# Patient Record
Sex: FEMALE | Race: WHITE | NOT HISPANIC OR LATINO | Employment: OTHER | URBAN - METROPOLITAN AREA
[De-identification: names, ages, dates, MRNs, and addresses within clinical notes are randomized per-mention and may not be internally consistent; named-entity substitution may affect disease eponyms.]

---

## 2017-10-17 ENCOUNTER — GENERIC CONVERSION - ENCOUNTER (OUTPATIENT)
Dept: OTHER | Facility: OTHER | Age: 62
End: 2017-10-17

## 2017-10-17 ENCOUNTER — ALLSCRIPTS OFFICE VISIT (OUTPATIENT)
Dept: OTHER | Facility: OTHER | Age: 62
End: 2017-10-17

## 2017-10-17 DIAGNOSIS — G47.00 INSOMNIA: ICD-10-CM

## 2017-10-17 DIAGNOSIS — E11.9 TYPE 2 DIABETES MELLITUS WITHOUT COMPLICATIONS (HCC): ICD-10-CM

## 2017-10-17 DIAGNOSIS — I48.91 ATRIAL FIBRILLATION (HCC): ICD-10-CM

## 2017-10-17 DIAGNOSIS — R11.0 NAUSEA: ICD-10-CM

## 2017-10-17 DIAGNOSIS — I63.9 CEREBRAL INFARCTION (HCC): ICD-10-CM

## 2017-11-22 ENCOUNTER — ALLSCRIPTS OFFICE VISIT (OUTPATIENT)
Dept: OTHER | Facility: OTHER | Age: 62
End: 2017-11-22

## 2017-11-22 LAB — HBA1C MFR BLD HPLC: 8.8 %

## 2017-12-26 ENCOUNTER — DOCTOR'S OFFICE (OUTPATIENT)
Dept: URBAN - METROPOLITAN AREA CLINIC 136 | Facility: CLINIC | Age: 62
Setting detail: OPHTHALMOLOGY
End: 2017-12-26
Payer: COMMERCIAL

## 2017-12-26 DIAGNOSIS — Z79.84: ICD-10-CM

## 2017-12-26 DIAGNOSIS — H25.13: ICD-10-CM

## 2017-12-26 DIAGNOSIS — E11.3413: ICD-10-CM

## 2017-12-26 PROCEDURE — 92004 COMPRE OPH EXAM NEW PT 1/>: CPT | Performed by: OPHTHALMOLOGY

## 2017-12-26 PROCEDURE — 92134 CPTRZ OPH DX IMG PST SGM RTA: CPT | Performed by: OPHTHALMOLOGY

## 2017-12-26 ASSESSMENT — REFRACTION_MANIFEST
OD_VA2: 20/
OU_VA: 20/
OS_VA1: 20/
OD_VA3: 20/
OD_VA3: 20/
OS_VA1: 20/
OS_VA2: 20/
OS_VA3: 20/
OU_VA: 20/
OD_VA3: 20/
OD_VA1: 20/
OS_VA2: 20/
OS_VA3: 20/
OU_VA: 20/
OD_VA1: 20/
OD_VA2: 20/
OD_VA2: 20/
OS_VA2: 20/
OD_VA1: 20/
OS_VA1: 20/
OS_VA3: 20/

## 2017-12-26 ASSESSMENT — VISUAL ACUITY
OS_BCVA: 20/70+1
OD_BCVA: 20/70-2

## 2017-12-26 ASSESSMENT — REFRACTION_CURRENTRX
OS_AXIS: 108
OS_OVR_VA: 20/
OD_AXIS: 103
OD_CYLINDER: -0.50
OS_SPHERE: +0.25
OD_OVR_VA: 20/
OS_ADD: +3.75
OS_VPRISM_DIRECTION: BF
OS_OVR_VA: 20/
OD_SPHERE: +0.75
OS_OVR_VA: 20/
OD_VPRISM_DIRECTION: BF
OS_CYLINDER: -0.50
OD_OVR_VA: 20/
OD_ADD: +3.75
OD_OVR_VA: 20/

## 2017-12-26 ASSESSMENT — REFRACTION_AUTOREFRACTION
OD_SPHERE: +1.25
OS_SPHERE: +1.25
OD_AXIS: 85
OS_CYLINDER: -1.50
OD_CYLINDER: -2.25
OS_AXIS: 74

## 2017-12-26 ASSESSMENT — SPHEQUIV_DERIVED
OD_SPHEQUIV: 0.125
OS_SPHEQUIV: 0.5

## 2017-12-26 ASSESSMENT — CONFRONTATIONAL VISUAL FIELD TEST (CVF)
OD_FINDINGS: FULL
OS_FINDINGS: FULL

## 2018-01-10 ENCOUNTER — GENERIC CONVERSION - ENCOUNTER (OUTPATIENT)
Dept: OTHER | Facility: OTHER | Age: 63
End: 2018-01-10

## 2018-01-15 VITALS
HEIGHT: 62 IN | DIASTOLIC BLOOD PRESSURE: 92 MMHG | SYSTOLIC BLOOD PRESSURE: 130 MMHG | BODY MASS INDEX: 34.96 KG/M2 | WEIGHT: 190 LBS

## 2018-01-17 ENCOUNTER — DOCTOR'S OFFICE (OUTPATIENT)
Dept: URBAN - METROPOLITAN AREA CLINIC 136 | Facility: CLINIC | Age: 63
Setting detail: OPHTHALMOLOGY
End: 2018-01-17
Payer: COMMERCIAL

## 2018-01-17 DIAGNOSIS — E11.3412: ICD-10-CM

## 2018-01-17 PROCEDURE — SPECPHARM SPECIALTY PHARMACY DRUG: Performed by: OPHTHALMOLOGY

## 2018-01-17 PROCEDURE — 67028 INJECTION EYE DRUG: CPT | Performed by: OPHTHALMOLOGY

## 2018-01-17 ASSESSMENT — REFRACTION_CURRENTRX
OS_OVR_VA: 20/
OD_AXIS: 103
OD_OVR_VA: 20/
OD_OVR_VA: 20/
OS_OVR_VA: 20/
OS_ADD: +3.75
OS_AXIS: 108
OD_OVR_VA: 20/
OD_VPRISM_DIRECTION: BF
OS_SPHERE: +0.25
OD_ADD: +3.75
OD_CYLINDER: -0.50
OS_VPRISM_DIRECTION: BF
OS_OVR_VA: 20/
OD_SPHERE: +0.75
OS_CYLINDER: -0.50

## 2018-01-17 ASSESSMENT — REFRACTION_MANIFEST
OD_VA3: 20/
OD_VA1: 20/
OS_VA1: 20/
OD_VA3: 20/
OD_VA1: 20/
OS_VA2: 20/
OD_VA2: 20/
OU_VA: 20/
OD_VA1: 20/
OS_VA1: 20/
OS_VA3: 20/
OU_VA: 20/
OU_VA: 20/
OS_VA2: 20/
OS_VA1: 20/
OD_VA2: 20/
OS_VA2: 20/
OD_VA2: 20/
OS_VA3: 20/
OS_VA3: 20/
OD_VA3: 20/

## 2018-01-17 ASSESSMENT — REFRACTION_AUTOREFRACTION
OD_SPHERE: +1.25
OD_AXIS: 85
OS_SPHERE: +1.25
OD_CYLINDER: -2.25
OS_AXIS: 74
OS_CYLINDER: -1.50

## 2018-01-17 ASSESSMENT — VISUAL ACUITY
OD_BCVA: 20/70-2
OS_BCVA: 20/70+1

## 2018-01-17 ASSESSMENT — SPHEQUIV_DERIVED
OD_SPHEQUIV: 0.125
OS_SPHEQUIV: 0.5

## 2018-01-17 ASSESSMENT — CONFRONTATIONAL VISUAL FIELD TEST (CVF)
OS_FINDINGS: FULL
OD_FINDINGS: FULL

## 2018-01-22 VITALS
WEIGHT: 180 LBS | BODY MASS INDEX: 33.13 KG/M2 | HEIGHT: 62 IN | DIASTOLIC BLOOD PRESSURE: 86 MMHG | TEMPERATURE: 97.7 F | SYSTOLIC BLOOD PRESSURE: 144 MMHG

## 2018-01-24 ENCOUNTER — DOCTOR'S OFFICE (OUTPATIENT)
Dept: URBAN - METROPOLITAN AREA CLINIC 136 | Facility: CLINIC | Age: 63
Setting detail: OPHTHALMOLOGY
End: 2018-01-24
Payer: COMMERCIAL

## 2018-01-24 VITALS
DIASTOLIC BLOOD PRESSURE: 90 MMHG | HEIGHT: 62 IN | OXYGEN SATURATION: 98 % | WEIGHT: 203.04 LBS | BODY MASS INDEX: 37.36 KG/M2 | SYSTOLIC BLOOD PRESSURE: 140 MMHG

## 2018-01-24 DIAGNOSIS — E11.3411: ICD-10-CM

## 2018-01-24 PROCEDURE — 67028 INJECTION EYE DRUG: CPT | Performed by: OPHTHALMOLOGY

## 2018-01-24 PROCEDURE — SPECPHARM SPECIALTY PHARMACY DRUG: Performed by: OPHTHALMOLOGY

## 2018-01-24 ASSESSMENT — REFRACTION_MANIFEST
OS_VA1: 20/
OD_VA1: 20/
OS_VA1: 20/
OU_VA: 20/
OD_VA1: 20/
OS_VA2: 20/
OS_VA1: 20/
OS_VA3: 20/
OD_VA2: 20/
OS_VA3: 20/
OU_VA: 20/
OD_VA1: 20/
OD_VA2: 20/
OU_VA: 20/
OS_VA3: 20/
OD_VA2: 20/
OD_VA3: 20/
OS_VA2: 20/
OD_VA3: 20/
OD_VA3: 20/
OS_VA2: 20/

## 2018-01-24 ASSESSMENT — REFRACTION_CURRENTRX
OD_OVR_VA: 20/
OS_CYLINDER: -0.50
OD_ADD: +3.75
OS_OVR_VA: 20/
OS_AXIS: 108
OS_OVR_VA: 20/
OS_VPRISM_DIRECTION: BF
OS_ADD: +3.75
OD_VPRISM_DIRECTION: BF
OD_SPHERE: +0.75
OS_SPHERE: +0.25
OD_AXIS: 103
OS_OVR_VA: 20/
OD_OVR_VA: 20/
OD_OVR_VA: 20/
OD_CYLINDER: -0.50

## 2018-01-24 ASSESSMENT — SPHEQUIV_DERIVED
OD_SPHEQUIV: 0.125
OS_SPHEQUIV: 0.5

## 2018-01-24 ASSESSMENT — REFRACTION_AUTOREFRACTION
OS_SPHERE: +1.25
OD_AXIS: 85
OD_CYLINDER: -2.25
OS_CYLINDER: -1.50
OS_AXIS: 74
OD_SPHERE: +1.25

## 2018-01-24 ASSESSMENT — VISUAL ACUITY
OD_BCVA: 20/70-2
OS_BCVA: 20/70-1

## 2018-02-27 DIAGNOSIS — E11.9 TYPE 2 DIABETES MELLITUS WITHOUT COMPLICATION, WITH LONG-TERM CURRENT USE OF INSULIN (HCC): Primary | ICD-10-CM

## 2018-02-27 DIAGNOSIS — Z79.4 TYPE 2 DIABETES MELLITUS WITHOUT COMPLICATION, WITH LONG-TERM CURRENT USE OF INSULIN (HCC): Primary | ICD-10-CM

## 2018-02-28 ENCOUNTER — DOCTOR'S OFFICE (OUTPATIENT)
Dept: URBAN - METROPOLITAN AREA CLINIC 136 | Facility: CLINIC | Age: 63
Setting detail: OPHTHALMOLOGY
End: 2018-02-28
Payer: COMMERCIAL

## 2018-02-28 DIAGNOSIS — Z79.84: ICD-10-CM

## 2018-02-28 DIAGNOSIS — E11.3413: ICD-10-CM

## 2018-02-28 DIAGNOSIS — E11.3411: ICD-10-CM

## 2018-02-28 DIAGNOSIS — H25.13: ICD-10-CM

## 2018-02-28 PROCEDURE — 92134 CPTRZ OPH DX IMG PST SGM RTA: CPT | Performed by: OPHTHALMOLOGY

## 2018-02-28 PROCEDURE — 67028 INJECTION EYE DRUG: CPT | Performed by: OPHTHALMOLOGY

## 2018-02-28 PROCEDURE — SPECPHARM SPECIALTY PHARMACY DRUG: Performed by: OPHTHALMOLOGY

## 2018-02-28 PROCEDURE — 92012 INTRM OPH EXAM EST PATIENT: CPT | Performed by: OPHTHALMOLOGY

## 2018-02-28 ASSESSMENT — REFRACTION_MANIFEST
OU_VA: 20/
OS_VA2: 20/
OD_VA1: 20/
OS_VA1: 20/
OD_VA3: 20/
OS_VA1: 20/
OD_VA1: 20/
OU_VA: 20/
OS_VA1: 20/
OD_VA1: 20/
OD_VA3: 20/
OS_VA3: 20/
OD_VA2: 20/
OD_VA2: 20/
OD_VA3: 20/
OS_VA3: 20/
OS_VA3: 20/
OS_VA2: 20/
OU_VA: 20/
OS_VA2: 20/
OD_VA2: 20/

## 2018-02-28 ASSESSMENT — REFRACTION_CURRENTRX
OD_VPRISM_DIRECTION: BF
OD_CYLINDER: -0.50
OS_OVR_VA: 20/
OD_OVR_VA: 20/
OS_ADD: +3.75
OD_OVR_VA: 20/
OD_ADD: +3.75
OS_OVR_VA: 20/
OS_CYLINDER: -0.50
OS_SPHERE: +0.25
OS_AXIS: 108
OD_AXIS: 103
OD_OVR_VA: 20/
OD_SPHERE: +0.75
OS_VPRISM_DIRECTION: BF
OS_OVR_VA: 20/

## 2018-02-28 ASSESSMENT — VISUAL ACUITY
OS_BCVA: 20/70-2
OD_BCVA: 20/80+1

## 2018-02-28 ASSESSMENT — CONFRONTATIONAL VISUAL FIELD TEST (CVF)
OS_FINDINGS: FULL
OD_FINDINGS: FULL

## 2018-02-28 ASSESSMENT — REFRACTION_AUTOREFRACTION
OS_AXIS: 74
OS_CYLINDER: -1.50
OS_SPHERE: +1.25
OD_CYLINDER: -2.25
OD_AXIS: 85
OD_SPHERE: +1.25

## 2018-02-28 ASSESSMENT — SPHEQUIV_DERIVED
OS_SPHEQUIV: 0.5
OD_SPHEQUIV: 0.125

## 2018-03-02 ENCOUNTER — TELEPHONE (OUTPATIENT)
Dept: FAMILY MEDICINE CLINIC | Facility: CLINIC | Age: 63
End: 2018-03-02

## 2018-03-05 DIAGNOSIS — E11.9 TYPE 2 DIABETES MELLITUS WITHOUT COMPLICATION, WITH LONG-TERM CURRENT USE OF INSULIN (HCC): ICD-10-CM

## 2018-03-05 DIAGNOSIS — Z79.4 TYPE 2 DIABETES MELLITUS WITHOUT COMPLICATION, WITH LONG-TERM CURRENT USE OF INSULIN (HCC): ICD-10-CM

## 2018-03-05 NOTE — TELEPHONE ENCOUNTER
PT CALLED AND WAS QUESTIONING WHERE HER SCRIPT WAS FOR - "BASAGLAR"   SHE SAID SHE ORDERED IT LAST WEEK  SHE ALSO WOULD LIKE TO REORDER HER SCRIPT FOR "Rosalio Mccormick" - SHE WOULD LIKE THEM TO GO TO [address] [place] [address] ON [address] IN [address] [facility]    TR [id number]

## 2018-03-15 ENCOUNTER — DOCTOR'S OFFICE (OUTPATIENT)
Dept: URBAN - METROPOLITAN AREA CLINIC 136 | Facility: CLINIC | Age: 63
Setting detail: OPHTHALMOLOGY
End: 2018-03-15
Payer: COMMERCIAL

## 2018-03-15 ENCOUNTER — RX ONLY (RX ONLY)
Age: 63
End: 2018-03-15

## 2018-03-15 DIAGNOSIS — R05.9 COUGH: Primary | ICD-10-CM

## 2018-03-15 DIAGNOSIS — E11.3412: ICD-10-CM

## 2018-03-15 PROCEDURE — 67028 INJECTION EYE DRUG: CPT | Performed by: OPHTHALMOLOGY

## 2018-03-15 PROCEDURE — SPECPHARM SPECIALTY PHARMACY DRUG: Performed by: OPHTHALMOLOGY

## 2018-03-15 RX ORDER — ALBUTEROL SULFATE 90 UG/1
2 AEROSOL, METERED RESPIRATORY (INHALATION)
COMMUNITY
Start: 2018-01-10 | End: 2018-03-15 | Stop reason: SDUPTHER

## 2018-03-15 RX ORDER — ALBUTEROL SULFATE 90 UG/1
2 AEROSOL, METERED RESPIRATORY (INHALATION) EVERY 4 HOURS PRN
Qty: 1 INHALER | Refills: 2 | Status: SHIPPED | OUTPATIENT
Start: 2018-03-15 | End: 2018-10-25 | Stop reason: SDUPTHER

## 2018-03-15 ASSESSMENT — REFRACTION_CURRENTRX
OS_VPRISM_DIRECTION: BF
OD_ADD: +3.75
OD_OVR_VA: 20/
OD_OVR_VA: 20/
OS_AXIS: 108
OD_CYLINDER: -0.50
OS_CYLINDER: -0.50
OS_OVR_VA: 20/
OS_ADD: +3.75
OD_OVR_VA: 20/
OS_OVR_VA: 20/
OD_VPRISM_DIRECTION: BF
OD_AXIS: 103
OS_OVR_VA: 20/
OS_SPHERE: +0.25
OD_SPHERE: +0.75

## 2018-03-15 ASSESSMENT — REFRACTION_MANIFEST
OD_VA1: 20/
OD_VA3: 20/
OS_VA3: 20/
OS_VA2: 20/
OD_VA3: 20/
OS_VA1: 20/
OS_VA2: 20/
OU_VA: 20/
OU_VA: 20/
OS_VA3: 20/
OD_VA2: 20/
OD_VA2: 20/
OU_VA: 20/
OD_VA1: 20/
OS_VA1: 20/
OS_VA2: 20/
OD_VA3: 20/
OD_VA2: 20/
OS_VA3: 20/
OS_VA1: 20/
OD_VA1: 20/

## 2018-03-15 ASSESSMENT — REFRACTION_AUTOREFRACTION
OS_AXIS: 74
OD_AXIS: 85
OD_SPHERE: +1.25
OD_CYLINDER: -2.25
OS_CYLINDER: -1.50
OS_SPHERE: +1.25

## 2018-03-15 ASSESSMENT — VISUAL ACUITY
OD_BCVA: 20/100+1
OS_BCVA: 20/70-2

## 2018-03-15 ASSESSMENT — SPHEQUIV_DERIVED
OD_SPHEQUIV: 0.125
OS_SPHEQUIV: 0.5

## 2018-03-16 ENCOUNTER — TELEPHONE (OUTPATIENT)
Dept: FAMILY MEDICINE CLINIC | Facility: CLINIC | Age: 63
End: 2018-03-16

## 2018-03-22 DIAGNOSIS — E11.8 TYPE 2 DIABETES MELLITUS WITH COMPLICATION, UNSPECIFIED LONG TERM INSULIN USE STATUS: Primary | ICD-10-CM

## 2018-04-19 ENCOUNTER — DOCTOR'S OFFICE (OUTPATIENT)
Dept: URBAN - METROPOLITAN AREA CLINIC 136 | Facility: CLINIC | Age: 63
Setting detail: OPHTHALMOLOGY
End: 2018-04-19
Payer: COMMERCIAL

## 2018-04-19 ENCOUNTER — RX ONLY (RX ONLY)
Age: 63
End: 2018-04-19

## 2018-04-19 DIAGNOSIS — E11.3411: ICD-10-CM

## 2018-04-19 DIAGNOSIS — Z79.84: ICD-10-CM

## 2018-04-19 DIAGNOSIS — E11.3413: ICD-10-CM

## 2018-04-19 DIAGNOSIS — H25.13: ICD-10-CM

## 2018-04-19 PROCEDURE — SPECPHARM SPECIALTY PHARMACY DRUG: Performed by: OPHTHALMOLOGY

## 2018-04-19 PROCEDURE — 92014 COMPRE OPH EXAM EST PT 1/>: CPT | Performed by: OPHTHALMOLOGY

## 2018-04-19 PROCEDURE — 92134 CPTRZ OPH DX IMG PST SGM RTA: CPT | Performed by: OPHTHALMOLOGY

## 2018-04-19 PROCEDURE — 67028 INJECTION EYE DRUG: CPT | Performed by: OPHTHALMOLOGY

## 2018-04-19 ASSESSMENT — SPHEQUIV_DERIVED
OD_SPHEQUIV: 0.125
OS_SPHEQUIV: 0.5

## 2018-04-19 ASSESSMENT — REFRACTION_MANIFEST
OD_VA1: 20/
OD_VA3: 20/
OS_VA1: 20/
OS_VA2: 20/
OD_VA2: 20/
OU_VA: 20/
OD_VA2: 20/
OD_VA1: 20/
OS_VA2: 20/
OD_VA3: 20/
OS_VA3: 20/
OS_VA1: 20/
OD_VA2: 20/
OD_VA3: 20/
OS_VA3: 20/
OU_VA: 20/
OU_VA: 20/
OS_VA1: 20/
OD_VA1: 20/
OS_VA2: 20/
OS_VA3: 20/

## 2018-04-19 ASSESSMENT — CONFRONTATIONAL VISUAL FIELD TEST (CVF)
OS_FINDINGS: FULL
OD_FINDINGS: FULL

## 2018-04-19 ASSESSMENT — REFRACTION_CURRENTRX
OD_CYLINDER: -0.50
OS_CYLINDER: -0.50
OS_OVR_VA: 20/
OD_OVR_VA: 20/
OS_AXIS: 108
OD_SPHERE: +0.75
OS_ADD: +3.75
OD_ADD: +3.75
OD_OVR_VA: 20/
OS_OVR_VA: 20/
OS_SPHERE: +0.25
OD_VPRISM_DIRECTION: BF
OS_OVR_VA: 20/
OD_OVR_VA: 20/
OD_AXIS: 103
OS_VPRISM_DIRECTION: BF

## 2018-04-19 ASSESSMENT — REFRACTION_AUTOREFRACTION
OS_SPHERE: +1.25
OS_CYLINDER: -1.50
OD_AXIS: 85
OS_AXIS: 74
OD_CYLINDER: -2.25
OD_SPHERE: +1.25

## 2018-04-19 ASSESSMENT — VISUAL ACUITY
OS_BCVA: 20/125-1
OD_BCVA: 20/125

## 2018-04-30 ENCOUNTER — DOCTOR'S OFFICE (OUTPATIENT)
Dept: URBAN - METROPOLITAN AREA CLINIC 136 | Facility: CLINIC | Age: 63
Setting detail: OPHTHALMOLOGY
End: 2018-04-30
Payer: COMMERCIAL

## 2018-04-30 ENCOUNTER — RX ONLY (RX ONLY)
Age: 63
End: 2018-04-30

## 2018-04-30 DIAGNOSIS — E11.3412: ICD-10-CM

## 2018-04-30 PROCEDURE — SPECPHARM SPECIALTY PHARMACY DRUG: Performed by: OPHTHALMOLOGY

## 2018-04-30 PROCEDURE — 67028 INJECTION EYE DRUG: CPT | Performed by: OPHTHALMOLOGY

## 2018-04-30 ASSESSMENT — REFRACTION_MANIFEST
OU_VA: 20/
OS_VA1: 20/
OS_VA3: 20/
OS_VA3: 20/
OD_VA2: 20/
OD_VA3: 20/
OD_VA1: 20/
OD_VA2: 20/
OS_VA2: 20/
OD_VA3: 20/
OS_VA2: 20/
OS_VA3: 20/
OU_VA: 20/
OD_VA1: 20/
OS_VA1: 20/
OU_VA: 20/
OD_VA1: 20/
OD_VA2: 20/
OS_VA2: 20/
OD_VA3: 20/
OS_VA1: 20/

## 2018-04-30 ASSESSMENT — REFRACTION_CURRENTRX
OD_OVR_VA: 20/
OS_VPRISM_DIRECTION: BF
OD_CYLINDER: -0.50
OS_ADD: +3.75
OD_OVR_VA: 20/
OS_OVR_VA: 20/
OS_OVR_VA: 20/
OS_SPHERE: +0.25
OD_OVR_VA: 20/
OS_AXIS: 108
OS_CYLINDER: -0.50
OS_OVR_VA: 20/
OD_ADD: +3.75
OD_SPHERE: +0.75
OD_VPRISM_DIRECTION: BF
OD_AXIS: 103

## 2018-04-30 ASSESSMENT — REFRACTION_AUTOREFRACTION
OD_AXIS: 85
OS_CYLINDER: -1.50
OS_AXIS: 74
OD_SPHERE: +1.25
OD_CYLINDER: -2.25
OS_SPHERE: +1.25

## 2018-04-30 ASSESSMENT — VISUAL ACUITY
OS_BCVA: 20/125-1
OD_BCVA: 20/100-1

## 2018-04-30 ASSESSMENT — SPHEQUIV_DERIVED
OD_SPHEQUIV: 0.125
OS_SPHEQUIV: 0.5

## 2018-05-10 ENCOUNTER — TELEPHONE (OUTPATIENT)
Dept: FAMILY MEDICINE CLINIC | Facility: CLINIC | Age: 63
End: 2018-05-10

## 2018-05-10 DIAGNOSIS — E11.9 TYPE 2 DIABETES MELLITUS WITHOUT COMPLICATION, WITH LONG-TERM CURRENT USE OF INSULIN (HCC): ICD-10-CM

## 2018-05-10 DIAGNOSIS — Z79.4 TYPE 2 DIABETES MELLITUS WITHOUT COMPLICATION, WITH LONG-TERM CURRENT USE OF INSULIN (HCC): ICD-10-CM

## 2018-05-29 DIAGNOSIS — E13.9 OTHER SPECIFIED DIABETES MELLITUS WITHOUT COMPLICATION, WITH LONG-TERM CURRENT USE OF INSULIN (HCC): Primary | ICD-10-CM

## 2018-05-29 DIAGNOSIS — Z79.4 OTHER SPECIFIED DIABETES MELLITUS WITHOUT COMPLICATION, WITH LONG-TERM CURRENT USE OF INSULIN (HCC): Primary | ICD-10-CM

## 2018-05-29 RX ORDER — LANCETS
EACH MISCELLANEOUS
COMMUNITY
Start: 2017-10-10 | End: 2021-04-30 | Stop reason: ALTCHOICE

## 2018-05-29 RX ORDER — PEN NEEDLE, DIABETIC 30 GX5/16"
NEEDLE, DISPOSABLE MISCELLANEOUS DAILY
Qty: 50 EACH | Refills: 0 | Status: SHIPPED | OUTPATIENT
Start: 2018-05-29 | End: 2021-09-08 | Stop reason: SDUPTHER

## 2018-05-30 ENCOUNTER — RX ONLY (RX ONLY)
Age: 63
End: 2018-05-30

## 2018-05-30 ENCOUNTER — DOCTOR'S OFFICE (OUTPATIENT)
Dept: URBAN - METROPOLITAN AREA CLINIC 136 | Facility: CLINIC | Age: 63
Setting detail: OPHTHALMOLOGY
End: 2018-05-30
Payer: COMMERCIAL

## 2018-05-30 DIAGNOSIS — E11.3411: ICD-10-CM

## 2018-05-30 DIAGNOSIS — H25.13: ICD-10-CM

## 2018-05-30 DIAGNOSIS — E11.3413: ICD-10-CM

## 2018-05-30 DIAGNOSIS — Z79.84: ICD-10-CM

## 2018-05-30 PROCEDURE — SPECPHARM SPECIALTY PHARMACY DRUG: Performed by: OPHTHALMOLOGY

## 2018-05-30 PROCEDURE — 92134 CPTRZ OPH DX IMG PST SGM RTA: CPT | Performed by: OPHTHALMOLOGY

## 2018-05-30 PROCEDURE — 92012 INTRM OPH EXAM EST PATIENT: CPT | Performed by: OPHTHALMOLOGY

## 2018-05-30 PROCEDURE — 67028 INJECTION EYE DRUG: CPT | Performed by: OPHTHALMOLOGY

## 2018-05-30 ASSESSMENT — CONFRONTATIONAL VISUAL FIELD TEST (CVF)
OD_FINDINGS: FULL
OS_FINDINGS: FULL

## 2018-05-31 ENCOUNTER — RX ONLY (RX ONLY)
Age: 63
End: 2018-05-31

## 2018-05-31 ASSESSMENT — REFRACTION_CURRENTRX
OS_SPHERE: +0.25
OD_OVR_VA: 20/
OS_OVR_VA: 20/
OS_CYLINDER: -0.50
OD_VPRISM_DIRECTION: BF
OD_OVR_VA: 20/
OS_VPRISM_DIRECTION: BF
OD_ADD: +3.75
OS_OVR_VA: 20/
OD_AXIS: 103
OS_ADD: +3.75
OD_SPHERE: +0.75
OD_CYLINDER: -0.50
OS_AXIS: 108
OS_OVR_VA: 20/
OD_OVR_VA: 20/

## 2018-05-31 ASSESSMENT — REFRACTION_MANIFEST
OS_VA2: 20/
OS_VA1: 20/
OS_VA1: 20/
OD_VA3: 20/
OD_VA1: 20/
OD_VA3: 20/
OS_VA3: 20/
OD_VA2: 20/
OU_VA: 20/
OS_VA1: 20/
OS_VA2: 20/
OS_VA3: 20/
OD_VA2: 20/
OD_VA1: 20/
OU_VA: 20/
OD_VA3: 20/
OS_VA2: 20/
OD_VA2: 20/
OS_VA3: 20/
OD_VA1: 20/
OU_VA: 20/

## 2018-05-31 ASSESSMENT — REFRACTION_AUTOREFRACTION
OS_AXIS: 74
OS_SPHERE: +1.25
OS_CYLINDER: -1.50
OD_SPHERE: +1.25
OD_AXIS: 85
OD_CYLINDER: -2.25

## 2018-05-31 ASSESSMENT — VISUAL ACUITY
OS_BCVA: 20/125
OD_BCVA: 20/100

## 2018-05-31 ASSESSMENT — SPHEQUIV_DERIVED
OD_SPHEQUIV: 0.125
OS_SPHEQUIV: 0.5

## 2018-06-07 ENCOUNTER — TELEPHONE (OUTPATIENT)
Dept: FAMILY MEDICINE CLINIC | Facility: CLINIC | Age: 63
End: 2018-06-07

## 2018-06-07 NOTE — TELEPHONE ENCOUNTER
Spoke with patient-as per Dr Hugo Biswas patient to hold second dose of metformin and cut lantus dose in half at bedtime-pt aware and understands-mg

## 2018-06-07 NOTE — TELEPHONE ENCOUNTER
PT LEFT VOICE MESSAGE STATING SHE IS HAVING THROAT SURGERY TOMORROW AND SHE NEEDS TO KNOW WHAT TO DO WITH HER INSULIN INJECTION

## 2018-06-12 ENCOUNTER — DOCTOR'S OFFICE (OUTPATIENT)
Dept: URBAN - METROPOLITAN AREA CLINIC 136 | Facility: CLINIC | Age: 63
Setting detail: OPHTHALMOLOGY
End: 2018-06-12
Payer: COMMERCIAL

## 2018-06-12 ENCOUNTER — RX ONLY (RX ONLY)
Age: 63
End: 2018-06-12

## 2018-06-12 DIAGNOSIS — E11.3412: ICD-10-CM

## 2018-06-12 PROCEDURE — 67028 INJECTION EYE DRUG: CPT | Performed by: OPHTHALMOLOGY

## 2018-06-12 PROCEDURE — SPECPHARM SPECIALTY PHARMACY DRUG: Performed by: OPHTHALMOLOGY

## 2018-06-12 ASSESSMENT — REFRACTION_MANIFEST
OS_VA2: 20/
OS_VA3: 20/
OU_VA: 20/
OS_VA1: 20/
OS_VA2: 20/
OS_VA3: 20/
OD_VA3: 20/
OS_VA2: 20/
OS_VA3: 20/
OD_VA1: 20/
OU_VA: 20/
OD_VA3: 20/
OU_VA: 20/
OD_VA2: 20/
OD_VA2: 20/
OS_VA1: 20/
OD_VA2: 20/
OS_VA1: 20/
OD_VA3: 20/
OD_VA1: 20/
OD_VA1: 20/

## 2018-06-12 ASSESSMENT — REFRACTION_AUTOREFRACTION
OS_SPHERE: +1.25
OD_SPHERE: +1.25
OD_AXIS: 85
OS_AXIS: 74
OD_CYLINDER: -2.25
OS_CYLINDER: -1.50

## 2018-06-12 ASSESSMENT — REFRACTION_CURRENTRX
OD_SPHERE: +0.75
OS_OVR_VA: 20/
OD_OVR_VA: 20/
OS_VPRISM_DIRECTION: BF
OS_AXIS: 108
OS_OVR_VA: 20/
OD_CYLINDER: -0.50
OS_OVR_VA: 20/
OD_VPRISM_DIRECTION: BF
OD_AXIS: 103
OS_ADD: +3.75
OD_OVR_VA: 20/
OD_OVR_VA: 20/
OS_SPHERE: +0.25
OD_ADD: +3.75
OS_CYLINDER: -0.50

## 2018-06-12 ASSESSMENT — CONFRONTATIONAL VISUAL FIELD TEST (CVF)
OS_FINDINGS: FULL
OD_FINDINGS: FULL

## 2018-06-12 ASSESSMENT — SPHEQUIV_DERIVED
OD_SPHEQUIV: 0.125
OS_SPHEQUIV: 0.5

## 2018-06-12 ASSESSMENT — VISUAL ACUITY
OD_BCVA: 20/80-1
OS_BCVA: 20/70-2

## 2018-06-25 DIAGNOSIS — E11.9 TYPE 2 DIABETES MELLITUS WITHOUT COMPLICATION, WITH LONG-TERM CURRENT USE OF INSULIN (HCC): ICD-10-CM

## 2018-06-25 DIAGNOSIS — Z79.4 TYPE 2 DIABETES MELLITUS WITHOUT COMPLICATION, WITH LONG-TERM CURRENT USE OF INSULIN (HCC): ICD-10-CM

## 2018-06-25 NOTE — TELEPHONE ENCOUNTER
Dr Erinn Reyez patient  She called stating she is out of her medication and requests a refill ASAP to be sent to [facility]

## 2018-06-26 ENCOUNTER — OPTICAL OFFICE (OUTPATIENT)
Dept: URBAN - METROPOLITAN AREA CLINIC 143 | Facility: CLINIC | Age: 63
Setting detail: OPHTHALMOLOGY
End: 2018-06-26
Payer: COMMERCIAL

## 2018-06-26 ENCOUNTER — DOCTOR'S OFFICE (OUTPATIENT)
Dept: URBAN - METROPOLITAN AREA CLINIC 136 | Facility: CLINIC | Age: 63
Setting detail: OPHTHALMOLOGY
End: 2018-06-26
Payer: COMMERCIAL

## 2018-06-26 DIAGNOSIS — H52.4: ICD-10-CM

## 2018-06-26 DIAGNOSIS — H52.223: ICD-10-CM

## 2018-06-26 PROCEDURE — V2744 TINT PHOTOCHROMATIC LENS/ES: HCPCS | Performed by: OPTOMETRIST

## 2018-06-26 PROCEDURE — 92015 DETERMINE REFRACTIVE STATE: CPT | Performed by: OPTOMETRIST

## 2018-06-26 PROCEDURE — V2203 LENS SPHCYL BIFOCAL 4.00D/.1: HCPCS | Performed by: OPTOMETRIST

## 2018-06-26 PROCEDURE — V2020 VISION SVCS FRAMES PURCHASES: HCPCS | Performed by: OPTOMETRIST

## 2018-06-26 ASSESSMENT — REFRACTION_OUTSIDERX
OD_VA3: 20/
OS_ADD: +2.75
OS_VA2: 20/
OS_SPHERE: +1.25
OU_VA: 20/50
OD_VA1: 20/50-
OS_AXIS: 090
OD_AXIS: 105
OS_VA1: 20/50-
OD_ADD: +2.75
OS_VA3: 20/
OD_SPHERE: +1.25
OD_VA2: 20/
OS_CYLINDER: -0.75
OD_CYLINDER: -0.75

## 2018-06-26 ASSESSMENT — REFRACTION_MANIFEST
OD_VA1: 20/
OS_VA2: 20/
OD_VA3: 20/
OD_VA1: 20/
OS_VA3: 20/
OU_VA: 20/
OS_VA2: 20/
OD_VA2: 20/
OU_VA: 20/
OS_VA3: 20/
OD_VA2: 20/
OD_VA3: 20/
OS_VA1: 20/
OS_VA1: 20/

## 2018-06-26 ASSESSMENT — REFRACTION_AUTOREFRACTION
OS_CYLINDER: -1.50
OS_AXIS: 087
OD_CYLINDER: -1.50
OD_AXIS: 115
OD_SPHERE: +1.25
OS_SPHERE: +1.50

## 2018-06-26 ASSESSMENT — REFRACTION_CURRENTRX
OD_CYLINDER: -0.50
OS_OVR_VA: 20/
OS_OVR_VA: 20/
OD_AXIS: 103
OS_AXIS: 108
OS_VPRISM_DIRECTION: BF
OS_CYLINDER: -0.50
OD_VPRISM_DIRECTION: BF
OD_ADD: +3.75
OD_OVR_VA: 20/
OS_ADD: +3.75
OD_OVR_VA: 20/
OS_SPHERE: +0.25
OD_OVR_VA: 20/
OS_OVR_VA: 20/
OD_SPHERE: +0.75

## 2018-06-26 ASSESSMENT — CONFRONTATIONAL VISUAL FIELD TEST (CVF): OD_FINDINGS: FULL

## 2018-06-26 ASSESSMENT — VISUAL ACUITY
OD_BCVA: 20/100-1
OS_BCVA: 20/80+1

## 2018-06-26 ASSESSMENT — SPHEQUIV_DERIVED
OS_SPHEQUIV: 0.75
OD_SPHEQUIV: 0.5

## 2018-07-19 ENCOUNTER — DOCTOR'S OFFICE (OUTPATIENT)
Dept: URBAN - METROPOLITAN AREA CLINIC 136 | Facility: CLINIC | Age: 63
Setting detail: OPHTHALMOLOGY
End: 2018-07-19
Payer: COMMERCIAL

## 2018-07-19 DIAGNOSIS — E11.3413: ICD-10-CM

## 2018-07-19 DIAGNOSIS — H25.13: ICD-10-CM

## 2018-07-19 DIAGNOSIS — Z79.84: ICD-10-CM

## 2018-07-19 PROCEDURE — 92012 INTRM OPH EXAM EST PATIENT: CPT | Performed by: OPHTHALMOLOGY

## 2018-07-19 PROCEDURE — 92134 CPTRZ OPH DX IMG PST SGM RTA: CPT | Performed by: OPHTHALMOLOGY

## 2018-07-19 ASSESSMENT — CONFRONTATIONAL VISUAL FIELD TEST (CVF): OD_FINDINGS: FULL

## 2018-07-25 ASSESSMENT — REFRACTION_MANIFEST
OD_VA2: 20/
OD_VA3: 20/
OD_VA2: 20/
OD_VA1: 20/
OS_VA2: 20/
OD_VA1: 20/
OU_VA: 20/
OS_VA1: 20/
OS_VA2: 20/
OS_VA1: 20/
OU_VA: 20/
OS_VA3: 20/
OS_VA3: 20/
OD_VA3: 20/

## 2018-07-25 ASSESSMENT — REFRACTION_OUTSIDERX
OS_SPHERE: +1.25
OD_VA3: 20/
OS_AXIS: 090
OS_ADD: +2.75
OU_VA: 20/50
OS_CYLINDER: -0.75
OS_VA1: 20/50-
OD_SPHERE: +1.25
OD_CYLINDER: -0.75
OD_VA2: 20/
OD_VA1: 20/50-
OS_VA2: 20/
OD_ADD: +2.75
OS_VA3: 20/
OD_AXIS: 105

## 2018-07-25 ASSESSMENT — REFRACTION_AUTOREFRACTION
OS_AXIS: 087
OD_CYLINDER: -1.50
OS_CYLINDER: -1.50
OS_SPHERE: +1.50
OD_AXIS: 115
OD_SPHERE: +1.25

## 2018-07-25 ASSESSMENT — REFRACTION_CURRENTRX
OD_OVR_VA: 20/
OD_AXIS: 103
OS_VPRISM_DIRECTION: BF
OS_ADD: +3.75
OS_OVR_VA: 20/
OD_ADD: +3.75
OS_AXIS: 108
OD_SPHERE: +0.75
OS_SPHERE: +0.25
OD_OVR_VA: 20/
OS_OVR_VA: 20/
OS_CYLINDER: -0.50
OD_CYLINDER: -0.50
OS_OVR_VA: 20/
OD_OVR_VA: 20/
OD_VPRISM_DIRECTION: BF

## 2018-07-25 ASSESSMENT — SPHEQUIV_DERIVED
OD_SPHEQUIV: 0.5
OS_SPHEQUIV: 0.75

## 2018-07-25 ASSESSMENT — VISUAL ACUITY
OS_BCVA: 20/60-1
OD_BCVA: 20/60-1

## 2018-08-07 DIAGNOSIS — Z79.4 TYPE 2 DIABETES MELLITUS WITHOUT COMPLICATION, WITH LONG-TERM CURRENT USE OF INSULIN (HCC): ICD-10-CM

## 2018-08-07 DIAGNOSIS — E11.9 TYPE 2 DIABETES MELLITUS WITHOUT COMPLICATION, WITH LONG-TERM CURRENT USE OF INSULIN (HCC): ICD-10-CM

## 2018-08-07 RX ORDER — INSULIN GLARGINE 100 [IU]/ML
50 INJECTION, SOLUTION SUBCUTANEOUS
Qty: 18 ML | Refills: 0 | Status: SHIPPED | OUTPATIENT
Start: 2018-08-07 | End: 2018-09-18 | Stop reason: SDUPTHER

## 2018-08-09 ENCOUNTER — DOCTOR'S OFFICE (OUTPATIENT)
Dept: URBAN - METROPOLITAN AREA CLINIC 136 | Facility: CLINIC | Age: 63
Setting detail: OPHTHALMOLOGY
End: 2018-08-09
Payer: COMMERCIAL

## 2018-08-09 ENCOUNTER — RX ONLY (RX ONLY)
Age: 63
End: 2018-08-09

## 2018-08-09 DIAGNOSIS — E11.3411: ICD-10-CM

## 2018-08-09 PROCEDURE — SPECPHARM SPECIALTY PHARMACY DRUG: Performed by: OPHTHALMOLOGY

## 2018-08-09 PROCEDURE — 67028 INJECTION EYE DRUG: CPT | Performed by: OPHTHALMOLOGY

## 2018-08-09 ASSESSMENT — REFRACTION_MANIFEST
OU_VA: 20/
OS_VA3: 20/
OS_VA1: 20/
OD_VA3: 20/
OS_VA3: 20/
OD_VA1: 20/
OD_VA2: 20/
OD_VA2: 20/
OU_VA: 20/
OD_VA1: 20/
OS_VA2: 20/
OS_VA1: 20/
OS_VA2: 20/
OD_VA3: 20/

## 2018-08-09 ASSESSMENT — REFRACTION_CURRENTRX
OS_CYLINDER: -0.50
OS_ADD: +3.75
OS_VPRISM_DIRECTION: BF
OD_CYLINDER: -0.50
OD_ADD: +3.75
OS_OVR_VA: 20/
OS_SPHERE: +0.25
OD_OVR_VA: 20/
OD_AXIS: 103
OD_OVR_VA: 20/
OS_OVR_VA: 20/
OS_OVR_VA: 20/
OD_VPRISM_DIRECTION: BF
OS_AXIS: 108
OD_SPHERE: +0.75
OD_OVR_VA: 20/

## 2018-08-09 ASSESSMENT — REFRACTION_OUTSIDERX
OS_VA3: 20/
OD_SPHERE: +1.25
OD_VA1: 20/50-
OD_VA3: 20/
OS_VA2: 20/
OD_CYLINDER: -0.75
OD_VA2: 20/
OD_AXIS: 105
OS_AXIS: 090
OU_VA: 20/50
OS_VA1: 20/50-
OD_ADD: +2.75
OS_SPHERE: +1.25
OS_ADD: +2.75
OS_CYLINDER: -0.75

## 2018-08-09 ASSESSMENT — REFRACTION_AUTOREFRACTION
OS_AXIS: 087
OD_AXIS: 115
OD_CYLINDER: -1.50
OS_SPHERE: +1.50
OD_SPHERE: +1.25
OS_CYLINDER: -1.50

## 2018-08-09 ASSESSMENT — SPHEQUIV_DERIVED
OD_SPHEQUIV: 0.5
OS_SPHEQUIV: 0.75

## 2018-08-09 ASSESSMENT — VISUAL ACUITY
OD_BCVA: 20/60-1
OS_BCVA: 20/70+2

## 2018-08-16 ENCOUNTER — RX ONLY (RX ONLY)
Age: 63
End: 2018-08-16

## 2018-08-16 ENCOUNTER — DOCTOR'S OFFICE (OUTPATIENT)
Dept: URBAN - METROPOLITAN AREA CLINIC 136 | Facility: CLINIC | Age: 63
Setting detail: OPHTHALMOLOGY
End: 2018-08-16
Payer: COMMERCIAL

## 2018-08-16 DIAGNOSIS — E11.3412: ICD-10-CM

## 2018-08-16 PROCEDURE — SPECPHARM SPECIALTY PHARMACY DRUG: Performed by: OPHTHALMOLOGY

## 2018-08-16 PROCEDURE — 67028 INJECTION EYE DRUG: CPT | Performed by: OPHTHALMOLOGY

## 2018-08-16 ASSESSMENT — REFRACTION_CURRENTRX
OS_AXIS: 108
OS_SPHERE: +0.25
OD_OVR_VA: 20/
OS_CYLINDER: -0.50
OS_VPRISM_DIRECTION: BF
OD_OVR_VA: 20/
OD_AXIS: 103
OS_OVR_VA: 20/
OD_VPRISM_DIRECTION: BF
OS_OVR_VA: 20/
OD_SPHERE: +0.75
OS_ADD: +3.75
OS_OVR_VA: 20/
OD_ADD: +3.75
OD_OVR_VA: 20/
OD_CYLINDER: -0.50

## 2018-08-16 ASSESSMENT — REFRACTION_MANIFEST
OS_VA2: 20/
OD_VA3: 20/
OU_VA: 20/
OS_VA2: 20/
OD_VA3: 20/
OS_VA3: 20/
OD_VA1: 20/
OS_VA3: 20/
OD_VA1: 20/
OS_VA1: 20/
OS_VA1: 20/
OD_VA2: 20/
OU_VA: 20/
OD_VA2: 20/

## 2018-08-16 ASSESSMENT — REFRACTION_OUTSIDERX
OD_ADD: +2.75
OU_VA: 20/50
OS_VA1: 20/50-
OD_CYLINDER: -0.75
OS_ADD: +2.75
OD_VA3: 20/
OS_VA2: 20/
OS_VA3: 20/
OS_AXIS: 090
OD_VA2: 20/
OD_AXIS: 105
OD_SPHERE: +1.25
OS_SPHERE: +1.25
OD_VA1: 20/50-
OS_CYLINDER: -0.75

## 2018-08-16 ASSESSMENT — REFRACTION_AUTOREFRACTION
OD_AXIS: 115
OD_SPHERE: +1.25
OS_SPHERE: +1.50
OD_CYLINDER: -1.50
OS_CYLINDER: -1.50
OS_AXIS: 087

## 2018-08-16 ASSESSMENT — SPHEQUIV_DERIVED
OD_SPHEQUIV: 0.5
OS_SPHEQUIV: 0.75

## 2018-08-16 ASSESSMENT — VISUAL ACUITY
OD_BCVA: 20/80+2
OS_BCVA: 20/70+2

## 2018-09-05 ENCOUNTER — DOCTOR'S OFFICE (OUTPATIENT)
Dept: URBAN - METROPOLITAN AREA CLINIC 136 | Facility: CLINIC | Age: 63
Setting detail: OPHTHALMOLOGY
End: 2018-09-05
Payer: COMMERCIAL

## 2018-09-05 DIAGNOSIS — H25.13: ICD-10-CM

## 2018-09-05 DIAGNOSIS — Z79.84: ICD-10-CM

## 2018-09-05 DIAGNOSIS — E11.3413: ICD-10-CM

## 2018-09-05 PROCEDURE — 92134 CPTRZ OPH DX IMG PST SGM RTA: CPT | Performed by: OPHTHALMOLOGY

## 2018-09-05 PROCEDURE — 92012 INTRM OPH EXAM EST PATIENT: CPT | Performed by: OPHTHALMOLOGY

## 2018-09-05 ASSESSMENT — REFRACTION_MANIFEST
OD_AXIS: 105
OU_VA: 20/
OD_VA2: 20/
OD_VA2: 20/
OD_SPHERE: +1.25
OD_CYLINDER: -0.75
OD_ADD: +2.75
OS_CYLINDER: -0.75
OS_VA2: 20/
OD_VA3: 20/
OD_VA3: 20/
OS_VA3: 20/
OS_SPHERE: +1.25
OU_VA: 20/50
OS_VA1: 20/50-
OS_VA3: 20/
OS_VA2: 20/
OS_VA1: 20/
OS_AXIS: 090
OD_VA1: 20/50-
OS_ADD: +2.75
OD_VA1: 20/

## 2018-09-05 ASSESSMENT — REFRACTION_CURRENTRX
OS_VPRISM_DIRECTION: BF
OS_AXIS: 108
OD_OVR_VA: 20/
OD_VPRISM_DIRECTION: BF
OD_OVR_VA: 20/
OS_OVR_VA: 20/
OD_SPHERE: +0.75
OS_ADD: +3.75
OS_CYLINDER: -0.50
OD_OVR_VA: 20/
OD_CYLINDER: -0.50
OD_AXIS: 103
OS_OVR_VA: 20/
OD_ADD: +3.75
OS_OVR_VA: 20/
OS_SPHERE: +0.25

## 2018-09-05 ASSESSMENT — VISUAL ACUITY
OS_BCVA: 20/70+2
OD_BCVA: 20/80+2

## 2018-09-05 ASSESSMENT — REFRACTION_AUTOREFRACTION
OD_CYLINDER: -1.50
OD_AXIS: 115
OS_CYLINDER: -1.50
OS_SPHERE: +1.50
OD_SPHERE: +1.25
OS_AXIS: 087

## 2018-09-05 ASSESSMENT — SPHEQUIV_DERIVED
OS_SPHEQUIV: 0.875
OS_SPHEQUIV: 0.75
OD_SPHEQUIV: 0.875
OD_SPHEQUIV: 0.5

## 2018-09-05 ASSESSMENT — CONFRONTATIONAL VISUAL FIELD TEST (CVF)
OD_FINDINGS: FULL
OS_FINDINGS: FULL

## 2018-09-06 DIAGNOSIS — E11.9 TYPE 2 DIABETES MELLITUS WITHOUT COMPLICATION, WITH LONG-TERM CURRENT USE OF INSULIN (HCC): Primary | ICD-10-CM

## 2018-09-06 DIAGNOSIS — Z79.4 TYPE 2 DIABETES MELLITUS WITHOUT COMPLICATION, WITH LONG-TERM CURRENT USE OF INSULIN (HCC): Primary | ICD-10-CM

## 2018-09-06 RX ORDER — PEN NEEDLE, DIABETIC 31 GX5/16"
NEEDLE, DISPOSABLE MISCELLANEOUS
Qty: 100 EACH | Refills: 0 | Status: SHIPPED | OUTPATIENT
Start: 2018-09-06 | End: 2018-12-04 | Stop reason: SDUPTHER

## 2018-09-18 ENCOUNTER — OFFICE VISIT (OUTPATIENT)
Dept: FAMILY MEDICINE CLINIC | Facility: CLINIC | Age: 63
End: 2018-09-18
Payer: COMMERCIAL

## 2018-09-18 VITALS
DIASTOLIC BLOOD PRESSURE: 96 MMHG | SYSTOLIC BLOOD PRESSURE: 142 MMHG | WEIGHT: 215 LBS | HEIGHT: 65 IN | BODY MASS INDEX: 35.82 KG/M2 | TEMPERATURE: 98.2 F

## 2018-09-18 DIAGNOSIS — J38.6 GLOTTIC STENOSIS: ICD-10-CM

## 2018-09-18 DIAGNOSIS — Z79.4 TYPE 2 DIABETES MELLITUS WITHOUT COMPLICATION, WITH LONG-TERM CURRENT USE OF INSULIN (HCC): ICD-10-CM

## 2018-09-18 DIAGNOSIS — Z12.11 COLON CANCER SCREENING: ICD-10-CM

## 2018-09-18 DIAGNOSIS — E11.8 TYPE 2 DIABETES MELLITUS WITH COMPLICATION, UNSPECIFIED WHETHER LONG TERM INSULIN USE: Primary | ICD-10-CM

## 2018-09-18 DIAGNOSIS — E11.9 TYPE 2 DIABETES MELLITUS WITHOUT COMPLICATION, WITH LONG-TERM CURRENT USE OF INSULIN (HCC): ICD-10-CM

## 2018-09-18 DIAGNOSIS — K52.9 CHRONIC DIARRHEA: ICD-10-CM

## 2018-09-18 DIAGNOSIS — J39.8 TRACHEAL STENOSIS: ICD-10-CM

## 2018-09-18 PROBLEM — J38.02 BILATERAL VOCAL CORD PARALYSIS: Status: ACTIVE | Noted: 2018-05-04

## 2018-09-18 PROBLEM — S22.39XA FRACTURE OF RIB: Status: ACTIVE | Noted: 2017-10-17

## 2018-09-18 PROBLEM — H53.8 BLURRY VISION: Status: ACTIVE | Noted: 2017-10-17

## 2018-09-18 PROBLEM — L92.9 GRANULATION TISSUE OF SITE OF TRACHEOSTOMY: Status: ACTIVE | Noted: 2017-10-17

## 2018-09-18 PROBLEM — R06.2 WHEEZING: Status: ACTIVE | Noted: 2018-01-10

## 2018-09-18 PROBLEM — R33.9 URINARY RETENTION: Status: ACTIVE | Noted: 2017-10-17

## 2018-09-18 PROBLEM — R49.0 DYSPHONIA: Status: ACTIVE | Noted: 2018-05-04

## 2018-09-18 PROBLEM — R11.0 NAUSEA: Status: ACTIVE | Noted: 2017-10-17

## 2018-09-18 PROBLEM — R13.10 DYSPHAGIA: Status: ACTIVE | Noted: 2018-04-19

## 2018-09-18 PROBLEM — I48.91 ATRIAL FIBRILLATION (HCC): Status: ACTIVE | Noted: 2017-10-17

## 2018-09-18 PROBLEM — J38.00 VOCAL CORD PARALYSIS: Status: ACTIVE | Noted: 2018-06-04

## 2018-09-18 PROBLEM — I63.9 CEREBROVASCULAR ACCIDENT (CVA) (HCC): Status: ACTIVE | Noted: 2017-10-17

## 2018-09-18 PROBLEM — G47.00 INSOMNIA: Status: ACTIVE | Noted: 2017-10-17

## 2018-09-18 PROBLEM — Z98.890 GRANULATION TISSUE OF SITE OF TRACHEOSTOMY: Status: ACTIVE | Noted: 2017-10-17

## 2018-09-18 PROBLEM — R33.9 INCOMPLETE EMPTYING OF BLADDER: Status: ACTIVE | Noted: 2018-05-07

## 2018-09-18 PROBLEM — E11.39 TYPE 2 DIABETES MELLITUS WITH OPHTHALMIC COMPLICATION (HCC): Status: ACTIVE | Noted: 2017-10-31

## 2018-09-18 PROBLEM — R06.02 SHORTNESS OF BREATH: Status: ACTIVE | Noted: 2018-05-10

## 2018-09-18 LAB — SL AMB POCT HEMOGLOBIN AIC: 10.5

## 2018-09-18 PROCEDURE — 3008F BODY MASS INDEX DOCD: CPT | Performed by: FAMILY MEDICINE

## 2018-09-18 PROCEDURE — 83036 HEMOGLOBIN GLYCOSYLATED A1C: CPT | Performed by: FAMILY MEDICINE

## 2018-09-18 PROCEDURE — 99214 OFFICE O/P EST MOD 30 MIN: CPT | Performed by: FAMILY MEDICINE

## 2018-09-18 PROCEDURE — 3725F SCREEN DEPRESSION PERFORMED: CPT | Performed by: FAMILY MEDICINE

## 2018-09-18 RX ORDER — LOPERAMIDE HYDROCHLORIDE 2 MG/1
2 TABLET ORAL 4 TIMES DAILY PRN
Qty: 30 TABLET | Refills: 0 | Status: SHIPPED | OUTPATIENT
Start: 2018-09-18 | End: 2021-02-20 | Stop reason: HOSPADM

## 2018-09-18 NOTE — PROGRESS NOTES
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  Discussed workup and treatment options with risks and benefits  Will increase basal goal are to 30 units twice daily and encourage continued lifestyle changes  Will give Imodium to use as needed for breakthrough symptoms but will refer to GI for colonoscopy  Encouraged continued follow-up with ENT as well and follow-up in 1 month to discuss patient's blood sugars  No problem-specific Assessment & Plan notes found for this encounter  Diagnoses and all orders for this visit:    Type 2 diabetes mellitus with complication, unspecified whether long term insulin use (HCC)  -     POCT hemoglobin A1c  -     metFORMIN (GLUCOPHAGE) 500 mg tablet; Take 1 tablet (500 mg total) by mouth 2 (two) times a day    Colon cancer screening  -     Ambulatory referral to Gastroenterology; Future    Chronic diarrhea  -     loperamide (IMODIUM A-D) 2 MG tablet; Take 1 tablet (2 mg total) by mouth 4 (four) times a day as needed for diarrhea    Type 2 diabetes mellitus without complication, with long-term current use of insulin (HCC)  -     insulin glargine (BASAGLAR KWIKPEN) 100 units/mL injection pen; Inject 30 Units under the skin every 12 (twelve) hours    Tracheal stenosis    Glottic stenosis          Subjective:   Chief Complaint   Patient presents with    Diarrhea     Contiuous, gas and some stoach pains    A1c done today          Patient ID: Benita Tyson is a 66 y o  female  Patient is a 53-year-old woman who presents for follow-up  Patient admits that she has had for the past several months chronic diarrhea that is not improving if anything worsening  No fevers chills nausea vomiting  No blood in her stool  No specific precipitating triggers  Patient has never had a colonoscopy  Patient also has type 2 diabetes and admits that her sugars have been poorly controlled    Patient also admits that she continues to follow ENT down in [place] and she had a surgery which helped somewhat but she is being planned for an additional surgery to help her vocal cord dysfunction  No other complaints at this time  Diarrhea          The following portions of the patient's history were reviewed and updated as appropriate: allergies, current medications, past family history, past medical history, past social history, past surgical history and problem list     Review of Systems   Constitutional: Negative  HENT: Negative  Eyes: Negative  Respiratory: Negative  Cardiovascular: Negative  Gastrointestinal: Positive for diarrhea  Endocrine: Negative  Genitourinary: Negative  Musculoskeletal: Negative  Allergic/Immunologic: Negative  Neurological: Negative  Hematological: Negative  Psychiatric/Behavioral: Negative  All other systems reviewed and are negative  Objective:      /96 (BP Location: Right arm, Patient Position: Sitting, Cuff Size: Large)   Temp 98 2 °F (36 8 °C) (Tympanic)   Ht 5' 5" (1 651 m)   Wt 97 5 kg (215 lb)   BMI 35 78 kg/m²          Physical Exam   Constitutional: She is oriented to person, place, and time  She appears well-developed and well-nourished  HENT:   Head: Atraumatic  Right Ear: External ear normal    Left Ear: External ear normal    Eyes: Conjunctivae and EOM are normal  Pupils are equal, round, and reactive to light  Neck: Normal range of motion  Cardiovascular: Normal rate, regular rhythm and normal heart sounds  Pulmonary/Chest: Effort normal and breath sounds normal  No respiratory distress  Abdominal: Soft  She exhibits no distension  There is no tenderness  There is no rebound and no guarding  Musculoskeletal: Normal range of motion  Neurological: She is alert and oriented to person, place, and time  No cranial nerve deficit  Skin: Skin is warm and dry     Psychiatric: She has a normal mood and affect   Her behavior is normal  Judgment and thought content normal

## 2018-09-26 ENCOUNTER — RX ONLY (RX ONLY)
Age: 63
End: 2018-09-26

## 2018-09-26 ENCOUNTER — DOCTOR'S OFFICE (OUTPATIENT)
Dept: URBAN - METROPOLITAN AREA CLINIC 136 | Facility: CLINIC | Age: 63
Setting detail: OPHTHALMOLOGY
End: 2018-09-26
Payer: COMMERCIAL

## 2018-09-26 DIAGNOSIS — E11.3411: ICD-10-CM

## 2018-09-26 PROCEDURE — 67028 INJECTION EYE DRUG: CPT | Performed by: OPHTHALMOLOGY

## 2018-09-26 PROCEDURE — SPECPHARM SPECIALTY PHARMACY DRUG: Performed by: OPHTHALMOLOGY

## 2018-09-26 ASSESSMENT — REFRACTION_AUTOREFRACTION
OS_AXIS: 087
OD_AXIS: 115
OD_CYLINDER: -1.50
OD_SPHERE: +1.25
OS_CYLINDER: -1.50
OS_SPHERE: +1.50

## 2018-09-26 ASSESSMENT — REFRACTION_CURRENTRX
OD_AXIS: 103
OS_AXIS: 108
OD_VPRISM_DIRECTION: BF
OS_OVR_VA: 20/
OS_OVR_VA: 20/
OD_OVR_VA: 20/
OD_OVR_VA: 20/
OS_SPHERE: +0.25
OS_CYLINDER: -0.50
OD_CYLINDER: -0.50
OS_VPRISM_DIRECTION: BF
OD_SPHERE: +0.75
OS_OVR_VA: 20/
OS_ADD: +3.75
OD_OVR_VA: 20/
OD_ADD: +3.75

## 2018-09-26 ASSESSMENT — REFRACTION_MANIFEST
OD_VA3: 20/
OD_VA2: 20/
OD_VA1: 20/
OS_SPHERE: +1.25
OD_VA3: 20/
OS_VA2: 20/
OU_VA: 20/50
OD_ADD: +2.75
OS_VA3: 20/
OD_SPHERE: +1.25
OD_CYLINDER: -0.75
OS_VA3: 20/
OS_AXIS: 090
OS_VA1: 20/
OS_VA1: 20/50-
OD_VA1: 20/50-
OD_VA2: 20/
OS_VA2: 20/
OD_AXIS: 105
OS_CYLINDER: -0.75
OS_ADD: +2.75
OU_VA: 20/

## 2018-09-26 ASSESSMENT — VISUAL ACUITY
OS_BCVA: 20/80-1
OD_BCVA: 20/80+2

## 2018-09-26 ASSESSMENT — SPHEQUIV_DERIVED
OS_SPHEQUIV: 0.75
OS_SPHEQUIV: 0.875
OD_SPHEQUIV: 0.5
OD_SPHEQUIV: 0.875

## 2018-10-25 ENCOUNTER — OFFICE VISIT (OUTPATIENT)
Dept: FAMILY MEDICINE CLINIC | Facility: CLINIC | Age: 63
End: 2018-10-25
Payer: COMMERCIAL

## 2018-10-25 VITALS
WEIGHT: 219 LBS | TEMPERATURE: 98.9 F | SYSTOLIC BLOOD PRESSURE: 148 MMHG | BODY MASS INDEX: 36.49 KG/M2 | DIASTOLIC BLOOD PRESSURE: 94 MMHG | HEIGHT: 65 IN

## 2018-10-25 DIAGNOSIS — R06.2 WHEEZING: ICD-10-CM

## 2018-10-25 DIAGNOSIS — J20.8 ACUTE BRONCHITIS DUE TO OTHER SPECIFIED ORGANISMS: Primary | ICD-10-CM

## 2018-10-25 DIAGNOSIS — R05.9 COUGH: ICD-10-CM

## 2018-10-25 PROCEDURE — 3008F BODY MASS INDEX DOCD: CPT | Performed by: FAMILY MEDICINE

## 2018-10-25 PROCEDURE — 1036F TOBACCO NON-USER: CPT | Performed by: FAMILY MEDICINE

## 2018-10-25 PROCEDURE — 99213 OFFICE O/P EST LOW 20 MIN: CPT | Performed by: FAMILY MEDICINE

## 2018-10-25 RX ORDER — ALBUTEROL SULFATE 90 UG/1
2 AEROSOL, METERED RESPIRATORY (INHALATION) EVERY 4 HOURS PRN
Qty: 1 INHALER | Refills: 0 | Status: SHIPPED | OUTPATIENT
Start: 2018-10-25 | End: 2019-03-15 | Stop reason: SDUPTHER

## 2018-10-25 RX ORDER — PREDNISONE 20 MG/1
40 TABLET ORAL DAILY
Qty: 10 TABLET | Refills: 0 | Status: SHIPPED | OUTPATIENT
Start: 2018-10-25 | End: 2019-04-17

## 2018-10-25 RX ORDER — ALBUTEROL SULFATE 90 UG/1
2 AEROSOL, METERED RESPIRATORY (INHALATION) EVERY 4 HOURS PRN
Qty: 1 INHALER | Refills: 0 | Status: SHIPPED | OUTPATIENT
Start: 2018-10-25 | End: 2018-10-25 | Stop reason: SDUPTHER

## 2018-10-25 RX ORDER — ALBUTEROL SULFATE 2.5 MG/3ML
2.5 SOLUTION RESPIRATORY (INHALATION) EVERY 6 HOURS PRN
Status: DISCONTINUED | OUTPATIENT
Start: 2018-10-25 | End: 2021-01-30

## 2018-10-25 RX ORDER — AMOXICILLIN 500 MG/1
500 TABLET, FILM COATED ORAL 3 TIMES DAILY
Qty: 21 TABLET | Refills: 0 | Status: SHIPPED | OUTPATIENT
Start: 2018-10-25 | End: 2018-10-25 | Stop reason: SDUPTHER

## 2018-10-25 RX ORDER — PREDNISONE 20 MG/1
40 TABLET ORAL DAILY
Qty: 10 TABLET | Refills: 0 | Status: SHIPPED | OUTPATIENT
Start: 2018-10-25 | End: 2018-10-25 | Stop reason: SDUPTHER

## 2018-10-25 RX ORDER — AMOXICILLIN 500 MG/1
500 TABLET, FILM COATED ORAL 3 TIMES DAILY
Qty: 21 TABLET | Refills: 0 | Status: SHIPPED | OUTPATIENT
Start: 2018-10-25 | End: 2018-11-01

## 2018-10-25 NOTE — PROGRESS NOTES
Assessment/Plan:    17-year-old woman with:  Acute bronchitis  Discussed treatment options with risks and benefits  Will give breathing treatment point of care  Patient use rescue inhalers every 4 hours as needed and will give steroid burst   Will give script for amoxicillin to fill in case symptoms are not improving or if they worsen  Encouraged her to call back if symptoms are not improving  No problem-specific Assessment & Plan notes found for this encounter  Diagnoses and all orders for this visit:    Acute bronchitis due to other specified organisms  -     Discontinue: predniSONE 20 mg tablet; Take 2 tablets (40 mg total) by mouth daily  -     Discontinue: amoxicillin (AMOXIL) 500 MG tablet; Take 1 tablet (500 mg total) by mouth 3 (three) times a day for 7 days  -     predniSONE 20 mg tablet; Take 2 tablets (40 mg total) by mouth daily  -     amoxicillin (AMOXIL) 500 MG tablet; Take 1 tablet (500 mg total) by mouth 3 (three) times a day for 7 days    Cough  -     Discontinue: albuterol (VENTOLIN HFA) 90 mcg/act inhaler; Inhale 2 puffs every 4 (four) hours as needed for wheezing  -     albuterol (VENTOLIN HFA) 90 mcg/act inhaler; Inhale 2 puffs every 4 (four) hours as needed for wheezing  -     albuterol inhalation solution 2 5 mg; Take 3 mL (2 5 mg total) by nebulization every 6 (six) hours as needed for wheezing or shortness of breath     Wheezing  -     albuterol inhalation solution 2 5 mg; Take 3 mL (2 5 mg total) by nebulization every 6 (six) hours as needed for wheezing or shortness of breath           Subjective:      Patient ID: Orvin Soulier is a 66 y o  female  Patient is a 17-year-old woman who presents complaining of several days of cough congestion and some wheezing with some post-tussive emesis  No fevers chills nausea vomiting  No productive sputum  No other complaints at this time      Cough     Vomiting    Associated symptoms include coughing         The following portions of the patient's history were reviewed and updated as appropriate: allergies, current medications, past family history, past medical history, past social history, past surgical history and problem list     Review of Systems   Constitutional: Negative  HENT: Negative  Eyes: Negative  Respiratory: Positive for cough  Cardiovascular: Negative  Gastrointestinal: Positive for vomiting  Endocrine: Negative  Genitourinary: Negative  Musculoskeletal: Negative  Allergic/Immunologic: Negative  Neurological: Negative  Hematological: Negative  Psychiatric/Behavioral: Negative  All other systems reviewed and are negative  Objective:      /94 (BP Location: Right arm, Patient Position: Sitting, Cuff Size: Large)   Temp 98 9 °F (37 2 °C) (Tympanic)   Ht 5' 5" (1 651 m)   Wt 99 3 kg (219 lb)   BMI 36 44 kg/m²          Physical Exam   Constitutional: She is oriented to person, place, and time  She appears well-developed and well-nourished  HENT:   Head: Atraumatic  Right Ear: External ear normal    Left Ear: External ear normal    Eyes: Pupils are equal, round, and reactive to light  Conjunctivae and EOM are normal    Neck: Normal range of motion  Cardiovascular: Normal rate, regular rhythm and normal heart sounds  Pulmonary/Chest: Effort normal  No respiratory distress  Decreased air movement bilaterally   Abdominal: Soft  She exhibits no distension  There is no tenderness  There is no rebound and no guarding  Musculoskeletal: Normal range of motion  Neurological: She is alert and oriented to person, place, and time  No cranial nerve deficit  Skin: Skin is warm and dry  Psychiatric: She has a normal mood and affect   Her behavior is normal  Judgment and thought content normal

## 2018-11-28 ENCOUNTER — DOCTOR'S OFFICE (OUTPATIENT)
Dept: URBAN - METROPOLITAN AREA CLINIC 136 | Facility: CLINIC | Age: 63
Setting detail: OPHTHALMOLOGY
End: 2018-11-28
Payer: COMMERCIAL

## 2018-11-28 DIAGNOSIS — Z79.84: ICD-10-CM

## 2018-11-28 DIAGNOSIS — H25.13: ICD-10-CM

## 2018-11-28 DIAGNOSIS — E11.3413: ICD-10-CM

## 2018-11-28 PROCEDURE — 92134 CPTRZ OPH DX IMG PST SGM RTA: CPT | Performed by: OPHTHALMOLOGY

## 2018-11-28 PROCEDURE — 92014 COMPRE OPH EXAM EST PT 1/>: CPT | Performed by: OPHTHALMOLOGY

## 2018-11-28 ASSESSMENT — CONFRONTATIONAL VISUAL FIELD TEST (CVF)
OS_FINDINGS: FULL
OD_FINDINGS: FULL

## 2018-11-29 ASSESSMENT — REFRACTION_MANIFEST
OS_SPHERE: +1.25
OD_VA2: 20/
OS_AXIS: 090
OS_VA2: 20/
OS_VA2: 20/
OD_VA1: 20/50-
OD_VA3: 20/
OS_VA1: 20/
OD_VA1: 20/
OS_VA1: 20/50-
OS_CYLINDER: -0.75
OS_VA3: 20/
OU_VA: 20/50
OD_AXIS: 105
OD_ADD: +2.75
OD_SPHERE: +1.25
OU_VA: 20/
OS_ADD: +2.75
OD_CYLINDER: -0.75
OD_VA3: 20/
OS_VA3: 20/
OD_VA2: 20/

## 2018-11-29 ASSESSMENT — REFRACTION_CURRENTRX
OS_VPRISM_DIRECTION: BF
OD_OVR_VA: 20/
OD_AXIS: 103
OD_VPRISM_DIRECTION: BF
OS_OVR_VA: 20/
OS_OVR_VA: 20/
OD_ADD: +3.75
OS_OVR_VA: 20/
OD_OVR_VA: 20/
OD_CYLINDER: -0.50
OD_SPHERE: +0.75
OS_SPHERE: +0.25
OS_CYLINDER: -0.50
OD_OVR_VA: 20/
OS_ADD: +3.75
OS_AXIS: 108

## 2018-11-29 ASSESSMENT — REFRACTION_AUTOREFRACTION
OD_AXIS: 115
OD_SPHERE: +1.25
OS_CYLINDER: -1.50
OD_CYLINDER: -1.50
OS_SPHERE: +1.50
OS_AXIS: 087

## 2018-11-29 ASSESSMENT — VISUAL ACUITY
OS_BCVA: 20/80-1
OD_BCVA: 20/80-1

## 2018-11-29 ASSESSMENT — SPHEQUIV_DERIVED
OD_SPHEQUIV: 0.875
OD_SPHEQUIV: 0.5
OS_SPHEQUIV: 0.75
OS_SPHEQUIV: 0.875

## 2018-12-04 DIAGNOSIS — E11.9 TYPE 2 DIABETES MELLITUS WITHOUT COMPLICATION, WITH LONG-TERM CURRENT USE OF INSULIN (HCC): ICD-10-CM

## 2018-12-04 DIAGNOSIS — Z79.4 TYPE 2 DIABETES MELLITUS WITHOUT COMPLICATION, WITH LONG-TERM CURRENT USE OF INSULIN (HCC): ICD-10-CM

## 2018-12-04 RX ORDER — PEN NEEDLE, DIABETIC 31 GX5/16"
NEEDLE, DISPOSABLE MISCELLANEOUS
Qty: 100 EACH | Refills: 0 | Status: SHIPPED | OUTPATIENT
Start: 2018-12-04 | End: 2019-12-17 | Stop reason: SDUPTHER

## 2018-12-06 ENCOUNTER — OPTICAL OFFICE (OUTPATIENT)
Dept: URBAN - METROPOLITAN AREA CLINIC 143 | Facility: CLINIC | Age: 63
Setting detail: OPHTHALMOLOGY
End: 2018-12-06

## 2018-12-06 DIAGNOSIS — H52.223: ICD-10-CM

## 2018-12-06 PROCEDURE — V2020 VISION SVCS FRAMES PURCHASES: HCPCS | Performed by: OPTOMETRIST

## 2018-12-06 PROCEDURE — V2744 TINT PHOTOCHROMATIC LENS/ES: HCPCS | Performed by: OPTOMETRIST

## 2018-12-06 PROCEDURE — V2203 LENS SPHCYL BIFOCAL 4.00D/.1: HCPCS | Performed by: OPTOMETRIST

## 2019-02-21 DIAGNOSIS — E11.65 TYPE 2 DIABETES MELLITUS WITH HYPERGLYCEMIA, WITHOUT LONG-TERM CURRENT USE OF INSULIN (HCC): Primary | ICD-10-CM

## 2019-02-21 RX ORDER — PEN NEEDLE, DIABETIC 31 GX5/16"
NEEDLE, DISPOSABLE MISCELLANEOUS
Qty: 100 EACH | Refills: 0 | Status: SHIPPED | OUTPATIENT
Start: 2019-02-21 | End: 2019-05-21 | Stop reason: SDUPTHER

## 2019-02-25 ENCOUNTER — DOCTOR'S OFFICE (OUTPATIENT)
Dept: URBAN - METROPOLITAN AREA CLINIC 136 | Facility: CLINIC | Age: 64
Setting detail: OPHTHALMOLOGY
End: 2019-02-25

## 2019-02-25 DIAGNOSIS — Z02.71: ICD-10-CM

## 2019-02-25 PROCEDURE — DISABILITY BUREAU OF DISABILITY REPORT: Performed by: OPHTHALMOLOGY

## 2019-03-15 ENCOUNTER — OFFICE VISIT (OUTPATIENT)
Dept: FAMILY MEDICINE CLINIC | Facility: CLINIC | Age: 64
End: 2019-03-15
Payer: COMMERCIAL

## 2019-03-15 VITALS — WEIGHT: 225 LBS | BODY MASS INDEX: 37.44 KG/M2

## 2019-03-15 DIAGNOSIS — R05.9 COUGH: ICD-10-CM

## 2019-03-15 DIAGNOSIS — Z11.59 NEED FOR HEPATITIS C SCREENING TEST: ICD-10-CM

## 2019-03-15 DIAGNOSIS — I48.91 ATRIAL FIBRILLATION, UNSPECIFIED TYPE (HCC): ICD-10-CM

## 2019-03-15 DIAGNOSIS — E78.5 HYPERLIPIDEMIA, UNSPECIFIED HYPERLIPIDEMIA TYPE: ICD-10-CM

## 2019-03-15 DIAGNOSIS — E11.9 TYPE 2 DIABETES MELLITUS WITHOUT COMPLICATION, WITH LONG-TERM CURRENT USE OF INSULIN (HCC): ICD-10-CM

## 2019-03-15 DIAGNOSIS — Z79.4 TYPE 2 DIABETES MELLITUS WITH DIABETIC CATARACT, WITH LONG-TERM CURRENT USE OF INSULIN (HCC): ICD-10-CM

## 2019-03-15 DIAGNOSIS — Z79.4 TYPE 2 DIABETES MELLITUS WITHOUT COMPLICATION, WITH LONG-TERM CURRENT USE OF INSULIN (HCC): ICD-10-CM

## 2019-03-15 DIAGNOSIS — R60.9 EDEMA, UNSPECIFIED TYPE: ICD-10-CM

## 2019-03-15 DIAGNOSIS — Z23 ENCOUNTER FOR VACCINATION: ICD-10-CM

## 2019-03-15 DIAGNOSIS — E66.01 SEVERE OBESITY (BMI 35.0-39.9) WITH COMORBIDITY (HCC): ICD-10-CM

## 2019-03-15 DIAGNOSIS — I63.9 CEREBROVASCULAR ACCIDENT (CVA), UNSPECIFIED MECHANISM (HCC): ICD-10-CM

## 2019-03-15 DIAGNOSIS — R19.7 DIARRHEA, UNSPECIFIED TYPE: ICD-10-CM

## 2019-03-15 DIAGNOSIS — E11.36 TYPE 2 DIABETES MELLITUS WITH DIABETIC CATARACT, WITH LONG-TERM CURRENT USE OF INSULIN (HCC): ICD-10-CM

## 2019-03-15 DIAGNOSIS — Z12.11 SCREENING FOR COLON CANCER: Primary | ICD-10-CM

## 2019-03-15 DIAGNOSIS — E11.36 DIABETIC CATARACT (HCC): ICD-10-CM

## 2019-03-15 PROBLEM — R19.4 CHANGE IN BOWEL HABITS: Status: ACTIVE | Noted: 2018-12-28

## 2019-03-15 PROBLEM — G93.40 ACUTE ENCEPHALOPATHY: Status: ACTIVE | Noted: 2019-03-15

## 2019-03-15 PROBLEM — E11.10 DIABETIC KETOACIDOSIS (HCC): Status: RESOLVED | Noted: 2017-08-13 | Resolved: 2019-03-15

## 2019-03-15 PROBLEM — K21.9 GERD (GASTROESOPHAGEAL REFLUX DISEASE): Status: ACTIVE | Noted: 2019-03-15

## 2019-03-15 PROBLEM — Z99.11 VENTILATOR DEPENDENCE (HCC): Status: ACTIVE | Noted: 2017-08-13

## 2019-03-15 PROBLEM — E11.10 DIABETIC KETOACIDOSIS (HCC): Status: ACTIVE | Noted: 2017-08-13

## 2019-03-15 PROBLEM — S22.41XA CLOSED FRACTURE OF MULTIPLE RIBS OF RIGHT SIDE: Status: ACTIVE | Noted: 2017-08-13

## 2019-03-15 PROBLEM — S22.008A CLOSED FRACTURE OF SPINOUS PROCESS OF THORACIC VERTEBRA (HCC): Status: RESOLVED | Noted: 2017-08-13 | Resolved: 2019-03-15

## 2019-03-15 PROBLEM — S22.008A CLOSED FRACTURE OF SPINOUS PROCESS OF THORACIC VERTEBRA (HCC): Status: ACTIVE | Noted: 2017-08-13

## 2019-03-15 PROBLEM — S27.0XXA TRAUMATIC PNEUMOTHORAX: Status: ACTIVE | Noted: 2017-08-13

## 2019-03-15 PROBLEM — Z99.11 VENTILATOR DEPENDENCE (HCC): Status: RESOLVED | Noted: 2017-08-13 | Resolved: 2019-03-15

## 2019-03-15 PROCEDURE — 1036F TOBACCO NON-USER: CPT | Performed by: FAMILY MEDICINE

## 2019-03-15 PROCEDURE — 99214 OFFICE O/P EST MOD 30 MIN: CPT | Performed by: FAMILY MEDICINE

## 2019-03-15 RX ORDER — LOPERAMIDE HYDROCHLORIDE 2 MG/1
2 TABLET ORAL 4 TIMES DAILY PRN
Qty: 30 TABLET | Refills: 0 | Status: SHIPPED | OUTPATIENT
Start: 2019-03-15 | End: 2019-10-23 | Stop reason: SDUPTHER

## 2019-03-15 RX ORDER — ALBUTEROL SULFATE 90 UG/1
2 AEROSOL, METERED RESPIRATORY (INHALATION) EVERY 4 HOURS PRN
Qty: 1 INHALER | Refills: 0 | Status: SHIPPED | OUTPATIENT
Start: 2019-03-15 | End: 2021-12-14

## 2019-03-15 RX ORDER — FUROSEMIDE 20 MG/1
20 TABLET ORAL DAILY
Qty: 30 TABLET | Refills: 0 | Status: SHIPPED | OUTPATIENT
Start: 2019-03-15 | End: 2021-02-20 | Stop reason: HOSPADM

## 2019-03-15 NOTE — PROGRESS NOTES
Assessment/Plan:    30-year-old woman with:  Type 2 diabetes with diabetic cataract, cough, diarrhea, severe obesity, history of AFib and CVA, hyperlipidemia and edema  Discussed workup and treatment options with risks and benefits  Patient admits that she stopped anticoagulation previously and she opts to hold off of vent  We can refer her to Cardiology if she would like to do that in the future  Encourage continued lifestyle modifications including healthy diet exercise and healthy weight  Will increase her insulin and discussed getting blood work and using compression stockings  Advised that she should not start her Lasix until we get the results of the blood work to make sure her electrolytes and her renal function are okay  Will continue as needed Imodium but advised her to go to her GI doctor she is not getting better follow-up in 1 month  Discussed other supportive care return parameters  No problem-specific Assessment & Plan notes found for this encounter  Diagnoses and all orders for this visit:    Screening for colon cancer  -     Ambulatory referral to Gastroenterology; Future  -     Occult Blood, Fecal Immunochemical; Future  -     CBC and differential; Future  -     Comprehensive metabolic panel; Future  -     Lipid Panel with Direct LDL reflex; Future  -     TSH, 3rd generation with Free T4 reflex; Future  -     Microalbumin / creatinine urine ratio  -     Urinalysis with reflex to microscopic    Need for hepatitis C screening test  -     Hepatitis C antibody; Future  -     CBC and differential; Future  -     Comprehensive metabolic panel; Future  -     Lipid Panel with Direct LDL reflex; Future  -     TSH, 3rd generation with Free T4 reflex; Future  -     Microalbumin / creatinine urine ratio  -     Urinalysis with reflex to microscopic    Encounter for vaccination  -     CBC and differential; Future  -     Comprehensive metabolic panel;  Future  -     Lipid Panel with Direct LDL reflex; Future  -     TSH, 3rd generation with Free T4 reflex; Future  -     Microalbumin / creatinine urine ratio  -     Urinalysis with reflex to microscopic    Type 2 diabetes mellitus without complication, with long-term current use of insulin (HCC)  -     Microalbumin / creatinine urine ratio  -     insulin glargine (BASAGLAR KWIKPEN) 100 units/mL injection pen; Inject 40 Units under the skin every 12 (twelve) hours  -     CBC and differential; Future  -     Comprehensive metabolic panel; Future  -     Lipid Panel with Direct LDL reflex; Future  -     TSH, 3rd generation with Free T4 reflex; Future  -     Microalbumin / creatinine urine ratio  -     Urinalysis with reflex to microscopic    Cough  -     albuterol (VENTOLIN HFA) 90 mcg/act inhaler; Inhale 2 puffs every 4 (four) hours as needed for wheezing  -     CBC and differential; Future  -     Comprehensive metabolic panel; Future  -     Lipid Panel with Direct LDL reflex; Future  -     TSH, 3rd generation with Free T4 reflex; Future  -     Microalbumin / creatinine urine ratio  -     Urinalysis with reflex to microscopic    Diarrhea, unspecified type  -     loperamide (IMODIUM A-D) 2 MG tablet; Take 1 tablet (2 mg total) by mouth 4 (four) times a day as needed for diarrhea  -     CBC and differential; Future  -     Comprehensive metabolic panel; Future  -     Lipid Panel with Direct LDL reflex; Future  -     TSH, 3rd generation with Free T4 reflex; Future  -     Microalbumin / creatinine urine ratio  -     Urinalysis with reflex to microscopic    Edema, unspecified type  -     furosemide (LASIX) 20 mg tablet; Take 1 tablet (20 mg total) by mouth daily  -     CBC and differential; Future  -     Comprehensive metabolic panel; Future  -     Lipid Panel with Direct LDL reflex; Future  -     TSH, 3rd generation with Free T4 reflex;  Future  -     Microalbumin / creatinine urine ratio  -     Urinalysis with reflex to microscopic    Type 2 diabetes mellitus with diabetic cataract, with long-term current use of insulin (HCC)  -     CBC and differential; Future  -     Comprehensive metabolic panel; Future  -     Lipid Panel with Direct LDL reflex; Future  -     TSH, 3rd generation with Free T4 reflex; Future  -     Microalbumin / creatinine urine ratio  -     Urinalysis with reflex to microscopic    Diabetic cataract (HCC)  -     CBC and differential; Future  -     Comprehensive metabolic panel; Future  -     Lipid Panel with Direct LDL reflex; Future  -     TSH, 3rd generation with Free T4 reflex; Future  -     Microalbumin / creatinine urine ratio  -     Urinalysis with reflex to microscopic    Atrial fibrillation, unspecified type (HCC)  -     CBC and differential; Future  -     Comprehensive metabolic panel; Future  -     Lipid Panel with Direct LDL reflex; Future  -     TSH, 3rd generation with Free T4 reflex; Future  -     Microalbumin / creatinine urine ratio  -     Urinalysis with reflex to microscopic    Cerebrovascular accident (CVA), unspecified mechanism ([facility] )  -     CBC and differential; Future  -     Comprehensive metabolic panel; Future  -     Lipid Panel with Direct LDL reflex; Future  -     TSH, 3rd generation with Free T4 reflex; Future  -     Microalbumin / creatinine urine ratio  -     Urinalysis with reflex to microscopic    Hyperlipidemia, unspecified hyperlipidemia type  -     CBC and differential; Future  -     Comprehensive metabolic panel; Future  -     Lipid Panel with Direct LDL reflex; Future  -     TSH, 3rd generation with Free T4 reflex; Future  -     Microalbumin / creatinine urine ratio  -     Urinalysis with reflex to microscopic    Severe obesity (BMI 35 0-39  9) with comorbidity ([facility] [address] )    Other orders  -     Cancel:  Tdap vaccine greater than or equal to 8yo IM  -     Cancel: Flu vaccine greater than or equal to 4yo preservative free IM          Subjective:     Chief Complaint   Patient presents with    Leg Swelling     both legs    Shoulder Pain     right    Foot Exam Competed today     Foot Screening Sites are normal        Patient ID: Kalli Clark is a 66 y o  female  Patient is a 53-year-old woman who presents for follow-up  She has type 2 diabetes diabetic cataract and admits her sugars have been elevated  She admits she is having swelling in her legs since she had a recent vacation to [place]  No fevers chills nausea vomiting  She does have some cough and she also has some aching in posterior right shoulder  No other health maintenance complaints at this point  Patient also has severe obesity  The following portions of the patient's history were reviewed and updated as appropriate: allergies, current medications, past family history, past medical history, past social history, past surgical history and problem list     Review of Systems   Constitutional: Negative  HENT: Negative  Eyes: Negative  Respiratory: Positive for cough  Cardiovascular: Negative  Gastrointestinal: Positive for diarrhea  Endocrine: Negative  Genitourinary: Negative  Musculoskeletal: Positive for arthralgias and neck pain  Allergic/Immunologic: Negative  Neurological: Negative  Hematological: Negative  Psychiatric/Behavioral: Negative  All other systems reviewed and are negative  Objective: Wt 102 kg (225 lb)   BMI 37 44 kg/m²          Physical Exam   Constitutional: She is oriented to person, place, and time  She appears well-developed and well-nourished  HENT:   Head: Atraumatic  Right Ear: External ear normal    Left Ear: External ear normal    Eyes: Pupils are equal, round, and reactive to light  Conjunctivae and EOM are normal    Neck: Normal range of motion  Cardiovascular: Normal rate, regular rhythm and normal heart sounds  Pulses are no weak pulses  Pulses:       Dorsalis pedis pulses are 2+ on the right side, and 2+ on the left side          Posterior tibial pulses are 2+ on the right side, and 2+ on the left side  Pulmonary/Chest: Effort normal and breath sounds normal  No respiratory distress  Abdominal: Soft  She exhibits no distension  There is no tenderness  There is no rebound and no guarding  Musculoskeletal: Normal range of motion  Feet:   Right Foot:   Skin Integrity: Negative for ulcer, skin breakdown, erythema, warmth, callus or dry skin  Left Foot:   Skin Integrity: Negative for ulcer, skin breakdown, erythema, warmth, callus or dry skin  Neurological: She is alert and oriented to person, place, and time  No cranial nerve deficit  Skin: Skin is warm and dry  1+ pitting edema bilateral lower extremity   Psychiatric: She has a normal mood and affect  Her behavior is normal  Judgment and thought content normal        Patient's shoes and socks removed  Right Foot/Ankle   Right Foot Inspection  Skin Exam: skin normal and skin intact no dry skin, no warmth, no callus, no erythema, no maceration, no abnormal color, no pre-ulcer, no ulcer and no callus                          Toe Exam: ROM and strength within normal limits  Sensory       Monofilament testing: intact  Vascular    The right DP pulse is 2+  The right PT pulse is 2+  Left Foot/Ankle  Left Foot Inspection  Skin Exam: skin normal and skin intactno dry skin, no warmth, no erythema, no maceration, normal color, no pre-ulcer, no ulcer and no callus                         Toe Exam: ROM and strength within normal limits                   Sensory       Monofilament: intact  Vascular    The left DP pulse is 2+  The left PT pulse is 2+  Assign Risk Category:  No deformity present; No loss of protective sensation; No weak pulses       Risk: 0    BMI Counseling: Body mass index is 37 44 kg/m²  Discussed the patient's BMI with her  The BMI is above average  BMI counseling and education was provided to the patient  Nutrition recommendations include moderation in carbohydrate intake   Exercise recommendations include moderate aerobic physical activity for 150 minutes/week

## 2019-03-18 ENCOUNTER — DOCTOR'S OFFICE (OUTPATIENT)
Dept: URBAN - METROPOLITAN AREA CLINIC 136 | Facility: CLINIC | Age: 64
Setting detail: OPHTHALMOLOGY
End: 2019-03-18
Payer: COMMERCIAL

## 2019-03-18 DIAGNOSIS — E11.3412: ICD-10-CM

## 2019-03-18 DIAGNOSIS — E11.3413: ICD-10-CM

## 2019-03-18 DIAGNOSIS — Z79.84: ICD-10-CM

## 2019-03-18 DIAGNOSIS — H25.13: ICD-10-CM

## 2019-03-18 LAB — HCV AB SER-ACNC: NEGATIVE

## 2019-03-18 PROCEDURE — 67028 INJECTION EYE DRUG: CPT | Performed by: OPHTHALMOLOGY

## 2019-03-18 PROCEDURE — 92134 CPTRZ OPH DX IMG PST SGM RTA: CPT | Performed by: OPHTHALMOLOGY

## 2019-03-18 PROCEDURE — SPECPHARM SPECIALTY PHARMACY DRUG: Performed by: OPHTHALMOLOGY

## 2019-03-18 PROCEDURE — 92014 COMPRE OPH EXAM EST PT 1/>: CPT | Performed by: OPHTHALMOLOGY

## 2019-03-18 ASSESSMENT — REFRACTION_AUTOREFRACTION
OS_CYLINDER: -1.50
OD_CYLINDER: -1.50
OD_AXIS: 115
OS_AXIS: 087
OS_SPHERE: +1.50
OD_SPHERE: +1.25

## 2019-03-18 ASSESSMENT — CONFRONTATIONAL VISUAL FIELD TEST (CVF)
OD_FINDINGS: FULL
OS_FINDINGS: FULL

## 2019-03-18 ASSESSMENT — REFRACTION_MANIFEST
OD_ADD: +2.75
OS_AXIS: 090
OS_SPHERE: +1.25
OD_VA1: 20/50-
OS_VA2: 20/
OD_VA1: 20/
OU_VA: 20/50
OD_VA2: 20/
OD_VA2: 20/
OD_SPHERE: +1.25
OS_VA3: 20/
OU_VA: 20/
OD_AXIS: 105
OS_VA1: 20/
OS_VA2: 20/
OS_VA1: 20/50-
OS_CYLINDER: -0.75
OD_VA3: 20/
OS_VA3: 20/
OD_CYLINDER: -0.75
OS_ADD: +2.75
OD_VA3: 20/

## 2019-03-18 ASSESSMENT — REFRACTION_CURRENTRX
OD_SPHERE: +0.75
OS_SPHERE: +0.25
OS_AXIS: 108
OS_VPRISM_DIRECTION: BF
OD_OVR_VA: 20/
OS_OVR_VA: 20/
OS_OVR_VA: 20/
OD_OVR_VA: 20/
OD_ADD: +3.75
OD_AXIS: 103
OS_ADD: +3.75
OS_CYLINDER: -0.50
OD_CYLINDER: -0.50
OD_VPRISM_DIRECTION: BF
OS_OVR_VA: 20/
OD_OVR_VA: 20/

## 2019-03-18 ASSESSMENT — VISUAL ACUITY
OD_BCVA: 20/80-1
OS_BCVA: 20/80-1

## 2019-03-18 ASSESSMENT — SPHEQUIV_DERIVED
OS_SPHEQUIV: 0.875
OD_SPHEQUIV: 0.875
OD_SPHEQUIV: 0.5
OS_SPHEQUIV: 0.75

## 2019-04-09 DIAGNOSIS — E11.65 TYPE 2 DIABETES MELLITUS WITH HYPERGLYCEMIA, WITHOUT LONG-TERM CURRENT USE OF INSULIN (HCC): Primary | ICD-10-CM

## 2019-04-17 ENCOUNTER — OFFICE VISIT (OUTPATIENT)
Dept: FAMILY MEDICINE CLINIC | Facility: CLINIC | Age: 64
End: 2019-04-17
Payer: COMMERCIAL

## 2019-04-17 VITALS
HEIGHT: 65 IN | DIASTOLIC BLOOD PRESSURE: 94 MMHG | TEMPERATURE: 99.4 F | WEIGHT: 227 LBS | BODY MASS INDEX: 37.82 KG/M2 | SYSTOLIC BLOOD PRESSURE: 144 MMHG

## 2019-04-17 DIAGNOSIS — Z79.4 TYPE 2 DIABETES MELLITUS WITHOUT COMPLICATION, WITH LONG-TERM CURRENT USE OF INSULIN (HCC): ICD-10-CM

## 2019-04-17 DIAGNOSIS — E11.36 DIABETIC CATARACT (HCC): ICD-10-CM

## 2019-04-17 DIAGNOSIS — E11.8 TYPE 2 DIABETES MELLITUS WITH COMPLICATION, UNSPECIFIED WHETHER LONG TERM INSULIN USE: ICD-10-CM

## 2019-04-17 DIAGNOSIS — Z00.00 ROUTINE HEALTH MAINTENANCE: ICD-10-CM

## 2019-04-17 DIAGNOSIS — I10 ESSENTIAL HYPERTENSION: ICD-10-CM

## 2019-04-17 DIAGNOSIS — E66.01 SEVERE OBESITY (BMI 35.0-39.9) WITH COMORBIDITY (HCC): ICD-10-CM

## 2019-04-17 DIAGNOSIS — E11.9 TYPE 2 DIABETES MELLITUS WITHOUT COMPLICATION, WITH LONG-TERM CURRENT USE OF INSULIN (HCC): ICD-10-CM

## 2019-04-17 DIAGNOSIS — Z12.11 SCREENING FOR COLON CANCER: Primary | ICD-10-CM

## 2019-04-17 LAB
SL AMB POCT GLUCOSE BLD: 139
SL AMB POCT HEMOGLOBIN AIC: 9.1 (ref ?–6.5)

## 2019-04-17 PROCEDURE — 83036 HEMOGLOBIN GLYCOSYLATED A1C: CPT | Performed by: FAMILY MEDICINE

## 2019-04-17 PROCEDURE — 99396 PREV VISIT EST AGE 40-64: CPT | Performed by: FAMILY MEDICINE

## 2019-04-17 PROCEDURE — 99214 OFFICE O/P EST MOD 30 MIN: CPT | Performed by: FAMILY MEDICINE

## 2019-04-17 PROCEDURE — 82948 REAGENT STRIP/BLOOD GLUCOSE: CPT | Performed by: FAMILY MEDICINE

## 2019-04-17 PROCEDURE — 3046F HEMOGLOBIN A1C LEVEL >9.0%: CPT | Performed by: FAMILY MEDICINE

## 2019-04-17 RX ORDER — METOPROLOL TARTRATE 50 MG/1
TABLET, FILM COATED ORAL EVERY 12 HOURS
COMMUNITY
Start: 2018-01-03 | End: 2019-06-26 | Stop reason: SDUPTHER

## 2019-04-17 RX ORDER — LISINOPRIL AND HYDROCHLOROTHIAZIDE 12.5; 1 MG/1; MG/1
1 TABLET ORAL DAILY
Qty: 30 TABLET | Refills: 0 | Status: SHIPPED | OUTPATIENT
Start: 2019-04-17 | End: 2019-07-17 | Stop reason: SDUPTHER

## 2019-04-17 RX ORDER — METOPROLOL TARTRATE 50 MG/1
TABLET, FILM COATED ORAL
Refills: 0 | COMMUNITY
Start: 2019-02-13 | End: 2021-02-20 | Stop reason: HOSPADM

## 2019-04-18 ENCOUNTER — DOCTOR'S OFFICE (OUTPATIENT)
Dept: URBAN - METROPOLITAN AREA CLINIC 136 | Facility: CLINIC | Age: 64
Setting detail: OPHTHALMOLOGY
End: 2019-04-18
Payer: COMMERCIAL

## 2019-04-18 DIAGNOSIS — E11.3411: ICD-10-CM

## 2019-04-18 PROCEDURE — 67028 INJECTION EYE DRUG: CPT | Performed by: OPHTHALMOLOGY

## 2019-04-18 PROCEDURE — SPECPHARM SPECIALTY PHARMACY DRUG: Performed by: OPHTHALMOLOGY

## 2019-04-18 ASSESSMENT — REFRACTION_AUTOREFRACTION
OD_CYLINDER: -1.50
OD_AXIS: 115
OS_CYLINDER: -1.50
OS_AXIS: 087
OS_SPHERE: +1.50
OD_SPHERE: +1.25

## 2019-04-18 ASSESSMENT — VISUAL ACUITY
OD_BCVA: 20/70-2
OS_BCVA: 20/70+2

## 2019-04-18 ASSESSMENT — SPHEQUIV_DERIVED
OS_SPHEQUIV: 0.75
OD_SPHEQUIV: 0.5

## 2019-05-01 ENCOUNTER — CLINICAL SUPPORT (OUTPATIENT)
Dept: FAMILY MEDICINE CLINIC | Facility: CLINIC | Age: 64
End: 2019-05-01
Payer: COMMERCIAL

## 2019-05-01 VITALS
DIASTOLIC BLOOD PRESSURE: 80 MMHG | WEIGHT: 227 LBS | HEIGHT: 65 IN | BODY MASS INDEX: 37.82 KG/M2 | SYSTOLIC BLOOD PRESSURE: 130 MMHG

## 2019-05-01 DIAGNOSIS — I10 HYPERTENSION, UNSPECIFIED TYPE: Primary | ICD-10-CM

## 2019-05-01 PROCEDURE — 99211 OFF/OP EST MAY X REQ PHY/QHP: CPT | Performed by: FAMILY MEDICINE

## 2019-05-14 ENCOUNTER — DOCTOR'S OFFICE (OUTPATIENT)
Dept: URBAN - METROPOLITAN AREA CLINIC 136 | Facility: CLINIC | Age: 64
Setting detail: OPHTHALMOLOGY
End: 2019-05-14
Payer: COMMERCIAL

## 2019-05-14 DIAGNOSIS — H25.13: ICD-10-CM

## 2019-05-14 DIAGNOSIS — E11.3413: ICD-10-CM

## 2019-05-14 PROCEDURE — 92134 CPTRZ OPH DX IMG PST SGM RTA: CPT | Performed by: OPHTHALMOLOGY

## 2019-05-14 PROCEDURE — 92014 COMPRE OPH EXAM EST PT 1/>: CPT | Performed by: OPHTHALMOLOGY

## 2019-05-14 ASSESSMENT — REFRACTION_MANIFEST
OS_VA3: 20/
OD_VA3: 20/
OS_VA2: 20/
OD_VA1: 20/
OS_VA2: 20/
OD_VA2: 20/
OS_VA3: 20/
OS_VA1: 20/
OU_VA: 20/
OU_VA: 20/
OD_VA3: 20/
OD_VA1: 20/
OS_VA1: 20/
OD_VA2: 20/

## 2019-05-14 ASSESSMENT — REFRACTION_CURRENTRX
OS_OVR_VA: 20/
OD_OVR_VA: 20/
OD_OVR_VA: 20/
OS_OVR_VA: 20/
OD_OVR_VA: 20/
OS_OVR_VA: 20/

## 2019-05-14 ASSESSMENT — REFRACTION_AUTOREFRACTION
OS_CYLINDER: -1.50
OS_AXIS: 087
OS_SPHERE: +1.50
OD_SPHERE: +1.25
OD_AXIS: 115
OD_CYLINDER: -1.50

## 2019-05-14 ASSESSMENT — SPHEQUIV_DERIVED
OS_SPHEQUIV: 0.75
OD_SPHEQUIV: 0.5

## 2019-05-14 ASSESSMENT — CONFRONTATIONAL VISUAL FIELD TEST (CVF)
OD_FINDINGS: FULL
OS_FINDINGS: FULL

## 2019-05-14 ASSESSMENT — VISUAL ACUITY
OS_BCVA: 20/60-2
OD_BCVA: 20/70-2

## 2019-05-21 DIAGNOSIS — E11.65 TYPE 2 DIABETES MELLITUS WITH HYPERGLYCEMIA, WITHOUT LONG-TERM CURRENT USE OF INSULIN (HCC): ICD-10-CM

## 2019-05-21 RX ORDER — PEN NEEDLE, DIABETIC 31 GX5/16"
NEEDLE, DISPOSABLE MISCELLANEOUS
Qty: 100 EACH | Refills: 0 | Status: SHIPPED | OUTPATIENT
Start: 2019-05-21

## 2019-05-21 RX ORDER — LISINOPRIL AND HYDROCHLOROTHIAZIDE 12.5; 1 MG/1; MG/1
TABLET ORAL
Qty: 30 TABLET | Refills: 0 | Status: SHIPPED | OUTPATIENT
Start: 2019-05-21 | End: 2021-02-20 | Stop reason: HOSPADM

## 2019-05-28 DIAGNOSIS — E11.65 TYPE 2 DIABETES MELLITUS WITH HYPERGLYCEMIA, WITHOUT LONG-TERM CURRENT USE OF INSULIN (HCC): ICD-10-CM

## 2019-05-30 DIAGNOSIS — E11.65 TYPE 2 DIABETES MELLITUS WITH HYPERGLYCEMIA, WITHOUT LONG-TERM CURRENT USE OF INSULIN (HCC): ICD-10-CM

## 2019-06-25 ENCOUNTER — TELEPHONE (OUTPATIENT)
Dept: FAMILY MEDICINE CLINIC | Facility: CLINIC | Age: 64
End: 2019-06-25

## 2019-06-25 DIAGNOSIS — I48.91 ATRIAL FIBRILLATION, UNSPECIFIED TYPE (HCC): Primary | ICD-10-CM

## 2019-06-26 ENCOUNTER — OFFICE VISIT (OUTPATIENT)
Dept: FAMILY MEDICINE CLINIC | Facility: CLINIC | Age: 64
End: 2019-06-26
Payer: COMMERCIAL

## 2019-06-26 VITALS
TEMPERATURE: 99.1 F | SYSTOLIC BLOOD PRESSURE: 138 MMHG | DIASTOLIC BLOOD PRESSURE: 72 MMHG | BODY MASS INDEX: 38.32 KG/M2 | WEIGHT: 230 LBS | HEIGHT: 65 IN

## 2019-06-26 DIAGNOSIS — Z79.4 TYPE 2 DIABETES MELLITUS WITHOUT COMPLICATION, WITH LONG-TERM CURRENT USE OF INSULIN (HCC): ICD-10-CM

## 2019-06-26 DIAGNOSIS — A08.4 VIRAL GASTROENTERITIS: Primary | ICD-10-CM

## 2019-06-26 DIAGNOSIS — E66.01 SEVERE OBESITY (BMI 35.0-39.9) WITH COMORBIDITY (HCC): ICD-10-CM

## 2019-06-26 DIAGNOSIS — F32.9 CURRENT EPISODE OF MAJOR DEPRESSIVE DISORDER WITHOUT PRIOR EPISODE, UNSPECIFIED DEPRESSION EPISODE SEVERITY: ICD-10-CM

## 2019-06-26 DIAGNOSIS — E11.9 TYPE 2 DIABETES MELLITUS WITHOUT COMPLICATION, WITH LONG-TERM CURRENT USE OF INSULIN (HCC): ICD-10-CM

## 2019-06-26 PROCEDURE — 1036F TOBACCO NON-USER: CPT | Performed by: FAMILY MEDICINE

## 2019-06-26 PROCEDURE — 3008F BODY MASS INDEX DOCD: CPT | Performed by: FAMILY MEDICINE

## 2019-06-26 PROCEDURE — 99214 OFFICE O/P EST MOD 30 MIN: CPT | Performed by: FAMILY MEDICINE

## 2019-06-26 RX ORDER — ONDANSETRON 4 MG/1
4 TABLET, ORALLY DISINTEGRATING ORAL EVERY 8 HOURS PRN
Qty: 30 TABLET | Refills: 0 | Status: SHIPPED | OUTPATIENT
Start: 2019-06-26 | End: 2019-07-26 | Stop reason: SDUPTHER

## 2019-06-26 RX ORDER — ESCITALOPRAM OXALATE 5 MG/1
5 TABLET ORAL DAILY
Qty: 30 TABLET | Refills: 0 | Status: SHIPPED | OUTPATIENT
Start: 2019-06-26 | End: 2019-07-29 | Stop reason: SDUPTHER

## 2019-07-02 ENCOUNTER — TELEPHONE (OUTPATIENT)
Dept: FAMILY MEDICINE CLINIC | Facility: CLINIC | Age: 64
End: 2019-07-02

## 2019-07-02 DIAGNOSIS — E11.9 TYPE 2 DIABETES MELLITUS WITHOUT COMPLICATION, WITH LONG-TERM CURRENT USE OF INSULIN (HCC): Primary | ICD-10-CM

## 2019-07-02 DIAGNOSIS — Z79.4 TYPE 2 DIABETES MELLITUS WITHOUT COMPLICATION, WITH LONG-TERM CURRENT USE OF INSULIN (HCC): Primary | ICD-10-CM

## 2019-07-02 NOTE — TELEPHONE ENCOUNTER
Dr Cynthia Becker,    Patient called today and stated that her insurance will cover the following meds, Victoza, Poetize and Truelisty

## 2019-07-09 ENCOUNTER — DOCTOR'S OFFICE (OUTPATIENT)
Dept: URBAN - METROPOLITAN AREA CLINIC 136 | Facility: CLINIC | Age: 64
Setting detail: OPHTHALMOLOGY
End: 2019-07-09
Payer: COMMERCIAL

## 2019-07-09 DIAGNOSIS — H25.13: ICD-10-CM

## 2019-07-09 DIAGNOSIS — E10.3593: ICD-10-CM

## 2019-07-09 DIAGNOSIS — E11.3413: ICD-10-CM

## 2019-07-09 PROCEDURE — 92134 CPTRZ OPH DX IMG PST SGM RTA: CPT | Performed by: OPHTHALMOLOGY

## 2019-07-09 PROCEDURE — 92014 COMPRE OPH EXAM EST PT 1/>: CPT | Performed by: OPHTHALMOLOGY

## 2019-07-09 ASSESSMENT — REFRACTION_MANIFEST
OD_VA2: 20/
OS_VA1: 20/
OS_VA2: 20/
OD_VA1: 20/
OS_VA3: 20/
OD_VA1: 20/
OU_VA: 20/
OS_VA3: 20/
OD_VA2: 20/
OS_VA2: 20/
OU_VA: 20/
OD_VA3: 20/
OS_VA1: 20/
OD_VA3: 20/

## 2019-07-09 ASSESSMENT — REFRACTION_AUTOREFRACTION
OS_SPHERE: +1.50
OD_SPHERE: +1.25
OS_CYLINDER: -1.50
OD_CYLINDER: -1.50
OS_AXIS: 087
OD_AXIS: 115

## 2019-07-09 ASSESSMENT — REFRACTION_CURRENTRX
OS_OVR_VA: 20/
OD_OVR_VA: 20/
OS_OVR_VA: 20/
OD_OVR_VA: 20/
OD_OVR_VA: 20/
OS_OVR_VA: 20/

## 2019-07-09 ASSESSMENT — VISUAL ACUITY
OS_BCVA: 20/70-2
OD_BCVA: 20/70-2

## 2019-07-09 ASSESSMENT — CONFRONTATIONAL VISUAL FIELD TEST (CVF)
OD_FINDINGS: FULL
OS_FINDINGS: FULL

## 2019-07-09 ASSESSMENT — SPHEQUIV_DERIVED
OS_SPHEQUIV: 0.75
OD_SPHEQUIV: 0.5

## 2019-07-17 ENCOUNTER — OFFICE VISIT (OUTPATIENT)
Dept: FAMILY MEDICINE CLINIC | Facility: CLINIC | Age: 64
End: 2019-07-17
Payer: COMMERCIAL

## 2019-07-17 VITALS
HEIGHT: 65 IN | WEIGHT: 220.6 LBS | BODY MASS INDEX: 36.75 KG/M2 | SYSTOLIC BLOOD PRESSURE: 120 MMHG | DIASTOLIC BLOOD PRESSURE: 80 MMHG

## 2019-07-17 DIAGNOSIS — Z79.4 TYPE 2 DIABETES MELLITUS WITHOUT COMPLICATION, WITH LONG-TERM CURRENT USE OF INSULIN (HCC): ICD-10-CM

## 2019-07-17 DIAGNOSIS — I10 ESSENTIAL HYPERTENSION: ICD-10-CM

## 2019-07-17 DIAGNOSIS — E11.36 DIABETIC CATARACT (HCC): Primary | ICD-10-CM

## 2019-07-17 DIAGNOSIS — Z79.4 TYPE 2 DIABETES MELLITUS WITH DIABETIC CATARACT, WITH LONG-TERM CURRENT USE OF INSULIN (HCC): ICD-10-CM

## 2019-07-17 DIAGNOSIS — E66.01 SEVERE OBESITY (BMI 35.0-39.9) WITH COMORBIDITY (HCC): ICD-10-CM

## 2019-07-17 DIAGNOSIS — E11.36 TYPE 2 DIABETES MELLITUS WITH DIABETIC CATARACT, WITH LONG-TERM CURRENT USE OF INSULIN (HCC): ICD-10-CM

## 2019-07-17 DIAGNOSIS — E11.9 TYPE 2 DIABETES MELLITUS WITHOUT COMPLICATION, WITH LONG-TERM CURRENT USE OF INSULIN (HCC): ICD-10-CM

## 2019-07-17 LAB — SL AMB POCT HEMOGLOBIN AIC: 8.3 (ref ?–6.5)

## 2019-07-17 PROCEDURE — 83036 HEMOGLOBIN GLYCOSYLATED A1C: CPT | Performed by: FAMILY MEDICINE

## 2019-07-17 PROCEDURE — 3045F PR MOST RECENT HEMOGLOBIN A1C LEVEL 7.0-9.0%: CPT | Performed by: FAMILY MEDICINE

## 2019-07-17 PROCEDURE — 3074F SYST BP LT 130 MM HG: CPT | Performed by: FAMILY MEDICINE

## 2019-07-17 PROCEDURE — 3008F BODY MASS INDEX DOCD: CPT | Performed by: FAMILY MEDICINE

## 2019-07-17 PROCEDURE — 99214 OFFICE O/P EST MOD 30 MIN: CPT | Performed by: FAMILY MEDICINE

## 2019-07-17 PROCEDURE — 1036F TOBACCO NON-USER: CPT | Performed by: FAMILY MEDICINE

## 2019-07-17 RX ORDER — LISINOPRIL AND HYDROCHLOROTHIAZIDE 12.5; 1 MG/1; MG/1
1 TABLET ORAL DAILY
Qty: 90 TABLET | Refills: 1 | Status: SHIPPED | OUTPATIENT
Start: 2019-07-17 | End: 2020-04-03

## 2019-07-17 NOTE — PROGRESS NOTES
Assessment/Plan:    65-year-old woman with:  hypertension, type 2 diabetes with diabetic cataract and obesity  Discussed supportive care return parameters  Continue current medications and will increase to use of Trulicity and advised that if patient's sugars continued to improve she may continue off the CinnaBid  Encouraged follow-up with GI with the bariatric team and follow-up in 3 months  No problem-specific Assessment & Plan notes found for this encounter  Diagnoses and all orders for this visit:    Diabetic cataract ([address] )  -     POCT hemoglobin A1c    Essential hypertension  -     lisinopril-hydrochlorothiazide (PRINZIDE,ZESTORETIC) 10-12 5 MG per tablet; Take 1 tablet by mouth daily    Type 2 diabetes mellitus without complication, with long-term current use of insulin (HCC)  -     Dulaglutide 1 5 MG/0 5ML SOPN; Inject 0 5 mL (1 5 mg total) under the skin once a week    Type 2 diabetes mellitus with diabetic cataract, with long-term current use of insulin (HCC)          Subjective:     Chief Complaint   Patient presents with    Follow-up     Reg F/U DM, GERD, HTN, Hyperlipids, MDD, insomnia   Medication Refill     Lisinopril-HCT to [facility] for 90 day - awaiting approval     Nausea     pt still c/o nausea, vomiting, spitting up, diarrhea persisting  Gastro gaver her supplements but this stuff doesnt work  Does not believe this is related to the Trulicity, this has been occurring prior  Patient ID: Alannah Li is a 66 y o  female  Patient is a 65-year-old woman who presents for follow-up on hypertension, type 2 diabetes with diabetic cataract and obesity  She admits that she is doing okay on her current medications but she is weaning down her basal call our because she likes taking the Trulicity better and she feels that it is working better with fever side effects  No fevers chills nausea vomiting    She is having diarrhea and she plans to see GI in August   No other complaints at this time  She has plans to see the bariatric team as well to begin the process  The following portions of the patient's history were reviewed and updated as appropriate: allergies, current medications, past family history, past medical history, past social history, past surgical history and problem list     Review of Systems   Constitutional: Negative  HENT: Negative  Eyes: Negative  Respiratory: Negative  Cardiovascular: Negative  Gastrointestinal: Positive for diarrhea  Endocrine: Negative  Genitourinary: Negative  Musculoskeletal: Negative  Allergic/Immunologic: Negative  Neurological: Negative  Hematological: Negative  Psychiatric/Behavioral: Negative  All other systems reviewed and are negative  Objective:      /80 (BP Location: Right arm, Patient Position: Sitting, Cuff Size: Large)   Ht 5' 5" (1 651 m)   Wt 100 kg (220 lb 9 6 oz)   BMI 36 71 kg/m²          Physical Exam   Constitutional: She is oriented to person, place, and time  She appears well-developed and well-nourished  HENT:   Head: Atraumatic  Right Ear: External ear normal    Left Ear: External ear normal    Eyes: Pupils are equal, round, and reactive to light  Conjunctivae and EOM are normal    Neck: Normal range of motion  Cardiovascular: Normal rate, regular rhythm and normal heart sounds  Pulmonary/Chest: Effort normal and breath sounds normal  No respiratory distress  Abdominal: Soft  She exhibits no distension  There is no tenderness  There is no rebound and no guarding  Musculoskeletal: Normal range of motion  Neurological: She is alert and oriented to person, place, and time  No cranial nerve deficit  Skin: Skin is warm and dry  Psychiatric: She has a normal mood and affect   Her behavior is normal  Judgment and thought content normal

## 2019-07-26 DIAGNOSIS — A08.4 VIRAL GASTROENTERITIS: ICD-10-CM

## 2019-07-26 RX ORDER — ONDANSETRON 4 MG/1
TABLET, ORALLY DISINTEGRATING ORAL
Qty: 30 TABLET | Refills: 0 | Status: SHIPPED | OUTPATIENT
Start: 2019-07-26 | End: 2020-11-27

## 2019-07-29 DIAGNOSIS — F32.9 CURRENT EPISODE OF MAJOR DEPRESSIVE DISORDER WITHOUT PRIOR EPISODE, UNSPECIFIED DEPRESSION EPISODE SEVERITY: ICD-10-CM

## 2019-07-29 RX ORDER — ESCITALOPRAM OXALATE 5 MG/1
TABLET ORAL
Qty: 30 TABLET | Refills: 0 | Status: SHIPPED | OUTPATIENT
Start: 2019-07-29 | End: 2021-02-26 | Stop reason: SDUPTHER

## 2019-08-08 ENCOUNTER — RX ONLY (RX ONLY)
Age: 64
End: 2019-08-08

## 2019-08-08 ENCOUNTER — DOCTOR'S OFFICE (OUTPATIENT)
Dept: URBAN - METROPOLITAN AREA CLINIC 136 | Facility: CLINIC | Age: 64
Setting detail: OPHTHALMOLOGY
End: 2019-08-08
Payer: COMMERCIAL

## 2019-08-08 DIAGNOSIS — E11.3411: ICD-10-CM

## 2019-08-08 DIAGNOSIS — E11.3413: ICD-10-CM

## 2019-08-08 PROCEDURE — 67028 INJECTION EYE DRUG: CPT | Performed by: OPHTHALMOLOGY

## 2019-08-08 PROCEDURE — SPECPHARM SPECIALTY PHARMACY DRUG: Performed by: OPHTHALMOLOGY

## 2019-08-08 PROCEDURE — 92134 CPTRZ OPH DX IMG PST SGM RTA: CPT | Performed by: OPHTHALMOLOGY

## 2019-08-08 ASSESSMENT — REFRACTION_MANIFEST
OD_VA1: 20/
OD_VA3: 20/
OS_VA2: 20/
OS_VA2: 20/
OS_VA3: 20/
OD_VA2: 20/
OU_VA: 20/
OS_VA1: 20/
OD_VA1: 20/
OD_VA2: 20/
OD_VA3: 20/
OU_VA: 20/
OS_VA3: 20/
OS_VA1: 20/

## 2019-08-08 ASSESSMENT — SPHEQUIV_DERIVED
OD_SPHEQUIV: 0.5
OS_SPHEQUIV: 0.75

## 2019-08-08 ASSESSMENT — CONFRONTATIONAL VISUAL FIELD TEST (CVF)
OD_FINDINGS: FULL
OS_FINDINGS: FULL

## 2019-08-08 ASSESSMENT — REFRACTION_CURRENTRX
OD_OVR_VA: 20/
OS_OVR_VA: 20/
OD_OVR_VA: 20/
OS_OVR_VA: 20/
OD_OVR_VA: 20/
OS_OVR_VA: 20/

## 2019-08-08 ASSESSMENT — REFRACTION_AUTOREFRACTION
OS_SPHERE: +1.50
OD_SPHERE: +1.25
OD_AXIS: 115
OS_AXIS: 087
OS_CYLINDER: -1.50
OD_CYLINDER: -1.50

## 2019-08-08 ASSESSMENT — VISUAL ACUITY
OS_BCVA: 20/60-1
OD_BCVA: 2070-1

## 2019-08-26 ENCOUNTER — TELEPHONE (OUTPATIENT)
Dept: FAMILY MEDICINE CLINIC | Facility: CLINIC | Age: 64
End: 2019-08-26

## 2019-08-26 DIAGNOSIS — Z72.0 TOBACCO ABUSE: Primary | ICD-10-CM

## 2019-08-27 RX ORDER — VARENICLINE TARTRATE 25 MG
KIT ORAL
Qty: 53 TABLET | Refills: 0 | Status: SHIPPED | OUTPATIENT
Start: 2019-08-27 | End: 2020-09-18 | Stop reason: ALTCHOICE

## 2019-09-12 DIAGNOSIS — A08.4 VIRAL GASTROENTERITIS: ICD-10-CM

## 2019-09-12 RX ORDER — ONDANSETRON 4 MG/1
TABLET, ORALLY DISINTEGRATING ORAL
Qty: 30 TABLET | Refills: 0 | Status: SHIPPED | OUTPATIENT
Start: 2019-09-12 | End: 2020-11-27

## 2019-10-02 DIAGNOSIS — E11.9 TYPE 2 DIABETES MELLITUS WITHOUT COMPLICATION, WITH LONG-TERM CURRENT USE OF INSULIN (HCC): ICD-10-CM

## 2019-10-02 DIAGNOSIS — Z79.4 TYPE 2 DIABETES MELLITUS WITHOUT COMPLICATION, WITH LONG-TERM CURRENT USE OF INSULIN (HCC): ICD-10-CM

## 2019-10-02 RX ORDER — DULAGLUTIDE 1.5 MG/.5ML
INJECTION, SOLUTION SUBCUTANEOUS
Qty: 6 ML | Refills: 0 | Status: SHIPPED | OUTPATIENT
Start: 2019-10-02 | End: 2019-10-31 | Stop reason: ALTCHOICE

## 2019-10-03 ENCOUNTER — DOCTOR'S OFFICE (OUTPATIENT)
Dept: URBAN - METROPOLITAN AREA CLINIC 136 | Facility: CLINIC | Age: 64
Setting detail: OPHTHALMOLOGY
End: 2019-10-03
Payer: COMMERCIAL

## 2019-10-03 DIAGNOSIS — E11.3413: ICD-10-CM

## 2019-10-03 DIAGNOSIS — H25.13: ICD-10-CM

## 2019-10-03 DIAGNOSIS — E10.3593: ICD-10-CM

## 2019-10-03 PROCEDURE — 92134 CPTRZ OPH DX IMG PST SGM RTA: CPT | Performed by: OPHTHALMOLOGY

## 2019-10-03 PROCEDURE — 92014 COMPRE OPH EXAM EST PT 1/>: CPT | Performed by: OPHTHALMOLOGY

## 2019-10-03 ASSESSMENT — CONFRONTATIONAL VISUAL FIELD TEST (CVF)
OS_FINDINGS: FULL
OD_FINDINGS: FULL

## 2019-10-23 ENCOUNTER — OFFICE VISIT (OUTPATIENT)
Dept: FAMILY MEDICINE CLINIC | Facility: CLINIC | Age: 64
End: 2019-10-23
Payer: COMMERCIAL

## 2019-10-23 VITALS
HEIGHT: 65 IN | TEMPERATURE: 97.9 F | SYSTOLIC BLOOD PRESSURE: 132 MMHG | BODY MASS INDEX: 34.16 KG/M2 | DIASTOLIC BLOOD PRESSURE: 84 MMHG | WEIGHT: 205 LBS

## 2019-10-23 DIAGNOSIS — E11.9 TYPE 2 DIABETES MELLITUS WITHOUT COMPLICATION, WITH LONG-TERM CURRENT USE OF INSULIN (HCC): ICD-10-CM

## 2019-10-23 DIAGNOSIS — Z79.4 TYPE 2 DIABETES MELLITUS WITHOUT COMPLICATION, WITH LONG-TERM CURRENT USE OF INSULIN (HCC): ICD-10-CM

## 2019-10-23 LAB — SL AMB POCT HEMOGLOBIN AIC: 10.6 (ref ?–6.5)

## 2019-10-23 PROCEDURE — 83036 HEMOGLOBIN GLYCOSYLATED A1C: CPT | Performed by: FAMILY MEDICINE

## 2019-10-23 PROCEDURE — 99213 OFFICE O/P EST LOW 20 MIN: CPT | Performed by: FAMILY MEDICINE

## 2019-10-23 NOTE — PROGRESS NOTES
Assessment/Plan:    60-year-old woman with:  Type 2 diabetes  Will restart the basilar and discussed additional treatment options and will begin trial of mealtime coverage and discussed supportive care return parameters and follow-up in 3 months    No problem-specific Assessment & Plan notes found for this encounter  Diagnoses and all orders for this visit:    Type 2 diabetes mellitus without complication, with long-term current use of insulin (HCC)  -     insulin glargine (BASAGLAR KWIKPEN) 100 units/mL injection pen; Inject 45 Units under the skin every 12 (twelve) hours  -     insulin lispro (ADMELOG SOLOSTAR) 100 units/mL injection pen; Inject 5 Units under the skin 3 (three) times a day with meals  -     POCT hemoglobin A1c          Subjective:     Chief Complaint   Patient presents with    Medication Problem     Vomitting, not eating and falling asleep        Patient ID: Sunday Zhen is a 60 y o  female  Patient is a 60-year-old woman who presents for follow-up on type 2 diabetes  She was having significant nausea and vomiting along with fatigue and she felt that it may have been secondary to her medications so she stopped that she was [facility]  She had previously  The Lantus  But her sugars have been poorly controlled  No fevers chills  Otherwise she admits being stable on her medicines and denies other complaints      The following portions of the patient's history were reviewed and updated as appropriate: allergies, current medications, past family history, past medical history, past social history, past surgical history and problem list     Review of Systems   Constitutional: Negative  HENT: Negative  Eyes: Negative  Respiratory: Negative  Cardiovascular: Negative  Gastrointestinal: Positive for nausea and vomiting  Endocrine: Negative  Genitourinary: Negative  Musculoskeletal: Negative  Allergic/Immunologic: Negative  Neurological: Negative      Hematological: Negative  Psychiatric/Behavioral: Negative  All other systems reviewed and are negative  Objective:      /84 (BP Location: Right arm, Patient Position: Sitting, Cuff Size: Large)   Temp 97 9 °F (36 6 °C) (Tympanic)   Ht 5' 5" (1 651 m)   Wt 93 kg (205 lb)   BMI 34 11 kg/m²          Physical Exam   Constitutional: She is oriented to person, place, and time  She appears well-developed and well-nourished  HENT:   Head: Atraumatic  Right Ear: External ear normal    Left Ear: External ear normal    Eyes: Pupils are equal, round, and reactive to light  Conjunctivae and EOM are normal    Neck: Normal range of motion  Cardiovascular: Normal rate, regular rhythm and normal heart sounds  Pulmonary/Chest: Effort normal and breath sounds normal  No respiratory distress  Abdominal: Soft  She exhibits no distension  There is no tenderness  There is no rebound and no guarding  Musculoskeletal: Normal range of motion  Neurological: She is alert and oriented to person, place, and time  No cranial nerve deficit  Skin: Skin is warm and dry  Psychiatric: She has a normal mood and affect   Her behavior is normal  Judgment and thought content normal

## 2019-10-28 ENCOUNTER — TELEPHONE (OUTPATIENT)
Dept: FAMILY MEDICINE CLINIC | Facility: CLINIC | Age: 64
End: 2019-10-28

## 2019-10-28 DIAGNOSIS — E11.36 TYPE 2 DIABETES MELLITUS WITH DIABETIC CATARACT, WITH LONG-TERM CURRENT USE OF INSULIN (HCC): Primary | ICD-10-CM

## 2019-10-28 DIAGNOSIS — Z79.4 TYPE 2 DIABETES MELLITUS WITH DIABETIC CATARACT, WITH LONG-TERM CURRENT USE OF INSULIN (HCC): Primary | ICD-10-CM

## 2019-10-28 NOTE — TELEPHONE ENCOUNTER
Patient called requesting a referral to endo  She also states she believes she is allergic to insulin because when she used to take the trulicity she would vomit and now she takes bagaslar and it is causing her constant nausea

## 2019-10-31 ENCOUNTER — TELEPHONE (OUTPATIENT)
Dept: DIABETES SERVICES | Facility: CLINIC | Age: 64
End: 2019-10-31

## 2019-10-31 ENCOUNTER — OFFICE VISIT (OUTPATIENT)
Dept: ENDOCRINOLOGY | Facility: CLINIC | Age: 64
End: 2019-10-31
Payer: COMMERCIAL

## 2019-10-31 VITALS
SYSTOLIC BLOOD PRESSURE: 128 MMHG | HEART RATE: 90 BPM | DIASTOLIC BLOOD PRESSURE: 70 MMHG | BODY MASS INDEX: 34.49 KG/M2 | HEIGHT: 65 IN | WEIGHT: 207 LBS

## 2019-10-31 DIAGNOSIS — Z79.4 TYPE 2 DIABETES MELLITUS WITH HYPERGLYCEMIA, WITH LONG-TERM CURRENT USE OF INSULIN (HCC): Primary | ICD-10-CM

## 2019-10-31 DIAGNOSIS — I10 ESSENTIAL HYPERTENSION: ICD-10-CM

## 2019-10-31 DIAGNOSIS — R11.2 NAUSEA AND VOMITING, INTRACTABILITY OF VOMITING NOT SPECIFIED, UNSPECIFIED VOMITING TYPE: ICD-10-CM

## 2019-10-31 DIAGNOSIS — E11.65 TYPE 2 DIABETES MELLITUS WITH HYPERGLYCEMIA, WITH LONG-TERM CURRENT USE OF INSULIN (HCC): Primary | ICD-10-CM

## 2019-10-31 LAB — SL AMB POCT GLUCOSE BLD: 315

## 2019-10-31 PROCEDURE — 82948 REAGENT STRIP/BLOOD GLUCOSE: CPT | Performed by: INTERNAL MEDICINE

## 2019-10-31 PROCEDURE — 99244 OFF/OP CNSLTJ NEW/EST MOD 40: CPT | Performed by: INTERNAL MEDICINE

## 2019-10-31 RX ORDER — GLIPIZIDE 2.5 MG/1
2.5 TABLET, EXTENDED RELEASE ORAL DAILY
Qty: 30 TABLET | Refills: 4 | Status: SHIPPED | OUTPATIENT
Start: 2019-10-31 | End: 2020-04-20

## 2019-10-31 NOTE — TELEPHONE ENCOUNTER
I spoke with Dr Rosa Keyes about this pt who is currently scheduled in January with her and she said to please have someone get her in ASAP at any location! Can someone please call to get her in sooner somewhere? Thanks so much!

## 2019-10-31 NOTE — TELEPHONE ENCOUNTER
Hi, is there someplace that we can fit patient in      She has amer caritas and is unable to go to [place] with this insurance, thank you

## 2019-10-31 NOTE — PROGRESS NOTES
Tobi Stephens 60 y o  female MRN: [id number]    Encounter: [id number]      Assessment/Plan     Assessment: This is a 60y o -year-old female with PMH of type 2 diabetes, uncontrolled, is here for evaluation of type 2 diabetes and management    Plan:    Diagnoses and all orders for this visit:    Type 2 diabetes mellitus with hyperglycemia, with long-term current use of insulin ([address] )    Lab Results   Component Value Date    HGBA1C 10 6 (A) 10/23/2019    Patient has been having nausea and vomiting with Trulicity, since she has stopped the trulicity, nausea and vomiting has improved, also abdominal pain is improved  She has not taken any medication for the last 4 days, and appetite has gotten better  I have asked her to make appointment with gastroenterologist, if her nausea vomiting comes back, her go to the ER for evaluation  Also have stopped metformin as well as [place]  Will start glipizide XL 2 5 mg with breakfast, if she does not eat breakfast and asked her to take glipizide with lunch  Will restart insulin glargine at 20 units at bedtime  Discussed to check blood sugar twice daily and send log next week to make further adjustment  Will refer her to nutrition therapy and diabetes education  -     glipiZIDE (GLUCOTROL XL) 2 5 mg 24 hr tablet; Take 1 tablet (2 5 mg total) by mouth daily With meal  -     Ambulatory referral to Diabetic Education; Future  -     Ambulatory referral to medical nutrition therapy for diabetes; Future  -     Microalbumin / creatinine urine ratio; Future  -     Hemoglobin A1C; Future  -     Lipid panel; Future  -     Ambulatory referral to Nutrition Services; Future    Nausea and vomiting, intractability of vomiting not specified, unspecified vomiting type  Improved  Discussed to make appointment with GI if she continues to have nausea vomiting, or go to the ER  -     POCT blood glucose  -     Basic metabolic panel; Future  -     Hepatic function panel;  Future    Essential hypertension  Blood pressure is controlled  Continue current management  -     Discontinue: insulin glargine (BASAGLAR KWIKPEN) 100 units/mL injection pen; Inject 30 units at bedtime  -     insulin glargine (BASAGLAR KWIKPEN) 100 units/mL injection pen; Inject 20 units at bedtime  -     Lipid panel; Future      Lab Results   Component Value Date    HGBA1C 10 6 (A) 10/23/2019         CC: Diabetes    History of Present Illness     HPI:    Anat Bryant is 22-year-old woman with past medical history of type 2 diabetes, diagnosed 2 years ago  Denies any complications of diabetes other than diabetic retinopathy  She was taking Trulicity however she was having severe nausea vomiting, abdominal pains  She took her last shot 10 days ago, she did not take trulicity last Sunday, since then she does not have a nausea, vomiting  She was supposed to take insulin glargine, 45 units at bedtime, and admelog 5 units with meals , however she has not taken anything for the last 3 days  Her blood sugar is in the office is 300 mg/dL  She also has diabetic retinopathy and is currently followed by Ophthalmology     Denies history of pancreatitis or pancreatic problem  Denies any history of liver problems    She has history of hypertension, for which she is currently taking metoprolol 50 mg daily, Prinizide 10/12 5 mg daily     Review of Systems   Constitutional: Negative for activity change, diaphoresis, fatigue, fever and unexpected weight change  HENT: Positive for voice change  Eyes: Positive for visual disturbance  Respiratory: Positive for cough and shortness of breath  Negative for chest tightness  Cardiovascular: Negative for chest pain, palpitations and leg swelling  Gastrointestinal: Negative for abdominal pain, constipation, diarrhea, nausea and vomiting  Endocrine: Negative for cold intolerance, heat intolerance, polydipsia, polyphagia and polyuria     Genitourinary: Negative for dysuria, enuresis, frequency and urgency  Musculoskeletal: Negative for arthralgias and myalgias  Skin: Negative for pallor, rash and wound  Allergic/Immunologic: Negative  Neurological: Negative for dizziness, tremors, weakness and numbness  Hematological: Negative  Psychiatric/Behavioral: Negative  Historical Information   History reviewed  No pertinent past medical history  Past Surgical History:   Procedure Laterality Date    LAPAROTOMY      Exploratory;  Last Assessed 10/17/2017     Social History   Social History     Substance and Sexual Activity   Alcohol Use Yes    Comment: Socially     Social History     Substance and Sexual Activity   Drug Use No     Social History     Tobacco Use   Smoking Status Never Smoker   Smokeless Tobacco Never Used     Family History:   Family History   Problem Relation Age of Onset    No Known Problems Mother     No Known Problems Father        Meds/Allergies   Current Outpatient Medications   Medication Sig Dispense Refill    ACCU-CHEK SOFTCLIX LANCETS lancets by Does not apply route      B-D ULTRAFINE III SHORT PEN 31G X 8 MM MISC use as directed 100 each 0    furosemide (LASIX) 20 mg tablet Take 1 tablet (20 mg total) by mouth daily 30 tablet 0    insulin glargine (BASAGLAR KWIKPEN) 100 units/mL injection pen Inject 20 units at bedtime 5 pen 1    Insulin Pen Needle (PEN NEEDLES 5/16") 30G X 8 MM MISC by Does not apply route daily 50 each 0    lisinopril-hydrochlorothiazide (PRINZIDE,ZESTORETIC) 10-12 5 MG per tablet Take 1 tablet by mouth daily 90 tablet 1    metoprolol tartrate (LOPRESSOR) 50 mg tablet   0    albuterol (VENTOLIN HFA) 90 mcg/act inhaler Inhale 2 puffs every 4 (four) hours as needed for wheezing (Patient not taking: Reported on 6/26/2019) 1 Inhaler 0    escitalopram (LEXAPRO) 5 mg tablet take 1 tablet by mouth once daily 30 tablet 0    glipiZIDE (GLUCOTROL XL) 2 5 mg 24 hr tablet Take 1 tablet (2 5 mg total) by mouth daily With meal 30 tablet 4    loperamide (IMODIUM A-D) 2 MG tablet Take 1 tablet (2 mg total) by mouth 4 (four) times a day as needed for diarrhea (Patient not taking: Reported on 10/31/2019) 30 tablet 0    ondansetron (ZOFRAN-ODT) 4 mg disintegrating tablet dissolve 1 tablet ON TONGUE every 8 hours if needed for nausea (Patient not taking: Reported on 10/31/2019) 30 tablet 0    ondansetron (ZOFRAN-ODT) 4 mg disintegrating tablet dissolve 1 tablet ON TONGUE every 8 hours if needed for nausea (Patient not taking: Reported on 10/31/2019) 30 tablet 0    varenicline (CHANTIX ALEX) 0 5 MG X 11 & 1 MG X 42 tablet Take one 0 5mg tab by mouth 1x daily for 3 days, then increase to one 0 5mg tab 2x daily for 3 days, then increase to one 1mg tab 2x daily (Patient not taking: Reported on 10/31/2019) 53 tablet 0     Current Facility-Administered Medications   Medication Dose Route Frequency Provider Last Rate Last Dose    albuterol inhalation solution 2 5 mg  2 5 mg Nebulization Q6H PRN Ming Harman MD         Allergies   Allergen Reactions    Eliquis [Apixaban] Vomiting       Objective   Vitals: Blood pressure 128/70, pulse 90, height 5' 5" (1 651 m), weight 93 9 kg (207 lb)  Physical Exam   Constitutional: She is oriented to person, place, and time  She appears well-developed and well-nourished  No distress  HENT:   Head: Normocephalic and atraumatic  Eyes: Conjunctivae and EOM are normal  Right eye exhibits no discharge  Left eye exhibits no discharge  Neck: Normal range of motion  Neck supple  No thyromegaly present  Cardiovascular: Normal rate, regular rhythm and normal heart sounds  No murmur heard  Pulmonary/Chest: Effort normal and breath sounds normal  No respiratory distress  She has no wheezes  Abdominal: Soft  Bowel sounds are normal  She exhibits no distension  There is no tenderness  Musculoskeletal: Normal range of motion  She exhibits no edema, tenderness or deformity     Neurological: She is alert and oriented to person, place, and time  She has normal reflexes  She displays normal reflexes  She exhibits normal muscle tone  Skin: Skin is warm and dry  No rash noted  She is not diaphoretic  No erythema  Psychiatric: She has a normal mood and affect  Her behavior is normal    Vitals reviewed  The history was obtained from the review of the chart, patient  Lab Results:   Lab Results   Component Value Date/Time    Hemoglobin A1C 10 6 (A) 10/23/2019 02:34 PM    Hemoglobin A1C 8 3 (A) 07/17/2019 10:21 AM    Hemoglobin A1C 9 1 (A) 04/17/2019 09:25 AM           Imaging Studies: I have personally reviewed pertinent reports  Portions of the record may have been created with voice recognition software  Occasional wrong word or "sound a like" substitutions may have occurred due to the inherent limitations of voice recognition software  Read the chart carefully and recognize, using context, where substitutions have occurred

## 2019-10-31 NOTE — TELEPHONE ENCOUNTER
As discussed in person--Pt states she got a call from Dr Nusrat Castillo but I can not find anything documented  She said she gets sick from her insulin and "would rather die happy from not taking it when it makes her sick then die sick from taking it"  Can you please call her to discuss it with her? I will also have endo call pt and schedule at any location ASAP since pt is currently scheduled in January  Thanks so much!

## 2019-11-14 ENCOUNTER — DOCTOR'S OFFICE (OUTPATIENT)
Dept: URBAN - METROPOLITAN AREA CLINIC 136 | Facility: CLINIC | Age: 64
Setting detail: OPHTHALMOLOGY
End: 2019-11-14
Payer: COMMERCIAL

## 2019-11-14 ENCOUNTER — OFFICE VISIT (OUTPATIENT)
Dept: DIABETES SERVICES | Facility: CLINIC | Age: 64
End: 2019-11-14
Payer: COMMERCIAL

## 2019-11-14 DIAGNOSIS — E11.65 TYPE 2 DIABETES MELLITUS WITH HYPERGLYCEMIA, WITH LONG-TERM CURRENT USE OF INSULIN (HCC): Primary | ICD-10-CM

## 2019-11-14 DIAGNOSIS — Z79.4 TYPE 2 DIABETES MELLITUS WITH HYPERGLYCEMIA, WITH LONG-TERM CURRENT USE OF INSULIN (HCC): Primary | ICD-10-CM

## 2019-11-14 DIAGNOSIS — H25.13: ICD-10-CM

## 2019-11-14 DIAGNOSIS — E11.3413: ICD-10-CM

## 2019-11-14 PROCEDURE — G0109 DIAB MANAGE TRN IND/GROUP: HCPCS | Performed by: DIETITIAN, REGISTERED

## 2019-11-14 PROCEDURE — 92012 INTRM OPH EXAM EST PATIENT: CPT | Performed by: OPHTHALMOLOGY

## 2019-11-14 PROCEDURE — 92134 CPTRZ OPH DX IMG PST SGM RTA: CPT | Performed by: OPHTHALMOLOGY

## 2019-11-14 ASSESSMENT — REFRACTION_AUTOREFRACTION
OD_SPHERE: +1.25
OS_AXIS: 087
OS_SPHERE: +1.50
OS_SPHERE: +1.50
OS_AXIS: 087
OD_CYLINDER: -1.50
OD_CYLINDER: -1.50
OD_AXIS: 115
OS_CYLINDER: -1.50
OD_AXIS: 115
OS_CYLINDER: -1.50
OD_SPHERE: +1.25

## 2019-11-14 ASSESSMENT — REFRACTION_MANIFEST
OS_VA1: 20/
OU_VA: 20/
OU_VA: 20/
OD_VA3: 20/
OD_VA2: 20/
OS_VA1: 20/
OS_VA3: 20/
OD_VA1: 20/
OD_VA1: 20/
OS_VA2: 20/
OS_VA1: 20/
OD_VA1: 20/
OS_VA2: 20/
OD_VA3: 20/
OS_VA1: 20/
OD_VA2: 20/
OU_VA: 20/
OS_VA2: 20/
OD_VA2: 20/
OS_VA3: 20/
OD_VA3: 20/
OD_VA1: 20/
OD_VA3: 20/
OD_VA2: 20/
OS_VA2: 20/
OS_VA3: 20/
OS_VA3: 20/
OU_VA: 20/

## 2019-11-14 ASSESSMENT — SPHEQUIV_DERIVED
OS_SPHEQUIV: 0.75
OD_SPHEQUIV: 0.5
OS_SPHEQUIV: 0.75
OD_SPHEQUIV: 0.5

## 2019-11-14 ASSESSMENT — REFRACTION_CURRENTRX
OD_OVR_VA: 20/
OS_OVR_VA: 20/
OD_OVR_VA: 20/
OS_OVR_VA: 20/
OD_OVR_VA: 20/

## 2019-11-14 ASSESSMENT — VISUAL ACUITY
OD_BCVA: 20/100+1
OS_BCVA: 20/60-1
OD_BCVA: 2070-1
OS_BCVA: 20/60-1

## 2019-11-14 ASSESSMENT — CONFRONTATIONAL VISUAL FIELD TEST (CVF)
OS_FINDINGS: FULL
OD_FINDINGS: FULL

## 2019-11-14 NOTE — PATIENT INSTRUCTIONS
Class Assessment AVS    You are scheduled to attend Living Well with Diabetes Classes starting: January 8, 2020 at [facility] location  Please bring a copy of your blood sugar log and pen with you to class  Testing frequency: Pair testing (before a meal and 2 hours after the same meal) rotating a different meal each day  Goal Blood Sugars:   Premeal , even better <110  2hr after a meal <180, even better <140  A1C <7%, even better <6 5%  Thank you for coming to the [facility] for education today  Please feel free to call with any questions or concerns      Soraya Hugo, [address]  [address] [facility] [id number]
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
## 2019-11-22 DIAGNOSIS — Z12.31 BREAST CANCER SCREENING BY MAMMOGRAM: Primary | ICD-10-CM

## 2019-12-17 DIAGNOSIS — E11.9 TYPE 2 DIABETES MELLITUS WITHOUT COMPLICATION, WITH LONG-TERM CURRENT USE OF INSULIN (HCC): ICD-10-CM

## 2019-12-17 DIAGNOSIS — Z79.4 TYPE 2 DIABETES MELLITUS WITHOUT COMPLICATION, WITH LONG-TERM CURRENT USE OF INSULIN (HCC): ICD-10-CM

## 2019-12-17 RX ORDER — PEN NEEDLE, DIABETIC 31 GX5/16"
NEEDLE, DISPOSABLE MISCELLANEOUS
Qty: 100 EACH | Refills: 0 | Status: SHIPPED | OUTPATIENT
Start: 2019-12-17 | End: 2020-04-30

## 2019-12-18 ENCOUNTER — DOCTOR'S OFFICE (OUTPATIENT)
Dept: URBAN - METROPOLITAN AREA CLINIC 136 | Facility: CLINIC | Age: 64
Setting detail: OPHTHALMOLOGY
End: 2019-12-18
Payer: COMMERCIAL

## 2019-12-18 DIAGNOSIS — E11.3413: ICD-10-CM

## 2019-12-18 PROCEDURE — 92012 INTRM OPH EXAM EST PATIENT: CPT | Performed by: OPHTHALMOLOGY

## 2019-12-18 PROCEDURE — 92134 CPTRZ OPH DX IMG PST SGM RTA: CPT | Performed by: OPHTHALMOLOGY

## 2019-12-18 ASSESSMENT — CONFRONTATIONAL VISUAL FIELD TEST (CVF)
OS_FINDINGS: FULL
OD_FINDINGS: FULL

## 2019-12-18 ASSESSMENT — REFRACTION_CURRENTRX
OD_OVR_VA: 20/
OS_OVR_VA: 20/
OD_OVR_VA: 20/
OD_OVR_VA: 20/
OS_OVR_VA: 20/
OS_OVR_VA: 20/

## 2019-12-18 ASSESSMENT — REFRACTION_AUTOREFRACTION
OS_CYLINDER: -1.50
OD_CYLINDER: -1.50
OS_SPHERE: +1.50
OD_AXIS: 115
OS_AXIS: 087
OD_SPHERE: +1.25

## 2019-12-18 ASSESSMENT — REFRACTION_MANIFEST
OD_VA3: 20/
OD_VA1: 20/
OS_VA3: 20/
OS_VA2: 20/
OD_VA3: 20/
OD_VA1: 20/
OD_VA2: 20/
OS_VA1: 20/
OS_VA1: 20/
OU_VA: 20/
OD_VA2: 20/
OS_VA3: 20/
OU_VA: 20/
OS_VA2: 20/

## 2019-12-18 ASSESSMENT — SPHEQUIV_DERIVED
OS_SPHEQUIV: 0.75
OD_SPHEQUIV: 0.5

## 2019-12-18 ASSESSMENT — VISUAL ACUITY
OS_BCVA: 20/70+2
OD_BCVA: 20/200

## 2020-01-16 ENCOUNTER — DOCTOR'S OFFICE (OUTPATIENT)
Dept: URBAN - METROPOLITAN AREA CLINIC 136 | Facility: CLINIC | Age: 65
Setting detail: OPHTHALMOLOGY
End: 2020-01-16
Payer: COMMERCIAL

## 2020-01-16 VITALS — HEIGHT: 55 IN

## 2020-01-16 DIAGNOSIS — E11.3411: ICD-10-CM

## 2020-01-16 DIAGNOSIS — H25.13: ICD-10-CM

## 2020-01-16 DIAGNOSIS — E11.3413: ICD-10-CM

## 2020-01-16 PROCEDURE — SPECPHARM SPECIALTY PHARMACY DRUG: Performed by: OPHTHALMOLOGY

## 2020-01-16 PROCEDURE — 92134 CPTRZ OPH DX IMG PST SGM RTA: CPT | Performed by: OPHTHALMOLOGY

## 2020-01-16 PROCEDURE — 67028 INJECTION EYE DRUG: CPT | Performed by: OPHTHALMOLOGY

## 2020-01-16 PROCEDURE — 92012 INTRM OPH EXAM EST PATIENT: CPT | Performed by: OPHTHALMOLOGY

## 2020-01-16 ASSESSMENT — VISUAL ACUITY
OD_BCVA: 20/200
OS_BCVA: 20/80+1

## 2020-01-16 ASSESSMENT — REFRACTION_AUTOREFRACTION
OS_CYLINDER: -1.50
OD_CYLINDER: -1.50
OD_AXIS: 115
OS_AXIS: 087
OD_SPHERE: +1.25
OS_SPHERE: +1.50

## 2020-01-16 ASSESSMENT — CONFRONTATIONAL VISUAL FIELD TEST (CVF)
OS_FINDINGS: FULL
OD_FINDINGS: FULL

## 2020-01-16 ASSESSMENT — SPHEQUIV_DERIVED
OD_SPHEQUIV: 0.5
OS_SPHEQUIV: 0.75

## 2020-01-31 ENCOUNTER — RX ONLY (RX ONLY)
Age: 65
End: 2020-01-31

## 2020-01-31 ENCOUNTER — DOCTOR'S OFFICE (OUTPATIENT)
Dept: URBAN - METROPOLITAN AREA CLINIC 136 | Facility: CLINIC | Age: 65
Setting detail: OPHTHALMOLOGY
End: 2020-01-31
Payer: COMMERCIAL

## 2020-01-31 VITALS — HEIGHT: 55 IN

## 2020-01-31 DIAGNOSIS — E11.3412: ICD-10-CM

## 2020-01-31 PROCEDURE — SPECPHARM SPECIALTY PHARMACY DRUG: Performed by: OPHTHALMOLOGY

## 2020-01-31 PROCEDURE — 67028 INJECTION EYE DRUG: CPT | Performed by: OPHTHALMOLOGY

## 2020-01-31 ASSESSMENT — REFRACTION_AUTOREFRACTION
OS_CYLINDER: -1.50
OD_SPHERE: +1.25
OS_SPHERE: +1.50
OD_AXIS: 115
OS_AXIS: 087
OD_CYLINDER: -1.50

## 2020-01-31 ASSESSMENT — CONFRONTATIONAL VISUAL FIELD TEST (CVF)
OS_FINDINGS: FULL
OD_FINDINGS: FULL

## 2020-01-31 ASSESSMENT — VISUAL ACUITY
OD_BCVA: 20/200
OS_BCVA: 20/80-1

## 2020-01-31 ASSESSMENT — SPHEQUIV_DERIVED
OS_SPHEQUIV: 0.75
OD_SPHEQUIV: 0.5

## 2020-02-27 ENCOUNTER — TELEPHONE (OUTPATIENT)
Dept: DIABETES SERVICES | Facility: CLINIC | Age: 65
End: 2020-02-27

## 2020-04-03 DIAGNOSIS — I10 ESSENTIAL HYPERTENSION: ICD-10-CM

## 2020-04-03 RX ORDER — LISINOPRIL AND HYDROCHLOROTHIAZIDE 12.5; 1 MG/1; MG/1
TABLET ORAL
Qty: 90 TABLET | Refills: 1 | Status: SHIPPED | OUTPATIENT
Start: 2020-04-03 | End: 2020-04-20 | Stop reason: SDUPTHER

## 2020-04-16 ENCOUNTER — TELEPHONE (OUTPATIENT)
Dept: ADMINISTRATIVE | Facility: OTHER | Age: 65
End: 2020-04-16

## 2020-04-17 ENCOUNTER — DOCTOR'S OFFICE (OUTPATIENT)
Dept: URBAN - METROPOLITAN AREA CLINIC 136 | Facility: CLINIC | Age: 65
Setting detail: OPHTHALMOLOGY
End: 2020-04-17
Payer: COMMERCIAL

## 2020-04-17 VITALS — HEIGHT: 55 IN

## 2020-04-17 DIAGNOSIS — E11.3413: ICD-10-CM

## 2020-04-17 DIAGNOSIS — E11.3411: ICD-10-CM

## 2020-04-17 PROCEDURE — SPECPHARM SPECIALTY PHARMACY DRUG: Performed by: OPHTHALMOLOGY

## 2020-04-17 PROCEDURE — 92134 CPTRZ OPH DX IMG PST SGM RTA: CPT | Performed by: OPHTHALMOLOGY

## 2020-04-17 PROCEDURE — 67028 INJECTION EYE DRUG: CPT | Performed by: OPHTHALMOLOGY

## 2020-04-17 ASSESSMENT — REFRACTION_AUTOREFRACTION
OS_AXIS: 087
OD_AXIS: 115
OD_SPHERE: +1.25
OD_CYLINDER: -1.50
OS_CYLINDER: -1.50
OS_SPHERE: +1.50

## 2020-04-17 ASSESSMENT — VISUAL ACUITY
OS_BCVA: 20/60
OD_BCVA: 20/250

## 2020-04-17 ASSESSMENT — SPHEQUIV_DERIVED
OS_SPHEQUIV: 0.75
OD_SPHEQUIV: 0.5

## 2020-04-18 DIAGNOSIS — Z79.4 TYPE 2 DIABETES MELLITUS WITH HYPERGLYCEMIA, WITH LONG-TERM CURRENT USE OF INSULIN (HCC): ICD-10-CM

## 2020-04-18 DIAGNOSIS — E11.65 TYPE 2 DIABETES MELLITUS WITH HYPERGLYCEMIA, WITH LONG-TERM CURRENT USE OF INSULIN (HCC): ICD-10-CM

## 2020-04-20 DIAGNOSIS — I10 ESSENTIAL HYPERTENSION: ICD-10-CM

## 2020-04-20 RX ORDER — GLIPIZIDE 2.5 MG/1
TABLET, EXTENDED RELEASE ORAL
Qty: 30 TABLET | Refills: 4 | Status: SHIPPED | OUTPATIENT
Start: 2020-04-20 | End: 2020-06-11

## 2020-04-20 RX ORDER — LISINOPRIL AND HYDROCHLOROTHIAZIDE 12.5; 1 MG/1; MG/1
1 TABLET ORAL DAILY
Qty: 90 TABLET | Refills: 1 | Status: SHIPPED | OUTPATIENT
Start: 2020-04-20 | End: 2020-11-27

## 2020-04-30 DIAGNOSIS — E11.9 TYPE 2 DIABETES MELLITUS WITHOUT COMPLICATION, WITH LONG-TERM CURRENT USE OF INSULIN (HCC): ICD-10-CM

## 2020-04-30 DIAGNOSIS — Z79.4 TYPE 2 DIABETES MELLITUS WITHOUT COMPLICATION, WITH LONG-TERM CURRENT USE OF INSULIN (HCC): ICD-10-CM

## 2020-04-30 RX ORDER — PEN NEEDLE, DIABETIC 31 GX5/16"
NEEDLE, DISPOSABLE MISCELLANEOUS
Qty: 100 EACH | Refills: 0 | Status: SHIPPED | OUTPATIENT
Start: 2020-04-30 | End: 2020-08-28

## 2020-06-05 ENCOUNTER — LAB (OUTPATIENT)
Dept: LAB | Facility: MEDICAL CENTER | Age: 65
End: 2020-06-05
Payer: COMMERCIAL

## 2020-06-05 DIAGNOSIS — I10 ESSENTIAL HYPERTENSION: ICD-10-CM

## 2020-06-05 DIAGNOSIS — R11.2 NAUSEA AND VOMITING, INTRACTABILITY OF VOMITING NOT SPECIFIED, UNSPECIFIED VOMITING TYPE: ICD-10-CM

## 2020-06-05 DIAGNOSIS — Z79.4 TYPE 2 DIABETES MELLITUS WITH HYPERGLYCEMIA, WITH LONG-TERM CURRENT USE OF INSULIN (HCC): ICD-10-CM

## 2020-06-05 DIAGNOSIS — E11.65 TYPE 2 DIABETES MELLITUS WITH HYPERGLYCEMIA, WITH LONG-TERM CURRENT USE OF INSULIN (HCC): ICD-10-CM

## 2020-06-05 LAB
ALBUMIN SERPL BCP-MCNC: 3.7 G/DL (ref 3.5–5)
ALP SERPL-CCNC: 69 U/L (ref 46–116)
ALT SERPL W P-5'-P-CCNC: 21 U/L (ref 12–78)
ANION GAP SERPL CALCULATED.3IONS-SCNC: 6 MMOL/L (ref 4–13)
AST SERPL W P-5'-P-CCNC: 8 U/L (ref 5–45)
BILIRUB DIRECT SERPL-MCNC: 0.14 MG/DL (ref 0–0.2)
BILIRUB SERPL-MCNC: 0.5 MG/DL (ref 0.2–1)
BUN SERPL-MCNC: 30 MG/DL (ref 5–25)
CALCIUM SERPL-MCNC: 9.7 MG/DL (ref 8.3–10.1)
CHLORIDE SERPL-SCNC: 103 MMOL/L (ref 100–108)
CHOLEST SERPL-MCNC: 249 MG/DL (ref 50–200)
CO2 SERPL-SCNC: 26 MMOL/L (ref 21–32)
CREAT SERPL-MCNC: 1.29 MG/DL (ref 0.6–1.3)
EST. AVERAGE GLUCOSE BLD GHB EST-MCNC: 298 MG/DL
GFR SERPL CREATININE-BSD FRML MDRD: 44 ML/MIN/1.73SQ M
GLUCOSE P FAST SERPL-MCNC: 339 MG/DL (ref 65–99)
HBA1C MFR BLD: 12 %
HDLC SERPL-MCNC: 69 MG/DL
LDLC SERPL CALC-MCNC: 154 MG/DL (ref 0–100)
NONHDLC SERPL-MCNC: 180 MG/DL
POTASSIUM SERPL-SCNC: 4.7 MMOL/L (ref 3.5–5.3)
PROT SERPL-MCNC: 7.9 G/DL (ref 6.4–8.2)
SODIUM SERPL-SCNC: 135 MMOL/L (ref 136–145)
TRIGL SERPL-MCNC: 128 MG/DL

## 2020-06-05 PROCEDURE — 80048 BASIC METABOLIC PNL TOTAL CA: CPT

## 2020-06-05 PROCEDURE — 36415 COLL VENOUS BLD VENIPUNCTURE: CPT

## 2020-06-05 PROCEDURE — 80061 LIPID PANEL: CPT

## 2020-06-05 PROCEDURE — 80076 HEPATIC FUNCTION PANEL: CPT

## 2020-06-05 PROCEDURE — 83036 HEMOGLOBIN GLYCOSYLATED A1C: CPT

## 2020-06-11 ENCOUNTER — TELEMEDICINE (OUTPATIENT)
Dept: ENDOCRINOLOGY | Facility: CLINIC | Age: 65
End: 2020-06-11
Payer: COMMERCIAL

## 2020-06-11 DIAGNOSIS — Z79.4 TYPE 2 DIABETES MELLITUS WITH HYPERGLYCEMIA, WITH LONG-TERM CURRENT USE OF INSULIN (HCC): Primary | ICD-10-CM

## 2020-06-11 DIAGNOSIS — E11.65 TYPE 2 DIABETES MELLITUS WITH HYPERGLYCEMIA, WITH LONG-TERM CURRENT USE OF INSULIN (HCC): Primary | ICD-10-CM

## 2020-06-11 DIAGNOSIS — I10 ESSENTIAL HYPERTENSION: ICD-10-CM

## 2020-06-11 DIAGNOSIS — E78.2 MIXED HYPERLIPIDEMIA: ICD-10-CM

## 2020-06-11 PROCEDURE — 99214 OFFICE O/P EST MOD 30 MIN: CPT | Performed by: PHYSICIAN ASSISTANT

## 2020-06-11 RX ORDER — GLIPIZIDE 5 MG/1
5 TABLET, FILM COATED, EXTENDED RELEASE ORAL DAILY
Qty: 90 TABLET | Refills: 1 | Status: SHIPPED | OUTPATIENT
Start: 2020-06-11 | End: 2020-12-02

## 2020-06-17 DIAGNOSIS — Z79.4 TYPE 2 DIABETES MELLITUS WITH HYPERGLYCEMIA, WITH LONG-TERM CURRENT USE OF INSULIN (HCC): Primary | ICD-10-CM

## 2020-06-17 DIAGNOSIS — E11.65 TYPE 2 DIABETES MELLITUS WITH HYPERGLYCEMIA, WITH LONG-TERM CURRENT USE OF INSULIN (HCC): Primary | ICD-10-CM

## 2020-07-17 ENCOUNTER — DOCTOR'S OFFICE (OUTPATIENT)
Dept: URBAN - METROPOLITAN AREA CLINIC 136 | Facility: CLINIC | Age: 65
Setting detail: OPHTHALMOLOGY
End: 2020-07-17
Payer: COMMERCIAL

## 2020-07-17 DIAGNOSIS — E11.3413: ICD-10-CM

## 2020-07-17 DIAGNOSIS — H25.13: ICD-10-CM

## 2020-07-17 DIAGNOSIS — H43.811: ICD-10-CM

## 2020-07-17 PROCEDURE — 92014 COMPRE OPH EXAM EST PT 1/>: CPT | Performed by: OPHTHALMOLOGY

## 2020-07-17 PROCEDURE — 92134 CPTRZ OPH DX IMG PST SGM RTA: CPT | Performed by: OPHTHALMOLOGY

## 2020-07-17 ASSESSMENT — REFRACTION_AUTOREFRACTION
OS_CYLINDER: -1.50
OD_AXIS: 115
OD_SPHERE: +1.25
OS_AXIS: 087
OS_SPHERE: +1.50
OD_CYLINDER: -1.50

## 2020-07-17 ASSESSMENT — CONFRONTATIONAL VISUAL FIELD TEST (CVF)
OS_FINDINGS: FULL
OD_FINDINGS: FULL

## 2020-07-17 ASSESSMENT — VISUAL ACUITY
OD_BCVA: 20/70-2
OS_BCVA: 20/200

## 2020-07-17 ASSESSMENT — SPHEQUIV_DERIVED
OD_SPHEQUIV: 0.5
OS_SPHEQUIV: 0.75

## 2020-08-03 ENCOUNTER — DOCTOR'S OFFICE (OUTPATIENT)
Dept: URBAN - METROPOLITAN AREA CLINIC 136 | Facility: CLINIC | Age: 65
Setting detail: OPHTHALMOLOGY
End: 2020-08-03

## 2020-08-03 DIAGNOSIS — H52.4: ICD-10-CM

## 2020-08-03 PROCEDURE — NCRX N/C GLASSES RX: Performed by: OPTOMETRIST

## 2020-08-03 ASSESSMENT — REFRACTION_AUTOREFRACTION
OS_SPHERE: +2.75
OS_CYLINDER: -2.00
OD_CYLINDER: -2.75
OD_AXIS: 075
OS_AXIS: 086
OD_SPHERE: +3.00

## 2020-08-03 ASSESSMENT — REFRACTION_CURRENTRX
OS_OVR_VA: 20/
OD_SPHERE: +1.25
OD_CYLINDER: -0.75
OD_VPRISM_DIRECTION: BF
OS_AXIS: 085
OS_CYLINDER: -0.75
OD_AXIS: 105
OD_OVR_VA: 20/
OS_ADD: +2.75
OD_ADD: +2.75
OS_SPHERE: +1.25
OS_VPRISM_DIRECTION: BF

## 2020-08-03 ASSESSMENT — REFRACTION_MANIFEST
OS_ADD: +2.75
OD_ADD: +2.75
OD_AXIS: 105
OU_VA: 20/60
OD_CYLINDER: -0.75
OD_SPHERE: +1.25
OS_SPHERE: +1.25
OS_CYLINDER: -0.75
OD_VA1: 20/150
OS_AXIS: 085
OS_VA1: 20/70

## 2020-08-03 ASSESSMENT — CONFRONTATIONAL VISUAL FIELD TEST (CVF)
OD_FINDINGS: FULL
OS_FINDINGS: FULL

## 2020-08-03 ASSESSMENT — SPHEQUIV_DERIVED
OD_SPHEQUIV: 0.875
OD_SPHEQUIV: 1.625
OS_SPHEQUIV: 1.75
OS_SPHEQUIV: 0.875

## 2020-08-03 ASSESSMENT — VISUAL ACUITY
OD_BCVA: 20/80-1
OS_BCVA: 20/200

## 2020-08-21 ENCOUNTER — OFFICE VISIT (OUTPATIENT)
Dept: FAMILY MEDICINE CLINIC | Facility: CLINIC | Age: 65
End: 2020-08-21
Payer: COMMERCIAL

## 2020-08-21 VITALS
SYSTOLIC BLOOD PRESSURE: 134 MMHG | OXYGEN SATURATION: 98 % | HEIGHT: 65 IN | BODY MASS INDEX: 36.32 KG/M2 | DIASTOLIC BLOOD PRESSURE: 70 MMHG | HEART RATE: 86 BPM | WEIGHT: 218 LBS

## 2020-08-21 DIAGNOSIS — Z79.4 TYPE 2 DIABETES MELLITUS WITH HYPERGLYCEMIA, WITH LONG-TERM CURRENT USE OF INSULIN (HCC): ICD-10-CM

## 2020-08-21 DIAGNOSIS — E11.36 TYPE 2 DIABETES MELLITUS WITH DIABETIC CATARACT, WITH LONG-TERM CURRENT USE OF INSULIN (HCC): ICD-10-CM

## 2020-08-21 DIAGNOSIS — Z79.4 TYPE 2 DIABETES MELLITUS WITH DIABETIC CATARACT, WITH LONG-TERM CURRENT USE OF INSULIN (HCC): ICD-10-CM

## 2020-08-21 DIAGNOSIS — I10 ESSENTIAL HYPERTENSION: ICD-10-CM

## 2020-08-21 DIAGNOSIS — E66.01 SEVERE OBESITY (BMI 35.0-39.9) WITH COMORBIDITY (HCC): Primary | ICD-10-CM

## 2020-08-21 DIAGNOSIS — E78.5 HYPERLIPIDEMIA, UNSPECIFIED HYPERLIPIDEMIA TYPE: ICD-10-CM

## 2020-08-21 DIAGNOSIS — I63.9 CEREBROVASCULAR ACCIDENT (CVA), UNSPECIFIED MECHANISM (HCC): ICD-10-CM

## 2020-08-21 DIAGNOSIS — Z00.00 MEDICARE ANNUAL WELLNESS VISIT, SUBSEQUENT: ICD-10-CM

## 2020-08-21 DIAGNOSIS — J38.00 VOCAL CORD PARALYSIS: ICD-10-CM

## 2020-08-21 DIAGNOSIS — E11.65 TYPE 2 DIABETES MELLITUS WITH HYPERGLYCEMIA, WITH LONG-TERM CURRENT USE OF INSULIN (HCC): ICD-10-CM

## 2020-08-21 DIAGNOSIS — E11.36 DIABETIC CATARACT (HCC): ICD-10-CM

## 2020-08-21 DIAGNOSIS — I48.0 PAROXYSMAL ATRIAL FIBRILLATION (HCC): ICD-10-CM

## 2020-08-21 PROBLEM — E11.9 TYPE 2 DIABETES MELLITUS WITHOUT COMPLICATION, WITH LONG-TERM CURRENT USE OF INSULIN (HCC): Status: RESOLVED | Noted: 2019-10-23 | Resolved: 2020-08-21

## 2020-08-21 PROCEDURE — 99214 OFFICE O/P EST MOD 30 MIN: CPT | Performed by: FAMILY MEDICINE

## 2020-08-21 PROCEDURE — 3078F DIAST BP <80 MM HG: CPT | Performed by: FAMILY MEDICINE

## 2020-08-21 PROCEDURE — 3046F HEMOGLOBIN A1C LEVEL >9.0%: CPT | Performed by: FAMILY MEDICINE

## 2020-08-21 PROCEDURE — 1036F TOBACCO NON-USER: CPT | Performed by: FAMILY MEDICINE

## 2020-08-21 PROCEDURE — 3008F BODY MASS INDEX DOCD: CPT | Performed by: FAMILY MEDICINE

## 2020-08-21 PROCEDURE — 3075F SYST BP GE 130 - 139MM HG: CPT | Performed by: FAMILY MEDICINE

## 2020-08-21 PROCEDURE — G0439 PPPS, SUBSEQ VISIT: HCPCS | Performed by: FAMILY MEDICINE

## 2020-08-21 RX ORDER — ATORVASTATIN CALCIUM 10 MG/1
10 TABLET, FILM COATED ORAL DAILY
Qty: 90 TABLET | Refills: 1 | Status: SHIPPED | OUTPATIENT
Start: 2020-08-21 | End: 2021-02-20 | Stop reason: HOSPADM

## 2020-08-21 NOTE — PROGRESS NOTES
Assessment/Plan:    81-year-old woman with: type 2 diabetes with diabetic cataract, hypertension, history of CVA, AFib, severe obesity, hyperlipidemia, vocal cord paralysis  Discussed workup and treatment options with risks and benefits  Will continue current medications and restart trial of statin with Lipitor 10 mg x 3 months and then rechecking fasting labs strongly encouraged patient to follow-up with endocrinology to better control her sugars encouraged lifestyle modifications will give referral to bariatric surgery as well discussed supportive care return parameters otherwise and follow-up in 3 months  No problem-specific Assessment & Plan notes found for this encounter  Diagnoses and all orders for this visit:    Severe obesity (BMI 35 0-39  9) with comorbidity ([facility] [address] )  -     Ambulatory referral to Bariatric Surgery; Future  -     Lipid Panel with Direct LDL reflex; Future  -     Comprehensive metabolic panel; Future    Type 2 diabetes mellitus with hyperglycemia, with long-term current use of insulin (HCC)  -     Lipid Panel with Direct LDL reflex; Future  -     Comprehensive metabolic panel; Future    Paroxysmal atrial fibrillation (HCC)  -     Lipid Panel with Direct LDL reflex; Future  -     Comprehensive metabolic panel; Future    Hyperlipidemia, unspecified hyperlipidemia type  -     atorvastatin (LIPITOR) 10 mg tablet; Take 1 tablet (10 mg total) by mouth daily  -     Lipid Panel with Direct LDL reflex; Future  -     Comprehensive metabolic panel; Future    Type 2 diabetes mellitus with diabetic cataract, with long-term current use of insulin (HCC)    Diabetic cataract ([facility] [address] )    Vocal cord paralysis    Cerebrovascular accident (CVA), unspecified mechanism ([facility] [address] )    Essential hypertension          Subjective:     Chief Complaint   Patient presents with   [facility] Visit     pt presents for an AWV  Patient ID: Kamryn Crawford is a 63 y o  female      Patient is a 81-year-old woman who presents for follow-up on type 2 diabetes with diabetic cataract, hypertension, history of CVA, AFib, severe obesity, hyperlipidemia, vocal cord paralysis  Patient admits that she follows with Endocrine and with her eye doctor she would like a referral again for bariatric evaluation no fevers chills nausea vomiting  Tolerating p o  Intake no other complaints at this time      The following portions of the patient's history were reviewed and updated as appropriate: allergies, current medications, past family history, past medical history, past social history, past surgical history and problem list     Review of Systems   Constitutional: Negative  HENT: Negative  Eyes: Negative  Respiratory: Negative  Cardiovascular: Negative  Gastrointestinal: Negative  Endocrine: Negative  Genitourinary: Negative  Musculoskeletal: Negative  Allergic/Immunologic: Negative  Neurological: Negative  Hematological: Negative  Psychiatric/Behavioral: Negative  All other systems reviewed and are negative  Objective:      /70 (BP Location: Right arm, Patient Position: Sitting, Cuff Size: Standard)   Pulse 86   Ht 5' 5" (1 651 m)   Wt 98 9 kg (218 lb)   SpO2 98%   BMI 36 28 kg/m²          Physical Exam  Constitutional:       Appearance: She is well-developed  HENT:      Head: Atraumatic  Right Ear: External ear normal       Left Ear: External ear normal    Eyes:      Conjunctiva/sclera: Conjunctivae normal       Pupils: Pupils are equal, round, and reactive to light  Neck:      Musculoskeletal: Normal range of motion  Cardiovascular:      Rate and Rhythm: Normal rate and regular rhythm  Pulses: no weak pulses          Dorsalis pedis pulses are 2+ on the right side and 2+ on the left side  Posterior tibial pulses are 2+ on the right side and 2+ on the left side  Heart sounds: Normal heart sounds     Pulmonary:      Effort: Pulmonary effort is normal  No respiratory distress  Breath sounds: Normal breath sounds  Abdominal:      General: Bowel sounds are normal  There is no distension  Palpations: Abdomen is soft  Tenderness: There is no abdominal tenderness  There is no guarding or rebound  Musculoskeletal: Normal range of motion  Feet:      Right foot:      Skin integrity: No ulcer, skin breakdown, erythema, warmth, callus or dry skin  Left foot:      Skin integrity: No ulcer, skin breakdown, erythema, warmth, callus or dry skin  Skin:     General: Skin is warm and dry  Neurological:      Mental Status: She is alert and oriented to person, place, and time  Cranial Nerves: No cranial nerve deficit  Psychiatric:         Behavior: Behavior normal          Thought Content: Thought content normal          Judgment: Judgment normal            BMI Counseling: Body mass index is 36 28 kg/m²  The BMI is above normal  Nutrition recommendations include encouraging healthy choices of fruits and vegetables  Exercise recommendations include moderate physical activity 150 minutes/week  Patient's shoes and socks removed  Right Foot/Ankle   Right Foot Inspection  Skin Exam: skin normal and skin intact no dry skin, no warmth, no callus, no erythema, no maceration, no abnormal color, no pre-ulcer, no ulcer and no callus                          Toe Exam: ROM and strength within normal limits  Sensory       Monofilament testing: intact  Vascular    The right DP pulse is 2+  The right PT pulse is 2+  Left Foot/Ankle  Left Foot Inspection  Skin Exam: skin normal and skin intactno dry skin, no warmth, no erythema, no maceration, normal color, no pre-ulcer, no ulcer and no callus                         Toe Exam: ROM and strength within normal limits                   Sensory       Monofilament: intact  Vascular    The left DP pulse is 2+  The left PT pulse is 2+  Assign Risk Category:  No deformity present;  No loss of protective sensation; No weak pulses       Risk: 0

## 2020-08-21 NOTE — PROGRESS NOTES
Assessment and Plan:     Problem List Items Addressed This Visit        Endocrine    Diabetic cataract ([address] )    Type 2 diabetes mellitus with diabetic cataract, with long-term current use of insulin ([address] )       Respiratory    Vocal cord paralysis       Cardiovascular and Mediastinum    Atrial fibrillation ([address] )    Relevant Orders    Lipid Panel with Direct LDL reflex    Comprehensive metabolic panel    Stroke [facility])    Essential hypertension       Other    Severe obesity (BMI 35 0-39  9) with comorbidity ([address] ) - Primary    Relevant Orders    Ambulatory referral to Bariatric Surgery    Lipid Panel with Direct LDL reflex    Comprehensive metabolic panel    Hyperlipidemia    Relevant Medications    atorvastatin (LIPITOR) 10 mg tablet    Other Relevant Orders    Lipid Panel with Direct LDL reflex    Comprehensive metabolic panel    Medicare annual wellness visit, subsequent           Preventive health issues were discussed with patient, and age appropriate screening tests were ordered as noted in patient's After Visit Summary  Personalized health advice and appropriate referrals for health education or preventive services given if needed, as noted in patient's After Visit Summary  History of Present Illness:     Patient presents for Medicare Annual Wellness visit    Patient Care Team:  Rosa Vela MD as PCP - General (Family Medicine)  Sherri Muñoz MD as PCP - Endocrinology (Endocrinology)  Rosa Vela MD as PCP - [address] [place] [address] (RTE)     Problem List:     Patient Active Problem List   Diagnosis    Atrial fibrillation ([address] )    Bilateral vocal cord paralysis    Stroke ([address] )    Blurry vision    Diabetic cataract ([address] )    Dysphagia    Dysphonia    Fracture of rib    Glottic stenosis    Granulation tissue of site of tracheostomy    Heart disease    Insomnia    Incomplete emptying of bladder    Nausea    Severe obesity (BMI 35 0-39  9) with comorbidity (HCC)    Shortness of breath    Tracheal stenosis    Type 2 diabetes mellitus with diabetic cataract, with long-term current use of insulin (HCC)    Urinary retention    Vocal cord paralysis    Wheezing    Acute bronchitis due to other specified organisms    Acute encephalopathy    Change in bowel habits    Closed fracture of multiple ribs of right side    GERD (gastroesophageal reflux disease)    Hyperlipidemia    Traumatic pneumothorax    Edema    Diarrhea    Cough    Essential hypertension    Routine health maintenance    Current episode of major depressive disorder without prior episode    Medicare annual wellness visit, subsequent      Past Medical and Surgical History:     History reviewed  No pertinent past medical history  Past Surgical History:   Procedure Laterality Date    LAPAROTOMY      Exploratory;  Last Assessed 10/17/2017      Family History:     Family History   Problem Relation Age of Onset    No Known Problems Mother     No Known Problems Father       Social History:        Social History     Socioeconomic History    Marital status:      Spouse name: None    Number of children: None    Years of education: None    Highest education level: None   Occupational History    None   Social Needs    Financial resource strain: None    Food insecurity     Worry: None     Inability: None    Transportation needs     Medical: None     Non-medical: None   Tobacco Use    Smoking status: Never Smoker    Smokeless tobacco: Never Used   Substance and Sexual Activity    Alcohol use: Yes     Comment: Socially    Drug use: No    Sexual activity: Not Currently   Lifestyle    Physical activity     Days per week: None     Minutes per session: None    Stress: None   Relationships    Social connections     Talks on phone: None     Gets together: None     Attends Hoahaoism service: None     Active member of club or organization: None     Attends meetings of clubs or organizations: None Relationship status: None    Intimate partner violence     Fear of current or ex partner: None     Emotionally abused: None     Physically abused: None     Forced sexual activity: None   Other Topics Concern    None   Social History Narrative    None      Medications and Allergies:     Current Outpatient Medications   Medication Sig Dispense Refill    ACCU-CHEK SOFTCLIX LANCETS lancets by Does not apply route      albuterol (VENTOLIN HFA) 90 mcg/act inhaler Inhale 2 puffs every 4 (four) hours as needed for wheezing 1 Inhaler 0    B-D ULTRAFINE III SHORT PEN 31G X 8 MM MISC use as directed 100 each 0    B-D ULTRAFINE III SHORT PEN 31G X 8 MM MISC USE AS DIRECTED 100 each 0    escitalopram (LEXAPRO) 5 mg tablet take 1 tablet by mouth once daily 30 tablet 0    furosemide (LASIX) 20 mg tablet Take 1 tablet (20 mg total) by mouth daily 30 tablet 0    glipiZIDE (GLUCOTROL XL) 5 mg 24 hr tablet Take 1 tablet (5 mg total) by mouth daily 90 tablet 1    insulin glargine (Lantus SoloStar) 100 units/mL injection pen Inject 20 Units under the skin daily 6 pen 1    Insulin Pen Needle (PEN NEEDLES 5/16") 30G X 8 MM MISC by Does not apply route daily 50 each 0    lisinopril-hydrochlorothiazide (PRINZIDE,ZESTORETIC) 10-12 5 MG per tablet take 1 tablet by mouth once daily 30 tablet 0    lisinopril-hydrochlorothiazide (PRINZIDE,ZESTORETIC) 10-12 5 MG per tablet Take 1 tablet by mouth daily 90 tablet 1    loperamide (IMODIUM A-D) 2 MG tablet Take 1 tablet (2 mg total) by mouth 4 (four) times a day as needed for diarrhea 30 tablet 0    metoprolol tartrate (LOPRESSOR) 50 mg tablet   0    ondansetron (ZOFRAN-ODT) 4 mg disintegrating tablet dissolve 1 tablet ON TONGUE every 8 hours if needed for nausea 30 tablet 0    ondansetron (ZOFRAN-ODT) 4 mg disintegrating tablet dissolve 1 tablet ON TONGUE every 8 hours if needed for nausea 30 tablet 0    varenicline (CHANTIX ALEX) 0 5 MG X 11 & 1 MG X 42 tablet Take one 0 5mg tab by mouth 1x daily for 3 days, then increase to one 0 5mg tab 2x daily for 3 days, then increase to one 1mg tab 2x daily 53 tablet 0    atorvastatin (LIPITOR) 10 mg tablet Take 1 tablet (10 mg total) by mouth daily 90 tablet 1     Current Facility-Administered Medications   Medication Dose Route Frequency Provider Last Rate Last Dose    albuterol inhalation solution 2 5 mg  2 5 mg Nebulization Q6H PRN Alex Cormier MD         Allergies   Allergen Reactions    Eliquis [Apixaban] Vomiting      Immunizations:     Immunization History   Administered Date(s) Administered    INFLUENZA 11/04/2015, 10/01/2017    Pneumococcal Polysaccharide PPV23 11/04/2015      Health Maintenance:         Topic Date Due    Hepatitis C Screening  Completed         Topic Date Due    DTaP,Tdap,and Td Vaccines (1 - Tdap) 08/02/1978    Influenza Vaccine  07/01/2020      Medicare Health Risk Assessment:     /70 (BP Location: Right arm, Patient Position: Sitting, Cuff Size: Standard)   Pulse 86   Ht 5' 5" (1 651 m)   Wt 98 9 kg (218 lb)   SpO2 98%   BMI 36 28 kg/m²      Last Medicare Wellness visit information reviewed, patient interviewed and updates made to the record today  Health Risk Assessment:   Patient rates overall health as good  Patient feels that their physical health rating is same  Eyesight was rated as same  Hearing was rated as same  Patient feels that their emotional and mental health rating is same  Pain experienced in the last 7 days has been none  Patient states that she has experienced no weight loss or gain in last 6 months  Depression Screening:   PHQ-2 Score: 2  PHQ-9 Score: 7      Fall Risk Screening: In the past year, patient has experienced: no history of falling in past year      Urinary Incontinence Screening:   Patient has not leaked urine accidently in the last six months  Home Safety:  Patient has trouble with stairs inside or outside of their home   Patient has working smoke alarms and has working carbon monoxide detector  Home safety hazards include: none  Nutrition:   Current diet is Regular  Medications:   Patient is not currently taking any over-the-counter supplements  Patient is able to manage medications  Activities of Daily Living (ADLs)/Instrumental Activities of Daily Living (IADLs):   Walk and transfer into and out of bed and chair?: Yes  Dress and groom yourself?: Yes    Bathe or shower yourself?: Yes    Feed yourself?  Yes  Do your laundry/housekeeping?: Yes  Manage your money, pay your bills and track your expenses?: Yes  Make your own meals?: Yes    Do your own shopping?: Yes    Previous Hospitalizations:   Any hospitalizations or ED visits within the last 12 months?: No      Advance Care Planning:   Living will: No    Durable POA for healthcare: No    Advanced directive: Yes      Cognitive Screening:   Provider or family/friend/caregiver concerned regarding cognition?: No    PREVENTIVE SCREENINGS      Cardiovascular Screening:    General: Screening Not Indicated and History Lipid Disorder      Diabetes Screening:     General: Screening Not Indicated and History Diabetes      Colorectal Cancer Screening:     General: Screening Current      Breast Cancer Screening:     General: Patient Declines      Cervical Cancer Screening:    General: Patient Declines      Osteoporosis Screening:    General: Screening Not Indicated      Abdominal Aortic Aneurysm (AAA) Screening:        General: Screening Not Indicated      Lung Cancer Screening:     General: Screening Not Indicated      Hepatitis C Screening:    General: Screening Current      Daisy Franks MD

## 2020-08-25 ENCOUNTER — DOCTOR'S OFFICE (OUTPATIENT)
Dept: URBAN - METROPOLITAN AREA CLINIC 136 | Facility: CLINIC | Age: 65
Setting detail: OPHTHALMOLOGY
End: 2020-08-25
Payer: COMMERCIAL

## 2020-08-25 VITALS — HEIGHT: 55 IN

## 2020-08-25 DIAGNOSIS — H25.043: ICD-10-CM

## 2020-08-25 DIAGNOSIS — H25.013: ICD-10-CM

## 2020-08-25 PROCEDURE — 92014 COMPRE OPH EXAM EST PT 1/>: CPT | Performed by: OPHTHALMOLOGY

## 2020-08-25 ASSESSMENT — REFRACTION_CURRENTRX
OS_ADD: +2.75
OD_CYLINDER: -0.75
OS_VPRISM_DIRECTION: BF
OS_CYLINDER: -0.75
OD_VPRISM_DIRECTION: BF
OD_SPHERE: +1.25
OS_OVR_VA: 20/
OD_AXIS: 100
OD_OVR_VA: 20/
OS_SPHERE: +1.25
OD_ADD: +2.75
OS_AXIS: 090

## 2020-08-25 ASSESSMENT — REFRACTION_AUTOREFRACTION
OD_SPHERE: +2.50
OS_CYLINDER: -1.50
OS_SPHERE: +2.25
OD_CYLINDER: -2.75
OS_AXIS: 077
OD_AXIS: 083

## 2020-08-25 ASSESSMENT — SPHEQUIV_DERIVED
OD_SPHEQUIV: 1.125
OS_SPHEQUIV: 1.5
OD_SPHEQUIV: 0.875
OS_SPHEQUIV: 0.875

## 2020-08-25 ASSESSMENT — REFRACTION_MANIFEST
OS_CYLINDER: -0.75
OS_ADD: +2.75
OD_AXIS: 105
OD_CYLINDER: -0.75
OD_VA1: 20/150
OD_ADD: +2.75
OS_VA1: 20/70
OS_AXIS: 085
OU_VA: 20/60
OS_SPHERE: +1.25
OD_SPHERE: +1.25

## 2020-08-25 ASSESSMENT — VISUAL ACUITY
OS_BCVA: 20/200
OD_BCVA: 20/70-1

## 2020-08-25 ASSESSMENT — CONFRONTATIONAL VISUAL FIELD TEST (CVF)
OS_FINDINGS: FULL
OD_FINDINGS: FULL

## 2020-08-28 DIAGNOSIS — Z79.4 TYPE 2 DIABETES MELLITUS WITHOUT COMPLICATION, WITH LONG-TERM CURRENT USE OF INSULIN (HCC): ICD-10-CM

## 2020-08-28 DIAGNOSIS — E11.65 TYPE 2 DIABETES MELLITUS WITH HYPERGLYCEMIA, WITH LONG-TERM CURRENT USE OF INSULIN (HCC): ICD-10-CM

## 2020-08-28 DIAGNOSIS — Z79.4 TYPE 2 DIABETES MELLITUS WITH HYPERGLYCEMIA, WITH LONG-TERM CURRENT USE OF INSULIN (HCC): ICD-10-CM

## 2020-08-28 DIAGNOSIS — E11.9 TYPE 2 DIABETES MELLITUS WITHOUT COMPLICATION, WITH LONG-TERM CURRENT USE OF INSULIN (HCC): ICD-10-CM

## 2020-08-28 RX ORDER — PEN NEEDLE, DIABETIC 31 GX5/16"
NEEDLE, DISPOSABLE MISCELLANEOUS
Qty: 100 EACH | Refills: 0 | Status: SHIPPED | OUTPATIENT
Start: 2020-08-28 | End: 2021-01-05

## 2020-08-30 RX ORDER — INSULIN GLARGINE 100 [IU]/ML
INJECTION, SOLUTION SUBCUTANEOUS
Qty: 9 ML | Refills: 5 | Status: SHIPPED | OUTPATIENT
Start: 2020-08-30 | End: 2020-09-23 | Stop reason: SDUPTHER

## 2020-09-02 ENCOUNTER — RX ONLY (RX ONLY)
Age: 65
End: 2020-09-02

## 2020-09-02 ENCOUNTER — DOCTOR'S OFFICE (OUTPATIENT)
Dept: URBAN - METROPOLITAN AREA CLINIC 136 | Facility: CLINIC | Age: 65
Setting detail: OPHTHALMOLOGY
End: 2020-09-02
Payer: COMMERCIAL

## 2020-09-02 VITALS — HEIGHT: 55 IN

## 2020-09-02 DIAGNOSIS — E11.3411: ICD-10-CM

## 2020-09-02 DIAGNOSIS — H25.013: ICD-10-CM

## 2020-09-02 DIAGNOSIS — E11.3512: ICD-10-CM

## 2020-09-02 DIAGNOSIS — H43.811: ICD-10-CM

## 2020-09-02 DIAGNOSIS — Z79.84: ICD-10-CM

## 2020-09-02 PROCEDURE — 67028 INJECTION EYE DRUG: CPT | Performed by: OPHTHALMOLOGY

## 2020-09-02 PROCEDURE — 92012 INTRM OPH EXAM EST PATIENT: CPT | Performed by: OPHTHALMOLOGY

## 2020-09-02 PROCEDURE — SPECPHARM SPECIALTY PHARMACY DRUG: Performed by: OPHTHALMOLOGY

## 2020-09-02 PROCEDURE — 92134 CPTRZ OPH DX IMG PST SGM RTA: CPT | Performed by: OPHTHALMOLOGY

## 2020-09-02 ASSESSMENT — VISUAL ACUITY
OS_BCVA: 20/300
OD_BCVA: 20/80-1

## 2020-09-02 ASSESSMENT — REFRACTION_CURRENTRX
OS_AXIS: 090
OS_CYLINDER: -0.75
OS_VPRISM_DIRECTION: BF
OD_SPHERE: +1.25
OS_SPHERE: +1.25
OD_OVR_VA: 20/
OS_ADD: +2.75
OD_ADD: +2.75
OD_CYLINDER: -0.75
OD_AXIS: 100
OS_OVR_VA: 20/
OD_VPRISM_DIRECTION: BF

## 2020-09-02 ASSESSMENT — REFRACTION_MANIFEST
OD_AXIS: 105
OD_SPHERE: +1.25
OS_CYLINDER: -0.75
OD_ADD: +2.75
OS_ADD: +2.75
OD_CYLINDER: -0.75
OS_VA1: 20/70
OS_AXIS: 085
OS_SPHERE: +1.25
OD_VA1: 20/150
OU_VA: 20/60

## 2020-09-02 ASSESSMENT — REFRACTION_AUTOREFRACTION
OD_AXIS: 083
OS_SPHERE: +2.25
OD_CYLINDER: -2.75
OD_SPHERE: +2.50
OS_CYLINDER: -1.50
OS_AXIS: 077

## 2020-09-02 ASSESSMENT — SPHEQUIV_DERIVED
OS_SPHEQUIV: 1.5
OD_SPHEQUIV: 1.125
OS_SPHEQUIV: 0.875
OD_SPHEQUIV: 0.875

## 2020-09-02 ASSESSMENT — CONFRONTATIONAL VISUAL FIELD TEST (CVF)
OS_FINDINGS: FULL
OD_FINDINGS: FULL

## 2020-09-04 ENCOUNTER — RX ONLY (RX ONLY)
Age: 65
End: 2020-09-04

## 2020-09-04 ENCOUNTER — DOCTOR'S OFFICE (OUTPATIENT)
Dept: URBAN - METROPOLITAN AREA CLINIC 136 | Facility: CLINIC | Age: 65
Setting detail: OPHTHALMOLOGY
End: 2020-09-04
Payer: COMMERCIAL

## 2020-09-04 VITALS — HEIGHT: 55 IN

## 2020-09-04 DIAGNOSIS — E11.3512: ICD-10-CM

## 2020-09-04 PROCEDURE — SAMPLE SAMPLE MEDICATION: Performed by: OPHTHALMOLOGY

## 2020-09-04 PROCEDURE — 67028 INJECTION EYE DRUG: CPT | Performed by: OPHTHALMOLOGY

## 2020-09-04 ASSESSMENT — SPHEQUIV_DERIVED
OD_SPHEQUIV: 1.125
OS_SPHEQUIV: 1.5

## 2020-09-04 ASSESSMENT — VISUAL ACUITY
OD_BCVA: 20/80
OS_BCVA: 20/300

## 2020-09-04 ASSESSMENT — REFRACTION_AUTOREFRACTION
OD_CYLINDER: -2.75
OD_AXIS: 083
OS_AXIS: 077
OD_SPHERE: +2.50
OS_SPHERE: +2.25
OS_CYLINDER: -1.50

## 2020-09-15 ENCOUNTER — CLINICAL SUPPORT (OUTPATIENT)
Dept: BARIATRICS | Facility: CLINIC | Age: 65
End: 2020-09-15

## 2020-09-15 VITALS
HEIGHT: 65 IN | DIASTOLIC BLOOD PRESSURE: 90 MMHG | WEIGHT: 211.5 LBS | TEMPERATURE: 97.6 F | SYSTOLIC BLOOD PRESSURE: 160 MMHG | HEART RATE: 83 BPM | BODY MASS INDEX: 35.24 KG/M2

## 2020-09-15 DIAGNOSIS — Z79.4 TYPE 2 DIABETES MELLITUS WITH DIABETIC CATARACT, WITH LONG-TERM CURRENT USE OF INSULIN (HCC): ICD-10-CM

## 2020-09-15 DIAGNOSIS — E11.36 TYPE 2 DIABETES MELLITUS WITH DIABETIC CATARACT, WITH LONG-TERM CURRENT USE OF INSULIN (HCC): ICD-10-CM

## 2020-09-15 DIAGNOSIS — E66.01 MORBID OBESITY (HCC): Primary | ICD-10-CM

## 2020-09-15 DIAGNOSIS — E66.9 OBESITY, CLASS II, BMI 35-39.9: Primary | ICD-10-CM

## 2020-09-15 DIAGNOSIS — Z11.4 SCREENING FOR HIV (HUMAN IMMUNODEFICIENCY VIRUS): ICD-10-CM

## 2020-09-15 DIAGNOSIS — Z23 ENCOUNTER FOR IMMUNIZATION: ICD-10-CM

## 2020-09-15 PROCEDURE — RECHECK: Performed by: DIETITIAN, REGISTERED
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
## 2020-09-18 ENCOUNTER — OFFICE VISIT (OUTPATIENT)
Dept: BARIATRICS | Facility: CLINIC | Age: 65
End: 2020-09-18
Payer: COMMERCIAL

## 2020-09-18 VITALS
HEART RATE: 83 BPM | SYSTOLIC BLOOD PRESSURE: 132 MMHG | HEIGHT: 65 IN | BODY MASS INDEX: 35.65 KG/M2 | TEMPERATURE: 97.8 F | DIASTOLIC BLOOD PRESSURE: 78 MMHG | WEIGHT: 214 LBS

## 2020-09-18 DIAGNOSIS — R60.9 EDEMA, UNSPECIFIED TYPE: ICD-10-CM

## 2020-09-18 DIAGNOSIS — E66.01 SEVERE OBESITY (BMI 35.0-39.9) WITH COMORBIDITY (HCC): Primary | ICD-10-CM

## 2020-09-18 DIAGNOSIS — I10 ESSENTIAL HYPERTENSION: ICD-10-CM

## 2020-09-18 DIAGNOSIS — J39.8 TRACHEAL STENOSIS: ICD-10-CM

## 2020-09-18 DIAGNOSIS — E78.5 HYPERLIPIDEMIA, UNSPECIFIED HYPERLIPIDEMIA TYPE: ICD-10-CM

## 2020-09-18 DIAGNOSIS — K21.9 GASTROESOPHAGEAL REFLUX DISEASE, ESOPHAGITIS PRESENCE NOT SPECIFIED: ICD-10-CM

## 2020-09-18 DIAGNOSIS — J38.00 VOCAL CORD PARALYSIS: ICD-10-CM

## 2020-09-18 DIAGNOSIS — I48.91 ATRIAL FIBRILLATION, UNSPECIFIED TYPE (HCC): ICD-10-CM

## 2020-09-18 DIAGNOSIS — J38.02 BILATERAL VOCAL CORD PARALYSIS: ICD-10-CM

## 2020-09-18 DIAGNOSIS — F32.9 CURRENT EPISODE OF MAJOR DEPRESSIVE DISORDER WITHOUT PRIOR EPISODE, UNSPECIFIED DEPRESSION EPISODE SEVERITY: ICD-10-CM

## 2020-09-18 DIAGNOSIS — E11.36 TYPE 2 DIABETES MELLITUS WITH DIABETIC CATARACT, WITH LONG-TERM CURRENT USE OF INSULIN (HCC): ICD-10-CM

## 2020-09-18 DIAGNOSIS — I51.9 HEART DISEASE: ICD-10-CM

## 2020-09-18 DIAGNOSIS — Z79.4 TYPE 2 DIABETES MELLITUS WITH DIABETIC CATARACT, WITH LONG-TERM CURRENT USE OF INSULIN (HCC): ICD-10-CM

## 2020-09-18 DIAGNOSIS — R13.10 DYSPHAGIA, UNSPECIFIED TYPE: ICD-10-CM

## 2020-09-18 DIAGNOSIS — J38.6 GLOTTIC STENOSIS: ICD-10-CM

## 2020-09-18 PROCEDURE — 99204 OFFICE O/P NEW MOD 45 MIN: CPT | Performed by: SURGERY
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
## 2020-09-22 ENCOUNTER — CONSULT (OUTPATIENT)
Dept: FAMILY MEDICINE CLINIC | Facility: CLINIC | Age: 65
End: 2020-09-22
Payer: COMMERCIAL

## 2020-09-22 VITALS
OXYGEN SATURATION: 98 % | SYSTOLIC BLOOD PRESSURE: 132 MMHG | DIASTOLIC BLOOD PRESSURE: 74 MMHG | RESPIRATION RATE: 20 BRPM | BODY MASS INDEX: 35.82 KG/M2 | HEART RATE: 98 BPM | HEIGHT: 65 IN | WEIGHT: 215 LBS | TEMPERATURE: 97.6 F

## 2020-09-22 DIAGNOSIS — E11.36 TYPE 2 DIABETES MELLITUS WITH DIABETIC CATARACT, WITH LONG-TERM CURRENT USE OF INSULIN (HCC): ICD-10-CM

## 2020-09-22 DIAGNOSIS — Z79.4 TYPE 2 DIABETES MELLITUS WITH HYPERGLYCEMIA, WITH LONG-TERM CURRENT USE OF INSULIN (HCC): ICD-10-CM

## 2020-09-22 DIAGNOSIS — E11.65 TYPE 2 DIABETES MELLITUS WITH HYPERGLYCEMIA, WITH LONG-TERM CURRENT USE OF INSULIN (HCC): ICD-10-CM

## 2020-09-22 DIAGNOSIS — E11.36 DIABETIC CATARACT (HCC): ICD-10-CM

## 2020-09-22 DIAGNOSIS — Z79.4 TYPE 2 DIABETES MELLITUS WITH DIABETIC CATARACT, WITH LONG-TERM CURRENT USE OF INSULIN (HCC): ICD-10-CM

## 2020-09-22 DIAGNOSIS — Z12.39 SCREENING FOR BREAST CANCER: Primary | ICD-10-CM

## 2020-09-22 DIAGNOSIS — Z01.818 PREOPERATIVE CLEARANCE: ICD-10-CM

## 2020-09-22 PROCEDURE — 99214 OFFICE O/P EST MOD 30 MIN: CPT | Performed by: FAMILY MEDICINE

## 2020-09-23 ENCOUNTER — DOCTOR'S OFFICE (OUTPATIENT)
Dept: URBAN - METROPOLITAN AREA CLINIC 136 | Facility: CLINIC | Age: 65
Setting detail: OPHTHALMOLOGY
End: 2020-09-23
Payer: COMMERCIAL

## 2020-09-23 DIAGNOSIS — H25.011: ICD-10-CM

## 2020-09-23 PROBLEM — Z01.818 PREOPERATIVE CLEARANCE: Status: ACTIVE | Noted: 2020-09-23

## 2020-09-23 PROCEDURE — 92136 OPHTHALMIC BIOMETRY: CPT | Performed by: OPHTHALMOLOGY

## 2020-09-23 RX ORDER — INSULIN GLARGINE 100 [IU]/ML
30 INJECTION, SOLUTION SUBCUTANEOUS DAILY
Qty: 9 ML | Refills: 5
Start: 2020-09-23 | End: 2021-02-20 | Stop reason: HOSPADM

## 2020-09-23 NOTE — PROGRESS NOTES
Assessment/Plan:    57-year-old woman with: type 2 diabetes with diabetic cataract and preop clearance for upcoming cataract placement bilaterally  Discussed workup and treatment options with risks and benefits will continue current medications and titrate up Lantus prior patient is a low cardiopulmonary risk for low risk surgery may proceed to the OR with acceptable risk    No problem-specific Assessment & Plan notes found for this encounter  Diagnoses and all orders for this visit:    Screening for breast cancer  -     Mammo screening bilateral w cad; Future    Type 2 diabetes mellitus with diabetic cataract, with long-term current use of insulin (HCC)    Diabetic cataract ([address] )    Type 2 diabetes mellitus with hyperglycemia, with long-term current use of insulin ([facility])  -     insulin glargine (Lantus SoloStar) 100 units/mL injection pen; Inject 30 Units under the skin daily    Preoperative clearance          Subjective:     Chief Complaint   Patient presents with    Pre-op Exam     cataract rt eye 9/28, and left eye 10/13/2020        Patient ID: Alice Casanova is a 63 y o  female  Patient is a 57-year-old woman who presents for follow-up on type 2 diabetes with diabetic cataract and preop clearance for upcoming cataract placement bilaterally  She admits being generally stable on medications denies acute complaints no fevers chills nausea vomiting  No chest pain or shortness of breath  Tolerating p o  Intake no other complaints at this time      The following portions of the patient's history were reviewed and updated as appropriate: allergies, current medications, past family history, past medical history, past social history, past surgical history and problem list     Review of Systems   Constitutional: Negative  HENT: Negative  Eyes: Negative  Respiratory: Negative  Cardiovascular: Negative  Gastrointestinal: Negative  Endocrine: Negative  Genitourinary: Negative  Musculoskeletal: Negative  Allergic/Immunologic: Negative  Neurological: Negative  Hematological: Negative  Psychiatric/Behavioral: Negative  All other systems reviewed and are negative  Objective:      /74   Pulse 98   Temp 97 6 °F (36 4 °C)   Resp 20   Ht 5' 5" (1 651 m)   Wt 97 5 kg (215 lb)   SpO2 98%   BMI 35 78 kg/m²          Physical Exam  Constitutional:       Appearance: She is well-developed  HENT:      Head: Atraumatic  Right Ear: External ear normal       Left Ear: External ear normal    Eyes:      Conjunctiva/sclera: Conjunctivae normal       Pupils: Pupils are equal, round, and reactive to light  Neck:      Musculoskeletal: Normal range of motion  Cardiovascular:      Rate and Rhythm: Normal rate and regular rhythm  Heart sounds: Normal heart sounds  Pulmonary:      Effort: Pulmonary effort is normal  No respiratory distress  Breath sounds: Normal breath sounds  Abdominal:      General: Bowel sounds are normal  There is no distension  Palpations: Abdomen is soft  Tenderness: There is no abdominal tenderness  There is no guarding or rebound  Musculoskeletal: Normal range of motion  Skin:     General: Skin is warm and dry  Neurological:      Mental Status: She is alert and oriented to person, place, and time  Cranial Nerves: No cranial nerve deficit  Psychiatric:         Behavior: Behavior normal          Thought Content:  Thought content normal          Judgment: Judgment normal

## 2020-09-28 ENCOUNTER — AMBUL SURGICAL CARE (OUTPATIENT)
Dept: URBAN - METROPOLITAN AREA SURGERY 32 | Facility: SURGERY | Age: 65
Setting detail: OPHTHALMOLOGY
End: 2020-09-28
Payer: COMMERCIAL

## 2020-09-28 DIAGNOSIS — H25.011: ICD-10-CM

## 2020-09-28 DIAGNOSIS — H25.11: ICD-10-CM

## 2020-09-28 DIAGNOSIS — H25.041: ICD-10-CM

## 2020-09-28 PROCEDURE — G8907 PT DOC NO EVENTS ON DISCHARG: HCPCS | Performed by: OPHTHALMOLOGY

## 2020-09-28 PROCEDURE — 66984 XCAPSL CTRC RMVL W/O ECP: CPT | Performed by: OPHTHALMOLOGY

## 2020-09-28 PROCEDURE — G8918 PT W/O PREOP ORDER IV AB PRO: HCPCS | Performed by: OPHTHALMOLOGY

## 2020-09-29 ENCOUNTER — DOCTOR'S OFFICE (OUTPATIENT)
Dept: URBAN - METROPOLITAN AREA CLINIC 136 | Facility: CLINIC | Age: 65
Setting detail: OPHTHALMOLOGY
End: 2020-09-29
Payer: COMMERCIAL

## 2020-09-29 ENCOUNTER — RX ONLY (RX ONLY)
Age: 65
End: 2020-09-29

## 2020-09-29 DIAGNOSIS — H25.012: ICD-10-CM

## 2020-09-29 DIAGNOSIS — Z96.1: ICD-10-CM

## 2020-09-29 PROCEDURE — 99024 POSTOP FOLLOW-UP VISIT: CPT | Performed by: OPHTHALMOLOGY

## 2020-09-29 PROCEDURE — 92071 CONTACT LENS FITTING FOR TX: CPT | Performed by: OPHTHALMOLOGY

## 2020-09-29 ASSESSMENT — REFRACTION_AUTOREFRACTION
OS_CYLINDER: -1.50
OD_CYLINDER: -1.25
OS_SPHERE: +2.00
OS_AXIS: 097
OD_AXIS: 081
OD_SPHERE: +0.50

## 2020-09-29 ASSESSMENT — SPHEQUIV_DERIVED
OS_SPHEQUIV: 1.25
OD_SPHEQUIV: -0.125

## 2020-09-29 ASSESSMENT — VISUAL ACUITY
OS_BCVA: 20/70-2
OD_BCVA: 20/100-1

## 2020-10-02 ENCOUNTER — RX ONLY (RX ONLY)
Age: 65
End: 2020-10-02

## 2020-10-02 ENCOUNTER — DOCTOR'S OFFICE (OUTPATIENT)
Dept: URBAN - METROPOLITAN AREA CLINIC 136 | Facility: CLINIC | Age: 65
Setting detail: OPHTHALMOLOGY
End: 2020-10-02
Payer: COMMERCIAL

## 2020-10-02 VITALS — HEIGHT: 55 IN

## 2020-10-02 DIAGNOSIS — H25.012: ICD-10-CM

## 2020-10-02 DIAGNOSIS — Z96.1: ICD-10-CM

## 2020-10-02 DIAGNOSIS — E11.3512: ICD-10-CM

## 2020-10-02 DIAGNOSIS — H43.811: ICD-10-CM

## 2020-10-02 DIAGNOSIS — E11.3411: ICD-10-CM

## 2020-10-02 DIAGNOSIS — Z79.84: ICD-10-CM

## 2020-10-02 PROCEDURE — 99024 POSTOP FOLLOW-UP VISIT: CPT | Performed by: OPHTHALMOLOGY

## 2020-10-02 PROCEDURE — 92134 CPTRZ OPH DX IMG PST SGM RTA: CPT | Performed by: OPHTHALMOLOGY

## 2020-10-02 PROCEDURE — 67028 INJECTION EYE DRUG: CPT | Performed by: OPHTHALMOLOGY

## 2020-10-02 ASSESSMENT — SPHEQUIV_DERIVED
OD_SPHEQUIV: -0.125
OS_SPHEQUIV: 1.25

## 2020-10-02 ASSESSMENT — REFRACTION_AUTOREFRACTION
OS_AXIS: 097
OD_AXIS: 081
OD_CYLINDER: -1.25
OD_SPHERE: +0.50
OS_CYLINDER: -1.50
OS_SPHERE: +2.00

## 2020-10-02 ASSESSMENT — VISUAL ACUITY
OD_BCVA: 20/100-1
OS_BCVA: 20/60-1

## 2020-10-02 ASSESSMENT — CONFRONTATIONAL VISUAL FIELD TEST (CVF)
OD_FINDINGS: FULL
OS_FINDINGS: FULL

## 2020-10-06 ENCOUNTER — DOCTOR'S OFFICE (OUTPATIENT)
Dept: URBAN - METROPOLITAN AREA CLINIC 136 | Facility: CLINIC | Age: 65
Setting detail: OPHTHALMOLOGY
End: 2020-10-06
Payer: COMMERCIAL

## 2020-10-06 VITALS — HEIGHT: 55 IN

## 2020-10-06 DIAGNOSIS — Z96.1: ICD-10-CM

## 2020-10-06 PROCEDURE — 99024 POSTOP FOLLOW-UP VISIT: CPT | Performed by: OPHTHALMOLOGY

## 2020-10-06 ASSESSMENT — REFRACTION_AUTOREFRACTION
OS_AXIS: 097
OD_CYLINDER: -1.75
OS_CYLINDER: -1.50
OS_SPHERE: +2.00
OD_SPHERE: +0.25
OD_AXIS: 092

## 2020-10-06 ASSESSMENT — SPHEQUIV_DERIVED
OS_SPHEQUIV: 1.25
OD_SPHEQUIV: -0.625

## 2020-10-06 ASSESSMENT — VISUAL ACUITY
OD_BCVA: 20/100-1
OS_BCVA: 20/80+1

## 2020-10-12 ENCOUNTER — OFFICE VISIT (OUTPATIENT)
Dept: BARIATRICS | Facility: CLINIC | Age: 65
End: 2020-10-12
Payer: COMMERCIAL

## 2020-10-12 VITALS
TEMPERATURE: 97.7 F | SYSTOLIC BLOOD PRESSURE: 118 MMHG | HEIGHT: 65 IN | WEIGHT: 215 LBS | DIASTOLIC BLOOD PRESSURE: 58 MMHG | BODY MASS INDEX: 35.82 KG/M2 | HEART RATE: 85 BPM

## 2020-10-12 DIAGNOSIS — J39.8 TRACHEAL STENOSIS: ICD-10-CM

## 2020-10-12 DIAGNOSIS — E11.36 TYPE 2 DIABETES MELLITUS WITH DIABETIC CATARACT, WITH LONG-TERM CURRENT USE OF INSULIN (HCC): ICD-10-CM

## 2020-10-12 DIAGNOSIS — E66.9 OBESITY, CLASS II, BMI 35-39.9: Primary | ICD-10-CM

## 2020-10-12 DIAGNOSIS — E78.5 HYPERLIPIDEMIA, UNSPECIFIED HYPERLIPIDEMIA TYPE: ICD-10-CM

## 2020-10-12 DIAGNOSIS — Z86.73 HISTORY OF CVA (CEREBROVASCULAR ACCIDENT): ICD-10-CM

## 2020-10-12 DIAGNOSIS — I10 ESSENTIAL HYPERTENSION: ICD-10-CM

## 2020-10-12 DIAGNOSIS — J38.02 BILATERAL VOCAL CORD PARALYSIS: ICD-10-CM

## 2020-10-12 DIAGNOSIS — Z79.4 TYPE 2 DIABETES MELLITUS WITH DIABETIC CATARACT, WITH LONG-TERM CURRENT USE OF INSULIN (HCC): ICD-10-CM

## 2020-10-12 DIAGNOSIS — I48.91 ATRIAL FIBRILLATION, UNSPECIFIED TYPE (HCC): ICD-10-CM

## 2020-10-12 DIAGNOSIS — Z01.818 PREOP TESTING: ICD-10-CM

## 2020-10-12 PROCEDURE — 99214 OFFICE O/P EST MOD 30 MIN: CPT | Performed by: PHYSICIAN ASSISTANT

## 2020-10-13 ENCOUNTER — AMBUL SURGICAL CARE (OUTPATIENT)
Dept: URBAN - METROPOLITAN AREA SURGERY 32 | Facility: SURGERY | Age: 65
Setting detail: OPHTHALMOLOGY
End: 2020-10-13
Payer: COMMERCIAL

## 2020-10-13 DIAGNOSIS — H25.042: ICD-10-CM

## 2020-10-13 DIAGNOSIS — H25.012: ICD-10-CM

## 2020-10-13 DIAGNOSIS — H25.12: ICD-10-CM

## 2020-10-13 PROCEDURE — G8918 PT W/O PREOP ORDER IV AB PRO: HCPCS | Performed by: OPHTHALMOLOGY

## 2020-10-13 PROCEDURE — G8907 PT DOC NO EVENTS ON DISCHARG: HCPCS | Performed by: OPHTHALMOLOGY

## 2020-10-13 PROCEDURE — 66984 XCAPSL CTRC RMVL W/O ECP: CPT | Performed by: OPHTHALMOLOGY

## 2020-10-15 ENCOUNTER — DOCTOR'S OFFICE (OUTPATIENT)
Dept: URBAN - METROPOLITAN AREA CLINIC 136 | Facility: CLINIC | Age: 65
Setting detail: OPHTHALMOLOGY
End: 2020-10-15
Payer: COMMERCIAL

## 2020-10-15 DIAGNOSIS — Z96.1: ICD-10-CM

## 2020-10-15 PROCEDURE — 99024 POSTOP FOLLOW-UP VISIT: CPT | Performed by: OPHTHALMOLOGY

## 2020-10-15 ASSESSMENT — REFRACTION_AUTOREFRACTION
OS_CYLINDER: -1.75
OS_AXIS: 75
OS_SPHERE: +0.50
OD_AXIS: 95
OD_CYLINDER: -1.50
OD_SPHERE: +0.25

## 2020-10-15 ASSESSMENT — VISUAL ACUITY
OD_BCVA: 20/100+1
OS_BCVA: 20/80

## 2020-10-15 ASSESSMENT — SPHEQUIV_DERIVED
OS_SPHEQUIV: -0.375
OD_SPHEQUIV: -0.5

## 2020-10-21 ENCOUNTER — TELEPHONE (OUTPATIENT)
Dept: FAMILY MEDICINE CLINIC | Facility: CLINIC | Age: 65
End: 2020-10-21

## 2020-10-23 DIAGNOSIS — E66.01 SEVERE OBESITY (BMI 35.0-39.9) WITH COMORBIDITY (HCC): Primary | ICD-10-CM

## 2020-10-28 ENCOUNTER — RX ONLY (RX ONLY)
Age: 65
End: 2020-10-28

## 2020-10-28 ENCOUNTER — DOCTOR'S OFFICE (OUTPATIENT)
Dept: URBAN - METROPOLITAN AREA CLINIC 136 | Facility: CLINIC | Age: 65
Setting detail: OPHTHALMOLOGY
End: 2020-10-28
Payer: COMMERCIAL

## 2020-10-28 VITALS — HEIGHT: 55 IN

## 2020-10-28 DIAGNOSIS — E11.3512: ICD-10-CM

## 2020-10-28 DIAGNOSIS — E11.3411: ICD-10-CM

## 2020-10-28 DIAGNOSIS — Z96.1: ICD-10-CM

## 2020-10-28 DIAGNOSIS — J38.00 VOCAL CORD PARALYSIS: Primary | ICD-10-CM

## 2020-10-28 PROCEDURE — 99024 POSTOP FOLLOW-UP VISIT: CPT | Performed by: OPHTHALMOLOGY

## 2020-10-28 ASSESSMENT — REFRACTION_AUTOREFRACTION
OS_AXIS: 095
OD_AXIS: 103
OS_SPHERE: +0.25
OD_SPHERE: PLANO
OD_CYLINDER: -1.25
OS_CYLINDER: -1.25

## 2020-10-28 ASSESSMENT — VISUAL ACUITY
OD_BCVA: 20/200+1
OS_BCVA: 20/80

## 2020-10-28 ASSESSMENT — SPHEQUIV_DERIVED: OS_SPHEQUIV: -0.375

## 2020-10-30 ENCOUNTER — TELEPHONE (OUTPATIENT)
Dept: SLEEP CENTER | Facility: CLINIC | Age: 65
End: 2020-10-30

## 2020-10-30 ENCOUNTER — OFFICE VISIT (OUTPATIENT)
Dept: SLEEP CENTER | Facility: CLINIC | Age: 65
End: 2020-10-30
Payer: COMMERCIAL

## 2020-10-30 VITALS
DIASTOLIC BLOOD PRESSURE: 82 MMHG | OXYGEN SATURATION: 98 % | SYSTOLIC BLOOD PRESSURE: 134 MMHG | HEIGHT: 65 IN | HEART RATE: 89 BPM | TEMPERATURE: 97.5 F | WEIGHT: 222.2 LBS | BODY MASS INDEX: 37.02 KG/M2

## 2020-10-30 DIAGNOSIS — I10 ESSENTIAL HYPERTENSION: ICD-10-CM

## 2020-10-30 DIAGNOSIS — K21.9 GASTROESOPHAGEAL REFLUX DISEASE, UNSPECIFIED WHETHER ESOPHAGITIS PRESENT: ICD-10-CM

## 2020-10-30 DIAGNOSIS — I48.91 ATRIAL FIBRILLATION, UNSPECIFIED TYPE (HCC): ICD-10-CM

## 2020-10-30 DIAGNOSIS — G47.19 EXCESSIVE DAYTIME SLEEPINESS: ICD-10-CM

## 2020-10-30 DIAGNOSIS — J38.6 GLOTTIC STENOSIS: ICD-10-CM

## 2020-10-30 DIAGNOSIS — J39.8 TRACHEAL STENOSIS: ICD-10-CM

## 2020-10-30 DIAGNOSIS — Z01.812 ENCOUNTER FOR PREPROCEDURE SCREENING LABORATORY TESTING FOR COVID-19: Primary | ICD-10-CM

## 2020-10-30 DIAGNOSIS — E66.01 SEVERE OBESITY (BMI 35.0-39.9) WITH COMORBIDITY (HCC): ICD-10-CM

## 2020-10-30 DIAGNOSIS — I63.9 CEREBROVASCULAR ACCIDENT (CVA), UNSPECIFIED MECHANISM (HCC): ICD-10-CM

## 2020-10-30 DIAGNOSIS — G47.33 OSA (OBSTRUCTIVE SLEEP APNEA): Primary | ICD-10-CM

## 2020-10-30 DIAGNOSIS — Z20.822 ENCOUNTER FOR PREPROCEDURE SCREENING LABORATORY TESTING FOR COVID-19: Primary | ICD-10-CM

## 2020-10-30 DIAGNOSIS — J38.00 VOCAL CORD PARALYSIS: ICD-10-CM

## 2020-10-30 PROCEDURE — 99204 OFFICE O/P NEW MOD 45 MIN: CPT | Performed by: INTERNAL MEDICINE

## 2020-10-30 RX ORDER — PANTOPRAZOLE SODIUM 40 MG/1
40 TABLET, DELAYED RELEASE ORAL DAILY
COMMUNITY
End: 2022-02-15

## 2020-11-02 DIAGNOSIS — Z20.822 ENCOUNTER FOR PREPROCEDURE SCREENING LABORATORY TESTING FOR COVID-19: ICD-10-CM

## 2020-11-02 DIAGNOSIS — Z01.812 ENCOUNTER FOR PREPROCEDURE SCREENING LABORATORY TESTING FOR COVID-19: ICD-10-CM

## 2020-11-02 PROCEDURE — U0003 INFECTIOUS AGENT DETECTION BY NUCLEIC ACID (DNA OR RNA); SEVERE ACUTE RESPIRATORY SYNDROME CORONAVIRUS 2 (SARS-COV-2) (CORONAVIRUS DISEASE [COVID-19]), AMPLIFIED PROBE TECHNIQUE, MAKING USE OF HIGH THROUGHPUT TECHNOLOGIES AS DESCRIBED BY CMS-2020-01-R: HCPCS | Performed by: INTERNAL MEDICINE

## 2020-11-03 LAB — SARS-COV-2 RNA SPEC QL NAA+PROBE: NOT DETECTED

## 2020-11-08 ENCOUNTER — HOSPITAL ENCOUNTER (OUTPATIENT)
Dept: SLEEP CENTER | Facility: CLINIC | Age: 65
Discharge: HOME | End: 2020-11-08
Payer: COMMERCIAL

## 2020-11-08 DIAGNOSIS — J38.00 VOCAL CORD PARALYSIS: ICD-10-CM

## 2020-11-08 DIAGNOSIS — E66.01 SEVERE OBESITY (BMI 35.0-39.9) WITH COMORBIDITY (HCC): ICD-10-CM

## 2020-11-08 DIAGNOSIS — J39.8 TRACHEAL STENOSIS: ICD-10-CM

## 2020-11-08 DIAGNOSIS — G47.33 OSA (OBSTRUCTIVE SLEEP APNEA): ICD-10-CM

## 2020-11-08 PROCEDURE — 95811 POLYSOM 6/>YRS CPAP 4/> PARM: CPT

## 2020-11-09 DIAGNOSIS — G47.33 OSA (OBSTRUCTIVE SLEEP APNEA): Primary | ICD-10-CM

## 2020-11-10 ENCOUNTER — TELEPHONE (OUTPATIENT)
Dept: FAMILY MEDICINE CLINIC | Facility: CLINIC | Age: 65
End: 2020-11-10

## 2020-11-11 ENCOUNTER — OFFICE VISIT (OUTPATIENT)
Dept: FAMILY MEDICINE CLINIC | Facility: CLINIC | Age: 65
End: 2020-11-11
Payer: COMMERCIAL

## 2020-11-11 VITALS — DIASTOLIC BLOOD PRESSURE: 72 MMHG | SYSTOLIC BLOOD PRESSURE: 128 MMHG | WEIGHT: 216.6 LBS | BODY MASS INDEX: 36.04 KG/M2

## 2020-11-11 DIAGNOSIS — L30.9 DERMATITIS: Primary | ICD-10-CM

## 2020-11-11 PROCEDURE — 99213 OFFICE O/P EST LOW 20 MIN: CPT | Performed by: FAMILY MEDICINE

## 2020-11-11 RX ORDER — TRIAMCINOLONE ACETONIDE 1 MG/G
CREAM TOPICAL 2 TIMES DAILY
Qty: 30 G | Refills: 0 | Status: SHIPPED | OUTPATIENT
Start: 2020-11-11 | End: 2021-02-20 | Stop reason: HOSPADM

## 2020-11-16 ENCOUNTER — OFFICE VISIT (OUTPATIENT)
Dept: BARIATRICS | Facility: CLINIC | Age: 65
End: 2020-11-16
Payer: COMMERCIAL

## 2020-11-16 ENCOUNTER — TELEPHONE (OUTPATIENT)
Dept: SLEEP CENTER | Facility: CLINIC | Age: 65
End: 2020-11-16

## 2020-11-16 VITALS
SYSTOLIC BLOOD PRESSURE: 120 MMHG | HEART RATE: 83 BPM | TEMPERATURE: 98 F | BODY MASS INDEX: 36.07 KG/M2 | DIASTOLIC BLOOD PRESSURE: 80 MMHG | RESPIRATION RATE: 20 BRPM | HEIGHT: 65 IN | WEIGHT: 216.5 LBS

## 2020-11-16 DIAGNOSIS — I48.91 ATRIAL FIBRILLATION, UNSPECIFIED TYPE (HCC): ICD-10-CM

## 2020-11-16 DIAGNOSIS — E11.36 TYPE 2 DIABETES MELLITUS WITH DIABETIC CATARACT, WITH LONG-TERM CURRENT USE OF INSULIN (HCC): ICD-10-CM

## 2020-11-16 DIAGNOSIS — I10 ESSENTIAL HYPERTENSION: ICD-10-CM

## 2020-11-16 DIAGNOSIS — E66.9 OBESITY, CLASS II, BMI 35-39.9: Primary | ICD-10-CM

## 2020-11-16 DIAGNOSIS — G47.33 OSA (OBSTRUCTIVE SLEEP APNEA): ICD-10-CM

## 2020-11-16 DIAGNOSIS — J38.00 VOCAL CORD PARALYSIS: ICD-10-CM

## 2020-11-16 DIAGNOSIS — Z79.4 TYPE 2 DIABETES MELLITUS WITH DIABETIC CATARACT, WITH LONG-TERM CURRENT USE OF INSULIN (HCC): ICD-10-CM

## 2020-11-16 DIAGNOSIS — J39.8 TRACHEAL STENOSIS: ICD-10-CM

## 2020-11-16 PROCEDURE — 99214 OFFICE O/P EST MOD 30 MIN: CPT | Performed by: PHYSICIAN ASSISTANT

## 2020-11-18 ENCOUNTER — TELEPHONE (OUTPATIENT)
Dept: SLEEP CENTER | Facility: CLINIC | Age: 65
End: 2020-11-18

## 2020-11-27 ENCOUNTER — OFFICE VISIT (OUTPATIENT)
Dept: FAMILY MEDICINE CLINIC | Facility: CLINIC | Age: 65
End: 2020-11-27
Payer: COMMERCIAL

## 2020-11-27 ENCOUNTER — TELEPHONE (OUTPATIENT)
Dept: ENDOCRINOLOGY | Facility: CLINIC | Age: 65
End: 2020-11-27

## 2020-11-27 VITALS
SYSTOLIC BLOOD PRESSURE: 150 MMHG | HEIGHT: 65 IN | WEIGHT: 216 LBS | BODY MASS INDEX: 35.99 KG/M2 | DIASTOLIC BLOOD PRESSURE: 98 MMHG

## 2020-11-27 DIAGNOSIS — Z79.4 TYPE 2 DIABETES MELLITUS WITH DIABETIC CATARACT, WITH LONG-TERM CURRENT USE OF INSULIN (HCC): ICD-10-CM

## 2020-11-27 DIAGNOSIS — E11.36 TYPE 2 DIABETES MELLITUS WITH DIABETIC CATARACT, WITH LONG-TERM CURRENT USE OF INSULIN (HCC): ICD-10-CM

## 2020-11-27 DIAGNOSIS — Z23 ENCOUNTER FOR IMMUNIZATION: ICD-10-CM

## 2020-11-27 DIAGNOSIS — I10 ESSENTIAL HYPERTENSION: ICD-10-CM

## 2020-11-27 DIAGNOSIS — E11.36 DIABETIC CATARACT (HCC): Primary | ICD-10-CM

## 2020-11-27 DIAGNOSIS — E66.01 SEVERE OBESITY (BMI 35.0-39.9) WITH COMORBIDITY (HCC): ICD-10-CM

## 2020-11-27 LAB — SL AMB POCT HEMOGLOBIN AIC: 12.5 (ref ?–6.5)

## 2020-11-27 PROCEDURE — 83036 HEMOGLOBIN GLYCOSYLATED A1C: CPT | Performed by: FAMILY MEDICINE

## 2020-11-27 PROCEDURE — 99214 OFFICE O/P EST MOD 30 MIN: CPT | Performed by: FAMILY MEDICINE

## 2020-11-28 ENCOUNTER — LAB (OUTPATIENT)
Dept: LAB | Facility: MEDICAL CENTER | Age: 65
End: 2020-11-28
Payer: COMMERCIAL

## 2020-11-28 DIAGNOSIS — E78.5 HYPERLIPIDEMIA, UNSPECIFIED HYPERLIPIDEMIA TYPE: ICD-10-CM

## 2020-11-28 DIAGNOSIS — I48.0 PAROXYSMAL ATRIAL FIBRILLATION (HCC): ICD-10-CM

## 2020-11-28 DIAGNOSIS — E11.65 TYPE 2 DIABETES MELLITUS WITH HYPERGLYCEMIA, WITH LONG-TERM CURRENT USE OF INSULIN (HCC): ICD-10-CM

## 2020-11-28 DIAGNOSIS — E66.01 SEVERE OBESITY (BMI 35.0-39.9) WITH COMORBIDITY (HCC): ICD-10-CM

## 2020-11-28 DIAGNOSIS — Z01.818 PREOP TESTING: ICD-10-CM

## 2020-11-28 DIAGNOSIS — Z79.4 TYPE 2 DIABETES MELLITUS WITH HYPERGLYCEMIA, WITH LONG-TERM CURRENT USE OF INSULIN (HCC): ICD-10-CM

## 2020-11-28 DIAGNOSIS — I10 ESSENTIAL HYPERTENSION: ICD-10-CM

## 2020-11-28 DIAGNOSIS — E66.9 OBESITY, CLASS II, BMI 35-39.9: ICD-10-CM

## 2020-11-28 LAB
ALBUMIN SERPL BCP-MCNC: 3.5 G/DL (ref 3.5–5)
ALP SERPL-CCNC: 63 U/L (ref 46–116)
ALT SERPL W P-5'-P-CCNC: 21 U/L (ref 12–78)
ANION GAP SERPL CALCULATED.3IONS-SCNC: 3 MMOL/L (ref 4–13)
AST SERPL W P-5'-P-CCNC: 18 U/L (ref 5–45)
BASOPHILS # BLD AUTO: 0.04 THOUSANDS/ΜL (ref 0–0.1)
BASOPHILS NFR BLD AUTO: 1 % (ref 0–1)
BILIRUB SERPL-MCNC: 0.56 MG/DL (ref 0.2–1)
BUN SERPL-MCNC: 29 MG/DL (ref 5–25)
CALCIUM SERPL-MCNC: 9.2 MG/DL (ref 8.3–10.1)
CHLORIDE SERPL-SCNC: 104 MMOL/L (ref 100–108)
CHOLEST SERPL-MCNC: 167 MG/DL (ref 50–200)
CO2 SERPL-SCNC: 26 MMOL/L (ref 21–32)
CREAT SERPL-MCNC: 1.21 MG/DL (ref 0.6–1.3)
EOSINOPHIL # BLD AUTO: 0.19 THOUSAND/ΜL (ref 0–0.61)
EOSINOPHIL NFR BLD AUTO: 3 % (ref 0–6)
ERYTHROCYTE [DISTWIDTH] IN BLOOD BY AUTOMATED COUNT: 12 % (ref 11.6–15.1)
EST. AVERAGE GLUCOSE BLD GHB EST-MCNC: 295 MG/DL
GFR SERPL CREATININE-BSD FRML MDRD: 48 ML/MIN/1.73SQ M
GLUCOSE P FAST SERPL-MCNC: 326 MG/DL (ref 65–99)
HBA1C MFR BLD: 11.9 %
HCT VFR BLD AUTO: 42.6 % (ref 34.8–46.1)
HDLC SERPL-MCNC: 62 MG/DL
HGB BLD-MCNC: 13.5 G/DL (ref 11.5–15.4)
IMM GRANULOCYTES # BLD AUTO: 0.01 THOUSAND/UL (ref 0–0.2)
IMM GRANULOCYTES NFR BLD AUTO: 0 % (ref 0–2)
LDLC SERPL CALC-MCNC: 74 MG/DL (ref 0–100)
LYMPHOCYTES # BLD AUTO: 2.26 THOUSANDS/ΜL (ref 0.6–4.47)
LYMPHOCYTES NFR BLD AUTO: 36 % (ref 14–44)
MCH RBC QN AUTO: 30.2 PG (ref 26.8–34.3)
MCHC RBC AUTO-ENTMCNC: 31.7 G/DL (ref 31.4–37.4)
MCV RBC AUTO: 95 FL (ref 82–98)
MONOCYTES # BLD AUTO: 0.45 THOUSAND/ΜL (ref 0.17–1.22)
MONOCYTES NFR BLD AUTO: 7 % (ref 4–12)
NEUTROPHILS # BLD AUTO: 3.34 THOUSANDS/ΜL (ref 1.85–7.62)
NEUTS SEG NFR BLD AUTO: 53 % (ref 43–75)
NRBC BLD AUTO-RTO: 0 /100 WBCS
PLATELET # BLD AUTO: 242 THOUSANDS/UL (ref 149–390)
PMV BLD AUTO: 11.7 FL (ref 8.9–12.7)
POTASSIUM SERPL-SCNC: 4.8 MMOL/L (ref 3.5–5.3)
PROT SERPL-MCNC: 7.6 G/DL (ref 6.4–8.2)
RBC # BLD AUTO: 4.47 MILLION/UL (ref 3.81–5.12)
SODIUM SERPL-SCNC: 133 MMOL/L (ref 136–145)
TRIGL SERPL-MCNC: 155 MG/DL
WBC # BLD AUTO: 6.29 THOUSAND/UL (ref 4.31–10.16)

## 2020-11-28 PROCEDURE — 85025 COMPLETE CBC W/AUTO DIFF WBC: CPT

## 2020-11-28 PROCEDURE — 36415 COLL VENOUS BLD VENIPUNCTURE: CPT

## 2020-11-28 PROCEDURE — 83036 HEMOGLOBIN GLYCOSYLATED A1C: CPT

## 2020-11-28 PROCEDURE — 80053 COMPREHEN METABOLIC PANEL: CPT

## 2020-11-28 PROCEDURE — 80061 LIPID PANEL: CPT

## 2020-11-30 ENCOUNTER — TELEPHONE (OUTPATIENT)
Dept: FAMILY MEDICINE CLINIC | Facility: CLINIC | Age: 65
End: 2020-11-30

## 2020-12-02 ENCOUNTER — OFFICE VISIT (OUTPATIENT)
Dept: ENDOCRINOLOGY | Facility: CLINIC | Age: 65
End: 2020-12-02
Payer: COMMERCIAL

## 2020-12-02 VITALS
WEIGHT: 218 LBS | HEIGHT: 65 IN | HEART RATE: 72 BPM | SYSTOLIC BLOOD PRESSURE: 144 MMHG | TEMPERATURE: 97.7 F | BODY MASS INDEX: 36.32 KG/M2 | DIASTOLIC BLOOD PRESSURE: 90 MMHG

## 2020-12-02 DIAGNOSIS — E11.65 TYPE 2 DIABETES MELLITUS WITH HYPERGLYCEMIA, WITH LONG-TERM CURRENT USE OF INSULIN (HCC): Primary | ICD-10-CM

## 2020-12-02 DIAGNOSIS — E78.2 MIXED HYPERLIPIDEMIA: ICD-10-CM

## 2020-12-02 DIAGNOSIS — I10 ESSENTIAL HYPERTENSION: ICD-10-CM

## 2020-12-02 DIAGNOSIS — Z79.4 TYPE 2 DIABETES MELLITUS WITH HYPERGLYCEMIA, WITH LONG-TERM CURRENT USE OF INSULIN (HCC): Primary | ICD-10-CM

## 2020-12-02 PROCEDURE — 99214 OFFICE O/P EST MOD 30 MIN: CPT | Performed by: PHYSICIAN ASSISTANT

## 2020-12-02 RX ORDER — GLIPIZIDE 5 MG/1
TABLET, FILM COATED, EXTENDED RELEASE ORAL
Qty: 270 TABLET | Refills: 1 | Status: SHIPPED | OUTPATIENT
Start: 2020-12-02 | End: 2021-02-20 | Stop reason: HOSPADM

## 2020-12-03 ENCOUNTER — CONSULT (OUTPATIENT)
Dept: MULTI SPECIALTY CLINIC | Facility: CLINIC | Age: 65
End: 2020-12-03

## 2020-12-03 VITALS
HEIGHT: 65 IN | HEART RATE: 87 BPM | WEIGHT: 219 LBS | TEMPERATURE: 97.8 F | BODY MASS INDEX: 36.49 KG/M2 | SYSTOLIC BLOOD PRESSURE: 181 MMHG | DIASTOLIC BLOOD PRESSURE: 83 MMHG

## 2020-12-03 DIAGNOSIS — K21.9 GASTROESOPHAGEAL REFLUX DISEASE, UNSPECIFIED WHETHER ESOPHAGITIS PRESENT: ICD-10-CM

## 2020-12-03 DIAGNOSIS — E11.65 TYPE 2 DIABETES MELLITUS WITH HYPERGLYCEMIA, WITH LONG-TERM CURRENT USE OF INSULIN (HCC): ICD-10-CM

## 2020-12-03 DIAGNOSIS — Z79.4 TYPE 2 DIABETES MELLITUS WITH HYPERGLYCEMIA, WITH LONG-TERM CURRENT USE OF INSULIN (HCC): ICD-10-CM

## 2020-12-03 DIAGNOSIS — G47.33 OSA (OBSTRUCTIVE SLEEP APNEA): ICD-10-CM

## 2020-12-03 DIAGNOSIS — R49.0 DYSPHONIA: ICD-10-CM

## 2020-12-03 DIAGNOSIS — R13.14 PHARYNGOESOPHAGEAL DYSPHAGIA: ICD-10-CM

## 2020-12-03 DIAGNOSIS — K21.9 REFLUX LARYNGITIS: ICD-10-CM

## 2020-12-03 DIAGNOSIS — R05.9 COUGH: ICD-10-CM

## 2020-12-03 DIAGNOSIS — J38.02 BILATERAL VOCAL FOLD PARESIS: ICD-10-CM

## 2020-12-03 DIAGNOSIS — R06.1 STRIDOR: ICD-10-CM

## 2020-12-03 DIAGNOSIS — J38.6 POSTERIOR GLOTTIC STENOSIS: ICD-10-CM

## 2020-12-03 DIAGNOSIS — R06.02 SHORTNESS OF BREATH: Primary | ICD-10-CM

## 2020-12-03 DIAGNOSIS — J04.0 REFLUX LARYNGITIS: ICD-10-CM

## 2020-12-03 DIAGNOSIS — E66.9 OBESITY, CLASS II, BMI 35-39.9: ICD-10-CM

## 2020-12-03 DIAGNOSIS — J38.3 GLOTTIC INSUFFICIENCY: ICD-10-CM

## 2020-12-03 DIAGNOSIS — E66.01 SEVERE OBESITY (BMI 35.0-39.9) WITH COMORBIDITY (HCC): ICD-10-CM

## 2020-12-03 DIAGNOSIS — J39.8 TRACHEAL STENOSIS: ICD-10-CM

## 2020-12-03 DIAGNOSIS — R49.0 MUSCLE TENSION DYSPHONIA: ICD-10-CM

## 2020-12-03 PROBLEM — Z01.818 PREOPERATIVE CLEARANCE: Status: RESOLVED | Noted: 2020-09-23 | Resolved: 2020-12-03

## 2020-12-03 PROBLEM — J38.00 VOCAL CORD PARALYSIS: Status: RESOLVED | Noted: 2018-06-04 | Resolved: 2020-12-03

## 2020-12-03 PROBLEM — S27.0XXA TRAUMATIC PNEUMOTHORAX: Status: RESOLVED | Noted: 2017-08-13 | Resolved: 2020-12-03

## 2020-12-03 PROCEDURE — 31575 DIAGNOSTIC LARYNGOSCOPY: CPT | Performed by: OTOLARYNGOLOGY

## 2020-12-03 PROCEDURE — 99204 OFFICE O/P NEW MOD 45 MIN: CPT | Performed by: OTOLARYNGOLOGY

## 2020-12-10 ENCOUNTER — DOCTOR'S OFFICE (OUTPATIENT)
Dept: URBAN - METROPOLITAN AREA CLINIC 136 | Facility: CLINIC | Age: 65
Setting detail: OPHTHALMOLOGY
End: 2020-12-10
Payer: COMMERCIAL

## 2020-12-10 ENCOUNTER — RX ONLY (RX ONLY)
Age: 65
End: 2020-12-10

## 2020-12-10 DIAGNOSIS — Z79.84: ICD-10-CM

## 2020-12-10 DIAGNOSIS — E11.3411: ICD-10-CM

## 2020-12-10 DIAGNOSIS — Z96.1: ICD-10-CM

## 2020-12-10 DIAGNOSIS — E11.3512: ICD-10-CM

## 2020-12-10 DIAGNOSIS — H35.373: ICD-10-CM

## 2020-12-10 PROCEDURE — 99024 POSTOP FOLLOW-UP VISIT: CPT | Performed by: OPHTHALMOLOGY

## 2020-12-10 PROCEDURE — 67028 INJECTION EYE DRUG: CPT | Performed by: OPHTHALMOLOGY

## 2020-12-10 PROCEDURE — 92134 CPTRZ OPH DX IMG PST SGM RTA: CPT | Performed by: OPHTHALMOLOGY

## 2020-12-10 ASSESSMENT — TONOMETRY
OS_IOP_MMHG: 13
OD_IOP_MMHG: 15

## 2020-12-10 ASSESSMENT — VISUAL ACUITY
OD_BCVA: 20/150
OS_BCVA: 20/80

## 2020-12-10 ASSESSMENT — REFRACTION_AUTOREFRACTION
OS_AXIS: 095
OS_SPHERE: +0.25
OS_CYLINDER: -1.25
OD_SPHERE: PLANO
OD_CYLINDER: -1.25
OD_AXIS: 103

## 2020-12-10 ASSESSMENT — CONFRONTATIONAL VISUAL FIELD TEST (CVF)
OD_FINDINGS: FULL
OS_FINDINGS: FULL

## 2020-12-10 ASSESSMENT — SPHEQUIV_DERIVED: OS_SPHEQUIV: -0.375

## 2020-12-14 ENCOUNTER — OFFICE VISIT (OUTPATIENT)
Dept: BARIATRICS | Facility: CLINIC | Age: 65
End: 2020-12-14
Payer: COMMERCIAL

## 2020-12-14 VITALS
RESPIRATION RATE: 21 BRPM | BODY MASS INDEX: 36.49 KG/M2 | HEART RATE: 93 BPM | HEIGHT: 65 IN | SYSTOLIC BLOOD PRESSURE: 154 MMHG | DIASTOLIC BLOOD PRESSURE: 88 MMHG | WEIGHT: 219 LBS | TEMPERATURE: 97.6 F

## 2020-12-14 DIAGNOSIS — J39.8 TRACHEAL STENOSIS: ICD-10-CM

## 2020-12-14 DIAGNOSIS — J38.02 BILATERAL VOCAL CORD PARALYSIS: ICD-10-CM

## 2020-12-14 DIAGNOSIS — I63.9 CEREBROVASCULAR ACCIDENT (CVA), UNSPECIFIED MECHANISM (HCC): ICD-10-CM

## 2020-12-14 DIAGNOSIS — E66.9 OBESITY, CLASS II, BMI 35-39.9: Primary | ICD-10-CM

## 2020-12-14 DIAGNOSIS — E78.5 HYPERLIPIDEMIA, UNSPECIFIED HYPERLIPIDEMIA TYPE: ICD-10-CM

## 2020-12-14 DIAGNOSIS — Z79.4 TYPE 2 DIABETES MELLITUS WITH HYPERGLYCEMIA, WITH LONG-TERM CURRENT USE OF INSULIN (HCC): ICD-10-CM

## 2020-12-14 DIAGNOSIS — G47.33 OSA (OBSTRUCTIVE SLEEP APNEA): ICD-10-CM

## 2020-12-14 DIAGNOSIS — I10 ESSENTIAL HYPERTENSION: ICD-10-CM

## 2020-12-14 DIAGNOSIS — E11.65 TYPE 2 DIABETES MELLITUS WITH HYPERGLYCEMIA, WITH LONG-TERM CURRENT USE OF INSULIN (HCC): ICD-10-CM

## 2020-12-14 PROCEDURE — 99214 OFFICE O/P EST MOD 30 MIN: CPT | Performed by: PHYSICIAN ASSISTANT

## 2020-12-15 ENCOUNTER — ANESTHESIA EVENT (OUTPATIENT)
Dept: GASTROENTEROLOGY | Facility: HOSPITAL | Age: 65
End: 2020-12-15

## 2020-12-16 ENCOUNTER — HOSPITAL ENCOUNTER (OUTPATIENT)
Dept: GASTROENTEROLOGY | Facility: HOSPITAL | Age: 65
Setting detail: OUTPATIENT SURGERY
Discharge: HOME | End: 2020-12-16
Attending: SURGERY | Admitting: SURGERY
Payer: COMMERCIAL

## 2020-12-16 ENCOUNTER — ANESTHESIA (OUTPATIENT)
Dept: GASTROENTEROLOGY | Facility: HOSPITAL | Age: 65
End: 2020-12-16

## 2020-12-16 VITALS
OXYGEN SATURATION: 99 % | HEIGHT: 65 IN | HEART RATE: 82 BPM | DIASTOLIC BLOOD PRESSURE: 69 MMHG | WEIGHT: 219 LBS | RESPIRATION RATE: 21 BRPM | BODY MASS INDEX: 36.49 KG/M2 | SYSTOLIC BLOOD PRESSURE: 125 MMHG | TEMPERATURE: 98.1 F

## 2020-12-16 VITALS — HEART RATE: 71 BPM

## 2020-12-16 DIAGNOSIS — E66.01 MORBID OBESITY (HCC): ICD-10-CM

## 2020-12-16 LAB — GLUCOSE SERPL-MCNC: 155 MG/DL (ref 65–140)

## 2020-12-16 PROCEDURE — 88305 TISSUE EXAM BY PATHOLOGIST: CPT | Performed by: PATHOLOGY

## 2020-12-16 PROCEDURE — 82948 REAGENT STRIP/BLOOD GLUCOSE: CPT

## 2020-12-16 PROCEDURE — 43239 EGD BIOPSY SINGLE/MULTIPLE: CPT | Performed by: SURGERY

## 2020-12-16 RX ORDER — LIDOCAINE HYDROCHLORIDE 20 MG/ML
INJECTION, SOLUTION INFILTRATION; PERINEURAL AS NEEDED
Status: DISCONTINUED | OUTPATIENT
Start: 2020-12-16 | End: 2020-12-16

## 2020-12-16 RX ORDER — PROPOFOL 10 MG/ML
INJECTION, EMULSION INTRAVENOUS AS NEEDED
Status: DISCONTINUED | OUTPATIENT
Start: 2020-12-16 | End: 2020-12-16

## 2020-12-16 RX ORDER — SODIUM CHLORIDE 9 MG/ML
125 INJECTION, SOLUTION INTRAVENOUS CONTINUOUS
Status: DISCONTINUED | OUTPATIENT
Start: 2020-12-16 | End: 2020-12-20 | Stop reason: HOSPADM

## 2020-12-16 RX ADMIN — SODIUM CHLORIDE 125 ML/HR: 0.9 INJECTION, SOLUTION INTRAVENOUS at 11:34

## 2020-12-16 RX ADMIN — PROPOFOL 100 MG: 10 INJECTION, EMULSION INTRAVENOUS at 12:09

## 2020-12-16 RX ADMIN — PROPOFOL 100 MG: 10 INJECTION, EMULSION INTRAVENOUS at 12:08

## 2020-12-16 RX ADMIN — LIDOCAINE HYDROCHLORIDE 5 ML: 20 INJECTION, SOLUTION INFILTRATION; PERINEURAL at 12:08

## 2020-12-29 ENCOUNTER — DOCTOR'S OFFICE (OUTPATIENT)
Dept: URBAN - METROPOLITAN AREA CLINIC 136 | Facility: CLINIC | Age: 65
Setting detail: OPHTHALMOLOGY
End: 2020-12-29
Payer: COMMERCIAL

## 2020-12-29 DIAGNOSIS — Z79.84: ICD-10-CM

## 2020-12-29 DIAGNOSIS — Z96.1: ICD-10-CM

## 2020-12-29 PROCEDURE — 99024 POSTOP FOLLOW-UP VISIT: CPT | Performed by: OPHTHALMOLOGY

## 2020-12-29 ASSESSMENT — SPHEQUIV_DERIVED
OD_SPHEQUIV: -0.25
OS_SPHEQUIV: 0.625

## 2020-12-29 ASSESSMENT — REFRACTION_AUTOREFRACTION
OS_SPHERE: +1.25
OD_CYLINDER: -1.50
OS_AXIS: 067
OS_CYLINDER: -1.25
OD_AXIS: 095
OD_SPHERE: +0.50

## 2020-12-29 ASSESSMENT — TEAR BREAK UP TIME (TBUT)
OS_TBUT: 1+
OD_TBUT: 1+

## 2020-12-29 ASSESSMENT — TONOMETRY
OD_IOP_MMHG: 16
OS_IOP_MMHG: 14

## 2020-12-29 ASSESSMENT — VISUAL ACUITY
OD_BCVA: 20/100+1
OS_BCVA: 20/60-2

## 2021-01-04 DIAGNOSIS — E11.9 TYPE 2 DIABETES MELLITUS WITHOUT COMPLICATION, WITH LONG-TERM CURRENT USE OF INSULIN (HCC): ICD-10-CM

## 2021-01-04 DIAGNOSIS — Z79.4 TYPE 2 DIABETES MELLITUS WITHOUT COMPLICATION, WITH LONG-TERM CURRENT USE OF INSULIN (HCC): ICD-10-CM

## 2021-01-05 RX ORDER — PEN NEEDLE, DIABETIC 31 GX5/16"
NEEDLE, DISPOSABLE MISCELLANEOUS
Qty: 100 EACH | Refills: 0 | Status: SHIPPED | OUTPATIENT
Start: 2021-01-05 | End: 2021-05-05

## 2021-01-12 ENCOUNTER — RX ONLY (RX ONLY)
Age: 66
End: 2021-01-12

## 2021-01-12 ENCOUNTER — DOCTOR'S OFFICE (OUTPATIENT)
Dept: URBAN - METROPOLITAN AREA CLINIC 136 | Facility: CLINIC | Age: 66
Setting detail: OPHTHALMOLOGY
End: 2021-01-12
Payer: COMMERCIAL

## 2021-01-12 DIAGNOSIS — H35.373: ICD-10-CM

## 2021-01-12 DIAGNOSIS — H43.811: ICD-10-CM

## 2021-01-12 DIAGNOSIS — E11.3512: ICD-10-CM

## 2021-01-12 DIAGNOSIS — E11.3411: ICD-10-CM

## 2021-01-12 PROBLEM — H25.012: Status: ACTIVE | Noted: 2020-09-29

## 2021-01-12 PROBLEM — H52.03 HYPEROPIA; BOTH EYES: Status: ACTIVE | Noted: 2018-06-26

## 2021-01-12 PROBLEM — H25.043 PSC POLAR AGE RELATED CATARACT; BOTH EYES: Status: ACTIVE | Noted: 2020-07-17

## 2021-01-12 PROBLEM — H52.223 ASTIGMATISM, REGULAR; BOTH EYES: Status: ACTIVE | Noted: 2018-06-26

## 2021-01-12 PROCEDURE — 92134 CPTRZ OPH DX IMG PST SGM RTA: CPT | Performed by: OPHTHALMOLOGY

## 2021-01-12 PROCEDURE — 92014 COMPRE OPH EXAM EST PT 1/>: CPT | Performed by: OPHTHALMOLOGY

## 2021-01-12 PROCEDURE — 67028 INJECTION EYE DRUG: CPT | Performed by: OPHTHALMOLOGY

## 2021-01-12 ASSESSMENT — SPHEQUIV_DERIVED
OS_SPHEQUIV: 0.625
OD_SPHEQUIV: -0.25

## 2021-01-12 ASSESSMENT — REFRACTION_AUTOREFRACTION
OS_SPHERE: +1.25
OD_SPHERE: +0.50
OS_AXIS: 067
OD_AXIS: 095
OS_CYLINDER: -1.25
OD_CYLINDER: -1.50

## 2021-01-12 ASSESSMENT — VISUAL ACUITY
OD_BCVA: 20/100-1
OS_BCVA: 20/70-1

## 2021-01-12 ASSESSMENT — CONFRONTATIONAL VISUAL FIELD TEST (CVF)
OD_FINDINGS: FULL
OS_FINDINGS: FULL

## 2021-01-12 ASSESSMENT — TONOMETRY
OD_IOP_MMHG: 14
OS_IOP_MMHG: 14

## 2021-01-12 ASSESSMENT — TEAR BREAK UP TIME (TBUT)
OS_TBUT: 1+
OD_TBUT: 1+
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
## 2021-01-28 ENCOUNTER — OFFICE VISIT (OUTPATIENT)
Dept: BARIATRICS | Facility: CLINIC | Age: 66
End: 2021-01-28

## 2021-01-28 VITALS — WEIGHT: 220.5 LBS | BODY MASS INDEX: 36.74 KG/M2 | HEIGHT: 65 IN

## 2021-01-28 DIAGNOSIS — E66.9 OBESITY, CLASS II, BMI 35-39.9: Primary | ICD-10-CM

## 2021-01-28 PROCEDURE — RECHECK: Performed by: DIETITIAN, REGISTERED

## 2021-01-30 ENCOUNTER — APPOINTMENT (INPATIENT)
Dept: MRI IMAGING | Facility: HOSPITAL | Age: 66
DRG: 689 | End: 2021-01-30
Payer: COMMERCIAL

## 2021-01-30 ENCOUNTER — APPOINTMENT (EMERGENCY)
Dept: CT IMAGING | Facility: HOSPITAL | Age: 66
DRG: 689 | End: 2021-01-30
Payer: COMMERCIAL

## 2021-01-30 ENCOUNTER — HOSPITAL ENCOUNTER (INPATIENT)
Facility: HOSPITAL | Age: 66
LOS: 3 days | DRG: 689 | End: 2021-02-02
Attending: EMERGENCY MEDICINE | Admitting: INTERNAL MEDICINE
Payer: COMMERCIAL

## 2021-01-30 ENCOUNTER — APPOINTMENT (EMERGENCY)
Dept: RADIOLOGY | Facility: HOSPITAL | Age: 66
DRG: 689 | End: 2021-01-30
Payer: COMMERCIAL

## 2021-01-30 DIAGNOSIS — R53.1 WEAKNESS: Primary | ICD-10-CM

## 2021-01-30 PROBLEM — Z86.73 HISTORY OF STROKE: Status: ACTIVE | Noted: 2021-01-30

## 2021-01-30 PROBLEM — I48.0 PAROXYSMAL ATRIAL FIBRILLATION (HCC): Status: ACTIVE | Noted: 2017-10-17

## 2021-01-30 LAB
ALBUMIN SERPL BCP-MCNC: 4 G/DL (ref 3.5–5.7)
ALP SERPL-CCNC: 51 U/L (ref 55–165)
ALT SERPL W P-5'-P-CCNC: 14 U/L (ref 7–52)
AMMONIA PLAS-SCNC: 38 UMOL/L (ref 25–90)
ANION GAP SERPL CALCULATED.3IONS-SCNC: 8 MMOL/L (ref 4–13)
AST SERPL W P-5'-P-CCNC: 14 U/L (ref 13–39)
ATRIAL RATE: 87 BPM
BACTERIA UR QL AUTO: ABNORMAL /HPF
BASOPHILS # BLD AUTO: 0.1 THOUSANDS/ΜL (ref 0–0.1)
BASOPHILS NFR BLD AUTO: 1 % (ref 0–2)
BILIRUB SERPL-MCNC: 0.7 MG/DL (ref 0.2–1)
BILIRUB UR QL STRIP: NEGATIVE
BUN SERPL-MCNC: 36 MG/DL (ref 7–25)
CALCIUM SERPL-MCNC: 9.4 MG/DL (ref 8.6–10.5)
CHLORIDE SERPL-SCNC: 103 MMOL/L (ref 98–107)
CHOLEST SERPL-MCNC: 181 MG/DL (ref 0–200)
CLARITY UR: ABNORMAL
CO2 SERPL-SCNC: 27 MMOL/L (ref 21–31)
COLOR UR: YELLOW
CREAT SERPL-MCNC: 0.99 MG/DL (ref 0.6–1.2)
EOSINOPHIL # BLD AUTO: 0.1 THOUSAND/ΜL (ref 0–0.61)
EOSINOPHIL NFR BLD AUTO: 2 % (ref 0–5)
ERYTHROCYTE [DISTWIDTH] IN BLOOD BY AUTOMATED COUNT: 12.9 % (ref 11.5–14.5)
FLUAV RNA RESP QL NAA+PROBE: NEGATIVE
FLUBV RNA RESP QL NAA+PROBE: NEGATIVE
GFR SERPL CREATININE-BSD FRML MDRD: 61 ML/MIN/1.73SQ M
GLUCOSE SERPL-MCNC: 221 MG/DL (ref 65–140)
GLUCOSE SERPL-MCNC: 243 MG/DL (ref 65–140)
GLUCOSE SERPL-MCNC: 319 MG/DL (ref 65–99)
GLUCOSE UR STRIP-MCNC: ABNORMAL MG/DL
HCT VFR BLD AUTO: 39.5 % (ref 42–47)
HDLC SERPL-MCNC: 56 MG/DL
HGB BLD-MCNC: 12.9 G/DL (ref 12–16)
HGB UR QL STRIP.AUTO: ABNORMAL
KETONES UR STRIP-MCNC: NEGATIVE MG/DL
LDLC SERPL CALC-MCNC: 107 MG/DL (ref 0–100)
LEUKOCYTE ESTERASE UR QL STRIP: NEGATIVE
LIPASE SERPL-CCNC: <10 U/L (ref 11–82)
LYMPHOCYTES # BLD AUTO: 1.7 THOUSANDS/ΜL (ref 0.6–4.47)
LYMPHOCYTES NFR BLD AUTO: 23 % (ref 21–51)
MAGNESIUM SERPL-MCNC: 2 MG/DL (ref 1.9–2.7)
MCH RBC QN AUTO: 29.6 PG (ref 26–34)
MCHC RBC AUTO-ENTMCNC: 32.6 G/DL (ref 31–37)
MCV RBC AUTO: 91 FL (ref 81–99)
MONOCYTES # BLD AUTO: 0.6 THOUSAND/ΜL (ref 0.17–1.22)
MONOCYTES NFR BLD AUTO: 8 % (ref 2–12)
NEUTROPHILS # BLD AUTO: 5.1 THOUSANDS/ΜL (ref 1.4–6.5)
NEUTS SEG NFR BLD AUTO: 67 % (ref 42–75)
NITRITE UR QL STRIP: NEGATIVE
NON-SQ EPI CELLS URNS QL MICRO: ABNORMAL /HPF
P AXIS: 57 DEGREES
PH UR STRIP.AUTO: 5 [PH]
PLATELET # BLD AUTO: 275 THOUSANDS/UL (ref 149–390)
PMV BLD AUTO: 9.4 FL (ref 8.6–11.7)
POTASSIUM SERPL-SCNC: 4.4 MMOL/L (ref 3.5–5.5)
PR INTERVAL: 188 MS
PROT SERPL-MCNC: 7.2 G/DL (ref 6.4–8.9)
PROT UR STRIP-MCNC: ABNORMAL MG/DL
QRS AXIS: 24 DEGREES
QRSD INTERVAL: 66 MS
QT INTERVAL: 388 MS
QTC INTERVAL: 466 MS
RBC # BLD AUTO: 4.35 MILLION/UL (ref 3.9–5.2)
RBC #/AREA URNS AUTO: ABNORMAL /HPF
RSV RNA RESP QL NAA+PROBE: NEGATIVE
SARS-COV-2 RNA RESP QL NAA+PROBE: NEGATIVE
SODIUM SERPL-SCNC: 138 MMOL/L (ref 134–143)
SP GR UR STRIP.AUTO: 1.02 (ref 1–1.03)
T WAVE AXIS: 52 DEGREES
TRIGL SERPL-MCNC: 90 MG/DL (ref 44–166)
TROPONIN I SERPL-MCNC: <0.03 NG/ML
TSH SERPL DL<=0.05 MIU/L-ACNC: 1.8 UIU/ML (ref 0.45–5.33)
UROBILINOGEN UR QL STRIP.AUTO: 0.2 E.U./DL
VENTRICULAR RATE: 87 BPM
WBC # BLD AUTO: 7.5 THOUSAND/UL (ref 4.8–10.8)
WBC #/AREA URNS AUTO: ABNORMAL /HPF

## 2021-01-30 PROCEDURE — 0241U HB NFCT DS VIR RESP RNA 4 TRGT: CPT | Performed by: EMERGENCY MEDICINE

## 2021-01-30 PROCEDURE — 80053 COMPREHEN METABOLIC PANEL: CPT | Performed by: EMERGENCY MEDICINE

## 2021-01-30 PROCEDURE — 84443 ASSAY THYROID STIM HORMONE: CPT | Performed by: NURSE PRACTITIONER

## 2021-01-30 PROCEDURE — 84484 ASSAY OF TROPONIN QUANT: CPT | Performed by: EMERGENCY MEDICINE

## 2021-01-30 PROCEDURE — 36415 COLL VENOUS BLD VENIPUNCTURE: CPT | Performed by: EMERGENCY MEDICINE

## 2021-01-30 PROCEDURE — 99285 EMERGENCY DEPT VISIT HI MDM: CPT | Performed by: EMERGENCY MEDICINE

## 2021-01-30 PROCEDURE — 70551 MRI BRAIN STEM W/O DYE: CPT

## 2021-01-30 PROCEDURE — 83690 ASSAY OF LIPASE: CPT | Performed by: EMERGENCY MEDICINE

## 2021-01-30 PROCEDURE — 70450 CT HEAD/BRAIN W/O DYE: CPT

## 2021-01-30 PROCEDURE — 99285 EMERGENCY DEPT VISIT HI MDM: CPT

## 2021-01-30 PROCEDURE — G1004 CDSM NDSC: HCPCS

## 2021-01-30 PROCEDURE — 71045 X-RAY EXAM CHEST 1 VIEW: CPT

## 2021-01-30 PROCEDURE — 81001 URINALYSIS AUTO W/SCOPE: CPT | Performed by: EMERGENCY MEDICINE

## 2021-01-30 PROCEDURE — 87186 SC STD MICRODIL/AGAR DIL: CPT | Performed by: NURSE PRACTITIONER

## 2021-01-30 PROCEDURE — 93005 ELECTROCARDIOGRAM TRACING: CPT

## 2021-01-30 PROCEDURE — 99223 1ST HOSP IP/OBS HIGH 75: CPT | Performed by: NURSE PRACTITIONER

## 2021-01-30 PROCEDURE — 87077 CULTURE AEROBIC IDENTIFY: CPT | Performed by: NURSE PRACTITIONER

## 2021-01-30 PROCEDURE — 93010 ELECTROCARDIOGRAM REPORT: CPT | Performed by: INTERNAL MEDICINE

## 2021-01-30 PROCEDURE — 82140 ASSAY OF AMMONIA: CPT | Performed by: NURSE PRACTITIONER

## 2021-01-30 PROCEDURE — 87086 URINE CULTURE/COLONY COUNT: CPT | Performed by: NURSE PRACTITIONER

## 2021-01-30 PROCEDURE — 80061 LIPID PANEL: CPT | Performed by: NURSE PRACTITIONER

## 2021-01-30 PROCEDURE — 85025 COMPLETE CBC W/AUTO DIFF WBC: CPT | Performed by: EMERGENCY MEDICINE

## 2021-01-30 PROCEDURE — 83735 ASSAY OF MAGNESIUM: CPT | Performed by: EMERGENCY MEDICINE

## 2021-01-30 PROCEDURE — 82948 REAGENT STRIP/BLOOD GLUCOSE: CPT

## 2021-01-30 RX ORDER — INSULIN GLARGINE 100 [IU]/ML
15 INJECTION, SOLUTION SUBCUTANEOUS
Status: DISCONTINUED | OUTPATIENT
Start: 2021-01-30 | End: 2021-02-01

## 2021-01-30 RX ORDER — ALBUTEROL SULFATE 90 UG/1
2 AEROSOL, METERED RESPIRATORY (INHALATION) EVERY 4 HOURS PRN
Status: DISCONTINUED | OUTPATIENT
Start: 2021-01-30 | End: 2021-02-02 | Stop reason: HOSPADM

## 2021-01-30 RX ORDER — METOPROLOL TARTRATE 50 MG/1
50 TABLET, FILM COATED ORAL EVERY 12 HOURS SCHEDULED
Status: DISCONTINUED | OUTPATIENT
Start: 2021-01-30 | End: 2021-02-02 | Stop reason: HOSPADM

## 2021-01-30 RX ORDER — ACETAMINOPHEN 325 MG/1
650 TABLET ORAL 4 TIMES DAILY PRN
Status: DISCONTINUED | OUTPATIENT
Start: 2021-01-30 | End: 2021-02-02 | Stop reason: HOSPADM

## 2021-01-30 RX ORDER — ESCITALOPRAM OXALATE 5 MG/1
5 TABLET ORAL DAILY
Status: DISCONTINUED | OUTPATIENT
Start: 2021-01-30 | End: 2021-02-02 | Stop reason: HOSPADM

## 2021-01-30 RX ORDER — CEFTRIAXONE 1 G/50ML
1000 INJECTION, SOLUTION INTRAVENOUS EVERY 24 HOURS
Status: DISCONTINUED | OUTPATIENT
Start: 2021-01-30 | End: 2021-02-02 | Stop reason: HOSPADM

## 2021-01-30 RX ORDER — ATORVASTATIN CALCIUM 40 MG/1
40 TABLET, FILM COATED ORAL
Status: DISCONTINUED | OUTPATIENT
Start: 2021-01-30 | End: 2021-02-01

## 2021-01-30 RX ORDER — PANTOPRAZOLE SODIUM 40 MG/1
40 TABLET, DELAYED RELEASE ORAL DAILY
Status: DISCONTINUED | OUTPATIENT
Start: 2021-01-30 | End: 2021-02-02 | Stop reason: HOSPADM

## 2021-01-30 RX ADMIN — CEFTRIAXONE 1000 MG: 1 INJECTION, SOLUTION INTRAVENOUS at 15:52

## 2021-01-30 RX ADMIN — HYDROCHLOROTHIAZIDE: 12.5 TABLET ORAL at 15:52

## 2021-01-30 RX ADMIN — PANTOPRAZOLE SODIUM 40 MG: 40 TABLET, DELAYED RELEASE ORAL at 15:52

## 2021-01-30 RX ADMIN — INSULIN LISPRO 2 UNITS: 100 INJECTION, SOLUTION INTRAVENOUS; SUBCUTANEOUS at 15:52

## 2021-01-30 RX ADMIN — ESCITALOPRAM 5 MG: 5 TABLET, FILM COATED ORAL at 15:52

## 2021-01-30 RX ADMIN — METOPROLOL TARTRATE 50 MG: 50 TABLET, FILM COATED ORAL at 21:59

## 2021-01-30 RX ADMIN — INSULIN LISPRO 2 UNITS: 100 INJECTION, SOLUTION INTRAVENOUS; SUBCUTANEOUS at 22:41

## 2021-01-30 RX ADMIN — ATORVASTATIN CALCIUM 40 MG: 40 TABLET, FILM COATED ORAL at 15:52

## 2021-01-30 RX ADMIN — INSULIN GLARGINE 15 UNITS: 100 INJECTION, SOLUTION SUBCUTANEOUS at 22:40
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
## 2021-01-30 NOTE — ASSESSMENT & PLAN NOTE
· Follows with Dr Anai Mcgarry at [facility]  · Currently is in sinus rhythm  · Continue with metoprolol  · Continue with Xarelto for anticoagulation  · Last echocardiogram shows LVEF of 65%

## 2021-01-30 NOTE — PLAN OF CARE
Problem: Potential for Falls  Goal: Patient will remain free of falls  Description: INTERVENTIONS:  - Assess patient frequently for physical needs  -  Identify cognitive and physical deficits and behaviors that affect risk of falls    -  [place] fall precautions as indicated by assessment   - Educate patient/family on patient safety including physical limitations  - Instruct patient to call for assistance with activity based on assessment  - Modify environment to reduce risk of injury  - Consider OT/PT consult to assist with strengthening/mobility  Outcome: Progressing     Problem: PAIN - ADULT  Goal: Verbalizes/displays adequate comfort level or baseline comfort level  Description: Interventions:  - Encourage patient to monitor pain and request assistance  - Assess pain using appropriate pain scale  - Administer analgesics based on type and severity of pain and evaluate response  - Implement non-pharmacological measures as appropriate and evaluate response  - Consider cultural and social influences on pain and pain management  - Notify physician/advanced practitioner if interventions unsuccessful or patient reports new pain  Outcome: Progressing     Problem: PAIN - ADULT  Goal: Verbalizes/displays adequate comfort level or baseline comfort level  Description: Interventions:  - Encourage patient to monitor pain and request assistance  - Assess pain using appropriate pain scale  - Administer analgesics based on type and severity of pain and evaluate response  - Implement non-pharmacological measures as appropriate and evaluate response  - Consider cultural and social influences on pain and pain management  - Notify physician/advanced practitioner if interventions unsuccessful or patient reports new pain  Outcome: Progressing     Problem: INFECTION - ADULT  Goal: Absence or prevention of progression during hospitalization  Description: INTERVENTIONS:  - Assess and monitor for signs and symptoms of infection  - Monitor lab/diagnostic results  - Monitor all insertion sites, i e  indwelling lines, tubes, and drains  - Monitor endotracheal if appropriate and nasal secretions for changes in amount and color  - Pound appropriate cooling/warming therapies per order  - Administer medications as ordered  - Instruct and encourage patient and family to use good hand hygiene technique  - Identify and instruct in appropriate isolation precautions for identified infection/condition  Outcome: Progressing  Goal: Absence of fever/infection during neutropenic period  Description: INTERVENTIONS:  - Monitor WBC    Outcome: Progressing     Problem: SAFETY ADULT  Goal: Maintain or return to baseline ADL function  Description: INTERVENTIONS:  -  Assess patient's ability to carry out ADLs; assess patient's baseline for ADL function and identify physical deficits which impact ability to perform ADLs (bathing, care of mouth/teeth, toileting, grooming, dressing, etc )  - Assess/evaluate cause of self-care deficits   - Assess range of motion  - Assess patient's mobility; develop plan if impaired  - Assess patient's need for assistive devices and provide as appropriate  - Encourage maximum independence but intervene and supervise when necessary  - Involve family in performance of ADLs  - Assess for home care needs following discharge   - Consider OT consult to assist with ADL evaluation and planning for discharge  - Provide patient education as appropriate  Outcome: Progressing  Goal: Maintain or return mobility status to optimal level  Description: INTERVENTIONS:  - Assess patient's baseline mobility status (ambulation, transfers, stairs, etc )    - Identify cognitive and physical deficits and behaviors that affect mobility  - Identify mobility aids required to assist with transfers and/or ambulation (gait belt, sit-to-stand, lift, walker, cane, etc )  - Pound fall precautions as indicated by assessment  - Record patient progress and toleration of activity level on Mobility SBAR; progress patient to next Phase/Stage  - Instruct patient to call for assistance with activity based on assessment  - Consider rehabilitation consult to assist with strengthening/weightbearing, etc   Outcome: Progressing     Problem: DISCHARGE PLANNING  Goal: Discharge to home or other facility with appropriate resources  Description: INTERVENTIONS:  - Identify barriers to discharge w/patient and caregiver  - Arrange for needed discharge resources and transportation as appropriate  - Identify discharge learning needs (meds, wound care, etc )  - Arrange for interpretive services to assist at discharge as needed  - Refer to Case Management Department for coordinating discharge planning if the patient needs post-hospital services based on physician/advanced practitioner order or complex needs related to functional status, cognitive ability, or social support system  Outcome: Progressing     Problem: Knowledge Deficit  Goal: Patient/family/caregiver demonstrates understanding of disease process, treatment plan, medications, and discharge instructions  Description: Complete learning assessment and assess knowledge base    Interventions:  - Provide teaching at level of understanding  - Provide teaching via preferred learning methods  Outcome: Progressing

## 2021-01-30 NOTE — ED NOTES
Received call from a male identifying himself as this patient's son  States his name is Kai  He left a cell phone number to be contacted and this RN placed that cell phone number on this patient's contact list  Pt made aware her son called to check on her  Pt requested to use bedpan at this time to void  Placed on bedpan        Santi Carter RN  01/30/21 0494

## 2021-01-30 NOTE — ASSESSMENT & PLAN NOTE
· With vocal cord paralysis   · The patient is being evaluated by ENT for a surgery to correct narrow airway related to prolonged intubation at [address] 2017     · Supportive care

## 2021-01-30 NOTE — NURSING NOTE
Patient found by staff to be OOB urinating on the floor  Patient stated she "had to go quick " Staff state, patient unsteady, took 3 staff members to return pt to bed  Bed alarm on  Call bell and belongings within reach, will continue to monitor
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
## 2021-01-30 NOTE — ASSESSMENT & PLAN NOTE
· Unclear etiology  This could be toxic metabolic due to mild urinary tract infection versus possible TIA/stroke with suspected expressive aphasia    · Please see treatment outline below  · Will continue with neuro check, consult neurology, MRI of the brain  · Pending urine culture  · Obtain urine for drug screen, ammonia level  · Start antibiotic for empiric urinary tract infection  · IV fluid
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
## 2021-01-31 LAB
ALBUMIN SERPL BCP-MCNC: 3.6 G/DL (ref 3.5–5.7)
ALP SERPL-CCNC: 48 U/L (ref 55–165)
ALT SERPL W P-5'-P-CCNC: 12 U/L (ref 7–52)
ANION GAP SERPL CALCULATED.3IONS-SCNC: 10 MMOL/L (ref 4–13)
AST SERPL W P-5'-P-CCNC: 11 U/L (ref 13–39)
BILIRUB SERPL-MCNC: 0.8 MG/DL (ref 0.2–1)
BUN SERPL-MCNC: 28 MG/DL (ref 7–25)
CALCIUM SERPL-MCNC: 9.2 MG/DL (ref 8.6–10.5)
CHLORIDE SERPL-SCNC: 104 MMOL/L (ref 98–107)
CO2 SERPL-SCNC: 27 MMOL/L (ref 21–31)
CREAT SERPL-MCNC: 0.9 MG/DL (ref 0.6–1.2)
ERYTHROCYTE [DISTWIDTH] IN BLOOD BY AUTOMATED COUNT: 12.9 % (ref 11.5–14.5)
GFR SERPL CREATININE-BSD FRML MDRD: 68 ML/MIN/1.73SQ M
GLUCOSE SERPL-MCNC: 212 MG/DL (ref 65–140)
GLUCOSE SERPL-MCNC: 213 MG/DL (ref 65–99)
GLUCOSE SERPL-MCNC: 256 MG/DL (ref 65–140)
GLUCOSE SERPL-MCNC: 295 MG/DL (ref 65–140)
GLUCOSE SERPL-MCNC: 356 MG/DL (ref 65–140)
HCT VFR BLD AUTO: 38.4 % (ref 42–47)
HGB BLD-MCNC: 12.6 G/DL (ref 12–16)
MAGNESIUM SERPL-MCNC: 2 MG/DL (ref 1.9–2.7)
MCH RBC QN AUTO: 30.2 PG (ref 26–34)
MCHC RBC AUTO-ENTMCNC: 32.8 G/DL (ref 31–37)
MCV RBC AUTO: 92 FL (ref 81–99)
PLATELET # BLD AUTO: 285 THOUSANDS/UL (ref 149–390)
PMV BLD AUTO: 9.8 FL (ref 8.6–11.7)
POTASSIUM SERPL-SCNC: 3.8 MMOL/L (ref 3.5–5.5)
PROT SERPL-MCNC: 6.6 G/DL (ref 6.4–8.9)
RBC # BLD AUTO: 4.17 MILLION/UL (ref 3.9–5.2)
SODIUM SERPL-SCNC: 141 MMOL/L (ref 134–143)
WBC # BLD AUTO: 7.6 THOUSAND/UL (ref 4.8–10.8)

## 2021-01-31 PROCEDURE — 97163 PT EVAL HIGH COMPLEX 45 MIN: CPT

## 2021-01-31 PROCEDURE — 83735 ASSAY OF MAGNESIUM: CPT | Performed by: NURSE PRACTITIONER

## 2021-01-31 PROCEDURE — 99233 SBSQ HOSP IP/OBS HIGH 50: CPT | Performed by: FAMILY MEDICINE

## 2021-01-31 PROCEDURE — 80053 COMPREHEN METABOLIC PANEL: CPT | Performed by: NURSE PRACTITIONER

## 2021-01-31 PROCEDURE — G0427 INPT/ED TELECONSULT70: HCPCS | Performed by: PSYCHIATRY & NEUROLOGY

## 2021-01-31 PROCEDURE — 97167 OT EVAL HIGH COMPLEX 60 MIN: CPT

## 2021-01-31 PROCEDURE — 82948 REAGENT STRIP/BLOOD GLUCOSE: CPT

## 2021-01-31 PROCEDURE — 85027 COMPLETE CBC AUTOMATED: CPT | Performed by: NURSE PRACTITIONER

## 2021-01-31 RX ADMIN — INSULIN GLARGINE 15 UNITS: 100 INJECTION, SOLUTION SUBCUTANEOUS at 22:19

## 2021-01-31 RX ADMIN — ESCITALOPRAM 5 MG: 5 TABLET, FILM COATED ORAL at 09:23

## 2021-01-31 RX ADMIN — INSULIN LISPRO 6 UNITS: 100 INJECTION, SOLUTION INTRAVENOUS; SUBCUTANEOUS at 15:54

## 2021-01-31 RX ADMIN — METOPROLOL TARTRATE 50 MG: 50 TABLET, FILM COATED ORAL at 21:59

## 2021-01-31 RX ADMIN — CEFTRIAXONE 1000 MG: 1 INJECTION, SOLUTION INTRAVENOUS at 15:53

## 2021-01-31 RX ADMIN — METOPROLOL TARTRATE 50 MG: 50 TABLET, FILM COATED ORAL at 09:23

## 2021-01-31 RX ADMIN — INSULIN LISPRO 2 UNITS: 100 INJECTION, SOLUTION INTRAVENOUS; SUBCUTANEOUS at 09:20

## 2021-01-31 RX ADMIN — INSULIN LISPRO 3 UNITS: 100 INJECTION, SOLUTION INTRAVENOUS; SUBCUTANEOUS at 12:29

## 2021-01-31 RX ADMIN — ATORVASTATIN CALCIUM 40 MG: 40 TABLET, FILM COATED ORAL at 15:53

## 2021-01-31 RX ADMIN — PANTOPRAZOLE SODIUM 40 MG: 40 TABLET, DELAYED RELEASE ORAL at 09:23

## 2021-01-31 RX ADMIN — INSULIN LISPRO 4 UNITS: 100 INJECTION, SOLUTION INTRAVENOUS; SUBCUTANEOUS at 22:19

## 2021-01-31 RX ADMIN — RIVAROXABAN 20 MG: 10 TABLET, FILM COATED ORAL at 12:30

## 2021-01-31 RX ADMIN — HYDROCHLOROTHIAZIDE: 12.5 TABLET ORAL at 09:23

## 2021-01-31 NOTE — PLAN OF CARE
Problem: PHYSICAL THERAPY ADULT  Goal: Performs mobility at highest level of function for planned discharge setting  See evaluation for individualized goals  Description: Treatment/Interventions: Functional transfer training, LE strengthening/ROM, Therapeutic exercise, Bed mobility, Gait training  Equipment Recommended: Ravinder Perkins       See flowsheet documentation for full assessment, interventions and recommendations  Outcome: Progressing  Note: Prognosis: Fair  Problem List: Decreased strength, Decreased endurance, Impaired balance, Decreased mobility  Assessment: Pt is 63 y o  female seen for PT evaluation s/p admit to [address] [place] [address] on 1/30/2021 w/ Encephalopathy  PT consulted to assess pt's functional mobility and d/c needs  Order placed for PT eval and tx, w/ up as tolerated order  Comorbidities affecting pt's physical performance at time of assessment include: stroke, DM and depression  PTA, pt was ambulates community distances and elevations and lives w/ self in 1 level apartment  Personal factors affecting pt at time of IE include: ambulating w/ assistive device and inability to ambulate household distances  Please find objective findings from PT assessment regarding body systems outlined above with impairments and limitations including weakness, impaired balance, decreased endurance, decreased activity tolerance, decreased functional mobility tolerance and fall risk  Pt's clinical presentation is currently unstable/unpredictable seen in pt's presentation of decline in mobility status  Pt to benefit from continued PT tx to address deficits as defined above and maximize level of functional independent mobility and consistency  From PT/mobility standpoint, recommendation at time of d/c would be STR pending progress in order to facilitate return to PLOF    Barriers to Discharge: Decreased caregiver support     PT Discharge Recommendation: Post-Acute Rehabilitation Services     PT - OK to Discharge: No    See flowsheet documentation for full assessment   Lashae Harp, PT
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
## 2021-01-31 NOTE — OCCUPATIONAL THERAPY NOTE
Occupational Therapy Evaluation      Bismark Molina    1/31/2021    Patient Active Problem List   Diagnosis    Paroxysmal atrial fibrillation (HCC)    Bilateral vocal cord paralysis    Stroke [facility] [address] [facility]    Blurry vision    Diabetic cataract ([address] )    Dysphagia    Dysphonia    Fracture of rib    Glottic stenosis    Granulation tissue of site of tracheostomy    Heart disease    Insomnia    Incomplete emptying of bladder    Nausea    Severe obesity (BMI 35 0-39  9) with comorbidity (HCC)    Shortness of breath    Tracheal stenosis    Type 2 diabetes mellitus with hyperglycemia, with long-term current use of insulin (HCC)    Urinary retention    Wheezing    Acute bronchitis due to other specified organisms    Encephalopathy    Change in bowel habits    Closed fracture of multiple ribs of right side    GERD (gastroesophageal reflux disease)    Hyperlipidemia    Edema    Diarrhea    Cough    Essential hypertension    Routine health maintenance    Current episode of major depressive disorder without prior episode    Medicare annual wellness visit, subsequent    YOLIE (obstructive sleep apnea)    Dermatitis    Stridor    Muscle tension dysphonia    Reflux laryngitis    Glottic insufficiency    History of stroke    Weakness of both lower extremities       Past Medical History:   Diagnosis Date    Abdominal fibromatosis     Diabetes mellitus ([facility] [address] )     Hyperlipemia     Hypertension     Pneumonia     Stroke [facility])        Past Surgical History:   Procedure Laterality Date    CATARACT EXTRACTION Bilateral     CATARACT EXTRACTION, BILATERAL      COLONOSCOPY      EGD      LAPAROTOMY      Exploratory; Last Assessed 10/17/2017    PEG TUBE PLACEMENT      PEG TUBE REMOVAL          01/31/21 0915   OT Last Visit   OT Visit Date 01/31/21   Note Type   Note type Evaluation   Restrictions/Precautions   Weight Bearing Precautions Per Order No   Other Precautions Chair Alarm; Bed Alarm; Fall Risk   Pain Assessment   Pain Assessment Tool Pain Assessment not indicated - pt denies pain   Pain Score No Pain   Home Living   Type of Home Apartment   Home Layout One level;Performs ADLs on one level; Able to live on main level with bedroom/bathroom; Access   Bathroom Shower/Tub Tub/shower unit   Bathroom Toilet Standard   Bathroom Equipment Grab bars in shower   [address]   (no AD used at baseline )   Prior Function   Level of [place] Independent with ADLs and functional mobility   Lives With Alone   ADL Assistance Independent   IADLs Independent   Falls in the last 6 months 0   Vocational Retired   Comments (+) driving    Lifestyle   Autonomy Patient reporting being independet with ADLs/IADLs, ambulatory with no AD and lives alone in a one level apartment    Reciprocal Relationships supportive son    Service to Others retired    Intrinsic Gratification enjoys cooking and [address] 5  [address] [facility] 5  Supervision/Setup   [address]  [address] 3  Moderate Assistance   [address] 4  [address] 3  Moderate Assistance   [address]  3  Moderate Assistance   Bed Mobility   Supine to Sit 4  Minimal assistance   Additional items Assist x 2;HOB elevated; Bedrails; Increased time required;Verbal cues;LE management   Sit to Supine   (DNT: pt seated OOB in recliner at end of eval )   Transfers   Sit to Stand 4  Minimal assistance   Additional items Assist x 2; Increased time required;Verbal cues   Stand to Sit 4  Minimal assistance   Additional items Assist x 2; Increased time required;Verbal cues   Functional Mobility   Functional Mobility 4  Minimal assistance   Additional Comments Pt ambulated short distance to recliner with assist x 2    Additional items Rolling walker   Balance   Static Sitting Fair +   Dynamic Sitting Fair   Static Standing Fair -   Dynamic Standing Poor +   Activity Tolerance   Activity Tolerance Patient limited by fatigue   Nurse Made Aware RN [address] made aware of outcomes    RUE Assessment   RUE Assessment WFL  (full AROM, grossly 3+/5 MMT )   LUE Assessment   LUE Assessment WFL  (full AROM, grossly 4-/5 MMT )   Hand Function   Gross Motor Coordination Functional   Fine Motor Coordination Functional   Sensation   Light Touch No apparent deficits  (per pt )   Vision-Basic Assessment   Current Vision Wears glasses only for reading   Patient Visual Report   (no significant changes reported )   Cognition   Overall Cognitive Status WFL   Arousal/Participation Alert; Responsive; Cooperative   Attention Within functional limits   Orientation Level Oriented X4   Memory Decreased recall of precautions   Following Commands Follows one step commands without difficulty   Comments Mini-Cog assessment performed today  Version 1 was given  Patient able to recall 1/3 words  Patient able to draw a normal clock with the correct time  Total score of test was 3/5 indicating  further screening of cognition is recommended  Cognition Assessment Tools   (Mini-cog )   Score 3   Assessment   Limitation Decreased ADL status; Decreased UE strength;Decreased Safe judgement during ADL;Decreased endurance;Decreased high-level ADLs; Decreased self-care trans   Prognosis Good   Assessment Patient is a 63 y o  female seen for OT evaluation s/p admit to [address] [place] [address]  on 1/30/2021 w/Encephalopathy  Commorbidities affecting patient's functional performance at time of assessment include: dysphagia, hx of CVA, A-fib, tracheal stenosis and DM2  Orders placed for OT evaluation and treatment and up with assistance  Performed at least two patient identifiers during session including name and wristband  Prior to admission, Patient reporting being independet with ADLs/IADLs, ambulatory with no AD and lives alone in a one level apartment   Personal factors affecting patient at time of initial evaluation include: limited caregiver support, difficulty performing ADLs and difficulty performing IADLs  Upon evaluation, patient requires minimal  assist for UB ADLs, moderate assist for LB ADLs, transfers and functional ambulation in room and bathroom with moderate assist, with the use of Rolling Walker  Patient is oriented x 4 and presents with limited ability to recall information after a brief period of time (short term memory) / working memory   Occupational performance is affected by the following deficits: decreased muscle strength, dynamic sit/ stand balance deficit with poor standing tolerance time for self care and functional mobility, decreased activity tolerance, impaired memory, increased pain, delayed righting and equilibrium reactions and postural control and postural alignment deficit, requiring external assistance to complete transitional movements  Patient to benefit from continued Occupational Therapy treatment while in the hospital to address deficits as defined above and maximize level of functional independence with ADLs and functional mobility  Occupational Performance areas to address include: grooming , bathing/ shower, dressing, toilet hygiene, transfer to all surfaces, functional ambulation, functional mobility, medication routine/ management, IADLS: Household maintenance, IADLs: safety procedures and IADLs: meal prep/ clean up  From OT standpoint, recommendation at time of d/c would be Post-Acute Rehabilitation Services  Goals   Patient Goals to get home    Plan   Treatment Interventions ADL retraining;Functional transfer training;UE strengthening/ROM; Endurance training;Patient/family training; Compensatory technique education; Activityengagement   Goal Expiration Date 02/10/21   OT Treatment Day 0   OT Frequency 3-5x/wk   Recommendation   OT Discharge Recommendation Post-Acute Rehabilitation Services   OT - OK to Discharge Yes  (Once medically cleared )   Additional Comments  The patient's raw score on the AM-PAC Daily Activity inpatient short form is 15, standardized score is 34 69, less than 39 4  Patients at this level are likely to benefit from DC to post-acute rehabilitation services  Please refer to the recommendation of the Occupational Therapist for safe DC planning  -[facility] Daily Activity Inpatient   Lower Body Dressing 2   Bathing 2   Toileting 2   Upper Body Dressing 3   Grooming 3   Eating 3   Daily Activity Raw Score 15   Daily Activity Standardized Score (Calc for Raw Score >=11) 34 69   AM-[facility] Applied Cognition Inpatient   Following a Speech/Presentation 3   Understanding Ordinary Conversation 3   Taking Medications 2   Remembering Where Things Are Placed or Put Away 3   Remembering List of 4-5 Errands 2   Taking Care of Complicated Tasks 2   Applied Cognition Raw Score 15   Applied Cognition Standardized Score 33 54     GOALS:    *ADL transfers with (I) for inc'd independence with ADLs/purposeful tasks    *UB ADL with (I) for inc'd independence with self cares    *LB ADL with (I) using AE prn for inc'd independence with self cares    *Toileting with (I) for clothing management and hygiene for return to [facility] with personal care    *Increase stand tolerance x8 m for inc'd tolerance with standing purposeful tasks    *Participate in 10m UE therex to increase overall stamina/activity tolerance for purposeful tasks    *Bed mobility- (I) for inc'd independence to manage own comfort and initiate EOB & OOB purposeful tasks    *Patient will verbalize 3 safety awareness/ principles to prevent falls in the home setting  *Patient will increase OOB/sitting tolerance to 2-4 hours per day to increase participation in self-care and leisure tasks with no s/s of exertion  *Patient will engage in ongoing cognitive assessment to assist with safe discharge planning/recommendations         Brenna Norris MS, OTR/L

## 2021-01-31 NOTE — PHYSICAL THERAPY NOTE
Physical Therapy Evaluation     Patient's Name: Sunday Cart    Admitting Diagnosis  Weakness generalized [R53 1]  Weakness [R53 1]    Problem List  Patient Active Problem List   Diagnosis    Paroxysmal atrial fibrillation (HCC)    Bilateral vocal cord paralysis    Stroke ([facility] [address] )    Blurry vision    Diabetic cataract ([facility] [address] )    Dysphagia    Dysphonia    Fracture of rib    Glottic stenosis    Granulation tissue of site of tracheostomy    Heart disease    Insomnia    Incomplete emptying of bladder    Nausea    Severe obesity (BMI 35 0-39  9) with comorbidity ([facility])    Shortness of breath    Tracheal stenosis    Type 2 diabetes mellitus with hyperglycemia, with long-term current use of insulin ([facility])    Urinary retention    Wheezing    Acute bronchitis due to other specified organisms    Encephalopathy    Change in bowel habits    Closed fracture of multiple ribs of right side    GERD (gastroesophageal reflux disease)    Hyperlipidemia    Edema    Diarrhea    Cough    Essential hypertension    Routine health maintenance    Current episode of major depressive disorder without prior episode    Medicare annual wellness visit, subsequent    YOLIE (obstructive sleep apnea)    Dermatitis    Stridor    Muscle tension dysphonia    Reflux laryngitis    Glottic insufficiency    History of stroke    Weakness of both lower extremities       Past Medical History  Past Medical History:   Diagnosis Date    Abdominal fibromatosis     Diabetes mellitus ([facility] [address] )     Hyperlipemia     Hypertension     Pneumonia     Stroke [facility])        Past Surgical History  Past Surgical History:   Procedure Laterality Date    CATARACT EXTRACTION Bilateral     CATARACT EXTRACTION, BILATERAL      COLONOSCOPY      EGD      LAPAROTOMY      Exploratory;  Last Assessed 10/17/2017    PEG TUBE PLACEMENT      PEG TUBE REMOVAL          01/31/21 4225   PT Last Visit   PT Visit Date 01/31/21   Note Type   Note type Evaluation   Pain Assessment   Pain Assessment Tool Pain Assessment not indicated - pt denies pain   Home Living   Type of [address] One level;Performs ADLs on one level; Able to live on main level with bedroom/bathroom; Access   Bathroom Shower/Tub Tub/shower unit   Bathroom Toilet Standard   Bathroom Equipment Grab bars in shower   [address]   (no DME/AD used at baseline)   Prior Function   Level of [place] Independent with ADLs and functional mobility   Lives With Alone   ADL Assistance Independent   IADLs Independent   Falls in the last 6 months 0   Vocational Retired   Comments +    Restrictions/Precautions   [facility] Bearing Precautions Per Order No   Other Precautions Chair Alarm; Bed Alarm; Fall Risk   General   Family/Caregiver Present No   Cognition   Overall Cognitive Status WFL   Arousal/Participation Alert   Attention Within functional limits   Orientation Level Oriented X4   Memory Decreased recall of recent events   Following Commands Follows one step commands without difficulty   Comments pt agreeable to PT session   RLE Assessment   RLE Assessment X   Strength RLE   RLE Overall Strength 4-/5   LLE Assessment   LLE Assessment X   Strength LLE   LLE Overall Strength 4-/5   Bed Mobility   Supine to Sit 4  Minimal assistance   Additional items Assist x 2;HOB elevated; Bedrails; Increased time required;Verbal cues;LE management   Transfers   Sit to Stand 4  Minimal assistance   Additional items Assist x 2; Increased time required;Verbal cues   Stand to Sit 4  Minimal assistance   Additional items Assist x 2; Increased time required;Verbal cues   Stand pivot 4  Minimal assistance   Additional items Assist x 2;Verbal cues; Increased time required   Balance   Static Sitting Fair +   Dynamic Sitting Fair   Static Standing Fair -   Dynamic Standing Poor +   Endurance Deficit   Endurance Deficit Yes   Endurance Deficit Description unable to tolerate further mobility   Activity Tolerance   Activity Tolerance Patient limited by fatigue   Assessment   Prognosis Fair   Problem List Decreased strength;Decreased endurance; Impaired balance;Decreased mobility   Assessment Pt is 63 y o  female seen for PT evaluation s/p admit to [address] on 1/30/2021 w/ Encephalopathy  PT consulted to assess pt's functional mobility and d/c needs  Order placed for PT eval and tx, w/ up as tolerated order  Comorbidities affecting pt's physical performance at time of assessment include: stroke, DM and depression  PTA, pt was ambulates community distances and elevations and lives w/ self in 1 level apartment  Personal factors affecting pt at time of IE include: ambulating w/ assistive device and inability to ambulate household distances  Please find objective findings from PT assessment regarding body systems outlined above with impairments and limitations including weakness, impaired balance, decreased endurance, decreased activity tolerance, decreased functional mobility tolerance and fall risk  Pt's clinical presentation is currently unstable/unpredictable seen in pt's presentation of decline in mobility status  Pt to benefit from continued PT tx to address deficits as defined above and maximize level of functional independent mobility and consistency  From PT/mobility standpoint, recommendation at time of d/c would be STR pending progress in order to facilitate return to PLOF  Barriers to Discharge Decreased caregiver support   Goals   Patient Goals to get OOB into a chair   LTG Expiration Date 02/10/21   Long Term Goal #1 Pt will: Perform bed mobility tasks to modified I to improve ease of bed mobility  Perform transfers to modified I to improve ease of transfers  Perform ambulation with MI and RW for 200 ft to   increase Indep in home environment  Increase dynamic standing balance to F+ to decrease fall risk    Increase BLE strength to 4+/5 to improve functional mobility  Increase OOB activity tolerance to 10 minutes without s/s of exertion to decrease fall risk  PT Treatment Day 0   Plan   Treatment/Interventions Functional transfer training;LE strengthening/ROM; Therapeutic exercise; Bed mobility;Gait training   PT Frequency   (3-5x/wk)   Recommendation   PT Discharge Recommendation Post-Acute Rehabilitation Services   Equipment Recommended Walker   PT - OK to Discharge No   Additional Comments upon conclusion pt seated in chair with all needs in reach   Additional Comments 2 The patient's AM-PAC Basic Mobility Inpatient Short Form Low Function Raw Score 16 , Standardized Score is 25 72  A standardized score less 42 9 suggests the patient may benefit from discharge to post-acute rehab services  Please also refer to the recommendation of the Physical Therapist for safe discharge planning     AM-PAC Basic Mobility Inpatient   Turning in Bed Without Bedrails 3   Lying on Back to Sitting on Edge of Flat Bed 1   Moving Bed to Chair 1   Standing Up From Chair 1   Walk in Room 1   Climb 3-5 Stairs 1   Basic Mobility Inpatient Raw Score 8   Turning Head Towards Sound 4   Follow Simple Instructions 4   Low Function Basic Mobility Raw Score 16   Low Function Basic Mobility Standardized Score 25 72   Miriam Arias, PT          Miriam Arias, PT

## 2021-01-31 NOTE — ASSESSMENT & PLAN NOTE
· Unclear etiology  This could be toxic metabolic due to mild urinary tract infection versus possible TIA/stroke with suspected expressive aphasia  · Please see treatment outline below    Mild chronic microangiopathic changes are noted  Urine culture:  Pending  Urine drug screen:  Pending  Ammonia:  Normal    · Continue antibiotic for empiric urinary tract infection  · Continue IV fluids      MRI: Brain MRI: Recent infarct left centrum semiovale  No evidence of hemorrhage  Neurology consult appreciated:  Official report of MRI as above    As per Dr Miller-unsure if culprit of her symptoms  Pending MRI of the spine  Manage risk factors for stroke:  Lipitor increased to 80, control blood sugars control, controlled blood pressure

## 2021-01-31 NOTE — ASSESSMENT & PLAN NOTE
· Follows with Dr Brittni Ramirez at [facility]  · Currently is in sinus rhythm  · Continue with metoprolol  · Continue with Xarelto for anticoagulation  · Last echocardiogram shows LVEF of 65%

## 2021-01-31 NOTE — NURSING NOTE
returned from mri by chayo gordillo via w/c assist x2 into bed  Inc smeary stool and changed for same  R/a status  resp easy  lungs clear  Abdomen soft and obese  Voids freely  Currently alert and oriented x4  Call bell given   Neuro checks within normal limits

## 2021-01-31 NOTE — PLAN OF CARE
Problem: OCCUPATIONAL THERAPY ADULT  Goal: Performs self-care activities at highest level of function for planned discharge setting  See evaluation for individualized goals  Description: Treatment Interventions: ADL retraining, Functional transfer training, UE strengthening/ROM, Endurance training, Patient/family training, Compensatory technique education, Activityengagement          See flowsheet documentation for full assessment, interventions and recommendations  Note: Limitation: Decreased ADL status, Decreased UE strength, Decreased Safe judgement during ADL, Decreased endurance, Decreased high-level ADLs, Decreased self-care trans  Prognosis: Good  Assessment: Patient is a 63 y o  female seen for OT evaluation s/p admit to [address] [place] [address]  on 1/30/2021 w/Encephalopathy  Commorbidities affecting patient's functional performance at time of assessment include: dysphagia, hx of CVA, A-fib, tracheal stenosis and DM2  Orders placed for OT evaluation and treatment and up with assistance  Performed at least two patient identifiers during session including name and wristband  Prior to admission, Patient reporting being independet with ADLs/IADLs, ambulatory with no AD and lives alone in a one level apartment  Personal factors affecting patient at time of initial evaluation include: limited caregiver support, difficulty performing ADLs and difficulty performing IADLs  Upon evaluation, patient requires minimal  assist for UB ADLs, moderate assist for LB ADLs, transfers and functional ambulation in room and bathroom with moderate assist, with the use of Rolling Walker  Patient is oriented x 4 and presents with limited ability to recall information after a brief period of time (short term memory) / working memory     Occupational performance is affected by the following deficits: decreased muscle strength, dynamic sit/ stand balance deficit with poor standing tolerance time for self care and functional mobility, decreased activity tolerance, impaired memory, increased pain, delayed righting and equilibrium reactions and postural control and postural alignment deficit, requiring external assistance to complete transitional movements  Patient to benefit from continued Occupational Therapy treatment while in the hospital to address deficits as defined above and maximize level of functional independence with ADLs and functional mobility  Occupational Performance areas to address include: grooming , bathing/ shower, dressing, toilet hygiene, transfer to all surfaces, functional ambulation, functional mobility, medication routine/ management, IADLS: Household maintenance, IADLs: safety procedures and IADLs: meal prep/ clean up  From OT standpoint, recommendation at time of d/c would be Post-Acute Rehabilitation Services         OT Discharge Recommendation: Post-Acute Rehabilitation Services  OT - OK to Discharge: Yes(Once medically cleared )     Daisy Aguirre OT
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
## 2021-01-31 NOTE — ASSESSMENT & PLAN NOTE
Bilateral lower extremity weakness since Friday with new onset of urinary incontinence  Neurology consult appreciated  Will order MRI of C-spine, thoracic spine, lumbar spine with and without contrast  If this is negative most likely etiology would be stroke
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
## 2021-01-31 NOTE — ASSESSMENT & PLAN NOTE
Lab Results   Component Value Date    HGBA1C 11 9 (H) 11/28/2020       Recent Labs     01/30/21  1550 01/30/21 2001 01/31/21  0614 01/31/21  1145   POCGLU 221* 243* 212* 256*       Blood Sugar Average: Last 72 hrs:  (P) 233

## 2021-02-01 ENCOUNTER — APPOINTMENT (INPATIENT)
Dept: MRI IMAGING | Facility: HOSPITAL | Age: 66
DRG: 689 | End: 2021-02-01
Payer: COMMERCIAL

## 2021-02-01 LAB
GLUCOSE SERPL-MCNC: 263 MG/DL (ref 65–140)
GLUCOSE SERPL-MCNC: 288 MG/DL (ref 65–140)
GLUCOSE SERPL-MCNC: 300 MG/DL (ref 65–140)
GLUCOSE SERPL-MCNC: 310 MG/DL (ref 65–140)

## 2021-02-01 PROCEDURE — 72141 MRI NECK SPINE W/O DYE: CPT

## 2021-02-01 PROCEDURE — 72146 MRI CHEST SPINE W/O DYE: CPT

## 2021-02-01 PROCEDURE — 82948 REAGENT STRIP/BLOOD GLUCOSE: CPT

## 2021-02-01 PROCEDURE — G1004 CDSM NDSC: HCPCS

## 2021-02-01 PROCEDURE — 92610 EVALUATE SWALLOWING FUNCTION: CPT

## 2021-02-01 PROCEDURE — 72148 MRI LUMBAR SPINE W/O DYE: CPT

## 2021-02-01 PROCEDURE — 99232 SBSQ HOSP IP/OBS MODERATE 35: CPT | Performed by: HOSPITALIST

## 2021-02-01 RX ORDER — INSULIN GLARGINE 100 [IU]/ML
25 INJECTION, SOLUTION SUBCUTANEOUS
Status: DISCONTINUED | OUTPATIENT
Start: 2021-02-01 | End: 2021-02-02 | Stop reason: HOSPADM

## 2021-02-01 RX ORDER — ATORVASTATIN CALCIUM 80 MG/1
80 TABLET, FILM COATED ORAL
Status: DISCONTINUED | OUTPATIENT
Start: 2021-02-01 | End: 2021-02-02 | Stop reason: HOSPADM

## 2021-02-01 RX ADMIN — INSULIN LISPRO 3 UNITS: 100 INJECTION, SOLUTION INTRAVENOUS; SUBCUTANEOUS at 07:59

## 2021-02-01 RX ADMIN — INSULIN LISPRO 5 UNITS: 100 INJECTION, SOLUTION INTRAVENOUS; SUBCUTANEOUS at 22:19

## 2021-02-01 RX ADMIN — METOPROLOL TARTRATE 50 MG: 50 TABLET, FILM COATED ORAL at 22:19

## 2021-02-01 RX ADMIN — INSULIN GLARGINE 25 UNITS: 100 INJECTION, SOLUTION SUBCUTANEOUS at 22:20

## 2021-02-01 RX ADMIN — INSULIN LISPRO 4 UNITS: 100 INJECTION, SOLUTION INTRAVENOUS; SUBCUTANEOUS at 11:31

## 2021-02-01 RX ADMIN — RIVAROXABAN 20 MG: 10 TABLET, FILM COATED ORAL at 10:33

## 2021-02-01 RX ADMIN — METOPROLOL TARTRATE 50 MG: 50 TABLET, FILM COATED ORAL at 10:33

## 2021-02-01 RX ADMIN — HYDROCHLOROTHIAZIDE: 12.5 TABLET ORAL at 06:12

## 2021-02-01 RX ADMIN — HYDROCHLOROTHIAZIDE: 12.5 TABLET ORAL at 10:32

## 2021-02-01 RX ADMIN — PANTOPRAZOLE SODIUM 40 MG: 40 TABLET, DELAYED RELEASE ORAL at 10:33

## 2021-02-01 RX ADMIN — ATORVASTATIN CALCIUM 80 MG: 80 TABLET, FILM COATED ORAL at 16:51

## 2021-02-01 RX ADMIN — INSULIN LISPRO 4 UNITS: 100 INJECTION, SOLUTION INTRAVENOUS; SUBCUTANEOUS at 16:45

## 2021-02-01 RX ADMIN — ESCITALOPRAM 5 MG: 5 TABLET, FILM COATED ORAL at 10:33

## 2021-02-01 RX ADMIN — CEFTRIAXONE 1000 MG: 1 INJECTION, SOLUTION INTRAVENOUS at 16:51

## 2021-02-01 NOTE — PLAN OF CARE
Problem: Potential for Falls  Goal: Patient will remain free of falls  Description: INTERVENTIONS:  - Assess patient frequently for physical needs  -  Identify cognitive and physical deficits and behaviors that affect risk of falls    -  [place] fall precautions as indicated by assessment   - Educate patient/family on patient safety including physical limitations  - Instruct patient to call for assistance with activity based on assessment  - Modify environment to reduce risk of injury  - Consider OT/PT consult to assist with strengthening/mobility  Outcome: Progressing     Problem: PAIN - ADULT  Goal: Verbalizes/displays adequate comfort level or baseline comfort level  Description: Interventions:  - Encourage patient to monitor pain and request assistance  - Assess pain using appropriate pain scale  - Administer analgesics based on type and severity of pain and evaluate response  - Implement non-pharmacological measures as appropriate and evaluate response  - Consider cultural and social influences on pain and pain management  - Notify physician/advanced practitioner if interventions unsuccessful or patient reports new pain  Outcome: Progressing     Problem: INFECTION - ADULT  Goal: Absence or prevention of progression during hospitalization  Description: INTERVENTIONS:  - Assess and monitor for signs and symptoms of infection  - Monitor lab/diagnostic results  - Monitor all insertion sites, i e  indwelling lines, tubes, and drains  - Monitor endotracheal if appropriate and nasal secretions for changes in amount and color  - [place] appropriate cooling/warming therapies per order  - Administer medications as ordered  - Instruct and encourage patient and family to use good hand hygiene technique  - Identify and instruct in appropriate isolation precautions for identified infection/condition  Outcome: Progressing  Goal: Absence of fever/infection during neutropenic period  Description: INTERVENTIONS:  - Monitor WBC    Outcome: Progressing     Problem: SAFETY ADULT  Goal: Maintain or return to baseline ADL function  Description: INTERVENTIONS:  -  Assess patient's ability to carry out ADLs; assess patient's baseline for ADL function and identify physical deficits which impact ability to perform ADLs (bathing, care of mouth/teeth, toileting, grooming, dressing, etc )  - Assess/evaluate cause of self-care deficits   - Assess range of motion  - Assess patient's mobility; develop plan if impaired  - Assess patient's need for assistive devices and provide as appropriate  - Encourage maximum independence but intervene and supervise when necessary  - Involve family in performance of ADLs  - Assess for home care needs following discharge   - Consider OT consult to assist with ADL evaluation and planning for discharge  - Provide patient education as appropriate  Outcome: Progressing  Goal: Maintain or return mobility status to optimal level  Description: INTERVENTIONS:  - Assess patient's baseline mobility status (ambulation, transfers, stairs, etc )    - Identify cognitive and physical deficits and behaviors that affect mobility  - Identify mobility aids required to assist with transfers and/or ambulation (gait belt, sit-to-stand, lift, walker, cane, etc )  - [place] fall precautions as indicated by assessment  - Record patient progress and toleration of activity level on Mobility SBAR; progress patient to next Phase/Stage  - Instruct patient to call for assistance with activity based on assessment  - Consider rehabilitation consult to assist with strengthening/weightbearing, etc   Outcome: Progressing     Problem: DISCHARGE PLANNING  Goal: Discharge to home or other facility with appropriate resources  Description: INTERVENTIONS:  - Identify barriers to discharge w/patient and caregiver  - Arrange for needed discharge resources and transportation as appropriate  - Identify discharge learning needs (meds, wound care, etc )  - Arrange for interpretive services to assist at discharge as needed  - Refer to Case Management Department for coordinating discharge planning if the patient needs post-hospital services based on physician/advanced practitioner order or complex needs related to functional status, cognitive ability, or social support system  Outcome: Progressing     Problem: Knowledge Deficit  Goal: Patient/family/caregiver demonstrates understanding of disease process, treatment plan, medications, and discharge instructions  Description: Complete learning assessment and assess knowledge base  Interventions:  - Provide teaching at level of understanding  - Provide teaching via preferred learning methods  Outcome: Progressing     Problem: Nutrition/Hydration-ADULT  Goal: Nutrient/Hydration intake appropriate for improving, restoring or maintaining nutritional needs  Description: Monitor and assess patient's nutrition/hydration status for malnutrition  Collaborate with interdisciplinary team and initiate plan and interventions as ordered  Monitor patient's weight and dietary intake as ordered or per policy  Utilize nutrition screening tool and intervene as necessary  Determine patient's food preferences and provide high-protein, high-caloric foods as appropriate       INTERVENTIONS:  - Monitor oral intake, urinary output, labs, and treatment plans  - Assess nutrition and hydration status and recommend course of action  - Evaluate amount of meals eaten  - Assist patient with eating if necessary   - Allow adequate time for meals  - Recommend/ encourage appropriate diets, oral nutritional supplements, and vitamin/mineral supplements  - Order, calculate, and assess calorie counts as needed  - Recommend, monitor, and adjust tube feedings and TPN/PPN based on assessed needs  - Assess need for intravenous fluids  - Provide specific nutrition/hydration education as appropriate  - Include patient/family/caregiver in decisions related to nutrition  Outcome: Progressing
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
## 2021-02-01 NOTE — ASSESSMENT & PLAN NOTE
· Most likely secondary to the history of her CVA  · Status post a PT and OT evaluation  · Patient has been deemed appropriate for a short-term rehab  · Patient is medically stable for discharge, case management and the receiving facility are working on it

## 2021-02-01 NOTE — ASSESSMENT & PLAN NOTE
Lab Results   Component Value Date    HGBA1C 11 9 (H) 11/28/2020       Recent Labs     01/31/21  1545 01/31/21  2040 02/01/21  0823 02/01/21  1106   POCGLU 356* 295* 263* 300*       Blood Sugar Average: Last 72 hrs:  (P) 268 25   · Hyperglycemia noted in the setting of having diabetes mellitus type 2  · Will increase Lantus dosing to 20 units   · Continue Accu-Cheks AC and HS with sliding scale coverage

## 2021-02-01 NOTE — PROGRESS NOTES
Progress Note - Gillian Guzman 1955, 63 y o  female MRN: [id number]    Unit/Bed#: -01 Encounter: [id number]    Primary Care Provider: Tamar Marion MD   Date and time admitted to hospital: 1/30/2021  8:14 AM        * Encephalopathy  Assessment & Plan  · Currently resolved  · Patient is alert, awake, and oriented x3  · In retrospect most likely secondary to a urinary tract infection:  Urine culture shows greater than 100,000 colony-forming units enteric like Gram-negative rods  · Continue ceftriaxone day 3  · MRI brain with the following results: Recent infarct left centrum semiovale  No evidence of hemorrhage  · Status post a neurology evaluation, MRI C-spine, T-spine, and L-spine also grossly within normal limits  · Case reviewed with Dr Ervin Bhardwaj has been increased to 80 mg daily by mouth, no further inpatient testing, treatment and/or workup is warranted, patient has been deemed stable from a neurological standpoint to be transferred to the acute rehab unit when a bed is available    Paroxysmal atrial fibrillation [facility])  Assessment & Plan  · Rate controlled with metoprolol  · Anticoagulation with Xarelto    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   · The patient is being evaluated by ENT for a surgery to correct narrow airway related to prolonged intubation at [address] 2017     · Supportive care       Type 2 diabetes mellitus with hyperglycemia, with long-term current use of insulin [facility])  Assessment & Plan  Lab Results   Component Value Date    HGBA1C 11 9 (H) 11/28/2020       Recent Labs     01/31/21  1545 01/31/21  2040 02/01/21  0823 02/01/21  1106   POCGLU 356* 295* 263* 300*       Blood Sugar Average: Last 72 hrs:  (P) 268 25   · Hyperglycemia noted in the setting of having diabetes mellitus type 2  · Will increase Lantus dosing to 20 units   · Continue Accu-Cheks AC and HS with sliding scale coverage    Weakness of both lower extremities  Assessment & Plan  · Most likely secondary to the history of her CVA  · Status post a PT and OT evaluation  · Patient has been deemed appropriate for a short-term rehab  · Patient is medically stable for discharge, case management and the receiving facility are working on it     History of stroke  Assessment & Plan  · Continue Xarelto, and Lipitor for secondary prevention    Dysphagia  Assessment & Plan  · Currently on a dental soft, nectar thick liquids diet  · Continue the same for now        VTE Prophylaxis:  Rivaroxaban (Xarelto)    Patient Centered Rounds: I have performed bedside rounds with nursing staff today  Discussions with Specialists or Other Care Team Provider:  Case management, nursing, pharmacy  Education and Discussions with Family / Patient:  The patient's son Tyler Wagner was brought up to par at phone #[phone] at 1:35 p m , all questions answered to his satisfaction    Current Length of Stay: 2 day(s)    Current Patient Status: Inpatient   Certification Statement: The patient will continue to require additional inpatient hospital stay due to The need for short-term rehab    Discharge Plan:  Patient is otherwise medically stable for discharge, case management is working on the patient's transition to the acute rehab unit    Code Status: Level 3 - DNAR and DNI    Subjective:   Patient seen and examined, resting in bed, is comfortable, denies any pain or discomfort otherwise  Is very happy to know that her MRI testing for the most part is negative    Objective:     Vitals:   Temp (24hrs), Av 7 °F (36 5 °C), Min:96 3 °F (35 7 °C), Max:98 8 °F (37 1 °C)    Temp:  [96 3 °F (35 7 °C)-98 8 °F (37 1 °C)] 98 8 °F (37 1 °C)  HR:  [78-89] 89  Resp:  [19-22] 20  BP: (120-173)/(61-78) 148/65  SpO2:  [95 %-98 %] 98 %  Body mass index is 36 39 kg/m²  Input and Output Summary (last 24 hours):        Intake/Output Summary (Last 24 hours) at 2021 1335  Last data filed at 2021 1714  Gross per 24 hour   Intake 240 ml Output 200 ml   Net 40 ml       Physical Exam:   Physical Exam  Constitutional:       General: She is not in acute distress  Appearance: Normal appearance  She is normal weight  She is not ill-appearing  HENT:      Head: Normocephalic and atraumatic  Nose: Nose normal       Mouth/Throat:      Mouth: Mucous membranes are moist    Eyes:      Extraocular Movements: Extraocular movements intact  Pupils: Pupils are equal, round, and reactive to light  Neck:      Musculoskeletal: Normal range of motion and neck supple  No neck rigidity  Cardiovascular:      Rate and Rhythm: Normal rate and regular rhythm  Pulses: Normal pulses  Heart sounds: Normal heart sounds  No murmur  No friction rub  No gallop  Pulmonary:      Effort: Pulmonary effort is normal  No respiratory distress  Breath sounds: Normal breath sounds  No wheezing, rhonchi or rales  Abdominal:      General: There is no distension  Palpations: Abdomen is soft  There is no mass  Tenderness: There is no abdominal tenderness  Hernia: No hernia is present  Musculoskeletal: Normal range of motion  General: No swelling or tenderness  Right lower leg: No edema  Left lower leg: No edema  Skin:     General: Skin is warm  Capillary Refill: Capillary refill takes less than 2 seconds  Findings: No erythema or rash  Neurological:      General: No focal deficit present  Mental Status: She is alert and oriented to person, place, and time  Mental status is at baseline  Cranial Nerves: No cranial nerve deficit  Motor: No weakness     Psychiatric:         Mood and Affect: Mood normal          Behavior: Behavior normal          Additional Data:     Labs:    Results from last 7 days   Lab Units 01/31/21  0513 01/30/21  0917   WBC Thousand/uL 7 60 7 50   HEMOGLOBIN g/dL 12 6 12 9   HEMATOCRIT % 38 4* 39 5*   PLATELETS Thousands/uL 285 275   NEUTROS PCT %  --  67   LYMPHS PCT %  -- 23   MONOS PCT %  --  8   EOS PCT %  --  2     Results from last 7 days   Lab Units 01/31/21  0513   SODIUM mmol/L 141   POTASSIUM mmol/L 3 8   CHLORIDE mmol/L 104   CO2 mmol/L 27   BUN mg/dL 28*   CREATININE mg/dL 0 90   CALCIUM mg/dL 9 2   ALK PHOS U/L 48*   ALT U/L 12   AST U/L 11*         Results from last 7 days   Lab Units 02/01/21  1106 02/01/21  0823 01/31/21  2040 01/31/21  1545 01/31/21  1145 01/31/21  0614 01/30/21 2001 01/30/21  1550   POC GLUCOSE mg/dl 300* 263* 295* 356* 256* 212* 243* 221*           * I Have Reviewed All Lab Data Listed Above  * Additional Pertinent Lab Tests Reviewed: Janey 68 Admission  Reviewed    Imaging:  Imaging Reports Reviewed Today Include:  MRI C-spine, T-spine, L-spine, MRI brain    Recent Cultures (last 7 days):     Results from last 7 days   Lab Units 01/30/21  1133   URINE CULTURE  >100,000 cfu/ml Gram Negative Angel Enteric Like*  10,000-19,000 cfu/ml        Last 24 Hours Medication List:   Current Facility-Administered Medications   Medication Dose Route Frequency Provider Last Rate    acetaminophen  650 mg Oral 4x Daily PRN Lamond Pu, CRNP      albuterol  2 puff Inhalation Q4H PRN Lamond Pu, CRNP      atorvastatin  80 mg Oral Daily With [address] Kenyetta Power MD      cefTRIAXone  1,000 mg Intravenous Q24H Lamond Pu, CRNP 1,000 mg (01/31/21 1553)    escitalopram  5 mg Oral Daily Lamond Pu, CRNP      insulin glargine  15 Units Subcutaneous HS Lamond Pu, CRNP      insulin lispro  1-6 Units Subcutaneous TID AC Lamond Pu, CRNP      insulin lispro  1-6 Units Subcutaneous HS Lamond Pu, CRNP      lisinopril-hydrochlorothiazide (PRINZIDE 10/12  5) combo dose   Oral Daily Lamond Pu, CRNP      metoprolol tartrate  50 mg Oral Q12H [facility] Lamond Pu, CRNP      pantoprazole  40 mg Oral Daily Lamond Pu, CRNP      rivaroxaban  20 mg Oral Daily With Breakfast Lamond Pu, ALEC          Today, Patient Was Seen By: Chandan Brooke MD    ** Please Note: Dictation voice to text software may have been used in the creation of this document   **

## 2021-02-01 NOTE — UTILIZATION REVIEW
Notification of Inpatient Admission/Inpatient Authorization Request   This is a Notification of Inpatient Admission for [address]  Be advised that this patient was admitted to our facility under Inpatient Status  Contact Kasey Hurtado at [phone] for additional admission information  [facility] DE[facility]  DEDICATED [phone]  Patient Name:   Nancy Trejo   YOB: 1955       Authorization Information   [address]:   [address]  Tax ID: [id number]  NPI: [id number] Attending Provider/NPI:  Phone:  Address: Tray Mckay, Evita Hahn [[id number]]  [phone]  Same as FLETCHER/Sierra Lemus 1106 of Service Code: 24 Place of Service Name: [address] [place] [address]   Start Date: 1/30/21 1150 Discharge Date & Time: No discharge date for patient encounter  Type of Admission: Inpatient Status Discharge Disposition   (if discharged): Final discharge disposition not confirmed   Patient Diagnoses: Weakness generalized [R53 1]  Weakness [R53 1]     Orders: Admission Orders (From admission, onward)     Ordered        01/30/21 1150  Inpatient Admission  Once                    Assigned Utilization Review Contact: Martin Ramsey  Utilization   Network Utilization Review Department  Phone: [phone]; Fax [phone]  Email: [email or web address]   ATTENTION PAYERS: Please call the assigned Utilization  directly with any questions or concerns ALL voicemails in the department are confidential  Send all requests for admission clinical reviews, approved or denied determinations and any other requests to dedicated fax number belonging to the campus where the patient is receiving treatment

## 2021-02-01 NOTE — NURSING NOTE
Awakened and neuro assessment completed  Denies pain  Tolerates nectar thick liquids  Skin is dry and no s/s of hypo/hyper glycemia noted  Call bell within reach no distress

## 2021-02-01 NOTE — NURSING NOTE
PATIENT WAS BEING ASSISTED TO BATHROOM WITH A WALKER AND ASSISTED BY PCA, WHEN SHE ENTERED THE BATHROOM AND FELL TO HER BUTTOCKS IN A SITTING POSITION  PCA CALLED FOR ASSIST AND AND PATIENT WAS ASSISTED TO STAND AND RETURNED TO BED  ALONSO SOW WAS MADE AWARE AND NURSE SUPERVISOR  PT STATED SHE WAS NOT HURT

## 2021-02-01 NOTE — ASSESSMENT & PLAN NOTE
· Currently resolved  · Patient is alert, awake, and oriented x3  · In retrospect most likely secondary to a urinary tract infection:  Urine culture shows greater than 100,000 colony-forming units enteric like Gram-negative rods  · Continue ceftriaxone day 3  · MRI brain with the following results: Recent infarct left centrum semiovale  No evidence of hemorrhage    · Status post a neurology evaluation, MRI C-spine, T-spine, and L-spine also grossly within normal limits  · Case reviewed with Dr Stroud Area has been increased to 80 mg daily by mouth, no further inpatient testing, treatment and/or workup is warranted, patient has been deemed stable from a neurological standpoint to be transferred to the acute rehab unit when a bed is available

## 2021-02-02 ENCOUNTER — HOSPITAL ENCOUNTER (INPATIENT)
Facility: HOSPITAL | Age: 66
LOS: 18 days | Discharge: HOME HEALTH | DRG: 057 | End: 2021-02-20
Attending: FAMILY MEDICINE | Admitting: FAMILY MEDICINE
Payer: COMMERCIAL

## 2021-02-02 VITALS
SYSTOLIC BLOOD PRESSURE: 116 MMHG | TEMPERATURE: 99 F | BODY MASS INDEX: 36.44 KG/M2 | WEIGHT: 218.7 LBS | HEIGHT: 65 IN | DIASTOLIC BLOOD PRESSURE: 73 MMHG | RESPIRATION RATE: 18 BRPM | HEART RATE: 77 BPM | OXYGEN SATURATION: 95 %

## 2021-02-02 DIAGNOSIS — Z79.4 TYPE 2 DIABETES MELLITUS WITH HYPERGLYCEMIA, WITH LONG-TERM CURRENT USE OF INSULIN (HCC): ICD-10-CM

## 2021-02-02 DIAGNOSIS — I48.0 PAROXYSMAL ATRIAL FIBRILLATION (HCC): Primary | ICD-10-CM

## 2021-02-02 DIAGNOSIS — I63.9 CEREBROVASCULAR ACCIDENT (CVA), UNSPECIFIED MECHANISM (HCC): ICD-10-CM

## 2021-02-02 DIAGNOSIS — I10 ESSENTIAL HYPERTENSION: Chronic | ICD-10-CM

## 2021-02-02 DIAGNOSIS — E11.65 TYPE 2 DIABETES MELLITUS WITH HYPERGLYCEMIA, WITH LONG-TERM CURRENT USE OF INSULIN (HCC): ICD-10-CM

## 2021-02-02 DIAGNOSIS — K21.9 GASTROESOPHAGEAL REFLUX DISEASE, UNSPECIFIED WHETHER ESOPHAGITIS PRESENT: Chronic | ICD-10-CM

## 2021-02-02 LAB
BACTERIA UR CULT: ABNORMAL
BACTERIA UR CULT: ABNORMAL
FLUAV RNA RESP QL NAA+PROBE: NEGATIVE
FLUBV RNA RESP QL NAA+PROBE: NEGATIVE
GLUCOSE SERPL-MCNC: 202 MG/DL (ref 65–140)
GLUCOSE SERPL-MCNC: 278 MG/DL (ref 65–140)
GLUCOSE SERPL-MCNC: 314 MG/DL (ref 65–140)
GLUCOSE SERPL-MCNC: 327 MG/DL (ref 65–140)
RSV RNA RESP QL NAA+PROBE: NEGATIVE
SARS-COV-2 RNA RESP QL NAA+PROBE: NEGATIVE

## 2021-02-02 PROCEDURE — 99223 1ST HOSP IP/OBS HIGH 75: CPT | Performed by: FAMILY MEDICINE

## 2021-02-02 PROCEDURE — 97110 THERAPEUTIC EXERCISES: CPT

## 2021-02-02 PROCEDURE — 99239 HOSP IP/OBS DSCHRG MGMT >30: CPT | Performed by: HOSPITALIST

## 2021-02-02 PROCEDURE — 82948 REAGENT STRIP/BLOOD GLUCOSE: CPT

## 2021-02-02 PROCEDURE — 97530 THERAPEUTIC ACTIVITIES: CPT

## 2021-02-02 PROCEDURE — 0241U HB NFCT DS VIR RESP RNA 4 TRGT: CPT | Performed by: HOSPITALIST

## 2021-02-02 PROCEDURE — 97116 GAIT TRAINING THERAPY: CPT

## 2021-02-02 RX ORDER — MAGNESIUM HYDROXIDE/ALUMINUM HYDROXICE/SIMETHICONE 120; 1200; 1200 MG/30ML; MG/30ML; MG/30ML
30 SUSPENSION ORAL EVERY 4 HOURS PRN
Status: DISCONTINUED | OUTPATIENT
Start: 2021-02-02 | End: 2021-02-20 | Stop reason: HOSPADM

## 2021-02-02 RX ORDER — ESCITALOPRAM OXALATE 5 MG/1
5 TABLET ORAL DAILY
Status: DISCONTINUED | OUTPATIENT
Start: 2021-02-03 | End: 2021-02-20 | Stop reason: HOSPADM

## 2021-02-02 RX ORDER — PANTOPRAZOLE SODIUM 40 MG/1
40 TABLET, DELAYED RELEASE ORAL DAILY
Status: CANCELLED | OUTPATIENT
Start: 2021-02-03

## 2021-02-02 RX ORDER — PANTOPRAZOLE SODIUM 40 MG/1
40 TABLET, DELAYED RELEASE ORAL DAILY
Status: DISCONTINUED | OUTPATIENT
Start: 2021-02-03 | End: 2021-02-20 | Stop reason: HOSPADM

## 2021-02-02 RX ORDER — ATORVASTATIN CALCIUM 80 MG/1
80 TABLET, FILM COATED ORAL
Status: DISCONTINUED | OUTPATIENT
Start: 2021-02-02 | End: 2021-02-20 | Stop reason: HOSPADM

## 2021-02-02 RX ORDER — ONDANSETRON 4 MG/1
4 TABLET, ORALLY DISINTEGRATING ORAL EVERY 6 HOURS PRN
Status: DISCONTINUED | OUTPATIENT
Start: 2021-02-02 | End: 2021-02-20 | Stop reason: HOSPADM

## 2021-02-02 RX ORDER — ALBUTEROL SULFATE 90 UG/1
2 AEROSOL, METERED RESPIRATORY (INHALATION) EVERY 4 HOURS PRN
Status: CANCELLED | OUTPATIENT
Start: 2021-02-02

## 2021-02-02 RX ORDER — ESCITALOPRAM OXALATE 5 MG/1
5 TABLET ORAL DAILY
Status: CANCELLED | OUTPATIENT
Start: 2021-02-03

## 2021-02-02 RX ORDER — DOCUSATE SODIUM 100 MG/1
100 CAPSULE, LIQUID FILLED ORAL 2 TIMES DAILY
Status: DISCONTINUED | OUTPATIENT
Start: 2021-02-02 | End: 2021-02-20 | Stop reason: HOSPADM

## 2021-02-02 RX ORDER — ATORVASTATIN CALCIUM 80 MG/1
80 TABLET, FILM COATED ORAL
Status: CANCELLED | OUTPATIENT
Start: 2021-02-02

## 2021-02-02 RX ORDER — ACETAMINOPHEN 325 MG/1
650 TABLET ORAL 4 TIMES DAILY PRN
Status: DISCONTINUED | OUTPATIENT
Start: 2021-02-02 | End: 2021-02-20 | Stop reason: HOSPADM

## 2021-02-02 RX ORDER — INSULIN GLARGINE 100 [IU]/ML
25 INJECTION, SOLUTION SUBCUTANEOUS
Status: DISCONTINUED | OUTPATIENT
Start: 2021-02-02 | End: 2021-02-03

## 2021-02-02 RX ORDER — FAMOTIDINE 20 MG/1
20 TABLET, FILM COATED ORAL DAILY
Status: DISCONTINUED | OUTPATIENT
Start: 2021-02-02 | End: 2021-02-20 | Stop reason: HOSPADM

## 2021-02-02 RX ORDER — METOPROLOL TARTRATE 50 MG/1
50 TABLET, FILM COATED ORAL EVERY 12 HOURS SCHEDULED
Status: CANCELLED | OUTPATIENT
Start: 2021-02-02

## 2021-02-02 RX ORDER — INSULIN GLARGINE 100 [IU]/ML
25 INJECTION, SOLUTION SUBCUTANEOUS
Status: CANCELLED | OUTPATIENT
Start: 2021-02-02

## 2021-02-02 RX ORDER — METOPROLOL TARTRATE 50 MG/1
50 TABLET, FILM COATED ORAL EVERY 12 HOURS SCHEDULED
Status: DISCONTINUED | OUTPATIENT
Start: 2021-02-02 | End: 2021-02-20 | Stop reason: HOSPADM

## 2021-02-02 RX ORDER — ALBUTEROL SULFATE 90 UG/1
2 AEROSOL, METERED RESPIRATORY (INHALATION) EVERY 4 HOURS PRN
Status: DISCONTINUED | OUTPATIENT
Start: 2021-02-02 | End: 2021-02-20 | Stop reason: HOSPADM

## 2021-02-02 RX ORDER — ACETAMINOPHEN 325 MG/1
650 TABLET ORAL 4 TIMES DAILY PRN
Status: CANCELLED | OUTPATIENT
Start: 2021-02-02

## 2021-02-02 RX ADMIN — ESCITALOPRAM 5 MG: 5 TABLET, FILM COATED ORAL at 08:21

## 2021-02-02 RX ADMIN — INSULIN GLARGINE 25 UNITS: 100 INJECTION, SOLUTION SUBCUTANEOUS at 22:08

## 2021-02-02 RX ADMIN — PANTOPRAZOLE SODIUM 40 MG: 40 TABLET, DELAYED RELEASE ORAL at 08:21

## 2021-02-02 RX ADMIN — RIVAROXABAN 20 MG: 10 TABLET, FILM COATED ORAL at 08:21

## 2021-02-02 RX ADMIN — INSULIN LISPRO 5 UNITS: 100 INJECTION, SOLUTION INTRAVENOUS; SUBCUTANEOUS at 17:45

## 2021-02-02 RX ADMIN — ATORVASTATIN CALCIUM 80 MG: 80 TABLET, FILM COATED ORAL at 17:48

## 2021-02-02 RX ADMIN — INSULIN LISPRO 4 UNITS: 100 INJECTION, SOLUTION INTRAVENOUS; SUBCUTANEOUS at 11:45

## 2021-02-02 RX ADMIN — INSULIN LISPRO 5 UNITS: 100 INJECTION, SOLUTION INTRAVENOUS; SUBCUTANEOUS at 22:08

## 2021-02-02 RX ADMIN — METOPROLOL TARTRATE 50 MG: 50 TABLET, FILM COATED ORAL at 22:09

## 2021-02-02 RX ADMIN — HYDROCHLOROTHIAZIDE: 12.5 TABLET ORAL at 08:21

## 2021-02-02 RX ADMIN — INSULIN LISPRO 2 UNITS: 100 INJECTION, SOLUTION INTRAVENOUS; SUBCUTANEOUS at 08:21

## 2021-02-02 RX ADMIN — FAMOTIDINE 20 MG: 20 TABLET ORAL at 17:47

## 2021-02-02 RX ADMIN — METOPROLOL TARTRATE 50 MG: 50 TABLET, FILM COATED ORAL at 08:21

## 2021-02-02 NOTE — ASSESSMENT & PLAN NOTE
· On arrival this was an acute metabolic encephalopathy in the setting having a urinary tract infection as evidenced by disorientation, change in mental status which required treatment with IV antibiotics  · This has all Currently resolved  · Patient is alert, awake, and oriented x3  · Acute cystitis without hematuria:  Patient has been diagnosed with an E coli UTI, she has completed the course of 3 days worth of IV antibiotics, no further antibiotics are indicated  · Initial considerations also included a possible cerebral infarction that was present on admission in the setting of having hypertension, diabetes, as evidenced by the bilateral lower extremity weakness, aphasia and the diagnosis of the recent infarct on the left centrum semiovale on the MRI treated with the stroke pathway  · Status post a neurology evaluation  · MRI brain with the following results: Recent infarct left centrum semiovale  No evidence of hemorrhage    · Status post a neurology evaluation, MRI C-spine, and L-spine also grossly within normal limits  · Case reviewed with Dr Julio Reich has been increased to 80 mg daily by mouth, no further inpatient testing, treatment and/or workup is warranted  · Okay for discharge to the acute rehab unit  · [address] will be ready, willing and able to follow up in a consultative role if deemed necessary by the physiatrist

## 2021-02-02 NOTE — PLAN OF CARE
Pt rec'd to [facility] room 216-1 @ approx 1530  A & O x4  Gen weakness noted, with Rt side weaker  Delayed speech & soft spoken  Pt has tracheal stenosis & vocal cord paralysis  Pt needs assist x2 for transfers  Oriented to room, policies & procedures  Verbalized understanding  Staff will continue with orientation to unit & therapy schedules  Will continue to monitor pt and follow plan of care

## 2021-02-02 NOTE — DISCHARGE SUMMARY
Discharge- Gillian Guzman 1955, 63 y o  female MRN: [id number]    Unit/Bed#: -01 Encounter: [id number]    Primary Care Provider: Tamar Marion MD   Date and time admitted to hospital: 1/30/2021  8:14 AM        * Encephalopathy  Assessment & Plan  · On arrival this was an acute metabolic encephalopathy in the setting having a urinary tract infection as evidenced by disorientation, change in mental status which required treatment with IV antibiotics  · This has all Currently resolved  · Patient is alert, awake, and oriented x3  · Acute cystitis without hematuria:  Patient has been diagnosed with an E coli UTI, she has completed the course of 3 days worth of IV antibiotics, no further antibiotics are indicated  · Initial considerations also included a possible cerebral infarction that was present on admission in the setting of having hypertension, diabetes, as evidenced by the bilateral lower extremity weakness, aphasia and the diagnosis of the recent infarct on the left centrum semiovale on the MRI treated with the stroke pathway  · Status post a neurology evaluation  · MRI brain with the following results: Recent infarct left centrum semiovale  No evidence of hemorrhage    · Status post a neurology evaluation, MRI C-spine, and L-spine also grossly within normal limits  · Case reviewed with Dr Ervin Bhardwaj has been increased to 80 mg daily by mouth, no further inpatient testing, treatment and/or workup is warranted  · Okay for discharge to the acute rehab unit  · [address] will be ready, willing and able to follow up in a consultative role if deemed necessary by the physiatrist    Paroxysmal atrial fibrillation ([facility] [address] )  Assessment & Plan  · Rate controlled with metoprolol  · Anticoagulation with Xarelto    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   · The patient is being evaluated by ENT for a surgery to correct narrow airway related to prolonged intubation [facility]    · Supportive care       Type 2 diabetes mellitus with hyperglycemia, with long-term current use of insulin [facility])  Assessment & Plan  Lab Results   Component Value Date    HGBA1C 11 9 (H) 11/28/2020       Recent Labs     02/01/21  1527 02/01/21 2010 02/02/21  0633 02/02/21  1119   POCGLU 288* 310* 202* 278*       Blood Sugar Average: Last 72 hrs:  (P) 251 [id number]   · Hyperglycemia noted in the setting of having diabetes mellitus type 2  · DC to the acute rehab unit on the current insulin regimen    Weakness of both lower extremities  Assessment & Plan  · Most likely secondary to the history of her CVA  · Status post a PT and OT evaluation  · Okay for discharge to the acute rehab unit    History of stroke  Assessment & Plan  · Continue Xarelto, and Lipitor for secondary prevention    Dysphagia  Assessment & Plan  · Currently on a dental soft, nectar thick liquids diet  · Continue the same for now          Discharging Physician / Practitioner: Shreyas Melgar MD  PCP: Warren Mcguire MD  Admission Date:   Admission Orders (From admission, onward)     Ordered        01/30/21 1150  Inpatient Admission  Once                   Discharge Date: 02/02/21    Resolved Problems  Date Reviewed: 1/30/2021    None          Consultations During Hospital Stay:  · Neurology    Procedures Performed:   · None    Significant Findings / Test Results:   · Chest x-ray-no acute cardiopulmonary disease  Chronic healed fracture deformities of the right mid thorax  · CT brain-mild cerebral atrophy with chronic small-vessel ischemic change  No acute intercranial abnormality  · MRI brain-Recent infarct left centrum semiovale   No evidence of hemorrhage  Mild chronic microangiopathic changes are noted  · MRI C-spine-Motion degraded examination demonstrating multilevel cervical spondylosis with mild spinal stenosis at C4-C5 and C6-C7  No cord deformity or cord signal abnormality     · MRI L-spine-mild to moderate multilevel degenerative changes which do not result in focal nerve root compression    Incidental Findings:   · None     Test Results Pending at Discharge (will require follow up): · None     Outpatient Tests Requested:  · None    Complications:     None    Reason for Admission:  Encephalopathy    Hospital Course:     Moon Doherty is a 63 y o  female patient who originally presented to the hospital on 1/30/2021 due to generalized weakness and an altered mental status  Please refer to the initial history and physical examination completed by García Schwartz for the initial presenting features and complaints  In brief, the patient is a 47-year-old female, that presented to the emergency room with generalized weakness  She was also noted to be confused  She was diagnosed with an acute metabolic/toxic encephalopathy  This was deemed to be secondary to a suspected urinary tract infection  The patient was started on broad-spectrum IV antibiotics in the form of IV ceftriaxone  Additionally a neurology consultation was obtained  Imaging was performed as outlined above  A CT scan of the brain yielded no acute intercranial abnormality  An MRI of the brain did however yielded a recent infarct in the left centrum semiovale  Medications that were adjusted included increasing Lipitor to 80 mg once a day  She completed a course of 3 days worth of IV ceftriaxone for an E coli UTI  Due to lower extremity weakness, the patient additionally underwent testing within MRI of the L-spine, and C-spine  All MRI testing was grossly within normal limits  Patient's mentation resolved and she was back to baseline  She was seen by physical and occupational therapy, they recommended short-term rehab  Patient was accepted to the acute rehab unit here at this facility  She will be discharged to the acute rehab unit today on 02/02/2021    Hospital Medicine will be ready, willing and able to follow up in a consultative role if deemed necessary by the physiatrist   Upon release from the acute rehab unit, ideally the patient should follow-up with her primary care physician and Neurology within 10-14 days  With the exception of increasing the Lipitor to 80 mg daily by mouth, the patient was otherwise discharged to rehab on all of her other pre-admission medications at the preadmission dosages  COVID-19 testing was negative prior to the patient being discharged to the acute rehab unit  Please see above list of diagnoses and related plan for additional information  Condition at Discharge: good     Discharge Day Visit / Exam:     Subjective:  Patient seen and examined, no complaints no distress  Vitals: Blood Pressure: 116/73 (02/02/21 0707)  Pulse: 77 (02/02/21 0707)  Temperature: 99 °F (37 2 °C) (02/02/21 0707)  Temp Source: Temporal (02/02/21 0707)  Respirations: 18 (02/02/21 0707)  Height: 5' 5" (165 1 cm) (01/30/21 1243)  Weight - Scale: 99 2 kg (218 lb 11 1 oz) (01/30/21 1243)  SpO2: 95 % (02/02/21 0707)  Exam:   Physical Exam  Constitutional:       General: She is not in acute distress  Appearance: Normal appearance  She is normal weight  She is not ill-appearing  HENT:      Head: Normocephalic and atraumatic  Nose: Nose normal       Mouth/Throat:      Mouth: Mucous membranes are moist    Eyes:      Extraocular Movements: Extraocular movements intact  Pupils: Pupils are equal, round, and reactive to light  Neck:      Musculoskeletal: Normal range of motion and neck supple  No neck rigidity  Cardiovascular:      Rate and Rhythm: Normal rate and regular rhythm  Pulses: Normal pulses  Heart sounds: Normal heart sounds  No murmur  No friction rub  No gallop  Pulmonary:      Effort: Pulmonary effort is normal  No respiratory distress  Breath sounds: Normal breath sounds  No wheezing, rhonchi or rales  Abdominal:      General: There is no distension  Palpations: Abdomen is soft   There is no mass  Tenderness: There is no abdominal tenderness  Hernia: No hernia is present  Musculoskeletal: Normal range of motion  General: No swelling or tenderness  Right lower leg: No edema  Left lower leg: No edema  Skin:     General: Skin is warm  Capillary Refill: Capillary refill takes less than 2 seconds  Findings: No erythema or rash  Neurological:      General: No focal deficit present  Mental Status: She is alert and oriented to person, place, and time  Mental status is at baseline  Cranial Nerves: No cranial nerve deficit  Motor: No weakness  Psychiatric:         Mood and Affect: Mood normal          Behavior: Behavior normal          Discussion with Family:  Attempts to call the patient's son Renetta Oneil at phone #[phone] at 3:22 p m  Proved futile, no answer    Discharge instructions/Information to patient and family:   See after visit summary for information provided to patient and family  Provisions for Follow-Up Care:  See after visit summary for information related to follow-up care and any pertinent home health orders  Disposition:     Acute Rehab at [facility]      Planned Readmission:    To the acute rehab unit     Discharge Statement:  I spent 45 minutes discharging the patient  This time was spent on the day of discharge  I had direct contact with the patient on the day of discharge  Greater than 50% of the total time was spent examining patient, answering all patient questions, arranging and discussing plan of care with patient as well as directly providing post-discharge instructions  Additional time then spent on discharge activities  Discharge Medications:  See after visit summary for reconciled discharge medications provided to patient and family        ** Please Note: This note has been constructed using a voice recognition system **
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
## 2021-02-02 NOTE — ASSESSMENT & PLAN NOTE
· Most likely secondary to the history of her CVA  · Status post a PT and OT evaluation  · Okay for discharge to the acute rehab unit
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
## 2021-02-02 NOTE — PLAN OF CARE
Problem: Potential for Falls  Goal: Patient will remain free of falls  Description: INTERVENTIONS:  - Assess patient frequently for physical needs  -  Identify cognitive and physical deficits and behaviors that affect risk of falls    -  [place] fall precautions as indicated by assessment   - Educate patient/family on patient safety including physical limitations  - Instruct patient to call for assistance with activity based on assessment  - Modify environment to reduce risk of injury  - Consider OT/PT consult to assist with strengthening/mobility  Outcome: Progressing     Problem: PAIN - ADULT  Goal: Verbalizes/displays adequate comfort level or baseline comfort level  Description: Interventions:  - Encourage patient to monitor pain and request assistance  - Assess pain using appropriate pain scale  - Administer analgesics based on type and severity of pain and evaluate response  - Implement non-pharmacological measures as appropriate and evaluate response  - Consider cultural and social influences on pain and pain management  - Notify physician/advanced practitioner if interventions unsuccessful or patient reports new pain  Outcome: Progressing     Problem: INFECTION - ADULT  Goal: Absence or prevention of progression during hospitalization  Description: INTERVENTIONS:  - Assess and monitor for signs and symptoms of infection  - Monitor lab/diagnostic results  - Monitor all insertion sites, i e  indwelling lines, tubes, and drains  - Administer medications as ordered  - Instruct and encourage patient and family to use good hand hygiene technique  Outcome: Progressing  Goal: Absence of fever/infection during neutropenic period  Description: INTERVENTIONS:  - Monitor WBC    Outcome: Progressing     Problem: SAFETY ADULT  Goal: Maintain or return to baseline ADL function  Description: INTERVENTIONS:  -  Assess patient's ability to carry out ADLs; assess patient's baseline for ADL function and identify physical deficits which impact ability to perform ADLs (bathing, care of mouth/teeth, toileting, grooming, dressing, etc )  - Assess/evaluate cause of self-care deficits   - Assess range of motion  - Assess patient's mobility; develop plan if impaired  - Assess patient's need for assistive devices and provide as appropriate  - Encourage maximum independence but intervene and supervise when necessary  - Involve family in performance of ADLs  - Assess for home care needs following discharge   - Consider OT consult to assist with ADL evaluation and planning for discharge  - Provide patient education as appropriate  Outcome: Progressing  Goal: Maintain or return mobility status to optimal level  Description: INTERVENTIONS:  - Assess patient's baseline mobility status (ambulation, transfers, stairs, etc )    - Identify cognitive and physical deficits and behaviors that affect mobility  - Identify mobility aids required to assist with transfers and/or ambulation (gait belt, sit-to-stand, lift, walker, cane, etc )  - [place] fall precautions as indicated by assessment  - Record patient progress and toleration of activity level on Mobility SBAR; progress patient to next Phase/Stage  - Instruct patient to call for assistance with activity based on assessment  - Consider rehabilitation consult to assist with strengthening/weightbearing, etc   Outcome: Progressing     Problem: DISCHARGE PLANNING  Goal: Discharge to home or other facility with appropriate resources  Description: INTERVENTIONS:  - Identify barriers to discharge w/patient and caregiver  - Arrange for needed discharge resources and transportation as appropriate  - Identify discharge learning needs (meds, wound care, etc )  - Refer to Case Management Department for coordinating discharge planning if the patient needs post-hospital services based on physician/advanced practitioner order or complex needs related to functional status, cognitive ability, or social support system  Outcome: Progressing     Problem: Knowledge Deficit  Goal: Patient/family/caregiver demonstrates understanding of disease process, treatment plan, medications, and discharge instructions  Description: Complete learning assessment and assess knowledge base  Interventions:  - Provide teaching at level of understanding  - Provide teaching via preferred learning methods  Outcome: Progressing     Problem: Nutrition/Hydration-ADULT  Goal: Nutrient/Hydration intake appropriate for improving, restoring or maintaining nutritional needs  Description: Monitor and assess patient's nutrition/hydration status for malnutrition  Collaborate with interdisciplinary team and initiate plan and interventions as ordered  Monitor patient's weight and dietary intake as ordered or per policy  Utilize nutrition screening tool and intervene as necessary  Determine patient's food preferences and provide high-protein, high-caloric foods as appropriate       INTERVENTIONS:  - Monitor oral intake, urinary output, labs, and treatment plans  - Assess nutrition and hydration status and recommend course of action  - Evaluate amount of meals eaten  - Assist patient with eating if necessary   - Allow adequate time for meals  - Recommend/ encourage appropriate diets, oral nutritional supplements, and vitamin/mineral supplements  - Order, calculate, and assess calorie counts as needed  - Assess need for intravenous fluids  - Provide specific nutrition/hydration education as appropriate  - Include patient/family/caregiver in decisions related to nutrition  Outcome: Progressing

## 2021-02-02 NOTE — OCCUPATIONAL THERAPY NOTE
Occupational Therapy Evaluation      Laron Ji    2/2/2021    Patient Active Problem List   Diagnosis    Paroxysmal atrial fibrillation (HCC)    Bilateral vocal cord paralysis    Stroke ([address]    Blurry vision    Diabetic cataract ([address]    Dysphagia    Dysphonia    Fracture of rib    Glottic stenosis    Granulation tissue of site of tracheostomy    Heart disease    Insomnia    Incomplete emptying of bladder    Nausea    Severe obesity (BMI 35 0-39  9) with comorbidity (HCC)    Shortness of breath    Tracheal stenosis    Type 2 diabetes mellitus with hyperglycemia, with long-term current use of insulin (HCC)    Urinary retention    Wheezing    Acute bronchitis due to other specified organisms    Encephalopathy    Change in bowel habits    Closed fracture of multiple ribs of right side    GERD (gastroesophageal reflux disease)    Hyperlipidemia    Edema    Diarrhea    Cough    Essential hypertension    Routine health maintenance    Current episode of major depressive disorder without prior episode    Medicare annual wellness visit, subsequent    YOLIE (obstructive sleep apnea)    Dermatitis    Stridor    Muscle tension dysphonia    Reflux laryngitis    Glottic insufficiency    History of stroke    Weakness of both lower extremities       Past Medical History:   Diagnosis Date    Abdominal fibromatosis     Diabetes mellitus ([address] )     Hyperlipemia     Hypertension     Pneumonia     Stroke [facility])        Past Surgical History:   Procedure Laterality Date    CATARACT EXTRACTION Bilateral     CATARACT EXTRACTION, BILATERAL      COLONOSCOPY      EGD      LAPAROTOMY      Exploratory; Last Assessed 10/17/2017    PEG TUBE PLACEMENT      PEG TUBE REMOVAL          02/02/21 1130   OT Last Visit   OT Visit Date 02/02/21   Note Type   Note Type Treatment   Restrictions/Precautions   Weight Bearing Precautions Per Order No   Other Precautions Chair Alarm; Bed Alarm; Fall Risk General   Response to Previous Treatment Patient with no complaints from previous session   Pain Assessment   Pain Assessment Tool Pain Assessment not indicated - pt denies pain   Pain Score No Pain   Bed Mobility   Supine to Sit 4  Minimal assistance   Additional items Assist x 1;HOB elevated; Bedrails; Increased time required;Verbal cues   Sit to Supine   (DNT: pt seated OOB in recliner at end of session )   Transfers   Sit to Stand 4  Minimal assistance   Additional items Assist x 1; Increased time required;Verbal cues;Armrests   Stand to Sit 4  Minimal assistance   Additional items Assist x 1; Increased time required;Verbal cues   Functional Mobility   Functional Mobility 4  Minimal assistance   Additional Comments Pt ambulated in hallway with no LOB or SOB  Pt required wheelchair follow 2/2 frequent rest breaks  Additional items Rolling walker   Therapeutic Exercise - ROM   UE-ROM Yes   ROM- Right Upper Extremities   R Shoulder AROM; Flexion; Extension  (Protraction/Retraction )   R Elbow AROM;Elbow flexion;Elbow extension   R Position Seated;Against gravity   R Weight/Reps/Sets 1 set x 15 reps each    ROM - Left Upper Extremities    L Shoulder AROM; Flexion; Extension  (Protraction/Retraction )   L Elbow AROM;Elbow flexion;Elbow extension   L Position Seated;Against gravity   L Weight/Reps/Sets 1 set x 15 reps each    Cognition   Overall Cognitive Status WFL   Arousal/Participation Alert; Responsive; Cooperative   Attention Within functional limits   Orientation Level Oriented X4   Memory Decreased recall of recent events   Following Commands Follows one step commands without difficulty   Comments Pt agreeable to OT session      Activity Tolerance   Activity Tolerance Patient limited by fatigue   Medical Staff Made Aware ARELIS Nugent Parents made aware of outcomes    Assessment   Assessment Patient participated in Skilled OT session this date with interventions consisting of therapeutic exercise to: increase functional use of BUEs, increase BUE muscle strength ,  therapeutic activities to: increase activity tolerance, increase dynamic sit/ stand balance during functional activity , increase postural control, increase trunk control and increase OOB/ sitting tolerance   Patient agreeable to OT treatment session, upon arrival patient was found supine in bed and in no apparent distress  Co treatment with PT completed secondary to complex medical condition of pt,  A of 2 required to achieve and maintain transitional movements, requiring the need of skilled therapeutic intervention of 2 therapists to achieve delivery of services  Patient completed functional mobility in hallway demonstrating good safety awareness with obstacles and completed 1 set x 15 reps of BUE Terri  In comparison to previous session, patient with improvements in standing tolerance and functional mobility  Patient requiring one step directives, frequent rest periods and ocassional safety reminders  Patient continues to be functioning below baseline level, occupational performance remains limited secondary to factors listed above and increased risk for falls and injury  From OT standpoint, recommendation at time of d/c would be Post-Acute Rehabilitation Services  Patient to benefit from continued Occupational Therapy treatment while in the hospital to address deficits as defined above and maximize level of functional independence with ADLs and functional mobility  Plan   Treatment Interventions Functional transfer training;UE strengthening/ROM; Endurance training; Compensatory technique education; Activityengagement   Goal Expiration Date 02/10/21   OT Treatment Day 1   OT Frequency 3-5x/wk   Recommendation   OT Discharge Recommendation Post-Acute Rehabilitation Services   OT - OK to Discharge Yes  (Once medically cleared )   Additional Comments  The patient's raw score on the AM-PAC Daily Activity inpatient short form is 16, standardized score is 35 96, less than 39 4  Patients at this level are likely to benefit from DC to post-acute rehabilitation services  Please refer to the recommendation of the Occupational Therapist for safe DC planning     AM-PAC Daily Activity Inpatient   Lower Body Dressing 2   Bathing 3   Toileting 2   Upper Body Dressing 3   Grooming 3   Eating 3   Daily Activity Raw Score 16   Daily Activity Standardized Score (Calc for Raw Score >=11) 35 96   AM-PAC Applied Cognition Inpatient   Following a Speech/Presentation 3   Understanding Ordinary Conversation 3   Taking Medications 2   Remembering Where Things Are Placed or Put Away 3   Remembering List of 4-5 Errands 2   Taking Care of Complicated Tasks 2   Applied Cognition Raw Score 15   Applied Cognition Standardized Score 33 54     Homar Hassan, OT

## 2021-02-02 NOTE — PLAN OF CARE
Problem: Potential for Falls  Goal: Patient will remain free of falls  Description: INTERVENTIONS:  - Assess patient frequently for physical needs  -  Identify cognitive and physical deficits and behaviors that affect risk of falls    -  [place] fall precautions as indicated by assessment   - Educate patient/family on patient safety including physical limitations  - Instruct patient to call for assistance with activity based on assessment  - Modify environment to reduce risk of injury  - Consider OT/PT consult to assist with strengthening/mobility  Outcome: Progressing     Problem: PAIN - ADULT  Goal: Verbalizes/displays adequate comfort level or baseline comfort level  Description: Interventions:  - Encourage patient to monitor pain and request assistance  - Assess pain using appropriate pain scale  - Administer analgesics based on type and severity of pain and evaluate response  - Implement non-pharmacological measures as appropriate and evaluate response  - Consider cultural and social influences on pain and pain management  - Notify physician/advanced practitioner if interventions unsuccessful or patient reports new pain  Outcome: Progressing     Problem: INFECTION - ADULT  Goal: Absence or prevention of progression during hospitalization  Description: INTERVENTIONS:  - Assess and monitor for signs and symptoms of infection  - Monitor lab/diagnostic results  - Monitor all insertion sites, i e  indwelling lines, tubes, and drains  - Administer medications as ordered  - Instruct and encourage patient and family to use good hand hygiene technique  Outcome: Progressing  Goal: Absence of fever/infection during neutropenic period  Description: INTERVENTIONS:  - Monitor WBC    Outcome: Progressing     Problem: SAFETY ADULT  Goal: Maintain or return to baseline ADL function  Description: INTERVENTIONS:  -  Assess patient's ability to carry out ADLs; assess patient's baseline for ADL function and identify physical deficits which impact ability to perform ADLs (bathing, care of mouth/teeth, toileting, grooming, dressing, etc )  - Assess/evaluate cause of self-care deficits   - Assess range of motion  - Assess patient's mobility; develop plan if impaired  - Assess patient's need for assistive devices and provide as appropriate  - Encourage maximum independence but intervene and supervise when necessary  - Involve family in performance of ADLs  - Assess for home care needs following discharge   - Consider OT consult to assist with ADL evaluation and planning for discharge  - Provide patient education as appropriate  Outcome: Progressing  Goal: Maintain or return mobility status to optimal level  Description: INTERVENTIONS:  - Assess patient's baseline mobility status (ambulation, transfers, stairs, etc )    - Identify cognitive and physical deficits and behaviors that affect mobility  - Identify mobility aids required to assist with transfers and/or ambulation (gait belt, sit-to-stand, lift, walker, cane, etc )  - [place] fall precautions as indicated by assessment  - Record patient progress and toleration of activity level on Mobility SBAR; progress patient to next Phase/Stage  - Instruct patient to call for assistance with activity based on assessment  - Consider rehabilitation consult to assist with strengthening/weightbearing, etc   Outcome: Progressing     Problem: DISCHARGE PLANNING  Goal: Discharge to home or other facility with appropriate resources  Description: INTERVENTIONS:  - Identify barriers to discharge w/patient and caregiver  - Arrange for needed discharge resources and transportation as appropriate  - Identify discharge learning needs (meds, wound care, etc )  - Refer to Case Management Department for coordinating discharge planning if the patient needs post-hospital services based on physician/advanced practitioner order or complex needs related to functional status, cognitive ability, or social support system  Outcome: Progressing     Problem: Knowledge Deficit  Goal: Patient/family/caregiver demonstrates understanding of disease process, treatment plan, medications, and discharge instructions  Description: Complete learning assessment and assess knowledge base  Interventions:  - Provide teaching at level of understanding  - Provide teaching via preferred learning methods  Outcome: Progressing     Problem: Nutrition/Hydration-ADULT  Goal: Nutrient/Hydration intake appropriate for improving, restoring or maintaining nutritional needs  Description: Monitor and assess patient's nutrition/hydration status for malnutrition  Collaborate with interdisciplinary team and initiate plan and interventions as ordered  Monitor patient's weight and dietary intake as ordered or per policy  Utilize nutrition screening tool and intervene as necessary  Determine patient's food preferences and provide high-protein, high-caloric foods as appropriate       INTERVENTIONS:  - Monitor oral intake, urinary output, labs, and treatment plans  - Assess nutrition and hydration status and recommend course of action  - Evaluate amount of meals eaten  - Assist patient with eating if necessary   - Allow adequate time for meals  - Recommend/ encourage appropriate diets, oral nutritional supplements, and vitamin/mineral supplements  - Order, calculate, and assess calorie counts as needed  - Assess need for intravenous fluids  - Provide specific nutrition/hydration education as appropriate  - Include patient/family/caregiver in decisions related to nutrition  Outcome: Progressing

## 2021-02-02 NOTE — CASE MANAGEMENT
Pt has been accepted by [facility] and authorization was received, I made the pt aware, she aske me to Barbara [place] to have him get some clothing for her, I called Nolan at 14:10 to # [phone] and made him aware of the pr's request, he stated he neyda work on this ofr her but he is not able to do it tohortencia ay, pt also asked me to call her son, I called Rosalee Weaveremmanuel a 14:15 to # [phone] and I made him aware of the pt is able to go to [facility] today after the covid test is completed and resulted, he is aware that Lesley Patterson is going to get clothing together for the pt, rn was given the # for report, pt and family are in agreement with the d/c an d/c plan to [facility]

## 2021-02-02 NOTE — PLAN OF CARE
Problem: PHYSICAL THERAPY ADULT  Goal: Performs mobility at highest level of function for planned discharge setting  See evaluation for individualized goals  Description: Treatment/Interventions: Functional transfer training, LE strengthening/ROM, Therapeutic exercise, Bed mobility, Gait training  Equipment Recommended: Nickolas Scripture       See flowsheet documentation for full assessment, interventions and recommendations  Outcome: Progressing  Note: Prognosis: Good  Problem List: Decreased strength, Decreased endurance, Impaired balance, Decreased mobility  Assessment: Pt seen for PT treatment session this date with interventions consisting of gait training w/ emphasis on improving pt's ability to ambulate level surfaces x 80 ft x 2 with min A provided by therapist with RW, Therapeutic exercise consisting of: AROM 15 reps B LE in sitting position and therapeutic activity consisting of training: supine<>sit transfers, sit<>stand transfers and vc and tactile cues for static standing posture faciliation  Pt agreeable to PT treatment session upon arrival, pt found supine in bed w/ HOB elevated, in no apparent distress, A&O x 4 and responsive  In comparison to previous session, pt with improvements in tolerating increased OOB activity including amb trial of 80 ft x 2  Post session: pt returned back to recliner, chair alarm engaged, all needs in reach and RN notified of session findings/recommendations  Continue to recommend STR at time of d/c in order to maximize pt's functional independence and safety w/ mobility  Pt continues to be functioning below baseline level, and remains limited 2* factors listed above  PT will continue to see pt while here in order to address the deficits listed above and provide interventions consistent w/ POC in effort to achieve STGs    Barriers to Discharge: Decreased caregiver support     PT Discharge Recommendation: Post-Acute Rehabilitation Services     PT - OK to Discharge: Yes(when medically cleared, if to STR)    See flowsheet documentation for full assessment

## 2021-02-02 NOTE — PHYSICAL THERAPY NOTE
Physical Therapy Treatment Note       02/02/21 1129   PT Last Visit   PT Visit Date 02/02/21   Note Type   Note Type Treatment   Pain Assessment   Pain Assessment Tool Pain Assessment not indicated - pt denies pain   Pain Score No Pain   Restrictions/Precautions   Weight Bearing Precautions Per Order No   Other Precautions Chair Alarm; Bed Alarm; Fall Risk;Aspiration   General   Chart Reviewed Yes   Response to Previous Treatment Patient with no complaints from previous session  Family/Caregiver Present No   Cognition   Overall Cognitive Status WFL   Arousal/Participation Alert; Responsive; Cooperative   Attention Within functional limits   Orientation Level Oriented X4   Memory Decreased recall of recent events   Following Commands Follows one step commands without difficulty   Comments pt agreeable to PT session   Subjective   Subjective "I can try walking"   Bed Mobility   Supine to Sit 4  Minimal assistance   Additional items Assist x 1;HOB elevated; Bedrails; Increased time required;Verbal cues   Transfers   Sit to Stand 4  Minimal assistance   Additional items Assist x 1; Increased time required;Verbal cues   Stand to Sit 4  Minimal assistance   Additional items Assist x 1;Verbal cues;Armrests   Ambulation/Elevation   Gait pattern Improper Weight shift;Decreased foot clearance;Shuffling; Step to;Excessively slow;R Foot drag   Gait Assistance 4  Minimal assist   Additional items Assist x 1;Verbal cues; Tactile cues  (+ close chair follow)   Assistive Device Rolling walker   Distance 80 ft x 2 c seated rest period btwn   Stair Management Assistance Not tested   Balance   Static Sitting Fair +   Dynamic Sitting Fair   Static Standing Fair -   Dynamic Standing Poor +   Ambulatory Poor +   Endurance Deficit   Endurance Deficit Yes   Activity Tolerance   Activity Tolerance Patient limited by fatigue   Nurse Made Aware Yes, RN made aware of outcomes/recs   Exercises   Hip Abduction Sitting;15 reps;AROM; Bilateral   Hip Adduction Sitting;15 reps;AROM; Bilateral   Knee AROM Long Arc Quad Sitting;15 reps;AROM; Bilateral   Marching Sitting;15 reps;AROM; Bilateral   Assessment   Prognosis Good   Problem List Decreased strength;Decreased endurance; Impaired balance;Decreased mobility   Assessment Pt seen for PT treatment session this date with interventions consisting of gait training w/ emphasis on improving pt's ability to ambulate level surfaces x 80 ft x 2 with min A provided by therapist with RW, Therapeutic exercise consisting of: AROM 15 reps B LE in sitting position and therapeutic activity consisting of training: supine<>sit transfers, sit<>stand transfers and vc and tactile cues for static standing posture faciliation  Pt agreeable to PT treatment session upon arrival, pt found supine in bed w/ HOB elevated, in no apparent distress, A&O x 4 and responsive  In comparison to previous session, pt with improvements in tolerating increased OOB activity including amb trial of 80 ft x 2  Post session: pt returned back to recliner, chair alarm engaged, all needs in reach and RN notified of session findings/recommendations  Continue to recommend STR at time of d/c in order to maximize pt's functional independence and safety w/ mobility  Pt continues to be functioning below baseline level, and remains limited 2* factors listed above  PT will continue to see pt while here in order to address the deficits listed above and provide interventions consistent w/ POC in effort to achieve STGs  Barriers to Discharge Decreased caregiver support   Goals   Patient Goals "to get stronger"   LTG Expiration Date 02/10/21   Long Term Goal #1 goals remain appropriate   PT Treatment Day 1   Plan   Treatment/Interventions Functional transfer training;LE strengthening/ROM; Therapeutic exercise; Endurance training;Patient/family training;Equipment eval/education; Bed mobility;Gait training;Spoke to nursing;Spoke to case management   Progress Slow progress, decreased activity tolerance   PT Frequency   (3-5x/wk)   Recommendation   PT Discharge Recommendation Post-Acute Rehabilitation Services   Equipment Recommended   (continue RW use)   PT - OK to Discharge Yes  (when medically cleared, if to STR)   Additional Comments Pt's raw score on the AM-PAC Basic Mobility inpatient short form is 15, standardized score is 36 97  Patients at this level are likely to benefit from DC to SNF/IRF, however, please refer to therapist recommendation for safe DC planning     AM-PAC Basic Mobility Inpatient   Turning in Bed Without Bedrails 3   Lying on Back to Sitting on Edge of Flat Bed 3   Moving Bed to Chair 3   Standing Up From Chair 3   Walk in Room 2   Climb 3-5 Stairs 1   Basic Mobility Inpatient Raw Score 15   Basic Mobility Standardized Score 36 97       Darryle Perkins, PT    Time of PT treatment session: 4971-7014

## 2021-02-02 NOTE — ASSESSMENT & PLAN NOTE
Lab Results   Component Value Date    HGBA1C 11 9 (H) 11/28/2020       Recent Labs     02/01/21  1527 02/01/21 2010 02/02/21  0633 02/02/21  1119   POCGLU 288* 310* 202* 278*       Blood Sugar Average: Last 72 hrs:  (P) 437 [id number]   · Hyperglycemia noted in the setting of having diabetes mellitus type 2  · DC to the acute rehab unit on the current insulin regimen

## 2021-02-02 NOTE — PLAN OF CARE
Problem: OCCUPATIONAL THERAPY ADULT  Goal: Performs self-care activities at highest level of function for planned discharge setting  See evaluation for individualized goals  Description: Treatment Interventions: ADL retraining, Functional transfer training, UE strengthening/ROM, Endurance training, Patient/family training, Compensatory technique education, Activityengagement          See flowsheet documentation for full assessment, interventions and recommendations  Outcome: Progressing  Note: Limitation: Decreased ADL status, Decreased UE strength, Decreased Safe judgement during ADL, Decreased endurance, Decreased high-level ADLs, Decreased self-care trans  Prognosis: Good  Assessment: Patient participated in Skilled OT session this date with interventions consisting of therapeutic exercise to: increase functional use of BUEs, increase BUE muscle strength ,  therapeutic activities to: increase activity tolerance, increase dynamic sit/ stand balance during functional activity , increase postural control, increase trunk control and increase OOB/ sitting tolerance   Patient agreeable to OT treatment session, upon arrival patient was found supine in bed and in no apparent distress  Co treatment with PT completed secondary to complex medical condition of pt,  A of 2 required to achieve and maintain transitional movements, requiring the need of skilled therapeutic intervention of 2 therapists to achieve delivery of services  Patient completed functional mobility in hallway demonstrating good safety awareness with obstacles and completed 1 set x 15 reps of BUE Terri  In comparison to previous session, patient with improvements in standing tolerance and functional mobility  Patient requiring one step directives, frequent rest periods and ocassional safety reminders   Patient continues to be functioning below baseline level, occupational performance remains limited secondary to factors listed above and increased risk for falls and injury  From OT standpoint, recommendation at time of d/c would be Post-Acute Rehabilitation Services  Patient to benefit from continued Occupational Therapy treatment while in the hospital to address deficits as defined above and maximize level of functional independence with ADLs and functional mobility        OT Discharge Recommendation: Post-Acute Rehabilitation Services  OT - OK to Discharge: Yes(Once medically cleared )     Cecil Dia, OT

## 2021-02-03 PROBLEM — R19.4 CHANGE IN BOWEL HABITS: Status: RESOLVED | Noted: 2018-12-28 | Resolved: 2021-02-03

## 2021-02-03 PROBLEM — R06.1 STRIDOR: Status: RESOLVED | Noted: 2020-12-03 | Resolved: 2021-02-03

## 2021-02-03 PROBLEM — E78.5 HYPERLIPIDEMIA: Chronic | Status: ACTIVE | Noted: 2019-03-15

## 2021-02-03 PROBLEM — J39.8 TRACHEAL STENOSIS: Chronic | Status: ACTIVE | Noted: 2018-05-10

## 2021-02-03 PROBLEM — Z79.4 TYPE 2 DIABETES MELLITUS WITH HYPERGLYCEMIA, WITH LONG-TERM CURRENT USE OF INSULIN (HCC): Chronic | Status: ACTIVE | Noted: 2017-10-17

## 2021-02-03 PROBLEM — K21.9 GERD (GASTROESOPHAGEAL REFLUX DISEASE): Chronic | Status: ACTIVE | Noted: 2019-03-15

## 2021-02-03 PROBLEM — I10 ESSENTIAL HYPERTENSION: Chronic | Status: ACTIVE | Noted: 2019-04-17

## 2021-02-03 PROBLEM — I48.0 PAROXYSMAL ATRIAL FIBRILLATION (HCC): Chronic | Status: ACTIVE | Noted: 2017-10-17

## 2021-02-03 PROBLEM — S22.41XA CLOSED FRACTURE OF MULTIPLE RIBS OF RIGHT SIDE: Status: RESOLVED | Noted: 2017-08-13 | Resolved: 2021-02-03

## 2021-02-03 PROBLEM — S22.39XA FRACTURE OF RIB: Status: RESOLVED | Noted: 2017-10-17 | Resolved: 2021-02-03

## 2021-02-03 PROBLEM — R06.2 WHEEZING: Status: RESOLVED | Noted: 2018-01-10 | Resolved: 2021-02-03

## 2021-02-03 PROBLEM — R05.9 COUGH: Status: RESOLVED | Noted: 2019-03-15 | Resolved: 2021-02-03

## 2021-02-03 PROBLEM — G93.40 ENCEPHALOPATHY: Status: RESOLVED | Noted: 2019-03-15 | Resolved: 2021-02-03

## 2021-02-03 PROBLEM — E11.65 TYPE 2 DIABETES MELLITUS WITH HYPERGLYCEMIA, WITH LONG-TERM CURRENT USE OF INSULIN (HCC): Chronic | Status: ACTIVE | Noted: 2017-10-17

## 2021-02-03 PROBLEM — R06.02 SHORTNESS OF BREATH: Status: RESOLVED | Noted: 2018-05-10 | Resolved: 2021-02-03

## 2021-02-03 PROBLEM — R33.9 URINARY RETENTION: Status: RESOLVED | Noted: 2017-10-17 | Resolved: 2021-02-03

## 2021-02-03 LAB
ANION GAP SERPL CALCULATED.3IONS-SCNC: 8 MMOL/L (ref 4–13)
BASOPHILS # BLD AUTO: 0.1 THOUSANDS/ΜL (ref 0–0.1)
BASOPHILS NFR BLD AUTO: 1 % (ref 0–2)
BUN SERPL-MCNC: 29 MG/DL (ref 7–25)
CALCIUM SERPL-MCNC: 9 MG/DL (ref 8.6–10.5)
CHLORIDE SERPL-SCNC: 100 MMOL/L (ref 98–107)
CO2 SERPL-SCNC: 29 MMOL/L (ref 21–31)
CREAT SERPL-MCNC: 1.06 MG/DL (ref 0.6–1.2)
EOSINOPHIL # BLD AUTO: 0.2 THOUSAND/ΜL (ref 0–0.61)
EOSINOPHIL NFR BLD AUTO: 3 % (ref 0–5)
ERYTHROCYTE [DISTWIDTH] IN BLOOD BY AUTOMATED COUNT: 12.6 % (ref 11.5–14.5)
GFR SERPL CREATININE-BSD FRML MDRD: 56 ML/MIN/1.73SQ M
GLUCOSE P FAST SERPL-MCNC: 180 MG/DL (ref 65–99)
GLUCOSE SERPL-MCNC: 177 MG/DL (ref 65–140)
GLUCOSE SERPL-MCNC: 180 MG/DL (ref 65–99)
GLUCOSE SERPL-MCNC: 229 MG/DL (ref 65–140)
GLUCOSE SERPL-MCNC: 250 MG/DL (ref 65–140)
GLUCOSE SERPL-MCNC: 271 MG/DL (ref 65–140)
HCT VFR BLD AUTO: 39 % (ref 42–47)
HGB BLD-MCNC: 12.8 G/DL (ref 12–16)
LYMPHOCYTES # BLD AUTO: 2.7 THOUSANDS/ΜL (ref 0.6–4.47)
LYMPHOCYTES NFR BLD AUTO: 32 % (ref 21–51)
MCH RBC QN AUTO: 30.1 PG (ref 26–34)
MCHC RBC AUTO-ENTMCNC: 32.9 G/DL (ref 31–37)
MCV RBC AUTO: 92 FL (ref 81–99)
MONOCYTES # BLD AUTO: 0.6 THOUSAND/ΜL (ref 0.17–1.22)
MONOCYTES NFR BLD AUTO: 8 % (ref 2–12)
NEUTROPHILS # BLD AUTO: 4.8 THOUSANDS/ΜL (ref 1.4–6.5)
NEUTS SEG NFR BLD AUTO: 57 % (ref 42–75)
PLATELET # BLD AUTO: 274 THOUSANDS/UL (ref 149–390)
PMV BLD AUTO: 9.2 FL (ref 8.6–11.7)
POTASSIUM SERPL-SCNC: 3.8 MMOL/L (ref 3.5–5.5)
RBC # BLD AUTO: 4.26 MILLION/UL (ref 3.9–5.2)
SODIUM SERPL-SCNC: 137 MMOL/L (ref 134–143)
WBC # BLD AUTO: 8.4 THOUSAND/UL (ref 4.8–10.8)

## 2021-02-03 PROCEDURE — 97116 GAIT TRAINING THERAPY: CPT

## 2021-02-03 PROCEDURE — 97162 PT EVAL MOD COMPLEX 30 MIN: CPT

## 2021-02-03 PROCEDURE — 82948 REAGENT STRIP/BLOOD GLUCOSE: CPT

## 2021-02-03 PROCEDURE — 97530 THERAPEUTIC ACTIVITIES: CPT

## 2021-02-03 PROCEDURE — 97535 SELF CARE MNGMENT TRAINING: CPT

## 2021-02-03 PROCEDURE — 92610 EVALUATE SWALLOWING FUNCTION: CPT

## 2021-02-03 PROCEDURE — 99254 IP/OBS CNSLTJ NEW/EST MOD 60: CPT | Performed by: PHYSICIAN ASSISTANT

## 2021-02-03 PROCEDURE — 85025 COMPLETE CBC W/AUTO DIFF WBC: CPT | Performed by: FAMILY MEDICINE

## 2021-02-03 PROCEDURE — 92523 SPEECH SOUND LANG COMPREHEN: CPT

## 2021-02-03 PROCEDURE — 99231 SBSQ HOSP IP/OBS SF/LOW 25: CPT | Performed by: FAMILY MEDICINE

## 2021-02-03 PROCEDURE — 97542 WHEELCHAIR MNGMENT TRAINING: CPT

## 2021-02-03 PROCEDURE — 97129 THER IVNTJ 1ST 15 MIN: CPT

## 2021-02-03 PROCEDURE — 97166 OT EVAL MOD COMPLEX 45 MIN: CPT

## 2021-02-03 PROCEDURE — 80048 BASIC METABOLIC PNL TOTAL CA: CPT | Performed by: FAMILY MEDICINE

## 2021-02-03 PROCEDURE — 97110 THERAPEUTIC EXERCISES: CPT

## 2021-02-03 RX ORDER — INSULIN GLARGINE 100 [IU]/ML
35 INJECTION, SOLUTION SUBCUTANEOUS
Status: DISCONTINUED | OUTPATIENT
Start: 2021-02-03 | End: 2021-02-10

## 2021-02-03 RX ORDER — INSULIN GLARGINE 100 [IU]/ML
30 INJECTION, SOLUTION SUBCUTANEOUS
Status: DISCONTINUED | OUTPATIENT
Start: 2021-02-03 | End: 2021-02-03

## 2021-02-03 RX ADMIN — INSULIN LISPRO 2 UNITS: 100 INJECTION, SOLUTION INTRAVENOUS; SUBCUTANEOUS at 12:23

## 2021-02-03 RX ADMIN — METOPROLOL TARTRATE 50 MG: 50 TABLET, FILM COATED ORAL at 21:28

## 2021-02-03 RX ADMIN — RIVAROXABAN 20 MG: 10 TABLET, FILM COATED ORAL at 07:52

## 2021-02-03 RX ADMIN — INSULIN LISPRO 8 UNITS: 100 INJECTION, SOLUTION INTRAVENOUS; SUBCUTANEOUS at 12:24

## 2021-02-03 RX ADMIN — ESCITALOPRAM 5 MG: 5 TABLET, FILM COATED ORAL at 08:22

## 2021-02-03 RX ADMIN — INSULIN LISPRO 8 UNITS: 100 INJECTION, SOLUTION INTRAVENOUS; SUBCUTANEOUS at 08:19

## 2021-02-03 RX ADMIN — INSULIN LISPRO 4 UNITS: 100 INJECTION, SOLUTION INTRAVENOUS; SUBCUTANEOUS at 16:48

## 2021-02-03 RX ADMIN — INSULIN LISPRO 8 UNITS: 100 INJECTION, SOLUTION INTRAVENOUS; SUBCUTANEOUS at 16:49

## 2021-02-03 RX ADMIN — INSULIN GLARGINE 35 UNITS: 100 INJECTION, SOLUTION SUBCUTANEOUS at 22:03

## 2021-02-03 RX ADMIN — INSULIN LISPRO 3 UNITS: 100 INJECTION, SOLUTION INTRAVENOUS; SUBCUTANEOUS at 21:30

## 2021-02-03 RX ADMIN — ATORVASTATIN CALCIUM 80 MG: 80 TABLET, FILM COATED ORAL at 16:54

## 2021-02-03 RX ADMIN — HYDROCHLOROTHIAZIDE: 12.5 TABLET ORAL at 08:20

## 2021-02-03 RX ADMIN — INSULIN LISPRO 1 UNITS: 100 INJECTION, SOLUTION INTRAVENOUS; SUBCUTANEOUS at 08:18

## 2021-02-03 RX ADMIN — PANTOPRAZOLE SODIUM 40 MG: 40 TABLET, DELAYED RELEASE ORAL at 08:20

## 2021-02-03 RX ADMIN — FAMOTIDINE 20 MG: 20 TABLET ORAL at 08:20

## 2021-02-03 RX ADMIN — METOPROLOL TARTRATE 50 MG: 50 TABLET, FILM COATED ORAL at 08:20

## 2021-02-03 NOTE — TEAM CONFERENCE
Acute RehabilitationTeam Conference Note  Date: 2/3/2021   Time: 11:07 AM       Patient Name:  Kalpana Nicholson       Medical Record Number: [id number]   YOB: 1955  Sex: Female          Room/Bed:  [facility] 216/[facility] 216-01  Payor Info:  Payor: [address] REP / Plan: Rip Hand / Product Type: Medicare HMO /      Admitting Diagnosis: CVA (cerebrovascular accident) ([facility] [address] ) [I63 [id number]]   Admit Date/Time:  2/2/2021  3:39 PM  Admission Comments: No comment available     Primary Diagnosis:  Stroke [facility])  Principal Problem: Stroke [facility])    Patient Active Problem List    Diagnosis Date Noted    Weakness of both lower extremities     History of stroke 01/30/2021    Muscle tension dysphonia 12/03/2020    Reflux laryngitis 12/03/2020    Glottic insufficiency 12/03/2020    Dermatitis 11/11/2020    YOLIE (obstructive sleep apnea)     Medicare annual wellness visit, subsequent 08/21/2020    Current episode of major depressive disorder without prior episode 06/26/2019    Essential hypertension 04/17/2019    Routine health maintenance 04/17/2019    GERD (gastroesophageal reflux disease) 03/15/2019    Hyperlipidemia 03/15/2019    Edema 03/15/2019    Diarrhea 03/15/2019    Acute bronchitis due to other specified organisms 10/25/2018    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    Incomplete emptying of bladder 05/07/2018    Bilateral vocal cord paralysis 05/04/2018    Dysphonia 05/04/2018    Glottic stenosis 05/04/2018    Dysphagia 04/19/2018    Paroxysmal atrial fibrillation ([address] ) 10/17/2017    Blurry vision 10/17/2017    Granulation tissue of site of tracheostomy 10/17/2017    Insomnia 10/17/2017    Nausea 10/17/2017    Type 2 diabetes mellitus with hyperglycemia, with long-term current use of insulin ([address] ) 10/17/2017    Stroke ([address] ) 01/01/2017    Heart disease 02/23/2015    Diabetic cataract ([address] ) 12/18/2014    Severe obesity (BMI 35 0-39  9) with comorbidity ([facility] [address] ) 12/18/2014       Physical Therapy:    Transfers: Incidental Touching, Minimal Assistance  Bed Mobility: Supervision, Minimal Assistance  Amulation Distance (ft): 20 feet  Ambulation: Minimal Assistance, Moderate Assistance  Assistive Device for Ambulation: Roller Walker  Wheelchair Mobility Distance: 182 ft  Wheelchair Mobility: Minimal Assistance, Moderate Assistance  Discharge Recommendations: Home with:  [address] with[de-identified] Family Support, First Floor Setup, Home Physical Therapy    02/03/2021:   Patient seen for IE  Presents, following CVA, with decreased ROM/strength, decreased balance and safety, decreased endurance, decreased cognition; all affecting functional mobility  Patient S/MIN A bed mobility, CG/MIN A all transfers with walker, MIN-MOD A gait training up to 20' level and unlevel surfaces with walker, MOD A wheelchair mobility level and unlevel surfaces  May benefit from continued inpatient ARC PT to increase function, safety, and increased independence in prep for safe d/c  Occupational Therapy:  Eating: Supervision  Grooming: Supervision  Bathing: Minimal Assistance  Bathing: Minimal Assistance  Upper Body Dressing: Supervision  Lower Body Dressing: Maximum Assistance  Toileting: Moderate Assistance  Tub/Shower Transfer: (TBA)  Toilet Transfer: Minimal Assistance  Cognition: Within Defined Limits  Orientation: Person, Place, Time, Situation  Discharge Recommendations: Home with:  [address] with[de-identified] Family Support, First Floor Setup, Home Occupational Therapy       2/3/2021: Pt's LOF listed above following evaluation and initial ADL  Barriers include decrease balance, strength, endurance, ROM and difficulty expressing needs causing decrease in independence with self-care  Pt would benefit from continued skilled OT services to increase independence with self-care/daily tasks, functional mobility/transfers and activity tolerance       Speech Therapy:           SLP orders received- assessment to be completed 2/3/2021  Results pending  Nursing Notes:  Appetite: Good  Diet Type: Diabetic                      Diet Patient/Family Education Complete: Yes                         Type of Wound Patient/Family Education: Yes  Bladder: 5 - Supervision     Bladder Patient/Family Education: Yes  Bowel: 5 - Supervision     Bowel Patient/Family Education: Yes     Pain Score: 0                          Pain Patient/Family Education: Yes  Medication Management/Safety  Medication Patient/Family Education Complete: Yes    No notes on file    Case Management:     Discharge Planning  Living Arrangements: Alone  Support Systems: Friends/neighbors  Assistance Needed: shopping, transportation  Type of Current Residence: Private residence(apartment)  Current Home Care Services: No  No notes on file    Is the patient actively participating in therapies? yes  List any modifications to the treatment plan: na    Barriers Interventions   Decreased right ROM and strength Therapeutic exercise, therapeutic activity   Aspiration precautions ST strategies, dysphagia 3 diet   Decreased cog, safety Cues, ST strategies, ADL/transfer/gait training   Decreased balance ADL, transfer, gait training   Fall risk Grasshopper placed     Is the patient making expected progress toward goals?  yes  List any update or changes to goals: na    Medical Goals: Patient will be medically stable for discharge to [facility] upon completion of rehab program    Weekly Team Goals:   Rehab Team Goals  ADL Team Goal: Patient will return to premorbid level for ADLs upon completion of rehab program     S bed mobility  Cg transfers, mobility   CG self care  ST to evaluate further    Health and wellness: to be able to return to light homemaking tasks     Discussion: Plan for return home alone with family support with Maicol Cornejo for PT, OT, ST, nursing, and aides    Anticipated Discharge Date:  Feb 16, 2021

## 2021-02-03 NOTE — CONSULTS
Consult- Wenceslao Martinez 1955, 63 y o  female MRN: [id number]    Unit/Bed#: -01 Encounter: [id number]    Primary Care Provider: Jaylan Olivire MD   Date and time admitted to hospital: 2/2/2021  3:39 PM      Inpatient consult to Internal Medicine  Consult performed by: Francy Carter PA-C  Consult ordered by: Guillermo Reyez MD          Type 2 diabetes mellitus with hyperglycemia, with long-term current use of insulin [facility])  Assessment & Plan  Lab Results   Component Value Date    HGBA1C 11 9 (H) 11/28/2020       Recent Labs     02/02/21  0633 02/02/21  1119 02/02/21  1641 02/02/21 2028   POCGLU 202* 278* 327* 314*       Blood Sugar Average: Last 72 hrs:  (P) 320 5   · Uncontrolled diabetes with A1c of 11 9  · Continue insulin w/ home dose of Lantus at 35 units and add 10 units TID w/ Meals and continue acu check coverage  · Adjust insulin as required    Paroxysmal atrial fibrillation (HCC)  Assessment & Plan  · Continue rate control and anticoagulation    Weakness of both lower extremities  Assessment & Plan  · Continue PT and OT per acute rehab    Essential hypertension  Assessment & Plan  · Continue statin lisinopril/HCTZ and metoprolol  · Monitor blood pressure    Hyperlipidemia  Assessment & Plan  · Continue statin    GERD (gastroesophageal reflux disease)  Assessment & Plan  · Continue PPI    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  · Follow-up ENT as an outpatient for pending surgery procedure to correct narrow airway secondary to prolonged intubation    * Stroke [facility])  Assessment & Plan  · Continue statin and Xarelto  · Continue PT OT eval for DC needs        Recommendations for Discharge:  · Per Primary Service      History of Present Illness:  Wenceslao Martinez is a 63 y o  female who was originally evaluated to [facility] secondary to weakness and altered mental status  She was on antibiotics for UTI and completed abx    She was evaluated by Neurology for weakness and r/o stroke  She was felt to have recent infarct in the left centrum semiovale and placed on high dose statin  Patient was evaluated by physical and occupation therapy and recommended for acute rehab  She is to follow up with Neurology as OP  We are consulted for Diabetes Mellitus and Paroxysmal atrial fibrillation  Patient has uncontrolled DM on insulin w/ A1C of 11 9 8 units were added to meals and patient is continued on rate control and anticoagulation  Review of Systems:  Review of Systems   HENT: Positive for voice change  Neurological: Positive for weakness  All other systems reviewed and are negative  Past Medical and Surgical History:   Past Medical History:   Diagnosis Date    Abdominal fibromatosis     Diabetes mellitus ([facility] [address] )     Hyperlipemia     Hypertension     Pneumonia     Stroke [facility])        Past Surgical History:   Procedure Laterality Date    CATARACT EXTRACTION Bilateral     CATARACT EXTRACTION, BILATERAL      COLONOSCOPY      EGD      LAPAROTOMY      Exploratory; Last Assessed 10/17/2017    PEG TUBE PLACEMENT      PEG TUBE REMOVAL         Meds/Allergies:  all medications and allergies reviewed    Allergies:    Allergies   Allergen Reactions    Apixaban Vomiting and GI Intolerance     Other reaction(s): Vomiting    Trulicity [Dulaglutide] Vomiting     Nausea vomiting       Social History:     Marital Status:     Substance Use History:   Social History     Substance and Sexual Activity   Alcohol Use Not Currently    Frequency: 2-4 times a month    Comment: Socially     Social History     Tobacco Use   Smoking Status Never Smoker   Smokeless Tobacco Never Used     Social History     Substance and Sexual Activity   Drug Use No       Family History:  I have reviewed the patients family history    Physical Exam:   Vitals:   Blood Pressure: 137/65 (02/03/21 1934)  Pulse: 84 (02/03/21 1934)  Temperature: (!) 97 1 °F (36 2 °C) (02/03/21 1934)  Temp Source: Temporal (02/03/21 1934)  Respirations: 18 (02/03/21 1934)  Height: 5' 5" (165 1 cm) (02/02/21 1601)  Weight - Scale: 97 8 kg (215 lb 9 8 oz) (02/02/21 1601)  SpO2: 96 % (02/03/21 1934)    Physical Exam  Vitals signs reviewed  Constitutional:       Appearance: Normal appearance  Comments: Sleeping, easily arousable   HENT:      Head: Normocephalic and atraumatic  Nose: Nose normal       Mouth/Throat:      Comments: Chronic hoarse voice  Eyes:      General:         Right eye: No discharge  Left eye: No discharge  Extraocular Movements: Extraocular movements intact  Conjunctiva/sclera: Conjunctivae normal    Neck:      Musculoskeletal: Normal range of motion  Cardiovascular:      Rate and Rhythm: Normal rate and regular rhythm  Pulmonary:      Effort: Pulmonary effort is normal  No respiratory distress  Breath sounds: Normal breath sounds  No wheezing  Abdominal:      General: Bowel sounds are normal  There is no distension  Palpations: Abdomen is soft  Tenderness: There is no abdominal tenderness  There is no guarding  Comments: obese   Musculoskeletal: Normal range of motion  General: No swelling or tenderness  Right lower leg: No edema  Left lower leg: No edema  Skin:     General: Skin is warm and dry  Capillary Refill: Capillary refill takes less than 2 seconds  Coloration: Skin is pale  Neurological:      Mental Status: Mental status is at baseline  Psychiatric:         Mood and Affect: Mood normal          Behavior: Behavior normal          Thought Content: Thought content normal          Judgment: Judgment normal          Additional Data:   Lab Results: I have personally reviewed pertinent reports        Results from last 7 days   Lab Units 02/03/21  0545   WBC Thousand/uL 8 40   HEMOGLOBIN g/dL 12 8   HEMATOCRIT % 39 0*   PLATELETS Thousands/uL 274   NEUTROS PCT % 57   LYMPHS PCT % 32   MONOS PCT % 8   EOS PCT % 3 Results from last 7 days   Lab Units 02/03/21  0545 01/31/21  0513   SODIUM mmol/L 137 141   POTASSIUM mmol/L 3 8 3 8   CHLORIDE mmol/L 100 104   CO2 mmol/L 29 27   BUN mg/dL 29* 28*   CREATININE mg/dL 1 06 0 90   CALCIUM mg/dL 9 0 9 2   ALK PHOS U/L  --  48*   ALT U/L  --  12   AST U/L  --  11*         Results from last 7 days   Lab Units 01/30/21  0917   TROPONIN I ng/mL <0 03     Lab Results   Component Value Date/Time    HGBA1C 11 9 (H) 11/28/2020 08:04 AM    HGBA1C 12 5 (A) 11/27/2020 10:21 AM    HGBA1C 12 0 (H) 06/05/2020 09:17 AM    HGBA1C 10 6 (A) 10/23/2019 02:34 PM    HGBA1C 8 3 (A) 07/17/2019 10:21 AM    HGBA1C 10 5 09/18/2018 12:55 PM    HGBA1C 8 8 11/22/2017 03:18 PM     Results from last 7 days   Lab Units 02/03/21  2027 02/03/21  1606 02/03/21  1152 02/03/21  0801 02/02/21  2028 02/02/21  1641 02/02/21  1119 02/02/21  0633 02/01/21 2010 02/01/21  1527 02/01/21  1106 02/01/21  0823   POC GLUCOSE mg/dl 250* 271* 229* 177* 314* 327* 278* 202* 310* 288* 300* 263*       ** Please Note: This note has been constructed using a voice recognition system   **

## 2021-02-03 NOTE — ASSESSMENT & PLAN NOTE
Lab Results   Component Value Date    HGBA1C 11 9 (H) 11/28/2020       Recent Labs     02/02/21  0633 02/02/21  1119 02/02/21  1641 02/02/21 2028   POCGLU 202* 278* 327* 314*       Blood Sugar Average: Last 72 hrs:  (P) 320 5   · Uncontrolled diabetes with A1c of 11 9  · Continue insulin w/ home dose of Lantus at 35 units and add 10 units TID w/ Meals and continue acu check coverage  · Adjust insulin as required

## 2021-02-03 NOTE — PROGRESS NOTES
02/03/21 0641   Patient Data   Rehab Impairment CVA with R body involvement   Support System   Name Kai   Relationship son  (does not live with her)   Home Setup   Type of Home Apartment   Method of Entry   (level entry)   Number of Stairs in Home   (elevator)   Prior Level of Function   Indoor-Mobility (Ambulation) 3  Independent - Patient completed the activities by him/herself, with or without an assistive device, with no assistance from a helper  Stairs 9  Not Applicable   Prior Device Used Z  None of the above   Restrictions/Precautions   Precautions Fall Risk   Pain Assessment   Pain Assessment Tool 0-10   Pain Score No Pain   Transfer Bed/Chair/Wheelchair   Positioning Concerns Hemiplegia   Limitations Noted In Balance; Endurance;LE Strength;UE Strength   Adaptive Equipment Roller Walker   Type of Assistance Needed Physical assistance   Amount of Physical Assistance Provided 25%-49%   Comment MIN A with cues   Chair/Bed-to-Chair Transfer CARE Score 3   Roll Left and Right   Type of Assistance Needed Supervision;Verbal cues   Comment bed rails   Roll Left and Right CARE Score 4   Sit to Lying   Type of Assistance Needed Physical assistance   Amount of Physical Assistance Provided 25%-49%   Comment MIN A   Sit to Lying CARE Score 3   Lying to Sitting on Side of Bed   Type of Assistance Needed Physical assistance   Amount of Physical Assistance Provided 25%-49%   Comment MIN A   Lying to Sitting on Side of Bed CARE Score 3   Sit to Stand   Type of Assistance Needed Physical assistance   Amount of Physical Assistance Provided 25%-49%   Comment cg/min assist   Sit to Stand CARE Score 3   Car Transfer   Reason if not Attempted Safety concerns   Car Transfer CARE Score 88   Walk 10 Feet   Type of Assistance Needed Physical assistance   Amount of Physical Assistance Provided 50%-74%   Comment min-mod A   Walk 10 Feet CARE Score 2   Walk 50 Feet with Two Turns   Reason if not Attempted Safety concerns   Walk 50 Feet with Two Turns CARE Score 88   Walk 150 Feet   Reason if not Attempted Safety concerns   Walk 150 Feet CARE Score 88   Walking 10 Feet on Uneven Surfaces   Type of Assistance Needed Physical assistance   Amount of Physical Assistance Provided 50%-74%   Comment min-mod assist    Walking 10 Feet on Uneven Surfaces CARE Score 2   Wheelchair mobility   Type of Wheelchair Used 1  Manual   Method Right upper extremity; Left upper extremity   Assistance Provided For Locking Brakes;Obstacles;Remove Leg Rest;Replace Leg Rest   Distance Level Surface (feet) 182 ft   Distance Wheeled 3% Grade 12 ft   Findings mod assist   Wheel 50 Feet with Two Turns   Type of Assistance Needed Physical assistance   Amount of Physical Assistance Provided 50%-74%   Comment mod assist   Wheel 50 Feet with Two Turns CARE Score 2   Wheel 150 Feet   Type of Assistance Needed Physical assistance   Amount of Physical Assistance Provided 50%-74%   Comment mod assist   Wheel 150 Feet CARE Score 2   Curb or Single Stair   Reason if not Attempted Activity not applicable   1 Step (Curb) CARE Score 9   4 Steps   Reason if not Attempted Activity not applicable   4 Steps CARE Score 9   12 Steps   Reason if not Attempted Activity not applicable   12 Steps CARE Score 9   Stairs   Findings patient has level entrance and elevator; TBA as able    Comprehension   QI: Comprehension 3  Usually Understands: Understands most conversations, but misses some part/intent of message  Requires cues at times to understand  Comprehension (FIM) 5 - Needs help/cues, repetition only RARELY ( < 10% of the time)   Expression   QI: Expression 3   Exhibits some difficulty with expressing needs and ideas (e g , some words or finishing thoughts) or speech is not clear   Expression (FIM) 4 - Expresses basic info/needs 75-90% of time   Social Interaction   Social Interaction (FIM) 6 - Interacts appropriately with others BUT requires extra  time   Problem Solving   Problem solving (FIM) 4 - Solves basic problems 75-89% of time   Memory   Memory (FIM) 6 - Recognizes with extra time   RLE Assessment   RLE Assessment   (ROM WFL; hip flex ~ 60degrees; PROM WFL)   Strength RLE   R Hip Flexion 3-/5   R Hip Extension 3/5   R Hip ABduction 3-/5   R Hip ADduction 3-/5   R Knee Flexion 3+/5   R Knee Extension 3+/5   R Ankle Dorsiflexion 3/5   R Ankle Plantar Flexion 3/5   LLE Assessment   LLE Assessment   (ROM WFL)   Strength LLE   L Hip Flexion 3+/5   L Hip Extension 3+/5   L Hip ABduction 3-/5   L Hip ADduction 3-/5   L Knee Flexion 4/5   L Knee Extension 4+/5   L Ankle Dorsiflexion 3+/5   L Ankle Plantar Flexion 3+/5   Coordination   Movements are Fluid and Coordinated 1   Sensation   Light Touch No apparent deficits   Propioception No apparent deficits   Cognition   Overall Cognitive Status Impaired   Arousal/Participation Alert; Responsive; Cooperative   Attention Attends with cues to redirect   Orientation Level Oriented X4   Memory Decreased short term memory;Decreased recall of recent events;Decreased recall of precautions   Following Commands Follows one step commands with increased time or repetition   Discharge Information   Patient's Discharge Plan return home   Patient's Rehab Expectations "get stronger"   Barriers to Discharge Home Limited Family Support;Decreased Cognitive Function;Decreased Strength;Decreased Endurance; Safety Considerations   Impressions Patient seen for IE  Presents, following CVA, with decreased ROM/strength, decreaed balance and safety, decreased endurance, decreased cognition; all affecting functional mobility  May benefit from continued inpatient [facility] PT to increase funciton, safety and increased independence in prep for d/c to home     PT Therapy Minutes   PT Time In 0641   PT Time Out 0737   PT Total Time (minutes) 56   PT Mode of treatment - Individual (minutes) 56   PT Mode of treatment - Concurrent (minutes) 0   PT Mode of treatment - Group (minutes) 0   PT Mode of treatment - Co-treat (minutes) 0   PT Mode of Treatment - Total time(minutes) 56 minutes   PT Cumulative Minutes 56   Cumulative Minutes   Cumulative therapy minutes 56

## 2021-02-03 NOTE — PROGRESS NOTES
02/03/21 0900   Patient Data   Rehab Impairment CVA with R body involvement   Support System   Name Kai   Relationship Son   Multiple Support Systems   (son lives about 15-20 minutes away )   Home Setup   Type of Home Apartment  (single level on 5th floor)   Method of Entry   (no stairs ot enter building, elevator available)   Number of Stairs 0   First Floor Bathroom Full  (tub/shower combo)   First Floor Bathroom Accessibility Grab bars by toilet;Grab bars in tub/shower   Prior IADL Participation   Money Management Identify Money;Estimate Costs;Estimate Change;Combine Bills;Manage Checkbook   Meal Preparation Full Participation   Laundry Full Participation  (washer/dryer on first floor)   Home Cleaning Full Participation   Prior Level of Function   Self-Care 3  Independent - Patient completed the activities by him/herself, with or without an assistive device, with no assistance from a helper  Prior Assistance Needed for   (none of the above)   Prior Device Used Z  None of the above   Falls in the Last Year   Number of falls in the past 12 months 2   Type of Injury Associated with Fall No injury   Patient Preference   Nickname (Patient Preference) Veronica   Patient Normally Wakes at 0700   Psychosocial   Psychosocial (WDL) WDL   Restrictions/Precautions   Precautions Fall Risk   Pain Assessment   Pain Assessment Tool Pain Assessment not indicated - pt denies pain   Pain Score No Pain   Oral Hygiene   Type of Assistance Needed Set-up / clean-up   Comment pt able to open toothpaste tube and squeeze toothpaste on to toothbrush, when prompted to wash mouth out with water pt swallowed water causing pt to cough   Oral Hygiene CARE Score 5   Grooming   Able To Wash/Dry Face;Brush/Clean Teeth   Limitation Noted In Coordination; Safety   Findings supervision assistance and verbal cues   Tub/Shower Transfer   Reason Not Assessed Sponge Bath   Shower/Bathe Self   Type of Assistance Needed Physical assistance   Amount of Physical Assistance Provided 25%-49%   Comment assist to wash both feet and buttocks   Shower/Bathe Self CARE Score 3   Bathing   Assessed Bath Style Sponge Bath   Anticipated D/C Bath Style Shower;Sponge Bath   Able to Chris Brendon No   Able to [employer] Temperature No   Able to Wash/Rinse/Dry (body part) Left Arm;Right Arm;L Upper Leg;R Upper Leg;Chest;Abdomen;Perineal Area   Limitations Noted in Balance; Coordination; Endurance;ROM;Safety;Strength   Positioning Seated;Standing   Dressing/Undressing Clothing   Remove UB Clothes Pullover Shirt   Don UB Clothes Pullover Shirt   Type of Assistance Needed Supervision   Amount of Physical Assistance Provided No physical assistance   Upper Body Dressing CARE Score 4   Remove LB Clothes Pants; Undergarment;Socks   Don LB Clothes Pants; Undergarment;Socks   Type of Assistance Needed Physical assistance   Amount of Physical Assistance Provided 50%-74%   Comment pt able to pull pants down to ankle and pull pants up over knees, requires assist for threading both feet through underwear and pants and up and over both hips and adjust in back   Lower Body Dressing CARE Score 2   Limitations Noted In Balance; Coordination; Endurance; Safety;Strength   Positioning Supported Sit;Standing   Putting On/Taking Off Footwear   Type of Assistance Needed Physical assistance   Amount of Physical Assistance Provided 75% or more   Comment pt able to lift both feet for OT to don and doff both socks   Putting On/Taking Off Footwear CARE Score 2   Toileting Hygiene   Type of Assistance Needed Physical assistance   Amount of Physical Assistance Provided 50%-74%   Comment assist to pull pants up over hips   [facility] 83 Score 2   Toilet Transfer   Surface Assessed Raised Toilet   Transfer Technique Stand Pivot   Limitations Noted In Balance; Endurance;ROM;Safety;UE Strength;LE Strength   Adaptive Equipment Grab Bar   Positioning Concerns Safety   Type of Assistance Needed Physical assistance   Amount of Physical Assistance Provided 25%-49%   Comment min A and verbal cues for hand and foot placement for safety and sequencing of transfer as pt reports this is the first toilet transfer she completed since admitted to hospital   Toilet Transfer CARE Score 3   [facility] to [address] down yes, up no  Able to Manage Clothing Closures No   Manage Hygiene   (did not end up going once on toilet)   Limitations Noted In Balance; Coordination; Safety; Sequencing;UE Strength;LE Strength   Adaptive Equipment Grab Bar   Transfer Bed/Chair/Wheelchair   Limitations Noted In Balance; Coordination; Endurance; Sequencing;UE Strength;LE Strength   Type of Assistance Needed Physical assistance   Amount of Physical Assistance Provided 25%-49%   Comment min A and verbal cues for safety and sequencing   Chair/Bed-to-Chair Transfer CARE Score 3   Sit to Lying   Type of Assistance Needed Physical assistance   Amount of Physical Assistance Provided 25%-49%   Comment min A, verbal cues for hand placement, assist to bring LLE on to bed   Sit to Lying CARE Score 3   Sit to Stand   Type of Assistance Needed Physical assistance   Amount of Physical Assistance Provided 25%-49%   Comment min A and verbal cues for hand placement   Sit to Stand CARE Score 3   Picking Up Object   Reason if not Attempted Safety concerns   Picking Up Object CARE Score 88   Comprehension   Comprehension (FIM) 5 - Needs help/cues, repetition only RARELY ( < 10% of the time)   Expression   Expression (FIM) 4 - Expresses basic info/needs 75-90% of time   Social Interaction   Social Interaction (FIM) 6 - Interacts appropriately with others BUT requires extra  time   Problem Solving   Problem solving (FIM) 4 - Solves basic problems 75-89% of time   Memory   Memory (FIM) 6 - Recognizes with extra time   RUE Assessment   RUE Assessment WFL  (MMT 3+/5 grossly assessed shoulder to hand)   LUE Assessment   LUE Assessment WFL  (MMT 3+/5 grossly assessed shoulder to hand)   Coordination   Movements are Fluid and Coordinated 1   Sensation   Light Touch No apparent deficits   Propioception No apparent deficits   Cognition   Overall Cognitive Status Impaired   Arousal/Participation Alert; Responsive; Cooperative   Attention Within functional limits   Orientation Level Oriented X4   Memory Decreased recall of recent events;Decreased recall of biographical information   Following Commands Follows one step commands without difficulty   Therapeutic Exercise   Therapeutic Exercise/Activity introduced functional reacher, used functional reacher to retrieve pegs on floor and place into board   Discharge Information   Vocational Plan Retired/not working  (hobbies: watching TV)   Patient's Discharge Plan Return home alone   Patient's Rehab Expectations "Improve function to get back home "   Impressions Pt presents following hospitalization of CVA with R body involvement  Barriers include decrease balance, endurance, strength, safety and difficulty expressing needs  Pt would benefit from continued skilled OT services to increase independence with self-care/daily tasks, functional mobility/transfers and activity tolerance      OT Therapy Minutes   OT Time In 0900   OT Time Out 1025   OT Total Time (minutes) 85   OT Mode of treatment - Individual (minutes) 85

## 2021-02-03 NOTE — PROGRESS NOTES
SLP [facility]       02/03/21 1200   Patient Data   Rehab Impairment Stroke with Bilateral involvement   Etiologic Diagnosis Recent infarct L centrum semiovale   Date of Onset 01/30/21   Support System   Name Kai   Relationship son   Prior IADL Participation   Money Management Identify Money;Estimate Costs;Estimate Change;Combine Bills;Manage Checkbook   Meal Preparation Full Participation   Laundry Full Participation   Home Cleaning Full Participation   Restrictions/Precautions   Precautions Aspiration;Bed/chair alarms;Cognitive; Fall Risk;Supervision on toilet/commode   Pain Assessment   Pain Assessment Tool Pain Assessment not indicated - pt denies pain   Pain Score No Pain   Eating Assessment   Swallow Precautions Yes   Bedside Swallow Results Yes   VBS Study Results No   Food To Mouth Yes   Noted Coughing   Positioning Upright   Safety Needs Increase Time   Meal Assessed Lunch   QI: Swallowing/Nutritional Status Modified food consistency   Current Diet Dysphia III;Nectar Thick   Intake Mode PO   Opens Packages Yes   Findings Pt presents with mild to moderate oropharyngeal dysphagia- see SLP rehab note for details  Type of Assistance Needed Set-up / clean-up   Amount of Physical Assistance Provided No physical assistance   Eating CARE Score 5   Comprehension   Auditory Basic;Complex   Visual Basic;Complex   Findings Pt completed portions of standardized assessment CLQT; see SLP rehab note for details  QI: Comprehension 3  Usually Understands: Understands most conversations, but misses some part/intent of message  Requires cues at times to understand  Comprehension (FIM) 4 - Understands basic info/conversation 75-90% of time   Expression   Verbal Basic;Complex   Non-Verbal Basic;Complex   Intelligibility Phrase   Findings Pt completed portions of standardized assessment CLQT; see SLP rehab note for details  QI: Expression 3   Exhibits some difficulty with expressing needs and ideas (e g , some words or finishing thoughts) or speech is not clear   Expression (FIM) 4 - Expresses basic info/needs 75-90% of time   Social Interaction   Cooperation with staff   Participation Individual   Behaviors observed Appropriate   Findings Pt completed portions of standardized assessment CLQT; see SLP rehab note for details  Social Interaction (FIM) 5 - Interacts appropriately with others 90% of time   Problem Solving   Complex Manages finances;Manages discharge planning;Manages medications   Routine Manages call bell;Manges precautions;Manages ADL   Findings Pt completed portions of standardized assessment CLQT; see SLP rehab note for details  Problem solving (FIM) 3 - Solves basic problmes 50-74% of time   Memory   Recognize People Yes   Remember Routine Yes   Short-Term Impaired   Long Term Impaired   Recalls Precaution No   Findings Pt completed portions of standardized assessment CLQT; see SLP rehab note for details  Memory (FIM) 3 - Recognizes, recalls/performs 50-74%   Discharge Information   Vocational Plan Retired/not working   Patient's Discharge Plan return home   Patient's Rehab Expectations To get better   Barriers to Discharge Home Limited Family Support; Unsafe Home Setup; Decreased Cognitive Function;Decreased Strength;Decreased Endurance; Safety Considerations   Impressions Pt presents as a good rehab candiate to achieve supervision level of assistance for overall cognitive linguistic communication abilities  Further assessment needs to be completed however at baseline pt was fully independent  Pt also will benefit from further sessions for dysphagia to assess swallow function and improve tolerance of upgraded textures to maximize nutrition/hydration with tolerance of LRD      SLP Therapy Minutes   SLP Time In 1200   SLP Time Out 1300   SLP Total Time (minutes) 60   SLP Mode of treatment - Individual (minutes) 60   SLP Mode of treatment - Concurrent (minutes) 0   SLP Mode of treatment - Group (minutes) 0   SLP Mode of treatment - Co-treat (minutes) 0   SLP Mode of Treatment - Total time(minutes) 60 minutes   SLP Cumulative Minutes 60   Cumulative Minutes   Cumulative therapy minutes 116

## 2021-02-03 NOTE — PROGRESS NOTES
02/03/21 0751   Charting Type   Charting Type Shift assessment   Neurological   Neuro (WDL) X   Level of Consciousness Alert/awake   Orientation Level Oriented X4   Cognition Follows commands; Appropriate judgement   Extraocular Movements Full   R Pupil Size (mm) 3   R Pupil Shape Round   R Pupil Reaction Brisk   L Pupil Size (mm) 3   L Pupil Shape Round   L Pupil Reaction Brisk   RUE Motor Response Responds to commands   RUE Sensation Full sensation   RUE Muscle Strength 4- Movement against gravity and limited resistance   LUE Muscle Strength 5- Normal strength   RLE Motor Response Responds to commands   RLE Sensation Full sensation   RLE Muscle Strength 4- Movement against gravity and limited resistance   LLE Muscle Strength 5- Normal strength   Neuro Symptoms Forgetful   Neuro Additional Assessments No   [place] Coma Scale   Eye Opening 4   Best Verbal Response 5   Best Motor Response 6   [place] Coma Scale Score 15   HEENT   HEENT (WDL) X   R Eye Intact   L Eye Intact   Vision - Corrective Lenses (at bedside) None   R Ear Intact   L Ear Intact   Voice Other (Comment)  (vocal cord paralysis)   Teeth Intact   Respiratory   Respiratory (WDL) WDL   Respiratory Pattern Normal   Chest Assessment Chest expansion symmetrical   Bilateral Breath Sounds Clear   Respiratory Additional Assessments Yes   Localized Breath Sounds   L Basilar Clear;Diminished   R Basilar Clear;Diminished   Respiratory Interventions   Respiratory Interventions Cough and deep breathe   Cough and Deep Breathe   Cough and Deep Breathe Yes   Cardiac   Cardiac (WDL) WDL   Cardiac Regularity Regular   Heart Sounds No adventitious heart sounds   Jugular Venous Distention (JVD) No   Cardiac Symptoms None   Chest Pain Present No   Pacemaker/ICD No   Pain Assessment   Pain Assessment Tool 0-10   Pain Score No Pain   Peripheral Vascular   Peripheral Vascular (WDL) X   Cyanosis None   Pulses R radial;L radial;R pedal;L pedal   PVS Additional Assessments No RUE Neurovascular Assessment   R Radial Pulse +2   LUE Neurovascular Assessment   L Radial Pulse +2   RLE Neurovascular Assessment   R Pedal Pulse +2   LLE Neurovascular Assessment   L Pedal Pulse +2   Integumentary   Integumentary (WDL) X   Skin Color Appropriate for ethnicity   Skin Condition/Temp Warm;Dry   Skin Integrity Bruising   Skin Location scattered   Skin Turgor Non-tenting   Integumentary Additional Assessments No   Tattoos/Piercings   Does patient have tattoos? No   Piercings Remaining No   David Scale   Sensory Perceptions 4   Moisture 4   Activity 3   Mobility 3   Nutrition 3   Friction and Shear 2   David Scale Score 19   Musculoskeletal   Musculoskeletal (WDL) X   Level of Assistance Moderate assist, patient does 50-74%   Assistive Device Front wheel walker   RUE Limited movement   RLE Limited movement   Musculoskeletal Additional Assessments No   Gastrointestinal   Gastrointestinal (WDL) X   Abdomen Inspection Soft;Rounded   Bowel Sounds (All Quadrants) Normoactive   Tenderness Soft;Nontender   Last BM Date 02/02/21   GI Symptoms None   Gastrointestinal Additional Assessments No   Bowel Shift Assessment   Assistance Needed None   Bowel Incontinence No   Stool Assessment   Bowel Incontinence No   Genitourinary   Genitourinary (WDL) WDL   Bladder Shift Assessment   Bladder Device Pull-up   Bladder Incontinence No   Bladder Management Moderate assistance   Bladder Management Deficit Help pull up; Help pull down   Urine Assessment   Urinary Incontinence No   Urine Color Yellow/straw   Urine Appearance Clear   Urine Odor No odor   Genitalia   Female Genitalia Intact   Anal/Rectal   Anal/Rectal (WDL) WDL   Psychosocial   Psychosocial (WDL) X   Patient Behaviors/Mood Calm; Cooperative   Needs Expressed Denies   Psychosocial Additional Assessments No   Ability to Express Feelings Needs assistance   Ability to Express Needs Needs assistance   Ability to Express Thoughts Needs assistance   Ability to Understand Others Usually understands   [place] Suicide Severity Rating Scale   1  Wish to be Dead No   2  Suicidal Thoughts Never   *Risk Level* Low Risk   Cough   Cough None         Offers no complaints  Right sided weakness  Unsteady with toilet transfer and mod assist with toileting  Pain free  Voice soft spoken but able to make needs met  Expressive aphasia noted at times and pt  Aware of same  Tolerating therapy  Continue same plan of care  Stroke education, meds and safety reinforced  Working with speech therapist at this time

## 2021-02-03 NOTE — PROGRESS NOTES
02/02/21 1930   Charting Type   Charting Type Shift assessment   Neurological   Neuro (WDL) X   Level of Consciousness Alert/awake   Orientation Level Oriented X4   Cognition Appropriate judgement; Appropriate attention/concentration; Appropriate for developmental age; Follows commands; Impulsive;Poor safety awareness   Extraocular Movements Full   Facial Symmetry Symmetrical   Tongue Deviation Midline   Speech Delayed responses; Other (Comment)  (soft spoken)   Swallow Difficulty swallowing  (nector thick liquids)   R Hand  Moderate   L Hand  Strong   RUE Muscle Strength 5- Normal strength   LUE Muscle Strength 5- Normal strength   RLE Muscle Strength 5- Normal strength   LLE Muscle Strength 5- Normal strength   Neuro Symptoms Forgetful   [place] Coma Scale   Eye Opening 4   Best Verbal Response 5   Best Motor Response 6   Reyna Coma Scale Score 15   HEENT   HEENT (WDL) X   Throat Difficulty swallowing   Voice Other (Comment)  (very soft spoken)   Respiratory   Respiratory Pattern Normal   Chest Assessment Chest expansion symmetrical   Cardiac   Cardiac (WDL) WDL   Cardiac Regularity Regular   Pain Assessment   Pain Assessment Tool 0-10   Pain Score No Pain   Peripheral Vascular   Peripheral Vascular (WDL) X   Cyanosis None   Pulses R pedal;L pedal   RLE Neurovascular Assessment   R Pedal Pulse +2   LLE Neurovascular Assessment   L Pedal Pulse +2   Integumentary   Integumentary (WDL) X   Skin Color Appropriate for ethnicity   Skin Condition/Temp Warm;Dry   Skin Integrity Bruising   Skin Location B/L UE/Leg   Skin Turgor Non-tenting   Integumentary Additional Assessments No   David Scale   Sensory Perceptions 4   Moisture 3   Activity 3   Mobility 3   Nutrition 3   Friction and Shear 2   David Scale Score 18   Musculoskeletal   Musculoskeletal (WDL) X   Level of Assistance Moderate assist, patient does 50-74%   Assistive Device Front wheel walker   RUE Full movement   LUE Full movement   RLE Limited movement; Other (Comment)  (foot drop)   LLE Limited movement   Musculoskeletal Additional Assessments No   Gastrointestinal   Gastrointestinal (WDL) X   Abdomen Inspection Soft;Nondistended;Obese   Bowel Sounds (All Quadrants) Normoactive   Tenderness Soft;Nontender; No guarding   Genitourinary   Genitourinary (WDL) X   Genitourinary Symptoms Other (Comment)  (inc  at times)   Urine Assessment   Urinary Incontinence Yes   Urine Color Yellow/straw   Cough   Cough None
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
## 2021-02-03 NOTE — ASSESSMENT & PLAN NOTE
· With vocal cord paralysis  · Follow-up ENT as an outpatient for pending surgery procedure to correct narrow airway secondary to prolonged intubation

## 2021-02-03 NOTE — PLAN OF CARE
Problem: Prexisting or High Potential for Compromised Skin Integrity  Goal: Skin integrity is maintained or improved  Description: INTERVENTIONS:  - Identify patients at risk for skin breakdown  - Assess and monitor skin integrity  - Assess and monitor nutrition and hydration status  - Monitor labs   - Assess for incontinence   - Turn and reposition patient  - Assist with mobility/ambulation  - Relieve pressure over bony prominences  - Avoid friction and shearing  - Provide appropriate hygiene as needed including keeping skin clean and dry  - Evaluate need for skin moisturizer/barrier cream  - Collaborate with interdisciplinary team   - Patient/family teaching  - Consider wound care consult   Outcome: Progressing     Problem: Potential for Falls  Goal: Patient will remain free of falls  Description: INTERVENTIONS:  - Assess patient frequently for physical needs  -  Identify cognitive and physical deficits and behaviors that affect risk of falls    -  [place] fall precautions as indicated by assessment   - Educate patient/family on patient safety including physical limitations  - Instruct patient to call for assistance with activity based on assessment  - Modify environment to reduce risk of injury  - Consider OT/PT consult to assist with strengthening/mobility  Outcome: Progressing     Problem: PAIN - ADULT  Goal: Verbalizes/displays adequate comfort level or baseline comfort level  Description: Interventions:  - Encourage patient to monitor pain and request assistance  - Assess pain using appropriate pain scale  - Administer analgesics based on type and severity of pain and evaluate response  - Implement non-pharmacological measures as appropriate and evaluate response  - Consider cultural and social influences on pain and pain management  - Notify physician/advanced practitioner if interventions unsuccessful or patient reports new pain  Outcome: Progressing     Problem: INFECTION - ADULT  Goal: Absence or prevention of progression during hospitalization  Description: INTERVENTIONS:  - Assess and monitor for signs and symptoms of infection  - Monitor lab/diagnostic results  - Monitor all insertion sites, i e  indwelling lines, tubes, and drains  - Monitor endotracheal if appropriate and nasal secretions for changes in amount and color  - [place] appropriate cooling/warming therapies per order  - Administer medications as ordered  - Instruct and encourage patient and family to use good hand hygiene technique  - Identify and instruct in appropriate isolation precautions for identified infection/condition  Outcome: Progressing  Goal: Absence of fever/infection during neutropenic period  Description: INTERVENTIONS:  - Monitor WBC    Outcome: Progressing     Problem: SAFETY ADULT  Goal: Patient will remain free of falls  Description: INTERVENTIONS:  - Assess patient frequently for physical needs  -  Identify cognitive and physical deficits and behaviors that affect risk of falls    -  [place] fall precautions as indicated by assessment   - Educate patient/family on patient safety including physical limitations  - Instruct patient to call for assistance with activity based on assessment  - Modify environment to reduce risk of injury  - Consider OT/PT consult to assist with strengthening/mobility  Outcome: Progressing  Goal: Maintain or return to baseline ADL function  Description: INTERVENTIONS:  -  Assess patient's ability to carry out ADLs; assess patient's baseline for ADL function and identify physical deficits which impact ability to perform ADLs (bathing, care of mouth/teeth, toileting, grooming, dressing, etc )  - Assess/evaluate cause of self-care deficits   - Assess range of motion  - Assess patient's mobility; develop plan if impaired  - Assess patient's need for assistive devices and provide as appropriate  - Encourage maximum independence but intervene and supervise when necessary  - Involve family in performance of ADLs  - Assess for home care needs following discharge   - Consider OT consult to assist with ADL evaluation and planning for discharge  - Provide patient education as appropriate  Outcome: Progressing  Goal: Maintain or return mobility status to optimal level  Description: INTERVENTIONS:  - Assess patient's baseline mobility status (ambulation, transfers, stairs, etc )    - Identify cognitive and physical deficits and behaviors that affect mobility  - Identify mobility aids required to assist with transfers and/or ambulation (gait belt, sit-to-stand, lift, walker, cane, etc )  - [place] fall precautions as indicated by assessment  - Record patient progress and toleration of activity level on Mobility SBAR; progress patient to next Phase/Stage  - Instruct patient to call for assistance with activity based on assessment  - Consider rehabilitation consult to assist with strengthening/weightbearing, etc   Outcome: Progressing     Problem: DISCHARGE PLANNING  Goal: Discharge to home or other facility with appropriate resources  Description: INTERVENTIONS:  - Identify barriers to discharge w/patient and caregiver  - Arrange for needed discharge resources and transportation as appropriate  - Identify discharge learning needs (meds, wound care, etc )  - Arrange for interpretive services to assist at discharge as needed  - Refer to Case Management Department for coordinating discharge planning if the patient needs post-hospital services based on physician/advanced practitioner order or complex needs related to functional status, cognitive ability, or social support system  Outcome: Progressing     Problem: Knowledge Deficit  Goal: Patient/family/caregiver demonstrates understanding of disease process, treatment plan, medications, and discharge instructions  Description: Complete learning assessment and assess knowledge base    Interventions:  - Provide teaching at level of understanding  - Provide teaching via preferred learning methods  Outcome: Progressing     Problem: Neurological Deficit  Goal: Neurological status is stable or improving  Description: Interventions:  - Monitor and assess patient's level of consciousness, motor function, sensory function, and level of assistance needed for ADLs  - Monitor and report changes from baseline  Collaborate with interdisciplinary team to initiate plan and implement interventions as ordered  - Provide and maintain a safe environment  - Consider seizure precautions  - Consider fall precautions  - Consider aspiration precautions  - Consider bleeding precautions  Outcome: Progressing     Problem: Activity Intolerance/Impaired Mobility  Goal: Mobility/activity is maintained at optimum level for patient  Description: Interventions:  - Assess and monitor patient  barriers to mobility and need for assistive/adaptive devices  - Assess patient's emotional response to limitations  - Collaborate with interdisciplinary team and initiate plans and interventions as ordered  - Encourage independent activity per ability   - Maintain proper body alignment  - Perform active/passive rom as tolerated/ordered  - Plan activities to conserve energy   - Turn patient as appropriate  Outcome: Progressing     Problem: Communication Impairment  Goal: Ability to express needs and understand communication  Description: Assess patient's communication skills and ability to understand information  Patient will demonstrate use of effective communication techniques, alternative methods of communication and understanding even if not able to speak  - Encourage communication and provide alternate methods of communication as needed  - Collaborate with case management/ for discharge needs  - Include patient/family/caregiver in decisions related to communication    Outcome: Progressing     Problem: Potential for Aspiration  Goal: Non-ventilated patient's risk of aspiration is minimized  Description: Assess and monitor vital signs, respiratory status, and labs (WBC)  Monitor for signs of aspiration (tachypnea, cough, rales, wheezing, cyanosis, fever)  - Assess and monitor patient's ability to swallow  - Place patient up in chair to eat if possible  - HOB up at 90 degrees to eat if unable to get patient up into chair   - Supervise patient during oral intake  - Instruct patient/ family to take small bites  - Instruct patient/ family to take small single sips when taking liquids  - Follow patient-specific strategies generated by speech pathologist   Outcome: Progressing  Goal: Ventilated patient's risk of aspiration is minimized  Description: Assess and monitor vital signs, respiratory status, airway cuff pressure, and labs (WBC)  Monitor for signs of aspiration (tachypnea, cough, rales, wheezing, cyanosis, fever)  - Elevate head of bed 30 degrees if patient has tube feeding   - Monitor tube feeding  Outcome: Progressing     Problem: Nutrition  Goal: Nutrition/Hydration status is improving  Description: Monitor and assess patient's nutrition/hydration status for malnutrition (ex- brittle hair, bruises, dry skin, pale skin and conjunctiva, muscle wasting, smooth red tongue, and disorientation)  Collaborate with interdisciplinary team and initiate plan and interventions as ordered  Monitor patient's weight and dietary intake as ordered or per policy  Utilize nutrition screening tool and intervene per policy  Determine patient's food preferences and provide high-protein, high-caloric foods as appropriate  - Assist patient with eating   - Allow adequate time for meals   - Encourage patient to take dietary supplement as ordered  - Collaborate with clinical nutritionist   - Include patient/family/caregiver in decisions related to nutrition    Outcome: Progressing

## 2021-02-03 NOTE — PROGRESS NOTES
02/03/21 1430   Pain Assessment   Pain Assessment Tool 0-10   Pain Score No Pain   Therapeutic Interventions   Strengthening supine ther ex   Assessment   Treatment Assessment Patient completed ther ex for general LE strengthening  Plan   Treatment/Interventions LE strengthening/ROM; Therapeutic exercise   Progress Progressing toward goals   PT Therapy Minutes   PT Time In 1430   PT Time Out 1504   PT Total Time (minutes) 34   PT Mode of treatment - Individual (minutes) 34   PT Mode of treatment - Concurrent (minutes) 0   PT Mode of treatment - Group (minutes) 0   PT Mode of treatment - Co-treat (minutes) 0   PT Mode of Treatment - Total time(minutes) 34 minutes   PT Cumulative Minutes 90   Therapy Time missed   Time missed?  No
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
## 2021-02-03 NOTE — TREATMENT PLAN
Individualized Plan of Kvng KRUGER Bear 63 y o  female MRN: [id number]  Unit/Bed#: -01 Encounter: [id number]     PATIENT INFORMATION  ADMISSION DATE: 2/2/2021  3:39 PM JACK CATEGORY:Stroke:  01 3  Bilateral involvement   ADMISSION DIAGNOSIS: CVA (cerebrovascular accident) ([facility] [place] [address] ) [[id number]]  EXPECTED LOS: 10-14 days     MEDICAL/FUNCTIONAL PROGNOSIS  Based on my assessment of the patient's medical conditions and current functional status, the prognosis for attaining medical and functional goals or the IRF stay is:  Good    Medical Goals: Patient will be medically stable for discharge to [facility] upon completion of rehab program    [address]: Home - Assistance      ANTICIPATED FOLLOW-UP SERVICE:        Home Health Services: PT, OT and SLP      DISCIPLINE SPECIFIC PLANS:  Required Disciplines & Services: Rehabillitation Nursing and Case Management        REQUIRED THERAPY:  Therapy Hours per Day Days per Week Total Days   Physical Therapy 1 5 5   Occupational Therapy 1 5 5   Speech/Language Therapy 1 3 5   NOTE: Additional therapy time(s) may be added as appropriate to meet patient needs and to achieve functional goals      Patient will either participate in above therapy regimen or participate in 900 minutes of therapy within 7 day week consisting of PT, OT and SLP due to the following medical procedure/condition:Stroke:  01 3  Bilateral involvement    ANTICIPATED FUNCTIONAL OUTCOMES:  ADL: Patient will return to premorbid level for ADLs upon completion of rehab program   Bladder/Bowel:     Transfers: Patient will be independent with transfers with least restrictive device upon completion of rehab program   Locomotion: Patient will be independent with locomotion with least restrictive device upon completion of rehab program   Cognitive:       DISCHARGE PLANNING NEEDS  Equipment needs: Discharge needs to be reviewed with team      REHAB ANTICIPATED PARTICIPATION RESTRICTIONS:  Rquires Assist with ADLS

## 2021-02-03 NOTE — PCC PHYSICAL THERAPY
02/03/2021:   Patient seen for IE  Presents, following CVA, with decreased ROM/strength, decreased balance and safety, decreased endurance, decreased cognition; all affecting functional mobility  Patient S/MIN A bed mobility, CG/MIN A all transfers with walker, MIN-MOD A gait training up to 20' level and unlevel surfaces with walker, MOD A wheelchair mobility level and unlevel surfaces  May benefit from continued inpatient ARC PT to increase function, safety, and increased independence in prep for safe d/c       02/09/2021:   Patient participating in therapy and making positive gains  Patient S bed mobility, CG all transfers with walker, CG ambulation up to 158' level and unlevel surfaces with walker, CG 7 steps with 2 handrails  Patient remains limited by decreased ROM/strength, decreased balance and safety, decreased cognition  May benefit from continued PT services to increase function, safety, and increased independence in prep for safe d/c     02/16/2021:  Patient participating in therapy and making positive gains  Patient MOD I bed mobility, S all transfers with walker, CG/close S ambulation up to 192' level and unlevel surfaces, CG up to 14 steps with 2 handrails  Remains limited by decreased ROM/strength, decreased balance and safety, decreased endurance  Would benefit from continued inpatient ARC PT to increase function, safety, and increased independence in prep for safe d/c to home

## 2021-02-04 LAB
GLUCOSE SERPL-MCNC: 173 MG/DL (ref 65–140)
GLUCOSE SERPL-MCNC: 213 MG/DL (ref 65–140)
GLUCOSE SERPL-MCNC: 283 MG/DL (ref 65–140)
GLUCOSE SERPL-MCNC: 324 MG/DL (ref 65–140)

## 2021-02-04 PROCEDURE — 97110 THERAPEUTIC EXERCISES: CPT

## 2021-02-04 PROCEDURE — 97530 THERAPEUTIC ACTIVITIES: CPT

## 2021-02-04 PROCEDURE — 99233 SBSQ HOSP IP/OBS HIGH 50: CPT | Performed by: FAMILY MEDICINE

## 2021-02-04 PROCEDURE — 82948 REAGENT STRIP/BLOOD GLUCOSE: CPT

## 2021-02-04 PROCEDURE — 92526 ORAL FUNCTION THERAPY: CPT

## 2021-02-04 PROCEDURE — 97129 THER IVNTJ 1ST 15 MIN: CPT

## 2021-02-04 PROCEDURE — 97130 THER IVNTJ EA ADDL 15 MIN: CPT

## 2021-02-04 PROCEDURE — 97116 GAIT TRAINING THERAPY: CPT

## 2021-02-04 RX ADMIN — INSULIN LISPRO 2 UNITS: 100 INJECTION, SOLUTION INTRAVENOUS; SUBCUTANEOUS at 21:50

## 2021-02-04 RX ADMIN — INSULIN LISPRO 4 UNITS: 100 INJECTION, SOLUTION INTRAVENOUS; SUBCUTANEOUS at 12:48

## 2021-02-04 RX ADMIN — INSULIN GLARGINE 35 UNITS: 100 INJECTION, SOLUTION SUBCUTANEOUS at 21:48

## 2021-02-04 RX ADMIN — ATORVASTATIN CALCIUM 80 MG: 80 TABLET, FILM COATED ORAL at 16:39

## 2021-02-04 RX ADMIN — ESCITALOPRAM 5 MG: 5 TABLET, FILM COATED ORAL at 08:59

## 2021-02-04 RX ADMIN — INSULIN LISPRO 1 UNITS: 100 INJECTION, SOLUTION INTRAVENOUS; SUBCUTANEOUS at 08:13

## 2021-02-04 RX ADMIN — INSULIN LISPRO 10 UNITS: 100 INJECTION, SOLUTION INTRAVENOUS; SUBCUTANEOUS at 08:14

## 2021-02-04 RX ADMIN — PANTOPRAZOLE SODIUM 40 MG: 40 TABLET, DELAYED RELEASE ORAL at 08:59

## 2021-02-04 RX ADMIN — INSULIN LISPRO 10 UNITS: 100 INJECTION, SOLUTION INTRAVENOUS; SUBCUTANEOUS at 12:48

## 2021-02-04 RX ADMIN — FAMOTIDINE 20 MG: 20 TABLET ORAL at 08:58

## 2021-02-04 RX ADMIN — METOPROLOL TARTRATE 50 MG: 50 TABLET, FILM COATED ORAL at 08:58

## 2021-02-04 RX ADMIN — METOPROLOL TARTRATE 50 MG: 50 TABLET, FILM COATED ORAL at 21:47

## 2021-02-04 RX ADMIN — HYDROCHLOROTHIAZIDE: 12.5 TABLET ORAL at 08:58

## 2021-02-04 RX ADMIN — INSULIN LISPRO 10 UNITS: 100 INJECTION, SOLUTION INTRAVENOUS; SUBCUTANEOUS at 16:40

## 2021-02-04 RX ADMIN — INSULIN LISPRO 5 UNITS: 100 INJECTION, SOLUTION INTRAVENOUS; SUBCUTANEOUS at 16:39

## 2021-02-04 RX ADMIN — RIVAROXABAN 20 MG: 10 TABLET, FILM COATED ORAL at 07:41

## 2021-02-04 NOTE — NURSING NOTE
Patient resting comfortably in bed at this time  No signs of distress noted  Two assist required to stand pivot transfer patient  Bed alarm in use to promote patient safety  Call bell within reach  Will continue to monitor patient and follow plan of care

## 2021-02-04 NOTE — PROGRESS NOTES
02/04/21 0743   Charting Type   Charting Type Shift assessment   Neurological   Neuro (WDL) X   Level of Consciousness Alert/awake   Orientation Level Oriented X4   Cognition Follows commands   Extraocular Movements Full   Facial Symmetry Symmetrical   R Pupil Size (mm) 3   R Pupil Shape Round   R Pupil Reaction Brisk   L Pupil Size (mm) 3   L Pupil Shape Round   L Pupil Reaction Brisk   RUE Muscle Strength 4- Movement against gravity and limited resistance   LUE Muscle Strength 5- Normal strength   RLE Muscle Strength 4- Movement against gravity and limited resistance   LLE Muscle Strength 5- Normal strength   Neuro Symptoms Forgetful   Neuro Additional Assessments No   [place] Coma Scale   Eye Opening 4   Best Verbal Response 5   Best Motor Response 6   [place]yna Coma Scale Score 15   HEENT   HEENT (WDL) X   R Eye Intact   L Eye Intact   R Ear Intact   L Ear Intact   Teeth Intact   Respiratory   Respiratory (WDL) WDL   Respiratory Pattern Normal   Chest Assessment Chest expansion symmetrical   Bilateral Breath Sounds Clear;Diminished   Respiratory Additional Assessments No   Respiratory Interventions   Respiratory Interventions Cough and deep breathe   Cough and Deep Breathe   Cough and Deep Breathe Yes   Cardiac   Cardiac (WDL) WDL   Cardiac Regularity Regular   Heart Sounds No adventitious heart sounds   Jugular Venous Distention (JVD) No   Cardiac Symptoms None   Chest Pain Present No   Pacemaker/ICD No   Pain Assessment   Pain Assessment Tool Pain Assessment not indicated - pt denies pain   Pain Score No Pain   Peripheral Vascular   Peripheral Vascular (WDL) X   Cyanosis None   Edema Right lower extremity; Left lower extremity   RLE Edema +2   LLE Edema +2   PVS Additional Assessments No   RUE Neurovascular Assessment   R Radial Pulse +2   LUE Neurovascular Assessment   L Radial Pulse +2   RLE Neurovascular Assessment   R Pedal Pulse +2   LLE Neurovascular Assessment   L Pedal Pulse +2   Integumentary Integumentary (WDL) X   Skin Color Appropriate for ethnicity   Skin Condition/Temp Warm;Dry   Skin Integrity Bruising   Skin Location SCATTERED   Skin Turgor Non-tenting   Integumentary Additional Assessments No   Tattoos/Piercings   Does patient have tattoos? No   Piercings Remaining No   David Scale   Sensory Perceptions 4   Moisture 4   Activity 3   Mobility 3   Nutrition 3   Friction and Shear 2   David Scale Score 19   Musculoskeletal   Musculoskeletal (WDL) X   Level of Assistance Moderate assist, patient does 50-74%   RUE Limited movement   RLE Limited movement   LLE Limited movement   Musculoskeletal Additional Assessments No   Gastrointestinal   Gastrointestinal (WDL) X   Abdomen Inspection Soft;Rounded   Bowel Sounds (All Quadrants) Normoactive   Tenderness Soft;Nontender   Last BM Date 02/02/21   GI Symptoms None   Gastrointestinal Additional Assessments No   Bowel Shift Assessment   Assistance Needed None   Bowel Incontinence No   Stool Assessment   Bowel Incontinence No   Genitourinary   Genitourinary (WDL) WDL   Bladder Shift Assessment   Bladder Device Pull-up   Bladder Incontinence No   Urine Assessment   Urinary Incontinence No   Genitalia   Female Genitalia Intact   Genitourinary Additional Assessments   Genitourinary Additional Assessments No   Anal/Rectal   Anal/Rectal (WDL) WDL   Psychosocial   Psychosocial (WDL) WDL   Patient Behaviors/Mood Calm; Cooperative   Needs Expressed Denies   Psychosocial Additional Assessments No   Ability to Express Feelings Needs assistance   Ability to Express Needs Needs assistance   Ability to Express Thoughts Needs assistance   Ability to Understand Others Usually understands   [place] Suicide Severity Rating Scale   1  Wish to be Dead No   2  Suicidal Thoughts Never   *Risk Level* Low Risk   Cough   Cough None     Denies pain or discomfort  Offers no complaints  Right sided weakness  Stroke ed, safety and meds reinforced  Occ expressive aphasia  Tolerating therapy, continue same plan of care  Marianela Perry

## 2021-02-04 NOTE — PROGRESS NOTES
02/04/21 1135   Pain Assessment   Pain Assessment Tool Pain Assessment not indicated - pt denies pain   Pain Score No Pain   Restrictions/Precautions   Precautions Fall Risk   Eating Assessment   Findings drank cup of nectar cranberry juice   Lying to Sitting on Side of Bed   Type of Assistance Needed Physical assistance   Amount of Physical Assistance Provided 25%-49%   Comment min A with verbal cues for safety and sequencing, pt able to roll on side and push self up with min A to coordinate movement smoothly, pt able to bring both legs over side of bed and adjust posture so both feet are flat on the floor   Lying to Sitting on Side of Bed CARE Score 3   Sit to Stand   Type of Assistance Needed Physical assistance   Amount of Physical Assistance Provided 25%-49%   Comment min A with verbal cues    Sit to Stand CARE Score 3   Bed-Chair Transfer   Type of Assistance Needed Physical assistance   Amount of Physical Assistance Provided 25%-49%   Comment min A with verbal and physical cues for hand and foot placement for sequencing   Chair/Bed-to-Chair Transfer CARE Score 3   Transfer Bed/Chair/Wheelchair   Limitations Noted In Balance; Endurance;UE Strength;LE Strength   Right Upper Extremity- Strength   RUE Strength Comment x30 2lb dumbbell: elbow flexion/extension, pronation/supination, ABD/ADD, x30 1lb protraction/retraction   Left Upper Extremity-Strength   LUE Strength Comment x30 2lb dumbbell: elbow flexion/extension, pronation/supination, ABD/ADD, x30 1lb protraction/retraction   Exercise Tools   Hand Gripper placed resistive clips with L hand with various resistance   Other Exercise Tool 1 sanderbox 2lb x30 all planes   Other Exercise Tool 2 key matching activity with R hand, functional reacher to retrieve resistive clips scattered on floor, worked on a puzzle   Cognition   Overall Cognitive Status Impaired   Arousal/Participation Alert; Responsive; Cooperative   Attention Within functional limits   Orientation Level Oriented X4   Memory Decreased recall of recent events   Following Commands Follows one step commands without difficulty   Assessment   Treatment Assessment Pt agreeable to OT session this AM, received laying supine in bed  Transferred out of bed into w/c with min A and verbal cues  Completed strengthening exercises addressing UB strength and endurance  Completed activity with functional reacher to improve use  Pt frustrated when using functional reacher as she often dropped objects and required increased time, with 5 clips left she asked "can I just quit now?" Transitioned to working on puzzle to ease frustration  Ended session sitting upright in w/c waiting for lunch  Barriers include decrease balance, safety, strength, endurance, cognition and difficulty with expression  Pt would benefit from continued skilled OT services to increase independence with self-care/daily tasks, functional mobility/transfers and activity tolerance  Prognosis Good   Problem List Decreased strength;Decreased range of motion;Decreased endurance; Impaired balance;Decreased cognition;Decreased safety awareness   Plan   Treatment/Interventions ADL retraining;Functional transfer training;LE strengthening/ROM; Therapeutic exercise; Endurance training;Cognitive reorientation;Patient/family training;Equipment eval/education; Compensatory technique education;OT   Progress Progressing toward goals   OT Therapy Minutes   OT Time In 1135   OT Time Out 1236   OT Total Time (minutes) 61   OT Mode of treatment - Individual (minutes) 24   OT Mode of treatment - Concurrent (minutes) 37   Therapy Time missed   Time missed?  No
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
## 2021-02-04 NOTE — PROGRESS NOTES
02/04/21 1325   Pain Assessment   Pain Assessment Tool 0-10   Pain Score No Pain   Restrictions/Precautions   Precautions Fall Risk;Cognitive   Cognition   Overall Cognitive Status Impaired   Arousal/Participation Alert; Responsive; Cooperative   Attention Attends with cues to redirect   Orientation Level Oriented X4   Memory Decreased recall of recent events;Decreased recall of precautions   Following Commands Follows one step commands with increased time or repetition   Sit to Lying   Type of Assistance Needed Physical assistance   Amount of Physical Assistance Provided 50%-74%   Comment mod assist; assist with bilateal LE   Sit to Lying CARE Score 2   Sit to Stand   Type of Assistance Needed Incidental touching   Comment cg   Sit to Stand CARE Score 4   Bed-Chair Transfer   Type of Assistance Needed Physical assistance   Amount of Physical Assistance Provided 25%-49%   Comment cg/min assist   Chair/Bed-to-Chair Transfer CARE Score 3   Walk 10 Feet   Type of Assistance Needed Physical assistance   Amount of Physical Assistance Provided 25%-49%   Comment min assist   Walk 10 Feet CARE Score 3   Ambulation   Does the patient walk? 2  Yes   Primary Mode of Locomotion Prior to Admission Walk   Distance Walked (feet) 37 ft  (37' 18' 20')   Assist Device Roller Walker   Gait Pattern Inconsistant Jaylyn; Slow Jaylyn;Decreased foot clearance;R foot drag;R hemiparesis; Narrow MAXWELL;Step to   Limitations Noted In Balance; Coordination;Device Management; Endurance; Safety;Strength   Provided Assistance with: Balance   Walk Assist Level Minimum Assist;Chair Follow   Findings level surfaces   Therapeutic Interventions   Strengthening seated ther ex   Balance gait and transfer training   Assessment   Treatment Assessment Patient tolerated therapy session well  Continues to need cues for general safety     Completed ther ex for general LE strengthening; gait and transfer training focusing on sequence and technique for improved balance and safety with functional mobility  PT Barriers   Physical Impairment Decreased strength;Decreased range of motion;Decreased endurance; Impaired balance;Decreased mobility; Decreased coordination;Decreased cognition; Impaired judgement;Decreased safety awareness   Functional Limitation Wheelchair management; [facility] negotiation   Plan   Treatment/Interventions Functional transfer training;LE strengthening/ROM; Therapeutic exercise; Bed mobility;Gait training   Progress Slow progress, cognitive deficits   PT Therapy Minutes   PT Time In 1325   PT Time Out 1425   PT Total Time (minutes) 60   PT Mode of treatment - Individual (minutes) 30   PT Mode of treatment - Concurrent (minutes) 30   PT Mode of treatment - Group (minutes) 0   PT Mode of treatment - Co-treat (minutes) 0   PT Mode of Treatment - Total time(minutes) 60 minutes   PT Cumulative Minutes 150   Therapy Time missed   Time missed?  No

## 2021-02-04 NOTE — PLAN OF CARE
Problem: Prexisting or High Potential for Compromised Skin Integrity  Goal: Skin integrity is maintained or improved  Description: INTERVENTIONS:  - Identify patients at risk for skin breakdown  - Assess and monitor skin integrity  - Assess and monitor nutrition and hydration status  - Monitor labs   - Assess for incontinence   - Turn and reposition patient  - Assist with mobility/ambulation  - Relieve pressure over bony prominences  - Avoid friction and shearing  - Provide appropriate hygiene as needed including keeping skin clean and dry  - Evaluate need for skin moisturizer/barrier cream  - Collaborate with interdisciplinary team   - Patient/family teaching  - Consider wound care consult   Outcome: Progressing     Problem: Potential for Falls  Goal: Patient will remain free of falls  Description: INTERVENTIONS:  - Assess patient frequently for physical needs  -  Identify cognitive and physical deficits and behaviors that affect risk of falls    -  [place] fall precautions as indicated by assessment   - Educate patient/family on patient safety including physical limitations  - Instruct patient to call for assistance with activity based on assessment  - Modify environment to reduce risk of injury  - Consider OT/PT consult to assist with strengthening/mobility  Outcome: Progressing     Problem: PAIN - ADULT  Goal: Verbalizes/displays adequate comfort level or baseline comfort level  Description: Interventions:  - Encourage patient to monitor pain and request assistance  - Assess pain using appropriate pain scale  - Administer analgesics based on type and severity of pain and evaluate response  - Implement non-pharmacological measures as appropriate and evaluate response  - Consider cultural and social influences on pain and pain management  - Notify physician/advanced practitioner if interventions unsuccessful or patient reports new pain  Outcome: Progressing     Problem: INFECTION - ADULT  Goal: Absence or prevention of progression during hospitalization  Description: INTERVENTIONS:  - Assess and monitor for signs and symptoms of infection  - Monitor lab/diagnostic results  - Monitor all insertion sites, i e  indwelling lines, tubes, and drains  - Monitor endotracheal if appropriate and nasal secretions for changes in amount and color  - [place] appropriate cooling/warming therapies per order  - Administer medications as ordered  - Instruct and encourage patient and family to use good hand hygiene technique  - Identify and instruct in appropriate isolation precautions for identified infection/condition  Outcome: Progressing  Goal: Absence of fever/infection during neutropenic period  Description: INTERVENTIONS:  - Monitor WBC    Outcome: Progressing     Problem: SAFETY ADULT  Goal: Patient will remain free of falls  Description: INTERVENTIONS:  - Assess patient frequently for physical needs  -  Identify cognitive and physical deficits and behaviors that affect risk of falls    -  [place] fall precautions as indicated by assessment   - Educate patient/family on patient safety including physical limitations  - Instruct patient to call for assistance with activity based on assessment  - Modify environment to reduce risk of injury  - Consider OT/PT consult to assist with strengthening/mobility  Outcome: Progressing  Goal: Maintain or return to baseline ADL function  Description: INTERVENTIONS:  -  Assess patient's ability to carry out ADLs; assess patient's baseline for ADL function and identify physical deficits which impact ability to perform ADLs (bathing, care of mouth/teeth, toileting, grooming, dressing, etc )  - Assess/evaluate cause of self-care deficits   - Assess range of motion  - Assess patient's mobility; develop plan if impaired  - Assess patient's need for assistive devices and provide as appropriate  - Encourage maximum independence but intervene and supervise when necessary  - Involve family in performance of ADLs  - Assess for home care needs following discharge   - Consider OT consult to assist with ADL evaluation and planning for discharge  - Provide patient education as appropriate  Outcome: Progressing  Goal: Maintain or return mobility status to optimal level  Description: INTERVENTIONS:  - Assess patient's baseline mobility status (ambulation, transfers, stairs, etc )    - Identify cognitive and physical deficits and behaviors that affect mobility  - Identify mobility aids required to assist with transfers and/or ambulation (gait belt, sit-to-stand, lift, walker, cane, etc )  - [place] fall precautions as indicated by assessment  - Record patient progress and toleration of activity level on Mobility SBAR; progress patient to next Phase/Stage  - Instruct patient to call for assistance with activity based on assessment  - Consider rehabilitation consult to assist with strengthening/weightbearing, etc   Outcome: Progressing     Problem: DISCHARGE PLANNING  Goal: Discharge to home or other facility with appropriate resources  Description: INTERVENTIONS:  - Identify barriers to discharge w/patient and caregiver  - Arrange for needed discharge resources and transportation as appropriate  - Identify discharge learning needs (meds, wound care, etc )  - Arrange for interpretive services to assist at discharge as needed  - Refer to Case Management Department for coordinating discharge planning if the patient needs post-hospital services based on physician/advanced practitioner order or complex needs related to functional status, cognitive ability, or social support system  Outcome: Progressing     Problem: Knowledge Deficit  Goal: Patient/family/caregiver demonstrates understanding of disease process, treatment plan, medications, and discharge instructions  Description: Complete learning assessment and assess knowledge base    Interventions:  - Provide teaching at level of understanding  - Provide teaching via preferred learning methods  Outcome: Progressing     Problem: Neurological Deficit  Goal: Neurological status is stable or improving  Description: Interventions:  - Monitor and assess patient's level of consciousness, motor function, sensory function, and level of assistance needed for ADLs  - Monitor and report changes from baseline  Collaborate with interdisciplinary team to initiate plan and implement interventions as ordered  - Provide and maintain a safe environment  - Consider seizure precautions  - Consider fall precautions  - Consider aspiration precautions  - Consider bleeding precautions  Outcome: Progressing     Problem: Activity Intolerance/Impaired Mobility  Goal: Mobility/activity is maintained at optimum level for patient  Description: Interventions:  - Assess and monitor patient  barriers to mobility and need for assistive/adaptive devices  - Assess patient's emotional response to limitations  - Collaborate with interdisciplinary team and initiate plans and interventions as ordered  - Encourage independent activity per ability   - Maintain proper body alignment  - Perform active/passive rom as tolerated/ordered  - Plan activities to conserve energy   - Turn patient as appropriate  Outcome: Progressing     Problem: Communication Impairment  Goal: Ability to express needs and understand communication  Description: Assess patient's communication skills and ability to understand information  Patient will demonstrate use of effective communication techniques, alternative methods of communication and understanding even if not able to speak  - Encourage communication and provide alternate methods of communication as needed  - Collaborate with case management/ for discharge needs  - Include patient/family/caregiver in decisions related to communication    Outcome: Progressing     Problem: Potential for Aspiration  Goal: Non-ventilated patient's risk of aspiration is minimized  Description: Assess and monitor vital signs, respiratory status, and labs (WBC)  Monitor for signs of aspiration (tachypnea, cough, rales, wheezing, cyanosis, fever)  - Assess and monitor patient's ability to swallow  - Place patient up in chair to eat if possible  - HOB up at 90 degrees to eat if unable to get patient up into chair   - Supervise patient during oral intake  - Instruct patient/ family to take small bites  - Instruct patient/ family to take small single sips when taking liquids  - Follow patient-specific strategies generated by speech pathologist   Outcome: Progressing  Goal: Ventilated patient's risk of aspiration is minimized  Description: Assess and monitor vital signs, respiratory status, airway cuff pressure, and labs (WBC)  Monitor for signs of aspiration (tachypnea, cough, rales, wheezing, cyanosis, fever)  - Elevate head of bed 30 degrees if patient has tube feeding   - Monitor tube feeding  Outcome: Progressing     Problem: Nutrition  Goal: Nutrition/Hydration status is improving  Description: Monitor and assess patient's nutrition/hydration status for malnutrition (ex- brittle hair, bruises, dry skin, pale skin and conjunctiva, muscle wasting, smooth red tongue, and disorientation)  Collaborate with interdisciplinary team and initiate plan and interventions as ordered  Monitor patient's weight and dietary intake as ordered or per policy  Utilize nutrition screening tool and intervene per policy  Determine patient's food preferences and provide high-protein, high-caloric foods as appropriate  - Assist patient with eating   - Allow adequate time for meals   - Encourage patient to take dietary supplement as ordered  - Collaborate with clinical nutritionist   - Include patient/family/caregiver in decisions related to nutrition    Outcome: Progressing

## 2021-02-04 NOTE — PCC CARE MANAGEMENT
2/3/21 - Initial assessment & orientation to [facility]C with Pt  Discussed [address] with Pt & Pt's son, who expressed understanding & agreement  Pt lives alone in a hi-rise apartment, 0 steps  Pt's son & mother reside in [place], but can check on her & assist periodically  Pt expressed that she is scheduled for surgery on 2/18/21 & absolutely wants to be DC'd by then  Tx team recommendations reviewed with patient & Pt's son, who expressed understanding & agreement  Target DC date is 2/16/21 with [facility] (Nsg, PT, OT, HHA); a list of providers was provided to Pt  SW will continue to monitor & assist as needed with Tx & DC planning  2/10/21 - Tx team recommendations reviewed with patient & son, who expressed understanding & agreement  Target DC date is extended to Saturday 2/20/21 with [facility] (Nsg, PT, OT, HHA); a list of providers was provided to Pt & a referral will be made based on Pt preference  Per Dr Alex Loja, Pt is unable to have surgery on 2/18, as it has not been 90 days since the CVA; Pt is aware & expressed understanding  SW will continue to monitor & assist as needed with Tx & DC planning  2/17/21 - Tx team recommendations reviewed with patient & Pt's son, who expressed understanding & agreement  Target DC date is Saturday, 2/20/21 with [facility] (Nsg, PT, OT, ST, HHA); a list of providers was provided to Pt &a referral will be made based on Pt preference  Pt resides alone  Tx team recommends that Pt be supervised  Pt's son reported that Pt can stay at his home in [place], but Pt is not agreeable to this  Pt's son stated he and others will complete frequent checks on Pt throughout the day  Life alert info provided & Meals on Wheels  SW will continue to monitor & assist as needed with Tx & DC planning
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
## 2021-02-04 NOTE — CASE MANAGEMENT
Initial assessment & orientation to ARC with Pt  Discussed [address] with Pt & Pt's son, who expressed understanding & agreement  Pt lives alone in a hi-rise apartment, 0 steps  Pt's son & mother reside in [facility] [address], but can check on her & assist periodically  Pt expressed that she is scheduled for surgery on 2/18/21 & absolutely wants to be DC'd by then  Tx team recommendations reviewed with patient & Pt's son, who expressed understanding & agreement  Target DC date is 2/16/21 with [facility] (Nsg, PT, OT, HHA); a list of providers was provided to Pt  SW will continue to monitor & assist as needed with Tx & DC planning

## 2021-02-05 LAB
GLUCOSE SERPL-MCNC: 174 MG/DL (ref 65–140)
GLUCOSE SERPL-MCNC: 181 MG/DL (ref 65–140)
GLUCOSE SERPL-MCNC: 185 MG/DL (ref 65–140)
GLUCOSE SERPL-MCNC: 194 MG/DL (ref 65–140)

## 2021-02-05 PROCEDURE — 92526 ORAL FUNCTION THERAPY: CPT

## 2021-02-05 PROCEDURE — 97116 GAIT TRAINING THERAPY: CPT

## 2021-02-05 PROCEDURE — 97530 THERAPEUTIC ACTIVITIES: CPT

## 2021-02-05 PROCEDURE — 97130 THER IVNTJ EA ADDL 15 MIN: CPT

## 2021-02-05 PROCEDURE — 82948 REAGENT STRIP/BLOOD GLUCOSE: CPT

## 2021-02-05 PROCEDURE — 97110 THERAPEUTIC EXERCISES: CPT

## 2021-02-05 PROCEDURE — 97535 SELF CARE MNGMENT TRAINING: CPT

## 2021-02-05 PROCEDURE — 99231 SBSQ HOSP IP/OBS SF/LOW 25: CPT | Performed by: FAMILY MEDICINE

## 2021-02-05 PROCEDURE — 97129 THER IVNTJ 1ST 15 MIN: CPT

## 2021-02-05 RX ADMIN — RIVAROXABAN 20 MG: 10 TABLET, FILM COATED ORAL at 08:19

## 2021-02-05 RX ADMIN — ATORVASTATIN CALCIUM 80 MG: 80 TABLET, FILM COATED ORAL at 16:51

## 2021-02-05 RX ADMIN — INSULIN GLARGINE 35 UNITS: 100 INJECTION, SOLUTION SUBCUTANEOUS at 21:49

## 2021-02-05 RX ADMIN — INSULIN LISPRO 1 UNITS: 100 INJECTION, SOLUTION INTRAVENOUS; SUBCUTANEOUS at 12:45

## 2021-02-05 RX ADMIN — DOCUSATE SODIUM 100 MG: 100 CAPSULE, LIQUID FILLED ORAL at 08:20

## 2021-02-05 RX ADMIN — INSULIN LISPRO 10 UNITS: 100 INJECTION, SOLUTION INTRAVENOUS; SUBCUTANEOUS at 12:44

## 2021-02-05 RX ADMIN — INSULIN LISPRO 2 UNITS: 100 INJECTION, SOLUTION INTRAVENOUS; SUBCUTANEOUS at 21:49

## 2021-02-05 RX ADMIN — DOCUSATE SODIUM 100 MG: 100 CAPSULE, LIQUID FILLED ORAL at 17:35

## 2021-02-05 RX ADMIN — FAMOTIDINE 20 MG: 20 TABLET ORAL at 08:19

## 2021-02-05 RX ADMIN — PANTOPRAZOLE SODIUM 40 MG: 40 TABLET, DELAYED RELEASE ORAL at 08:20

## 2021-02-05 RX ADMIN — INSULIN LISPRO 10 UNITS: 100 INJECTION, SOLUTION INTRAVENOUS; SUBCUTANEOUS at 16:51

## 2021-02-05 RX ADMIN — HYDROCHLOROTHIAZIDE: 12.5 TABLET ORAL at 08:19

## 2021-02-05 RX ADMIN — METOPROLOL TARTRATE 50 MG: 50 TABLET, FILM COATED ORAL at 21:49

## 2021-02-05 RX ADMIN — ESCITALOPRAM 5 MG: 5 TABLET, FILM COATED ORAL at 08:20

## 2021-02-05 RX ADMIN — INSULIN LISPRO 1 UNITS: 100 INJECTION, SOLUTION INTRAVENOUS; SUBCUTANEOUS at 16:51

## 2021-02-05 RX ADMIN — INSULIN LISPRO 1 UNITS: 100 INJECTION, SOLUTION INTRAVENOUS; SUBCUTANEOUS at 08:21

## 2021-02-05 RX ADMIN — INSULIN LISPRO 10 UNITS: 100 INJECTION, SOLUTION INTRAVENOUS; SUBCUTANEOUS at 08:20

## 2021-02-05 RX ADMIN — METOPROLOL TARTRATE 50 MG: 50 TABLET, FILM COATED ORAL at 08:19

## 2021-02-05 NOTE — PROGRESS NOTES
02/05/21 0900   Pain Assessment   Pain Assessment Tool 0-10   Pain Score No Pain   Restrictions/Precautions   Precautions Cognitive; Fall Risk   Weight Bearing Restrictions No   ROM Restrictions No   Cognition   Overall Cognitive Status Impaired   Orientation Level Oriented X4   Subjective   Subjective "I'm okay "   Sit to Stand   Type of Assistance Needed Supervision;Verbal cues   Amount of Physical Assistance Provided No physical assistance   Comment VCs for proper hand placement   Sit to Stand CARE Score 4   Transfer Bed/Chair/Wheelchair   Limitations Noted In Balance; Endurance;UE Strength;LE Strength; Sequencing   Adaptive Equipment Roller Walker   Sit to TXU Daniela  (VCs for proper hand placement)   Stand to Rixty  (VCs for proper hand placement)   Walk 10 Feet   Type of Assistance Needed Incidental touching;Verbal cues   Amount of Physical Assistance Provided Less than 25%   Comment CGA; VCs to pick R foot up and avoid R foot drag   Walk 10 Feet CARE Score 3   Walk 50 Feet with Two Turns   Type of Assistance Needed Incidental touching;Verbal cues   Amount of Physical Assistance Provided Less than 25%   Comment CGA; VCs to pick R foot up and avoid R foot drag   Walk 50 Feet with Two Turns CARE Score 3   Ambulation   Does the patient walk? 2  Yes   Primary Mode of Locomotion Prior to Admission Walk   Distance Walked (feet) 61 ft  (44', 51', 61', 34')   Assist Device Roller Walker   Gait Pattern Inconsistant Jaylyn; Slow Jaylyn;Decreased foot clearance;R foot drag;R hemiparesis; Forward Flexion;Narrow MAXWELL;Step through; Improper weight shift   Limitations Noted In Balance; Coordination;Device Management; Endurance; Heel Strike;Posture; Safety;Speed;Strength;Swing   Provided Assistance with: Balance   Walk Assist Level Contact Guard; Chair Follow   Findings level; VCs to pick R foot up and avoid R foot drag   Therapeutic Interventions   Strengthening seated BLE therex x30; rest breaks taken as needed Balance transfers, ambulation   Assessment   Treatment Assessment Pt tolerated PT session well this morning  Pt received sitting in w/c looking out window after breakfast and agreeable to PT session  Pt performed ambulation of 40' and 46' with RW and CGA to improve endurance with seated rest break taken between trials  Pt required VCs to pick R foot up and avoid R foot drag due to R-sided weakness  Pt also required VCs for proper hand placement during transfers  Pt rested and performed seated BLE therex to improve strength & ROM  Pt then performed ambulation of 64' and 29' with RW and CGA back to room with seated rest break taken between trials  Pt presents with decreased balance, endurance, strength, ROM, safety awareness, functional mobility, cognition, and R sided coordination  Pt would continue to benefit from skilled ARC PT to address these deficits prior to safe d/c home  Problem List Decreased strength;Decreased range of motion;Decreased endurance; Impaired balance;Decreased mobility; Decreased cognition;Decreased safety awareness   Barriers to Discharge Decreased caregiver support   PT Barriers   Physical Impairment Decreased strength;Decreased range of motion;Decreased endurance; Impaired balance;Decreased mobility; Decreased cognition;Decreased safety awareness   Functional Limitation Standing;Transfers; Walking; Wheelchair management;Ramp negotiation;Car transfers;Stair negotiation   Plan   Treatment/Interventions ADL retraining;Functional transfer training;LE strengthening/ROM; Therapeutic exercise; Endurance training;Cognitive reorientation;Patient/family training;Equipment eval/education;Gait training; Compensatory technique education   Progress Progressing toward goals   PT Therapy Minutes   PT Time In 0900   PT Time Out 1000   PT Total Time (minutes) 60   PT Mode of treatment - Individual (minutes) 60   PT Mode of treatment - Concurrent (minutes) 0   PT Mode of treatment - Group (minutes) 0   PT Mode of treatment - Co-treat (minutes) 0   PT Mode of Treatment - Total time(minutes) 60 minutes   PT Cumulative Minutes 210   Therapy Time missed   Time missed?  No
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
## 2021-02-05 NOTE — NURSING NOTE
No c/o of pain  Continues w/ slight R sided weakness, expressive aphagia at times  Lungs rhonchi, physician aware  Pt tolerated therapy well  Pt currently resting in bed  Alarms on and in place, call bell in reach  Will continue to monitor and follow plan of care

## 2021-02-05 NOTE — PROGRESS NOTES
02/05/21 1025   Pain Assessment   Pain Assessment Tool Pain Assessment not indicated - pt denies pain   Pain Score No Pain   Restrictions/Precautions   Precautions Cognitive; Fall Risk   Grooming   Able To Comb/Brush Hair;Wash/Dry Face;Wash/Dry Hands   Limitation Noted In Coordination; Safety   Findings cues to initiate task, supervision assistance   Shower/Bathe Self   Type of Assistance Needed Physical assistance   Amount of Physical Assistance Provided 25%-49%   Comment pt introduced to long handled sponge, OTS provided hand over hand assistance, pt able to take over and complete washing bilat lower legs and feet with no assistance  assistance required to wash perineal area/buttocks   Shower/Bathe Self CARE Score 3   Bathing   Assessed Bath Style Sponge Bath   Anticipated D/C Bath Style Shower;Sponge Bath   Able to [place] No   Able to [employer] Temperature No   Able to Wash/Rinse/Dry (body part) Left Arm;Right Arm;L Upper Leg;R Upper Leg;R Lower Leg/Foot;L Lower Leg/Foot;Chest;Abdomen   Limitations Noted in Balance; Coordination; Endurance;ROM;Safety;Strength   Positioning Seated;Standing   Adaptive Equipment Longhand Sponge   Upper Body Dressing   Type of Assistance Needed Supervision   Amount of Physical Assistance Provided No physical assistance   Upper Body Dressing CARE Score 4   Lower Body Dressing   Type of Assistance Needed Physical assistance   Amount of Physical Assistance Provided 50%-74%   Comment pt able to stand up and pull underwear down to ankle, introduced to functional reacher to assist doffing clothing  OTS provided assistance by doffing underwear from around left ankle, pt able to complete with doffing underwear wrapped around right ankle with no physical assistance  pt able to thread left foot through pants without reacher, required assistance with right foot as she was determined to not use reacher to assist in dressing   assist required to pull underwear and pants up over both hips   Lower Body Dressing CARE Score 2   Putting On/Taking Off Footwear   Type of Assistance Needed Physical assistance   Amount of Physical Assistance Provided 25%-49%   Comment introduced sock aide, OTS applied sock to sock aide and demonstrated use on R foot, pt able to apply sock to sock aide with min A hand over hand assistance, pt able to pull sock up successfully    Putting On/Taking Off Footwear CARE Score 3   Dressing/Undressing Clothing   Remove UB Clothes Pullover [address]; Undergarment;Socks   Limitations Noted In Balance; Coordination; Endurance; Safety;Strength   Adaptive Equipment Reacher;Sock Aide   Positioning Supported Sit;Standing   Sit to Stand   Type of Assistance Needed Incidental touching   Comment CGA for hand placement and to lock brakes prior to standing   Sit to Stand CARE Score 4   Toileting Hygiene   Comment pt denies need to use BR, however brief incont of urine   Right Upper Extremity- Strength   RUE Strength Comment x30 2lb dumbbell: elbow flexion/extension, pronation/supination, ABD/ADD, protraction/retraction, x30 wlb shoulder flexion/extension   Left Upper Extremity-Strength   LUE Strength Comment x30 2lb dumbbell: elbow flexion/extension, pronation/supination, ABD/ADD, protraction/retraction, x30 1lb shoulder flexion/extension   Exercise Tools   Other Exercise Tool 1 placed and removed pegs with velcro using R hand   Cognition   Overall Cognitive Status Impaired   Arousal/Participation Alert; Responsive; Cooperative   Attention Within functional limits   Orientation Level Oriented X4   Memory Decreased recall of recent events   Following Commands Follows one step commands without difficulty   Assessment   Treatment Assessment Pt agreeable to OT session this AM, received sitting upright in w/c  Completed sponge bath, dressing and grooming at w/c level   Introduced adaptive equipment to assist in independence with self-care, pt demonstrates good understanding when using sock aide and longhandled sponge, required hang over hand assistance when using functional reacher  Completed light exercises to improve UB and pinch strength  Pt tolerated exercises well with no complaints of increase pain or fatigue  Ended session sitting upright in w/c watching TV  Barriers include decrease, balance, strength, endurance, ROM and cognition  Pt would benefit from continued skilled OT services to increase independence with self-care/daily tasks, functional mobility/transfers and activity tolerance  Prognosis Good   Problem List Decreased strength;Decreased range of motion;Decreased endurance; Impaired balance;Decreased cognition;Decreased safety awareness   Plan   Treatment/Interventions ADL retraining;Functional transfer training;LE strengthening/ROM; Therapeutic exercise; Endurance training;Cognitive reorientation;Patient/family training;Equipment eval/education; Compensatory technique education;OT   Progress Progressing toward goals   OT Therapy Minutes   OT Time In 1025   OT Time Out 1132   OT Total Time (minutes) 67   OT Mode of treatment - Individual (minutes) 35   OT Mode of treatment - Concurrent (minutes) 32   Therapy Time missed   Time missed?  No

## 2021-02-05 NOTE — PLAN OF CARE
Problem: Prexisting or High Potential for Compromised Skin Integrity  Goal: Skin integrity is maintained or improved  Description: INTERVENTIONS:  - Identify patients at risk for skin breakdown  - Assess and monitor skin integrity  - Assess and monitor nutrition and hydration status  - Monitor labs   - Assess for incontinence   - Turn and reposition patient  - Assist with mobility/ambulation  - Relieve pressure over bony prominences  - Avoid friction and shearing  - Provide appropriate hygiene as needed including keeping skin clean and dry  - Evaluate need for skin moisturizer/barrier cream  - Collaborate with interdisciplinary team   - Patient/family teaching  - Consider wound care consult   Outcome: Progressing     Problem: Potential for Falls  Goal: Patient will remain free of falls  Description: INTERVENTIONS:  - Assess patient frequently for physical needs  -  Identify cognitive and physical deficits and behaviors that affect risk of falls    -  [place] fall precautions as indicated by assessment   - Educate patient/family on patient safety including physical limitations  - Instruct patient to call for assistance with activity based on assessment  - Modify environment to reduce risk of injury  - Consider OT/PT consult to assist with strengthening/mobility  Outcome: Progressing     Problem: PAIN - ADULT  Goal: Verbalizes/displays adequate comfort level or baseline comfort level  Description: Interventions:  - Encourage patient to monitor pain and request assistance  - Assess pain using appropriate pain scale  - Administer analgesics based on type and severity of pain and evaluate response  - Implement non-pharmacological measures as appropriate and evaluate response  - Consider cultural and social influences on pain and pain management  - Notify physician/advanced practitioner if interventions unsuccessful or patient reports new pain  Outcome: Progressing     Problem: INFECTION - ADULT  Goal: Absence or prevention of progression during hospitalization  Description: INTERVENTIONS:  - Assess and monitor for signs and symptoms of infection  - Monitor lab/diagnostic results  - Monitor all insertion sites, i e  indwelling lines, tubes, and drains  - Monitor endotracheal if appropriate and nasal secretions for changes in amount and color  - [place] appropriate cooling/warming therapies per order  - Administer medications as ordered  - Instruct and encourage patient and family to use good hand hygiene technique  - Identify and instruct in appropriate isolation precautions for identified infection/condition  Outcome: Progressing  Goal: Absence of fever/infection during neutropenic period  Description: INTERVENTIONS:  - Monitor WBC    Outcome: Progressing     Problem: SAFETY ADULT  Goal: Patient will remain free of falls  Description: INTERVENTIONS:  - Assess patient frequently for physical needs  -  Identify cognitive and physical deficits and behaviors that affect risk of falls    -  [place] fall precautions as indicated by assessment   - Educate patient/family on patient safety including physical limitations  - Instruct patient to call for assistance with activity based on assessment  - Modify environment to reduce risk of injury  - Consider OT/PT consult to assist with strengthening/mobility  Outcome: Progressing  Goal: Maintain or return to baseline ADL function  Description: INTERVENTIONS:  -  Assess patient's ability to carry out ADLs; assess patient's baseline for ADL function and identify physical deficits which impact ability to perform ADLs (bathing, care of mouth/teeth, toileting, grooming, dressing, etc )  - Assess/evaluate cause of self-care deficits   - Assess range of motion  - Assess patient's mobility; develop plan if impaired  - Assess patient's need for assistive devices and provide as appropriate  - Encourage maximum independence but intervene and supervise when necessary  - Involve family in performance of ADLs  - Assess for home care needs following discharge   - Consider OT consult to assist with ADL evaluation and planning for discharge  - Provide patient education as appropriate  Outcome: Progressing  Goal: Maintain or return mobility status to optimal level  Description: INTERVENTIONS:  - Assess patient's baseline mobility status (ambulation, transfers, stairs, etc )    - Identify cognitive and physical deficits and behaviors that affect mobility  - Identify mobility aids required to assist with transfers and/or ambulation (gait belt, sit-to-stand, lift, walker, cane, etc )  - [place] fall precautions as indicated by assessment  - Record patient progress and toleration of activity level on Mobility SBAR; progress patient to next Phase/Stage  - Instruct patient to call for assistance with activity based on assessment  - Consider rehabilitation consult to assist with strengthening/weightbearing, etc   Outcome: Progressing     Problem: DISCHARGE PLANNING  Goal: Discharge to home or other facility with appropriate resources  Description: INTERVENTIONS:  - Identify barriers to discharge w/patient and caregiver  - Arrange for needed discharge resources and transportation as appropriate  - Identify discharge learning needs (meds, wound care, etc )  - Arrange for interpretive services to assist at discharge as needed  - Refer to Case Management Department for coordinating discharge planning if the patient needs post-hospital services based on physician/advanced practitioner order or complex needs related to functional status, cognitive ability, or social support system  Outcome: Progressing     Problem: Knowledge Deficit  Goal: Patient/family/caregiver demonstrates understanding of disease process, treatment plan, medications, and discharge instructions  Description: Complete learning assessment and assess knowledge base    Interventions:  - Provide teaching at level of understanding  - Provide teaching via preferred learning methods  Outcome: Progressing     Problem: Neurological Deficit  Goal: Neurological status is stable or improving  Description: Interventions:  - Monitor and assess patient's level of consciousness, motor function, sensory function, and level of assistance needed for ADLs  - Monitor and report changes from baseline  Collaborate with interdisciplinary team to initiate plan and implement interventions as ordered  - Provide and maintain a safe environment  - Consider seizure precautions  - Consider fall precautions  - Consider aspiration precautions  - Consider bleeding precautions  Outcome: Progressing     Problem: Activity Intolerance/Impaired Mobility  Goal: Mobility/activity is maintained at optimum level for patient  Description: Interventions:  - Assess and monitor patient  barriers to mobility and need for assistive/adaptive devices  - Assess patient's emotional response to limitations  - Collaborate with interdisciplinary team and initiate plans and interventions as ordered  - Encourage independent activity per ability   - Maintain proper body alignment  - Perform active/passive rom as tolerated/ordered  - Plan activities to conserve energy   - Turn patient as appropriate  Outcome: Progressing     Problem: Communication Impairment  Goal: Ability to express needs and understand communication  Description: Assess patient's communication skills and ability to understand information  Patient will demonstrate use of effective communication techniques, alternative methods of communication and understanding even if not able to speak  - Encourage communication and provide alternate methods of communication as needed  - Collaborate with case management/ for discharge needs  - Include patient/family/caregiver in decisions related to communication    Outcome: Progressing     Problem: Potential for Aspiration  Goal: Non-ventilated patient's risk of aspiration is minimized  Description: Assess and monitor vital signs, respiratory status, and labs (WBC)  Monitor for signs of aspiration (tachypnea, cough, rales, wheezing, cyanosis, fever)  - Assess and monitor patient's ability to swallow  - Place patient up in chair to eat if possible  - HOB up at 90 degrees to eat if unable to get patient up into chair   - Supervise patient during oral intake  - Instruct patient/ family to take small bites  - Instruct patient/ family to take small single sips when taking liquids  - Follow patient-specific strategies generated by speech pathologist   Outcome: Progressing  Goal: Ventilated patient's risk of aspiration is minimized  Description: Assess and monitor vital signs, respiratory status, airway cuff pressure, and labs (WBC)  Monitor for signs of aspiration (tachypnea, cough, rales, wheezing, cyanosis, fever)  - Elevate head of bed 30 degrees if patient has tube feeding   - Monitor tube feeding  Outcome: Progressing     Problem: Nutrition  Goal: Nutrition/Hydration status is improving  Description: Monitor and assess patient's nutrition/hydration status for malnutrition (ex- brittle hair, bruises, dry skin, pale skin and conjunctiva, muscle wasting, smooth red tongue, and disorientation)  Collaborate with interdisciplinary team and initiate plan and interventions as ordered  Monitor patient's weight and dietary intake as ordered or per policy  Utilize nutrition screening tool and intervene per policy  Determine patient's food preferences and provide high-protein, high-caloric foods as appropriate  - Assist patient with eating   - Allow adequate time for meals   - Encourage patient to take dietary supplement as ordered  - Collaborate with clinical nutritionist   - Include patient/family/caregiver in decisions related to nutrition    Outcome: Progressing

## 2021-02-05 NOTE — PROGRESS NOTES
02/05/21 1200   Pain Assessment   Pain Assessment Tool Pain Assessment not indicated - pt denies pain   Pain Score No Pain   Restrictions/Precautions   Precautions Aspiration;Bed/chair alarms;Cognitive; Fall Risk;Supervision on toilet/commode   Comprehension   Comprehension (FIM) 5 - Needs help to apply glasses(visual)   Expression   Expression (FIM) 4 - Expresses basic info/needs 75-90% of time   Social Interaction   Social Interaction (FIM) 5 - Interacts appropriately with others 90% of time   Problem Solving   Problem solving (FIM) 4 - Solves basic problems 75-89% of time   Memory   Memory (FIM) 4 - Recognizes/recalls/performs 75-89%   Speech/Language/Cognition Assessmetn   Treatment Assessment Pt seen for skilled cognitive linguistic communication assessment  Pt alert and interactive seen OOB upright in chair  Pt engaged in the following skilled therapy tasks- 4 step written sequencing task  Pt was able to sequence 4 steps in correct order with 25/32acc increasing with verbal cues and direct modeling  Pt next completed simple money management task of adding up coin amounts- pt was able to complete in 12/15acc increasing with verbal cues and extended processing time  Pt last completed reverse money task of breaking down total amounts into denominations- pt was able to complete with 4/6acc increasing with verbal cues and extended processing time  At this time, pt will cont to benefit from skilled SLP services targeting cognitive linguistic communication skills to maximize independence and decreased burden of care  Swallow Information   Current Risks for Dysphagia & Aspiration Weak cough;Weak voicing;Dysphonia; Aphonia;General debilitation;New Neuro event;Brain injury;Cognitive deficit;HX neurologic dx   Current Symptoms/Concerns Cough; With food; With liquids;During meals; Difficulty chewing;Decreased oral intake;HX Dysphagia   Current Diet Dysphagia advance; Nectar thick liquid   Baseline Diet Regular; Thin liquids   Consistencies Assessed and Performance   Materials Admnistered Soft/Level 3;Regular/Solid; Nectar thick liquid   Oral Stage Mild impaired   Phargngeal Stage Mild impaired; Moderate impaired;Aspiration risk   Swallow Mechanics Mild delayed; Moderate delayed;Swallow initation;Weak larygneal rise;Aspiration risk;Vocal Cord dysfunction suspected   Esophageal Concerns Hx GERDS   Recommendations   Risk for Aspiration Present   Recommendations Dysphagia treatment   Diet Solid Recommendation Level 3 Dysphagia/ advanced/ soft to chew   Diet Liquid Recommendation Nectar thick liquid   Recommended Form of Meds As desired; As tolerated   General Precautions Aspiration precautions; Feed only when alert;Minimize distractions;Upright as possible for all oral intake;Remain upright for 45 mins after meals  (OOB fully upright with PO, set up, distant sup)   Compensatory Swallowing Strategies Place food/straw in on left side; Check for pocketing of food on the right; Alternate solids and liquids;Swallow 2 times per bite/sip; External pacing;Effortful swallow;Voluntary throat clear/cough to clear penetration   Results Reviewed with PT/Family/Caregiver   Eating   Type of Assistance Needed Set-up / clean-up   Amount of Physical Assistance Provided No physical assistance   Eating CARE Score 5   Swallow Assessment   Swallow Treatment Assessment Pt seen for skilled speech therapy session for dysphagia  Pt alert and interactive seen OOB upright in chair in room  Pt seen with lunch meal- items assessed included open face turkey sandwich, chopped green beans, mashed potatoes, and pudding with NTL  Pt noted with dry cough at baseline  Pt required some set up assistance with try but no physical assistance self feeding  Meal completion 25% and 240cc liquids  Pt ate food items c good bolus retrieval, oral control, slow oral processing, delayed transfers and swallows   Pt noted with overt coughing post swallow of turkey piece- appeared able to cough and clear however spontaneous cough/sneeze occurred throughout meal after this  Pt then also deferring most items- eating mashed potatoes and pudding only then  Pt noted with trace retention with decreased oral clearance between swallows but cleared by end of meal  Pt drank NTL by cup- good bolus retrieval, decreased sensation with some loss on Right side, mildly delayed tranfers and swallows  Pt offered other items of food preference for meal, pt cont to defer  At this time, cont to recommend dysphagia level 3 diet items with [place] Thick liquids- Aspiration precautions, slow rate, small bites, alternate food and liquids, OOb for all meals with distant supervision  Cont skilled SLP services to assess swallow function, trial upgrades with strategies to reduce s/s aspiration, and achieve LRD for increased nutrition and hydration at this time  Swallow Assessment Prognosis   Prognosis Good   Prognosis Considerations Age; Co-morbidities; Medical diagnosis; Medical prognosis;Previous level of function;Severity of impairments;New learning ability;Ability to carry over; Cooperation   SLP Therapy Minutes   SLP Time In 1200   SLP Time Out 1300   SLP Total Time (minutes) 60   SLP Mode of treatment - Individual (minutes) 60   SLP Mode of treatment - Concurrent (minutes) 0   SLP Mode of treatment - Group (minutes) 0   SLP Mode of treatment - Co-treat (minutes) 0   SLP Mode of Treatment - Total time(minutes) 60 minutes   SLP Cumulative Minutes 195   Therapy Time missed   Time missed?  No

## 2021-02-06 LAB
GLUCOSE SERPL-MCNC: 148 MG/DL (ref 65–140)
GLUCOSE SERPL-MCNC: 191 MG/DL (ref 65–140)
GLUCOSE SERPL-MCNC: 201 MG/DL (ref 65–140)
GLUCOSE SERPL-MCNC: 223 MG/DL (ref 65–140)

## 2021-02-06 PROCEDURE — 97116 GAIT TRAINING THERAPY: CPT

## 2021-02-06 PROCEDURE — 97110 THERAPEUTIC EXERCISES: CPT

## 2021-02-06 PROCEDURE — 97530 THERAPEUTIC ACTIVITIES: CPT

## 2021-02-06 PROCEDURE — 97535 SELF CARE MNGMENT TRAINING: CPT

## 2021-02-06 PROCEDURE — 82948 REAGENT STRIP/BLOOD GLUCOSE: CPT

## 2021-02-06 RX ADMIN — INSULIN LISPRO 2 UNITS: 100 INJECTION, SOLUTION INTRAVENOUS; SUBCUTANEOUS at 16:55

## 2021-02-06 RX ADMIN — DOCUSATE SODIUM 100 MG: 100 CAPSULE, LIQUID FILLED ORAL at 17:18

## 2021-02-06 RX ADMIN — FAMOTIDINE 20 MG: 20 TABLET ORAL at 08:26

## 2021-02-06 RX ADMIN — RIVAROXABAN 20 MG: 10 TABLET, FILM COATED ORAL at 08:26

## 2021-02-06 RX ADMIN — INSULIN LISPRO 10 UNITS: 100 INJECTION, SOLUTION INTRAVENOUS; SUBCUTANEOUS at 16:54

## 2021-02-06 RX ADMIN — METOPROLOL TARTRATE 50 MG: 50 TABLET, FILM COATED ORAL at 22:01

## 2021-02-06 RX ADMIN — ESCITALOPRAM 5 MG: 5 TABLET, FILM COATED ORAL at 08:26

## 2021-02-06 RX ADMIN — INSULIN LISPRO 2 UNITS: 100 INJECTION, SOLUTION INTRAVENOUS; SUBCUTANEOUS at 22:01

## 2021-02-06 RX ADMIN — ATORVASTATIN CALCIUM 80 MG: 80 TABLET, FILM COATED ORAL at 16:54

## 2021-02-06 RX ADMIN — INSULIN GLARGINE 35 UNITS: 100 INJECTION, SOLUTION SUBCUTANEOUS at 22:01

## 2021-02-06 RX ADMIN — PANTOPRAZOLE SODIUM 40 MG: 40 TABLET, DELAYED RELEASE ORAL at 08:26

## 2021-02-06 RX ADMIN — INSULIN LISPRO 10 UNITS: 100 INJECTION, SOLUTION INTRAVENOUS; SUBCUTANEOUS at 08:30

## 2021-02-06 RX ADMIN — INSULIN LISPRO 2 UNITS: 100 INJECTION, SOLUTION INTRAVENOUS; SUBCUTANEOUS at 12:41

## 2021-02-06 RX ADMIN — INSULIN LISPRO 10 UNITS: 100 INJECTION, SOLUTION INTRAVENOUS; SUBCUTANEOUS at 12:40

## 2021-02-06 RX ADMIN — DOCUSATE SODIUM 100 MG: 100 CAPSULE, LIQUID FILLED ORAL at 08:26

## 2021-02-06 NOTE — PLAN OF CARE
Problem: Prexisting or High Potential for Compromised Skin Integrity  Goal: Skin integrity is maintained or improved  Description: INTERVENTIONS:  - Identify patients at risk for skin breakdown  - Assess and monitor skin integrity  - Assess and monitor nutrition and hydration status  - Monitor labs   - Assess for incontinence   - Turn and reposition patient  - Assist with mobility/ambulation  - Relieve pressure over bony prominences  - Avoid friction and shearing  - Provide appropriate hygiene as needed including keeping skin clean and dry  - Evaluate need for skin moisturizer/barrier cream  - Collaborate with interdisciplinary team   - Patient/family teaching  - Consider wound care consult   Outcome: Progressing     Problem: Potential for Falls  Goal: Patient will remain free of falls  Description: INTERVENTIONS:  - Assess patient frequently for physical needs  -  Identify cognitive and physical deficits and behaviors that affect risk of falls    -  [place] fall precautions as indicated by assessment   - Educate patient/family on patient safety including physical limitations  - Instruct patient to call for assistance with activity based on assessment  - Modify environment to reduce risk of injury  - Consider OT/PT consult to assist with strengthening/mobility  Outcome: Progressing     Problem: PAIN - ADULT  Goal: Verbalizes/displays adequate comfort level or baseline comfort level  Description: Interventions:  - Encourage patient to monitor pain and request assistance  - Assess pain using appropriate pain scale  - Administer analgesics based on type and severity of pain and evaluate response  - Implement non-pharmacological measures as appropriate and evaluate response  - Consider cultural and social influences on pain and pain management  - Notify physician/advanced practitioner if interventions unsuccessful or patient reports new pain  Outcome: Progressing     Problem: INFECTION - ADULT  Goal: Absence or prevention of progression during hospitalization  Description: INTERVENTIONS:  - Assess and monitor for signs and symptoms of infection  - Monitor lab/diagnostic results  - Monitor all insertion sites, i e  indwelling lines, tubes, and drains  - Monitor endotracheal if appropriate and nasal secretions for changes in amount and color  - [place] appropriate cooling/warming therapies per order  - Administer medications as ordered  - Instruct and encourage patient and family to use good hand hygiene technique  - Identify and instruct in appropriate isolation precautions for identified infection/condition  Outcome: Progressing  Goal: Absence of fever/infection during neutropenic period  Description: INTERVENTIONS:  - Monitor WBC    Outcome: Progressing     Problem: SAFETY ADULT  Goal: Patient will remain free of falls  Description: INTERVENTIONS:  - Assess patient frequently for physical needs  -  Identify cognitive and physical deficits and behaviors that affect risk of falls    -  [place] fall precautions as indicated by assessment   - Educate patient/family on patient safety including physical limitations  - Instruct patient to call for assistance with activity based on assessment  - Modify environment to reduce risk of injury  - Consider OT/PT consult to assist with strengthening/mobility  Outcome: Progressing  Goal: Maintain or return to baseline ADL function  Description: INTERVENTIONS:  -  Assess patient's ability to carry out ADLs; assess patient's baseline for ADL function and identify physical deficits which impact ability to perform ADLs (bathing, care of mouth/teeth, toileting, grooming, dressing, etc )  - Assess/evaluate cause of self-care deficits   - Assess range of motion  - Assess patient's mobility; develop plan if impaired  - Assess patient's need for assistive devices and provide as appropriate  - Encourage maximum independence but intervene and supervise when necessary  - Involve family in performance of ADLs  - Assess for home care needs following discharge   - Consider OT consult to assist with ADL evaluation and planning for discharge  - Provide patient education as appropriate  Outcome: Progressing  Goal: Maintain or return mobility status to optimal level  Description: INTERVENTIONS:  - Assess patient's baseline mobility status (ambulation, transfers, stairs, etc )    - Identify cognitive and physical deficits and behaviors that affect mobility  - Identify mobility aids required to assist with transfers and/or ambulation (gait belt, sit-to-stand, lift, walker, cane, etc )  - [place] fall precautions as indicated by assessment  - Record patient progress and toleration of activity level on Mobility SBAR; progress patient to next Phase/Stage  - Instruct patient to call for assistance with activity based on assessment  - Consider rehabilitation consult to assist with strengthening/weightbearing, etc   Outcome: Progressing     Problem: DISCHARGE PLANNING  Goal: Discharge to home or other facility with appropriate resources  Description: INTERVENTIONS:  - Identify barriers to discharge w/patient and caregiver  - Arrange for needed discharge resources and transportation as appropriate  - Identify discharge learning needs (meds, wound care, etc )  - Arrange for interpretive services to assist at discharge as needed  - Refer to Case Management Department for coordinating discharge planning if the patient needs post-hospital services based on physician/advanced practitioner order or complex needs related to functional status, cognitive ability, or social support system  Outcome: Progressing     Problem: Knowledge Deficit  Goal: Patient/family/caregiver demonstrates understanding of disease process, treatment plan, medications, and discharge instructions  Description: Complete learning assessment and assess knowledge base    Interventions:  - Provide teaching at level of understanding  - Provide teaching via preferred learning methods  Outcome: Progressing     Problem: Neurological Deficit  Goal: Neurological status is stable or improving  Description: Interventions:  - Monitor and assess patient's level of consciousness, motor function, sensory function, and level of assistance needed for ADLs  - Monitor and report changes from baseline  Collaborate with interdisciplinary team to initiate plan and implement interventions as ordered  - Provide and maintain a safe environment  - Consider seizure precautions  - Consider fall precautions  - Consider aspiration precautions  - Consider bleeding precautions  Outcome: Progressing     Problem: Activity Intolerance/Impaired Mobility  Goal: Mobility/activity is maintained at optimum level for patient  Description: Interventions:  - Assess and monitor patient  barriers to mobility and need for assistive/adaptive devices  - Assess patient's emotional response to limitations  - Collaborate with interdisciplinary team and initiate plans and interventions as ordered  - Encourage independent activity per ability   - Maintain proper body alignment  - Perform active/passive rom as tolerated/ordered  - Plan activities to conserve energy   - Turn patient as appropriate  Outcome: Progressing     Problem: Communication Impairment  Goal: Ability to express needs and understand communication  Description: Assess patient's communication skills and ability to understand information  Patient will demonstrate use of effective communication techniques, alternative methods of communication and understanding even if not able to speak  - Encourage communication and provide alternate methods of communication as needed  - Collaborate with case management/ for discharge needs  - Include patient/family/caregiver in decisions related to communication    Outcome: Progressing     Problem: Potential for Aspiration  Goal: Non-ventilated patient's risk of aspiration is minimized  Description: Assess and monitor vital signs, respiratory status, and labs (WBC)  Monitor for signs of aspiration (tachypnea, cough, rales, wheezing, cyanosis, fever)  - Assess and monitor patient's ability to swallow  - Place patient up in chair to eat if possible  - HOB up at 90 degrees to eat if unable to get patient up into chair   - Supervise patient during oral intake  - Instruct patient/ family to take small bites  - Instruct patient/ family to take small single sips when taking liquids  - Follow patient-specific strategies generated by speech pathologist   Outcome: Progressing  Goal: Ventilated patient's risk of aspiration is minimized  Description: Assess and monitor vital signs, respiratory status, airway cuff pressure, and labs (WBC)  Monitor for signs of aspiration (tachypnea, cough, rales, wheezing, cyanosis, fever)  - Elevate head of bed 30 degrees if patient has tube feeding   - Monitor tube feeding  Outcome: Progressing     Problem: Nutrition  Goal: Nutrition/Hydration status is improving  Description: Monitor and assess patient's nutrition/hydration status for malnutrition (ex- brittle hair, bruises, dry skin, pale skin and conjunctiva, muscle wasting, smooth red tongue, and disorientation)  Collaborate with interdisciplinary team and initiate plan and interventions as ordered  Monitor patient's weight and dietary intake as ordered or per policy  Utilize nutrition screening tool and intervene per policy  Determine patient's food preferences and provide high-protein, high-caloric foods as appropriate  - Assist patient with eating   - Allow adequate time for meals   - Encourage patient to take dietary supplement as ordered  - Collaborate with clinical nutritionist   - Include patient/family/caregiver in decisions related to nutrition    Outcome: Progressing

## 2021-02-06 NOTE — PROGRESS NOTES
OT Daily Progress       02/06/21 1893   Pain Assessment   Pain Assessment Tool Pain Assessment not indicated - pt denies pain   Pain Score No Pain   Oral Hygiene   Type of Assistance Needed Set-up / clean-up   Amount of Physical Assistance Provided No physical assistance   Comment setup with toothbrush and toothpaste - able to manage both independently  Oral Hygiene CARE Score 5   Grooming   Able To Wash/Dry Face;Brush/Clean Teeth;Wash/Dry Hands   Limitation Noted In Coordination;Strength   Shower/Bathe Self   Type of Assistance Needed Physical assistance   Amount of Physical Assistance Provided Less than 25%   Shower/Bathe Self CARE Score 3   Bathing   Assessed Bath Style Sponge Bath   Able to Gather/Transport No   Able to [employer] Temperature Yes   Able to Wash/Rinse/Dry (body part) Left Arm;Right Arm;L Upper Leg;R Upper Leg;Chest;Abdomen;Perineal Area; Buttocks   Limitations Noted in Balance; Coordination; Endurance;Strength   Positioning Seated;Standing   Findings  eleanor hygiene in stance with close supervision only  Upper Body Dressing   Type of Assistance Needed Supervision   Amount of Physical Assistance Provided No physical assistance   Comment hospital gown    Upper Body Dressing CARE Score 4   Lower Body Dressing   Type of Assistance Needed Physical assistance   Amount of Physical Assistance Provided Less than 25%   Comment pt able to doff LB clothing, brief, socks, pants  Donns brief, pants and socks with LH AE, prn  Requires assist to completely pull sock over foot, as she was unable to do so with sock aide       Lower Body Dressing CARE Score 3   Putting On/Taking Off Footwear   Type of Assistance Needed Physical assistance   Amount of Physical Assistance Provided 25%-49%   Comment assist with sock aide to pull socks over heels    Putting On/Taking Off Footwear CARE Score 3   Dressing/Undressing Clothing   Remove UB Clothes   (gown)   Don UB Clothes   (gown)   Sit to Stand   Type of Assistance Needed Incidental touching   Amount of Physical Assistance Provided No physical assistance   Comment CG A   Sit to Stand CARE Score 4       Pt seen for follow up visit  Pt awake, alert  OOB in wheelchair  ADL treatment as noted above  Pt to OT gym for UE strengthening/endurance post ADL  Engages in UE therapeutic exercise including arm bike 4 mins forward and back, Orange t putty to retrieve small objects with R hand  Using L hand as assist   Pinching ex with orange putty for strengthening/coordination  Yellow T bar for wrist/forearm 2 sets x 20 reps for bar flexion downwards, 1 set x 20 reps for bar upward flexion  Pt returned to room at end of session, needs in reach  Tolerating well  Decreased safety awareness noted with transfers and w/c safety  Continues to require some minimal assistance with self care tasks, especially lower body  Decreased R UE coordination and strength  Will benefit from continued OT to max I and safety with self care prior to discharge

## 2021-02-06 NOTE — PLAN OF CARE
Problem: Prexisting or High Potential for Compromised Skin Integrity  Goal: Skin integrity is maintained or improved  Description: INTERVENTIONS:  - Identify patients at risk for skin breakdown  - Assess and monitor skin integrity  - Assess and monitor nutrition and hydration status  - Monitor labs   - Assess for incontinence   - Turn and reposition patient  - Assist with mobility/ambulation  - Relieve pressure over bony prominences  - Avoid friction and shearing  - Provide appropriate hygiene as needed including keeping skin clean and dry  - Evaluate need for skin moisturizer/barrier cream  - Collaborate with interdisciplinary team   - Patient/family teaching  - Consider wound care consult   2/6/2021 1211 by Deanna Archuleta RN  Outcome: Progressing  2/6/2021 1211 by Deanna Archuleta RN  Outcome: Progressing     Problem: Potential for Falls  Goal: Patient will remain free of falls  Description: INTERVENTIONS:  - Assess patient frequently for physical needs  -  Identify cognitive and physical deficits and behaviors that affect risk of falls    -  [place] fall precautions as indicated by assessment   - Educate patient/family on patient safety including physical limitations  - Instruct patient to call for assistance with activity based on assessment  - Modify environment to reduce risk of injury  - Consider OT/PT consult to assist with strengthening/mobility  2/6/2021 1211 by Deanna Archuleta RN  Outcome: Progressing  2/6/2021 1211 by Deanna Archuleta RN  Outcome: Progressing     Problem: PAIN - ADULT  Goal: Verbalizes/displays adequate comfort level or baseline comfort level  Description: Interventions:  - Encourage patient to monitor pain and request assistance  - Assess pain using appropriate pain scale  - Administer analgesics based on type and severity of pain and evaluate response  - Implement non-pharmacological measures as appropriate and evaluate response  - Consider cultural and social influences on pain and pain management  - Notify physician/advanced practitioner if interventions unsuccessful or patient reports new pain  2/6/2021 1211 by Sabrina Ross RN  Outcome: Progressing  2/6/2021 1211 by Sabrina Ross RN  Outcome: Progressing     Problem: INFECTION - ADULT  Goal: Absence or prevention of progression during hospitalization  Description: INTERVENTIONS:  - Assess and monitor for signs and symptoms of infection  - Monitor lab/diagnostic results  - Monitor all insertion sites, i e  indwelling lines, tubes, and drains  - Monitor endotracheal if appropriate and nasal secretions for changes in amount and color  - [place] appropriate cooling/warming therapies per order  - Administer medications as ordered  - Instruct and encourage patient and family to use good hand hygiene technique  - Identify and instruct in appropriate isolation precautions for identified infection/condition  2/6/2021 1211 by Sabrina Ross RN  Outcome: Progressing  2/6/2021 1211 by Sabrina Ross RN  Outcome: Progressing  Goal: Absence of fever/infection during neutropenic period  Description: INTERVENTIONS:  - Monitor WBC    2/6/2021 1211 by Sabrina Ross RN  Outcome: Progressing  2/6/2021 1211 by Sabrina Ross RN  Outcome: Progressing     Problem: SAFETY ADULT  Goal: Patient will remain free of falls  Description: INTERVENTIONS:  - Assess patient frequently for physical needs  -  Identify cognitive and physical deficits and behaviors that affect risk of falls    -  [place] fall precautions as indicated by assessment   - Educate patient/family on patient safety including physical limitations  - Instruct patient to call for assistance with activity based on assessment  - Modify environment to reduce risk of injury  - Consider OT/PT consult to assist with strengthening/mobility  2/6/2021 1211 by Sabrina Ross RN  Outcome: Progressing  2/6/2021 1211 by Sabrina Ross RN  Outcome: Progressing  Goal: Maintain or return to baseline ADL function  Description: INTERVENTIONS:  -  Assess patient's ability to carry out ADLs; assess patient's baseline for ADL function and identify physical deficits which impact ability to perform ADLs (bathing, care of mouth/teeth, toileting, grooming, dressing, etc )  - Assess/evaluate cause of self-care deficits   - Assess range of motion  - Assess patient's mobility; develop plan if impaired  - Assess patient's need for assistive devices and provide as appropriate  - Encourage maximum independence but intervene and supervise when necessary  - Involve family in performance of ADLs  - Assess for home care needs following discharge   - Consider OT consult to assist with ADL evaluation and planning for discharge  - Provide patient education as appropriate  2/6/2021 1211 by Phoebe Farrar RN  Outcome: Progressing  2/6/2021 1211 by Phoebe Farrar RN  Outcome: Progressing  Goal: Maintain or return mobility status to optimal level  Description: INTERVENTIONS:  - Assess patient's baseline mobility status (ambulation, transfers, stairs, etc )    - Identify cognitive and physical deficits and behaviors that affect mobility  - Identify mobility aids required to assist with transfers and/or ambulation (gait belt, sit-to-stand, lift, walker, cane, etc )  - [place] fall precautions as indicated by assessment  - Record patient progress and toleration of activity level on Mobility SBAR; progress patient to next Phase/Stage  - Instruct patient to call for assistance with activity based on assessment  - Consider rehabilitation consult to assist with strengthening/weightbearing, etc   2/6/2021 1211 by Phoebe Farrar RN  Outcome: Progressing  2/6/2021 1211 by Phoebe Farrar RN  Outcome: Progressing     Problem: DISCHARGE PLANNING  Goal: Discharge to home or other facility with appropriate resources  Description: INTERVENTIONS:  - Identify barriers to discharge w/patient and caregiver  - Arrange for needed discharge resources and transportation as appropriate  - Identify discharge learning needs (meds, wound care, etc )  - Arrange for interpretive services to assist at discharge as needed  - Refer to Case Management Department for coordinating discharge planning if the patient needs post-hospital services based on physician/advanced practitioner order or complex needs related to functional status, cognitive ability, or social support system  2/6/2021 1211 by Jocelyn Flores RN  Outcome: Progressing  2/6/2021 1211 by Jocelyn Flores RN  Outcome: Progressing     Problem: Knowledge Deficit  Goal: Patient/family/caregiver demonstrates understanding of disease process, treatment plan, medications, and discharge instructions  Description: Complete learning assessment and assess knowledge base  Interventions:  - Provide teaching at level of understanding  - Provide teaching via preferred learning methods  2/6/2021 1211 by Jocelyn Flores RN  Outcome: Progressing  2/6/2021 1211 by Jocelyn Flores RN  Outcome: Progressing     Problem: Neurological Deficit  Goal: Neurological status is stable or improving  Description: Interventions:  - Monitor and assess patient's level of consciousness, motor function, sensory function, and level of assistance needed for ADLs  - Monitor and report changes from baseline  Collaborate with interdisciplinary team to initiate plan and implement interventions as ordered  - Provide and maintain a safe environment  - Consider seizure precautions  - Consider fall precautions  - Consider aspiration precautions  - Consider bleeding precautions  2/6/2021 1211 by Jocelyn Flores RN  Outcome: Progressing  2/6/2021 1211 by Jocelyn Flores RN  Outcome: Progressing     Problem:  Activity Intolerance/Impaired Mobility  Goal: Mobility/activity is maintained at optimum level for patient  Description: Interventions:  - Assess and monitor patient  barriers to mobility and need for assistive/adaptive devices  - Assess patient's emotional response to limitations  - Collaborate with interdisciplinary team and initiate plans and interventions as ordered  - Encourage independent activity per ability   - Maintain proper body alignment  - Perform active/passive rom as tolerated/ordered  - Plan activities to conserve energy   - Turn patient as appropriate  2/6/2021 1211 by Monty Jenkins RN  Outcome: Progressing  2/6/2021 1211 by Monty Jenkins RN  Outcome: Progressing     Problem: Communication Impairment  Goal: Ability to express needs and understand communication  Description: Assess patient's communication skills and ability to understand information  Patient will demonstrate use of effective communication techniques, alternative methods of communication and understanding even if not able to speak  - Encourage communication and provide alternate methods of communication as needed  - Collaborate with case management/ for discharge needs  - Include patient/family/caregiver in decisions related to communication  2/6/2021 1211 by Monty Jenkins RN  Outcome: Progressing  2/6/2021 1211 by Monty Jenkins RN  Outcome: Progressing     Problem: Potential for Aspiration  Goal: Non-ventilated patient's risk of aspiration is minimized  Description: Assess and monitor vital signs, respiratory status, and labs (WBC)  Monitor for signs of aspiration (tachypnea, cough, rales, wheezing, cyanosis, fever)  - Assess and monitor patient's ability to swallow  - Place patient up in chair to eat if possible  - HOB up at 90 degrees to eat if unable to get patient up into chair   - Supervise patient during oral intake  - Instruct patient/ family to take small bites  - Instruct patient/ family to take small single sips when taking liquids    - Follow patient-specific strategies generated by speech pathologist   2/6/2021 1211 by Monty Jenkins RN  Outcome: Progressing  2/6/2021 1211 by Crissy Junior RN  Outcome: Progressing  Goal: Ventilated patient's risk of aspiration is minimized  Description: Assess and monitor vital signs, respiratory status, airway cuff pressure, and labs (WBC)  Monitor for signs of aspiration (tachypnea, cough, rales, wheezing, cyanosis, fever)  - Elevate head of bed 30 degrees if patient has tube feeding   - Monitor tube feeding  2/6/2021 1211 by Crissy Junior RN  Outcome: Progressing  2/6/2021 1211 by Crissy Junior RN  Outcome: Progressing     Problem: Nutrition  Goal: Nutrition/Hydration status is improving  Description: Monitor and assess patient's nutrition/hydration status for malnutrition (ex- brittle hair, bruises, dry skin, pale skin and conjunctiva, muscle wasting, smooth red tongue, and disorientation)  Collaborate with interdisciplinary team and initiate plan and interventions as ordered  Monitor patient's weight and dietary intake as ordered or per policy  Utilize nutrition screening tool and intervene per policy  Determine patient's food preferences and provide high-protein, high-caloric foods as appropriate  - Assist patient with eating   - Allow adequate time for meals   - Encourage patient to take dietary supplement as ordered  - Collaborate with clinical nutritionist   - Include patient/family/caregiver in decisions related to nutrition    2/6/2021 1211 by Crissy Junior RN  Outcome: Progressing  2/6/2021 1211 by Crissy Junior RN  Outcome: Progressing

## 2021-02-06 NOTE — PLAN OF CARE
Problem: Prexisting or High Potential for Compromised Skin Integrity  Goal: Skin integrity is maintained or improved  Description: INTERVENTIONS:  - Identify patients at risk for skin breakdown  - Assess and monitor skin integrity  - Assess and monitor nutrition and hydration status  - Monitor labs   - Assess for incontinence   - Turn and reposition patient  - Assist with mobility/ambulation  - Relieve pressure over bony prominences  - Avoid friction and shearing  - Provide appropriate hygiene as needed including keeping skin clean and dry  - Evaluate need for skin moisturizer/barrier cream  - Collaborate with interdisciplinary team   - Patient/family teaching  - Consider wound care consult   Outcome: Progressing     Problem: Potential for Falls  Goal: Patient will remain free of falls  Description: INTERVENTIONS:  - Assess patient frequently for physical needs  -  Identify cognitive and physical deficits and behaviors that affect risk of falls    -  [place] fall precautions as indicated by assessment   - Educate patient/family on patient safety including physical limitations  - Instruct patient to call for assistance with activity based on assessment  - Modify environment to reduce risk of injury  - Consider OT/PT consult to assist with strengthening/mobility  Outcome: Progressing     Problem: PAIN - ADULT  Goal: Verbalizes/displays adequate comfort level or baseline comfort level  Description: Interventions:  - Encourage patient to monitor pain and request assistance  - Assess pain using appropriate pain scale  - Administer analgesics based on type and severity of pain and evaluate response  - Implement non-pharmacological measures as appropriate and evaluate response  - Consider cultural and social influences on pain and pain management  - Notify physician/advanced practitioner if interventions unsuccessful or patient reports new pain  Outcome: Progressing     Problem: INFECTION - ADULT  Goal: Absence or prevention of progression during hospitalization  Description: INTERVENTIONS:  - Assess and monitor for signs and symptoms of infection  - Monitor lab/diagnostic results  - Monitor all insertion sites, i e  indwelling lines, tubes, and drains  - Monitor endotracheal if appropriate and nasal secretions for changes in amount and color  - [place] appropriate cooling/warming therapies per order  - Administer medications as ordered  - Instruct and encourage patient and family to use good hand hygiene technique  - Identify and instruct in appropriate isolation precautions for identified infection/condition  Outcome: Progressing  Goal: Absence of fever/infection during neutropenic period  Description: INTERVENTIONS:  - Monitor WBC    Outcome: Progressing     Problem: SAFETY ADULT  Goal: Patient will remain free of falls  Description: INTERVENTIONS:  - Assess patient frequently for physical needs  -  Identify cognitive and physical deficits and behaviors that affect risk of falls    -  [place] fall precautions as indicated by assessment   - Educate patient/family on patient safety including physical limitations  - Instruct patient to call for assistance with activity based on assessment  - Modify environment to reduce risk of injury  - Consider OT/PT consult to assist with strengthening/mobility  Outcome: Progressing  Goal: Maintain or return to baseline ADL function  Description: INTERVENTIONS:  -  Assess patient's ability to carry out ADLs; assess patient's baseline for ADL function and identify physical deficits which impact ability to perform ADLs (bathing, care of mouth/teeth, toileting, grooming, dressing, etc )  - Assess/evaluate cause of self-care deficits   - Assess range of motion  - Assess patient's mobility; develop plan if impaired  - Assess patient's need for assistive devices and provide as appropriate  - Encourage maximum independence but intervene and supervise when necessary  - Involve family in performance of ADLs  - Assess for home care needs following discharge   - Consider OT consult to assist with ADL evaluation and planning for discharge  - Provide patient education as appropriate  Outcome: Progressing  Goal: Maintain or return mobility status to optimal level  Description: INTERVENTIONS:  - Assess patient's baseline mobility status (ambulation, transfers, stairs, etc )    - Identify cognitive and physical deficits and behaviors that affect mobility  - Identify mobility aids required to assist with transfers and/or ambulation (gait belt, sit-to-stand, lift, walker, cane, etc )  - [place] fall precautions as indicated by assessment  - Record patient progress and toleration of activity level on Mobility SBAR; progress patient to next Phase/Stage  - Instruct patient to call for assistance with activity based on assessment  - Consider rehabilitation consult to assist with strengthening/weightbearing, etc   Outcome: Progressing     Problem: DISCHARGE PLANNING  Goal: Discharge to home or other facility with appropriate resources  Description: INTERVENTIONS:  - Identify barriers to discharge w/patient and caregiver  - Arrange for needed discharge resources and transportation as appropriate  - Identify discharge learning needs (meds, wound care, etc )  - Arrange for interpretive services to assist at discharge as needed  - Refer to Case Management Department for coordinating discharge planning if the patient needs post-hospital services based on physician/advanced practitioner order or complex needs related to functional status, cognitive ability, or social support system  Outcome: Progressing     Problem: Knowledge Deficit  Goal: Patient/family/caregiver demonstrates understanding of disease process, treatment plan, medications, and discharge instructions  Description: Complete learning assessment and assess knowledge base    Interventions:  - Provide teaching at level of understanding  - Provide teaching via preferred learning methods  Outcome: Progressing     Problem: Neurological Deficit  Goal: Neurological status is stable or improving  Description: Interventions:  - Monitor and assess patient's level of consciousness, motor function, sensory function, and level of assistance needed for ADLs  - Monitor and report changes from baseline  Collaborate with interdisciplinary team to initiate plan and implement interventions as ordered  - Provide and maintain a safe environment  - Consider seizure precautions  - Consider fall precautions  - Consider aspiration precautions  - Consider bleeding precautions  Outcome: Progressing     Problem: Activity Intolerance/Impaired Mobility  Goal: Mobility/activity is maintained at optimum level for patient  Description: Interventions:  - Assess and monitor patient  barriers to mobility and need for assistive/adaptive devices  - Assess patient's emotional response to limitations  - Collaborate with interdisciplinary team and initiate plans and interventions as ordered  - Encourage independent activity per ability   - Maintain proper body alignment  - Perform active/passive rom as tolerated/ordered  - Plan activities to conserve energy   - Turn patient as appropriate  Outcome: Progressing     Problem: Communication Impairment  Goal: Ability to express needs and understand communication  Description: Assess patient's communication skills and ability to understand information  Patient will demonstrate use of effective communication techniques, alternative methods of communication and understanding even if not able to speak  - Encourage communication and provide alternate methods of communication as needed  - Collaborate with case management/ for discharge needs  - Include patient/family/caregiver in decisions related to communication    Outcome: Progressing     Problem: Potential for Aspiration  Goal: Non-ventilated patient's risk of aspiration is minimized  Description: Assess and monitor vital signs, respiratory status, and labs (WBC)  Monitor for signs of aspiration (tachypnea, cough, rales, wheezing, cyanosis, fever)  - Assess and monitor patient's ability to swallow  - Place patient up in chair to eat if possible  - HOB up at 90 degrees to eat if unable to get patient up into chair   - Supervise patient during oral intake  - Instruct patient/ family to take small bites  - Instruct patient/ family to take small single sips when taking liquids  - Follow patient-specific strategies generated by speech pathologist   Outcome: Progressing  Goal: Ventilated patient's risk of aspiration is minimized  Description: Assess and monitor vital signs, respiratory status, airway cuff pressure, and labs (WBC)  Monitor for signs of aspiration (tachypnea, cough, rales, wheezing, cyanosis, fever)  - Elevate head of bed 30 degrees if patient has tube feeding   - Monitor tube feeding  Outcome: Progressing     Problem: Nutrition  Goal: Nutrition/Hydration status is improving  Description: Monitor and assess patient's nutrition/hydration status for malnutrition (ex- brittle hair, bruises, dry skin, pale skin and conjunctiva, muscle wasting, smooth red tongue, and disorientation)  Collaborate with interdisciplinary team and initiate plan and interventions as ordered  Monitor patient's weight and dietary intake as ordered or per policy  Utilize nutrition screening tool and intervene per policy  Determine patient's food preferences and provide high-protein, high-caloric foods as appropriate  - Assist patient with eating   - Allow adequate time for meals   - Encourage patient to take dietary supplement as ordered  - Collaborate with clinical nutritionist   - Include patient/family/caregiver in decisions related to nutrition    Outcome: Progressing

## 2021-02-06 NOTE — PROGRESS NOTES
02/06/21 0830   Pain Assessment   Pain Assessment Tool Pain Assessment not indicated - pt denies pain   Restrictions/Precautions   Precautions Aphasia; Aspiration;Cognitive; Fall Risk   Weight Bearing Restrictions No   ROM Restrictions No   Cognition   Overall Cognitive Status Impaired   Orientation Level Oriented X4   Subjective   Subjective "I'm done eating "   Sit to Stand   Type of Assistance Needed Incidental touching;Verbal cues   Amount of Physical Assistance Provided Less than 25%   Comment CGA/CS   Sit to Stand CARE Score 3   Transfer Bed/Chair/Wheelchair   Limitations Noted In Balance; Endurance; Sequencing;UE Strength;LE Strength   Adaptive Equipment Roller Walker   Sit to [facility]  (CGA/CS)   Stand to [facility]  ([facility]   Walk 10 Feet   Type of Assistance Needed Incidental touching;Verbal cues   Amount of Physical Assistance Provided Less than 25%   Comment CGA   Walk 10 Feet CARE Score 3   Walk 50 Feet with Two Turns   Type of Assistance Needed Incidental touching;Verbal cues   Amount of Physical Assistance Provided Less than 25%   Comment CGA   Walk 50 Feet with Two Turns CARE Score 3   Ambulation   Does the patient walk? 2  Yes   Primary Mode of Locomotion Prior to Admission Walk   Distance Walked (feet) 66 ft  (66', 39')   Assist Device Roller Walker   Gait Pattern Inconsistant Jaylyn; Slow Jaylyn;Decreased foot clearance;R foot drag; Forward Flexion;R hemiparesis; Lateral deviation;Narrow MAXWELL;Step to; Improper weight shift   Limitations Noted In Balance; Coordination;Device Management; Endurance; Heel Strike;Posture; Safety; Sequencing;Speed;Strength;Swing   Provided Assistance with: Balance   Walk Assist Level Contact Guard; Chair Follow   Findings level; VCs to avoid R foot drag   Wheel 50 Feet with Two Turns   Type of Assistance Needed Physical assistance;Verbal cues   Amount of Physical Assistance Provided 25%-49%   Comment Min A   Wheel 50 Feet with Two Turns CARE Score 3 Wheelchair mobility   Does the patient use a wheelchair? 1  Yes   Type of Wheelchair Used 1  Manual   Method Right upper extremity; Left upper extremity   Assistance Provided For Locking Brakes;Obstacles   Distance Level Surface (feet) 95 ft   Distance Wheeled 3% Grade 0 ft   Findings level; Min A   Therapeutic Interventions   Strengthening seated BLE therex x30; rest breaks taken as needed   Balance transfers, ambulation   Other w/c mobility   Assessment   Treatment Assessment Pt tolerated PT session well this morning  Pt received sitting in w/c after eating breakfast and agreeable to PT session  Pt performed STS with CGA/CS prior to ambulating 77' and 44' with RW and CGA to improve endurance with seated rest break taken between trials  Pt continues to require VCs to avoid R foot drag during ambulation  Pt did shown improvements with proper hand placement during transfers this session compared to last  Pt required rest breaks throughout PT session due to decreased activity tolerance  Pt brought to PT room to perform seated BLE therex to improve strength & ROM  Pt then performed w/c mobility of 95' with Min A back to room  Pt presents with decreased balance, endurance, strength, ROM, safety awareness, functional mobility, expressive aphasia, and cognition  Pt would continue to benefit from skilled [facility] PT to address these deficits prior to safe d/c home  Problem List Decreased strength;Decreased range of motion;Decreased endurance; Impaired balance;Decreased mobility; Decreased coordination;Decreased cognition;Decreased safety awareness   PT Barriers   Physical Impairment Decreased strength;Decreased range of motion;Decreased endurance; Impaired balance;Decreased mobility; Decreased coordination;Decreased cognition;Decreased safety awareness   Functional Limitation Standing;Transfers; Walking; Wheelchair management;Stair negotiation;Ramp negotiation;Car transfers   Plan   Treatment/Interventions ADL retraining;Functional transfer training;LE strengthening/ROM; Therapeutic exercise; Endurance training;Cognitive reorientation;Patient/family training;Equipment eval/education;Gait training; Compensatory technique education   Progress Progressing toward goals   PT Therapy Minutes   PT Time In 0830   PT Time Out 0930   PT Total Time (minutes) 60   PT Mode of treatment - Individual (minutes) 60   PT Mode of treatment - Concurrent (minutes) 0   PT Mode of treatment - Group (minutes) 0   PT Mode of treatment - Co-treat (minutes) 0   PT Mode of Treatment - Total time(minutes) 60 minutes   PT Cumulative Minutes 270   Therapy Time missed   Time missed?  No

## 2021-02-06 NOTE — NURSING NOTE
Pt slept through the night with no signs of pain or distress observed during hourly rounding  Pt has expressive aphasia with delayed responses  Pt was unable to give correct month and year when assessed at start of shift  When assessed pt HR was regular, but has been Irregular for previous shifts  Call bell within reach  Will continue to monitor and follow plan of care

## 2021-02-07 PROBLEM — J38.02 BILATERAL VOCAL CORD PARALYSIS: Status: RESOLVED | Noted: 2018-05-04 | Resolved: 2021-02-07

## 2021-02-07 LAB
GLUCOSE SERPL-MCNC: 145 MG/DL (ref 65–140)
GLUCOSE SERPL-MCNC: 146 MG/DL (ref 65–140)
GLUCOSE SERPL-MCNC: 176 MG/DL (ref 65–140)
GLUCOSE SERPL-MCNC: 207 MG/DL (ref 65–140)

## 2021-02-07 PROCEDURE — 97535 SELF CARE MNGMENT TRAINING: CPT

## 2021-02-07 PROCEDURE — 82948 REAGENT STRIP/BLOOD GLUCOSE: CPT

## 2021-02-07 PROCEDURE — 97530 THERAPEUTIC ACTIVITIES: CPT

## 2021-02-07 PROCEDURE — 97110 THERAPEUTIC EXERCISES: CPT

## 2021-02-07 RX ADMIN — METOPROLOL TARTRATE 50 MG: 50 TABLET, FILM COATED ORAL at 08:28

## 2021-02-07 RX ADMIN — INSULIN LISPRO 1 UNITS: 100 INJECTION, SOLUTION INTRAVENOUS; SUBCUTANEOUS at 12:45

## 2021-02-07 RX ADMIN — HYDROCHLOROTHIAZIDE: 12.5 TABLET ORAL at 08:27

## 2021-02-07 RX ADMIN — INSULIN LISPRO 2 UNITS: 100 INJECTION, SOLUTION INTRAVENOUS; SUBCUTANEOUS at 17:16

## 2021-02-07 RX ADMIN — ATORVASTATIN CALCIUM 80 MG: 80 TABLET, FILM COATED ORAL at 17:16

## 2021-02-07 RX ADMIN — DOCUSATE SODIUM 100 MG: 100 CAPSULE, LIQUID FILLED ORAL at 17:16

## 2021-02-07 RX ADMIN — INSULIN LISPRO 10 UNITS: 100 INJECTION, SOLUTION INTRAVENOUS; SUBCUTANEOUS at 12:45

## 2021-02-07 RX ADMIN — INSULIN LISPRO 10 UNITS: 100 INJECTION, SOLUTION INTRAVENOUS; SUBCUTANEOUS at 08:28

## 2021-02-07 RX ADMIN — RIVAROXABAN 20 MG: 10 TABLET, FILM COATED ORAL at 08:28

## 2021-02-07 RX ADMIN — DOCUSATE SODIUM 100 MG: 100 CAPSULE, LIQUID FILLED ORAL at 08:28

## 2021-02-07 RX ADMIN — ESCITALOPRAM 5 MG: 5 TABLET, FILM COATED ORAL at 08:27

## 2021-02-07 RX ADMIN — PANTOPRAZOLE SODIUM 40 MG: 40 TABLET, DELAYED RELEASE ORAL at 08:27

## 2021-02-07 RX ADMIN — INSULIN GLARGINE 35 UNITS: 100 INJECTION, SOLUTION SUBCUTANEOUS at 21:55

## 2021-02-07 RX ADMIN — INSULIN LISPRO 10 UNITS: 100 INJECTION, SOLUTION INTRAVENOUS; SUBCUTANEOUS at 17:16

## 2021-02-07 RX ADMIN — FAMOTIDINE 20 MG: 20 TABLET ORAL at 08:28

## 2021-02-07 NOTE — PLAN OF CARE
Problem: Prexisting or High Potential for Compromised Skin Integrity  Goal: Skin integrity is maintained or improved  Description: INTERVENTIONS:  - Identify patients at risk for skin breakdown  - Assess and monitor skin integrity  - Assess and monitor nutrition and hydration status  - Monitor labs   - Assess for incontinence   - Turn and reposition patient  - Assist with mobility/ambulation  - Relieve pressure over bony prominences  - Avoid friction and shearing  - Provide appropriate hygiene as needed including keeping skin clean and dry  - Evaluate need for skin moisturizer/barrier cream  - Collaborate with interdisciplinary team   - Patient/family teaching  - Consider wound care consult   Outcome: Progressing     Problem: Potential for Falls  Goal: Patient will remain free of falls  Description: INTERVENTIONS:  - Assess patient frequently for physical needs  -  Identify cognitive and physical deficits and behaviors that affect risk of falls    -  [place] fall precautions as indicated by assessment   - Educate patient/family on patient safety including physical limitations  - Instruct patient to call for assistance with activity based on assessment  - Modify environment to reduce risk of injury  - Consider OT/PT consult to assist with strengthening/mobility  Outcome: Progressing     Problem: PAIN - ADULT  Goal: Verbalizes/displays adequate comfort level or baseline comfort level  Description: Interventions:  - Encourage patient to monitor pain and request assistance  - Assess pain using appropriate pain scale  - Administer analgesics based on type and severity of pain and evaluate response  - Implement non-pharmacological measures as appropriate and evaluate response  - Consider cultural and social influences on pain and pain management  - Notify physician/advanced practitioner if interventions unsuccessful or patient reports new pain  Outcome: Progressing     Problem: INFECTION - ADULT  Goal: Absence or prevention of progression during hospitalization  Description: INTERVENTIONS:  - Assess and monitor for signs and symptoms of infection  - Monitor lab/diagnostic results  - Monitor all insertion sites, i e  indwelling lines, tubes, and drains  - Monitor endotracheal if appropriate and nasal secretions for changes in amount and color  - [place] appropriate cooling/warming therapies per order  - Administer medications as ordered  - Instruct and encourage patient and family to use good hand hygiene technique  - Identify and instruct in appropriate isolation precautions for identified infection/condition  Outcome: Progressing  Goal: Absence of fever/infection during neutropenic period  Description: INTERVENTIONS:  - Monitor WBC    Outcome: Progressing     Problem: SAFETY ADULT  Goal: Patient will remain free of falls  Description: INTERVENTIONS:  - Assess patient frequently for physical needs  -  Identify cognitive and physical deficits and behaviors that affect risk of falls    -  [place] fall precautions as indicated by assessment   - Educate patient/family on patient safety including physical limitations  - Instruct patient to call for assistance with activity based on assessment  - Modify environment to reduce risk of injury  - Consider OT/PT consult to assist with strengthening/mobility  Outcome: Progressing  Goal: Maintain or return to baseline ADL function  Description: INTERVENTIONS:  -  Assess patient's ability to carry out ADLs; assess patient's baseline for ADL function and identify physical deficits which impact ability to perform ADLs (bathing, care of mouth/teeth, toileting, grooming, dressing, etc )  - Assess/evaluate cause of self-care deficits   - Assess range of motion  - Assess patient's mobility; develop plan if impaired  - Assess patient's need for assistive devices and provide as appropriate  - Encourage maximum independence but intervene and supervise when necessary  - Involve family in performance of ADLs  - Assess for home care needs following discharge   - Consider OT consult to assist with ADL evaluation and planning for discharge  - Provide patient education as appropriate  Outcome: Progressing  Goal: Maintain or return mobility status to optimal level  Description: INTERVENTIONS:  - Assess patient's baseline mobility status (ambulation, transfers, stairs, etc )    - Identify cognitive and physical deficits and behaviors that affect mobility  - Identify mobility aids required to assist with transfers and/or ambulation (gait belt, sit-to-stand, lift, walker, cane, etc )  - [place] fall precautions as indicated by assessment  - Record patient progress and toleration of activity level on Mobility SBAR; progress patient to next Phase/Stage  - Instruct patient to call for assistance with activity based on assessment  - Consider rehabilitation consult to assist with strengthening/weightbearing, etc   Outcome: Progressing     Problem: DISCHARGE PLANNING  Goal: Discharge to home or other facility with appropriate resources  Description: INTERVENTIONS:  - Identify barriers to discharge w/patient and caregiver  - Arrange for needed discharge resources and transportation as appropriate  - Identify discharge learning needs (meds, wound care, etc )  - Arrange for interpretive services to assist at discharge as needed  - Refer to Case Management Department for coordinating discharge planning if the patient needs post-hospital services based on physician/advanced practitioner order or complex needs related to functional status, cognitive ability, or social support system  Outcome: Progressing     Problem: Knowledge Deficit  Goal: Patient/family/caregiver demonstrates understanding of disease process, treatment plan, medications, and discharge instructions  Description: Complete learning assessment and assess knowledge base    Interventions:  - Provide teaching at level of understanding  - Provide teaching via preferred learning methods  Outcome: Progressing     Problem: Neurological Deficit  Goal: Neurological status is stable or improving  Description: Interventions:  - Monitor and assess patient's level of consciousness, motor function, sensory function, and level of assistance needed for ADLs  - Monitor and report changes from baseline  Collaborate with interdisciplinary team to initiate plan and implement interventions as ordered  - Provide and maintain a safe environment  - Consider seizure precautions  - Consider fall precautions  - Consider aspiration precautions  - Consider bleeding precautions  Outcome: Progressing     Problem: Activity Intolerance/Impaired Mobility  Goal: Mobility/activity is maintained at optimum level for patient  Description: Interventions:  - Assess and monitor patient  barriers to mobility and need for assistive/adaptive devices  - Assess patient's emotional response to limitations  - Collaborate with interdisciplinary team and initiate plans and interventions as ordered  - Encourage independent activity per ability   - Maintain proper body alignment  - Perform active/passive rom as tolerated/ordered  - Plan activities to conserve energy   - Turn patient as appropriate  Outcome: Progressing     Problem: Communication Impairment  Goal: Ability to express needs and understand communication  Description: Assess patient's communication skills and ability to understand information  Patient will demonstrate use of effective communication techniques, alternative methods of communication and understanding even if not able to speak  - Encourage communication and provide alternate methods of communication as needed  - Collaborate with case management/ for discharge needs  - Include patient/family/caregiver in decisions related to communication    Outcome: Progressing     Problem: Potential for Aspiration  Goal: Non-ventilated patient's risk of aspiration is minimized  Description: Assess and monitor vital signs, respiratory status, and labs (WBC)  Monitor for signs of aspiration (tachypnea, cough, rales, wheezing, cyanosis, fever)  - Assess and monitor patient's ability to swallow  - Place patient up in chair to eat if possible  - HOB up at 90 degrees to eat if unable to get patient up into chair   - Supervise patient during oral intake  - Instruct patient/ family to take small bites  - Instruct patient/ family to take small single sips when taking liquids  - Follow patient-specific strategies generated by speech pathologist   Outcome: Progressing  Goal: Ventilated patient's risk of aspiration is minimized  Description: Assess and monitor vital signs, respiratory status, airway cuff pressure, and labs (WBC)  Monitor for signs of aspiration (tachypnea, cough, rales, wheezing, cyanosis, fever)  - Elevate head of bed 30 degrees if patient has tube feeding   - Monitor tube feeding  Outcome: Progressing     Problem: Nutrition  Goal: Nutrition/Hydration status is improving  Description: Monitor and assess patient's nutrition/hydration status for malnutrition (ex- brittle hair, bruises, dry skin, pale skin and conjunctiva, muscle wasting, smooth red tongue, and disorientation)  Collaborate with interdisciplinary team and initiate plan and interventions as ordered  Monitor patient's weight and dietary intake as ordered or per policy  Utilize nutrition screening tool and intervene per policy  Determine patient's food preferences and provide high-protein, high-caloric foods as appropriate  - Assist patient with eating   - Allow adequate time for meals   - Encourage patient to take dietary supplement as ordered  - Collaborate with clinical nutritionist   - Include patient/family/caregiver in decisions related to nutrition    Outcome: Progressing

## 2021-02-07 NOTE — PROGRESS NOTES
02/07/21 1122   Pain Assessment   Pain Assessment Tool Pain Assessment not indicated - pt denies pain   Pain Score No Pain   Restrictions/Precautions   Precautions Cognitive; Fall Risk;Fluid restriction   Lower Body Dressing   Type of Assistance Needed Physical assistance   Amount of Physical Assistance Provided 50%-74%   Comment pt able to pull pants and underwear down to ankle, requires assist to thread both feet through underwear and pants and pull up and over both hips   Lower Body Dressing CARE Score 2   Dressing/Undressing Clothing   Remove LB Clothes Pants; Undergarment   Don LB Clothes Pants; Undergarment   Limitations Noted In Balance; Coordination; Endurance; Safety;Strength;ROM   Positioning Supported Sit;Standing   Lying to Sitting on Side of Bed   Type of Assistance Needed Incidental touching   Comment CGA   Lying to Sitting on Side of Bed CARE Score 4   Sit to Stand   Type of Assistance Needed Incidental touching   Comment CGA   Sit to Stand CARE Score 4   Bed-Chair Transfer   Type of Assistance Needed Incidental touching   Comment CGA   Chair/Bed-to-Chair Transfer CARE Score 4   Transfer Bed/Chair/Wheelchair   Limitations Noted In Balance; Endurance;UE Strength;LE Strength   Toileting Hygiene   Type of Assistance Needed Physical assistance   Amount of Physical Assistance Provided 50%-74%   Comment pt able to pull pants down and wipe perineal area, assist to wipe buttocks and pull pants up over hips   [address] Score 2   Toileting   Able to [address] down yes, up no  Able to Manage Clothing Closures Yes   Manage Hygiene Bladder; Bowel  (brief incont of urine)   Limitations Noted In Balance; Coordination; Safety;UE Strength;LE Strength   Toilet Transfer   Type of Assistance Needed Physical assistance   Amount of Physical Assistance Provided Less than 25%   Comment min A   Toilet Transfer CARE Score 3   Toilet Transfer   Surface Assessed Raised Toilet   Transfer Technique Stand Pivot Limitations Noted In Balance; Endurance;ROM;Safety;UE Strength;LE Strength   Adaptive Equipment Grab Bar   Positioning Concerns Safety   Exercise Tools   Hand Gripper x30 3lb hand gripper bilat hand   Other Exercise Tool 1 sanderbox x30 2lb all planes   Other Exercise Tool 2 card matching activity   Cognition   Overall Cognitive Status Impaired   Arousal/Participation Alert; Responsive; Cooperative   Attention Within functional limits   Orientation Level Oriented X4   Memory Decreased recall of recent events   Following Commands Follows one step commands without difficulty   Additional Activities   Additional Activities Comments w/c mobility in hallway min A   Other Comments   Assessment Pt participates in 11 minutes of concurrent treatment addressing therapeutic activity to increase independence with ADL completion  Assessment   Treatment Assessment Pt agreeable to OT session this PM, received supine in bed  Transferred out of bed and into w/c with CGA  Self-propelled down the [place]rera to therapy room with min A to complete therapeutic activity and exercise  Pt tolerated exercises well with no report of increase pain or fatigue  Completed toileting hygiene and LB dressing, pt continues to have difficulty wiht LB dressing due to decrease ROM and frustration with using functional reacher  Ended session sitting upright in w/c waiting for lunch  Barriers include decrease cognition, safety, balance, endurance, strength and ROM  Pt would benefit from continued skilled OT services to increase independence with daily tasks, functional mobility/transfers and activity tolerance  Prognosis Good   Problem List Decreased strength;Decreased range of motion;Decreased endurance; Impaired balance;Decreased cognition;Decreased safety awareness   Plan   Treatment/Interventions ADL retraining;Functional transfer training;LE strengthening/ROM; Therapeutic exercise; Endurance training;Cognitive reorientation;Patient/family training;Equipment eval/education; Compensatory technique education;OT   Progress Progressing toward goals   OT Therapy Minutes   OT Time In 1122   OT Time Out 1216   OT Total Time (minutes) 54   OT Mode of treatment - Individual (minutes) 43   OT Mode of treatment - Concurrent (minutes) 11   Therapy Time missed   Time missed?  No

## 2021-02-07 NOTE — PLAN OF CARE
Problem: Prexisting or High Potential for Compromised Skin Integrity  Goal: Skin integrity is maintained or improved  Description: INTERVENTIONS:  - Identify patients at risk for skin breakdown  - Assess and monitor skin integrity  - Assess and monitor nutrition and hydration status  - Monitor labs   - Assess for incontinence   - Turn and reposition patient  - Assist with mobility/ambulation  - Relieve pressure over bony prominences  - Avoid friction and shearing  - Provide appropriate hygiene as needed including keeping skin clean and dry  - Evaluate need for skin moisturizer/barrier cream  - Collaborate with interdisciplinary team   - Patient/family teaching  - Consider wound care consult   Outcome: Progressing     Problem: Potential for Falls  Goal: Patient will remain free of falls  Description: INTERVENTIONS:  - Assess patient frequently for physical needs  -  Identify cognitive and physical deficits and behaviors that affect risk of falls    -  [place] fall precautions as indicated by assessment   - Educate patient/family on patient safety including physical limitations  - Instruct patient to call for assistance with activity based on assessment  - Modify environment to reduce risk of injury  - Consider OT/PT consult to assist with strengthening/mobility  Outcome: Progressing     Problem: PAIN - ADULT  Goal: Verbalizes/displays adequate comfort level or baseline comfort level  Description: Interventions:  - Encourage patient to monitor pain and request assistance  - Assess pain using appropriate pain scale  - Administer analgesics based on type and severity of pain and evaluate response  - Implement non-pharmacological measures as appropriate and evaluate response  - Consider cultural and social influences on pain and pain management  - Notify physician/advanced practitioner if interventions unsuccessful or patient reports new pain  Outcome: Progressing     Problem: INFECTION - ADULT  Goal: Absence or prevention of progression during hospitalization  Description: INTERVENTIONS:  - Assess and monitor for signs and symptoms of infection  - Monitor lab/diagnostic results  - Monitor all insertion sites, i e  indwelling lines, tubes, and drains  - Monitor endotracheal if appropriate and nasal secretions for changes in amount and color  - [place] appropriate cooling/warming therapies per order  - Administer medications as ordered  - Instruct and encourage patient and family to use good hand hygiene technique  - Identify and instruct in appropriate isolation precautions for identified infection/condition  Outcome: Progressing  Goal: Absence of fever/infection during neutropenic period  Description: INTERVENTIONS:  - Monitor WBC    Outcome: Progressing     Problem: SAFETY ADULT  Goal: Patient will remain free of falls  Description: INTERVENTIONS:  - Assess patient frequently for physical needs  -  Identify cognitive and physical deficits and behaviors that affect risk of falls    -  [place] fall precautions as indicated by assessment   - Educate patient/family on patient safety including physical limitations  - Instruct patient to call for assistance with activity based on assessment  - Modify environment to reduce risk of injury  - Consider OT/PT consult to assist with strengthening/mobility  Outcome: Progressing  Goal: Maintain or return to baseline ADL function  Description: INTERVENTIONS:  -  Assess patient's ability to carry out ADLs; assess patient's baseline for ADL function and identify physical deficits which impact ability to perform ADLs (bathing, care of mouth/teeth, toileting, grooming, dressing, etc )  - Assess/evaluate cause of self-care deficits   - Assess range of motion  - Assess patient's mobility; develop plan if impaired  - Assess patient's need for assistive devices and provide as appropriate  - Encourage maximum independence but intervene and supervise when necessary  - Involve family in performance of ADLs  - Assess for home care needs following discharge   - Consider OT consult to assist with ADL evaluation and planning for discharge  - Provide patient education as appropriate  Outcome: Progressing  Goal: Maintain or return mobility status to optimal level  Description: INTERVENTIONS:  - Assess patient's baseline mobility status (ambulation, transfers, stairs, etc )    - Identify cognitive and physical deficits and behaviors that affect mobility  - Identify mobility aids required to assist with transfers and/or ambulation (gait belt, sit-to-stand, lift, walker, cane, etc )  - [place] fall precautions as indicated by assessment  - Record patient progress and toleration of activity level on Mobility SBAR; progress patient to next Phase/Stage  - Instruct patient to call for assistance with activity based on assessment  - Consider rehabilitation consult to assist with strengthening/weightbearing, etc   Outcome: Progressing     Problem: DISCHARGE PLANNING  Goal: Discharge to home or other facility with appropriate resources  Description: INTERVENTIONS:  - Identify barriers to discharge w/patient and caregiver  - Arrange for needed discharge resources and transportation as appropriate  - Identify discharge learning needs (meds, wound care, etc )  - Arrange for interpretive services to assist at discharge as needed  - Refer to Case Management Department for coordinating discharge planning if the patient needs post-hospital services based on physician/advanced practitioner order or complex needs related to functional status, cognitive ability, or social support system  Outcome: Progressing     Problem: Knowledge Deficit  Goal: Patient/family/caregiver demonstrates understanding of disease process, treatment plan, medications, and discharge instructions  Description: Complete learning assessment and assess knowledge base    Interventions:  - Provide teaching at level of understanding  - Provide teaching via preferred learning methods  Outcome: Progressing     Problem: Neurological Deficit  Goal: Neurological status is stable or improving  Description: Interventions:  - Monitor and assess patient's level of consciousness, motor function, sensory function, and level of assistance needed for ADLs  - Monitor and report changes from baseline  Collaborate with interdisciplinary team to initiate plan and implement interventions as ordered  - Provide and maintain a safe environment  - Consider seizure precautions  - Consider fall precautions  - Consider aspiration precautions  - Consider bleeding precautions  Outcome: Progressing     Problem: Activity Intolerance/Impaired Mobility  Goal: Mobility/activity is maintained at optimum level for patient  Description: Interventions:  - Assess and monitor patient  barriers to mobility and need for assistive/adaptive devices  - Assess patient's emotional response to limitations  - Collaborate with interdisciplinary team and initiate plans and interventions as ordered  - Encourage independent activity per ability   - Maintain proper body alignment  - Perform active/passive rom as tolerated/ordered  - Plan activities to conserve energy   - Turn patient as appropriate  Outcome: Progressing     Problem: Communication Impairment  Goal: Ability to express needs and understand communication  Description: Assess patient's communication skills and ability to understand information  Patient will demonstrate use of effective communication techniques, alternative methods of communication and understanding even if not able to speak  - Encourage communication and provide alternate methods of communication as needed  - Collaborate with case management/ for discharge needs  - Include patient/family/caregiver in decisions related to communication    Outcome: Progressing     Problem: Potential for Aspiration  Goal: Non-ventilated patient's risk of aspiration is minimized  Description: Assess and monitor vital signs, respiratory status, and labs (WBC)  Monitor for signs of aspiration (tachypnea, cough, rales, wheezing, cyanosis, fever)  - Assess and monitor patient's ability to swallow  - Place patient up in chair to eat if possible  - HOB up at 90 degrees to eat if unable to get patient up into chair   - Supervise patient during oral intake  - Instruct patient/ family to take small bites  - Instruct patient/ family to take small single sips when taking liquids  - Follow patient-specific strategies generated by speech pathologist   Outcome: Progressing  Goal: Ventilated patient's risk of aspiration is minimized  Description: Assess and monitor vital signs, respiratory status, airway cuff pressure, and labs (WBC)  Monitor for signs of aspiration (tachypnea, cough, rales, wheezing, cyanosis, fever)  - Elevate head of bed 30 degrees if patient has tube feeding   - Monitor tube feeding  Outcome: Progressing     Problem: Nutrition  Goal: Nutrition/Hydration status is improving  Description: Monitor and assess patient's nutrition/hydration status for malnutrition (ex- brittle hair, bruises, dry skin, pale skin and conjunctiva, muscle wasting, smooth red tongue, and disorientation)  Collaborate with interdisciplinary team and initiate plan and interventions as ordered  Monitor patient's weight and dietary intake as ordered or per policy  Utilize nutrition screening tool and intervene per policy  Determine patient's food preferences and provide high-protein, high-caloric foods as appropriate  - Assist patient with eating   - Allow adequate time for meals   - Encourage patient to take dietary supplement as ordered  - Collaborate with clinical nutritionist   - Include patient/family/caregiver in decisions related to nutrition    Outcome: Progressing

## 2021-02-07 NOTE — NURSING NOTE
Pt participated in OT  No c/o of pain or distress  Continues w/ expressive aphagia  Cont of B+B  Pt currently resting in bed  Call bell in reach  Will continue to monitor and follow plan of care

## 2021-02-08 LAB
GLUCOSE SERPL-MCNC: 138 MG/DL (ref 65–140)
GLUCOSE SERPL-MCNC: 140 MG/DL (ref 65–140)
GLUCOSE SERPL-MCNC: 147 MG/DL (ref 65–140)
GLUCOSE SERPL-MCNC: 159 MG/DL (ref 65–140)

## 2021-02-08 PROCEDURE — 82948 REAGENT STRIP/BLOOD GLUCOSE: CPT

## 2021-02-08 PROCEDURE — 97110 THERAPEUTIC EXERCISES: CPT

## 2021-02-08 PROCEDURE — 97129 THER IVNTJ 1ST 15 MIN: CPT

## 2021-02-08 PROCEDURE — 92526 ORAL FUNCTION THERAPY: CPT

## 2021-02-08 PROCEDURE — 97116 GAIT TRAINING THERAPY: CPT

## 2021-02-08 PROCEDURE — 97112 NEUROMUSCULAR REEDUCATION: CPT

## 2021-02-08 PROCEDURE — 97130 THER IVNTJ EA ADDL 15 MIN: CPT

## 2021-02-08 PROCEDURE — 97530 THERAPEUTIC ACTIVITIES: CPT

## 2021-02-08 PROCEDURE — 99231 SBSQ HOSP IP/OBS SF/LOW 25: CPT | Performed by: FAMILY MEDICINE

## 2021-02-08 RX ADMIN — ATORVASTATIN CALCIUM 80 MG: 80 TABLET, FILM COATED ORAL at 16:52

## 2021-02-08 RX ADMIN — INSULIN LISPRO 10 UNITS: 100 INJECTION, SOLUTION INTRAVENOUS; SUBCUTANEOUS at 16:53

## 2021-02-08 RX ADMIN — METOPROLOL TARTRATE 50 MG: 50 TABLET, FILM COATED ORAL at 08:32

## 2021-02-08 RX ADMIN — RIVAROXABAN 20 MG: 10 TABLET, FILM COATED ORAL at 08:32

## 2021-02-08 RX ADMIN — INSULIN GLARGINE 35 UNITS: 100 INJECTION, SOLUTION SUBCUTANEOUS at 21:42

## 2021-02-08 RX ADMIN — INSULIN LISPRO 10 UNITS: 100 INJECTION, SOLUTION INTRAVENOUS; SUBCUTANEOUS at 13:09

## 2021-02-08 RX ADMIN — INSULIN LISPRO 1 UNITS: 100 INJECTION, SOLUTION INTRAVENOUS; SUBCUTANEOUS at 16:53

## 2021-02-08 RX ADMIN — METOPROLOL TARTRATE 50 MG: 50 TABLET, FILM COATED ORAL at 21:42

## 2021-02-08 RX ADMIN — PANTOPRAZOLE SODIUM 40 MG: 40 TABLET, DELAYED RELEASE ORAL at 08:32

## 2021-02-08 RX ADMIN — ESCITALOPRAM 5 MG: 5 TABLET, FILM COATED ORAL at 08:32

## 2021-02-08 RX ADMIN — FAMOTIDINE 20 MG: 20 TABLET ORAL at 08:32

## 2021-02-08 RX ADMIN — HYDROCHLOROTHIAZIDE: 12.5 TABLET ORAL at 08:32

## 2021-02-08 RX ADMIN — INSULIN LISPRO 10 UNITS: 100 INJECTION, SOLUTION INTRAVENOUS; SUBCUTANEOUS at 08:33

## 2021-02-08 NOTE — PLAN OF CARE
Problem: Prexisting or High Potential for Compromised Skin Integrity  Goal: Skin integrity is maintained or improved  Description: INTERVENTIONS:  - Identify patients at risk for skin breakdown  - Assess and monitor skin integrity  - Assess and monitor nutrition and hydration status  - Monitor labs   - Assess for incontinence   - Turn and reposition patient  - Assist with mobility/ambulation  - Relieve pressure over bony prominences  - Avoid friction and shearing  - Provide appropriate hygiene as needed including keeping skin clean and dry  - Evaluate need for skin moisturizer/barrier cream  - Collaborate with interdisciplinary team   - Patient/family teaching  - Consider wound care consult   Outcome: Progressing     Problem: Potential for Falls  Goal: Patient will remain free of falls  Description: INTERVENTIONS:  - Assess patient frequently for physical needs  -  Identify cognitive and physical deficits and behaviors that affect risk of falls    -  [place] fall precautions as indicated by assessment   - Educate patient/family on patient safety including physical limitations  - Instruct patient to call for assistance with activity based on assessment  - Modify environment to reduce risk of injury  - Consider OT/PT consult to assist with strengthening/mobility  Outcome: Progressing     Problem: PAIN - ADULT  Goal: Verbalizes/displays adequate comfort level or baseline comfort level  Description: Interventions:  - Encourage patient to monitor pain and request assistance  - Assess pain using appropriate pain scale  - Administer analgesics based on type and severity of pain and evaluate response  - Implement non-pharmacological measures as appropriate and evaluate response  - Consider cultural and social influences on pain and pain management  - Notify physician/advanced practitioner if interventions unsuccessful or patient reports new pain  Outcome: Progressing     Problem: INFECTION - ADULT  Goal: Absence or prevention of progression during hospitalization  Description: INTERVENTIONS:  - Assess and monitor for signs and symptoms of infection  - Monitor lab/diagnostic results  - Monitor all insertion sites, i e  indwelling lines, tubes, and drains  - Monitor endotracheal if appropriate and nasal secretions for changes in amount and color  - [place] appropriate cooling/warming therapies per order  - Administer medications as ordered  - Instruct and encourage patient and family to use good hand hygiene technique  - Identify and instruct in appropriate isolation precautions for identified infection/condition  Outcome: Progressing  Goal: Absence of fever/infection during neutropenic period  Description: INTERVENTIONS:  - Monitor WBC    Outcome: Progressing     Problem: SAFETY ADULT  Goal: Patient will remain free of falls  Description: INTERVENTIONS:  - Assess patient frequently for physical needs  -  Identify cognitive and physical deficits and behaviors that affect risk of falls    -  [place] fall precautions as indicated by assessment   - Educate patient/family on patient safety including physical limitations  - Instruct patient to call for assistance with activity based on assessment  - Modify environment to reduce risk of injury  - Consider OT/PT consult to assist with strengthening/mobility  Outcome: Progressing  Goal: Maintain or return to baseline ADL function  Description: INTERVENTIONS:  -  Assess patient's ability to carry out ADLs; assess patient's baseline for ADL function and identify physical deficits which impact ability to perform ADLs (bathing, care of mouth/teeth, toileting, grooming, dressing, etc )  - Assess/evaluate cause of self-care deficits   - Assess range of motion  - Assess patient's mobility; develop plan if impaired  - Assess patient's need for assistive devices and provide as appropriate  - Encourage maximum independence but intervene and supervise when necessary  - Involve family in performance of ADLs  - Assess for home care needs following discharge   - Consider OT consult to assist with ADL evaluation and planning for discharge  - Provide patient education as appropriate  Outcome: Progressing  Goal: Maintain or return mobility status to optimal level  Description: INTERVENTIONS:  - Assess patient's baseline mobility status (ambulation, transfers, stairs, etc )    - Identify cognitive and physical deficits and behaviors that affect mobility  - Identify mobility aids required to assist with transfers and/or ambulation (gait belt, sit-to-stand, lift, walker, cane, etc )  - [place] fall precautions as indicated by assessment  - Record patient progress and toleration of activity level on Mobility SBAR; progress patient to next Phase/Stage  - Instruct patient to call for assistance with activity based on assessment  - Consider rehabilitation consult to assist with strengthening/weightbearing, etc   Outcome: Progressing     Problem: DISCHARGE PLANNING  Goal: Discharge to home or other facility with appropriate resources  Description: INTERVENTIONS:  - Identify barriers to discharge w/patient and caregiver  - Arrange for needed discharge resources and transportation as appropriate  - Identify discharge learning needs (meds, wound care, etc )  - Arrange for interpretive services to assist at discharge as needed  - Refer to Case Management Department for coordinating discharge planning if the patient needs post-hospital services based on physician/advanced practitioner order or complex needs related to functional status, cognitive ability, or social support system  Outcome: Progressing     Problem: Knowledge Deficit  Goal: Patient/family/caregiver demonstrates understanding of disease process, treatment plan, medications, and discharge instructions  Description: Complete learning assessment and assess knowledge base    Interventions:  - Provide teaching at level of understanding  - Provide teaching via preferred learning methods  Outcome: Progressing     Problem: Neurological Deficit  Goal: Neurological status is stable or improving  Description: Interventions:  - Monitor and assess patient's level of consciousness, motor function, sensory function, and level of assistance needed for ADLs  - Monitor and report changes from baseline  Collaborate with interdisciplinary team to initiate plan and implement interventions as ordered  - Provide and maintain a safe environment  - Consider seizure precautions  - Consider fall precautions  - Consider aspiration precautions  - Consider bleeding precautions  Outcome: Progressing     Problem: Activity Intolerance/Impaired Mobility  Goal: Mobility/activity is maintained at optimum level for patient  Description: Interventions:  - Assess and monitor patient  barriers to mobility and need for assistive/adaptive devices  - Assess patient's emotional response to limitations  - Collaborate with interdisciplinary team and initiate plans and interventions as ordered  - Encourage independent activity per ability   - Maintain proper body alignment  - Perform active/passive rom as tolerated/ordered  - Plan activities to conserve energy   - Turn patient as appropriate  Outcome: Progressing     Problem: Communication Impairment  Goal: Ability to express needs and understand communication  Description: Assess patient's communication skills and ability to understand information  Patient will demonstrate use of effective communication techniques, alternative methods of communication and understanding even if not able to speak  - Encourage communication and provide alternate methods of communication as needed  - Collaborate with case management/ for discharge needs  - Include patient/family/caregiver in decisions related to communication    Outcome: Progressing     Problem: Potential for Aspiration  Goal: Non-ventilated patient's risk of aspiration is minimized  Description: Assess and monitor vital signs, respiratory status, and labs (WBC)  Monitor for signs of aspiration (tachypnea, cough, rales, wheezing, cyanosis, fever)  - Assess and monitor patient's ability to swallow  - Place patient up in chair to eat if possible  - HOB up at 90 degrees to eat if unable to get patient up into chair   - Supervise patient during oral intake  - Instruct patient/ family to take small bites  - Instruct patient/ family to take small single sips when taking liquids  - Follow patient-specific strategies generated by speech pathologist   Outcome: Progressing  Goal: Ventilated patient's risk of aspiration is minimized  Description: Assess and monitor vital signs, respiratory status, airway cuff pressure, and labs (WBC)  Monitor for signs of aspiration (tachypnea, cough, rales, wheezing, cyanosis, fever)  - Elevate head of bed 30 degrees if patient has tube feeding   - Monitor tube feeding  Outcome: Progressing     Problem: Nutrition  Goal: Nutrition/Hydration status is improving  Description: Monitor and assess patient's nutrition/hydration status for malnutrition (ex- brittle hair, bruises, dry skin, pale skin and conjunctiva, muscle wasting, smooth red tongue, and disorientation)  Collaborate with interdisciplinary team and initiate plan and interventions as ordered  Monitor patient's weight and dietary intake as ordered or per policy  Utilize nutrition screening tool and intervene per policy  Determine patient's food preferences and provide high-protein, high-caloric foods as appropriate  - Assist patient with eating   - Allow adequate time for meals   - Encourage patient to take dietary supplement as ordered  - Collaborate with clinical nutritionist   - Include patient/family/caregiver in decisions related to nutrition    Outcome: Progressing
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
## 2021-02-08 NOTE — PROGRESS NOTES
02/08/21 1315   Pain Assessment   Pain Assessment Tool 0-10   Pain Score No Pain   Restrictions/Precautions   Precautions Cognitive; Fall Risk;Fluid restriction   Sit to Lying   Type of Assistance Needed Supervision   Amount of Physical Assistance Provided No physical assistance   Sit to Lying CARE Score 4   Lying to Sitting on Side of Bed   Type of Assistance Needed Supervision   Amount of Physical Assistance Provided No physical assistance   Lying to Sitting on Side of Bed CARE Score 4   Sit to Stand   Type of Assistance Needed Supervision   Amount of Physical Assistance Provided No physical assistance   Sit to Stand CARE Score 4   Bed-Chair Transfer   Type of Assistance Needed Incidental touching   Comment CGA   Chair/Bed-to-Chair Transfer CARE Score 4   Transfer Bed/Chair/Wheelchair   Limitations Noted In Balance; Endurance;UE Strength;LE Strength   Functional Standing Tolerance   Time trial 1&2: 1 minute and 5 seconds, trial 3: 1 minute and 31 seconds   Activity followed pattern and placed pegs into foam board   Comments CGA   Exercise Tools   Theraputty red: removed and placed 10 marbles of various sizes, pulled apart, rolled into ball, lateral pinch and 3 jaw arnaud with bilat hand   Other Exercise Tool 1 followed pattern and placed pegs into foam board   Other Exercise Tool 2 functional reacher to retrieve pegs and place in board   Cognition   Overall Cognitive Status Impaired   Arousal/Participation Alert; Responsive; Cooperative   Attention Within functional limits   Orientation Level Oriented X4   Memory Decreased recall of recent events   Following Commands Follows one step commands without difficulty   Assessment   Treatment Assessment Pt agreeable to OT session this PM, received supine in bed  Transferred out of bed and into w/c with CGA  Completed therapeutic activity involving following direction, balance/standing tolerance and functional reacher use to improve independence with daily tasks   Pt tolerated session well with rest breaks as needed  Pt fatigued at end of session requesting to lay in bed  Pt demonstrates improved use of functional reacher with less frustration  Barriers include decrease cognition, balance, safety, strength and ROM  Pt would benefit from continued skilled OT services to increase independence with daily tasks, functional mobility/transfers and activity tolerance  Prognosis Good   Problem List Decreased strength;Decreased range of motion;Decreased endurance; Impaired balance;Decreased cognition;Decreased safety awareness   Plan   Treatment/Interventions ADL retraining;Functional transfer training;LE strengthening/ROM; Therapeutic exercise; Endurance training;Cognitive reorientation;Patient/family training;Equipment eval/education; Compensatory technique education;OT   Progress Progressing toward goals   OT Therapy Minutes   OT Time In 1315   OT Time Out 1420   OT Total Time (minutes) 65   OT Mode of treatment - Individual (minutes) 7   OT Mode of treatment - Concurrent (minutes) 58   Therapy Time missed   Time missed?  No

## 2021-02-08 NOTE — PROGRESS NOTES
02/08/21 1200   Pain Assessment   Pain Assessment Tool Pain Assessment not indicated - pt denies pain   Pain Score No Pain   Restrictions/Precautions   Precautions Aspiration;Bed/chair alarms;Cognitive; Fall Risk;Supervision on toilet/commode;Visual deficit   Comprehension   Comprehension (FIM) 5 - Understands basic directions and conversation   Expression   Expression (FIM) 4 - Needs to repeat single words   Social Interaction   Social Interaction (FIM) 5 - Interacts appropriately with others 90% of time   Problem Solving   Problem solving (FIM) 4 - Solves basic problems 75-89% of time   Memory   Memory (FIM) 4 - Recognizes/recalls/performs 75-89%   Speech/Language/Cognition Assessmetn   Treatment Assessment Pt seen for skilled cognitive linguistic communication session  Pt alert and interactive seen upright on EOB in room  Pt completed the following- finishing money management task from last session- took total amounts and broke down into the denominations  Pt was able to complete with 6/8acc increasing with verbal cues to check work and correct errors  Pt next completed reading comprehension task of mock credit card bill  Pt was able to search find her answers with 7/10acc increasing with verbal cues  Pt noted to have some difficulty seeing items due to visual deficits at baseline- reading glasses in use but still requires assistance  Pt attempted check writing task- discussed monthly bills she has and was able to recall 3 with approximate amounts to write down  However when it came to writing out the checks, pt's handwriting limiting her ability to complete  At this time recommending someone assist with filling out checks and just having pt sign the bottom  Pt last completed simple problem solving task with comparison/contrast of category members  Pt was able to ID the word that didn't belong with 8/10acc, name the category with 6/10acc, and then name an additional items to fit the category with 9/10acc   Pt overall increasing with verbal cues and extended processing time to completed  Pt overall will cont to benefit from skilled SLP services to maximize overall cognitive linguistic communication abilities at this time  Swallow Information   Current Risks for Dysphagia & Aspiration Weak cough;Weak voicing;Dysphonia; Aphonia;General debilitation;New Neuro event;Brain injury;Cognitive deficit;HX neurologic dx   Current Symptoms/Concerns Cough;Clear throat;During meals   Current Diet Dysphagia advance; Nectar thick liquid   Baseline Diet Regular; Thin liquids   Consistencies Assessed and Performance   Materials Admnistered Soft/Level 3;Regular/Solid; Nectar thick liquid   Oral Stage Mild impaired   Phargngeal Stage Mild impaired; Moderate impaired;Aspiration risk   Swallow Mechanics Mild delayed;Swallow initation;Weak larygneal rise;Good Larygneal rise;Aspiration risk;Vocal Cord dysfunction suspected   Esophageal Concerns Hx GERDS   Recommendations   Risk for Aspiration Present   Recommendations Dysphagia treatment   Diet Solid Recommendation Level 3 Dysphagia/ advanced/ soft to chew   Diet Liquid Recommendation Nectar thick liquid   Recommended Form of Meds Whole; With thick liquid; As desired; As tolerated   General Precautions Aspiration precautions; Feed only when alert;Minimize distractions;Upright as possible for all oral intake;Remain upright for 45 mins after meals  (OOB fully upright for PO, set up, distant sup)   Compensatory Swallowing Strategies Place food/straw in on left side; Check for pocketing of food on the right; Alternate solids and liquids; External pacing;Effortful swallow;Voluntary throat clear/cough to clear penetration   Results Reviewed with PT/Family/Caregiver   Eating   Type of Assistance Needed Set-up / clean-up   Amount of Physical Assistance Provided No physical assistance   Eating CARE Score 5   Swallow Assessment   Swallow Treatment Assessment Pt seen for skilled dysphagia tx session   Pt alert and interactive seen upright in EOB in room  Pt seen with trial regular diet meal and NTL  Items assessed included chicken casserole with noodles, sauteed zucchini, mashed potatoes, pudding, and NTL by cup  Pt required some set up assistance with tray but no physical assistance self feeding  Meal completion 100% and 120cc liquids  Pt eating food items c good bolus retrieval, oral control, slow oral processing, mildly delayed transfers and swallows  Pt with trace rentention between swallows which clears throughout meal  Pt drank NTL by cup c good bolus retrieval, oral control, cont decreased sensation with mild loss on R side and residual on lip, slow transfers and swallows  No s/s aspiration with NTL  Pt completed lingual sweeps at end of meal to clear buccal cavities of mild residue  No thin liquids trialed during this meal however prior to meal, pt trialed ice chips- pt noted with delayed dry effortful cough x3 post ice chip trials  Pt conts with increased s/s aspiration with trials of thin liquids  Cont recommended diet of Dysphagia level 3 and Nectar Thick Liquids- aspiration precautions, OOB for meals as able, set up, distant supervision, slow rate, small bites, alternate food and liquids  Pt will cont to benefit to maximize overall swallow function for increased tolerance of LRD at this time  Swallow Assessment Prognosis   Prognosis Good   Prognosis Considerations Age; Co-morbidities; Medical diagnosis; Medical prognosis; Severity of impairments;New learning ability;Ability to carry over; Cooperation   SLP Therapy Minutes   SLP Time In 1200   SLP Time Out 1310   SLP Total Time (minutes) 70   SLP Mode of treatment - Individual (minutes) 70   SLP Mode of treatment - Concurrent (minutes) 0   SLP Mode of treatment - Group (minutes) 0   SLP Mode of treatment - Co-treat (minutes) 0   SLP Mode of Treatment - Total time(minutes) 70 minutes   SLP Cumulative Minutes 265   Therapy Time missed   Time missed?  No

## 2021-02-08 NOTE — NURSING NOTE
Pt cont of B+B throughout the night  Slept throughout the entire night with no signs of pain or distress observed during rounding  Pt is currently sleeping  Call bell within reach  Will continue to monitor and follow plan of care

## 2021-02-08 NOTE — PROGRESS NOTES
02/08/21 0829   Pain Assessment   Pain Assessment Tool 0-10   Pain Score No Pain   Restrictions/Precautions   Precautions Cognitive; Fall Risk;Fluid restriction   Cognition   Overall Cognitive Status Impaired   Arousal/Participation Alert; Responsive; Cooperative   Attention Within functional limits   Orientation Level Oriented X4   Memory Decreased recall of recent events   Following Commands Follows one step commands without difficulty   Lying to Sitting on Side of Bed   Type of Assistance Needed Physical assistance   Amount of Physical Assistance Provided Less than 25%   Lying to Sitting on Side of Bed CARE Score 3   Sit to Stand   Type of Assistance Needed Incidental touching   Amount of Physical Assistance Provided No physical assistance   Sit to Stand CARE Score 4   Bed-Chair Transfer   Type of Assistance Needed Incidental touching   Amount of Physical Assistance Provided No physical assistance   Chair/Bed-to-Chair Transfer CARE Score 4   Transfer Bed/Chair/Wheelchair   Limitations Noted In Balance; Endurance; Sequencing;UE Strength;LE Strength   Adaptive Equipment Roller Walker   Stand Pivot Contact Guard   Sit to [facility]   Stand to [facility]   Sit to Supine Minimal Assist   All Transfer Minimal Assist   Walk 10 Feet   Type of Assistance Needed Incidental touching   Amount of Physical Assistance Provided No physical assistance   Walk 10 Feet CARE Score 4   Walk 50 Feet with Two Turns   Type of Assistance Needed Incidental touching   Amount of Physical Assistance Provided No physical assistance   Walk 50 Feet with Two Turns CARE Score 4   Ambulation   Does the patient walk? 2  Yes   Primary Mode of Locomotion Prior to Admission Walk   Distance Walked (feet) 95 ft  (55)   Assist Device Roller Walker   Gait Pattern Inconsistant Jaylyn; Slow Jaylyn;Decreased foot clearance;R foot drag;R hemiparesis   Limitations Noted In Balance; Coordination;Device Management; Endurance; Safety;Strength Provided Assistance with: Balance   Walk Assist Level Contact Guard   Findings level with VCs for walker management   Therapeutic Interventions   Strengthening seated the miguel angel 30 reps   Balance gait and transfers   Assessment   Treatment Assessment Pt is CGA for transfers and amb up to 95' with RW; Pt needs VCs for walker management and decaresed foot clearance on R LE; Pt is slow paced and drage R foot as she gets fatigued; Rests needed throughout session; Pt perfomred setaed ther ex for strengthening B LE   PT Barriers   Physical Impairment Decreased strength;Decreased range of motion;Decreased endurance; Impaired balance;Decreased mobility; Decreased coordination;Decreased cognition; Impaired judgement;Decreased safety awareness   Functional Limitation Walking;Transfers;Standing   Plan   Treatment/Interventions Functional transfer training;LE strengthening/ROM; Therapeutic exercise; Endurance training;Gait training;Patient/family training   PT Therapy Minutes   PT Time In 0829   PT Time Out 0859   PT Total Time (minutes) 30   PT Mode of treatment - Individual (minutes) 30   PT Mode of treatment - Concurrent (minutes) 0   PT Mode of treatment - Group (minutes) 0   PT Mode of treatment - Co-treat (minutes) 0   PT Mode of Treatment - Total time(minutes) 30 minutes   PT Cumulative Minutes 337   Therapy Time missed   Time missed?  No

## 2021-02-08 NOTE — PROGRESS NOTES
02/08/21 0728   Pain Assessment   Pain Assessment Tool 0-10   Pain Score No Pain   Restrictions/Precautions   Precautions Cognitive; Fall Risk;Fluid restriction   Cognition   Overall Cognitive Status Impaired   Arousal/Participation Alert; Responsive; Cooperative   Attention Within functional limits   Orientation Level Oriented X4   Memory Decreased recall of recent events   Following Commands Follows one step commands without difficulty   Therapeutic Interventions   Strengthening supine ther ex 30 reps   Assessment   Treatment Assessment Pt performed supine ther ex for general conditioning B LE   PT Barriers   Physical Impairment Decreased strength;Decreased range of motion;Decreased endurance; Impaired balance;Decreased mobility; Decreased coordination;Decreased cognition; Impaired judgement;Decreased safety awareness   Functional Limitation Walking;Transfers;Standing   Plan   Treatment/Interventions Functional transfer training;LE strengthening/ROM; Therapeutic exercise; Endurance training;Patient/family training;Gait training   Progress Progressing toward goals   PT Therapy Minutes   PT Time In 0728   PT Time Out 0805   PT Total Time (minutes) 37   PT Mode of treatment - Individual (minutes) 37   PT Mode of treatment - Concurrent (minutes) 0   PT Mode of treatment - Group (minutes) 0   PT Mode of treatment - Co-treat (minutes) 0   PT Mode of Treatment - Total time(minutes) 37 minutes   PT Cumulative Minutes 307   Therapy Time missed   Time missed?  No

## 2021-02-09 LAB
GLUCOSE SERPL-MCNC: 134 MG/DL (ref 65–140)
GLUCOSE SERPL-MCNC: 154 MG/DL (ref 65–140)
GLUCOSE SERPL-MCNC: 219 MG/DL (ref 65–140)
GLUCOSE SERPL-MCNC: 242 MG/DL (ref 65–140)

## 2021-02-09 PROCEDURE — 82948 REAGENT STRIP/BLOOD GLUCOSE: CPT

## 2021-02-09 PROCEDURE — 97129 THER IVNTJ 1ST 15 MIN: CPT

## 2021-02-09 PROCEDURE — 97530 THERAPEUTIC ACTIVITIES: CPT

## 2021-02-09 PROCEDURE — 97537 COMMUNITY/WORK REINTEGRATION: CPT

## 2021-02-09 PROCEDURE — 97116 GAIT TRAINING THERAPY: CPT

## 2021-02-09 PROCEDURE — 97110 THERAPEUTIC EXERCISES: CPT

## 2021-02-09 PROCEDURE — 97535 SELF CARE MNGMENT TRAINING: CPT

## 2021-02-09 PROCEDURE — 92526 ORAL FUNCTION THERAPY: CPT

## 2021-02-09 PROCEDURE — 97130 THER IVNTJ EA ADDL 15 MIN: CPT

## 2021-02-09 RX ADMIN — HYDROCHLOROTHIAZIDE: 12.5 TABLET ORAL at 08:18

## 2021-02-09 RX ADMIN — METOPROLOL TARTRATE 50 MG: 50 TABLET, FILM COATED ORAL at 21:10

## 2021-02-09 RX ADMIN — INSULIN LISPRO 10 UNITS: 100 INJECTION, SOLUTION INTRAVENOUS; SUBCUTANEOUS at 08:20

## 2021-02-09 RX ADMIN — ATORVASTATIN CALCIUM 80 MG: 80 TABLET, FILM COATED ORAL at 17:05

## 2021-02-09 RX ADMIN — DOCUSATE SODIUM 100 MG: 100 CAPSULE, LIQUID FILLED ORAL at 08:17

## 2021-02-09 RX ADMIN — INSULIN LISPRO 10 UNITS: 100 INJECTION, SOLUTION INTRAVENOUS; SUBCUTANEOUS at 17:04

## 2021-02-09 RX ADMIN — INSULIN GLARGINE 35 UNITS: 100 INJECTION, SOLUTION SUBCUTANEOUS at 21:10

## 2021-02-09 RX ADMIN — PANTOPRAZOLE SODIUM 40 MG: 40 TABLET, DELAYED RELEASE ORAL at 08:19

## 2021-02-09 RX ADMIN — FAMOTIDINE 20 MG: 20 TABLET ORAL at 08:17

## 2021-02-09 RX ADMIN — INSULIN LISPRO 1 UNITS: 100 INJECTION, SOLUTION INTRAVENOUS; SUBCUTANEOUS at 12:26

## 2021-02-09 RX ADMIN — INSULIN LISPRO 2 UNITS: 100 INJECTION, SOLUTION INTRAVENOUS; SUBCUTANEOUS at 17:03

## 2021-02-09 RX ADMIN — INSULIN LISPRO 3 UNITS: 100 INJECTION, SOLUTION INTRAVENOUS; SUBCUTANEOUS at 21:09

## 2021-02-09 RX ADMIN — METOPROLOL TARTRATE 50 MG: 50 TABLET, FILM COATED ORAL at 08:17

## 2021-02-09 RX ADMIN — ESCITALOPRAM 5 MG: 5 TABLET, FILM COATED ORAL at 08:19

## 2021-02-09 RX ADMIN — RIVAROXABAN 20 MG: 10 TABLET, FILM COATED ORAL at 08:16

## 2021-02-09 RX ADMIN — INSULIN LISPRO 10 UNITS: 100 INJECTION, SOLUTION INTRAVENOUS; SUBCUTANEOUS at 12:27

## 2021-02-09 NOTE — PROGRESS NOTES
02/09/21 0905   Pain Assessment   Pain Assessment Tool Pain Assessment not indicated - pt denies pain   Pain Score No Pain   Restrictions/Precautions   Precautions Cognitive; Fall Risk;Fluid restriction   Grooming   Able To [employer]; Acquire Items;Comb/Brush Hair;Wash/Dry Face;Wash/Dry Hands   Limitation Noted In Coordination; Safety   Findings supervision assistance   Shower/Bathe Self   Type of Assistance Needed Incidental touching   Comment CGA to maintain balance while standing to wash perineal area and buttocks   Shower/Bathe Self CARE Score 4   Bathing   Assessed Bath Style Shower   Anticipated D/C Bath Style Shower;Sponge Bath   Able to [place] No   Able to [employer] Temperature Yes   Able to Wash/Rinse/Dry (body part) Left Arm;Right Arm;L Upper Leg;R Upper Leg;L Lower Leg/Foot;R Lower Leg/Foot;Chest;Abdomen;Perineal Area; Buttocks   Limitations Noted in Balance; Coordination; Endurance;ROM;Safety;Strength   Positioning Seated;Standing   Adaptive Equipment Longhand Sponge; Shower Admittor; Shower Seat;Hand [facility] Care   Limitations Noted In Balance; Endurance;ROM;Safety;UE Strength;LE Strength   Adaptive Equipment Grab Bars;Seat with out Back   Assessed Shower   Findings CGA and verbal cues for sequencing of transfer   Upper Body Dressing   Type of Assistance Needed Supervision   Amount of Physical Assistance Provided No physical assistance   Upper Body Dressing CARE Score 4   Lower Body Dressing   Type of Assistance Needed Physical assistance   Amount of Physical Assistance Provided 25%-49%   Comment min A to thread bilat feet through underwear and pants with use of functional reacher  pt able to pull pants down and up over hips with no physical assistance   pt demonstrates improved use of functional reacher, however continues to require min verbal cues to use   Lower Body Dressing CARE Score 3   Putting On/Taking Off Footwear   Type of Assistance Needed Physical assistance Amount of Physical Assistance Provided 25%-49%   Comment min A, pt able to use functional reacher to doff both socks with min A and verbal cues to properly manipulate reacher  reviewed sock aide, provided hand over hand assistance to apply sock to sock aide, pt able to apply sock to sock aide with supervision assistance  pt pulled up sock aide to apply both socks with supervision assistance   Putting On/Taking Off Footwear CARE Score 3   Dressing/Undressing Clothing   Remove UB Clothes Pullover Shirt   Don UB Clothes Pullover Shirt   Remove LB Clothes Pants; Undergarment;Socks   Don LB Clothes Pants; Undergarment;Socks   Limitations Noted In Balance; Coordination; Endurance; Safety;Strength;ROM   Adaptive Equipment Reacher;Sock Aide   Positioning Supported Sit;Standing   Sit to Lying   Type of Assistance Needed Supervision   Amount of Physical Assistance Provided No physical assistance   Sit to Lying CARE Score 4   Sit to Stand   Type of Assistance Needed Supervision   Amount of Physical Assistance Provided No physical assistance   Sit to Stand CARE Score 4   Bed-Chair Transfer   Type of Assistance Needed Incidental touching   Comment CGA and verbal cues   Chair/Bed-to-Chair Transfer CARE Score 4   Transfer Bed/Chair/Wheelchair   Limitations Noted In Balance; Endurance;UE Strength;LE Strength   Light Housekeeping   Light Housekeeping Level Wheelchair   Light Housekeeping Level of Assistance Close supervision   Light Housekeeping assisted in organizing clothing with supevision assistance and verbal cues   Exercise Tools   Hand Gripper 5lb hand gripper x30 bilat hand   Cognition   Overall Cognitive Status WFL   Arousal/Participation Alert; Responsive; Cooperative   Attention Within functional limits   Orientation Level Oriented X4   Memory Decreased recall of recent events   Following Commands Follows one step commands without difficulty   Additional Activities   Additional Activities Comments w/c mobility in room and [facility] S/ CGA   Other Comments   Assessment Pt participates in 40 minutes of concurrent treatment addressing therapeutic activity to increase independence with ADL completion  Assessment   Treatment Assessment Pt agreeable to OT session this AM, received sitting upright in w/c eagerly organizing clothing in preparation for a shower  Self-propelled down the perera with S/ CGA with verbal cues  Pt completed shower, grooming and dressing this AM  Pt demonstrates improvement with use of adaptive equipment to complete shower and dressing  Improved use of functional reacher noted as less assistance and verbal cues were required  Completed light exercise in bed to improve  strength as pt was fatigued at end of session  No report of increase pain  Barriers include decrease safety, balance, strength, ROM and endurance  Pt would benefit from continued skilled OT services to increase independence with daily tasks, functional mobility/transfers and activity tolerance  Prognosis Good   Problem List Decreased strength;Decreased range of motion;Decreased endurance; Impaired balance;Decreased coordination;Decreased safety awareness   Plan   Treatment/Interventions ADL retraining;Functional transfer training;LE strengthening/ROM; Therapeutic exercise; Endurance training;Patient/family training;Equipment eval/education; Compensatory technique education;OT   Progress Progressing toward goals   OT Therapy Minutes   OT Time In 0905   OT Time Out 1005   OT Total Time (minutes) 60   OT Mode of treatment - Individual (minutes) 20   OT Mode of treatment - Concurrent (minutes) 40   Therapy Time missed   Time missed?  No

## 2021-02-09 NOTE — PROGRESS NOTES
02/09/21 0800   Pain Assessment   Pain Assessment Tool Pain Assessment not indicated - pt denies pain   Pain Score No Pain   Restrictions/Precautions   Precautions Aphasia;Bed/chair alarms;Aspiration;Cognitive; Fall Risk;Supervision on toilet/commode;Visual deficit   Comprehension   Comprehension (FIM) 5 - Needs help/cues, repetition only RARELY ( < 10% of the time)   Expression   Expression (FIM) 4 - Needs to repeat single words   Social Interaction   Social Interaction (FIM) 5 - Interacts appropriately with others 90% of time   Problem Solving   Problem solving (FIM) 4 - Solves basic problems 75-89% of time   Memory   Memory (FIM) 4 - Recognizes/recalls/performs 75-89%   Speech/Language/Cognition Assessmetn   Treatment Assessment Pt seen for skilled speech therapy session targeting cognitive linguistic communication skills  Pt finishing up a task from yesterday- pt completing simple problem solving with comparing/contrasting words for improved reasoning/word finding and organization as well as working memory due to words being presented orally for pt to recall and manipulate  Pt provided with 4 words and was able to ID which one did not belong with the others in 10/10acc, pt then able to name the category the other words belonged with 9/10acc and then pt was able to add to the category with 8/10acc  Pt benefitted from occ repetition of words for increased recall and verbal cues for better association of words and word finding  Pt at this time will cont to benefit from further skilled SLP services to maximize cognitive linguistic communication abilities at this time  Swallow Information   Current Risks for Dysphagia & Aspiration Weak cough;Weak voicing;Aphonia;General debilitation;New Neuro event;Brain injury;Cognitive deficit;HX neurologic dx   Current Symptoms/Concerns Cough;Clear throat; With liquids; Chronic cough;HX Dysphagia   Current Diet Dysphagia advance; Nectar thick liquid   Baseline Diet Regular; Thin liquids   Consistencies Assessed and Performance   Materials Admnistered Regular/Solid; Nectar thick liquid; Thin liquid;Pills; Whole; With thick liquid   Oral Stage Mild impaired   Phargngeal Stage Mild impaired; Moderate impaired;Aspiration risk   Swallow Mechanics Mild delayed;Swallow initation;Weak larygneal rise;Good Larygneal rise;Vocal Cord dysfunction suspected;Aspiration risk   Esophageal Concerns Hx GERDS   Recommendations   Risk for Aspiration Present   Recommendations Dysphagia treatment   Diet Solid Recommendation Level 3 Dysphagia/ advanced/ soft to chew   Diet Liquid Recommendation Nectar thick liquid   Recommended Form of Meds Whole; With thick liquid; As desired; As tolerated   General Precautions Aspiration precautions; Feed only when alert;Minimize distractions;Upright as possible for all oral intake;Remain upright for 45 mins after meals  (OOB fully upright with PO, set up, distant sup)   Compensatory Swallowing Strategies Place food/straw in on left side; Check for pocketing of food on the right; Alternate solids and liquids;Swallow 2 times per bite/sip; External pacing;Effortful swallow;Voluntary throat clear/cough to clear penetration   Results Reviewed with PT/Family/Caregiver   Eating   Type of Assistance Needed Set-up / clean-up   Amount of Physical Assistance Provided No physical assistance   Eating CARE Score 5   Swallow Assessment   Swallow Treatment Assessment Pt seen for skilled speech therapy session for dysphagia  Pt alert and interactive seen OOB upright in chair  Pt seen initially with thin liquids for trial before meal arrived  Pt taking single small sips of thin- good bolus retrieval, oral control, appeared with faster transfers and swallows  Swallow timing appeared improved and pt focusing on this with some verbal cues for effortful swallows as well  Pt noted to have immediate cough x4 across 12oz   When coughing, pt encouraged to take a breath and produce productive cough- pt's cough dry and needed multiple attempts to clear  When tray arrived- pt seen with scrambled eggs and sausage breakfast with NTL during meal  Meal completion 100% and 120cc of NTL  Nursing also provided meds whole with NTL- tolerated well  Pt requires minimal set up assistance and no physical assistance self feeding  Pt eating food items c good bolus retrieval, oral control, slow oral processing, mildly delayed transfers and swallows  Pt with trace residue between swallows on tongue on in Right cheek but clears throughout as meal progressed  Pt also noted to be completing lingual sweeps throughout meal  No coughing or throat clearing during food intake  Pt completed thin liquid trial at end of meal as noted above  Pt noted with increased tolerance with thin trials however due to laryngeal stenosis, appears pt has difficulty catching breath and producing effective cough without extended time  Cont Dysphagia level 3 diet and NTL- aspiration precautions, slow rate, small bites, alternate food and liquids  Cont skilled SLP services to assess swallow function with trials of upgraded consistencies as appropriate as able to increased nutrition and hydration needs for increased tolerance of LRD at this time  Swallow Assessment Prognosis   Prognosis Good   Prognosis Considerations Age; Co-morbidities; Medical diagnosis; Medical prognosis;Previous level of function;Severity of impairments;New learning ability;Ability to carry over; Cooperation   SLP Therapy Minutes   SLP Time In 0800   SLP Time Out 0900   SLP Total Time (minutes) 60   SLP Mode of treatment - Individual (minutes) 0   SLP Mode of treatment - Concurrent (minutes) 0   SLP Mode of treatment - Group (minutes) 0   SLP Mode of treatment - Co-treat (minutes) 0   SLP Mode of Treatment - Total time(minutes) 0 minutes   SLP Cumulative Minutes 265   Therapy Time missed   Time missed?  No

## 2021-02-09 NOTE — PROGRESS NOTES
02/09/21 1319   Pain Assessment   Pain Assessment Tool 0-10   Pain Score No Pain   Restrictions/Precautions   Precautions Cognitive; Fall Risk;Fluid restriction   Cognition   Overall Cognitive Status Impaired   Arousal/Participation Alert; Responsive; Cooperative   Attention Attends with cues to redirect   Orientation Level Oriented X4   Memory Decreased recall of precautions   Following Commands Follows one step commands with increased time or repetition   Sit to Lying   Type of Assistance Needed Supervision   Sit to Lying CARE Score 4   Lying to Sitting on Side of Bed   Type of Assistance Needed Supervision   Lying to Sitting on Side of Bed CARE Score 4   Sit to Stand   Type of Assistance Needed Incidental touching   Comment cg   Sit to Stand CARE Score 4   Bed-Chair Transfer   Type of Assistance Needed Incidental touching   Comment cg   Chair/Bed-to-Chair Transfer CARE Score 4   Walk 10 Feet   Type of Assistance Needed Incidental touching   Comment cg   Walk 10 Feet CARE Score 4   Walk 50 Feet with Two Turns   Type of Assistance Needed Incidental touching   Comment cg   Walk 50 Feet with Two Turns CARE Score 4   Walk 150 Feet   Type of Assistance Needed Incidental touching   Comment    Walk 150 Feet CARE Score 4   Walking 10 Feet on Uneven Surfaces   Type of Assistance Needed Incidental touching   Comment    Walking 10 Feet on Uneven Surfaces CARE Score 4   Ambulation   Does the patient walk? 2  Yes   Primary Mode of Locomotion Prior to Admission Walk   Distance Walked (feet) 158 ft  (158' 115')   Assist Device Roller Walker   Gait Pattern Inconsistant Jaylyn; Slow Jaylyn;Decreased foot clearance;R foot drag;R hemiparesis   Limitations Noted In Balance; Coordination;Device Management; Endurance; Safety;Strength   Provided Assistance with: Balance   Walk Assist Level Contact Guard   Curb or Single Stair   Style negotiated Single stair   Type of Assistance Needed Incidental touching;Verbal cues   Comment cg   1 Step (Curb) CARE Score 4   4 Steps   Type of Assistance Needed Incidental touching;Verbal cues   Comment cg   4 Steps CARE Score 4   Stairs   Type Stairs   # of Steps 7   Weight Bearing Precautions WBAT   Assist Devices Bilateral Rail   Findings cg   Therapeutic Interventions   Strengthening seated and supine ther ex   Balance gait and transfer training   Other stairs   Assessment   Treatment Assessment Patient tolerated therapy session well  Improved R LE strength noted  Patient continues to need cues for general safety  Patient also needs directional cues to complete all tasks  Patient completed ther ex for general LE strengthening; gait and transfer training focusing on sequence and technique for improved balance and safety with functional mobility  PT Barriers   Physical Impairment Decreased strength;Decreased range of motion;Decreased endurance; Impaired balance;Decreased mobility; Decreased coordination;Decreased cognition; Impaired judgement;Decreased safety awareness   Functional Limitation Walking;Transfers;Standing;Stair negotiation   Plan   Treatment/Interventions Functional transfer training;LE strengthening/ROM; Elevations; Therapeutic exercise; Bed mobility;Gait training   Progress Progressing toward goals   PT Therapy Minutes   PT Time In 1319   PT Time Out 1450   PT Total Time (minutes) 91   PT Mode of treatment - Individual (minutes) 71   PT Mode of treatment - Concurrent (minutes) 20   PT Mode of treatment - Group (minutes) 0   PT Mode of treatment - Co-treat (minutes) 0   PT Mode of Treatment - Total time(minutes) 91 minutes   PT Cumulative Minutes 428   Therapy Time missed   Time missed?  No

## 2021-02-09 NOTE — NURSING NOTE
Patient resting comfortably in bed at this time  No signs of distress noted  Patient was encouraged to reposition frequently overnight to promote skin integrity  Bed alarm in place to promote patient safety  Call bell within reach  Will continue to monitor patient and follow plan of care

## 2021-02-09 NOTE — PROGRESS NOTES
02/09/21 0701   Pain Assessment   Pain Assessment Tool Pain Assessment not indicated - pt denies pain   Restrictions/Precautions   Precautions Cognitive; Fall Risk;Fluid restriction   Lower Body Dressing   Type of Assistance Needed Physical assistance;Verbal cues   Amount of Physical Assistance Provided 25%-49%   Comment MIN A with underwear after incontinence episode    Lower Body Dressing CARE Score 3   Sit to Lying   Type of Assistance Needed Supervision   Sit to Lying CARE Score 4   Lying to Sitting on Side of Bed   Type of Assistance Needed Supervision   Lying to Sitting on Side of Bed CARE Score 4   Sit to Stand   Type of Assistance Needed Incidental touching;Verbal cues   Comment LOB when stood at commode and CGA to regain    Sit to Stand CARE Score 4   Toileting Hygiene   Type of Assistance Needed Incidental touching;Verbal cues   [address] 85 Score 4   Toileting   Able to [address] down yes, up yes  Manage Hygiene Bladder; Bowel   Limitations Noted In Balance;LE Strength;UE Strength; Safety   Findings CGA for safety    Toilet Transfer   Type of Assistance Needed Incidental touching;Verbal cues   Toilet Transfer CARE Score 4   Toilet Transfer   Surface Assessed Raised Toilet   Transfer Technique Standard   Limitations Noted In LE Strength; Safety;Balance   Adaptive Equipment Walker   Cognition   Overall Cognitive Status WFL   Arousal/Participation Alert   Attention Within functional limits   Orientation Level Oriented X4   Memory Decreased recall of recent events   Following Commands Follows one step commands without difficulty   Additional Activities   Additional Activities Comments functional mobility RW CGA to and from BR   Activity Tolerance   Activity Tolerance Patient limited by fatigue   Assessment   Treatment Assessment Upon entry to room pt ringing to to use BR  OT assisted her by instructing her to ambulate to and from BR with RW  CGA provided for safety   STS txfs with CGA and VCs for hand placement  Pt had incontinence episode and able to A with mgmt of brief  Pt had LOB with stance by toilet with CGA to regain  OT guiding with use of walker due to bumping into items on R side  Plan is to continue skilled OT to address goals in prep for d/c  Prognosis Good   Problem List Decreased strength;Decreased endurance; Impaired balance;Decreased mobility; Decreased safety awareness   Plan   Treatment/Interventions ADL retraining;Functional transfer training; Endurance training;OT;Spoke to nursing;Gait training;Bed mobility; Equipment eval/education;Patient/family training; Compensatory technique education   Progress Progressing toward goals   OT Therapy Minutes   OT Time In 0701   OT Time Out 0711   OT Total Time (minutes) 10   OT Mode of treatment - Individual (minutes) 10   OT Mode of treatment - Concurrent (minutes) 0   OT Mode of treatment - Group (minutes) 0   OT Mode of treatment - Co-treat (minutes) 0   OT Mode of Treatment - Total time(minutes) 10 minutes   OT Cumulative Minutes 10   Therapy Time missed   Time missed? No   Tx time [id number]  Pt completed 10 minutes of concurrent tx not 10 minutes of individual  Pt participated in 10 minutes of concurrent addressing similar goals with a pt with similar deficits

## 2021-02-09 NOTE — PROGRESS NOTES
Offers no complaints  Appetite good for dinner  Occ  Non productive cough   In no distress  Continue same plan of care

## 2021-02-09 NOTE — PCC NURSING
2/9/21 Patient is a recent admission to [facility] following a stroke  Patient with generalized weakness noted and delayed responses at times  Patient with history of vocal core paralysis causing a hoarse soft voice  Bed alarm in place to promote patient safety  Patient continues with lungs clear but diminished on room air  Patient with urinary incontinence noted at times  Patient wears SCDs as ordered for DVT prevention  Blood sugars are being monitored before meals and at bedtime as ordered and insulin administered as appropriate  Patient continues to require assistance with self care tasks  2/15/21 Alert and oriented  Pt continues with generalized weakness and delayed speech response which is improving  Lungs clear/decreased on RA  Pt coughs occasionally, on nectar thick liquids and aspiration precautions  Occasionally incontinent of urine, wears pull up  Pt is impulsive at times, bed/chair alarm in place for safety   [address] [facility]

## 2021-02-10 LAB
GLUCOSE SERPL-MCNC: 112 MG/DL (ref 65–140)
GLUCOSE SERPL-MCNC: 150 MG/DL (ref 65–140)
GLUCOSE SERPL-MCNC: 179 MG/DL (ref 65–140)
GLUCOSE SERPL-MCNC: 211 MG/DL (ref 65–140)

## 2021-02-10 PROCEDURE — 97129 THER IVNTJ 1ST 15 MIN: CPT

## 2021-02-10 PROCEDURE — 97530 THERAPEUTIC ACTIVITIES: CPT

## 2021-02-10 PROCEDURE — 97537 COMMUNITY/WORK REINTEGRATION: CPT

## 2021-02-10 PROCEDURE — 99233 SBSQ HOSP IP/OBS HIGH 50: CPT | Performed by: FAMILY MEDICINE

## 2021-02-10 PROCEDURE — 97110 THERAPEUTIC EXERCISES: CPT

## 2021-02-10 PROCEDURE — 82948 REAGENT STRIP/BLOOD GLUCOSE: CPT

## 2021-02-10 PROCEDURE — 92526 ORAL FUNCTION THERAPY: CPT

## 2021-02-10 PROCEDURE — 97130 THER IVNTJ EA ADDL 15 MIN: CPT

## 2021-02-10 PROCEDURE — 97116 GAIT TRAINING THERAPY: CPT

## 2021-02-10 RX ORDER — INSULIN GLARGINE 100 [IU]/ML
45 INJECTION, SOLUTION SUBCUTANEOUS
Status: DISCONTINUED | OUTPATIENT
Start: 2021-02-10 | End: 2021-02-12

## 2021-02-10 RX ADMIN — METOPROLOL TARTRATE 50 MG: 50 TABLET, FILM COATED ORAL at 08:05

## 2021-02-10 RX ADMIN — DOCUSATE SODIUM 100 MG: 100 CAPSULE, LIQUID FILLED ORAL at 08:04

## 2021-02-10 RX ADMIN — INSULIN LISPRO 10 UNITS: 100 INJECTION, SOLUTION INTRAVENOUS; SUBCUTANEOUS at 12:53

## 2021-02-10 RX ADMIN — HYDROCHLOROTHIAZIDE: 12.5 TABLET ORAL at 08:04

## 2021-02-10 RX ADMIN — INSULIN LISPRO 1 UNITS: 100 INJECTION, SOLUTION INTRAVENOUS; SUBCUTANEOUS at 17:06

## 2021-02-10 RX ADMIN — ESCITALOPRAM 5 MG: 5 TABLET, FILM COATED ORAL at 08:05

## 2021-02-10 RX ADMIN — INSULIN LISPRO 1 UNITS: 100 INJECTION, SOLUTION INTRAVENOUS; SUBCUTANEOUS at 08:04

## 2021-02-10 RX ADMIN — FAMOTIDINE 20 MG: 20 TABLET ORAL at 08:05

## 2021-02-10 RX ADMIN — INSULIN LISPRO 10 UNITS: 100 INJECTION, SOLUTION INTRAVENOUS; SUBCUTANEOUS at 08:03

## 2021-02-10 RX ADMIN — RIVAROXABAN 20 MG: 10 TABLET, FILM COATED ORAL at 08:05

## 2021-02-10 RX ADMIN — PANTOPRAZOLE SODIUM 40 MG: 40 TABLET, DELAYED RELEASE ORAL at 08:05

## 2021-02-10 RX ADMIN — INSULIN GLARGINE 45 UNITS: 100 INJECTION, SOLUTION SUBCUTANEOUS at 21:34

## 2021-02-10 RX ADMIN — INSULIN LISPRO 10 UNITS: 100 INJECTION, SOLUTION INTRAVENOUS; SUBCUTANEOUS at 17:31

## 2021-02-10 RX ADMIN — INSULIN LISPRO 2 UNITS: 100 INJECTION, SOLUTION INTRAVENOUS; SUBCUTANEOUS at 21:35

## 2021-02-10 RX ADMIN — ATORVASTATIN CALCIUM 80 MG: 80 TABLET, FILM COATED ORAL at 17:05

## 2021-02-10 NOTE — NURSING NOTE
Pt slept through the night  Cont of B+B throughout shift  Pt is currently sleeping with no signs of pain or distress observed  Call bell within reach, bed alarm in place for safety  Will continue to monitor and follow plan of care

## 2021-02-10 NOTE — PLAN OF CARE
Problem: Prexisting or High Potential for Compromised Skin Integrity  Goal: Skin integrity is maintained or improved  Description: INTERVENTIONS:  - Identify patients at risk for skin breakdown  - Assess and monitor skin integrity  - Assess and monitor nutrition and hydration status  - Monitor labs   - Assess for incontinence   - Turn and reposition patient  - Assist with mobility/ambulation  - Relieve pressure over bony prominences  - Avoid friction and shearing  - Provide appropriate hygiene as needed including keeping skin clean and dry  - Evaluate need for skin moisturizer/barrier cream  - Collaborate with interdisciplinary team   - Patient/family teaching  - Consider wound care consult   Outcome: Progressing     Problem: Potential for Falls  Goal: Patient will remain free of falls  Description: INTERVENTIONS:  - Assess patient frequently for physical needs  -  Identify cognitive and physical deficits and behaviors that affect risk of falls    -  [place] fall precautions as indicated by assessment   - Educate patient/family on patient safety including physical limitations  - Instruct patient to call for assistance with activity based on assessment  - Modify environment to reduce risk of injury  - Consider OT/PT consult to assist with strengthening/mobility  Outcome: Progressing     Problem: PAIN - ADULT  Goal: Verbalizes/displays adequate comfort level or baseline comfort level  Description: Interventions:  - Encourage patient to monitor pain and request assistance  - Assess pain using appropriate pain scale  - Administer analgesics based on type and severity of pain and evaluate response  - Implement non-pharmacological measures as appropriate and evaluate response  - Consider cultural and social influences on pain and pain management  - Notify physician/advanced practitioner if interventions unsuccessful or patient reports new pain  Outcome: Progressing     Problem: INFECTION - ADULT  Goal: Absence or prevention of progression during hospitalization  Description: INTERVENTIONS:  - Assess and monitor for signs and symptoms of infection  - Monitor lab/diagnostic results  - Monitor all insertion sites, i e  indwelling lines, tubes, and drains  - Monitor endotracheal if appropriate and nasal secretions for changes in amount and color  - [place] appropriate cooling/warming therapies per order  - Administer medications as ordered  - Instruct and encourage patient and family to use good hand hygiene technique  - Identify and instruct in appropriate isolation precautions for identified infection/condition  Outcome: Progressing  Goal: Absence of fever/infection during neutropenic period  Description: INTERVENTIONS:  - Monitor WBC    Outcome: Progressing     Problem: SAFETY ADULT  Goal: Patient will remain free of falls  Description: INTERVENTIONS:  - Assess patient frequently for physical needs  -  Identify cognitive and physical deficits and behaviors that affect risk of falls    -  [place] fall precautions as indicated by assessment   - Educate patient/family on patient safety including physical limitations  - Instruct patient to call for assistance with activity based on assessment  - Modify environment to reduce risk of injury  - Consider OT/PT consult to assist with strengthening/mobility  Outcome: Progressing  Goal: Maintain or return to baseline ADL function  Description: INTERVENTIONS:  -  Assess patient's ability to carry out ADLs; assess patient's baseline for ADL function and identify physical deficits which impact ability to perform ADLs (bathing, care of mouth/teeth, toileting, grooming, dressing, etc )  - Assess/evaluate cause of self-care deficits   - Assess range of motion  - Assess patient's mobility; develop plan if impaired  - Assess patient's need for assistive devices and provide as appropriate  - Encourage maximum independence but intervene and supervise when necessary  - Involve family in performance of ADLs  - Assess for home care needs following discharge   - Consider OT consult to assist with ADL evaluation and planning for discharge  - Provide patient education as appropriate  Outcome: Progressing  Goal: Maintain or return mobility status to optimal level  Description: INTERVENTIONS:  - Assess patient's baseline mobility status (ambulation, transfers, stairs, etc )    - Identify cognitive and physical deficits and behaviors that affect mobility  - Identify mobility aids required to assist with transfers and/or ambulation (gait belt, sit-to-stand, lift, walker, cane, etc )  - [place] fall precautions as indicated by assessment  - Record patient progress and toleration of activity level on Mobility SBAR; progress patient to next Phase/Stage  - Instruct patient to call for assistance with activity based on assessment  - Consider rehabilitation consult to assist with strengthening/weightbearing, etc   Outcome: Progressing     Problem: DISCHARGE PLANNING  Goal: Discharge to home or other facility with appropriate resources  Description: INTERVENTIONS:  - Identify barriers to discharge w/patient and caregiver  - Arrange for needed discharge resources and transportation as appropriate  - Identify discharge learning needs (meds, wound care, etc )  - Arrange for interpretive services to assist at discharge as needed  - Refer to Case Management Department for coordinating discharge planning if the patient needs post-hospital services based on physician/advanced practitioner order or complex needs related to functional status, cognitive ability, or social support system  Outcome: Progressing     Problem: Knowledge Deficit  Goal: Patient/family/caregiver demonstrates understanding of disease process, treatment plan, medications, and discharge instructions  Description: Complete learning assessment and assess knowledge base    Interventions:  - Provide teaching at level of understanding  - Provide teaching via preferred learning methods  Outcome: Progressing     Problem: Neurological Deficit  Goal: Neurological status is stable or improving  Description: Interventions:  - Monitor and assess patient's level of consciousness, motor function, sensory function, and level of assistance needed for ADLs  - Monitor and report changes from baseline  Collaborate with interdisciplinary team to initiate plan and implement interventions as ordered  - Provide and maintain a safe environment  - Consider seizure precautions  - Consider fall precautions  - Consider aspiration precautions  - Consider bleeding precautions  Outcome: Progressing     Problem: Activity Intolerance/Impaired Mobility  Goal: Mobility/activity is maintained at optimum level for patient  Description: Interventions:  - Assess and monitor patient  barriers to mobility and need for assistive/adaptive devices  - Assess patient's emotional response to limitations  - Collaborate with interdisciplinary team and initiate plans and interventions as ordered  - Encourage independent activity per ability   - Maintain proper body alignment  - Perform active/passive rom as tolerated/ordered  - Plan activities to conserve energy   - Turn patient as appropriate  Outcome: Progressing     Problem: Communication Impairment  Goal: Ability to express needs and understand communication  Description: Assess patient's communication skills and ability to understand information  Patient will demonstrate use of effective communication techniques, alternative methods of communication and understanding even if not able to speak  - Encourage communication and provide alternate methods of communication as needed  - Collaborate with case management/ for discharge needs  - Include patient/family/caregiver in decisions related to communication    Outcome: Progressing     Problem: Potential for Aspiration  Goal: Non-ventilated patient's risk of aspiration is minimized  Description: Assess and monitor vital signs, respiratory status, and labs (WBC)  Monitor for signs of aspiration (tachypnea, cough, rales, wheezing, cyanosis, fever)  - Assess and monitor patient's ability to swallow  - Place patient up in chair to eat if possible  - HOB up at 90 degrees to eat if unable to get patient up into chair   - Supervise patient during oral intake  - Instruct patient/ family to take small bites  - Instruct patient/ family to take small single sips when taking liquids  - Follow patient-specific strategies generated by speech pathologist   Outcome: Progressing  Goal: Ventilated patient's risk of aspiration is minimized  Description: Assess and monitor vital signs, respiratory status, airway cuff pressure, and labs (WBC)  Monitor for signs of aspiration (tachypnea, cough, rales, wheezing, cyanosis, fever)  - Elevate head of bed 30 degrees if patient has tube feeding   - Monitor tube feeding  Outcome: Progressing     Problem: Nutrition  Goal: Nutrition/Hydration status is improving  Description: Monitor and assess patient's nutrition/hydration status for malnutrition (ex- brittle hair, bruises, dry skin, pale skin and conjunctiva, muscle wasting, smooth red tongue, and disorientation)  Collaborate with interdisciplinary team and initiate plan and interventions as ordered  Monitor patient's weight and dietary intake as ordered or per policy  Utilize nutrition screening tool and intervene per policy  Determine patient's food preferences and provide high-protein, high-caloric foods as appropriate  - Assist patient with eating   - Allow adequate time for meals   - Encourage patient to take dietary supplement as ordered  - Collaborate with clinical nutritionist   - Include patient/family/caregiver in decisions related to nutrition    Outcome: Progressing

## 2021-02-10 NOTE — PROGRESS NOTES
02/10/21 1305   Pain Assessment   Pain Assessment Tool Pain Assessment not indicated - pt denies pain   Pain Score No Pain   Restrictions/Precautions   Precautions Fall Risk   Sit to Stand   Type of Assistance Needed Incidental touching   Comment [facility]   Sit to Stand CARE Score 4   Toileting Hygiene   Type of Assistance Needed Physical assistance   Amount of Physical Assistance Provided 25%-49%   Comment min A to manage hygiene   Toileting Hygiene CARE Score 3   Toileting   Able to Pull Clothing down yes, up yes  Able to Manage Clothing Closures Yes   Manage Hygiene Bladder; Bowel   Limitations Noted In Balance;Problem Solving;ROM;Safety;UE Strength;LE Strength   Adaptive Equipment Grab Bar   Toilet Transfer   Type of Assistance Needed Incidental touching   Comment [facility]   Toilet Transfer CARE Score 4   Toilet Transfer   Surface Assessed Raised Toilet   Transfer Technique Stand Pivot   Limitations Noted In Balance; Endurance;ROM;Safety;UE Strength;LE Strength   Adaptive Equipment Grab Bar   Positioning Concerns Safety   Right Upper Extremity- Strength   RUE Strength Comment 2 x15 2lb dumbbell: elbow flexion/extension, shoulder flexion/extension, pronation/supination, internal/external rotation, protraction/retraction, ABD/ADD   Left Upper Extremity-Strength   LUE Strength Comment 2 x15 2lb dumbbell: elbow flexion/extension, shoulder flexion/extension, pronation/supination, internal/external rotation, protraction/retraction, ABD/ADD   Exercise Tools   UE Ergometer arm bike BUE 4 minutes forward and backwards min resistance   Other Exercise Tool 1 functional reacher to retrieve pegs of various sizes   Cognition   Overall Cognitive Status Impaired   Arousal/Participation Alert; Responsive; Cooperative   Attention Attends with cues to redirect   Orientation Level Oriented X4   Memory Decreased recall of precautions   Following Commands Follows one step commands with increased time or repetition   Assessment   Treatment Assessment Pt agreeable to OT session this PM, received sitting upright in w/c  Completed BUE therex to improve strength and endurance, pt tolerated exercises well with rest breaks taken as needed and no pain increase reported  Completed toileting hygiene at end of session with min A  Barriers include decrease cognition, strength, endurance, ROM and balance  Pt would benefit from continued skilled OT services to increase independence with cee tasks, functional mobility/transfers and activity tolerance  Prognosis Good   Problem List Decreased strength;Decreased range of motion;Decreased endurance; Impaired balance;Decreased cognition;Decreased safety awareness   Plan   Treatment/Interventions ADL retraining;Functional transfer training;LE strengthening/ROM; Therapeutic exercise; Endurance training;Cognitive reorientation;Patient/family training;Equipment eval/education; Compensatory technique education;OT   Progress Progressing toward goals   OT Therapy Minutes   OT Time In 1305   OT Time Out 1406   OT Total Time (minutes) 61   OT Mode of treatment - Individual (minutes) 51   OT Mode of treatment - Concurrent (minutes) 10   Therapy Time missed   Time missed?  No
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
## 2021-02-10 NOTE — CASE MANAGEMENT
Tx team recommendations reviewed with patient & son, who expressed understanding & agreement  Target DC date is extended to Saturday 2/20/21 with [facility] (Nsg, PT, OT, HHA); a list of providers was provided to Pt & a referral will be made based on Pt preference  Per Dr Isaura Clemens, Pt is unable to have surgery on 2/18, as it has not been 90 days since the CVA; Pt is aware & expressed understanding  SW will continue to monitor & assist as needed with Tx & DC planning

## 2021-02-10 NOTE — PROGRESS NOTES
02/10/21 0800   Pain Assessment   Pain Assessment Tool Pain Assessment not indicated - pt denies pain   Pain Score No Pain   Restrictions/Precautions   Precautions Aspiration;Bed/chair alarms;Cognitive; Fall Risk;Supervision on toilet/commode;Visual deficit   Comprehension   Comprehension (FIM) 5 - Needs slowed speech to understand   Expression   Expression (FIM) 5 - Needs help/cues only RARELY (< 10% of the time)   Social Interaction   Social Interaction (FIM) 5 - Interacts appropriately with others 90% of time   Problem Solving   Problem solving (FIM) 4 - Solves basic problems 75-89% of time   Memory   Memory (FIM) 4 - Recognizes/recalls/performs 75-89%   Speech/Language/Cognition Assessmetn   Treatment Assessment Pt seen for skilled speech therapy session targeting cognitive linguistic communication tasks  Pt alert and interactive seen OOB upright in chair in room  Pt completed the following- visual attention task with visual logic  Pt provided with a picture of a calendar and instructed to locate as many errors as she could on the page  Pt was able to locate 6/11acc increasing with verbal cues for locating other mistakes  Pt noted with increased reasoning once error brought to her attention with being able to explain then why it was a mistake  Pt next completed visual memory tasks- pt presented with a picture and instructed to use a strategy to attempt to remember the information present so she could then recall once the picture was removed from sight  Pt was then asked questions regarding the picture- trial 1 able to complete with 8/10acc and trial 2 with 10/10acc  Pt overall is improving with skilled SLP services and will cont to benefit to maximize overall cognitive linguistic communication abilities at this time  Swallow Information   Current Risks for Dysphagia & Aspiration Weak cough;Weak voicing;Dysphonia; Aphonia;General debilitation;New Neuro event;Brain injury;Cognitive deficit;HX neurologic dx Current Symptoms/Concerns Cough;Clear throat; With liquids; Chronic cough;HX Dysphagia   Current Diet Dysphagia advance; Nectar thick liquid   Baseline Diet Regular; Thin liquids   Consistencies Assessed and Performance   Materials Admnistered Regular/Solid; Thin liquid; Nectar thick liquid   Oral Stage Mild impaired   Phargngeal Stage Mild impaired; Moderate impaired;Aspiration risk   Swallow Mechanics Mild delayed;Swallow initation;Weak larygneal rise;Good Larygneal rise;Aspiration risk;Vocal Cord dysfunction suspected   Esophageal Concerns Hx GERDS   Recommendations   Risk for Aspiration Present   Recommendations Dysphagia treatment;Consider Video/Barium Swallow Study   Diet Solid Recommendation Level 3 Dysphagia/ advanced/ soft to chew   Diet Liquid Recommendation Nectar thick liquid   Recommended Form of Meds Whole; With thick liquid; As desired; As tolerated   General Precautions Aspiration precautions; Feed only when alert;Minimize distractions;Upright as possible for all oral intake;Remain upright for 45 mins after meals  (OOB for meals, set up, distant sup)   Compensatory Swallowing Strategies Place food/straw in on left side; Check for pocketing of food on the right; Alternate solids and liquids; External pacing;Effortful swallow;Voluntary throat clear/cough to clear penetration   Results Reviewed with PT/Family/Caregiver;RN;MD   Eating   Type of Assistance Needed Set-up / clean-up   Amount of Physical Assistance Provided No physical assistance   Eating CARE Score 5   Swallow Assessment   Swallow Treatment Assessment Pt seen for skilled dysphagia tx session  Pt alert and interactive seen OOB upright in chair in room  Pt seen with breakfast tray- trialing regular diet textures and thin liquids  Pt provided with verbal cue initially to utilize single small cup sips (as there was a straw on the tray and pt attempted to use with thin trials)   Pt required some set up with opening containers but no physical assistance self feeding  Items assessed included egg and cheese omelet, turkey laurent, yogurt, thin liquids and NTL  Nursing also present and provided meds whole with NTL- tolerated well  Pt overall meal completion 100% and 160cc liquids  Pt starting with cup sip of thin- pt with good retrieval and oral hold, no anterior loss, mildly delayed transfer and swallow  Pt noted with incoordinated double swallow as if piecemeal or too large of sip and sub sequentially demonstrated immediate cough response  Pt provided encouragement to set cup down and cont coughing- pt with decreased cough and breath support- needed to take in deep breath before being able to achieve productive cough effort  Pt needing a few minute to recover- c/o now tickle in back of throat from irritation  Provided with yogurt and took a few bites which soothed her throat  Pt cont'd eating regular diet items but instructed to wait to drink thin liquids until the end and use the NTL during meal  Pt eating food items by bite with good bolus retrieval, adequate oral control, slow processing, functional transfers and swallows  Noted slight prolonged chewing with laurent but AP transfers relatively timely with minimal to no residue throughout  At end of meal, pt cont to drink further cup sips of liquid, noted to take smaller sips one at a time, cough only present at end of cough x1  Discussed current strategy of small single cup sips that appears to improve tolerance of thin liquids however cannot rule out aspiration or components of weakness in the swallow that are impacting overall swallow performance and coordination  Recommend completion of VBS to further assess swallow function before upgrading diet (if able and appropriate)  Pt agreeable with plan- pt understood what assessment is for and how it is completed as she has had one in the past (2019)  At this time, cont dysphagia level 3 diet and NTL with aspiration precautions   Set up, distant sup, slow rate, small bite, alternate food and liquids  Cont further dysphagia sessions to complete VBS and for further recommendations following  Swallow Assessment Prognosis   Prognosis Good   Prognosis Considerations Age; Co-morbidities; Medical diagnosis; Medical prognosis;Previous level of function;Severity of impairments;New learning ability;Ability to carry over; Cooperation   SLP Therapy Minutes   SLP Time In 0800   SLP Time Out 0900   SLP Total Time (minutes) 60   SLP Mode of treatment - Individual (minutes) 60   SLP Mode of treatment - Concurrent (minutes) 0   SLP Mode of treatment - Group (minutes) 0   SLP Mode of treatment - Co-treat (minutes) 0   SLP Mode of Treatment - Total time(minutes) 60 minutes   SLP Cumulative Minutes 325   Therapy Time missed   Time missed?  No

## 2021-02-10 NOTE — PROGRESS NOTES
02/10/21 1430   Pain Assessment   Pain Assessment Tool 0-10   Pain Score No Pain   Restrictions/Precautions   Precautions Fall Risk   Cognition   Overall Cognitive Status Impaired   Arousal/Participation Alert; Responsive; Cooperative   Attention Attends with cues to redirect   Orientation Level Oriented X4   Memory Decreased recall of precautions   Following Commands Follows one step commands with increased time or repetition   Sit to Lying   Type of Assistance Needed Supervision   Sit to Lying CARE Score 4   Lying to Sitting on Side of Bed   Type of Assistance Needed Supervision   Lying to Sitting on Side of Bed CARE Score 4   Sit to Stand   Type of Assistance Needed Incidental touching   Comment cg   Sit to Stand CARE Score 4   Bed-Chair Transfer   Type of Assistance Needed Incidental touching   Comment cg   Chair/Bed-to-Chair Transfer CARE Score 4   Walk 10 Feet   Type of Assistance Needed Physical assistance   Amount of Physical Assistance Provided 25%-49%   Comment cg/min    Walk 10 Feet CARE Score 3   Walk 50 Feet with Two Turns   Type of Assistance Needed Physical assistance   Amount of Physical Assistance Provided 25%-49%   Comment cg/min assist   Walk 50 Feet with Two Turns CARE Score 3   Walking 10 Feet on Uneven Surfaces   Type of Assistance Needed Physical assistance   Amount of Physical Assistance Provided 25%-49%   Comment cg/min assist   Walking 10 Feet on Uneven Surfaces CARE Score 3   Ambulation   Does the patient walk? 2  Yes   Primary Mode of Locomotion Prior to Admission Walk   Distance Walked (feet) 127 ft  (127' 108')   Assist Device Roller Walker   Gait Pattern Inconsistant Jaylyn; Slow Jaylyn;Decreased foot clearance;R foot drag;R hemiparesis; Step to;Narrow MAXWELL   Limitations Noted In Balance; Coordination;Device Management; Endurance; Safety;Strength   Provided Assistance with: Balance   Walk Assist Level Contact Guard;Minimum Assist   Findings level and unlevel surfaces   Curb or Single Stair   Style negotiated Single stair   Type of Assistance Needed Incidental touching   Comment cg   1 Step (Curb) CARE Score 4   4 Steps   Type of Assistance Needed Incidental touching   Comment cg   4 Steps CARE Score 4   Stairs   Type Stairs   # of Steps 7   Weight Bearing Precautions WBAT   Assist Devices Bilateral Rail   Findings cg   Therapeutic Interventions   Strengthening seated ther ex   Balance gait and transfer training   Other stairs   Assessment   Treatment Assessment Patient tolerated therapy session well  Continues to need cues for general safety as well as for sequence and technique for all functional mobility  Completed ther ex for general LE strengthening; gait and transfer training focusing on sequence and technique for improved balance and safety with functional mobility  Patient appropriate for concurrent therapy to increase motivation among pts with similar deficits while completing therapy session  PT Barriers   Physical Impairment Decreased strength;Decreased range of motion;Decreased endurance; Impaired balance;Decreased mobility; Decreased coordination;Decreased cognition; Impaired judgement;Decreased safety awareness   Functional Limitation Walking;Transfers;Standing;Stair negotiation   Plan   Treatment/Interventions Functional transfer training;LE strengthening/ROM; Elevations; Therapeutic exercise; Bed mobility;Gait training   Progress Progressing toward goals   PT Therapy Minutes   PT Time In 1430   PT Time Out 1530   PT Total Time (minutes) 60   PT Mode of treatment - Individual (minutes) 30   PT Mode of treatment - Concurrent (minutes) 30   PT Mode of treatment - Group (minutes) 0   PT Mode of treatment - Co-treat (minutes) 0   PT Mode of Treatment - Total time(minutes) 60 minutes   PT Cumulative Minutes 488   Therapy Time missed   Time missed?  No

## 2021-02-11 ENCOUNTER — APPOINTMENT (INPATIENT)
Dept: RADIOLOGY | Facility: HOSPITAL | Age: 66
DRG: 057 | End: 2021-02-11
Attending: FAMILY MEDICINE
Payer: COMMERCIAL

## 2021-02-11 LAB
GLUCOSE SERPL-MCNC: 115 MG/DL (ref 65–140)
GLUCOSE SERPL-MCNC: 118 MG/DL (ref 65–140)
GLUCOSE SERPL-MCNC: 138 MG/DL (ref 65–140)
GLUCOSE SERPL-MCNC: 193 MG/DL (ref 65–140)

## 2021-02-11 PROCEDURE — 97530 THERAPEUTIC ACTIVITIES: CPT

## 2021-02-11 PROCEDURE — 97535 SELF CARE MNGMENT TRAINING: CPT

## 2021-02-11 PROCEDURE — 74230 X-RAY XM SWLNG FUNCJ C+: CPT

## 2021-02-11 PROCEDURE — 82948 REAGENT STRIP/BLOOD GLUCOSE: CPT

## 2021-02-11 PROCEDURE — 97537 COMMUNITY/WORK REINTEGRATION: CPT

## 2021-02-11 PROCEDURE — 99231 SBSQ HOSP IP/OBS SF/LOW 25: CPT | Performed by: FAMILY MEDICINE

## 2021-02-11 PROCEDURE — 97110 THERAPEUTIC EXERCISES: CPT

## 2021-02-11 PROCEDURE — 92610 EVALUATE SWALLOWING FUNCTION: CPT

## 2021-02-11 PROCEDURE — 97116 GAIT TRAINING THERAPY: CPT

## 2021-02-11 RX ADMIN — INSULIN LISPRO 10 UNITS: 100 INJECTION, SOLUTION INTRAVENOUS; SUBCUTANEOUS at 13:17

## 2021-02-11 RX ADMIN — FAMOTIDINE 20 MG: 20 TABLET ORAL at 08:57

## 2021-02-11 RX ADMIN — HYDROCHLOROTHIAZIDE: 12.5 TABLET ORAL at 08:58

## 2021-02-11 RX ADMIN — METOPROLOL TARTRATE 50 MG: 50 TABLET, FILM COATED ORAL at 08:57

## 2021-02-11 RX ADMIN — INSULIN GLARGINE 45 UNITS: 100 INJECTION, SOLUTION SUBCUTANEOUS at 21:29

## 2021-02-11 RX ADMIN — INSULIN LISPRO 10 UNITS: 100 INJECTION, SOLUTION INTRAVENOUS; SUBCUTANEOUS at 17:11

## 2021-02-11 RX ADMIN — PANTOPRAZOLE SODIUM 40 MG: 40 TABLET, DELAYED RELEASE ORAL at 08:58

## 2021-02-11 RX ADMIN — ATORVASTATIN CALCIUM 80 MG: 80 TABLET, FILM COATED ORAL at 17:11

## 2021-02-11 RX ADMIN — INSULIN LISPRO 2 UNITS: 100 INJECTION, SOLUTION INTRAVENOUS; SUBCUTANEOUS at 13:18

## 2021-02-11 RX ADMIN — ESCITALOPRAM 5 MG: 5 TABLET, FILM COATED ORAL at 08:58

## 2021-02-11 RX ADMIN — RIVAROXABAN 20 MG: 10 TABLET, FILM COATED ORAL at 08:59

## 2021-02-11 RX ADMIN — INSULIN LISPRO 10 UNITS: 100 INJECTION, SOLUTION INTRAVENOUS; SUBCUTANEOUS at 09:02

## 2021-02-11 NOTE — PROCEDURES
Speech Pathology Department  Video Barium Swallow Study    Patient Name: Tony Mckeon  Date: 2/11/2021    Past Medical History:   Diagnosis Date    Abdominal fibromatosis     Diabetes mellitus ([address] )     Hyperlipemia     Hypertension     Pneumonia     Stroke [facility])        Past Surgical History:   Procedure Laterality Date    CATARACT EXTRACTION Bilateral     CATARACT EXTRACTION, BILATERAL      COLONOSCOPY      EGD      LAPAROTOMY      Exploratory; Last Assessed 10/17/2017    PEG TUBE PLACEMENT      PEG TUBE REMOVAL         Summary:   Pt is presenting with mild to mild-moderate oropharyngeal dysphagia with liquids characterized by decreased oral control, resulting in premature spillage, weak bolus formation and propulsion, delayed initiation of swallows, incomplete epiglottic closure and reduced anterior and superior hyolaryngeal movement  Pt frequently uses multiple swallows per bolus to assist in full oral clearance  Reduced oral control and premature spillage observed with larger sips of NTL, resulting in aspiration which pt did elicit a spontaneous cough response  Aspiration events of thin liquids, which were silent and pt required cuing to clear material with coughing and throat clears  Utilizing small sips and bolus prep set/hold of NTL by cup sips, oral control and timing of swallows improved where no aspiration events then present  Oral and pharyngeal swallow function observed to be functional with solids  Pt able to follow commands to complete strategies but will recommend pt have full supervision for now with all liquid intake in order to ensure carryover  Will focus on carryover of strategies in upcoming sessions  Pt w/o hx of pneumonias, increased WBC count or fevers  Nursing assessment of chest as clear, per documentation           Recommendations:  Diet: dysphagia level 3 (for now-will trial full regular diet meals with potential for upgrade)  Liquids: nectar thick by cup-SMALL sips only  Meds: whole in puree  Strategies: small sips of NTL, bolus prep set/hold of liquids  Upright position  Full Supervision for meals (for now to ensure carryover of strategies to maintain safety with intake)  Aspiration Precautions  Consider consult with: ENT (pt is currently being followed by an ENT-may consider new assessment of cords)  Results reviewed with: physician, RN, therapy staff, patient  Aspiration precautions posted  Previous VBS:   None on file, however, chart review reveals VBS completed on 6/28/2019 completed at [facility] indicating laryngeal penetration of thin liquids  ENT Hx:   Pt has been followed for evaluation of dyspnea/upper airway stenosis  Has reported some trouble swallowing water, coughing with water to ENT  ENT report 1/11/2021 indicated decreased B/L right and left vocal fold abduction, normal adduction, incomplete glottic closure  Current Diet:  Dysphagia level 3 and nectar thick liquids    Premorbid diet:  Regular, thins    O2 requirement:  -    Vocal Quality/Speech:  Breathy, weak     Consistencies administered: Pt was administered barium laden puree, soft solid, regular solid, honey thick liquid, nectar thick liquid and thin liquid  Liquid was taken by cup and straw sips  Pt also took barium tablet in puree  Pt was seated laterally at 90 degrees        Oral stage:  Lip closure: WFL  Mastication: mildly prolonged with solids  Bolus formation: reduced, resulted in consistent diffuse oral residual and on BOT  Bolus control: reduced with liquids; adequate w/ solids  Transfer: prolonged   Residue: diffuse, cleared with piecemeal transfers and increased time for additional swallows     Pharyngeal stage:  Swallow promptness: ranged from timely to mildly delayed  Spill to valleculae: with NTL and thin liquids  Spill to pyriforms: occasionally with liquids  Epiglottic inversion: incomplete  Laryngeal excursion: reduced anteriorly and superiorly   Pharyngeal constriction: WFL  Vallecular retention: mild to moderate amounts with NTL and thin liquids, lessened with secondary swallows  Pyriform retention: trace amounts  PPW coating: none  Osteophytes: no  CP prominence: no  Retropulsion from prominence: -  Epiglottic undercoat: -  Penetration: flash high of HTL, high of NTL, fairly consistent with thins  Aspiration: with large sips of NTL, with thin liquids  Strategies: small sips and prep set/bolus hold were effective in improving bolus control of liquids, in turn improving airway protection   Response to aspiration: cough response to NTL, silent aspiration of thin liquids; coughing only intermittently effective in clearing laryngeal vestibule and aspirated material due to weakness of cough-required verbal cuing and models to increase use of throat clear, cough attempts which did then improve clearance     Screening of Esophageal stage:  Esophageal screening appeared WFL at this time  Bolus passed without difficulty and promptly

## 2021-02-11 NOTE — PROGRESS NOTES
02/11/21 1330   Pain Assessment   Pain Assessment Tool 0-10   Pain Score No Pain   Restrictions/Precautions   Precautions Fall Risk   Cognition   Overall Cognitive Status Impaired   Arousal/Participation Alert; Responsive; Cooperative   Attention Attends with cues to redirect   Orientation Level Oriented X4   Memory Decreased recall of precautions   Following Commands Follows one step commands with increased time or repetition   Sit to Lying   Type of Assistance Needed Supervision   Sit to Lying CARE Score 4   Sit to Stand   Type of Assistance Needed Incidental touching;Supervision   Comment cg/S   Sit to Stand CARE Score 4   Bed-Chair Transfer   Type of Assistance Needed Incidental touching;Supervision   Comment cg/S   Chair/Bed-to-Chair Transfer CARE Score 4   Walk 10 Feet   Type of Assistance Needed Incidental touching   Comment cg   Walk 10 Feet CARE Score 4   Walk 50 Feet with Two Turns   Type of Assistance Needed Incidental touching   Comment cg   Walk 50 Feet with Two Turns CARE Score 4   Walk 150 Feet   Type of Assistance Needed Incidental touching   Comment cg   Walk 150 Feet CARE Score 4   Walking 10 Feet on Uneven Surfaces   Type of Assistance Needed Incidental touching   Comment cg   Walking 10 Feet on Uneven Surfaces CARE Score 4   Ambulation   Does the patient walk? 2  Yes   Primary Mode of Locomotion Prior to Admission Walk   Distance Walked (feet) 160 ft  (543' 109'  132')   Assist Device Roller Walker   Gait Pattern Inconsistant Jaylyn; Slow Jaylyn;Decreased foot clearance;R foot drag;R hemiparesis; Step to;Narrow MAXWELL   Limitations Noted In Balance; Coordination; Endurance; Safety;Strength   Provided Assistance with: Balance   Walk Assist Level Contact Guard;Close Supervision   Findings cg/close S level and unlevel surfaces   Curb or Single Stair   Style negotiated Single stair   Type of Assistance Needed Incidental touching;Supervision   Comment cg/close S   1 Step (Curb) CARE Score 4   4 Steps Type of Assistance Needed Incidental touching;Supervision   Comment cg/close S   4 Steps CARE Score 4   Stairs   Type Stairs   # of Steps 7   Weight Bearing Precautions WBAT   Assist Devices Bilateral Rail   Findings cg/close S   Therapeutic Interventions   Strengthening supine ther ex   Balance gait and transfer training   Other stairs   Assessment   Treatment Assessment Patient tolerated therapy session well  Improved balance with functional tasks noted today  Continues to need cues for general safety with all tasks  Completed ther ex for general LE strengthening; gait and transfer training focusing on sequence and technique for improved balance and safety with functional mobilty  PT Barriers   Physical Impairment Decreased strength;Decreased range of motion;Decreased endurance; Impaired balance;Decreased mobility; Decreased coordination;Decreased cognition; Impaired judgement;Decreased safety awareness;Pain   Functional Limitation Walking;Transfers;Standing;Stair negotiation   Plan   Treatment/Interventions Functional transfer training;LE strengthening/ROM; Elevations; Therapeutic exercise; Bed mobility;Gait training   Progress Progressing toward goals   PT Therapy Minutes   PT Time In 1330   PT Time Out 1430   PT Total Time (minutes) 60   PT Mode of treatment - Individual (minutes) 60   PT Mode of treatment - Concurrent (minutes) 0   PT Mode of treatment - Group (minutes) 0   PT Mode of treatment - Co-treat (minutes) 0   PT Mode of Treatment - Total time(minutes) 60 minutes   PT Cumulative Minutes 575   Therapy Time missed   Time missed?  No

## 2021-02-11 NOTE — PROGRESS NOTES
02/11/21 1030   Pain Assessment   Pain Assessment Tool 0-10   Pain Score No Pain   Restrictions/Precautions   Precautions Aspiration;Bed/chair alarms;Cognitive; Fall Risk;Supervision on toilet/commode;Visual deficit   Recommendations   Risk for Aspiration Present   Recommendations Dysphagia treatment   Diet Solid Recommendation Level 3 Dysphagia/ advanced/ soft to chew   Diet Liquid Recommendation Nectar thick liquid  (SMALL sips only-CUP only)   Recommended Form of Meds Whole; With puree   General Precautions Aspiration precautions; Feed only when alert;Minimize distractions;Upright as possible for all oral intake;Remain upright for 45 mins after meals; Supervision with meals  (FULL supervision for now)   Results Reviewed with MD;RN;PT/Family/Caregiver   Swallow Assessment   Swallow Treatment Assessment Pt completed VBS/VFSS this date to further assess oral and pharyngeal swallow function, as well as to determine appropriateness of potential diet upgrade and to guide treatment plan  Pt found to be presenting w/ overall mild oral and mild to moderate pharyngeal dysphagia with liquids at time of study  See SLP Procedure Note for details  Pt provided w/ education and review of results following completion of study  SLP Therapy Minutes   SLP Time In 1030   SLP Time Out 1130   SLP Total Time (minutes) 60   SLP Mode of treatment - Individual (minutes) 60   SLP Mode of treatment - Concurrent (minutes) 0   SLP Mode of treatment - Group (minutes) 0   SLP Mode of treatment - Co-treat (minutes) 0   SLP Mode of Treatment - Total time(minutes) 60 minutes   SLP Cumulative Minutes 385   Therapy Time missed   Time missed?  No

## 2021-02-11 NOTE — PROGRESS NOTES
02/11/21 1152   Pain Assessment   Pain Assessment Tool 0-10   Pain Score No Pain   Restrictions/Precautions   Precautions Fall Risk   Cognition   Overall Cognitive Status Impaired   Arousal/Participation Alert; Responsive; Cooperative   Attention Attends with cues to redirect   Orientation Level Oriented X4   Memory Decreased recall of precautions   Following Commands Follows one step commands with increased time or repetition   Therapeutic Interventions   Strengthening seated ther ex   Assessment   Treatment Assessment Patient tolerated therapy session well  Completed ther ex for general LE strengthening  Plan   Treatment/Interventions LE strengthening/ROM; Therapeutic exercise   Progress Progressing toward goals   PT Therapy Minutes   PT Time In 1152   PT Time Out 1219   PT Total Time (minutes) 27   PT Mode of treatment - Individual (minutes) 27   PT Mode of treatment - Concurrent (minutes) 0   PT Mode of treatment - Group (minutes) 0   PT Mode of treatment - Co-treat (minutes) 0   PT Mode of Treatment - Total time(minutes) 27 minutes   PT Cumulative Minutes 515   Therapy Time missed   Time missed?  No

## 2021-02-11 NOTE — PROGRESS NOTES
02/10/21 2016   Charting Type   Charting Type Shift assessment   Neurological   Neuro (WDL) X   Level of Consciousness Alert/awake   Orientation Level Oriented X4   Cognition Appropriate judgement   Facial Symmetry Right facial drooping   Tongue Deviation Midline   Speech Delayed responses   Pupil Assessment Yes   R Pupil Size (mm) 5   R Pupil Shape Round   R Pupil Reaction Brisk   L Pupil Size (mm) 5   L Pupil Shape Round   L Pupil Reaction Brisk   Muscle Function/Sensation Assessment ;Dorsiflexion;Plantar flexion; Motor response;Sensation;Muscle strength   R Hand  Moderate   L Hand  Strong   R Foot Dorsiflexion Moderate   L Foot Dorsiflexion Moderate   R Foot Plantar Flexion Moderate   L Foot Plantar Flexion Moderate   RUE Motor Response Responds to commands   RUE Sensation Full sensation   RUE Muscle Strength 4- Movement against gravity and limited resistance   LUE Motor Response Responds to commands   LUE Sensation Full sensation   LUE Muscle Strength 5- Normal strength   RLE Motor Response Responds to commands   RLE Sensation Full sensation   RLE Muscle Strength 4- Movement against gravity and limited resistance   LLE Motor Response Responds to commands   LLE Sensation Full sensation   LLE Muscle Strength 5- Normal strength   Neuro Symptoms Forgetful   [place] Coma Scale   Eye Opening 4   Best Verbal Response 5   Best Motor Response 6   [place] Coma Scale Score 15   HEENT   HEENT (WDL) X   Throat Difficulty swallowing   Voice Hoarse   Respiratory   Respiratory (WDL) X   Respiratory Pattern Normal   Chest Assessment Chest expansion symmetrical   Bilateral Breath Sounds Diminished   Cardiac   Cardiac (WDL) WDL   Cardiac Regularity Regular   Peripheral Vascular   Peripheral Vascular (WDL) X   Cyanosis None   Capillary Refill Less than/equal to 2 seconds (All extremities)   Pulses R pedal;L pedal   RLE Neurovascular Assessment   R Pedal Pulse +2   LLE Neurovascular Assessment   L Pedal Pulse +2 Integumentary   Integumentary (WDL) X   Skin Color Appropriate for ethnicity   Skin Condition/Temp Warm;Dry   Skin Integrity Bruising   Skin Location scattered    Skin Turgor Non-tenting   Integumentary Additional Assessments No   David Scale   Sensory Perceptions 3   Moisture 2   Activity 4   Mobility 3   Nutrition 3   Friction and Shear 2   David Scale Score 17   Musculoskeletal   Musculoskeletal (WDL) X   RUE Limited movement   LUE Full movement   RLE Limited movement   LLE Limited movement   Gastrointestinal   Gastrointestinal (WDL) X   Abdomen Inspection Soft;Nondistended   Bowel Sounds (All Quadrants) Normoactive   Tenderness Soft;Nontender   Passing Flatus Yes   Genitourinary   Genitourinary (WDL) WDL   Genitalia   Female Genitalia Intact   Psychosocial   Psychosocial (WDL) WDL   Patient Behaviors/Mood Calm; Cooperative;Pleasant   Needs Expressed Denies

## 2021-02-11 NOTE — PROGRESS NOTES
02/11/21 0703   Pain Assessment   Pain Assessment Tool Pain Assessment not indicated - pt denies pain   Pain Score No Pain   Eating   Type of Assistance Needed Set-up / clean-up   Amount of Physical Assistance Provided No physical assistance   Eating CARE Score 5   Eating Assessment   Food To Mouth Yes   Able To Cut Yes   Positioning Upright;Out of Bed   Meal Assessed Breakfast   Opens Packages Yes   Grooming   Able To Comb/Brush Hair;Wash/Dry Face;Wash/Dry Hands   Limitation Noted In Coordination; Safety   Findings supervision assistance    Shower/Bathe Self   Type of Assistance Needed Supervision   Amount of Physical Assistance Provided No physical assistance   Shower/Bathe Self CARE Score 4   Bathing   Assessed Bath Style Shower   Anticipated D/C Bath Style Shower;Sponge Bath   Able to [place] No   Able to [employer] Temperature No   Able to Wash/Rinse/Dry (body part) Left Arm;Right Arm;L Upper Leg;R Upper Leg;L Lower Leg/Foot;R Lower Leg/Foot;Chest;Abdomen;Perineal Area; Buttocks   Limitations Noted in Balance; Coordination; Endurance;ROM;Safety;Strength   Positioning Seated;Standing   Adaptive Equipment Longhand Sponge   Tub/Shower Transfer   Limitations Noted In Balance; Endurance;ROM;Safety;UE Strength;LE Strength   Adaptive Equipment Grab Bars;Seat with out Back   Assessed Shower   Findings supervision assistance   Upper Body Dressing   Type of Assistance Needed Supervision   Amount of Physical Assistance Provided No physical assistance   Upper Body Dressing CARE Score 4   Lower Body Dressing   Type of Assistance Needed Physical assistance   Amount of Physical Assistance Provided Less than 25%   Comment min A hand over hand assistance with using functional reacher to don underwear and pants   Lower Body Dressing CARE Score 3   Putting On/Taking Off Footwear   Type of Assistance Needed Physical assistance   Amount of Physical Assistance Provided Less than 25%   Comment min A hand over hand assistance to remove both socks with functional reacher, able to use sock aide with no physical assistance   Putting On/Taking Off Footwear CARE Score 3   Dressing/Undressing Clothing   Remove UB Clothes Pullover Shirt   Don UB Clothes Pullover Shirt   Remove LB Clothes Pants; Undergarment;Socks   Don LB Clothes Pants; Undergarment;Socks   Limitations Noted In Balance; Coordination; Endurance; Safety;Strength   Adaptive Equipment Reacher;Sock Aide   Positioning Supported Sit;Standing   Lying to Sitting on Side of Bed   Type of Assistance Needed Supervision   Amount of Physical Assistance Provided No physical assistance   Lying to Sitting on Side of Bed CARE Score 4   Sit to Stand   Type of Assistance Needed Supervision   Amount of Physical Assistance Provided No physical assistance   Sit to Stand CARE Score 4   Bed-Chair Transfer   Type of Assistance Needed Supervision   Amount of Physical Assistance Provided No physical assistance   Chair/Bed-to-Chair Transfer CARE Score 4   Transfer Bed/Chair/Wheelchair   Limitations Noted In Balance; Coordination; Endurance;UE Strength;LE Strength   Toileting Hygiene   Type of Assistance Needed Supervision   Amount of Physical Assistance Provided No physical assistance   Toileting Hygiene CARE Score 4   Toileting   Able to Pull Clothing down yes, up yes  Able to Manage Clothing Closures Yes   Manage Hygiene Bladder   Limitations Noted In Balance; Coordination;ROM;Safety;UE Strength;LE Strength   Adaptive Equipment Grab Bar   Toilet Transfer   Type of Assistance Needed Supervision   Amount of Physical Assistance Provided No physical assistance   Toilet Transfer CARE Score 4   Toilet Transfer   Surface Assessed Raised Toilet   Transfer Technique Stand Pivot   Limitations Noted In Balance; Endurance;ROM;Safety;UE Strength;LE Strength   Adaptive Equipment Grab Bar   Positioning Concerns Safety   Exercise Tools   Hand Gripper placed resistive clips using bilat hands   Cognition   Overall Cognitive Status Impaired   Arousal/Participation Alert; Responsive; Cooperative   Attention Attends with cues to redirect   Orientation Level Oriented X4   Memory Decreased recall of precautions   Following Commands Follows one step commands with increased time or repetition   Additional Activities   Additional Activities Comments w/c mobility in hallway min A   Assessment   Treatment Assessment Pt agreeable to OT session this AM, received supine in bed  Transferred into w/c with supervision assistance, self-propelled with min A down the perera to shower room  Pt completed shower, dressing and grooming with supervision/ min A and verbal cues  Pt began to demonstrate self-initiation with using fuctional reacher for donning and doffing LB dressing, however continues to require min hand over hand assistance to use reacher properly  Completed light pinch exercise to increase pinch strength, pt tolerated well with rest breaks taken as needed  Ended session sitting upright in w/c eating breakfast  Barriers include decrease cognition, strength, coordination, endurance, ROM and balance  Pt would benefit from continued skilled OT services to increase independence with daily tasks, functional mobility/transfers and activity tolerance  Prognosis Good   Problem List Decreased strength;Decreased range of motion;Decreased endurance; Impaired balance;Decreased coordination;Decreased safety awareness   Plan   Treatment/Interventions ADL retraining;Functional transfer training;LE strengthening/ROM; Therapeutic exercise; Endurance training;Cognitive reorientation;Patient/family training;Equipment eval/education; Compensatory technique education;OT   Progress Progressing toward goals   OT Therapy Minutes   OT Time In 0703   OT Time Out 0833   OT Total Time (minutes) 90   OT Mode of treatment - Individual (minutes) 90   Therapy Time missed   Time missed?  No
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
## 2021-02-12 LAB
GLUCOSE SERPL-MCNC: 168 MG/DL (ref 65–140)
GLUCOSE SERPL-MCNC: 183 MG/DL (ref 65–140)
GLUCOSE SERPL-MCNC: 192 MG/DL (ref 65–140)
GLUCOSE SERPL-MCNC: 81 MG/DL (ref 65–140)

## 2021-02-12 PROCEDURE — 82948 REAGENT STRIP/BLOOD GLUCOSE: CPT

## 2021-02-12 PROCEDURE — 97110 THERAPEUTIC EXERCISES: CPT

## 2021-02-12 PROCEDURE — 92507 TX SP LANG VOICE COMM INDIV: CPT

## 2021-02-12 PROCEDURE — 97530 THERAPEUTIC ACTIVITIES: CPT

## 2021-02-12 PROCEDURE — 97129 THER IVNTJ 1ST 15 MIN: CPT

## 2021-02-12 PROCEDURE — 97116 GAIT TRAINING THERAPY: CPT

## 2021-02-12 PROCEDURE — 97537 COMMUNITY/WORK REINTEGRATION: CPT

## 2021-02-12 PROCEDURE — 99231 SBSQ HOSP IP/OBS SF/LOW 25: CPT | Performed by: FAMILY MEDICINE

## 2021-02-12 PROCEDURE — 97535 SELF CARE MNGMENT TRAINING: CPT

## 2021-02-12 PROCEDURE — 92526 ORAL FUNCTION THERAPY: CPT

## 2021-02-12 RX ORDER — LISINOPRIL 5 MG/1
5 TABLET ORAL DAILY
Status: DISCONTINUED | OUTPATIENT
Start: 2021-02-13 | End: 2021-02-20 | Stop reason: HOSPADM

## 2021-02-12 RX ORDER — INSULIN GLARGINE 100 [IU]/ML
40 INJECTION, SOLUTION SUBCUTANEOUS
Status: DISCONTINUED | OUTPATIENT
Start: 2021-02-12 | End: 2021-02-20 | Stop reason: HOSPADM

## 2021-02-12 RX ADMIN — PANTOPRAZOLE SODIUM 40 MG: 40 TABLET, DELAYED RELEASE ORAL at 08:09

## 2021-02-12 RX ADMIN — INSULIN GLARGINE 40 UNITS: 100 INJECTION, SOLUTION SUBCUTANEOUS at 21:33

## 2021-02-12 RX ADMIN — ATORVASTATIN CALCIUM 80 MG: 80 TABLET, FILM COATED ORAL at 17:05

## 2021-02-12 RX ADMIN — INSULIN LISPRO 1 UNITS: 100 INJECTION, SOLUTION INTRAVENOUS; SUBCUTANEOUS at 21:33

## 2021-02-12 RX ADMIN — ESCITALOPRAM 5 MG: 5 TABLET, FILM COATED ORAL at 08:09

## 2021-02-12 RX ADMIN — ACETAMINOPHEN 650 MG: 325 TABLET ORAL at 21:18

## 2021-02-12 RX ADMIN — DOCUSATE SODIUM 100 MG: 100 CAPSULE, LIQUID FILLED ORAL at 08:09

## 2021-02-12 RX ADMIN — METOPROLOL TARTRATE 50 MG: 50 TABLET, FILM COATED ORAL at 21:18

## 2021-02-12 RX ADMIN — RIVAROXABAN 20 MG: 10 TABLET, FILM COATED ORAL at 08:09

## 2021-02-12 RX ADMIN — INSULIN LISPRO 1 UNITS: 100 INJECTION, SOLUTION INTRAVENOUS; SUBCUTANEOUS at 12:26

## 2021-02-12 RX ADMIN — FAMOTIDINE 20 MG: 20 TABLET ORAL at 08:09

## 2021-02-12 RX ADMIN — INSULIN LISPRO 2 UNITS: 100 INJECTION, SOLUTION INTRAVENOUS; SUBCUTANEOUS at 17:06

## 2021-02-12 RX ADMIN — INSULIN LISPRO 8 UNITS: 100 INJECTION, SOLUTION INTRAVENOUS; SUBCUTANEOUS at 17:05

## 2021-02-12 RX ADMIN — INSULIN LISPRO 8 UNITS: 100 INJECTION, SOLUTION INTRAVENOUS; SUBCUTANEOUS at 12:26

## 2021-02-12 RX ADMIN — DOCUSATE SODIUM 100 MG: 100 CAPSULE, LIQUID FILLED ORAL at 17:05

## 2021-02-12 NOTE — PROGRESS NOTES
02/12/21 1330   Pain Assessment   Pain Assessment Tool 0-10   Pain Score No Pain   Restrictions/Precautions   Precautions Aspiration;Cognitive; Fall Risk;Fluid restriction   Cognition   Overall Cognitive Status Impaired   Arousal/Participation Alert; Responsive; Cooperative   Attention Within functional limits   Orientation Level Oriented X4   Memory Decreased recall of precautions   Following Commands Follows one step commands without difficulty   Sit to Stand   Type of Assistance Needed Supervision;Verbal cues   Sit to Stand CARE Score 4   Bed-Chair Transfer   Type of Assistance Needed Supervision;Verbal cues   Chair/Bed-to-Chair Transfer CARE Score 4   Walk 10 Feet   Type of Assistance Needed Incidental touching;Supervision;Verbal cues   Comment cg/S with cues for safety   Walk 10 Feet CARE Score 4   Walk 50 Feet with Two Turns   Type of Assistance Needed Supervision;Verbal cues; Set-up / clean-up   Comment cg/S with cues for safety   Walk 50 Feet with Two Turns CARE Score 4   Walk 150 Feet   Type of Assistance Needed Incidental touching;Supervision;Verbal cues   Comment cg/S with cues for safety   Walk 150 Feet CARE Score 4   Walking 10 Feet on Uneven Surfaces   Type of Assistance Needed Incidental touching;Supervision;Verbal cues   Comment cg/S with cues for safety  Walking 10 Feet on Uneven Surfaces CARE Score 4   Ambulation   Does the patient walk? 2  Yes   Primary Mode of Locomotion Prior to Admission Walk   Distance Walked (feet) 153 ft  (033' 152' 116')   Assist Device Roller Walker   Gait Pattern Inconsistant Jaylyn; Slow Jaylyn;Decreased foot clearance;R foot drag;R hemiparesis; Narrow MAXWELL;Step to   Limitations Noted In Balance; Coordination; Endurance; Safety;Strength   Provided Assistance with: Balance   Walk Assist Level Contact Guard;Supervision   Findings level and unlevel surfaces    Curb or Single Stair   Style negotiated Single stair   Type of Assistance Needed Incidental touching   Comment cg   1 Step (Curb) CARE Score 4   4 Steps   Type of Assistance Needed Incidental touching   Comment cg   4 Steps CARE Score 4   12 Steps   Type of Assistance Needed Incidental touching   Comment cg   12 Steps CARE Score 4   Stairs   Type Stairs   # of Steps 14   Weight Bearing Precautions WBAT   Assist Devices Bilateral Rail   Findings cg   Therapeutic Interventions   Strengthening standing and supine ther ex   Balance gait and transfer training   Other stair training   Assessment   Treatment Assessment Patient tolerated therapy session well  Patient continues to show daily improvement with functional mobility  Continues to need cues for safety  Completed ther ex for general LE strengthening; gait and transfer training focusing on sequence and technque for improved balance and safety with functional mobility  PT Barriers   Physical Impairment Decreased strength;Decreased range of motion;Decreased endurance; Impaired balance;Decreased mobility; Decreased cognition; Impaired judgement;Decreased safety awareness   Functional Limitation Walking;Transfers;Standing;Stair negotiation   Plan   Treatment/Interventions Functional transfer training;LE strengthening/ROM; Elevations; Therapeutic exercise; Bed mobility;Gait training   Progress Progressing toward goals   PT Therapy Minutes   PT Time In 1330   PT Time Out 1500   PT Total Time (minutes) 90   PT Mode of treatment - Individual (minutes) 90   PT Mode of treatment - Concurrent (minutes) 0   PT Mode of treatment - Group (minutes) 0   PT Mode of treatment - Co-treat (minutes) 0   PT Mode of Treatment - Total time(minutes) 90 minutes   PT Cumulative Minutes 693   Therapy Time missed   Time missed?  No

## 2021-02-12 NOTE — PROGRESS NOTES
02/12/21 1200   Pain Assessment   Pain Assessment Tool Pain Assessment not indicated - pt denies pain   Restrictions/Precautions   Precautions Aspiration;Cognitive; Fall Risk;Fluid restriction   Lying to Sitting on Side of Bed   Type of Assistance Needed Supervision   Amount of Physical Assistance Provided No physical assistance   Lying to Sitting on Side of Bed CARE Score 4   Sit to Stand   Type of Assistance Needed Supervision   Amount of Physical Assistance Provided No physical assistance   Sit to Stand CARE Score 4   Bed-Chair Transfer   Type of Assistance Needed Supervision   Amount of Physical Assistance Provided No physical assistance   Chair/Bed-to-Chair Transfer CARE Score 4   Transfer Bed/Chair/Wheelchair   Limitations Noted In Balance; Endurance;UE Strength;LE Strength   Functional Standing Tolerance   Time first trial: 5 minutes and 53 seconds, second trial: 1 minute and 44 seconds, third trial: 4 minutes and 36 seconds   Activity first trial: card matching activity, second trial: placed pegs in foam board   Comments supervision assistance, pt stood unsupprted for all 3 trials   Cognition   Overall Cognitive Status Impaired   Arousal/Participation Alert; Responsive; Cooperative   Attention Within functional limits   Orientation Level Oriented X4   Memory Decreased recall of precautions   Following Commands Follows one step commands without difficulty   Assessment   Treatment Assessment Pt agreeable to OT session this PM, received supine in bed  Transferred into [facility] with supervision assistance  Completed activities addressing standing tolerance and balance  Pt demonstrates excellent improvement with standing tolerance as she required less trials and increased time by approx 4-5 minutes  Pt notes slight fatigue at end of session  Ended session in room sitting upright in / waiting for lunch  Barriers include decrease cognition, safety, balance, strength/ROM and endurance   Pt would benefit from continued skilled OT services to increase independence with daily tasks, functional mobility/transfers and activity tolerance  Prognosis Good   Problem List Decreased strength;Decreased range of motion;Decreased endurance; Impaired balance;Decreased cognition;Decreased safety awareness   Plan   Treatment/Interventions ADL retraining;Functional transfer training;LE strengthening/ROM; Therapeutic exercise; Endurance training;Cognitive reorientation;Patient/family training;Equipment eval/education; Compensatory technique education;OT   Progress Progressing toward goals   OT Therapy Minutes   OT Time In 1200   OT Time Out 1233   OT Total Time (minutes) 33   OT Mode of treatment - Individual (minutes) 33   Therapy Time missed   Time missed?  No
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
## 2021-02-12 NOTE — PROGRESS NOTES
02/12/21 0727   Pain Assessment   Pain Assessment Tool 0-10   Pain Score No Pain   Restrictions/Precautions   Precautions Fall Risk   Cognition   Overall Cognitive Status Impaired   Arousal/Participation Alert; Responsive; Cooperative   Attention Attends with cues to redirect   Orientation Level Oriented X4   Memory Decreased recall of precautions   Following Commands Follows one step commands with increased time or repetition   Therapeutic Interventions   Strengthening seated ther ex   Assessment   Treatment Assessment Patient tolerated therapy session well  Completed ther ex for general LE strengthening  PT Barriers   Physical Impairment Decreased strength;Decreased range of motion;Decreased endurance; Impaired balance;Decreased mobility; Decreased coordination;Decreased cognition; Impaired judgement;Decreased safety awareness   Functional Limitation Walking;Transfers;Standing;Stair negotiation   Plan   Treatment/Interventions LE strengthening/ROM; Therapeutic exercise   Progress Progressing toward goals   PT Therapy Minutes   PT Time In 0727   PT Time Out 0755   PT Total Time (minutes) 28   PT Mode of treatment - Individual (minutes) 28   PT Mode of treatment - Concurrent (minutes) 0   PT Mode of treatment - Group (minutes) 0   PT Mode of treatment - Co-treat (minutes) 0   PT Mode of Treatment - Total time(minutes) 28 minutes   PT Cumulative Minutes 603   Therapy Time missed   Time missed?  No

## 2021-02-12 NOTE — PROGRESS NOTES
02/12/21 0900   Pain Assessment   Pain Assessment Tool Pain Assessment not indicated - pt denies pain   Restrictions/Precautions   Precautions Aspiration;Cognitive; Fall Risk   Weight Bearing Restrictions No   ROM Restrictions No   Eating Assessment   Findings drank nectar diet ginger ale during session   Oral Hygiene   Type of Assistance Needed Set-up / clean-up   Amount of Physical Assistance Provided No physical assistance   Oral Hygiene CARE Score 5   Grooming   Able To Initiate Tasks; Wash/Dry Face;Brush/Clean Teeth   Limitation Noted In Coordination; Safety   Shower/Bathe Self   Type of Assistance Needed Incidental touching; Adaptive equipment   Comment CG   Shower/Bathe Self CARE Score 4   Bathing   Assessed Bath Style Sponge Bath   Anticipated D/C Bath Style Shower;Sponge Bath   Able to [place] No   Able to Wash/Rinse/Dry (body part) Left Arm;Right Arm;L Upper Leg;R Upper Leg;L Lower Leg/Foot;R Lower Leg/Foot;Chest;Abdomen;Perineal Area; Buttocks   Limitations Noted in Balance; Coordination; Endurance;Problem Solving;ROM;Safety;Strength   Positioning Seated;Standing   Adaptive Equipment Longhand Sponge   Upper Body Dressing   Type of Assistance Needed Physical assistance   Amount of Physical Assistance Provided Less than 25%   Comment assist to pull shirt over head   Upper Body Dressing CARE Score 3   Lower Body Dressing   Type of Assistance Needed Physical assistance; Adaptive equipment   Amount of Physical Assistance Provided Less than 25%   Comment assist to get R pants leg over foot up to knee   Lower Body Dressing CARE Score 3   Putting On/Taking Off Footwear   Type of Assistance Needed Supervision; Adaptive equipment   Amount of Physical Assistance Provided No physical assistance   Putting On/Taking Off Footwear CARE Score 4   Dressing/Undressing Clothing   Remove UB Clothes Pullover Shirt   Don UB Clothes Pullover Shirt   Remove LB Clothes Pants; Undergarment;Socks   [address] Pants;Undergarment;Socks   Limitations Noted In Balance; Coordination; Endurance; Safety;Strength;ROM   Adaptive Equipment Reacher;Sock Aide   Positioning Supported Sit   Sit to Stand   Type of Assistance Needed Supervision   Amount of Physical Assistance Provided No physical assistance   Sit to Stand CARE Score 4   Therapeutic Exercise - ROM   UE-ROM Yes   ROM- Right Upper Extremities   RUE ROM Comment pushed weighted wooden box x20 all planes of motion   ROM - Left Upper Extremities    LUE ROM Comment pushed weighted wooden box x20 all planes of motion   Therapeutic Excerise-Strength   UE Strength Yes   Right Upper Extremity- Strength   RUE Strength Comment x15 using 2# dumbbell: elbow flexion/extension, protraction/retraction, internal/external rotation, pronation/supination, shoulder flexion/extension, ABD/ADD; searched for and retrieved small objects in yellow theraputty   Left Upper Extremity-Strength   LUE Strength Comment x15 using 2# dumbbell: elbow flexion/extension, protraction/retraction, internal/external rotation, pronation/supination, shoulder flexion/extension, ABD/ADD; searched for and retrieved small objects in yellow theraputty   Cognition   Overall Cognitive Status Impaired   Arousal/Participation Alert; Responsive; Cooperative   Attention Within functional limits   Orientation Level Oriented X4   Memory Decreased recall of precautions   Following Commands Follows one step commands without difficulty   Assessment   Treatment Assessment Pt agreeable to OT session this AM  Received sitting upright in w/c  ADL session completed; current LOF and details listed in respective sections  Pt is overall CG for bathing, Felicita UB and LB dressing with use of AE, supervision grooming  Pt required increased time for all tasks 2* impaired coordination in R hand as well as generalized fatigue   Verbal cues required occasionally to ensure thoroughness and sequencing when washing as well as problem solving during AE use during LB dressing  After ADL, pt propelled self in w/c to OT room approx 50% of the distance, then completed ROM/strength exercises with good tolerance and rest breaks taken as needed  Pt continues with impaired fine motor coordination on R hand that was noticeable during theraputty activity, as pt with difficulty pinching objects  Pt also continues with impaired vocal production and requires increased time to vocalize words  Pt would benefit from continued skilled OT services in order to maximize independence in self care, functional mobility/transfers, ROM/strength/coordination, and activity tolerance  Prognosis Good   Problem List Decreased strength;Decreased range of motion; Impaired balance;Decreased endurance;Decreased coordination;Decreased cognition; Impaired judgement;Decreased safety awareness   Plan   Treatment/Interventions ADL retraining;Functional transfer training;LE strengthening/ROM; Therapeutic exercise;Cognitive reorientation; Endurance training;Patient/family training;Equipment eval/education; Compensatory technique education;OT   Progress Progressing toward goals   OT Therapy Minutes   OT Time In 0900   OT Time Out 1025   OT Total Time (minutes) 85   OT Mode of treatment - Individual (minutes) 85   Therapy Time missed   Time missed?  No

## 2021-02-12 NOTE — PROGRESS NOTES
02/11/21 1930   Charting Type   Charting Type Shift assessment   Neurological   Neuro (WDL) X   Level of Consciousness Alert/awake   Orientation Level Oriented X4   Cognition Appropriate judgement   Facial Symmetry Right facial drooping   Tongue Deviation Midline   Speech Delayed responses   Pupil Assessment Yes   R Pupil Size (mm) 5   R Pupil Shape Round   R Pupil Reaction Brisk   L Pupil Size (mm) 5   L Pupil Shape Round   L Pupil Reaction Brisk   Muscle Function/Sensation Assessment ;Dorsiflexion;Plantar flexion; Motor response;Sensation;Muscle strength   R Hand  Moderate   L Hand  Strong   R Foot Dorsiflexion Moderate   L Foot Dorsiflexion Moderate   R Foot Plantar Flexion Moderate   L Foot Plantar Flexion Moderate   RUE Motor Response Responds to commands   RUE Sensation Full sensation   RUE Muscle Strength 4- Movement against gravity and limited resistance   LUE Motor Response Responds to commands   LUE Sensation Full sensation   LUE Muscle Strength 5- Normal strength   RLE Motor Response Responds to commands   RLE Sensation Full sensation   RLE Muscle Strength 4- Movement against gravity and limited resistance   LLE Motor Response Responds to commands   LLE Sensation Full sensation   LLE Muscle Strength 5- Normal strength   Neuro Symptoms Forgetful   [place] Coma Scale   Eye Opening 4   Best Verbal Response 5   Best Motor Response 6   [place] Coma Scale Score 15   HEENT   HEENT (WDL) X   Throat Difficulty swallowing   Voice Hoarse   Respiratory   Respiratory (WDL) X   Respiratory Pattern Normal   Chest Assessment Chest expansion symmetrical   Bilateral Breath Sounds Diminished   Cardiac   Cardiac (WDL) WDL   Cardiac Regularity Regular   Peripheral Vascular   Peripheral Vascular (WDL) X   Cyanosis None   Capillary Refill Less than/equal to 2 seconds (All extremities)   Pulses R pedal;L pedal   Edema Generalized   Generalized Edema Non-pitting   RLE Edema None   LLE Edema None   RLE Neurovascular Assessment   R Pedal Pulse +2   LLE Neurovascular Assessment   L Pedal Pulse +2   Integumentary   Integumentary (WDL) X   Skin Color Appropriate for ethnicity   Skin Condition/Temp Warm;Dry   Skin Integrity Bruising   Skin Location scattered    Skin Turgor Non-tenting   Integumentary Additional Assessments No   David Scale   Sensory Perceptions 3   Moisture 3   Activity 3   Mobility 3   Nutrition 3   Friction and Shear 2   David Scale Score 17   Musculoskeletal   Musculoskeletal (WDL) X   RUE Limited movement   LUE Full movement   RLE Limited movement   LLE Limited movement   Gastrointestinal   Gastrointestinal (WDL) X   Abdomen Inspection Soft;Nondistended   Bowel Sounds (All Quadrants) Normoactive   Tenderness Soft;Nontender   Passing Flatus Yes   Genitourinary   Genitourinary (WDL) X   Urine Assessment   Urinary Incontinence Yes   Genitalia   Female Genitalia Intact   Psychosocial   Psychosocial (WDL) WDL   Patient Behaviors/Mood Calm; Cooperative;Pleasant   Needs Expressed Denies

## 2021-02-13 LAB
GLUCOSE SERPL-MCNC: 138 MG/DL (ref 65–140)
GLUCOSE SERPL-MCNC: 236 MG/DL (ref 65–140)
GLUCOSE SERPL-MCNC: 93 MG/DL (ref 65–140)
GLUCOSE SERPL-MCNC: 97 MG/DL (ref 65–140)

## 2021-02-13 PROCEDURE — 97535 SELF CARE MNGMENT TRAINING: CPT

## 2021-02-13 PROCEDURE — 82948 REAGENT STRIP/BLOOD GLUCOSE: CPT

## 2021-02-13 RX ADMIN — PANTOPRAZOLE SODIUM 40 MG: 40 TABLET, DELAYED RELEASE ORAL at 09:01

## 2021-02-13 RX ADMIN — INSULIN LISPRO 8 UNITS: 100 INJECTION, SOLUTION INTRAVENOUS; SUBCUTANEOUS at 09:00

## 2021-02-13 RX ADMIN — INSULIN GLARGINE 40 UNITS: 100 INJECTION, SOLUTION SUBCUTANEOUS at 21:34

## 2021-02-13 RX ADMIN — INSULIN LISPRO 8 UNITS: 100 INJECTION, SOLUTION INTRAVENOUS; SUBCUTANEOUS at 12:46

## 2021-02-13 RX ADMIN — RIVAROXABAN 20 MG: 10 TABLET, FILM COATED ORAL at 07:32

## 2021-02-13 RX ADMIN — ATORVASTATIN CALCIUM 80 MG: 80 TABLET, FILM COATED ORAL at 17:25

## 2021-02-13 RX ADMIN — DOCUSATE SODIUM 100 MG: 100 CAPSULE, LIQUID FILLED ORAL at 09:01

## 2021-02-13 RX ADMIN — ESCITALOPRAM 5 MG: 5 TABLET, FILM COATED ORAL at 09:01

## 2021-02-13 RX ADMIN — INSULIN LISPRO 3 UNITS: 100 INJECTION, SOLUTION INTRAVENOUS; SUBCUTANEOUS at 21:34

## 2021-02-13 RX ADMIN — INSULIN LISPRO 8 UNITS: 100 INJECTION, SOLUTION INTRAVENOUS; SUBCUTANEOUS at 17:25

## 2021-02-13 RX ADMIN — DOCUSATE SODIUM 100 MG: 100 CAPSULE, LIQUID FILLED ORAL at 17:25

## 2021-02-13 RX ADMIN — LISINOPRIL 5 MG: 5 TABLET ORAL at 09:01

## 2021-02-13 RX ADMIN — METOPROLOL TARTRATE 50 MG: 50 TABLET, FILM COATED ORAL at 09:01

## 2021-02-13 RX ADMIN — FAMOTIDINE 20 MG: 20 TABLET ORAL at 09:01

## 2021-02-13 NOTE — PROGRESS NOTES
02/13/21 0741   Charting Type   Charting Type Shift assessment   Neurological   Neuro (WDL) X   Level of Consciousness Alert/awake   Orientation Level Oriented X4   Cognition Short term memory loss   Extraocular Movements Full   Speech Delayed responses   R Pupil Size (mm) 3   L Pupil Size (mm) 3   RUE Muscle Strength 4- Movement against gravity and limited resistance   LUE Muscle Strength 5- Normal strength   RLE Muscle Strength 4- Movement against gravity and limited resistance   LLE Muscle Strength 5- Normal strength   Neuro Symptoms Forgetful; Fatigue   Relieved by Rest   Neuro Additional Assessments No   Reyna Coma Scale   Eye Opening 4   Best Verbal Response 5   Best Motor Response 6   [place] Coma Scale Score 15   HEENT   HEENT (WDL) X   R Eye Intact   L Eye Intact   Vision - Corrective Lenses (at bedside) None   R Ear Intact   L Ear Intact   Teeth Intact   Respiratory   Respiratory (WDL) X   Respiratory Pattern Normal   Chest Assessment Chest expansion symmetrical   Bilateral Breath Sounds Clear;Diminished   Respiratory Additional Assessments No   Respiratory Interventions   Respiratory Interventions Cough and deep breathe   Cough and Deep Breathe   Cough and Deep Breathe Yes   Cardiac   Cardiac (WDL) WDL   Cardiac Regularity Regular   Heart Sounds No adventitious heart sounds   Jugular Venous Distention (JVD) No   Cardiac Symptoms None   Chest Pain Present No   Pacemaker/ICD No   Pain Assessment   Pain Assessment Tool 0-10   Pain Score No Pain   Peripheral Vascular   Peripheral Vascular (WDL) X   Cyanosis None   RLE Edema Non-pitting   LLE Edema Non-pitting   PVS Additional Assessments No   RUE Neurovascular Assessment   R Radial Pulse +2   LUE Neurovascular Assessment   L Radial Pulse +2   RLE Neurovascular Assessment   R Pedal Pulse +2   LLE Neurovascular Assessment   L Pedal Pulse +2   Integumentary   Integumentary (WDL) X   Skin Condition/Temp Warm;Dry   Skin Integrity Bruising   Skin Location scattered   Skin Turgor Non-tenting   Integumentary Additional Assessments No   Tattoos/Piercings   Does patient have tattoos? No   Piercings Remaining No   Musculoskeletal   Musculoskeletal (WDL) X   Level of Assistance Contact guard assist, steadying assist   Assistive Device Front wheel walker   RUE Limited movement   RLE Limited movement   Musculoskeletal Additional Assessments No   Gastrointestinal   Gastrointestinal (WDL) X   Abdomen Inspection Soft;Obese   Bowel Sounds (All Quadrants) Normoactive   Tenderness Soft;Nontender   GI Symptoms None   Gastrointestinal Additional Assessments No   Bowel Shift Assessment   Assistance Needed Stool softener   Bowel Incontinence No   Bowel Management Supervision/setup   Bowel Management Deficit Grab bar use;Steadying   Genitourinary   Genitourinary (WDL) WDL   Bladder Shift Assessment   Bladder Device Pull-up   Bladder Incontinence No   Bladder Management Supervision/setup   Bladder Management Deficit Steadying;Grab bar use   Urine Assessment   Urinary Incontinence No   Urine Color Julia   Urine Appearance Clear   Urine Odor No odor   Genitalia   Female Genitalia Intact   Genitourinary Additional Assessments   Genitourinary Additional Assessments No   Anal/Rectal   Anal/Rectal (WDL) WDL   Psychosocial   Psychosocial (WDL) WDL   Patient Behaviors/Mood Calm; Cooperative   Needs Expressed Denies   Ability to Express Feelings Able to express   Ability to Express Needs Able to express   Ability to Express Thoughts Needs assistance   Ability to Understand Others Usually understands   [place] Suicide Severity Rating Scale   1  Wish to be Dead No   Cough   Cough None       Speaks softly due to hx vocal cord paralysis  Offers no complaints  Supervision with ambulation RW with verbal cues for safety at times  Supervision with toileting  All meds explained and safety reinforced  Continue same plan of care

## 2021-02-13 NOTE — PROGRESS NOTES
02/12/21 1919   Charting Type   Charting Type Shift assessment   Neurological   Neuro (WDL) X   Level of Consciousness Alert/awake   Orientation Level Oriented X4   Cognition Poor safety awareness;Poor judgement; Impulsive   Facial Symmetry Right facial drooping   Tongue Deviation Midline   Speech Delayed responses   Swallow Chokes on liquids   R Hand  Moderate   L Hand  Strong   RUE Muscle Strength 4- Movement against gravity and limited resistance   LUE Muscle Strength 5- Normal strength   RLE Muscle Strength 4- Movement against gravity and limited resistance   LLE Muscle Strength 5- Normal strength   Neuro Symptoms Forgetful   Reyna Coma Scale   Eye Opening 4   Best Verbal Response 5   Best Motor Response 6   [place] Coma Scale Score 15   Respiratory   Respiratory (WDL) X   Respiratory Pattern Normal   Chest Assessment Chest expansion symmetrical   Bilateral Breath Sounds Diminished   Cough and Deep Breathe   Cough and Deep Breathe Yes   Incentive Spirometry   IS level of assistance Assisted by nursing;Needs encouragement   Frequency q1hr W/A   Respiratory Effort Normal   Treatment Tolerance Tolerated fairly well   Incentive Spirometry Goal (mL) 1000 mL   Incentive Spirometry Achieved (mL) 500 mL   Cardiac   Cardiac (WDL) WDL   Cardiac Regularity Regular   Pain Assessment   Pain Assessment Tool 0-10   Pain Score No Pain   Peripheral Vascular   Peripheral Vascular (WDL) X   Cyanosis None   Capillary Refill Less than/equal to 2 seconds (All extremities)   Pulses R pedal;L pedal   Edema Generalized   Generalized Edema Non-pitting   RLE Neurovascular Assessment   R Pedal Pulse +2   LLE Neurovascular Assessment   L Pedal Pulse +2   Integumentary   Integumentary (WDL) X   Skin Color Appropriate for ethnicity   Skin Condition/Temp Warm;Dry   Skin Integrity Bruising   Skin Location scattered    Skin Turgor Non-tenting   Integumentary Additional Assessments No   David Scale   Sensory Perceptions 3   Moisture 3   Activity 3   Mobility 3   Nutrition 3   Friction and Shear 2   David Scale Score 17   Musculoskeletal   Musculoskeletal (WDL) X   Level of Assistance Contact guard assist, steadying assist   Assistive Device Front wheel walker   RUE Limited movement   LUE Full movement   RLE Limited movement   LLE Limited movement   Musculoskeletal Additional Assessments No   Gastrointestinal   Gastrointestinal (WDL) X   Abdomen Inspection Soft;Nondistended   Bowel Sounds (All Quadrants) Normoactive   Tenderness Soft;Nontender   Genitourinary   Genitourinary (WDL) X   Urine Assessment   Urinary Incontinence Yes   Cough   Cough Non-productive;Strong

## 2021-02-13 NOTE — PLAN OF CARE
Problem: Prexisting or High Potential for Compromised Skin Integrity  Goal: Skin integrity is maintained or improved  Description: INTERVENTIONS:  - Identify patients at risk for skin breakdown  - Assess and monitor skin integrity  - Assess and monitor nutrition and hydration status  - Monitor labs   - Assess for incontinence   - Turn and reposition patient  - Assist with mobility/ambulation  - Relieve pressure over bony prominences  - Avoid friction and shearing  - Provide appropriate hygiene as needed including keeping skin clean and dry  - Evaluate need for skin moisturizer/barrier cream  - Collaborate with interdisciplinary team   - Patient/family teaching  - Consider wound care consult   Outcome: Progressing     Problem: Potential for Falls  Goal: Patient will remain free of falls  Description: INTERVENTIONS:  - Assess patient frequently for physical needs  -  Identify cognitive and physical deficits and behaviors that affect risk of falls    -  [place] fall precautions as indicated by assessment   - Educate patient/family on patient safety including physical limitations  - Instruct patient to call for assistance with activity based on assessment  - Modify environment to reduce risk of injury  - Consider OT/PT consult to assist with strengthening/mobility  Outcome: Progressing     Problem: PAIN - ADULT  Goal: Verbalizes/displays adequate comfort level or baseline comfort level  Description: Interventions:  - Encourage patient to monitor pain and request assistance  - Assess pain using appropriate pain scale  - Administer analgesics based on type and severity of pain and evaluate response  - Implement non-pharmacological measures as appropriate and evaluate response  - Consider cultural and social influences on pain and pain management  - Notify physician/advanced practitioner if interventions unsuccessful or patient reports new pain  Outcome: Progressing     Problem: INFECTION - ADULT  Goal: Absence or prevention of progression during hospitalization  Description: INTERVENTIONS:  - Assess and monitor for signs and symptoms of infection  - Monitor lab/diagnostic results  - Monitor all insertion sites, i e  indwelling lines, tubes, and drains  - Monitor endotracheal if appropriate and nasal secretions for changes in amount and color  - [place] appropriate cooling/warming therapies per order  - Administer medications as ordered  - Instruct and encourage patient and family to use good hand hygiene technique  - Identify and instruct in appropriate isolation precautions for identified infection/condition  Outcome: Progressing  Goal: Absence of fever/infection during neutropenic period  Description: INTERVENTIONS:  - Monitor WBC    Outcome: Progressing     Problem: SAFETY ADULT  Goal: Patient will remain free of falls  Description: INTERVENTIONS:  - Assess patient frequently for physical needs  -  Identify cognitive and physical deficits and behaviors that affect risk of falls    -  [place] fall precautions as indicated by assessment   - Educate patient/family on patient safety including physical limitations  - Instruct patient to call for assistance with activity based on assessment  - Modify environment to reduce risk of injury  - Consider OT/PT consult to assist with strengthening/mobility  Outcome: Progressing  Goal: Maintain or return to baseline ADL function  Description: INTERVENTIONS:  -  Assess patient's ability to carry out ADLs; assess patient's baseline for ADL function and identify physical deficits which impact ability to perform ADLs (bathing, care of mouth/teeth, toileting, grooming, dressing, etc )  - Assess/evaluate cause of self-care deficits   - Assess range of motion  - Assess patient's mobility; develop plan if impaired  - Assess patient's need for assistive devices and provide as appropriate  - Encourage maximum independence but intervene and supervise when necessary  - Involve family in performance of ADLs  - Assess for home care needs following discharge   - Consider OT consult to assist with ADL evaluation and planning for discharge  - Provide patient education as appropriate  Outcome: Progressing  Goal: Maintain or return mobility status to optimal level  Description: INTERVENTIONS:  - Assess patient's baseline mobility status (ambulation, transfers, stairs, etc )    - Identify cognitive and physical deficits and behaviors that affect mobility  - Identify mobility aids required to assist with transfers and/or ambulation (gait belt, sit-to-stand, lift, walker, cane, etc )  - [place] fall precautions as indicated by assessment  - Record patient progress and toleration of activity level on Mobility SBAR; progress patient to next Phase/Stage  - Instruct patient to call for assistance with activity based on assessment  - Consider rehabilitation consult to assist with strengthening/weightbearing, etc   Outcome: Progressing     Problem: DISCHARGE PLANNING  Goal: Discharge to home or other facility with appropriate resources  Description: INTERVENTIONS:  - Identify barriers to discharge w/patient and caregiver  - Arrange for needed discharge resources and transportation as appropriate  - Identify discharge learning needs (meds, wound care, etc )  - Arrange for interpretive services to assist at discharge as needed  - Refer to Case Management Department for coordinating discharge planning if the patient needs post-hospital services based on physician/advanced practitioner order or complex needs related to functional status, cognitive ability, or social support system  Outcome: Progressing     Problem: Knowledge Deficit  Goal: Patient/family/caregiver demonstrates understanding of disease process, treatment plan, medications, and discharge instructions  Description: Complete learning assessment and assess knowledge base    Interventions:  - Provide teaching at level of understanding  - Provide teaching via preferred learning methods  Outcome: Progressing     Problem: Neurological Deficit  Goal: Neurological status is stable or improving  Description: Interventions:  - Monitor and assess patient's level of consciousness, motor function, sensory function, and level of assistance needed for ADLs  - Monitor and report changes from baseline  Collaborate with interdisciplinary team to initiate plan and implement interventions as ordered  - Provide and maintain a safe environment  - Consider seizure precautions  - Consider fall precautions  - Consider aspiration precautions  - Consider bleeding precautions  Outcome: Progressing     Problem: Activity Intolerance/Impaired Mobility  Goal: Mobility/activity is maintained at optimum level for patient  Description: Interventions:  - Assess and monitor patient  barriers to mobility and need for assistive/adaptive devices  - Assess patient's emotional response to limitations  - Collaborate with interdisciplinary team and initiate plans and interventions as ordered  - Encourage independent activity per ability   - Maintain proper body alignment  - Perform active/passive rom as tolerated/ordered  - Plan activities to conserve energy   - Turn patient as appropriate  Outcome: Progressing     Problem: Communication Impairment  Goal: Ability to express needs and understand communication  Description: Assess patient's communication skills and ability to understand information  Patient will demonstrate use of effective communication techniques, alternative methods of communication and understanding even if not able to speak  - Encourage communication and provide alternate methods of communication as needed  - Collaborate with case management/ for discharge needs  - Include patient/family/caregiver in decisions related to communication    Outcome: Progressing     Problem: Potential for Aspiration  Goal: Non-ventilated patient's risk of aspiration is minimized  Description: Assess and monitor vital signs, respiratory status, and labs (WBC)  Monitor for signs of aspiration (tachypnea, cough, rales, wheezing, cyanosis, fever)  - Assess and monitor patient's ability to swallow  - Place patient up in chair to eat if possible  - HOB up at 90 degrees to eat if unable to get patient up into chair   - Supervise patient during oral intake  - Instruct patient/ family to take small bites  - Instruct patient/ family to take small single sips when taking liquids  - Follow patient-specific strategies generated by speech pathologist   Outcome: Progressing  Goal: Ventilated patient's risk of aspiration is minimized  Description: Assess and monitor vital signs, respiratory status, airway cuff pressure, and labs (WBC)  Monitor for signs of aspiration (tachypnea, cough, rales, wheezing, cyanosis, fever)  - Elevate head of bed 30 degrees if patient has tube feeding   - Monitor tube feeding  Outcome: Progressing     Problem: Nutrition  Goal: Nutrition/Hydration status is improving  Description: Monitor and assess patient's nutrition/hydration status for malnutrition (ex- brittle hair, bruises, dry skin, pale skin and conjunctiva, muscle wasting, smooth red tongue, and disorientation)  Collaborate with interdisciplinary team and initiate plan and interventions as ordered  Monitor patient's weight and dietary intake as ordered or per policy  Utilize nutrition screening tool and intervene per policy  Determine patient's food preferences and provide high-protein, high-caloric foods as appropriate  - Assist patient with eating   - Allow adequate time for meals   - Encourage patient to take dietary supplement as ordered  - Collaborate with clinical nutritionist   - Include patient/family/caregiver in decisions related to nutrition    Outcome: Progressing     Problem: Prexisting or High Potential for Compromised Skin Integrity  Goal: Skin integrity is maintained or improved  Description: INTERVENTIONS:  - Identify patients at risk for skin breakdown  - Assess and monitor skin integrity  - Assess and monitor nutrition and hydration status  - Monitor labs   - Assess for incontinence   - Turn and reposition patient  - Assist with mobility/ambulation  - Relieve pressure over bony prominences  - Avoid friction and shearing  - Provide appropriate hygiene as needed including keeping skin clean and dry  - Evaluate need for skin moisturizer/barrier cream  - Collaborate with interdisciplinary team   - Patient/family teaching  - Consider wound care consult   Outcome: Progressing     Problem: Potential for Falls  Goal: Patient will remain free of falls  Description: INTERVENTIONS:  - Assess patient frequently for physical needs  -  Identify cognitive and physical deficits and behaviors that affect risk of falls    -  [place] fall precautions as indicated by assessment   - Educate patient/family on patient safety including physical limitations  - Instruct patient to call for assistance with activity based on assessment  - Modify environment to reduce risk of injury  - Consider OT/PT consult to assist with strengthening/mobility  Outcome: Progressing     Problem: PAIN - ADULT  Goal: Verbalizes/displays adequate comfort level or baseline comfort level  Description: Interventions:  - Encourage patient to monitor pain and request assistance  - Assess pain using appropriate pain scale  - Administer analgesics based on type and severity of pain and evaluate response  - Implement non-pharmacological measures as appropriate and evaluate response  - Consider cultural and social influences on pain and pain management  - Notify physician/advanced practitioner if interventions unsuccessful or patient reports new pain  Outcome: Progressing     Problem: INFECTION - ADULT  Goal: Absence or prevention of progression during hospitalization  Description: INTERVENTIONS:  - Assess and monitor for signs and symptoms of infection  - Monitor lab/diagnostic results  - Monitor all insertion sites, i e  indwelling lines, tubes, and drains  - Monitor endotracheal if appropriate and nasal secretions for changes in amount and color  - [place] appropriate cooling/warming therapies per order  - Administer medications as ordered  - Instruct and encourage patient and family to use good hand hygiene technique  - Identify and instruct in appropriate isolation precautions for identified infection/condition  Outcome: Progressing  Goal: Absence of fever/infection during neutropenic period  Description: INTERVENTIONS:  - Monitor WBC    Outcome: Progressing     Problem: SAFETY ADULT  Goal: Patient will remain free of falls  Description: INTERVENTIONS:  - Assess patient frequently for physical needs  -  Identify cognitive and physical deficits and behaviors that affect risk of falls    -  [place] fall precautions as indicated by assessment   - Educate patient/family on patient safety including physical limitations  - Instruct patient to call for assistance with activity based on assessment  - Modify environment to reduce risk of injury  - Consider OT/PT consult to assist with strengthening/mobility  Outcome: Progressing  Goal: Maintain or return to baseline ADL function  Description: INTERVENTIONS:  -  Assess patient's ability to carry out ADLs; assess patient's baseline for ADL function and identify physical deficits which impact ability to perform ADLs (bathing, care of mouth/teeth, toileting, grooming, dressing, etc )  - Assess/evaluate cause of self-care deficits   - Assess range of motion  - Assess patient's mobility; develop plan if impaired  - Assess patient's need for assistive devices and provide as appropriate  - Encourage maximum independence but intervene and supervise when necessary  - Involve family in performance of ADLs  - Assess for home care needs following discharge   - Consider OT consult to assist with ADL evaluation and planning for discharge  - Provide patient education as appropriate  Outcome: Progressing  Goal: Maintain or return mobility status to optimal level  Description: INTERVENTIONS:  - Assess patient's baseline mobility status (ambulation, transfers, stairs, etc )    - Identify cognitive and physical deficits and behaviors that affect mobility  - Identify mobility aids required to assist with transfers and/or ambulation (gait belt, sit-to-stand, lift, walker, cane, etc )  - [place] fall precautions as indicated by assessment  - Record patient progress and toleration of activity level on Mobility SBAR; progress patient to next Phase/Stage  - Instruct patient to call for assistance with activity based on assessment  - Consider rehabilitation consult to assist with strengthening/weightbearing, etc   Outcome: Progressing     Problem: DISCHARGE PLANNING  Goal: Discharge to home or other facility with appropriate resources  Description: INTERVENTIONS:  - Identify barriers to discharge w/patient and caregiver  - Arrange for needed discharge resources and transportation as appropriate  - Identify discharge learning needs (meds, wound care, etc )  - Arrange for interpretive services to assist at discharge as needed  - Refer to Case Management Department for coordinating discharge planning if the patient needs post-hospital services based on physician/advanced practitioner order or complex needs related to functional status, cognitive ability, or social support system  Outcome: Progressing     Problem: Knowledge Deficit  Goal: Patient/family/caregiver demonstrates understanding of disease process, treatment plan, medications, and discharge instructions  Description: Complete learning assessment and assess knowledge base    Interventions:  - Provide teaching at level of understanding  - Provide teaching via preferred learning methods  Outcome: Progressing     Problem: Neurological Deficit  Goal: Neurological status is stable or improving  Description: Interventions:  - Monitor and assess patient's level of consciousness, motor function, sensory function, and level of assistance needed for ADLs  - Monitor and report changes from baseline  Collaborate with interdisciplinary team to initiate plan and implement interventions as ordered  - Provide and maintain a safe environment  - Consider seizure precautions  - Consider fall precautions  - Consider aspiration precautions  - Consider bleeding precautions  Outcome: Progressing     Problem: Activity Intolerance/Impaired Mobility  Goal: Mobility/activity is maintained at optimum level for patient  Description: Interventions:  - Assess and monitor patient  barriers to mobility and need for assistive/adaptive devices  - Assess patient's emotional response to limitations  - Collaborate with interdisciplinary team and initiate plans and interventions as ordered  - Encourage independent activity per ability   - Maintain proper body alignment  - Perform active/passive rom as tolerated/ordered  - Plan activities to conserve energy   - Turn patient as appropriate  Outcome: Progressing     Problem: Communication Impairment  Goal: Ability to express needs and understand communication  Description: Assess patient's communication skills and ability to understand information  Patient will demonstrate use of effective communication techniques, alternative methods of communication and understanding even if not able to speak  - Encourage communication and provide alternate methods of communication as needed  - Collaborate with case management/ for discharge needs  - Include patient/family/caregiver in decisions related to communication  Outcome: Progressing     Problem: Potential for Aspiration  Goal: Non-ventilated patient's risk of aspiration is minimized  Description: Assess and monitor vital signs, respiratory status, and labs (WBC)    Monitor for signs of aspiration (tachypnea, cough, rales, wheezing, cyanosis, fever)  - Assess and monitor patient's ability to swallow  - Place patient up in chair to eat if possible  - HOB up at 90 degrees to eat if unable to get patient up into chair   - Supervise patient during oral intake  - Instruct patient/ family to take small bites  - Instruct patient/ family to take small single sips when taking liquids  - Follow patient-specific strategies generated by speech pathologist   Outcome: Progressing  Goal: Ventilated patient's risk of aspiration is minimized  Description: Assess and monitor vital signs, respiratory status, airway cuff pressure, and labs (WBC)  Monitor for signs of aspiration (tachypnea, cough, rales, wheezing, cyanosis, fever)  - Elevate head of bed 30 degrees if patient has tube feeding   - Monitor tube feeding  Outcome: Progressing     Problem: Nutrition  Goal: Nutrition/Hydration status is improving  Description: Monitor and assess patient's nutrition/hydration status for malnutrition (ex- brittle hair, bruises, dry skin, pale skin and conjunctiva, muscle wasting, smooth red tongue, and disorientation)  Collaborate with interdisciplinary team and initiate plan and interventions as ordered  Monitor patient's weight and dietary intake as ordered or per policy  Utilize nutrition screening tool and intervene per policy  Determine patient's food preferences and provide high-protein, high-caloric foods as appropriate  - Assist patient with eating   - Allow adequate time for meals   - Encourage patient to take dietary supplement as ordered  - Collaborate with clinical nutritionist   - Include patient/family/caregiver in decisions related to nutrition    Outcome: Progressing

## 2021-02-13 NOTE — PLAN OF CARE
Problem: Prexisting or High Potential for Compromised Skin Integrity  Goal: Skin integrity is maintained or improved  Description: INTERVENTIONS:  - Identify patients at risk for skin breakdown  - Assess and monitor skin integrity  - Assess and monitor nutrition and hydration status  - Monitor labs   - Assess for incontinence   - Turn and reposition patient  - Assist with mobility/ambulation  - Relieve pressure over bony prominences  - Avoid friction and shearing  - Provide appropriate hygiene as needed including keeping skin clean and dry  - Evaluate need for skin moisturizer/barrier cream  - Collaborate with interdisciplinary team   - Patient/family teaching  - Consider wound care consult   2/13/2021 1044 by Kelsea Felix RN  Outcome: Progressing  2/13/2021 1044 by Kelsea Felix RN  Outcome: Progressing     Problem: Potential for Falls  Goal: Patient will remain free of falls  Description: INTERVENTIONS:  - Assess patient frequently for physical needs  -  Identify cognitive and physical deficits and behaviors that affect risk of falls    -  [place] fall precautions as indicated by assessment   - Educate patient/family on patient safety including physical limitations  - Instruct patient to call for assistance with activity based on assessment  - Modify environment to reduce risk of injury  - Consider OT/PT consult to assist with strengthening/mobility  2/13/2021 1044 by Kelsea Felix RN  Outcome: Progressing  2/13/2021 1044 by Kelsea Felix RN  Outcome: Progressing     Problem: PAIN - ADULT  Goal: Verbalizes/displays adequate comfort level or baseline comfort level  Description: Interventions:  - Encourage patient to monitor pain and request assistance  - Assess pain using appropriate pain scale  - Administer analgesics based on type and severity of pain and evaluate response  - Implement non-pharmacological measures as appropriate and evaluate response  - Consider cultural and social influences on pain and pain management  - Notify physician/advanced practitioner if interventions unsuccessful or patient reports new pain  2/13/2021 1044 by Horace Melo RN  Outcome: Progressing  2/13/2021 1044 by Horace Melo RN  Outcome: Progressing     Problem: INFECTION - ADULT  Goal: Absence or prevention of progression during hospitalization  Description: INTERVENTIONS:  - Assess and monitor for signs and symptoms of infection  - Monitor lab/diagnostic results  - Monitor all insertion sites, i e  indwelling lines, tubes, and drains  - Monitor endotracheal if appropriate and nasal secretions for changes in amount and color  - [place] appropriate cooling/warming therapies per order  - Administer medications as ordered  - Instruct and encourage patient and family to use good hand hygiene technique  - Identify and instruct in appropriate isolation precautions for identified infection/condition  2/13/2021 1044 by Horace Melo RN  Outcome: Progressing  2/13/2021 1044 by Horace Melo RN  Outcome: Progressing  Goal: Absence of fever/infection during neutropenic period  Description: INTERVENTIONS:  - Monitor WBC    2/13/2021 1044 by Horace Melo RN  Outcome: Progressing  2/13/2021 1044 by Horace Melo RN  Outcome: Progressing     Problem: SAFETY ADULT  Goal: Patient will remain free of falls  Description: INTERVENTIONS:  - Assess patient frequently for physical needs  -  Identify cognitive and physical deficits and behaviors that affect risk of falls    -  [place] fall precautions as indicated by assessment   - Educate patient/family on patient safety including physical limitations  - Instruct patient to call for assistance with activity based on assessment  - Modify environment to reduce risk of injury  - Consider OT/PT consult to assist with strengthening/mobility  2/13/2021 1044 by Horace Melo RN  Outcome: Progressing  2/13/2021 1044 by Horace Melo RN  Outcome: Progressing  Goal: Maintain or return to baseline ADL function  Description: INTERVENTIONS:  -  Assess patient's ability to carry out ADLs; assess patient's baseline for ADL function and identify physical deficits which impact ability to perform ADLs (bathing, care of mouth/teeth, toileting, grooming, dressing, etc )  - Assess/evaluate cause of self-care deficits   - Assess range of motion  - Assess patient's mobility; develop plan if impaired  - Assess patient's need for assistive devices and provide as appropriate  - Encourage maximum independence but intervene and supervise when necessary  - Involve family in performance of ADLs  - Assess for home care needs following discharge   - Consider OT consult to assist with ADL evaluation and planning for discharge  - Provide patient education as appropriate  2/13/2021 1044 by Ameya Arnlod RN  Outcome: Progressing  2/13/2021 1044 by Ameya Arnold RN  Outcome: Progressing  Goal: Maintain or return mobility status to optimal level  Description: INTERVENTIONS:  - Assess patient's baseline mobility status (ambulation, transfers, stairs, etc )    - Identify cognitive and physical deficits and behaviors that affect mobility  - Identify mobility aids required to assist with transfers and/or ambulation (gait belt, sit-to-stand, lift, walker, cane, etc )  - [place] fall precautions as indicated by assessment  - Record patient progress and toleration of activity level on Mobility SBAR; progress patient to next Phase/Stage  - Instruct patient to call for assistance with activity based on assessment  - Consider rehabilitation consult to assist with strengthening/weightbearing, etc   2/13/2021 1044 by Ameya Arnold RN  Outcome: Progressing  2/13/2021 1044 by Ameya Arnold RN  Outcome: Progressing     Problem: DISCHARGE PLANNING  Goal: Discharge to home or other facility with appropriate resources  Description: INTERVENTIONS:  - Identify barriers to discharge w/patient and caregiver  - Arrange for needed discharge resources and transportation as appropriate  - Identify discharge learning needs (meds, wound care, etc )  - Arrange for interpretive services to assist at discharge as needed  - Refer to Case Management Department for coordinating discharge planning if the patient needs post-hospital services based on physician/advanced practitioner order or complex needs related to functional status, cognitive ability, or social support system  2/13/2021 1044 by Judd Sal RN  Outcome: Progressing  2/13/2021 1044 by Judd Sal RN  Outcome: Progressing     Problem: Knowledge Deficit  Goal: Patient/family/caregiver demonstrates understanding of disease process, treatment plan, medications, and discharge instructions  Description: Complete learning assessment and assess knowledge base  Interventions:  - Provide teaching at level of understanding  - Provide teaching via preferred learning methods  2/13/2021 1044 by Judd Sal RN  Outcome: Progressing  2/13/2021 1044 by Judd Sal RN  Outcome: Progressing     Problem: Neurological Deficit  Goal: Neurological status is stable or improving  Description: Interventions:  - Monitor and assess patient's level of consciousness, motor function, sensory function, and level of assistance needed for ADLs  - Monitor and report changes from baseline  Collaborate with interdisciplinary team to initiate plan and implement interventions as ordered  - Provide and maintain a safe environment  - Consider seizure precautions  - Consider fall precautions  - Consider aspiration precautions  - Consider bleeding precautions  2/13/2021 1044 by Judd Sal RN  Outcome: Progressing  2/13/2021 1044 by Judd Sal RN  Outcome: Progressing     Problem: Activity Intolerance/Impaired Mobility  Goal: Mobility/activity is maintained at optimum level for patient  Description: Interventions:  - Assess and monitor patient  barriers to mobility and need for assistive/adaptive devices    - Assess patient's emotional response to limitations  - Collaborate with interdisciplinary team and initiate plans and interventions as ordered  - Encourage independent activity per ability   - Maintain proper body alignment  - Perform active/passive rom as tolerated/ordered  - Plan activities to conserve energy   - Turn patient as appropriate  2/13/2021 1044 by Janey Alexander RN  Outcome: Progressing  2/13/2021 1044 by Janey Alexander RN  Outcome: Progressing     Problem: Communication Impairment  Goal: Ability to express needs and understand communication  Description: Assess patient's communication skills and ability to understand information  Patient will demonstrate use of effective communication techniques, alternative methods of communication and understanding even if not able to speak  - Encourage communication and provide alternate methods of communication as needed  - Collaborate with case management/ for discharge needs  - Include patient/family/caregiver in decisions related to communication  2/13/2021 1044 by Jaeny Alexander RN  Outcome: Progressing  2/13/2021 1044 by Janey Alexander RN  Outcome: Progressing     Problem: Potential for Aspiration  Goal: Non-ventilated patient's risk of aspiration is minimized  Description: Assess and monitor vital signs, respiratory status, and labs (WBC)  Monitor for signs of aspiration (tachypnea, cough, rales, wheezing, cyanosis, fever)  - Assess and monitor patient's ability to swallow  - Place patient up in chair to eat if possible  - HOB up at 90 degrees to eat if unable to get patient up into chair   - Supervise patient during oral intake  - Instruct patient/ family to take small bites  - Instruct patient/ family to take small single sips when taking liquids    - Follow patient-specific strategies generated by speech pathologist   2/13/2021 1044 by Janey Alexander RN  Outcome: Progressing  2/13/2021 1044 by Janey Alexander RN  Outcome: Progressing  Goal: Ventilated patient's risk of aspiration is minimized  Description: Assess and monitor vital signs, respiratory status, airway cuff pressure, and labs (WBC)  Monitor for signs of aspiration (tachypnea, cough, rales, wheezing, cyanosis, fever)  - Elevate head of bed 30 degrees if patient has tube feeding   - Monitor tube feeding  2/13/2021 1044 by Clotilde Everett RN  Outcome: Progressing  2/13/2021 1044 by Clotilde Everett RN  Outcome: Progressing     Problem: Nutrition  Goal: Nutrition/Hydration status is improving  Description: Monitor and assess patient's nutrition/hydration status for malnutrition (ex- brittle hair, bruises, dry skin, pale skin and conjunctiva, muscle wasting, smooth red tongue, and disorientation)  Collaborate with interdisciplinary team and initiate plan and interventions as ordered  Monitor patient's weight and dietary intake as ordered or per policy  Utilize nutrition screening tool and intervene per policy  Determine patient's food preferences and provide high-protein, high-caloric foods as appropriate  - Assist patient with eating   - Allow adequate time for meals   - Encourage patient to take dietary supplement as ordered  - Collaborate with clinical nutritionist   - Include patient/family/caregiver in decisions related to nutrition    2/13/2021 1044 by Clotilde Everett RN  Outcome: Progressing  2/13/2021 1044 by Clotilde Everett RN  Outcome: Progressing

## 2021-02-13 NOTE — PROGRESS NOTES
02/13/21 1305   Pain Assessment   Pain Assessment Tool Pain Assessment not indicated - pt denies pain   Restrictions/Precautions   Precautions Fall Risk   Weight Bearing Restrictions No   ROM Restrictions No   Grooming   Able To Initiate Tasks;Comb/Brush Hair;Wash/Dry Face   Limitation Noted In Coordination; Safety   Shower/Bathe Self   Type of Assistance Needed Supervision; Adaptive equipment   Amount of Physical Assistance Provided No physical assistance   Shower/Bathe Self CARE Score 4   Bathing   Assessed Bath Style Shower   Anticipated D/C Bath Style Shower;Sponge Bath   Able to [place] No   Able to [employer] Temperature No   Able to Wash/Rinse/Dry (body part) Left Arm;Right Arm;L Upper Leg;R Upper Leg;L Lower Leg/Foot;R Lower Leg/Foot;Chest;Abdomen;Perineal Area; Buttocks   Limitations Noted in Balance; Coordination; Endurance;ROM;Safety; Sequencing   Positioning Standing;Seated   Adaptive Equipment Longhand Sponge; Shower Bars;Tub Bench;Hand Held Shower   Tub/Shower Transfer   Limitations Noted In Coordination;Balance;ROM;Safety;UE Strength;LE Strength   Adaptive Equipment Grab Bars;Seat with out Back   Assessed Tub/shower combo   Findings Felicita for support when swinging legs over tub edge   Upper Body Dressing   Type of Assistance Needed Supervision   Amount of Physical Assistance Provided No physical assistance   Upper Body Dressing CARE Score 4   Lower Body Dressing   Type of Assistance Needed Physical assistance; Adaptive equipment   Amount of Physical Assistance Provided Less than 25%   Comment assist to get bilat feet fully into underwear, RLE into pants   Lower Body Dressing CARE Score 3   Putting On/Taking Off Footwear   Type of Assistance Needed Supervision; Adaptive equipment   Amount of Physical Assistance Provided No physical assistance   Putting On/Taking Off Footwear CARE Score 4   Dressing/Undressing Clothing   Remove UB Clothes Pullover Shirt   Don UB Clothes Pullover Shirt   Remove LB Clothes Pants; Undergarment;Socks   Don LB Clothes Pants; Undergarment;Socks   Limitations Noted In Balance; Coordination; Safety;Strength;ROM;Endurance   Adaptive Equipment Reacher;Sock Aide   Positioning Supported Sit   Sit to Lying   Type of Assistance Needed Supervision   Amount of Physical Assistance Provided No physical assistance   Sit to Lying CARE Score 4   Sit to Stand   Type of Assistance Needed Supervision   Amount of Physical Assistance Provided No physical assistance   Sit to Stand CARE Score 4   Bed-Chair Transfer   Type of Assistance Needed Incidental touching   Comment CG   Chair/Bed-to-Chair Transfer CARE Score 4   Transfer Bed/Chair/Wheelchair   Limitations Noted In Balance; Endurance;LE Strength   Cognition   Overall Cognitive Status Impaired   Arousal/Participation Alert; Responsive; Cooperative   Attention Within functional limits   Orientation Level Oriented X4   Memory Decreased recall of precautions   Following Commands Follows one step commands without difficulty   Assessment   Treatment Assessment Pt agreeable to OT session this PM  Received sitting upright in w/c  ADL session completed; current LOF and details listed in respective sections  Pt is CG for bathing, supervision UB dressing, Felicita shower transfer and LB dressing  AE used for LB tasks as needed, however pt with difficulty effectively using 2* impaired RUE coordination and strength  Pt also with decreased RLE ROM, which made lifting foot to place in underwear and pants difficult  Pt continues with impaired vocalization production and requires increased time to converse and answer questions  Pt would benefit from continued skilled OT services in order to maximize independence in self care, functional mobility/transfers, ROM/strength/coordination, and activity tolerance  Prognosis Good   Problem List Decreased strength;Decreased range of motion;Decreased endurance; Impaired balance;Decreased coordination;Decreased cognition;Decreased safety awareness   Plan   Treatment/Interventions ADL retraining;Functional transfer training;LE strengthening/ROM; Therapeutic exercise; Endurance training;Cognitive reorientation;Patient/family training;Equipment eval/education; Compensatory technique education;OT   Progress Progressing toward goals   OT Therapy Minutes   OT Time In 1305   OT Time Out 1353   OT Total Time (minutes) 48   OT Mode of treatment - Individual (minutes) 48   Therapy Time missed   Time missed?  No

## 2021-02-14 LAB
GLUCOSE SERPL-MCNC: 108 MG/DL (ref 65–140)
GLUCOSE SERPL-MCNC: 129 MG/DL (ref 65–140)
GLUCOSE SERPL-MCNC: 143 MG/DL (ref 65–140)
GLUCOSE SERPL-MCNC: 167 MG/DL (ref 65–140)

## 2021-02-14 PROCEDURE — 82948 REAGENT STRIP/BLOOD GLUCOSE: CPT

## 2021-02-14 RX ADMIN — INSULIN GLARGINE 40 UNITS: 100 INJECTION, SOLUTION SUBCUTANEOUS at 21:11

## 2021-02-14 RX ADMIN — INSULIN LISPRO 8 UNITS: 100 INJECTION, SOLUTION INTRAVENOUS; SUBCUTANEOUS at 08:26

## 2021-02-14 RX ADMIN — LISINOPRIL 5 MG: 5 TABLET ORAL at 08:27

## 2021-02-14 RX ADMIN — ATORVASTATIN CALCIUM 80 MG: 80 TABLET, FILM COATED ORAL at 16:34

## 2021-02-14 RX ADMIN — INSULIN LISPRO 1 UNITS: 100 INJECTION, SOLUTION INTRAVENOUS; SUBCUTANEOUS at 16:35

## 2021-02-14 RX ADMIN — DOCUSATE SODIUM 100 MG: 100 CAPSULE, LIQUID FILLED ORAL at 08:26

## 2021-02-14 RX ADMIN — DOCUSATE SODIUM 100 MG: 100 CAPSULE, LIQUID FILLED ORAL at 17:15

## 2021-02-14 RX ADMIN — INSULIN LISPRO 8 UNITS: 100 INJECTION, SOLUTION INTRAVENOUS; SUBCUTANEOUS at 12:27

## 2021-02-14 RX ADMIN — RIVAROXABAN 20 MG: 10 TABLET, FILM COATED ORAL at 07:49

## 2021-02-14 RX ADMIN — ESCITALOPRAM 5 MG: 5 TABLET, FILM COATED ORAL at 08:27

## 2021-02-14 RX ADMIN — INSULIN LISPRO 8 UNITS: 100 INJECTION, SOLUTION INTRAVENOUS; SUBCUTANEOUS at 16:36

## 2021-02-14 RX ADMIN — FAMOTIDINE 20 MG: 20 TABLET ORAL at 08:27

## 2021-02-14 RX ADMIN — METOPROLOL TARTRATE 50 MG: 50 TABLET, FILM COATED ORAL at 21:10

## 2021-02-14 RX ADMIN — METOPROLOL TARTRATE 50 MG: 50 TABLET, FILM COATED ORAL at 08:27

## 2021-02-14 RX ADMIN — PANTOPRAZOLE SODIUM 40 MG: 40 TABLET, DELAYED RELEASE ORAL at 08:26

## 2021-02-14 NOTE — PLAN OF CARE
Problem: Prexisting or High Potential for Compromised Skin Integrity  Goal: Skin integrity is maintained or improved  Description: INTERVENTIONS:  - Identify patients at risk for skin breakdown  - Assess and monitor skin integrity  - Assess and monitor nutrition and hydration status  - Monitor labs   - Assess for incontinence   - Turn and reposition patient  - Assist with mobility/ambulation  - Relieve pressure over bony prominences  - Avoid friction and shearing  - Provide appropriate hygiene as needed including keeping skin clean and dry  - Evaluate need for skin moisturizer/barrier cream  - Collaborate with interdisciplinary team   - Patient/family teaching  - Consider wound care consult   Outcome: Progressing     Problem: Potential for Falls  Goal: Patient will remain free of falls  Description: INTERVENTIONS:  - Assess patient frequently for physical needs  -  Identify cognitive and physical deficits and behaviors that affect risk of falls    -  [place] fall precautions as indicated by assessment   - Educate patient/family on patient safety including physical limitations  - Instruct patient to call for assistance with activity based on assessment  - Modify environment to reduce risk of injury  - Consider OT/PT consult to assist with strengthening/mobility  Outcome: Progressing     Problem: PAIN - ADULT  Goal: Verbalizes/displays adequate comfort level or baseline comfort level  Description: Interventions:  - Encourage patient to monitor pain and request assistance  - Assess pain using appropriate pain scale  - Administer analgesics based on type and severity of pain and evaluate response  - Implement non-pharmacological measures as appropriate and evaluate response  - Consider cultural and social influences on pain and pain management  - Notify physician/advanced practitioner if interventions unsuccessful or patient reports new pain  Outcome: Progressing     Problem: INFECTION - ADULT  Goal: Absence or prevention of progression during hospitalization  Description: INTERVENTIONS:  - Assess and monitor for signs and symptoms of infection  - Monitor lab/diagnostic results  - Monitor all insertion sites, i e  indwelling lines, tubes, and drains  - Monitor endotracheal if appropriate and nasal secretions for changes in amount and color  - [place] appropriate cooling/warming therapies per order  - Administer medications as ordered  - Instruct and encourage patient and family to use good hand hygiene technique  - Identify and instruct in appropriate isolation precautions for identified infection/condition  Outcome: Progressing  Goal: Absence of fever/infection during neutropenic period  Description: INTERVENTIONS:  - Monitor WBC    Outcome: Progressing     Problem: SAFETY ADULT  Goal: Patient will remain free of falls  Description: INTERVENTIONS:  - Assess patient frequently for physical needs  -  Identify cognitive and physical deficits and behaviors that affect risk of falls    -  [place] fall precautions as indicated by assessment   - Educate patient/family on patient safety including physical limitations  - Instruct patient to call for assistance with activity based on assessment  - Modify environment to reduce risk of injury  - Consider OT/PT consult to assist with strengthening/mobility  Outcome: Progressing  Goal: Maintain or return to baseline ADL function  Description: INTERVENTIONS:  -  Assess patient's ability to carry out ADLs; assess patient's baseline for ADL function and identify physical deficits which impact ability to perform ADLs (bathing, care of mouth/teeth, toileting, grooming, dressing, etc )  - Assess/evaluate cause of self-care deficits   - Assess range of motion  - Assess patient's mobility; develop plan if impaired  - Assess patient's need for assistive devices and provide as appropriate  - Encourage maximum independence but intervene and supervise when necessary  - Involve family in performance of ADLs  - Assess for home care needs following discharge   - Consider OT consult to assist with ADL evaluation and planning for discharge  - Provide patient education as appropriate  Outcome: Progressing  Goal: Maintain or return mobility status to optimal level  Description: INTERVENTIONS:  - Assess patient's baseline mobility status (ambulation, transfers, stairs, etc )    - Identify cognitive and physical deficits and behaviors that affect mobility  - Identify mobility aids required to assist with transfers and/or ambulation (gait belt, sit-to-stand, lift, walker, cane, etc )  - [place] fall precautions as indicated by assessment  - Record patient progress and toleration of activity level on Mobility SBAR; progress patient to next Phase/Stage  - Instruct patient to call for assistance with activity based on assessment  - Consider rehabilitation consult to assist with strengthening/weightbearing, etc   Outcome: Progressing     Problem: DISCHARGE PLANNING  Goal: Discharge to home or other facility with appropriate resources  Description: INTERVENTIONS:  - Identify barriers to discharge w/patient and caregiver  - Arrange for needed discharge resources and transportation as appropriate  - Identify discharge learning needs (meds, wound care, etc )  - Arrange for interpretive services to assist at discharge as needed  - Refer to Case Management Department for coordinating discharge planning if the patient needs post-hospital services based on physician/advanced practitioner order or complex needs related to functional status, cognitive ability, or social support system  Outcome: Progressing     Problem: Knowledge Deficit  Goal: Patient/family/caregiver demonstrates understanding of disease process, treatment plan, medications, and discharge instructions  Description: Complete learning assessment and assess knowledge base    Interventions:  - Provide teaching at level of understanding  - Provide teaching via preferred learning methods  Outcome: Progressing     Problem: Neurological Deficit  Goal: Neurological status is stable or improving  Description: Interventions:  - Monitor and assess patient's level of consciousness, motor function, sensory function, and level of assistance needed for ADLs  - Monitor and report changes from baseline  Collaborate with interdisciplinary team to initiate plan and implement interventions as ordered  - Provide and maintain a safe environment  - Consider seizure precautions  - Consider fall precautions  - Consider aspiration precautions  - Consider bleeding precautions  Outcome: Progressing     Problem: Activity Intolerance/Impaired Mobility  Goal: Mobility/activity is maintained at optimum level for patient  Description: Interventions:  - Assess and monitor patient  barriers to mobility and need for assistive/adaptive devices  - Assess patient's emotional response to limitations  - Collaborate with interdisciplinary team and initiate plans and interventions as ordered  - Encourage independent activity per ability   - Maintain proper body alignment  - Perform active/passive rom as tolerated/ordered  - Plan activities to conserve energy   - Turn patient as appropriate  Outcome: Progressing     Problem: Communication Impairment  Goal: Ability to express needs and understand communication  Description: Assess patient's communication skills and ability to understand information  Patient will demonstrate use of effective communication techniques, alternative methods of communication and understanding even if not able to speak  - Encourage communication and provide alternate methods of communication as needed  - Collaborate with case management/ for discharge needs  - Include patient/family/caregiver in decisions related to communication    Outcome: Progressing     Problem: Potential for Aspiration  Goal: Non-ventilated patient's risk of aspiration is minimized  Description: Assess and monitor vital signs, respiratory status, and labs (WBC)  Monitor for signs of aspiration (tachypnea, cough, rales, wheezing, cyanosis, fever)  - Assess and monitor patient's ability to swallow  - Place patient up in chair to eat if possible  - HOB up at 90 degrees to eat if unable to get patient up into chair   - Supervise patient during oral intake  - Instruct patient/ family to take small bites  - Instruct patient/ family to take small single sips when taking liquids  - Follow patient-specific strategies generated by speech pathologist   Outcome: Progressing  Goal: Ventilated patient's risk of aspiration is minimized  Description: Assess and monitor vital signs, respiratory status, airway cuff pressure, and labs (WBC)  Monitor for signs of aspiration (tachypnea, cough, rales, wheezing, cyanosis, fever)  - Elevate head of bed 30 degrees if patient has tube feeding   - Monitor tube feeding  Outcome: Progressing     Problem: Nutrition  Goal: Nutrition/Hydration status is improving  Description: Monitor and assess patient's nutrition/hydration status for malnutrition (ex- brittle hair, bruises, dry skin, pale skin and conjunctiva, muscle wasting, smooth red tongue, and disorientation)  Collaborate with interdisciplinary team and initiate plan and interventions as ordered  Monitor patient's weight and dietary intake as ordered or per policy  Utilize nutrition screening tool and intervene per policy  Determine patient's food preferences and provide high-protein, high-caloric foods as appropriate  - Assist patient with eating   - Allow adequate time for meals   - Encourage patient to take dietary supplement as ordered  - Collaborate with clinical nutritionist   - Include patient/family/caregiver in decisions related to nutrition    Outcome: Progressing

## 2021-02-14 NOTE — PROGRESS NOTES
02/14/21 0758   Charting Type   Charting Type Shift assessment   Neurological   Neuro (WDL) X   Level of Consciousness Alert/awake   Orientation Level Oriented X4   Cognition Follows commands; Short term memory loss   Extraocular Movements Full   Speech Delayed responses; Other (Comment)  (voice soft due to vocal cord paralysis)   R Pupil Size (mm) 4   R Pupil Shape Round   R Pupil Reaction Brisk   L Pupil Size (mm) 4   L Pupil Shape Round   L Pupil Reaction Brisk   RUE Muscle Strength 4- Movement against gravity and limited resistance   LUE Muscle Strength 5- Normal strength   RLE Muscle Strength 4- Movement against gravity and limited resistance   LLE Muscle Strength 5- Normal strength   Neuro Symptoms Fatigue; Forgetful   Relieved by Rest   Neuro Additional Assessments No   Delirium Assessment- CAM    Acute Onset and Fluctuating Course (1) No   Inattention (2) No   Disorganized Thinking (3) No   Rate Patient's Level of Consciousness (4) Alert (Normal), No   Delirium Present No   Reyna Coma Scale   Eye Opening 4   Best Verbal Response 5   Best Motor Response 6   Reyna Coma Scale Score 15   HEENT   HEENT (WDL) X   R Eye Intact   L Eye Intact   R Ear Intact   L Ear Intact   Teeth Intact   Respiratory   Respiratory (WDL) X   Respiratory Pattern Normal   Chest Assessment Chest expansion symmetrical   Bilateral Breath Sounds Clear;Diminished   Respiratory Additional Assessments No   Respiratory Interventions   Respiratory Interventions Cough and deep breathe;Incentive spirometry   Cough and Deep Breathe   Cough and Deep Breathe Yes   Cardiac   Cardiac (WDL) WDL   Cardiac Regularity Regular   Heart Sounds No adventitious heart sounds   Jugular Venous Distention (JVD) No   Cardiac Symptoms None   Chest Pain Present No   Pacemaker/ICD No   Pain Assessment   Pain Assessment Tool 0-10   Pain Score No Pain   Peripheral Vascular   Peripheral Vascular (WDL) X   Cyanosis None   RLE Edema Non-pitting   LLE Edema Non-pitting PVS Additional Assessments No   RUE Neurovascular Assessment   R Radial Pulse +2   LUE Neurovascular Assessment   L Radial Pulse +2   RLE Neurovascular Assessment   R Pedal Pulse +2   LLE Neurovascular Assessment   L Pedal Pulse +2   Integumentary   Integumentary (WDL) X   Skin Color Appropriate for ethnicity   Skin Condition/Temp Warm;Dry   Skin Integrity Bruising   Skin Location scattered   Skin Turgor Non-tenting   Integumentary Additional Assessments No   Tattoos/Piercings   Does patient have tattoos? No   Piercings Remaining No   David Scale   Sensory Perceptions 3   Moisture 3   Activity 3   Mobility 3   Nutrition 3   Friction and Shear 2   David Scale Score 17   Musculoskeletal   Musculoskeletal (WDL) X   Level of Assistance Contact guard assist, steadying assist   Assistive Device Front wheel walker   RUE Limited movement   RLE Limited movement   Musculoskeletal Additional Assessments No   Gastrointestinal   Gastrointestinal (WDL) X   Abdomen Inspection Soft;Obese   Bowel Sounds (All Quadrants) Normoactive   Tenderness Soft;Nontender   Last BM Date 02/10/21   GI Symptoms None   Gastrointestinal Additional Assessments No   Bowel Shift Assessment   Assistance Needed Stool softener   Bowel Incontinence No   Stool Assessment   Bowel Incontinence No   Genitourinary   Genitourinary (WDL) WDL   Bladder Shift Assessment   Bladder Device Pull-up   Bladder Incontinence No   Bladder Management Supervision/setup   Bladder Management Deficit Supervision/safety;Steadying   Urine Assessment   Urinary Incontinence No   Urine Color Yellow/straw   Urine Appearance Clear   Urine Odor No odor   Genitalia   Female Genitalia Intact   Genitourinary Additional Assessments   Genitourinary Additional Assessments No   Anal/Rectal   Anal/Rectal (WDL) WDL   Psychosocial   Psychosocial (WDL) X   Patient Behaviors/Mood Calm; Cooperative   Needs Expressed Denies   Psychosocial Additional Assessments No   Ability to Express Feelings Able to express   Ability to Express Needs Able to express   Ability to Express Thoughts Needs assistance   Ability to Understand Others Usually understands   Efrain Suicide Severity Rating Scale   1  Wish to be Dead No   Cough   Cough None     Right sided weakness  Vocal cord paralysis but able to make her needs met  Ambulates RW/CGA without any difficulty  No s/s aspiration, appetite good  Stroke education, meds and safety reinforced  Continue same plan of care

## 2021-02-14 NOTE — NURSING NOTE
Pt slept through the night with no signs of pain or distress observed during hourly rounding  Pt was supervision for transfers during shift  Cont of B+B throughout the night  Call bell within reach, bed alarm on for safety  Will continue to monitor and follow plan of care

## 2021-02-15 LAB
BACTERIA UR QL AUTO: ABNORMAL /HPF
BILIRUB UR QL STRIP: NEGATIVE
CLARITY UR: CLEAR
COLOR UR: YELLOW
GLUCOSE SERPL-MCNC: 111 MG/DL (ref 65–140)
GLUCOSE SERPL-MCNC: 157 MG/DL (ref 65–140)
GLUCOSE SERPL-MCNC: 197 MG/DL (ref 65–140)
GLUCOSE SERPL-MCNC: 224 MG/DL (ref 65–140)
GLUCOSE UR STRIP-MCNC: ABNORMAL MG/DL
HGB UR QL STRIP.AUTO: NEGATIVE
KETONES UR STRIP-MCNC: NEGATIVE MG/DL
LEUKOCYTE ESTERASE UR QL STRIP: NEGATIVE
MUCOUS THREADS UR QL AUTO: ABNORMAL
NITRITE UR QL STRIP: NEGATIVE
NON-SQ EPI CELLS URNS QL MICRO: ABNORMAL /HPF
PH UR STRIP.AUTO: 5.5 [PH]
PROT UR STRIP-MCNC: ABNORMAL MG/DL
RBC #/AREA URNS AUTO: ABNORMAL /HPF
SP GR UR STRIP.AUTO: 1.02 (ref 1–1.03)
UROBILINOGEN UR QL STRIP.AUTO: 0.2 E.U./DL
WBC #/AREA URNS AUTO: ABNORMAL /HPF

## 2021-02-15 PROCEDURE — 97530 THERAPEUTIC ACTIVITIES: CPT

## 2021-02-15 PROCEDURE — 97129 THER IVNTJ 1ST 15 MIN: CPT

## 2021-02-15 PROCEDURE — 97130 THER IVNTJ EA ADDL 15 MIN: CPT

## 2021-02-15 PROCEDURE — 97110 THERAPEUTIC EXERCISES: CPT

## 2021-02-15 PROCEDURE — 92526 ORAL FUNCTION THERAPY: CPT

## 2021-02-15 PROCEDURE — 81001 URINALYSIS AUTO W/SCOPE: CPT | Performed by: FAMILY MEDICINE

## 2021-02-15 PROCEDURE — 82948 REAGENT STRIP/BLOOD GLUCOSE: CPT

## 2021-02-15 PROCEDURE — 97537 COMMUNITY/WORK REINTEGRATION: CPT

## 2021-02-15 PROCEDURE — 99231 SBSQ HOSP IP/OBS SF/LOW 25: CPT | Performed by: FAMILY MEDICINE

## 2021-02-15 PROCEDURE — 97116 GAIT TRAINING THERAPY: CPT

## 2021-02-15 RX ADMIN — INSULIN LISPRO 8 UNITS: 100 INJECTION, SOLUTION INTRAVENOUS; SUBCUTANEOUS at 09:15

## 2021-02-15 RX ADMIN — ESCITALOPRAM 5 MG: 5 TABLET, FILM COATED ORAL at 09:08

## 2021-02-15 RX ADMIN — INSULIN LISPRO 8 UNITS: 100 INJECTION, SOLUTION INTRAVENOUS; SUBCUTANEOUS at 13:16

## 2021-02-15 RX ADMIN — INSULIN GLARGINE 40 UNITS: 100 INJECTION, SOLUTION SUBCUTANEOUS at 21:14

## 2021-02-15 RX ADMIN — METOPROLOL TARTRATE 50 MG: 50 TABLET, FILM COATED ORAL at 09:07

## 2021-02-15 RX ADMIN — PANTOPRAZOLE SODIUM 40 MG: 40 TABLET, DELAYED RELEASE ORAL at 09:07

## 2021-02-15 RX ADMIN — DOCUSATE SODIUM 100 MG: 100 CAPSULE, LIQUID FILLED ORAL at 18:10

## 2021-02-15 RX ADMIN — INSULIN LISPRO 2 UNITS: 100 INJECTION, SOLUTION INTRAVENOUS; SUBCUTANEOUS at 21:16

## 2021-02-15 RX ADMIN — LISINOPRIL 5 MG: 5 TABLET ORAL at 09:08

## 2021-02-15 RX ADMIN — INSULIN LISPRO 1 UNITS: 100 INJECTION, SOLUTION INTRAVENOUS; SUBCUTANEOUS at 13:15

## 2021-02-15 RX ADMIN — METOPROLOL TARTRATE 50 MG: 50 TABLET, FILM COATED ORAL at 21:14

## 2021-02-15 RX ADMIN — ATORVASTATIN CALCIUM 80 MG: 80 TABLET, FILM COATED ORAL at 16:52

## 2021-02-15 RX ADMIN — FAMOTIDINE 20 MG: 20 TABLET ORAL at 09:08

## 2021-02-15 RX ADMIN — DOCUSATE SODIUM 100 MG: 100 CAPSULE, LIQUID FILLED ORAL at 09:07

## 2021-02-15 RX ADMIN — RIVAROXABAN 20 MG: 10 TABLET, FILM COATED ORAL at 09:08

## 2021-02-15 RX ADMIN — INSULIN LISPRO 2 UNITS: 100 INJECTION, SOLUTION INTRAVENOUS; SUBCUTANEOUS at 16:53

## 2021-02-15 RX ADMIN — INSULIN LISPRO 8 UNITS: 100 INJECTION, SOLUTION INTRAVENOUS; SUBCUTANEOUS at 16:54
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
## 2021-02-15 NOTE — PROGRESS NOTES
02/15/21 1230   Pain Assessment   Pain Assessment Tool Pain Assessment not indicated - pt denies pain   Pain Score No Pain   Restrictions/Precautions   Precautions Fall Risk;Aspiration; Aphasia;Bed/chair alarms;Cognitive;Supervision on toilet/commode   Comprehension   Comprehension (FIM) 5 - Needs help/cues, repetition only RARELY ( < 10% of the time)   Expression   Expression (FIM) 4 - Expresses basic info/needs 75-90% of time   Social Interaction   Social Interaction (FIM) 5 - Interacts appropriately with others 90% of time   Problem Solving   Problem solving (FIM) 4 - Solves basic problems 75-89% of time   Memory   Memory (FIM) 4 - Recalls 2 of 3 steps   Speech/Language/Cognition Assessmetn   Treatment Assessment Pt seen for skilled speech therapy session targeting word finding and improving cognitive linguistic communication abilities as well  Pt alert and interactive seen OOB upright in chair following meal  Pt engaged in the following- abstract naming 3 items in a provided group- 39/42acc increasing with verbal cues and extended processing time  Provided cues as well for word finding with visualization, describing, and associations  Pt next completed weight alternatives task- pt completed 2 scenarios in which she was to determine things she would consider for each when coming up with her solutions  Pt was able to complete with 10/10acc increasing with verbal prompts for some context and increased organization  Pt overall is improving with skilled SLP services and will cont to benefit to maximize overall cognitive linguistic communication abilities at this time  Swallow Information   Current Risks for Dysphagia & Aspiration Weak cough;Weak voicing;Dysphonia;General debilitation;New Neuro event;Brain injury;Cognitive deficit;HX neurologic dx   Current Symptoms/Concerns Cough;Clear throat;During meals; With food; With liquids;HX Dysphagia   Current Diet Dysphagia advance; Nectar thick liquid   Baseline Diet Regular; Thin liquids   Consistencies Assessed and Performance   Materials Admnistered Soft/Level 3;Nectar thick liquid   Oral Stage Mild impaired   Phargngeal Stage Mild impaired; Moderate impaired;Aspiration risk   Swallow Mechanics Mild delayed; Moderate delayed;Swallow initation;Weak larygneal rise;Good Larygneal rise;Vocal Cord dysfunction suspected;Aspiration risk   Esophageal Concerns Hx GERDS   Recommendations   Risk for Aspiration Present   Recommendations Dysphagia treatment   Diet Solid Recommendation Level 3 Dysphagia/ advanced/ soft to chew   Diet Liquid Recommendation Nectar thick liquid  (SMALL cup sips only)   Recommended Form of Meds Whole; With puree   General Precautions Aspiration precautions; Feed only when alert;Minimize distractions;Upright as possible for all oral intake   Compensatory Swallowing Strategies Place food/straw in on left side; Check for pocketing of food on the right; Alternate solids and liquids;Prep set/bolus hold;Swallow 2 times per bite/sip; External pacing;Effortful swallow;Voluntary throat clear/cough to clear penetration   Results Reviewed with PT/Family/Caregiver   Eating   Type of Assistance Needed Set-up / clean-up;Supervision;Verbal cues   Amount of Physical Assistance Provided No physical assistance   Eating CARE Score 4   Swallow Assessment   Swallow Treatment Assessment Pt seen for skilled dysphagia tx session with lunch  Pt alert and interactive seen fully upright and OOB for session, presented c regular diet trial and NTL taken by cup sips  Pt provided w/ set up assist but able to self feed  Consumed 75% of meal (stuffed shells and zucchni and pudding) and ~240cc NTL by cup sips  Pt noted with functional bolus retrieval and lip seal-no anterior loss  Mastication of solids was mildly prolonged but appeared overall functional for pt   Pt with mildly delayed bolus formations and transfers minimal residue noted between bites that cleared over time throughout meal  Overall oral manipulation and processing of solids was slower but did appear functional during meal, noting use of piecemeal transfers  A-p transfers and bolus propulsion of NTL continued to appear to fluctuate from prompt to at times delayed  Pt needing reminders for single sips and bolus prep/set with effortful swallow  Also cued for use of double swallows across meal as needed  No overt s/s penetration/aspiration observed this meal across solids or liquids  Pt cued as well for protective throat clears/coughs to clear suspect penetration throughout meal  At this time, cont dysphagia level 3 diet and NTL with aspiration precautions  FULL supervision for now, OOB for all meals, slow rate, SMALL sips of liquids, use of bolus hold/prep set strategy w/ liquids, alternate food and liquids  At this time, pt is recommended for further skilled ST services in order to maximize swallow function while safely supporting PO intake  Swallow Assessment Prognosis   Prognosis Fair   Prognosis Considerations Age; Co-morbidities; Medical diagnosis; Medical prognosis;Previous level of function;Severity of impairments;New learning ability;Ability to carry over; Cooperation   SLP Therapy Minutes   SLP Time In 5133   SLP Time Out 6506   SLP Total Time (minutes) 75   SLP Mode of treatment - Individual (minutes) 75   SLP Mode of treatment - Concurrent (minutes) 0   SLP Mode of treatment - Group (minutes) 0   SLP Mode of treatment - Co-treat (minutes) 0   SLP Mode of Treatment - Total time(minutes) 75 minutes   SLP Cumulative Minutes 520   Therapy Time missed   Time missed?  No

## 2021-02-15 NOTE — NURSING NOTE
During rounding pt found laying on her R side on edge of bed with knees hanging over the side of bed  3rd side rail put up on dame side of bed for pt safety  Pt is currently sleeping with no signs of pain or distress observed  Call bell within reach  Will continue to monitor and follow plan of care

## 2021-02-15 NOTE — PROGRESS NOTES
02/15/21 1130   Pain Assessment   Pain Assessment Tool Pain Assessment not indicated - pt denies pain   Pain Score No Pain   Restrictions/Precautions   Precautions Fall Risk   Cognition   Overall Cognitive Status WFL   Arousal/Participation Alert; Responsive; Cooperative   Attention Within functional limits   Orientation Level Oriented X4   Memory Decreased recall of precautions   Following Commands Follows one step commands without difficulty   Sit to Stand   Type of Assistance Needed Supervision;Verbal cues   Sit to Stand CARE Score 4   Bed-Chair Transfer   Type of Assistance Needed Supervision;Verbal cues   Chair/Bed-to-Chair Transfer CARE Score 4   Walk 10 Feet   Type of Assistance Needed Incidental touching;Supervision;Verbal cues   Comment cg/close S   Walk 10 Feet CARE Score 4   Walk 50 Feet with Two Turns   Type of Assistance Needed Incidental touching;Supervision;Verbal cues   Comment cg/close S   Walk 50 Feet with Two Turns CARE Score 4   Walk 150 Feet   Type of Assistance Needed Incidental touching;Supervision;Verbal cues   Comment cg/close S   Walk 150 Feet CARE Score 4   Walking 10 Feet on Uneven Surfaces   Type of Assistance Needed Incidental touching;Supervision;Verbal cues   Comment cg/close S   Walking 10 Feet on Uneven Surfaces CARE Score 4   Ambulation   Does the patient walk? 2  Yes   Primary Mode of Locomotion Prior to Admission Walk   Distance Walked (feet) 177 ft  (87' 177' 138')   Assist Device Roller Walker   Gait Pattern Inconsistant Jaylyn; Slow Jaylyn;Decreased foot clearance;R foot drag;R hemiparesis; Narrow MAXWELL;Step to   Limitations Noted In Balance; Coordination; Safety;Strength   Provided Assistance with: Balance   Walk Assist Level Minimum Assist;Close Supervision   Findings level and unlevel surfaces   Curb or Single Stair   Style negotiated Single stair   Type of Assistance Needed Incidental touching;Verbal cues   Comment cg   1 Step (Curb) CARE Score 4   4 Steps   Type of Assistance Needed Incidental touching;Verbal cues   Comment cg   4 Steps CARE Score 4   12 Steps   Type of Assistance Needed Incidental touching;Verbal cues   Comment cg   12 Steps CARE Score 4   Stairs   Type Stairs   # of Steps 14   Weight Bearing Precautions WBAT   Assist Devices Bilateral Rail   Findings cg with cues   Therapeutic Interventions   Strengthening seated ther ex   Balance gait and transfer training   Other stair training   Assessment   Treatment Assessment Patient tolerated therapy session well  Continues to need cues for general safety  Completed ther ex for general LE strengthening; gait and transfer training focusing on sequence and technique for improved balance and safety with functional mobility  Patient appropriate for concurrent therapy to increase motivation among pts with similar deficits while completing therapy session  PT Barriers   Physical Impairment Decreased strength;Decreased range of motion;Decreased endurance; Impaired balance;Decreased mobility; Impaired judgement;Decreased safety awareness   Functional Limitation Walking;Transfers;Standing;Stair negotiation   Plan   Treatment/Interventions Functional transfer training;LE strengthening/ROM; Elevations; Therapeutic exercise; Bed mobility;Gait training   Progress Progressing toward goals   PT Therapy Minutes   PT Time In 1130   PT Time Out 1230   PT Total Time (minutes) 60   PT Mode of treatment - Individual (minutes) 30   PT Mode of treatment - Concurrent (minutes) 30   PT Mode of treatment - Group (minutes) 0   PT Mode of treatment - Co-treat (minutes) 0   PT Mode of Treatment - Total time(minutes) 60 minutes   PT Cumulative Minutes 753   Therapy Time missed   Time missed?  No

## 2021-02-15 NOTE — PROGRESS NOTES
02/15/21 0900   Pain Assessment   Pain Assessment Tool Pain Assessment not indicated - pt denies pain   Restrictions/Precautions   Precautions Fall Risk   Sit to Stand   Type of Assistance Needed Supervision;Verbal cues   Comment VCs for hand placement;pt tends to pull up from item in front which was consistnet during session   Sit to Stand CARE Score 4   Meal Prep   Meal Preparation reviewed seated vs stance activites at table top   Kitchen Mobility   Kitchen-Mobility Level Walker   Kitchen Activity Retrieve items;Transport items   Kitchen Mobility Comments CGA with kitchen mobility;Educated in benefit of walker tray or basket and demon functionality of both;home safety tips reviewed;visually demon to pt how to slide items on counter top;avoidance of holding onto fridge door;removal of throw rugs  Pt had a LOB while retrieving item from fridge which OT assisted her to regain   Functional Standing Tolerance   Activity Pt participated in dynamic/stance activites for postural stability;OT encouraged midline reaching and outside of base of support while completing TA which consisted of: graded clothes pins, 2# dowel in 5 planes of movement x10 reps and then OT donned 1# weights onto wirsts and she reached for cones and stacked them on table top    Cognition   Overall Cognitive Status [facility]   Arousal/Participation Alert; Cooperative   Attention Within functional limits   Orientation Level Oriented X4   Memory Decreased recall of precautions   Following Commands Follows one step commands without difficulty   Additional Activities   Additional Activities Comments functional mobility to and from OT room from pt room with RW and CGA; pt needs guidance with negotiating obstacles and turns    Activity Tolerance   Activity Tolerance Patient tolerated treatment well   Assessment   Treatment Assessment OT tx focused on safety with functional txfs and safe strategies for home mgmt along with balance tasks   Pt demon decreased safety with STS txfs requiring consistent VCs for  hand placement with txfs  Pt tolerance to session was good as noted by brock harris with few rest periods  Refer to respective sections for details of session with regards to stance activites and home mgmt  D/C planning ongoing  Plan to continue OT to address established OT goals in prep for d/c to home  Prognosis Good   Problem List Decreased strength;Decreased endurance; Impaired balance;Decreased mobility; Decreased safety awareness; Impaired judgement   Plan   Treatment/Interventions Functional transfer training; Therapeutic exercise; Endurance training;Patient/family training;Equipment eval/education;Gait training;OT   Progress Progressing toward goals   OT Therapy Minutes   OT Time In 0900   OT Time Out 1000   OT Total Time (minutes) 60   OT Mode of treatment - Individual (minutes) 60   OT Mode of treatment - Concurrent (minutes) 0   OT Mode of treatment - Group (minutes) 0   OT Mode of treatment - Co-treat (minutes) 0   OT Mode of Treatment - Total time(minutes) 60 minutes   OT Cumulative Minutes 70   Therapy Time missed   Time missed?  No

## 2021-02-16 LAB
GLUCOSE SERPL-MCNC: 114 MG/DL (ref 65–140)
GLUCOSE SERPL-MCNC: 139 MG/DL (ref 65–140)
GLUCOSE SERPL-MCNC: 228 MG/DL (ref 65–140)
GLUCOSE SERPL-MCNC: 260 MG/DL (ref 65–140)

## 2021-02-16 PROCEDURE — 97110 THERAPEUTIC EXERCISES: CPT

## 2021-02-16 PROCEDURE — 92526 ORAL FUNCTION THERAPY: CPT

## 2021-02-16 PROCEDURE — 97530 THERAPEUTIC ACTIVITIES: CPT

## 2021-02-16 PROCEDURE — 99231 SBSQ HOSP IP/OBS SF/LOW 25: CPT | Performed by: FAMILY MEDICINE

## 2021-02-16 PROCEDURE — 97535 SELF CARE MNGMENT TRAINING: CPT

## 2021-02-16 PROCEDURE — 97537 COMMUNITY/WORK REINTEGRATION: CPT

## 2021-02-16 PROCEDURE — 97116 GAIT TRAINING THERAPY: CPT

## 2021-02-16 PROCEDURE — 82948 REAGENT STRIP/BLOOD GLUCOSE: CPT

## 2021-02-16 RX ADMIN — INSULIN LISPRO 8 UNITS: 100 INJECTION, SOLUTION INTRAVENOUS; SUBCUTANEOUS at 08:16

## 2021-02-16 RX ADMIN — INSULIN LISPRO 8 UNITS: 100 INJECTION, SOLUTION INTRAVENOUS; SUBCUTANEOUS at 16:59

## 2021-02-16 RX ADMIN — RIVAROXABAN 20 MG: 10 TABLET, FILM COATED ORAL at 08:16

## 2021-02-16 RX ADMIN — ESCITALOPRAM 5 MG: 5 TABLET, FILM COATED ORAL at 08:16

## 2021-02-16 RX ADMIN — INSULIN GLARGINE 40 UNITS: 100 INJECTION, SOLUTION SUBCUTANEOUS at 21:27

## 2021-02-16 RX ADMIN — METOPROLOL TARTRATE 50 MG: 50 TABLET, FILM COATED ORAL at 08:16

## 2021-02-16 RX ADMIN — LISINOPRIL 5 MG: 5 TABLET ORAL at 08:16

## 2021-02-16 RX ADMIN — INSULIN LISPRO 3 UNITS: 100 INJECTION, SOLUTION INTRAVENOUS; SUBCUTANEOUS at 21:28

## 2021-02-16 RX ADMIN — FAMOTIDINE 20 MG: 20 TABLET ORAL at 08:16

## 2021-02-16 RX ADMIN — INSULIN LISPRO 2 UNITS: 100 INJECTION, SOLUTION INTRAVENOUS; SUBCUTANEOUS at 16:59

## 2021-02-16 RX ADMIN — INSULIN LISPRO 8 UNITS: 100 INJECTION, SOLUTION INTRAVENOUS; SUBCUTANEOUS at 13:41

## 2021-02-16 RX ADMIN — PANTOPRAZOLE SODIUM 40 MG: 40 TABLET, DELAYED RELEASE ORAL at 08:16

## 2021-02-16 RX ADMIN — ATORVASTATIN CALCIUM 80 MG: 80 TABLET, FILM COATED ORAL at 16:58

## 2021-02-16 RX ADMIN — DOCUSATE SODIUM 100 MG: 100 CAPSULE, LIQUID FILLED ORAL at 08:24

## 2021-02-16 RX ADMIN — METOPROLOL TARTRATE 50 MG: 50 TABLET, FILM COATED ORAL at 21:26

## 2021-02-16 NOTE — PLAN OF CARE
Problem: Prexisting or High Potential for Compromised Skin Integrity  Goal: Skin integrity is maintained or improved  Description: INTERVENTIONS:  - Identify patients at risk for skin breakdown  - Assess and monitor skin integrity  - Assess and monitor nutrition and hydration status  - Monitor labs   - Assess for incontinence   - Turn and reposition patient  - Assist with mobility/ambulation  - Relieve pressure over bony prominences  - Avoid friction and shearing  - Provide appropriate hygiene as needed including keeping skin clean and dry  - Evaluate need for skin moisturizer/barrier cream  - Collaborate with interdisciplinary team   - Patient/family teaching  - Consider wound care consult   Outcome: Progressing     Problem: Potential for Falls  Goal: Patient will remain free of falls  Description: INTERVENTIONS:  - Assess patient frequently for physical needs  -  Identify cognitive and physical deficits and behaviors that affect risk of falls    -  [place] fall precautions as indicated by assessment   - Educate patient/family on patient safety including physical limitations  - Instruct patient to call for assistance with activity based on assessment  - Modify environment to reduce risk of injury  - Consider OT/PT consult to assist with strengthening/mobility  Outcome: Progressing     Problem: PAIN - ADULT  Goal: Verbalizes/displays adequate comfort level or baseline comfort level  Description: Interventions:  - Encourage patient to monitor pain and request assistance  - Assess pain using appropriate pain scale  - Administer analgesics based on type and severity of pain and evaluate response  - Implement non-pharmacological measures as appropriate and evaluate response  - Consider cultural and social influences on pain and pain management  - Notify physician/advanced practitioner if interventions unsuccessful or patient reports new pain  Outcome: Progressing     Problem: INFECTION - ADULT  Goal: Absence or prevention of progression during hospitalization  Description: INTERVENTIONS:  - Assess and monitor for signs and symptoms of infection  - Monitor lab/diagnostic results  - Monitor all insertion sites, i e  indwelling lines, tubes, and drains  - Monitor endotracheal if appropriate and nasal secretions for changes in amount and color  - [place] appropriate cooling/warming therapies per order  - Administer medications as ordered  - Instruct and encourage patient and family to use good hand hygiene technique  - Identify and instruct in appropriate isolation precautions for identified infection/condition  Outcome: Progressing  Goal: Absence of fever/infection during neutropenic period  Description: INTERVENTIONS:  - Monitor WBC    Outcome: Progressing     Problem: SAFETY ADULT  Goal: Patient will remain free of falls  Description: INTERVENTIONS:  - Assess patient frequently for physical needs  -  Identify cognitive and physical deficits and behaviors that affect risk of falls    -  [place] fall precautions as indicated by assessment   - Educate patient/family on patient safety including physical limitations  - Instruct patient to call for assistance with activity based on assessment  - Modify environment to reduce risk of injury  - Consider OT/PT consult to assist with strengthening/mobility  Outcome: Progressing  Goal: Maintain or return to baseline ADL function  Description: INTERVENTIONS:  -  Assess patient's ability to carry out ADLs; assess patient's baseline for ADL function and identify physical deficits which impact ability to perform ADLs (bathing, care of mouth/teeth, toileting, grooming, dressing, etc )  - Assess/evaluate cause of self-care deficits   - Assess range of motion  - Assess patient's mobility; develop plan if impaired  - Assess patient's need for assistive devices and provide as appropriate  - Encourage maximum independence but intervene and supervise when necessary  - Involve family in performance of ADLs  - Assess for home care needs following discharge   - Consider OT consult to assist with ADL evaluation and planning for discharge  - Provide patient education as appropriate  Outcome: Progressing  Goal: Maintain or return mobility status to optimal level  Description: INTERVENTIONS:  - Assess patient's baseline mobility status (ambulation, transfers, stairs, etc )    - Identify cognitive and physical deficits and behaviors that affect mobility  - Identify mobility aids required to assist with transfers and/or ambulation (gait belt, sit-to-stand, lift, walker, cane, etc )  - [place] fall precautions as indicated by assessment  - Record patient progress and toleration of activity level on Mobility SBAR; progress patient to next Phase/Stage  - Instruct patient to call for assistance with activity based on assessment  - Consider rehabilitation consult to assist with strengthening/weightbearing, etc   Outcome: Progressing     Problem: DISCHARGE PLANNING  Goal: Discharge to home or other facility with appropriate resources  Description: INTERVENTIONS:  - Identify barriers to discharge w/patient and caregiver  - Arrange for needed discharge resources and transportation as appropriate  - Identify discharge learning needs (meds, wound care, etc )  - Arrange for interpretive services to assist at discharge as needed  - Refer to Case Management Department for coordinating discharge planning if the patient needs post-hospital services based on physician/advanced practitioner order or complex needs related to functional status, cognitive ability, or social support system  Outcome: Progressing     Problem: Knowledge Deficit  Goal: Patient/family/caregiver demonstrates understanding of disease process, treatment plan, medications, and discharge instructions  Description: Complete learning assessment and assess knowledge base    Interventions:  - Provide teaching at level of understanding  - Provide teaching via preferred learning methods  Outcome: Progressing     Problem: Neurological Deficit  Goal: Neurological status is stable or improving  Description: Interventions:  - Monitor and assess patient's level of consciousness, motor function, sensory function, and level of assistance needed for ADLs  - Monitor and report changes from baseline  Collaborate with interdisciplinary team to initiate plan and implement interventions as ordered  - Provide and maintain a safe environment  - Consider seizure precautions  - Consider fall precautions  - Consider aspiration precautions  - Consider bleeding precautions  Outcome: Progressing     Problem: Activity Intolerance/Impaired Mobility  Goal: Mobility/activity is maintained at optimum level for patient  Description: Interventions:  - Assess and monitor patient  barriers to mobility and need for assistive/adaptive devices  - Assess patient's emotional response to limitations  - Collaborate with interdisciplinary team and initiate plans and interventions as ordered  - Encourage independent activity per ability   - Maintain proper body alignment  - Perform active/passive rom as tolerated/ordered  - Plan activities to conserve energy   - Turn patient as appropriate  Outcome: Progressing     Problem: Communication Impairment  Goal: Ability to express needs and understand communication  Description: Assess patient's communication skills and ability to understand information  Patient will demonstrate use of effective communication techniques, alternative methods of communication and understanding even if not able to speak  - Encourage communication and provide alternate methods of communication as needed  - Collaborate with case management/ for discharge needs  - Include patient/family/caregiver in decisions related to communication    Outcome: Progressing     Problem: Potential for Aspiration  Goal: Non-ventilated patient's risk of aspiration is minimized  Description: Assess and monitor vital signs, respiratory status, and labs (WBC)  Monitor for signs of aspiration (tachypnea, cough, rales, wheezing, cyanosis, fever)  - Assess and monitor patient's ability to swallow  - Place patient up in chair to eat if possible  - HOB up at 90 degrees to eat if unable to get patient up into chair   - Supervise patient during oral intake  - Instruct patient/ family to take small bites  - Instruct patient/ family to take small single sips when taking liquids  - Follow patient-specific strategies generated by speech pathologist   Outcome: Progressing  Goal: Ventilated patient's risk of aspiration is minimized  Description: Assess and monitor vital signs, respiratory status, airway cuff pressure, and labs (WBC)  Monitor for signs of aspiration (tachypnea, cough, rales, wheezing, cyanosis, fever)  - Elevate head of bed 30 degrees if patient has tube feeding   - Monitor tube feeding  Outcome: Progressing     Problem: Nutrition  Goal: Nutrition/Hydration status is improving  Description: Monitor and assess patient's nutrition/hydration status for malnutrition (ex- brittle hair, bruises, dry skin, pale skin and conjunctiva, muscle wasting, smooth red tongue, and disorientation)  Collaborate with interdisciplinary team and initiate plan and interventions as ordered  Monitor patient's weight and dietary intake as ordered or per policy  Utilize nutrition screening tool and intervene per policy  Determine patient's food preferences and provide high-protein, high-caloric foods as appropriate  - Assist patient with eating   - Allow adequate time for meals   - Encourage patient to take dietary supplement as ordered  - Collaborate with clinical nutritionist   - Include patient/family/caregiver in decisions related to nutrition    Outcome: Progressing
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
## 2021-02-16 NOTE — PROGRESS NOTES
02/16/21 0700   Pain Assessment   Pain Assessment Tool Pain Assessment not indicated - pt denies pain   Restrictions/Precautions   Precautions Aspiration;Cognitive; Fall Risk   Weight Bearing Restrictions No   ROM Restrictions No   Eating   Type of Assistance Needed Supervision   Amount of Physical Assistance Provided No physical assistance   Eating CARE Score 4   Eating Assessment   Food To Mouth Yes   Able To Cut   (with side of fork)   Positioning Out of Bed;Upright   Meal Assessed Breakfast   QI: Swallowing/Nutritional Status Modified food consistency   Current Diet Dysphia III;Nectar Thick   Intake Mode PO   Finishes Timely Yes   Opens Packages Yes   Grooming   Able To Initiate Tasks;Comb/Brush Hair;Wash/Dry Face   Limitation Noted In Coordination; Safety;Strength   Shower/Bathe Self   Type of Assistance Needed Supervision; Adaptive equipment   Amount of Physical Assistance Provided No physical assistance   Shower/Bathe Self CARE Score 4   Bathing   Assessed Bath Style Shower   Anticipated D/C Bath Style Shower;Sponge Bath   Able to Chris Brendon No   Able to Wash/Rinse/Dry (body part) Left Arm;Right Arm;R Upper Leg;L Upper Leg;R Lower Leg/Foot;L Lower Leg/Foot; Abdomen; Chest;Perineal Area; Buttocks   Limitations Noted in Balance; Coordination;ROM;Safety;Strength   Positioning Seated;Standing   Adaptive Equipment [employer] Sponge; Tub Bench; Shower [employer]   Limitations Noted In Balance;ROM;Safety;UE Strength;LE Strength   Adaptive Equipment Grab Bars;Transfer Bench   Assessed Tub/shower combo   Findings CG   Upper Body Dressing   Type of Assistance Needed Set-up / clean-up   Amount of Physical Assistance Provided No physical assistance   Upper Body Dressing CARE Score 5   Lower Body Dressing   Type of Assistance Needed Physical assistance; Adaptive equipment   Amount of Physical Assistance Provided Less than 25%   Comment assist to pull pants up on R side, adjust pants in back once pulled up   Lower Body Dressing CARE Score 3   Putting On/Taking Off Footwear   Type of Assistance Needed Supervision; Adaptive equipment   Amount of Physical Assistance Provided No physical assistance   Putting On/Taking Off Footwear CARE Score 4   Dressing/Undressing Clothing   Remove UB Clothes Pullover Shirt   Don UB Clothes Pullover Shirt   Remove LB Clothes Undergarment;Socks   Don LB Clothes Pants; Undergarment;Socks   Limitations Noted In Balance; Coordination; Safety;Strength;ROM   Adaptive Equipment Reacher;Sock Aide   Positioning Supported Sit   Lying to Sitting on Side of Bed   Type of Assistance Needed Supervision   Amount of Physical Assistance Provided No physical assistance   Lying to Sitting on Side of Bed CARE Score 4   Sit to Stand   Type of Assistance Needed Supervision   Amount of Physical Assistance Provided No physical assistance   Sit to Stand CARE Score 4   Toileting Hygiene   Type of Assistance Needed Physical assistance   Amount of Physical Assistance Provided Less than 25%   Comment assist for buttocks thoroughness   Toileting Hygiene CARE Score 3   Toileting   Able to [address] down yes, up yes  Manage Hygiene Bladder  (also incontinent of bowel at start of session)   Limitations Noted In Balance; Coordination; Safety;ROM;LE Strength   Adaptive Equipment Grab Bar   Toilet Transfer   Type of Assistance Needed Supervision   Amount of Physical Assistance Provided No physical assistance   Toilet Transfer CARE Score 4   Toilet Transfer   Surface Assessed Raised Toilet   Transfer Technique Stand Pivot   Limitations Noted In Balance; Safety;ROM;LE Strength   Adaptive Equipment Grab Bar   Therapeutic Exercise - ROM   UE-ROM Yes   ROM- Right Upper Extremities   RUE ROM Comment pushed weighted wooden box x20 all planes of motion   ROM - Left Upper Extremities    LUE ROM Comment pushed weighted wooden box x20 all planes of motion   Coordination   Fine Motor matched and folded socks using bilat hands, pt continues with slight R hand impaired coordination/bradytaxia in digits   Cognition   Overall Cognitive Status WFL   Arousal/Participation Alert; Responsive; Cooperative   Attention Within functional limits   Orientation Level Oriented X4   Memory Decreased recall of precautions   Following Commands Follows one step commands without difficulty   Assessment   Treatment Assessment Pt agreeable to OT session this AM  Received lying supine in bed  ADL session completed; current LOF and details listed in respective sections  Pt is overall supervision bathing, UB dressing, grooming, eating; Felicita LB dressing and toileting; CG transfers  AE used as needed for LB tasks with noticeable improvement in technique  Pt completed functional mobility to bathroom using RW with CG but then used w/c to OT room 2* fatigue  Completed ROM and bilat fine motor coordination activity with good tolerance  Supervision required for breakfast tray at end of session; pt inquired about chopped meats, OT will direct question to SLP for clarification  Pt would benefit from continued skilled OT services in order to maximize independence in self care, functional mobility/transfers, ROM/strength/coordination, and activity tolerance  Prognosis Good   Problem List Decreased range of motion;Decreased strength;Decreased endurance; Impaired balance;Decreased coordination;Decreased cognition;Decreased safety awareness   Plan   Treatment/Interventions ADL retraining;Functional transfer training;LE strengthening/ROM; Therapeutic exercise; Endurance training;Patient/family training;Equipment eval/education; Compensatory technique education;OT   Progress Progressing toward goals   OT Therapy Minutes   OT Time In 0700   OT Time Out 0825   OT Total Time (minutes) 85   OT Mode of treatment - Individual (minutes) 85   Therapy Time missed   Time missed?  No

## 2021-02-16 NOTE — PROGRESS NOTES
02/16/21 1320   Pain Assessment   Pain Assessment Tool 0-10   Pain Score No Pain   Restrictions/Precautions   Precautions Aspiration;Bed/chair alarms;Cognitive; Fall Risk;Supervision on toilet/commode   Swallow Information   Current Risks for Dysphagia & Aspiration Weak cough;Weak voicing;Dysphonia;General debilitation;New Neuro event;Brain injury;Cognitive deficit;HX neurologic dx   Current Symptoms/Concerns Cough;Clear throat;During meals;Pockets food;HX Dysphagia   Current Diet Dysphagia advance; Nectar thick liquid   Baseline Diet Regular; Thin liquids   Consistencies Assessed and Performance   Materials Admnistered Regular/Solid; Nectar thick liquid   Oral Stage Mild impaired   Phargngeal Stage Mild impaired; Moderate impaired;Aspiration risk   Swallow Mechanics Mild delayed; Moderate delayed;Swallow initation;Weak larygneal rise;Good Larygneal rise;Vocal Cord dysfunction suspected;Aspiration risk   Esophageal Concerns Hx GERDS   Recommendations   Risk for Aspiration Present   Recommendations Dysphagia treatment   Diet Solid Recommendation Regular consistency   Diet Liquid Recommendation Nectar thick liquid  (small CUP sips only)   Recommended Form of Meds Whole; With puree   General Precautions Aspiration precautions; Feed only when alert;Minimize distractions;Upright as possible for all oral intake;Remain upright for 45 mins after meals; Supervision with meals  (OOB for meals, FULL supervision)   Compensatory Swallowing Strategies Place food/straw in on left side; Check for pocketing of food on the right; Alternate solids and liquids;Prep set/bolus hold;Swallow 2 times per bite/sip; External pacing;Effortful swallow;Voluntary throat clear/cough to clear penetration   Results Reviewed with MD;RN;PT/Family/Caregiver   Eating   Type of Assistance Needed Supervision;Verbal cues; Set-up / clean-up   Amount of Physical Assistance Provided No physical assistance   Eating CARE Score 4   Swallow Assessment   Swallow Treatment Assessment Pt seen during lunch meal, seated OOB and fully upright for dysphagia therapy session w/ focus on trials of regular diet items for potential diet upgrade  Presented w/ a full regular diet trial tray of a cheeseburger, french fries, tossed salad and rice pudding, consuming 100% of meal and ~240cc NTL by cup sips  Provided w/ set up assist, pt demo ability to self feed but noted to require intermittent verbal cues for pacing due to faster rate of intake  Effective bolus retrieval of all items w/o anterior loss  Mastication appeared overall functional for bolus breakdown and noted to be more timely across regular solids  Pt remains w/ mildly decreased bolus formation, resulting in mild R side pocketing which she cleared intermittently during meal using lingual sweeps  Transfers and initiation of swallows appeared timely this meal  Suspected use of piecemeal transfers  Transfers of liquids, however, appeared to fluctuate in timing and also suspected to be delayed-indicated by results of recent VBS  Pt w/ throat clear x2 during meal but no further overt s/s penetration/aspiration  Of note, pt did require frequent verbal cues for carryover of safe swallow strategies to include using a slower rate, single and small sips and use of volitional throat clears/cough as a preventative measure to clear any potential penetration  Pt was provided w/ a visual aid of strategies which was placed on the wall in front of her to reference and gain her attention during meals  At this time, will recommend an upgrade to regular diet and remain on NTL by cup sips w/ FULL supervision during meals to ensure carryover of safe swallow strategies  Additional recommendations include: SLOW rate, SMALL sips of liquids, double swallows, OOB for meals, bolus hold/prep strategy w/ liquids   At this time, pt is recommended for further skilled ST services for dysphagia therapy in order to monitor tolerance of recent diet upgrade, as well as to cont to maximize swallow function while safely supporting PO intake  Swallow Assessment Prognosis   Prognosis Fair   Prognosis Considerations Age; Co-morbidities; Medical diagnosis; Medical prognosis;Previous level of function;Severity of impairments;Ability to carry over   SLP Therapy Minutes   SLP Time In 1320   SLP Time Out 1400   SLP Total Time (minutes) 40   SLP Mode of treatment - Individual (minutes) 40   SLP Mode of treatment - Concurrent (minutes) 0   SLP Mode of treatment - Group (minutes) 0   SLP Mode of treatment - Co-treat (minutes) 0   SLP Mode of Treatment - Total time(minutes) 40 minutes   SLP Cumulative Minutes 560   Therapy Time missed   Time missed?  No

## 2021-02-16 NOTE — PROGRESS NOTES
02/16/21 1100   Pain Assessment   Pain Assessment Tool 0-10   Pain Score No Pain   Restrictions/Precautions   Precautions Aspiration;Combative; Fall Risk   Cognition   Overall Cognitive Status WFL   Arousal/Participation Alert; Responsive; Cooperative   Attention Within functional limits   Orientation Level Oriented X4   Memory Decreased recall of precautions   Following Commands Follows one step commands without difficulty   Roll Left and Right   Type of Assistance Needed Independent   Roll Left and Right CARE Score 6   Sit to Lying   Type of Assistance Needed Independent   Sit to Lying CARE Score 6   Lying to Sitting on Side of Bed   Type of Assistance Needed Independent   Lying to Sitting on Side of Bed CARE Score 6   Sit to Stand   Type of Assistance Needed Supervision   Sit to Stand CARE Score 4   Bed-Chair Transfer   Type of Assistance Needed Supervision   Chair/Bed-to-Chair Transfer CARE Score 4   Walk 10 Feet   Type of Assistance Needed Supervision;Verbal cues   Walk 10 Feet CARE Score 4   Walk 50 Feet with Two Turns   Type of Assistance Needed Supervision;Verbal cues   Walk 50 Feet with Two Turns CARE Score 4   Walk 150 Feet   Type of Assistance Needed Supervision;Verbal cues   Walk 150 Feet CARE Score 4   Walking 10 Feet on Uneven Surfaces   Type of Assistance Needed Supervision;Verbal cues   Walking 10 Feet on Uneven Surfaces CARE Score 4   Ambulation   Does the patient walk? 2  Yes   Primary Mode of Locomotion Prior to Admission Walk   Distance Walked (feet) 182 ft  (509' 163'  134)   Assist Device Roller Walker   Gait Pattern Inconsistant Jaylyn; Slow Jaylyn;Decreased foot clearance;R foot drag;R hemiparesis; Narrow MAXWELL;Step to   Limitations Noted In Balance; Coordination; Endurance; Safety;Strength   Provided Assistance with: Balance   Walk Assist Level Close Supervision;Supervision   Findings level and unlevel surfaces   Curb or Single Stair   Style negotiated Single stair   Type of Assistance Needed Incidental touching   Comment cg   1 Step (Curb) CARE Score 4   4 Steps   Type of Assistance Needed Incidental touching   Comment cg   4 Steps CARE Score 4   Stairs   Type Stairs   # of Steps 7   Weight Bearing Precautions WBAT   Assist Devices Bilateral Rail   Findings cg with cues   Therapeutic Interventions   Strengthening seated and supine ther ex   Balance gait and transfer training   Other stair training   Assessment   Treatment Assessment Patient tolerated therapy session well  Improved balance noted with all functional mobility, however, continues to need cues for general safety  Completed ther ex for general LE strenghtening; gait and transfer training focusing on sequence and technique for improved balance and safety with functional mobilty  Patient appropriate for concurrent therapy to increase motivation among pts with similar deficits while completing therapy session  PT Barriers   Physical Impairment Decreased strength;Decreased range of motion;Decreased endurance; Impaired balance;Decreased mobility; Impaired judgement;Decreased safety awareness   Functional Limitation Walking;Transfers;Standing;Stair negotiation   Plan   Treatment/Interventions Functional transfer training;LE strengthening/ROM; Elevations; Therapeutic exercise; Bed mobility;Gait training   Progress Progressing toward goals   PT Therapy Minutes   PT Time In 1100   PT Time Out 1230   PT Total Time (minutes) 90   PT Mode of treatment - Individual (minutes) 60   PT Mode of treatment - Concurrent (minutes) 30   PT Mode of treatment - Group (minutes) 0   PT Mode of treatment - Co-treat (minutes) 0   PT Mode of Treatment - Total time(minutes) 90 minutes   PT Cumulative Minutes 843   Therapy Time missed   Time missed?  No

## 2021-02-17 LAB
GLUCOSE SERPL-MCNC: 144 MG/DL (ref 65–140)
GLUCOSE SERPL-MCNC: 146 MG/DL (ref 65–140)
GLUCOSE SERPL-MCNC: 183 MG/DL (ref 65–140)
GLUCOSE SERPL-MCNC: 207 MG/DL (ref 65–140)

## 2021-02-17 PROCEDURE — 97129 THER IVNTJ 1ST 15 MIN: CPT

## 2021-02-17 PROCEDURE — 97130 THER IVNTJ EA ADDL 15 MIN: CPT

## 2021-02-17 PROCEDURE — 97110 THERAPEUTIC EXERCISES: CPT

## 2021-02-17 PROCEDURE — 92526 ORAL FUNCTION THERAPY: CPT

## 2021-02-17 PROCEDURE — 82948 REAGENT STRIP/BLOOD GLUCOSE: CPT

## 2021-02-17 PROCEDURE — 99231 SBSQ HOSP IP/OBS SF/LOW 25: CPT | Performed by: FAMILY MEDICINE

## 2021-02-17 PROCEDURE — 97537 COMMUNITY/WORK REINTEGRATION: CPT

## 2021-02-17 PROCEDURE — 97116 GAIT TRAINING THERAPY: CPT

## 2021-02-17 PROCEDURE — 97530 THERAPEUTIC ACTIVITIES: CPT

## 2021-02-17 RX ADMIN — METOPROLOL TARTRATE 50 MG: 50 TABLET, FILM COATED ORAL at 21:31

## 2021-02-17 RX ADMIN — INSULIN LISPRO 8 UNITS: 100 INJECTION, SOLUTION INTRAVENOUS; SUBCUTANEOUS at 08:10

## 2021-02-17 RX ADMIN — INSULIN LISPRO 2 UNITS: 100 INJECTION, SOLUTION INTRAVENOUS; SUBCUTANEOUS at 21:34

## 2021-02-17 RX ADMIN — INSULIN LISPRO 1 UNITS: 100 INJECTION, SOLUTION INTRAVENOUS; SUBCUTANEOUS at 16:55

## 2021-02-17 RX ADMIN — RIVAROXABAN 20 MG: 10 TABLET, FILM COATED ORAL at 08:24

## 2021-02-17 RX ADMIN — INSULIN LISPRO 8 UNITS: 100 INJECTION, SOLUTION INTRAVENOUS; SUBCUTANEOUS at 12:58

## 2021-02-17 RX ADMIN — ESCITALOPRAM 5 MG: 5 TABLET, FILM COATED ORAL at 08:24

## 2021-02-17 RX ADMIN — PANTOPRAZOLE SODIUM 40 MG: 40 TABLET, DELAYED RELEASE ORAL at 08:24

## 2021-02-17 RX ADMIN — METOPROLOL TARTRATE 50 MG: 50 TABLET, FILM COATED ORAL at 08:24

## 2021-02-17 RX ADMIN — LISINOPRIL 5 MG: 5 TABLET ORAL at 08:24

## 2021-02-17 RX ADMIN — INSULIN GLARGINE 40 UNITS: 100 INJECTION, SOLUTION SUBCUTANEOUS at 21:32

## 2021-02-17 RX ADMIN — INSULIN LISPRO 8 UNITS: 100 INJECTION, SOLUTION INTRAVENOUS; SUBCUTANEOUS at 16:55

## 2021-02-17 RX ADMIN — ATORVASTATIN CALCIUM 80 MG: 80 TABLET, FILM COATED ORAL at 16:56

## 2021-02-17 RX ADMIN — FAMOTIDINE 20 MG: 20 TABLET ORAL at 08:24

## 2021-02-17 NOTE — NURSING NOTE
Pt stand/pivot transferred from wheelchair to toilet overnight with supervision  Pt encouraged to attempt to perform toileting hygeine as independently as possible  Pt refused  Impulsive at times, bed alarm in place for safety

## 2021-02-17 NOTE — PROGRESS NOTES
02/17/21 1451   Pain Assessment   Pain Assessment Tool Pain Assessment not indicated - pt denies pain   Restrictions/Precautions   Precautions Aspiration;Cognitive; Fall Risk   Weight Bearing Restrictions No   ROM Restrictions No   Eating Assessment   Findings drank nectar diet pepsi during session   Grooming   Able To Initiate Tasks; Acquire Items; Wash/Dry Hands   Limitation Noted In Coordination; Safety;Strength   Sit to Stand   Type of Assistance Needed Supervision   Amount of Physical Assistance Provided No physical assistance   Sit to Stand CARE Score 4   Toileting Hygiene   Type of Assistance Needed Incidental touching   Comment    Toileting Hygiene CARE Score 4   Toileting   Able to [address] down yes, up yes  Manage Hygiene Bladder   Limitations Noted In Balance;ROM;Safety;LE Strength; Coordination   Adaptive Equipment Grab Bar   Toilet Transfer   Type of Assistance Needed Incidental touching   Comment    Toilet Transfer CARE Score 4   Toilet Transfer   Surface Assessed Raised Toilet   Transfer Technique Standard   Limitations Noted In Balance;ROM;Safety;LE Strength   Adaptive Equipment Grab Bar;Walker   Functional Standing Tolerance   Time 5m during key matching/static standing   Therapeutic Exercise - ROM   UE-ROM Yes   ROM- Right Upper Extremities   RUE ROM Comment pushed weighted wooden box x25 all planes of motion   ROM - Left Upper Extremities    LUE ROM Comment pushed weighted wooden box x25 all planes of motion   Therapeutic Excerise-Strength   UE Strength Yes   Right Upper Extremity- Strength   RUE Strength Comment searched for and retrieved small objects in red theraputty   Left Upper Extremity-Strength   LUE Strength Comment searched for and retrieved small objects in red theraputty   Coordination   Fine Motor placed keys on matching hooks with R hand and removed; picked items out of red theraputty with R hand pinch; pt with overall improving /pinch strength in R hand but continues with fine motor bradytaxia   Cognition   Overall Cognitive Status Impaired   Arousal/Participation Alert; Responsive; Cooperative   Attention Attends with cues to redirect   Orientation Level Oriented X4   Memory Decreased recall of precautions   Following Commands Follows one step commands without difficulty   Additional Activities   Additional Activities Comments cognitive activity: games of popexpert eights with OT, required verbal cues for correct completion approx 10% of the time   Assessment   Treatment Assessment Pt agreeable to OT session this PM  Received on toilet finishing toileting, which she completed with CG  Functional mobility to OT room using RW with CG, then completed ROM/strength, standing tolerance, fine motor coordination, and cognitive activities  Pt with good tolerance overall and noticeably improved fine motor control in R hand, however does continue with overall bradytaxia in R side  Standing tolerance and dynamic standing balance also continue to improve  During functional mobility, pt with occasional running into objects and walls on either side and required verbal cues from OT to correct  Pt would benefit from continued skilled OT services in order to maximize independence in self care, functional mobility/transfers, ROM/strength, cognition, and activity tolerance  Prognosis Good   Problem List Decreased strength;Decreased range of motion;Decreased endurance; Impaired balance;Decreased coordination; Impaired judgement;Decreased cognition;Decreased safety awareness   Plan   Treatment/Interventions ADL retraining;Functional transfer training;LE strengthening/ROM; Therapeutic exercise; Endurance training;Cognitive reorientation;Patient/family training;Equipment eval/education; Compensatory technique education;OT   Progress Progressing toward goals   OT Therapy Minutes   OT Time In 1451   OT Time Out 1551   OT Total Time (minutes) 60   OT Mode of treatment - Individual (minutes) 60

## 2021-02-17 NOTE — PROGRESS NOTES
02/17/21 0800   Pain Assessment   Pain Assessment Tool Pain Assessment not indicated - pt denies pain   Pain Score No Pain   Restrictions/Precautions   Precautions Aspiration;Bed/chair alarms;Cognitive; Fall Risk;Supervision on toilet/commode   Comprehension   Comprehension (FIM) 5 - Needs help/cues, repetition only RARELY ( < 10% of the time)   Expression   Expression (FIM) 4 - Needs to repeat single words   Social Interaction   Social Interaction (FIM) 5 - Needs monitoring/encouragement  to participate/interact   Problem Solving   Problem solving (FIM) 5 - Solves basic problems 90% of time   Memory   Memory (FIM) 4 - Recognizes/recalls/performs 75-89%   Speech/Language/Cognition Assessmetn   Treatment Assessment Pt seen for skilled speech therapy session  Pt alert and interactive seen OOB upright in chair in room following breakfast dysphagia tx session  Pt engaged in the following- medication management review- discussion of current medications- pt was able to recognize 6/9 meds as old medication that she is used to take and 2 meds that are new and  1 questionable that may not be continued post discharge (stool softener Colace)  Pt however when provided with the medication name was able to ID what it was for in 8/9acc increasing with verbal cues  Pt prior stated that he med regime was all at night time with a medication for her DM that was taken 2x/day  Pt one medication that is 2x/day and 2 meds that are for the evening  Pt expressed she is used to organizing in a weekly pill organizer two 2 time slots  Pt receptive to education and review- demonstrates good comprehension/understanding of current meds and following the directions for dosage/frequency  Pt next engaged in Stroke Education using the "What you need to know about stroke- a patient resource kit"  Pt provided with her own copy to keep and follow along   Pt was able to provide a definition of stroke however needed cues that it was a clot in the brain, not the heart  Reviewed her admission summary to increase recall of events leading to hospitalization  Pt needed cues as to her symptoms and the testing used for diagnosis  Pt had an MRI which showed where her stroke was- provided a google picture for increased comprehension  Pt did recall that because the stroke was on her Left she was having Right sided deficits  Pt was able to ID deficits in weakness in arm/leg, speech, word finding, and swallow  Reviewed further sections of Ischemic Stroke, Stroke, TIA, and warning signs, and Changes Caused by Stroke  Education on the reducing risk of stroke with improving diet, managing healthy weight, blood pressure, diabetes, no smoking, limit alcohol  Pt was able to recall 4/6 items at end of discussion  Reviewed BE FAST acronym for s/s of stroke  Pt was able to recall 3/6 s/s at end of session  Pt overall is improving with skilled SLP services and will cont to benefit to maximize overall cognitive linguistic communication abilities a this time  Swallow Information   Current Risks for Dysphagia & Aspiration Weak cough;Weak voicing;Dysphonia;General debilitation;New Neuro event;Brain injury;Cognitive deficit;HX neurologic dx   Current Symptoms/Concerns Cough;Clear throat;During meals;HX Dysphagia   Current Diet Regular;Nectar thick liquid   Baseline Diet Regular; Thin liquids   Consistencies Assessed and Performance   Materials Admnistered Regular/Solid; Nectar thick liquid   Oral Stage Mild impaired   Phargngeal Stage Mild impaired; Moderate impaired;Aspiration risk   Swallow Mechanics Mild delayed; Moderate delayed;Swallow initation;Weak larygneal rise;Vocal Cord dysfunction suspected;Aspiration risk   Esophageal Concerns Hx GERDS   Recommendations   Risk for Aspiration Present   Recommendations Dysphagia treatment   Diet Solid Recommendation Regular consistency   Diet Liquid Recommendation Nectar thick liquid   Recommended Form of Meds Whole; With puree   General Precautions Aspiration precautions; Feed only when alert;Minimize distractions;Upright as possible for all oral intake;Remain upright for 45 mins after meals; Supervision with meals  (OOB for meals, FULL supervision)   Compensatory Swallowing Strategies Place food/straw in on left side; Check for pocketing of food on the right; Alternate solids and liquids;Prep set/bolus hold; External pacing;Effortful swallow;Voluntary throat clear/cough to clear penetration   Results Reviewed with PT/Family/Caregiver;RN;MD   Eating   Type of Assistance Needed Set-up / clean-up;Supervision;Verbal cues   Amount of Physical Assistance Provided No physical assistance   Eating CARE Score 4   Swallow Assessment   Swallow Treatment Assessment Pt seen for skilled dysphagia tx session with breakfast  Pt alert and interactive seen OOB upright in chair in room  Pt seen with regular diet meal and NTL by cup- items assessed included egg/cheese omelet, sausage and yogurt with NTL by cup  Pt was not provided with set up and noted was able to cut food and open containers with extended time  Pt meal completion 75% and 240cc liquids  Pt ate food items c good bolus retrieval and oral holding- no anterior loss of food items  Oral processing slow and purposeful- mildly delayed transfers and swallows  Swallow appear weaker with decreased excursion, noted double swallows at times  Pt drinking NTL liquids by cup- pt reviewed strategies for with liquids for single cup sips, prep set and swallows  Effortful swallow and double swallow as needed with protective throat clear every few bites/sips  Pt tolerated NTL while using strategies- noted wet cough x1 during intake- able to cough and clear with dry cough  Pt noted with some wet voicing and needed cues to ID this and use throat clear/reswallow to clear   Pt overall showing improvement with swallow function- cont regular diet and NTL with full supervision to encourage use and carryover with strategy- slow rate, small bites, single cup sips of NTL, prepset, effortful swallow, protective throat clears as needed  At this time, cont skilled session to cont education on swallow function as well as review for thickening liquids for preparation of discharge home on modified diet consistency  Swallow Assessment Prognosis   Prognosis Fair   Prognosis Considerations Age; Co-morbidities; Medical diagnosis; Medical prognosis; Potential;Previous level of function;Severity of impairments;New learning ability;Ability to carry over; Cooperation   SLP Therapy Minutes   SLP Time In 0800   SLP Time Out 0930   SLP Total Time (minutes) 90   SLP Mode of treatment - Individual (minutes) 90   SLP Mode of treatment - Concurrent (minutes) 0   SLP Mode of treatment - Group (minutes) 0   SLP Mode of treatment - Co-treat (minutes) 0   SLP Mode of Treatment - Total time(minutes) 90 minutes   SLP Cumulative Minutes 650   Therapy Time missed   Time missed?  No

## 2021-02-17 NOTE — PROGRESS NOTES
02/17/21 1130   Pain Assessment   Pain Assessment Tool 0-10   Pain Score No Pain   Restrictions/Precautions   Precautions Aspiration;Bed/chair alarms;Cognitive; Fall Risk   Cognition   Overall Cognitive Status Impaired   Arousal/Participation Alert; Responsive; Cooperative   Attention Attends with cues to redirect   Orientation Level Oriented X4   Memory Decreased recall of precautions   Following Commands Follows one step commands without difficulty   Roll Left and Right   Type of Assistance Needed Independent   Roll Left and Right CARE Score 6   Sit to Lying   Type of Assistance Needed Independent   Sit to Lying CARE Score 6   Lying to Sitting on Side of Bed   Type of Assistance Needed Independent   Lying to Sitting on Side of Bed CARE Score 6   Sit to Stand   Type of Assistance Needed Supervision   Sit to Stand CARE Score 4   Bed-Chair Transfer   Type of Assistance Needed Supervision   Chair/Bed-to-Chair Transfer CARE Score 4   Walk 10 Feet   Type of Assistance Needed Incidental touching;Supervision   Comment cg/S   Walk 10 Feet CARE Score 4   Walk 50 Feet with Two Turns   Type of Assistance Needed Incidental touching;Supervision   Comment cg/S   Walk 50 Feet with Two Turns CARE Score 4   Walk 150 Feet   Type of Assistance Needed Incidental touching;Supervision   Comment cg/S   Walk 150 Feet CARE Score 4   Walking 10 Feet on Uneven Surfaces   Type of Assistance Needed Incidental touching;Supervision   Comment cg/S   Walking 10 Feet on Uneven Surfaces CARE Score 4   Ambulation   Does the patient walk? 2  Yes   Primary Mode of Locomotion Prior to Admission Walk   Distance Walked (feet) 172 ft  (086' 118'  116')   Assist Device Roller Walker   Gait Pattern Inconsistant Jaylyn; Slow Jaylyn;Decreased foot clearance;R foot drag;R hemiparesis; Step to;Narrow MAXWELL   Limitations Noted In Balance; Coordination; Endurance; Safety;Strength   Provided Assistance with: Balance   Walk Assist Level Contact Guard;Close Supervision Findings level and unlevel surfaces   Curb or Single Stair   Style negotiated Single stair   Type of Assistance Needed Incidental touching;Supervision   Comment cg/S   1 Step (Curb) CARE Score 4   4 Steps   Type of Assistance Needed Incidental touching;Supervision   Comment cg/S   4 Steps CARE Score 4   Stairs   Type Stairs   # of Steps 7   Weight Bearing Precautions WBAT   Assist Devices Bilateral Rail   Findings cg/S with cues   Therapeutic Interventions   Strengthening seated ther ex   Balance gait and transfer training   Other stair training   Assessment   Treatment Assessment Patien tolerated therapy session  Continues to need cues for safety  Also requires cues to scan environment when ambulatiing so as not to run into objects on both left and right  Completed ther ex for general LE strengrthening; gait and transfer training focusing on sequence and technique for improved balance and safety with funcitonal mobility  PT Barriers   Physical Impairment Decreased strength;Decreased range of motion;Decreased endurance; Impaired balance;Decreased mobility; Decreased coordination;Decreased cognition; Impaired judgement;Decreased safety awareness   Functional Limitation Walking;Transfers;Standing;Stair negotiation   Plan   Treatment/Interventions Functional transfer training;LE strengthening/ROM; Elevations; Therapeutic exercise; Bed mobility;Gait training   Progress Progressing toward goals   PT Therapy Minutes   PT Time In 1130   PT Time Out 1230   PT Total Time (minutes) 60   PT Mode of treatment - Individual (minutes) 60   PT Mode of treatment - Concurrent (minutes) 0   PT Mode of treatment - Group (minutes) 0   PT Mode of treatment - Co-treat (minutes) 0   PT Mode of Treatment - Total time(minutes) 60 minutes   PT Cumulative Minutes 903   Therapy Time missed   Time missed?  No
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
## 2021-02-17 NOTE — CASE MANAGEMENT
Tx team recommendations reviewed with patient & Pt's son, who expressed understanding & agreement  Target DC date is Saturday, 2/20/21 with [facility] (Nsg, PT, OT, ST, HHA); a list of providers was provided to Pt &a referral will be made based on Pt preference  Pt resides alone  Tx team recommends that Pt be supervised  Pt's son reported that Pt can stay at his home in [facility], but Pt is not agreeable to this  Pt's son stated he and others will complete frequent checks on Pt throughout the day  Life alert info provided & Meals on Wheels  SW will continue to monitor & assist as needed with Tx & DC planning

## 2021-02-18 LAB
GLUCOSE SERPL-MCNC: 147 MG/DL (ref 65–140)
GLUCOSE SERPL-MCNC: 172 MG/DL (ref 65–140)
GLUCOSE SERPL-MCNC: 202 MG/DL (ref 65–140)
GLUCOSE SERPL-MCNC: 93 MG/DL (ref 65–140)

## 2021-02-18 PROCEDURE — 97110 THERAPEUTIC EXERCISES: CPT

## 2021-02-18 PROCEDURE — 82948 REAGENT STRIP/BLOOD GLUCOSE: CPT

## 2021-02-18 PROCEDURE — 97116 GAIT TRAINING THERAPY: CPT

## 2021-02-18 PROCEDURE — 99231 SBSQ HOSP IP/OBS SF/LOW 25: CPT | Performed by: FAMILY MEDICINE

## 2021-02-18 PROCEDURE — 97530 THERAPEUTIC ACTIVITIES: CPT

## 2021-02-18 PROCEDURE — 97537 COMMUNITY/WORK REINTEGRATION: CPT

## 2021-02-18 RX ADMIN — ESCITALOPRAM 5 MG: 5 TABLET, FILM COATED ORAL at 08:39

## 2021-02-18 RX ADMIN — RIVAROXABAN 20 MG: 10 TABLET, FILM COATED ORAL at 08:39

## 2021-02-18 RX ADMIN — LISINOPRIL 5 MG: 5 TABLET ORAL at 08:39

## 2021-02-18 RX ADMIN — INSULIN LISPRO 8 UNITS: 100 INJECTION, SOLUTION INTRAVENOUS; SUBCUTANEOUS at 16:50

## 2021-02-18 RX ADMIN — METOPROLOL TARTRATE 50 MG: 50 TABLET, FILM COATED ORAL at 08:39

## 2021-02-18 RX ADMIN — INSULIN LISPRO 1 UNITS: 100 INJECTION, SOLUTION INTRAVENOUS; SUBCUTANEOUS at 16:50

## 2021-02-18 RX ADMIN — METOPROLOL TARTRATE 50 MG: 50 TABLET, FILM COATED ORAL at 21:07

## 2021-02-18 RX ADMIN — PANTOPRAZOLE SODIUM 40 MG: 40 TABLET, DELAYED RELEASE ORAL at 08:39

## 2021-02-18 RX ADMIN — ATORVASTATIN CALCIUM 80 MG: 80 TABLET, FILM COATED ORAL at 17:30

## 2021-02-18 RX ADMIN — INSULIN LISPRO 8 UNITS: 100 INJECTION, SOLUTION INTRAVENOUS; SUBCUTANEOUS at 12:29

## 2021-02-18 RX ADMIN — INSULIN GLARGINE 40 UNITS: 100 INJECTION, SOLUTION SUBCUTANEOUS at 21:07

## 2021-02-18 RX ADMIN — INSULIN LISPRO 2 UNITS: 100 INJECTION, SOLUTION INTRAVENOUS; SUBCUTANEOUS at 21:07

## 2021-02-18 RX ADMIN — FAMOTIDINE 20 MG: 20 TABLET ORAL at 08:39

## 2021-02-18 NOTE — PLAN OF CARE
Problem: Prexisting or High Potential for Compromised Skin Integrity  Goal: Skin integrity is maintained or improved  Description: INTERVENTIONS:  - Identify patients at risk for skin breakdown  - Assess and monitor skin integrity  - Assess and monitor nutrition and hydration status  - Monitor labs   - Assess for incontinence   - Turn and reposition patient  - Assist with mobility/ambulation  - Relieve pressure over bony prominences  - Avoid friction and shearing  - Provide appropriate hygiene as needed including keeping skin clean and dry  - Evaluate need for skin moisturizer/barrier cream  - Collaborate with interdisciplinary team   - Patient/family teaching  - Consider wound care consult   Outcome: Progressing     Problem: Potential for Falls  Goal: Patient will remain free of falls  Description: INTERVENTIONS:  - Assess patient frequently for physical needs  -  Identify cognitive and physical deficits and behaviors that affect risk of falls    -  [place] fall precautions as indicated by assessment   - Educate patient/family on patient safety including physical limitations  - Instruct patient to call for assistance with activity based on assessment  - Modify environment to reduce risk of injury  - Consider OT/PT consult to assist with strengthening/mobility  Outcome: Progressing     Problem: PAIN - ADULT  Goal: Verbalizes/displays adequate comfort level or baseline comfort level  Description: Interventions:  - Encourage patient to monitor pain and request assistance  - Assess pain using appropriate pain scale  - Administer analgesics based on type and severity of pain and evaluate response  - Implement non-pharmacological measures as appropriate and evaluate response  - Consider cultural and social influences on pain and pain management  - Notify physician/advanced practitioner if interventions unsuccessful or patient reports new pain  Outcome: Progressing     Problem: INFECTION - ADULT  Goal: Absence or prevention of progression during hospitalization  Description: INTERVENTIONS:  - Assess and monitor for signs and symptoms of infection  - Monitor lab/diagnostic results  - Monitor all insertion sites, i e  indwelling lines, tubes, and drains  - Monitor endotracheal if appropriate and nasal secretions for changes in amount and color  - [place] appropriate cooling/warming therapies per order  - Administer medications as ordered  - Instruct and encourage patient and family to use good hand hygiene technique  - Identify and instruct in appropriate isolation precautions for identified infection/condition  Outcome: Progressing  Goal: Absence of fever/infection during neutropenic period  Description: INTERVENTIONS:  - Monitor WBC    Outcome: Progressing     Problem: SAFETY ADULT  Goal: Patient will remain free of falls  Description: INTERVENTIONS:  - Assess patient frequently for physical needs  -  Identify cognitive and physical deficits and behaviors that affect risk of falls    -  [place] fall precautions as indicated by assessment   - Educate patient/family on patient safety including physical limitations  - Instruct patient to call for assistance with activity based on assessment  - Modify environment to reduce risk of injury  - Consider OT/PT consult to assist with strengthening/mobility  Outcome: Progressing  Goal: Maintain or return to baseline ADL function  Description: INTERVENTIONS:  -  Assess patient's ability to carry out ADLs; assess patient's baseline for ADL function and identify physical deficits which impact ability to perform ADLs (bathing, care of mouth/teeth, toileting, grooming, dressing, etc )  - Assess/evaluate cause of self-care deficits   - Assess range of motion  - Assess patient's mobility; develop plan if impaired  - Assess patient's need for assistive devices and provide as appropriate  - Encourage maximum independence but intervene and supervise when necessary  - Involve family in performance of ADLs  - Assess for home care needs following discharge   - Consider OT consult to assist with ADL evaluation and planning for discharge  - Provide patient education as appropriate  Outcome: Progressing  Goal: Maintain or return mobility status to optimal level  Description: INTERVENTIONS:  - Assess patient's baseline mobility status (ambulation, transfers, stairs, etc )    - Identify cognitive and physical deficits and behaviors that affect mobility  - Identify mobility aids required to assist with transfers and/or ambulation (gait belt, sit-to-stand, lift, walker, cane, etc )  - [place] fall precautions as indicated by assessment  - Record patient progress and toleration of activity level on Mobility SBAR; progress patient to next Phase/Stage  - Instruct patient to call for assistance with activity based on assessment  - Consider rehabilitation consult to assist with strengthening/weightbearing, etc   Outcome: Progressing     Problem: Neurological Deficit  Goal: Neurological status is stable or improving  Description: Interventions:  - Monitor and assess patient's level of consciousness, motor function, sensory function, and level of assistance needed for ADLs  - Monitor and report changes from baseline  Collaborate with interdisciplinary team to initiate plan and implement interventions as ordered  - Provide and maintain a safe environment  - Consider seizure precautions  - Consider fall precautions  - Consider aspiration precautions  - Consider bleeding precautions  Outcome: Progressing     Problem: Activity Intolerance/Impaired Mobility  Goal: Mobility/activity is maintained at optimum level for patient  Description: Interventions:  - Assess and monitor patient  barriers to mobility and need for assistive/adaptive devices  - Assess patient's emotional response to limitations  - Collaborate with interdisciplinary team and initiate plans and interventions as ordered    - Encourage independent activity per ability   - Maintain proper body alignment  - Perform active/passive rom as tolerated/ordered    - Plan activities to conserve energy   - Turn patient as appropriate  Outcome: Progressing

## 2021-02-18 NOTE — PROGRESS NOTES
02/18/21 1309   Pain Assessment   Pain Assessment Tool Pain Assessment not indicated - pt denies pain   Restrictions/Precautions   Precautions Aspiration;Cognitive; Fall Risk   Weight Bearing Restrictions No   ROM Restrictions No   Eating Assessment   Findings drank nectar diet pepsi during session   Sit to Lying   Type of Assistance Needed Independent   Amount of Physical Assistance Provided No physical assistance   Sit to Lying CARE Score 6   Sit to Stand   Type of Assistance Needed Supervision   Amount of Physical Assistance Provided No physical assistance   Sit to Stand CARE Score 4   Kitchen Mobility   Kitchen-Mobility Level Walker   Kitchen Activity Retrieve items;Transport items   Kitchen Mobility Comments retrieved cones hidden around kitchen, in appliances and cabinets, and placed in bag attached to RW; close supervision for this task; pt with one LOB while standing at sink but was able to right self using RW and sink ledge   Health Management   Health Management Level of Assistance Close supervision   Health Management medication management with direct supervision from OT to ensure correct completion; pt reported having a pill container at home for morning and night and is confidently able to place them in the correct spots; pt with difficulty gripping pills occasionally and they dropped onto table, OT instructed pt to place pills in container while all materials are on a towel to prevent slipping off the table   Functional Standing Tolerance   Time 2m55s during clothespin activity   Therapeutic Exercise - ROM   UE-ROM Yes   ROM- Right Upper Extremities   RUE ROM Comment arm bike 4m forward and 4m backwards; used reacher to retrieve clothespins from floor and place in basket on table   ROM - Left Upper Extremities    LUE ROM Comment arm bike 4m forward and 4m backwards; used reacher to retrieve clothespins from floor and place in basket on table   Therapeutic Excerise-Strength   UE Strength Yes   Right Upper Extremity- Strength   RUE Strength Comment x30 using 2# dumbbell: elbow flexion/extension, protraction/retraction, internal/external rotation, pronation/supination, shoulder flexion/extension; pinched clothespins to hang and remove from rods   Left Upper Extremity-Strength   LUE Strength Comment x30 using 2# dumbbell: elbow flexion/extension, protraction/retraction, internal/external rotation, pronation/supination, shoulder flexion/extension; pinched clothespins to hang and remove from rods   Cognition   Overall Cognitive Status [facility]   Arousal/Participation Alert; Responsive; Cooperative   Attention Attends with cues to redirect   Orientation Level Oriented X4   Memory Decreased recall of precautions   Following Commands Follows one step commands without difficulty   Assessment   Treatment Assessment Pt agreeable to OT session this PM  Received sitting upright in w/c  Functional mobility to OT room using RW, CG overall with verbal cues to avoid bumping into objects in either side of perera  Completed ROM/strength, standing tolerance, medication management, and kitchen mobility tasks with good tolerance overall  Rest breaks taken as needed  Pt completed kitchen mobility and medication tasks with close supervision; pt with difficulty gripping small pills and navigating corners in kitchen, however did demonstrate good safety when opening cabinets and drawers to put rW on opposite side from which way door opens  Pt and OT discussed at length both kitchen and medicine safety and ways to adapt management of both  Returned to supine at end of session  Pt would benefit from continued skilled OT services in order to maximize independence in self care, functional mobility/transfers, ROM/strength/coordination, cognition, and activity tolerance  Prognosis Good   Problem List Decreased strength;Decreased range of motion;Decreased endurance; Impaired balance;Decreased coordination;Decreased cognition; Impaired judgement;Decreased safety awareness   Plan   Treatment/Interventions ADL retraining;Functional transfer training;LE strengthening/ROM; Therapeutic exercise; Endurance training;Cognitive reorientation;Patient/family training;Equipment eval/education; Compensatory technique education;OT   Progress Progressing toward goals   OT Therapy Minutes   OT Time In 1309   OT Time Out 1439   OT Total Time (minutes) 90   OT Mode of treatment - Individual (minutes) 51   OT Mode of treatment - Concurrent (minutes) 39   Therapy Time missed   Time missed?  No

## 2021-02-18 NOTE — CASE MANAGEMENT
Referral made to [facility] for level of care assessment for meals & aides in the home  AAA will contact Pt's son early next week to schedule the LOC exam  Notifed Pt's son
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
## 2021-02-18 NOTE — PROGRESS NOTES
02/18/21 1030   Pain Assessment   Pain Assessment Tool 0-10   Pain Score No Pain   Restrictions/Precautions   Precautions Aspiration;Cognitive; Fall Risk   Cognition   Overall Cognitive Status Impaired   Arousal/Participation Alert; Responsive; Cooperative   Attention Attends with cues to redirect   Orientation Level Oriented X4   Memory Decreased recall of precautions   Following Commands Follows one step commands without difficulty   Roll Left and Right   Type of Assistance Needed Independent   Roll Left and Right CARE Score 6   Sit to Lying   Type of Assistance Needed Independent   Sit to Lying CARE Score 6   Lying to Sitting on Side of Bed   Type of Assistance Needed Independent   Lying to Sitting on Side of Bed CARE Score 6   Sit to Stand   Type of Assistance Needed Independent   Sit to Stand CARE Score 6   Bed-Chair Transfer   Type of Assistance Needed Supervision   Comment with cues to avoid objects/   Chair/Bed-to-Chair Transfer CARE Score 4   Walk 10 Feet   Type of Assistance Needed Supervision;Verbal cues   Comment close S/S   Walk 10 Feet CARE Score 4   Walk 50 Feet with Two Turns   Type of Assistance Needed Supervision;Verbal cues   Comment close S/S   Walk 50 Feet with Two Turns CARE Score 4   Walk 150 Feet   Type of Assistance Needed Supervision;Verbal cues   Comment close S/S   Walk 150 Feet CARE Score 4   Ambulation   Does the patient walk? 2  Yes   Primary Mode of Locomotion Prior to Admission Walk   Distance Walked (feet)   (201')   Assist Device Roller Walker   Gait Pattern Inconsistant Jaylyn; Slow Jaylyn;Decreased foot clearance;R foot drag;R hemiparesis; Narrow MAXWELL;Step to   Limitations Noted In Balance; Coordination; Endurance; Safety;Strength   Provided Assistance with: Balance   Walk Assist Level Close Supervision;Supervision   Findings level and unlevel surfaces; needs cues to scan envionment to avoid running into objects   Curb or Single Stair   Style negotiated Single stair   Type of Assistance Needed Supervision;Verbal cues   Comment close S/S   1 Step (Curb) CARE Score 4   4 Steps   Type of Assistance Needed Supervision;Verbal cues   Comment close S/S   4 Steps CARE Score 4   Stairs   Type Stairs   # of Steps 7   Weight Bearing Precautions WBAT   Assist Devices Bilateral Rail   Findings close S/S with cues for sequence   Assessment   Treatment Assessment Patient tolerated therapy session  Patient continues to need cues for general safety as well as for scanning environment to avoid running into objects when walking  Completed ther ex for general LE strengthening; gait and transfer training focusingon sequence and technique for improved balance and safety with functional mobiltiy  Patient appropriate for concurren therapy to increase motivation among pts with similar deficits while completing therapy session  PT Barriers   Physical Impairment Decreased strength;Decreased range of motion;Decreased endurance; Impaired balance;Decreased mobility; Decreased cognition; Impaired judgement;Decreased safety awareness   Functional Limitation Walking;Transfers;Standing;Stair negotiation   Plan   Treatment/Interventions Functional transfer training;LE strengthening/ROM; Elevations; Therapeutic exercise; Bed mobility;Gait training   Progress Improving as expected   PT Therapy Minutes   PT Time In 1030   PT Time Out 1200   PT Total Time (minutes) 90   PT Mode of treatment - Individual (minutes) 60   PT Mode of treatment - Concurrent (minutes) 30   PT Mode of treatment - Group (minutes) 0   PT Mode of treatment - Co-treat (minutes) 0   PT Mode of Treatment - Total time(minutes) 90 minutes   PT Cumulative Minutes 993   Therapy Time missed   Time missed?  No

## 2021-02-18 NOTE — NURSING NOTE
Patient ambulates to bathroom with contact guard with walker  Continues with generalized weakness  Reports no pain  Continues on nectar thick liquids and doing well on current diet  Needs reminders and cueing to ring for assistance  Will continue to monitor and follow plan of care

## 2021-02-19 LAB
GLUCOSE SERPL-MCNC: 110 MG/DL (ref 65–140)
GLUCOSE SERPL-MCNC: 156 MG/DL (ref 65–140)
GLUCOSE SERPL-MCNC: 184 MG/DL (ref 65–140)
GLUCOSE SERPL-MCNC: 83 MG/DL (ref 65–140)

## 2021-02-19 PROCEDURE — 97129 THER IVNTJ 1ST 15 MIN: CPT

## 2021-02-19 PROCEDURE — 82948 REAGENT STRIP/BLOOD GLUCOSE: CPT

## 2021-02-19 PROCEDURE — 97530 THERAPEUTIC ACTIVITIES: CPT

## 2021-02-19 PROCEDURE — 97537 COMMUNITY/WORK REINTEGRATION: CPT

## 2021-02-19 PROCEDURE — 97535 SELF CARE MNGMENT TRAINING: CPT

## 2021-02-19 PROCEDURE — 97110 THERAPEUTIC EXERCISES: CPT

## 2021-02-19 PROCEDURE — 99239 HOSP IP/OBS DSCHRG MGMT >30: CPT | Performed by: FAMILY MEDICINE

## 2021-02-19 PROCEDURE — 92526 ORAL FUNCTION THERAPY: CPT

## 2021-02-19 PROCEDURE — 97116 GAIT TRAINING THERAPY: CPT

## 2021-02-19 RX ORDER — INSULIN GLARGINE 100 [IU]/ML
40 INJECTION, SOLUTION SUBCUTANEOUS
Qty: 10 ML | Refills: 0 | Status: SHIPPED | OUTPATIENT
Start: 2021-02-19 | End: 2021-03-23 | Stop reason: SDUPTHER

## 2021-02-19 RX ORDER — ATORVASTATIN CALCIUM 80 MG/1
80 TABLET, FILM COATED ORAL
Qty: 30 TABLET | Refills: 0 | Status: SHIPPED | OUTPATIENT
Start: 2021-02-19 | End: 2021-02-26 | Stop reason: SDUPTHER

## 2021-02-19 RX ORDER — METOPROLOL TARTRATE 50 MG/1
50 TABLET, FILM COATED ORAL EVERY 12 HOURS SCHEDULED
Qty: 60 TABLET | Refills: 0 | Status: SHIPPED | OUTPATIENT
Start: 2021-02-19 | End: 2021-02-26 | Stop reason: SDUPTHER

## 2021-02-19 RX ORDER — ACETAMINOPHEN 325 MG/1
650 TABLET ORAL 4 TIMES DAILY PRN
Refills: 0
Start: 2021-02-19 | End: 2021-12-14

## 2021-02-19 RX ORDER — FAMOTIDINE 20 MG/1
20 TABLET, FILM COATED ORAL DAILY
Qty: 30 TABLET | Refills: 0 | Status: SHIPPED | OUTPATIENT
Start: 2021-02-20 | End: 2021-02-26 | Stop reason: SDUPTHER

## 2021-02-19 RX ORDER — LISINOPRIL 5 MG/1
5 TABLET ORAL DAILY
Qty: 30 TABLET | Refills: 0 | Status: SHIPPED | OUTPATIENT
Start: 2021-02-20 | End: 2021-02-26 | Stop reason: SDUPTHER

## 2021-02-19 RX ADMIN — RIVAROXABAN 20 MG: 10 TABLET, FILM COATED ORAL at 08:26

## 2021-02-19 RX ADMIN — INSULIN LISPRO 8 UNITS: 100 INJECTION, SOLUTION INTRAVENOUS; SUBCUTANEOUS at 12:27

## 2021-02-19 RX ADMIN — INSULIN LISPRO 1 UNITS: 100 INJECTION, SOLUTION INTRAVENOUS; SUBCUTANEOUS at 16:19

## 2021-02-19 RX ADMIN — METOPROLOL TARTRATE 50 MG: 50 TABLET, FILM COATED ORAL at 08:25

## 2021-02-19 RX ADMIN — INSULIN LISPRO 8 UNITS: 100 INJECTION, SOLUTION INTRAVENOUS; SUBCUTANEOUS at 16:19

## 2021-02-19 RX ADMIN — FAMOTIDINE 20 MG: 20 TABLET ORAL at 08:26

## 2021-02-19 RX ADMIN — LISINOPRIL 5 MG: 5 TABLET ORAL at 08:26

## 2021-02-19 RX ADMIN — INSULIN GLARGINE 40 UNITS: 100 INJECTION, SOLUTION SUBCUTANEOUS at 21:57

## 2021-02-19 RX ADMIN — INSULIN LISPRO 8 UNITS: 100 INJECTION, SOLUTION INTRAVENOUS; SUBCUTANEOUS at 08:26

## 2021-02-19 RX ADMIN — PANTOPRAZOLE SODIUM 40 MG: 40 TABLET, DELAYED RELEASE ORAL at 08:26

## 2021-02-19 RX ADMIN — INSULIN LISPRO 1 UNITS: 100 INJECTION, SOLUTION INTRAVENOUS; SUBCUTANEOUS at 21:58

## 2021-02-19 RX ADMIN — ATORVASTATIN CALCIUM 80 MG: 80 TABLET, FILM COATED ORAL at 16:19

## 2021-02-19 RX ADMIN — ESCITALOPRAM 5 MG: 5 TABLET, FILM COATED ORAL at 08:26

## 2021-02-19 NOTE — PROGRESS NOTES
02/19/21 1122   Pain Assessment   Pain Assessment Tool 0-10   Pain Score No Pain   Restrictions/Precautions   Precautions Aspiration;Cognitive; Fall Risk   Cognition   Overall Cognitive Status Impaired   Arousal/Participation Alert; Responsive; Cooperative   Attention Attends with cues to redirect   Orientation Level Oriented X4   Memory Decreased recall of precautions   Following Commands Follows one step commands without difficulty   Roll Left and Right   Type of Assistance Needed Independent   Roll Left and Right CARE Score 6   Sit to Lying   Type of Assistance Needed Independent   Sit to Lying CARE Score 6   Lying to Sitting on Side of Bed   Type of Assistance Needed Independent   Lying to Sitting on Side of Bed CARE Score 6   Sit to Stand   Type of Assistance Needed Independent   Sit to Stand CARE Score 6   Bed-Chair Transfer   Type of Assistance Needed Supervision;Verbal cues   Comment close S/S   Chair/Bed-to-Chair Transfer CARE Score 4   Walk 10 Feet   Type of Assistance Needed Incidental touching;Supervision;Verbal cues   Comment cg/close S   Walk 10 Feet CARE Score 4   Walk 50 Feet with Two Turns   Type of Assistance Needed Incidental touching;Supervision;Verbal cues   Comment cg/close S   Walk 50 Feet with Two Turns CARE Score 4   Walking 10 Feet on Uneven Surfaces   Type of Assistance Needed Incidental touching;Supervision;Verbal cues   Comment cg/close S   Walking 10 Feet on Uneven Surfaces CARE Score 4   Ambulation   Does the patient walk? 2  Yes   Primary Mode of Locomotion Prior to Admission Walk   Distance Walked (feet) 160 ft  (140' 160' 137')   Assist Device Roller Walker   Gait Pattern Inconsistant Jaylyn; Slow Jaylyn;Decreased foot clearance;R foot drag;R hemiparesis; Narrow MAXWELL;Step to   Limitations Noted In Balance; Coordination;Device Management; Endurance; Safety;Strength   Provided Assistance with: Balance   Walk Assist Level Contact Guard;Close Supervision   Findings level and unlevel surfaces; cues needed to scan environment to avoid running into objects when ambulating   Wheel 50 Feet with Two Turns   Reason if not Attempted Activity not applicable   Wheel 50 Feet with Two Turns CARE Score 9   Wheel 150 Feet   Reason if not Attempted Activity not applicable   Wheel 361 Feet CARE Score 9   Curb or Single Stair   Style negotiated Single stair   Type of Assistance Needed Incidental touching   Comment cg   1 Step (Curb) CARE Score 4   4 Steps   Type of Assistance Needed Incidental touching   Comment cg   4 Steps CARE Score 4   12 Steps   Reason if not Attempted Activity not applicable   12 Steps CARE Score 9   Stairs   Type Stairs   # of Steps 7   Weight Bearing Precautions WBAT   Assist Devices Bilateral Rail   Findings cg with cues   Therapeutic Interventions   Strengthening standing ther ex   Balance gait and transfer training   Other stair training   Assessment   Treatment Assessment Patien tolerated therapy session well  Continues to need cues for general safety, as well as for scanning environment to avoid obstacles whtle ambulating  COntinues with decreased awareness of deficits  Completed ther ex for general LE strengthening; gait and transfer training focusing on sequence and technique for improved balance and safety with functional mobility  Recommending patient stay with son due to safety concerns regarding living alone  Patient refuses to stay with son  Son will be staying with patient for a couple of days at time of d/c    PT Barriers   Physical Impairment Decreased strength;Decreased range of motion;Decreased endurance; Impaired balance;Decreased mobility; Decreased coordination;Decreased cognition; Impaired judgement;Decreased safety awareness   Functional Limitation Walking;Transfers;Standing;Stair negotiation   Plan   Treatment/Interventions Functional transfer training;LE strengthening/ROM; Elevations; Therapeutic exercise; Bed mobility;Gait training   Progress Progressing toward goals   PT Therapy Minutes   PT Time In 1122   PT Time Out 1224   PT Total Time (minutes) 62   PT Mode of treatment - Individual (minutes) 62   PT Mode of treatment - Concurrent (minutes) 0   PT Mode of treatment - Group (minutes) 0   PT Mode of treatment - Co-treat (minutes) 0   PT Mode of Treatment - Total time(minutes) 62 minutes   PT Cumulative Minutes 1055   Therapy Time missed   Time missed?  No

## 2021-02-19 NOTE — NURSING NOTE
Patient resting comfortably in bed at this time  No signs of distress noted  Patient was able to stand pivot transfer with one assist and walker overnight  Bed alarm and close supervision in place to promote patient safety  Patient was encouraged to reposition self frequently to promote skin integrity  Call bell within reach  Will continue to monitor patient and follow plan of care
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
## 2021-02-19 NOTE — PROGRESS NOTES
02/19/21 0829   Pain Assessment   Pain Assessment Tool Pain Assessment not indicated - pt denies pain   Pain Score No Pain   Restrictions/Precautions   Precautions Aspiration; Aphasia;Bed/chair alarms;Cognitive; Fall Risk   Comprehension   Comprehension (FIM) 5 - Understands basic directions and conversation   Expression   Expression (FIM) 4 - Needs to repeat single words   Social Interaction   Social Interaction (FIM) 5 - Interacts appropriately with others 90% of time   Problem Solving   Problem solving (FIM) 5 - Solves basic problems 90% of time   Memory   Memory (FIM) 4 - Recognizes/recalls/performs 75-89%   Speech/Language/Cognition Assessmetn   Treatment Assessment Pt seen for skilled speech therapy session  Pt alert and interactive seen OOB upright in chair in room post meal  Pt engaged in brief follow up with stroke education  Pt shaking her head saying "I don't want to think about this stuff, I just want to move forward"  Education provided regarding encouragement as to the importance of understanding her medical history and being aware of the risks and s/s of strokes since this is not her first one  Pt expressing agreement and able to recall the BE FAST acronym for the s/s of strokes- able to recall 4/6 items provided verbal cues and rehearsal  Pt discussed the risk factors of things she can do to improve her health and reduce risk of strokes- no smoking, limit alcohol, manage diabetes and blood pressure, increase activity and healthy diet  Pt expressing good comprehension of information discussed- note pt did have some word finding deficits during task which required pt to have extended processing time and some verbal cues for context to improve retrieval  Pt overall is improving with skilled SLP services- pt planned for discharge tomorrow- recommendations for further SLP services to maximize abilities at this time      Swallow Information   Current Risks for Dysphagia & Aspiration Weak cough;Weak voicing;Dysphonia;General debilitation;New Neuro event;Brain injury;Cognitive deficit;HX neurologic dx   Current Symptoms/Concerns Cough;Clear throat;During meals; Difficulty chewing;HX Dysphagia   Current Diet Regular;Nectar thick liquid   Baseline Diet Regular;Nectar thick liquids   Consistencies Assessed and Performance   Materials Admnistered Regular/Solid; Nectar thick liquid   Oral Stage Mild impaired   Phargngeal Stage Mild impaired; Moderate impaired;Aspiration risk   Swallow Mechanics Mild delayed; Moderate delayed;Swallow initation;Weak larygneal rise;Good Larygneal rise;Vocal Cord dysfunction suspected;Aspiration risk   Esophageal Concerns Hx GERDS   Recommendations   Risk for Aspiration Present   Recommendations Dysphagia treatment   Diet Solid Recommendation Regular consistency   Diet Liquid Recommendation Nectar thick liquid   Recommended Form of Meds Whole; With puree; As desired; As tolerated   General Precautions Aspiration precautions; Feed only when alert;Minimize distractions;Upright as possible for all oral intake;Remain upright for 45 mins after meals  (OOB for meals, set up)   Compensatory Swallowing Strategies Place food/straw in on left side; Check for pocketing of food on the right; Alternate solids and liquids; External pacing;Effortful swallow;Voluntary throat clear/cough to clear penetration   Results Reviewed with PT/Family/Caregiver;RN   Eating   Type of Assistance Needed Set-up / clean-up   Eating CARE Score 5   Swallow Assessment   Swallow Treatment Assessment Pt seen for skilled dysphagia tx session with breakfast meal to complete final review of strategies, diet consistencies and provide some education regarding thickening liquids at home  Pt alert and interactive seen OOB upright in chair in room  Pt seen with regular diet meal and NTL- items assessed included cheese omelet, laurent, yogurt and NTL by cup sip  Pt required limited set up and no physical assistance self feeding   Pt eating food items c good bolus retrieval, oral control, slow oral processing, mildly delayed transfers and swallows  Pt alternating food items with NTL by cup- pt noted to take single sips most of the time during meal, occ successive sip at end of meal needed cues and reminders to take one at a time with bolus prep/set  Pt tolerated single sips c good oral hold, mildly delayed transfers and swallows  During pt's successive sips, noted delayed throat clearing with dry cough to clear  Encouraged the use of protective throat clears and reswallows for clearing residual and suspected penetrated/aspirated material  Meal completion 100% and 120cc liquids  Provided with handout on thickening liquids to Nectar Thick/Mildly Thick liquids using powder thickeners, gel thickeners or purchasing pre-thickened liquids  Pt is aware of thickening liquids due too from previously being on modified liquids in the past  Pt aware to follow the directions on the amounts to mix together for appropriate consistencies  Provided pt with sample packets for her to use at home as well as some pre thickened cups so she has examples on items she can purchase  At this time, cont regular diet and NTL- aspiration precautions, slow rate, small bites, single cup sips, bolus prep/set and effortful swallow with protective throat clears/coughs  Pt plans to discharge home tomorrow and will cont to benefit from further swallow therapy sessions to maximize swallow function at this time  Swallow Assessment Prognosis   Prognosis Fair   Prognosis Considerations Age; Co-morbidities; Medical diagnosis; Medical prognosis;Participation level;Previous level of function;Severity of impairments;New learning ability;Ability to carry over; Cooperation   SLP Therapy Minutes   SLP Time In 0830   SLP Time Out 0930   SLP Total Time (minutes) 60   SLP Mode of treatment - Individual (minutes) 60   SLP Mode of treatment - Concurrent (minutes) 0   SLP Mode of treatment - Group (minutes) 0 SLP Mode of treatment - Co-treat (minutes) 0   SLP Mode of Treatment - Total time(minutes) 60 minutes   SLP Cumulative Minutes 710   Therapy Time missed   Time missed?  No

## 2021-02-19 NOTE — NURSING NOTE
Patient continues with generalized weakness  Voices no complaints of pain  Continued stroke education provided  Educated on importance of safety at discharge  Alarms intact  Will continue to monitor and follow plan of care

## 2021-02-19 NOTE — PLAN OF CARE
Problem: Prexisting or High Potential for Compromised Skin Integrity  Goal: Skin integrity is maintained or improved  Description: INTERVENTIONS:  - Identify patients at risk for skin breakdown  - Assess and monitor skin integrity  - Assess and monitor nutrition and hydration status  - Monitor labs   - Assess for incontinence   - Turn and reposition patient  - Assist with mobility/ambulation  - Relieve pressure over bony prominences  - Avoid friction and shearing  - Provide appropriate hygiene as needed including keeping skin clean and dry  - Evaluate need for skin moisturizer/barrier cream  - Collaborate with interdisciplinary team   - Patient/family teaching  - Consider wound care consult   Outcome: Progressing     Problem: Potential for Falls  Goal: Patient will remain free of falls  Description: INTERVENTIONS:  - Assess patient frequently for physical needs  -  Identify cognitive and physical deficits and behaviors that affect risk of falls    -  [place] fall precautions as indicated by assessment   - Educate patient/family on patient safety including physical limitations  - Instruct patient to call for assistance with activity based on assessment  - Modify environment to reduce risk of injury  - Consider OT/PT consult to assist with strengthening/mobility  Outcome: Progressing     Problem: PAIN - ADULT  Goal: Verbalizes/displays adequate comfort level or baseline comfort level  Description: Interventions:  - Encourage patient to monitor pain and request assistance  - Assess pain using appropriate pain scale  - Administer analgesics based on type and severity of pain and evaluate response  - Implement non-pharmacological measures as appropriate and evaluate response  - Consider cultural and social influences on pain and pain management  - Notify physician/advanced practitioner if interventions unsuccessful or patient reports new pain  Outcome: Progressing     Problem: INFECTION - ADULT  Goal: Absence or prevention of progression during hospitalization  Description: INTERVENTIONS:  - Assess and monitor for signs and symptoms of infection  - Monitor lab/diagnostic results  - Monitor all insertion sites, i e  indwelling lines, tubes, and drains  - Monitor endotracheal if appropriate and nasal secretions for changes in amount and color  - [place] appropriate cooling/warming therapies per order  - Administer medications as ordered  - Instruct and encourage patient and family to use good hand hygiene technique  - Identify and instruct in appropriate isolation precautions for identified infection/condition  Outcome: Progressing  Goal: Absence of fever/infection during neutropenic period  Description: INTERVENTIONS:  - Monitor WBC    Outcome: Progressing     Problem: SAFETY ADULT  Goal: Patient will remain free of falls  Description: INTERVENTIONS:  - Assess patient frequently for physical needs  -  Identify cognitive and physical deficits and behaviors that affect risk of falls    -  [place] fall precautions as indicated by assessment   - Educate patient/family on patient safety including physical limitations  - Instruct patient to call for assistance with activity based on assessment  - Modify environment to reduce risk of injury  - Consider OT/PT consult to assist with strengthening/mobility  Outcome: Progressing  Goal: Maintain or return to baseline ADL function  Description: INTERVENTIONS:  -  Assess patient's ability to carry out ADLs; assess patient's baseline for ADL function and identify physical deficits which impact ability to perform ADLs (bathing, care of mouth/teeth, toileting, grooming, dressing, etc )  - Assess/evaluate cause of self-care deficits   - Assess range of motion  - Assess patient's mobility; develop plan if impaired  - Assess patient's need for assistive devices and provide as appropriate  - Encourage maximum independence but intervene and supervise when necessary  - Involve family in performance of ADLs  - Assess for home care needs following discharge   - Consider OT consult to assist with ADL evaluation and planning for discharge  - Provide patient education as appropriate  Outcome: Progressing  Goal: Maintain or return mobility status to optimal level  Description: INTERVENTIONS:  - Assess patient's baseline mobility status (ambulation, transfers, stairs, etc )    - Identify cognitive and physical deficits and behaviors that affect mobility  - Identify mobility aids required to assist with transfers and/or ambulation (gait belt, sit-to-stand, lift, walker, cane, etc )  - [place] fall precautions as indicated by assessment  - Record patient progress and toleration of activity level on Mobility SBAR; progress patient to next Phase/Stage  - Instruct patient to call for assistance with activity based on assessment  - Consider rehabilitation consult to assist with strengthening/weightbearing, etc   Outcome: Progressing     Problem: Neurological Deficit  Goal: Neurological status is stable or improving  Description: Interventions:  - Monitor and assess patient's level of consciousness, motor function, sensory function, and level of assistance needed for ADLs  - Monitor and report changes from baseline  Collaborate with interdisciplinary team to initiate plan and implement interventions as ordered  - Provide and maintain a safe environment  - Consider seizure precautions  - Consider fall precautions  - Consider aspiration precautions  - Consider bleeding precautions  Outcome: Progressing     Problem: Activity Intolerance/Impaired Mobility  Goal: Mobility/activity is maintained at optimum level for patient  Description: Interventions:  - Assess and monitor patient  barriers to mobility and need for assistive/adaptive devices  - Assess patient's emotional response to limitations  - Collaborate with interdisciplinary team and initiate plans and interventions as ordered    - Encourage independent activity per ability   - Maintain proper body alignment  - Perform active/passive rom as tolerated/ordered    - Plan activities to conserve energy   - Turn patient as appropriate  Outcome: Progressing

## 2021-02-19 NOTE — DISCHARGE INSTRUCTIONS
Stroke   WHAT YOU NEED TO KNOW:   A stroke happens when blood flow to part of the brain is interrupted  This can cause serious brain damage from a lack of oxygen  Your healthcare providers will help you create goals for your recovery  They will help you and your family or care providers make a plan for care at home to help you reach your goals  The plan will include lifestyle changes you can make to help you manage your health and prevent another stroke  Your discharge instructions will include information on services and equipment you or your family may need  DISCHARGE INSTRUCTIONS:   Call your local emergency number (911 in the [address] [facility]) for any of the following:   · You have any of the following signs of a stroke:      ? Numbness or drooping on one side of your face     ? Weakness in an arm or leg    ? Confusion or difficulty speaking    ? Dizziness, a severe headache, or vision loss       · You have a seizure  · You have chest pain or shortness of breath  Seek care immediately if:   · Your arm or leg feels warm, tender, and painful  It may look swollen and red  · You have loss of balance or coordination  · You have double vision or vision loss  · You have unusual or heavy bleeding  Call your doctor or neurologist if:   · Your blood sugar level or blood pressure is higher or lower than usual     · You have trouble swallowing  · You have trouble having a bowel movement or urinating  · You have questions or concerns about your condition or care  Medicines:  Medicines may be needed to treat high cholesterol, high blood pressure, or diabetes  You may also need any of the following, depending on the kind of stroke you had:  · Antiplatelets , such as aspirin, help prevent blood clots  Take your antiplatelet medicine exactly as directed  These medicines make it more likely for you to bleed or bruise  If you are told to take aspirin, do not take acetaminophen or ibuprofen instead       · Blood thinners  help prevent blood clots  Clots can cause strokes, heart attacks, and death  The following are general safety guidelines to follow while you are taking a blood thinner:    ? Watch for bleeding and bruising while you take blood thinners  Watch for bleeding from your gums or nose  Watch for blood in your urine and bowel movements  Use a soft washcloth on your skin, and a soft toothbrush to brush your teeth  This can keep your skin and gums from bleeding  If you shave, use an electric shaver  Do not play contact sports  ? Tell your dentist and other healthcare providers that you take a blood thinner  Wear a bracelet or necklace that says you take this medicine  ? Do not start or stop any other medicines unless your healthcare provider tells you to  Many medicines cannot be used with blood thinners  ? Take your blood thinner exactly as prescribed by your healthcare provider  Do not skip does or take less than prescribed  Tell your provider right away if you forget to take your blood thinner, or if you take too much  ? Warfarin  is a blood thinner that you may need to take  The following are things you should be aware of if you take warfarin:     § Foods and medicines can affect the amount of warfarin in your blood  Do not make major changes to your diet while you take warfarin  Warfarin works best when you eat about the same amount of vitamin K every day  Vitamin K is found in green leafy vegetables and certain other foods  Ask for more information about what to eat when you are taking warfarin  § You will need to see your healthcare provider for follow-up visits when you are on warfarin  You will need regular blood tests  These tests are used to decide how much medicine you need  · Take your medicine as directed  Contact your healthcare provider if you think your medicine is not helping or if you have side effects  Tell him or her if you are allergic to any medicine   Keep a list of the medicines, vitamins, and herbs you take  Include the amounts, and when and why you take them  Bring the list or the pill bottles to follow-up visits  Carry your medicine list with you in case of an emergency  Know the warning signs of a stroke: The words BE FAST can help you remember and recognize warning signs of a stroke:  · B = Balance:  Sudden loss of balance    · E = Eyes:  Loss of vision in one or both eyes    · F = Face:  Face droops on one side    · A = Arms:  Arm drops when both arms are raised    · S = Speech:  Speech is slurred or sounds different    · T = Time:  Time to get help immediately     Recovery testing: Your healthcare provider will test your recovery 90 days (3 months) after your stroke  This may be done over the phone or in person  Your provider will ask how well you can do the activities you did before the stroke  He or she will also ask how well you can do your daily activities without help  Your provider may make recommendations for you based on your test  For example, you may need someone to help you walk safely  You may also need help with daily activities, such as getting dressed  Based on your answers, your provider may do this test again over time  Manage the effects of a stroke:   · Go to stroke rehabilitation (rehab) if directed  Rehab is a program run by specialists who will help you recover abilities you may have lost  Specialists include physical, occupational, and speech therapists  Physical therapists help you gain strength or keep your balance  Occupational therapists teach you new ways to do daily activities  Your therapy may include movements for everyday activities  An example is being able to raise yourself from a chair  A speech therapist helps you improve your ability to talk and swallow  · Make your home safe  Remove anything you might trip over  Tape electrical cords down  Keep paths clear throughout your home  Make sure your home is well lit   Put nonslip materials on surfaces that might be slippery  An example is your bathtub or shower floor  A cane or walker may help you keep your balance as you walk  Prevent another stroke:   · Manage health conditions  A condition such as diabetes can increase your risk for a stroke  Control your blood sugar level if you have hyperglycemia or diabetes  Take your prescribed medicines and check your blood sugar level as directed  · Check your blood pressure as directed  High blood pressure can increase your risk for a stroke  Follow your healthcare provider's directions for controlling your blood pressure  · Do not use nicotine products or illegal drugs  Nicotine and other chemicals in cigarettes and cigars can cause blood vessel damage  Nicotine and illegal drugs both increase your risk for a stroke  Ask your healthcare provider for information if you currently smoke or use drugs and need help to quit  E- cigarettes or smokeless tobacco still contain nicotine  Talk to your healthcare provider before you use these products  · Talk to your healthcare provider about alcohol  Alcohol can raise your blood pressure  The recommended limit is 2 drinks in a day for men and 1 drink in a day for women  Do not binge drink or save a week's worth of alcohol to drink in 1 or 2 days  Limit weekly amounts as directed by your provider  · Eat a variety of healthy foods  Healthy foods include whole-grain breads, low-fat dairy products, beans, lean meats, and fish  Eat at least 5 servings of fruits and vegetables each day  Choose foods that are low in fat, cholesterol, salt, and sugar  Eat foods that are high in potassium, such as potatoes and bananas  A dietitian can help you create healthy meal plans  · Maintain a healthy weight  Ask your healthcare provider how much you should weigh  Ask him or her to help you create a weight loss plan if you are overweight   He or she can help you create small goals if you have a lot of weight to lose  · Exercise as directed  Exercise can lower your blood pressure, cholesterol, weight, and blood sugar levels  Healthcare providers will help you create exercise goals  They can also help you make a plan to reach your goals  For example, you can break exercise into 10 minute periods, 3 times in the day  Find an exercise that you enjoy  This will make it easier for you to reach your exercise goals  · Manage stress  Stress can raise your blood pressure  Find new ways to relax, such as deep breathing or listening to music  What you need to know about depression after a stroke:  Talk to your healthcare provider if you have depression that continues or is getting worse  Your provider may be able to help treat your depression  Your provider can also recommend support groups for you to join  A support group is a place to talk with others who have had a stroke  It may also help to talk to friends and family members about how you are feeling  Tell your family and friends to let your healthcare provider know if they see any signs of depression:  · Extreme sadness    · Avoiding social interaction with family or friends    · A lack of interest in things you once enjoyed    · Irritability    · Trouble sleeping    · Low energy levels    · A change in eating habits or sudden weight gain or loss    Follow up with your doctor or neurologist as directed: You may need to come in for regular tests of your brain function  Your provider may refer you to a specialist, or to other kinds of care  An example is palliative (comfort) care  Your provider can also give information about respite care to anyone who helps care for you  Respite care is a service to help caregivers take a break or get more rest  Write down your questions so you remember to ask them during your visits  For support and more information:   · National Stroke Association  [address] , Jerri Bueno [address]  Phone: 1- [address]  Web Address: [employer]  [address] Information is for End User's use only and may not be sold, redistributed or otherwise used for commercial purposes  All illustrations and images included in CareNotes® are the copyrighted property of A D A M , Inc  or [address] [place], [place] [address]bettina Mosqueda   The above information is an  only  It is not intended as medical advice for individual conditions or treatments  Talk to your doctor, nurse or pharmacist before following any medical regimen to see if it is safe and effective for you

## 2021-02-19 NOTE — PHYSICAL THERAPY NOTE
PT DISCHARGE SUMMARY:      Patient has met max benefit for inpatient [facility] PT  Patient MOD I bed mobility; cg/close S/S all transfers with walker; CG/close S/S ambulation up to 201' level and unlevel surfaces with walker; CG/close S up to 14 steps with 2 handrails  Patient remains limited by decreased ROM/strength, decreased balance and safety, decrease awareness of deficits, decreased cognition, decreased endurance  Recommend patient stay with son, however, patient is refusing to go anywhere but home  Son will be staying with patient for a few days once home  Patient for d/c to home with continued PT services 02/20/2021  Patient safe to transport home via car

## 2021-02-19 NOTE — CASE MANAGEMENT
DC instructions reviewed with Pt & Pt's son, who expressed an understanding & agreement  Pt being [address] home tomorrow at 11-11:30AM, being transported by family via car, which tx team has determined to be a safe mode of transport  FU appts indicated on DC instructions, to be scheduled by Pt's family per scheduling preferences  Maicol Cornejo arranged with [facility] (Nsg, PT, OT, ST, [facility]), per Pt preferences from choice list provided  The Pt's current course of Tx & post DC goals of care have been shared with Post-acute care service providers  Ins company made aware; all days covered  Reiterated to Pt's son that the Tx team recommends Pt be supervised  Pt could stay at the home with her mother (where her son resides), but she is not interested in doing so  Pt's son stated that they will follow Pt's wishes to return to her home, with frequent checks  Pt's son stated that "if after a day or so we see she needs more, then she will just have to go there" (to the home where her mother resides - Pt's mother is reported to be in good health & independent, per Pt's son)

## 2021-02-20 VITALS
RESPIRATION RATE: 16 BRPM | WEIGHT: 220.46 LBS | DIASTOLIC BLOOD PRESSURE: 58 MMHG | SYSTOLIC BLOOD PRESSURE: 124 MMHG | HEART RATE: 91 BPM | TEMPERATURE: 98.6 F | BODY MASS INDEX: 36.73 KG/M2 | OXYGEN SATURATION: 98 % | HEIGHT: 65 IN

## 2021-02-20 LAB
GLUCOSE SERPL-MCNC: 152 MG/DL (ref 65–140)
GLUCOSE SERPL-MCNC: 76 MG/DL (ref 65–140)

## 2021-02-20 PROCEDURE — 97110 THERAPEUTIC EXERCISES: CPT

## 2021-02-20 PROCEDURE — 97530 THERAPEUTIC ACTIVITIES: CPT

## 2021-02-20 PROCEDURE — 97116 GAIT TRAINING THERAPY: CPT

## 2021-02-20 PROCEDURE — 97537 COMMUNITY/WORK REINTEGRATION: CPT

## 2021-02-20 PROCEDURE — 82948 REAGENT STRIP/BLOOD GLUCOSE: CPT

## 2021-02-20 PROCEDURE — 97535 SELF CARE MNGMENT TRAINING: CPT

## 2021-02-20 RX ADMIN — LISINOPRIL 5 MG: 5 TABLET ORAL at 08:24

## 2021-02-20 RX ADMIN — METOPROLOL TARTRATE 50 MG: 50 TABLET, FILM COATED ORAL at 08:24

## 2021-02-20 RX ADMIN — ESCITALOPRAM 5 MG: 5 TABLET, FILM COATED ORAL at 08:24

## 2021-02-20 RX ADMIN — RIVAROXABAN 20 MG: 10 TABLET, FILM COATED ORAL at 08:24

## 2021-02-20 RX ADMIN — PANTOPRAZOLE SODIUM 40 MG: 40 TABLET, DELAYED RELEASE ORAL at 08:24

## 2021-02-20 RX ADMIN — INSULIN LISPRO 8 UNITS: 100 INJECTION, SOLUTION INTRAVENOUS; SUBCUTANEOUS at 08:20

## 2021-02-20 RX ADMIN — FAMOTIDINE 20 MG: 20 TABLET ORAL at 08:24

## 2021-02-20 NOTE — PROGRESS NOTES
02/20/21 0830   Pain Assessment   Pain Assessment Tool Pain Assessment not indicated - pt denies pain   Restrictions/Precautions   Precautions Aspiration;Cognitive; Fall Risk   Cognition   Overall Cognitive Status Impaired   Arousal/Participation Alert; Responsive; Cooperative   Attention Attends with cues to redirect   Orientation Level Oriented X4   Memory Decreased recall of precautions   Following Commands Follows one step commands without difficulty   Sit to Stand   Type of Assistance Needed Supervision   Amount of Physical Assistance Provided No physical assistance   Sit to Stand CARE Score 4   Bed-Chair Transfer   Type of Assistance Needed Supervision   Amount of Physical Assistance Provided No physical assistance   Chair/Bed-to-Chair Transfer CARE Score 4   Transfer Bed/Chair/Wheelchair   Limitations Noted In Balance; Endurance; Sequencing;UE Strength;LE Strength   Adaptive Equipment Roller Walker   Stand Pivot Supervision   Sit to Stand Supervision   Stand to Sit Supervision   All Transfer Supervision   Walk 10 Feet   Type of Assistance Needed Supervision   Amount of Physical Assistance Provided No physical assistance   Walk 10 Feet CARE Score 4   Walk 50 Feet with Two Turns   Type of Assistance Needed Supervision   Amount of Physical Assistance Provided No physical assistance   Walk 50 Feet with Two Turns CARE Score 4   Walk 150 Feet   Type of Assistance Needed Supervision   Amount of Physical Assistance Provided No physical assistance   Walk 150 Feet CARE Score 4   Walking 10 Feet on Uneven Surfaces   Type of Assistance Needed Supervision   Amount of Physical Assistance Provided No physical assistance   Walking 10 Feet on Uneven Surfaces CARE Score 4   Ambulation   Does the patient walk? 2  Yes   Primary Mode of Locomotion Prior to Admission Walk   Distance Walked (feet) 185 ft  (111)   Assist Device Roller Walker   Gait Pattern Inconsistant Jaylyn; Slow Jaylyn;Decreased foot clearance;R foot drag;R hemiparesis; Narrow MAXWELL   Limitations Noted In Balance; Coordination;Device Management; Endurance; Safety;Strength   Provided Assistance with: Balance   Walk Assist Level Close Supervision   Findings level and uneven surfaces   Curb or Single Stair   Style negotiated Single stair   Type of Assistance Needed Supervision   Amount of Physical Assistance Provided No physical assistance   1 Step (Curb) CARE Score 4   4 Steps   Type of Assistance Needed Supervision   Amount of Physical Assistance Provided No physical assistance   4 Steps CARE Score 4   12 Steps   Type of Assistance Needed Supervision   Amount of Physical Assistance Provided No physical assistance   12 Steps CARE Score 4   Stairs   Type Stairs   # of Steps 14   Weight Bearing Precautions WBAT   Assist Devices Bilateral Rail   Findings Close S   Therapeutic Interventions   Strengthening seated ther ex 30 reps   Balance gait and transfers   Other stairs   Assessment   Treatment Assessment Pt performed well in PT today at S level for all transfer, amb, and stairs; Pt is being D/C home today with family support   PT Barriers   Physical Impairment Decreased strength;Decreased range of motion;Decreased endurance; Impaired balance;Decreased mobility; Decreased coordination;Decreased cognition; Impaired judgement;Decreased safety awareness   Functional Limitation Walking;Transfers;Standing;Stair negotiation   Plan   Treatment/Interventions Functional transfer training;LE strengthening/ROM; Elevations; Therapeutic exercise; Endurance training;Patient/family training;Gait training   Progress Progressing toward goals   PT Therapy Minutes   PT Time In 0830   PT Time Out 0930   PT Total Time (minutes) 60   PT Mode of treatment - Individual (minutes) 60   PT Mode of treatment - Concurrent (minutes) 0   PT Mode of treatment - Group (minutes) 0   PT Mode of treatment - Co-treat (minutes) 0   PT Mode of Treatment - Total time(minutes) 60 minutes   PT Cumulative Minutes 1115 Therapy Time missed   Time missed?  No

## 2021-02-20 NOTE — DISCHARGE INSTR - OTHER ORDERS
Drink plenty of fluids (nectar thick liquids)  and consume fruits, vegetables to keep bowels active  If constipation occurs, consider over the counter stool softeners, senekot, milk-of-magnesia, and/or fleets enema  If unable to move bowels in 3-5 days, or if feeling uncomfortable, notify primary care physician or go to the emergency room

## 2021-02-20 NOTE — PROGRESS NOTES
02/20/21 0702   Pain Assessment   Pain Assessment Tool Pain Assessment not indicated - pt denies pain   Restrictions/Precautions   Precautions Aspiration;Cognitive; Fall Risk   Weight Bearing Restrictions No   ROM Restrictions No   Shower/Bathe Self   Type of Assistance Needed Supervision; Adaptive equipment   Shower/Bathe Self CARE Score 4   Bathing   Assessed Bath Style Sponge Bath   Anticipated D/C Bath Style Shower;Sponge Bath   Able to Chris Brendon No   Able to Wash/Rinse/Dry (body part) Left Arm;Right Arm;L Upper Leg;L Lower Leg/Foot;R Upper Leg;R Lower Leg/Foot; Abdomen; Chest;Perineal Area; Buttocks   Limitations Noted in Balance; Safety;Strength   Positioning Seated;Standing   Adaptive Equipment Longhand Sponge   Upper Body Dressing   Type of Assistance Needed Set-up / clean-up   Amount of Physical Assistance Provided No physical assistance   Upper Body Dressing CARE Score 5   Lower Body Dressing   Type of Assistance Needed Supervision; Adaptive equipment   Amount of Physical Assistance Provided No physical assistance   Lower Body Dressing CARE Score 4   Putting On/Taking Off Footwear   Type of Assistance Needed Supervision; Adaptive equipment   Amount of Physical Assistance Provided No physical assistance   Putting On/Taking Off Footwear CARE Score 4   Dressing/Undressing Clothing   Remove UB Clothes   (hospital robe)   [facility] [facility]; Undergarment   Don LB Clothes Pants; Undergarment;Socks; Shoes   Limitations Noted In Balance; Safety;Strength   Adaptive Equipment Reacher;Sock Aide   Positioning Supported Sit   Lying to Sitting on Side of Bed   Type of Assistance Needed Independent   Amount of Physical Assistance Provided No physical assistance   Lying to Sitting on Side of Bed CARE Score 6   Sit to Stand   Type of Assistance Needed Independent   Amount of Physical Assistance Provided No physical assistance   Sit to Stand CARE Score 6   Bed-Chair Transfer   Type of Assistance Needed Supervision   Amount of Physical Assistance Provided No physical assistance   Chair/Bed-to-Chair Transfer CARE Score 4   Transfer Bed/Chair/Wheelchair   Limitations Noted In Balance;LE Strength   Toileting Hygiene   Type of Assistance Needed Supervision;Verbal cues   Amount of Physical Assistance Provided No physical assistance   Comment verbal cues to complete hygiene   Toileting Hygiene CARE Score 4   Toileting   Able to Pull Clothing down yes, up yes  Manage Hygiene Bladder   Limitations Noted In Balance; Safety;LE Strength   Adaptive Equipment Grab Bar   Toilet Transfer   Type of Assistance Needed Supervision   Amount of Physical Assistance Provided No physical assistance   Toilet Transfer CARE Score 4   Toilet Transfer   Surface Assessed Raised Toilet   Transfer Technique Standard   Limitations Noted In Balance; Safety;LE Strength   Adaptive Equipment Grab Bar;Walker   Light Housekeeping   Light Housekeeping Level Wheelchair   Light Housekeeping Level of Assistance Directs care (Comment)   Light Housekeeping packed belongings in preparation for d/c later this AM, directed OT which items to place in which bags and where to put them for now   Cognition   Overall Cognitive Status Impaired   Arousal/Participation Alert; Responsive; Cooperative   Attention Attends with cues to redirect   Orientation Level Oriented X4   Memory Decreased recall of precautions   Following Commands Follows one step commands without difficulty   Assessment   Treatment Assessment Pt agreeable to OT session tihs AM  Received sidelying in bed  ADL session completed; current LOF and details listed in respective sections  Pt is overall set-up/supervision for ADL tasks with the exception of mod I grooming seated at sink  AE used for LB tasks as needed effectively with increased time 2* impaired coordination  Occasional verbal cues required for problem solving, particularly with LB dressing/AE use   After ADL, packed belongings with assist from OT  Discussed with OT questions and concerns regarding d/c home; OT strongly recommended supervision for pt's ADL tasks, which pt reports son will be staying at her home for a few days  OT also discussed [facility] and their involvement in reorientation to home set up  [place] situation/recommendation from OT is 24 hr supervision for safety upon d/c later this AM    Recommendation   OT - OK to Discharge Yes   Discharge Summary Pt is cleared for d/c from ARU to home independently with [facility], though OT recommends 24 hr supervision 2* decreased safety awareness  Pt is overall supervision/set up for ADL tasks with the exception of mod I grooming seated at sink  Functional transfers also supervision with RW  AE used for LB tasks with increased time 2* impaired fine motor coordination on R side, decreased balance, and decreased ROM/strength  Pt participated in ADL training, therapeutic exercises, therapeutic activities, functional mobility/transfers, neuromuscular reeducation, cognitive training, and activity tolerance in order to progress towards goals  DME has been obtained  OT Therapy Minutes   OT Time In 0702   OT Time Out 0752   OT Total Time (minutes) 50   OT Mode of treatment - Individual (minutes) 50   Therapy Time missed   Time missed?  No

## 2021-02-20 NOTE — NURSING NOTE
Discharge instructions reviewed with pt & pt's son  Verbalized understanding  Prescriptions faxed to [facility]  Questions asked & answered  Reinforced importance of keeping follow-up appts  Medical equipment & belongings brought to car by staff  Pt discharged @ 1205 via WC to car  Patient was assessed and interdisciplinary team determined safest means of transportation is via family vehicle  Transfer to [facility] to car with supervision  Seat belt applied by PCA

## 2021-02-22 ENCOUNTER — TRANSITIONAL CARE MANAGEMENT (OUTPATIENT)
Dept: FAMILY MEDICINE CLINIC | Facility: CLINIC | Age: 66
End: 2021-02-22

## 2021-02-23 ENCOUNTER — TELEPHONE (OUTPATIENT)
Dept: FAMILY MEDICINE CLINIC | Facility: CLINIC | Age: 66
End: 2021-02-23

## 2021-02-24 ENCOUNTER — TELEPHONE (OUTPATIENT)
Dept: FAMILY MEDICINE CLINIC | Facility: CLINIC | Age: 66
End: 2021-02-24

## 2021-02-26 ENCOUNTER — OFFICE VISIT (OUTPATIENT)
Dept: FAMILY MEDICINE CLINIC | Facility: CLINIC | Age: 66
End: 2021-02-26
Payer: COMMERCIAL

## 2021-02-26 VITALS
HEIGHT: 65 IN | WEIGHT: 214.6 LBS | DIASTOLIC BLOOD PRESSURE: 80 MMHG | TEMPERATURE: 97.7 F | SYSTOLIC BLOOD PRESSURE: 130 MMHG | BODY MASS INDEX: 35.75 KG/M2

## 2021-02-26 DIAGNOSIS — E11.65 TYPE 2 DIABETES MELLITUS WITH HYPERGLYCEMIA, WITH LONG-TERM CURRENT USE OF INSULIN (HCC): ICD-10-CM

## 2021-02-26 DIAGNOSIS — I48.0 PAROXYSMAL ATRIAL FIBRILLATION (HCC): ICD-10-CM

## 2021-02-26 DIAGNOSIS — I10 ESSENTIAL HYPERTENSION: Chronic | ICD-10-CM

## 2021-02-26 DIAGNOSIS — K21.9 GASTROESOPHAGEAL REFLUX DISEASE, UNSPECIFIED WHETHER ESOPHAGITIS PRESENT: Chronic | ICD-10-CM

## 2021-02-26 DIAGNOSIS — F32.9 CURRENT EPISODE OF MAJOR DEPRESSIVE DISORDER WITHOUT PRIOR EPISODE, UNSPECIFIED DEPRESSION EPISODE SEVERITY: ICD-10-CM

## 2021-02-26 DIAGNOSIS — I63.9 CEREBROVASCULAR ACCIDENT (CVA), UNSPECIFIED MECHANISM (HCC): ICD-10-CM

## 2021-02-26 DIAGNOSIS — Z79.4 TYPE 2 DIABETES MELLITUS WITH HYPERGLYCEMIA, WITH LONG-TERM CURRENT USE OF INSULIN (HCC): ICD-10-CM

## 2021-02-26 PROCEDURE — 99495 TRANSJ CARE MGMT MOD F2F 14D: CPT | Performed by: FAMILY MEDICINE

## 2021-02-26 RX ORDER — LISINOPRIL 5 MG/1
5 TABLET ORAL DAILY
Qty: 90 TABLET | Refills: 1 | Status: SHIPPED | OUTPATIENT
Start: 2021-02-26 | End: 2021-09-08 | Stop reason: SDUPTHER

## 2021-02-26 RX ORDER — METOPROLOL TARTRATE 50 MG/1
50 TABLET, FILM COATED ORAL EVERY 12 HOURS SCHEDULED
Qty: 180 TABLET | Refills: 1 | Status: SHIPPED | OUTPATIENT
Start: 2021-02-26 | End: 2021-09-08 | Stop reason: SDUPTHER

## 2021-02-26 RX ORDER — ESCITALOPRAM OXALATE 5 MG/1
5 TABLET ORAL DAILY
Qty: 90 TABLET | Refills: 1 | Status: SHIPPED | OUTPATIENT
Start: 2021-02-26 | End: 2021-08-19

## 2021-02-26 RX ORDER — ATORVASTATIN CALCIUM 80 MG/1
80 TABLET, FILM COATED ORAL
Qty: 90 TABLET | Refills: 1 | Status: SHIPPED | OUTPATIENT
Start: 2021-02-26 | End: 2021-09-08 | Stop reason: SDUPTHER

## 2021-02-26 RX ORDER — FAMOTIDINE 20 MG/1
20 TABLET, FILM COATED ORAL DAILY
Qty: 90 TABLET | Refills: 1 | Status: SHIPPED | OUTPATIENT
Start: 2021-02-26 | End: 2021-09-08 | Stop reason: SDUPTHER

## 2021-02-26 NOTE — PROGRESS NOTES
Assessment/Plan:     79-year-old woman with:TCM for CVA, major depressive disorder, GERD, type 2 diabetes, hypertension and paroxysmal Afib  Discussed workup and treatment options with risks and benefits will continue current medications discussed supportive care return parameters follow-up in 3 months encouraged follow-up closely with her therapists and advised to call back if other issues developed before then encouraged follow-up with her specialist as well    No problem-specific Assessment & Plan notes found for this encounter  Diagnoses and all orders for this visit:    Cerebrovascular accident (CVA), unspecified mechanism ([address] )  -     atorvastatin (LIPITOR) 80 mg tablet; Take 1 tablet (80 mg total) by mouth daily with dinner    Current episode of major depressive disorder without prior episode, unspecified depression episode severity  -     escitalopram (LEXAPRO) 5 mg tablet; Take 1 tablet (5 mg total) by mouth daily    Gastroesophageal reflux disease, unspecified whether esophagitis present  -     famotidine (PEPCID) 20 mg tablet; Take 1 tablet (20 mg total) by mouth daily    Type 2 diabetes mellitus with hyperglycemia, with long-term current use of insulin (HCC)  -     Discontinue: insulin lispro (HumaLOG) 100 units/mL injection; Inject 8 Units under the skin 3 (three) times a day with meals  -     insulin lispro (HumaLOG) 100 units/mL injection; Inject 8 Units under the skin 3 (three) times a day with meals    Essential hypertension  -     lisinopril (ZESTRIL) 5 mg tablet; Take 1 tablet (5 mg total) by mouth daily    Paroxysmal atrial fibrillation (HCC)  -     metoprolol tartrate (LOPRESSOR) 50 mg tablet; Take 1 tablet (50 mg total) by mouth every 12 (twelve) hours  -     rivaroxaban (XARELTO) 20 mg tablet; Take 1 tablet (20 mg total) by mouth daily with breakfast         Subjective:     Patient ID: Alea Stauffer is a 63 y o  female      Patient is a 79-year-old woman who presents for follow-up TCM for CVA, major depressive disorder, GERD, type 2 diabetes, hypertension and paroxysmal AFib she admits that she has at home and she is getting home therapy and she is beginning to improve no fevers chills nausea vomiting tolerating p o  Intake number complaints at this time      Review of Systems   Constitutional: Negative  HENT: Negative  Eyes: Negative  Respiratory: Negative  Cardiovascular: Negative  Gastrointestinal: Negative  Endocrine: Negative  Genitourinary: Negative  Musculoskeletal: Negative  Allergic/Immunologic: Negative  Neurological: Negative  Hematological: Negative  Psychiatric/Behavioral: Negative  All other systems reviewed and are negative  Objective:     Physical Exam  Constitutional:       Appearance: She is well-developed  HENT:      Head: Atraumatic  Right Ear: External ear normal       Left Ear: External ear normal    Eyes:      Conjunctiva/sclera: Conjunctivae normal       Pupils: Pupils are equal, round, and reactive to light  Neck:      Musculoskeletal: Normal range of motion  Cardiovascular:      Rate and Rhythm: Normal rate and regular rhythm  Heart sounds: Normal heart sounds  Pulmonary:      Effort: Pulmonary effort is normal  No respiratory distress  Breath sounds: Normal breath sounds  Abdominal:      General: There is no distension  Palpations: Abdomen is soft  Tenderness: There is no abdominal tenderness  There is no guarding or rebound  Musculoskeletal: Normal range of motion  Skin:     General: Skin is warm and dry  Neurological:      Mental Status: She is alert and oriented to person, place, and time  Cranial Nerves: No cranial nerve deficit  Psychiatric:         Behavior: Behavior normal          Thought Content:  Thought content normal          Judgment: Judgment normal            Vitals:    02/26/21 0925   BP: 130/80   Temp: 97 7 °F (36 5 °C)   Weight: 97 3 kg (214 lb 9 6 oz)   Height: 5' 5" (1 933 m)       Transitional Care Management Review:  Christina Westbrook is a 63 y o  female here for TCM follow up  During the TCM phone call patient stated:    TCM Call (since 1/26/2021)     Date and time call was made  2/22/2021 11:38 AM    Patient was hospitialized at  [address]        Date of Admission  02/02/21    Date of discharge  02/20/21    Diagnosis  CVA    Disposition  Home    Were the patients medications reviewed and updated  Yes    Current Symptoms  None      TCM Call (since 1/26/2021)     Post hospital issues  None    Should patient be enrolled in anticoag monitoring? No    Scheduled for follow up? Yes    Did you obtain your prescribed medications  Yes    Do you need help managing your prescriptions or medications  No    Is transportation to your appointment needed  Yes    Specify why  DUE TO STROKE    I have advised the patient to call PCP with any new or worsening symptoms  LORNA CARVER, [address]    The type of support provided  Emotional; Physical    Do you have social support  Yes, as much as I need    Are you recieving any outpatient services  Yes    What type of services  VNA, OT, PT    Are you recieving home care services  Yes    Types of home care services  Home PT; Nurse visit    Are you using any community resources  No    Current waiver services  No    Have you fallen in the last 12 months  Yes    How many times  1    Interperter language line needed  No    Counseling  Patient          Elsy Cassidy MD    BMI Counseling: Body mass index is 35 71 kg/m²  The BMI is above normal  Nutrition recommendations include encouraging healthy choices of fruits and vegetables  Exercise recommendations include moderate physical activity 150 minutes/week
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
## 2021-03-05 ENCOUNTER — TELEPHONE (OUTPATIENT)
Dept: FAMILY MEDICINE CLINIC | Facility: CLINIC | Age: 66
End: 2021-03-05

## 2021-03-05 NOTE — TELEPHONE ENCOUNTER
Left message with Yadira Estrella to call back  Need clarification if insurance will cover arm glucometer and what pharmacy order needs to be placed

## 2021-03-05 NOTE — TELEPHONE ENCOUNTER
Alesia Leigh the visiting nurse from [facility] called in stating patient is still having a hard time remembering to check her sugar  Patients sugar was checked today a half hour after lunch and it was 231 but she hasn't been checking her sugars since the last time nakita was there  Alesia Leigh is wondering if we can order the freestyle arm glucometer instead of her having to prick her finger daily  Please review and advise  Thank you  Please contact Nakita at [phone] with any questions

## 2021-03-09 ENCOUNTER — TELEPHONE (OUTPATIENT)
Dept: FAMILY MEDICINE CLINIC | Facility: CLINIC | Age: 66
End: 2021-03-09

## 2021-03-09 DIAGNOSIS — E11.65 TYPE 2 DIABETES MELLITUS WITH HYPERGLYCEMIA, WITH LONG-TERM CURRENT USE OF INSULIN (HCC): Primary | Chronic | ICD-10-CM

## 2021-03-09 DIAGNOSIS — Z79.4 TYPE 2 DIABETES MELLITUS WITH HYPERGLYCEMIA, WITH LONG-TERM CURRENT USE OF INSULIN (HCC): Primary | Chronic | ICD-10-CM

## 2021-03-09 RX ORDER — INSULIN GLARGINE 100 [IU]/ML
40 INJECTION, SOLUTION SUBCUTANEOUS
Qty: 15 ML | Refills: 3 | Status: SHIPPED | OUTPATIENT
Start: 2021-03-09 | End: 2021-06-28

## 2021-03-09 NOTE — TELEPHONE ENCOUNTER
Suezanne Goodell called to say that patient received vial of Lantus insulin, but she cannot use this and needs pens  Have placed order

## 2021-03-09 NOTE — TELEPHONE ENCOUNTER
Spoke with Mikhail Zee this morning, she will check about patient pharmacy and coverage for arm glucometer  Pt had recent stroke and is therefore struggling to remember to check blood glucose with own glucometer  Awaiting call back from Mikhail Zee

## 2021-03-09 NOTE — TELEPHONE ENCOUNTER
Jeffrey Arriaga from [address] is calling regarding clinical review for need of Fani Zheng  Caller needs dx, recent office visit note, medication list  Please call to discuss specific details

## 2021-03-09 NOTE — TELEPHONE ENCOUNTER
As per Linsey Clarke from [employer] they received clinics on [place]  They require 3 patient identifiers for HIPPA requirements   Such as members ID number , Date of Birth , Name , Address   Call back with all the required information [phone] or fax [phone]

## 2021-03-23 ENCOUNTER — OFFICE VISIT (OUTPATIENT)
Dept: ENDOCRINOLOGY | Facility: CLINIC | Age: 66
End: 2021-03-23
Payer: COMMERCIAL

## 2021-03-23 VITALS
TEMPERATURE: 97.5 F | SYSTOLIC BLOOD PRESSURE: 128 MMHG | DIASTOLIC BLOOD PRESSURE: 72 MMHG | HEART RATE: 71 BPM | BODY MASS INDEX: 35.28 KG/M2 | WEIGHT: 212 LBS

## 2021-03-23 DIAGNOSIS — Z79.4 TYPE 2 DIABETES MELLITUS WITH HYPERGLYCEMIA, WITH LONG-TERM CURRENT USE OF INSULIN (HCC): Primary | Chronic | ICD-10-CM

## 2021-03-23 DIAGNOSIS — E11.65 TYPE 2 DIABETES MELLITUS WITH HYPERGLYCEMIA, WITH LONG-TERM CURRENT USE OF INSULIN (HCC): Primary | Chronic | ICD-10-CM

## 2021-03-23 DIAGNOSIS — I10 ESSENTIAL HYPERTENSION: ICD-10-CM

## 2021-03-23 DIAGNOSIS — E78.2 MIXED HYPERLIPIDEMIA: ICD-10-CM

## 2021-03-23 LAB
SL AMB POCT GLUCOSE BLD: 186
SL AMB POCT HEMOGLOBIN AIC: 9.3 (ref ?–6.5)

## 2021-03-23 PROCEDURE — 83036 HEMOGLOBIN GLYCOSYLATED A1C: CPT | Performed by: INTERNAL MEDICINE

## 2021-03-23 PROCEDURE — 82948 REAGENT STRIP/BLOOD GLUCOSE: CPT | Performed by: INTERNAL MEDICINE

## 2021-03-23 PROCEDURE — 99214 OFFICE O/P EST MOD 30 MIN: CPT | Performed by: INTERNAL MEDICINE

## 2021-03-23 RX ORDER — INSULIN GLARGINE 100 [IU]/ML
INJECTION, SOLUTION SUBCUTANEOUS
Qty: 10 ML | Refills: 0
Start: 2021-03-23 | End: 2021-08-03 | Stop reason: SDUPTHER

## 2021-03-23 RX ORDER — REPAGLINIDE 0.5 MG/1
0.5 TABLET ORAL
Qty: 60 TABLET | Refills: 4 | Status: SHIPPED | OUTPATIENT
Start: 2021-03-23 | End: 2021-09-08 | Stop reason: SDUPTHER

## 2021-03-23 RX ORDER — LINAGLIPTIN 5 MG/1
5 TABLET, FILM COATED ORAL DAILY
Qty: 30 TABLET | Refills: 3 | Status: SHIPPED | OUTPATIENT
Start: 2021-03-23 | End: 2021-08-19

## 2021-03-23 NOTE — PROGRESS NOTES
Saud Pérez 63 y o  female MRN: [id number]    Encounter: [id number]      Assessment/Plan     Assessment: This is a 63y o -year-old female with diabetes with hyperglycemia  Plan:    Diagnoses and all orders for this visit:    Type 2 diabetes mellitus with hyperglycemia, with long-term current use of insulin ([facility] [address] )    Lab Results   Component Value Date    HGBA1C 11 9 (H) 11/28/2020    previous A1c was 11 9%, today A1c in the office is 9%   will start Prandin 0 5 mg with breakfast and dinner as blood sugars elevated during the day  Will start Tradjenta 5 mg daily with breakfast   Will reduce Lantus to 35 units at bedtime  Discussed to check blood sugar twice daily and send log in 2 weeks for review  Goal for blood sugar is 80 to 160 mg/dL  Educated about hypoglycemia and treatment  -     repaglinide (PRANDIN) 0 5 mg tablet; Take 1 tablet (0 5 mg total) by mouth 2 (two) times a day before meals  -     linaGLIPtin (Tradjenta) 5 MG TABS; Take 5 mg by mouth daily  -     insulin glargine (LANTUS) 100 units/mL subcutaneous injection; Inject 35 unit at bedtime  -     Basic metabolic panel; Future  -     Hemoglobin A1C; Future  -     Microalbumin / creatinine urine ratio; Future    Essential hypertension   blood pressure well controlled   continue current management  Mixed hyperlipidemia   continue Lipitor 80 mg at bedtime      CC: Diabetes    History of Present Illness     HPI:    Saud Pérez  Is 81-year-old woman with medical history of type 2 diabetes with long-term insulin use is here for follow-up     she was recently admitted for stroke in the hospital, glipizide was discontinued  currently she is taking Lantus 40 units at bedtime  she is checking blood sugar 1-2 times daily      her blood sugars are usually in 100-220 mg/dL range/   her most recent A1c today from office is 9%     Diabetes control has been unstable, last A1c was 11 9%, she did not complete blood work before this appointment  Lab Results Component Value Date    HGBA1C 11 9 (H) 11/28/2020    for hypertension she takes metoprolol 50 mg twice a day  for hyperlipidemia she takes Lipitor 80 mg at bedtime  She also takes lisinopril 5 mg daily      Complications of diabetes   she could not complete blood work before this appointment, it was ordered in December 2020  Review of Systems   Constitutional: Positive for activity change (Because of stroke)  Negative for diaphoresis, fatigue, fever and unexpected weight change  She walks with walker   HENT: Negative  Eyes: Positive for redness  Negative for visual disturbance  Respiratory: Negative for cough, chest tightness and shortness of breath  Cardiovascular: Negative for chest pain, palpitations and leg swelling  Gastrointestinal: Negative for abdominal pain, constipation, diarrhea, nausea and vomiting  Endocrine: Negative for cold intolerance, heat intolerance, polydipsia, polyphagia and polyuria  Genitourinary: Positive for frequency (At night)  Negative for dysuria, enuresis and urgency  Musculoskeletal: Positive for arthralgias and myalgias  Skin: Negative for pallor, rash and wound  Allergic/Immunologic: Negative  Neurological: Negative for dizziness, tremors, weakness and numbness  Hematological: Negative  Psychiatric/Behavioral: Negative  Negative for decreased concentration  The patient is not nervous/anxious  Historical Information   Past Medical History:   Diagnosis Date    Abdominal fibromatosis     Diabetes mellitus ([facility] [address] )     Hyperlipemia     Hypertension     Pneumonia     Stroke [facility])      Past Surgical History:   Procedure Laterality Date    CATARACT EXTRACTION Bilateral     CATARACT EXTRACTION, BILATERAL      COLONOSCOPY      EGD      LAPAROTOMY      Exploratory;  Last Assessed 10/17/2017    PEG TUBE PLACEMENT      PEG TUBE REMOVAL       Social History   Social History     Substance and Sexual Activity   Alcohol Use Not Currently  Frequency: 2-4 times a month    Comment: Socially     Social History     Substance and Sexual Activity   Drug Use No     Social History     Tobacco Use   Smoking Status Never Smoker   Smokeless Tobacco Never Used     Family History:   Family History   Problem Relation Age of Onset    No Known Problems Mother     No Known Problems Father        Meds/Allergies   Current Outpatient Medications   Medication Sig Dispense Refill    ACCU-CHEK SOFTCLIX LANCETS lancets by Does not apply route      albuterol (VENTOLIN HFA) 90 mcg/act inhaler Inhale 2 puffs every 4 (four) hours as needed for wheezing 1 Inhaler 0    atorvastatin (LIPITOR) 80 mg tablet Take 1 tablet (80 mg total) by mouth daily with dinner 90 tablet 1    B-D ULTRAFINE III SHORT PEN 31G X 8 MM MISC use as directed 100 each 0    B-D ULTRAFINE III SHORT PEN 31G X 8 MM MISC USE AS DIRECTED 100 each 0    escitalopram (LEXAPRO) 5 mg tablet Take 1 tablet (5 mg total) by mouth daily 90 tablet 1    famotidine (PEPCID) 20 mg tablet Take 1 tablet (20 mg total) by mouth daily 90 tablet 1    insulin glargine (Lantus SoloStar) 100 units/mL injection pen Inject 40 Units under the skin daily at bedtime 15 mL 3    Insulin Pen Needle (PEN NEEDLES 5/16") 30G X 8 MM MISC by Does not apply route daily 50 each 0    lisinopril (ZESTRIL) 5 mg tablet Take 1 tablet (5 mg total) by mouth daily 90 tablet 1    metoprolol tartrate (LOPRESSOR) 50 mg tablet Take 1 tablet (50 mg total) by mouth every 12 (twelve) hours 180 tablet 1    pantoprazole (PROTONIX) 40 mg tablet Take 40 mg by mouth daily      rivaroxaban (XARELTO) 20 mg tablet Take 1 tablet (20 mg total) by mouth daily with breakfast 90 tablet 1    acetaminophen (TYLENOL) 325 mg tablet Take 2 tablets (650 mg total) by mouth 4 (four) times a day as needed for mild pain, headaches or fever (Patient not taking: Reported on 3/23/2021)  0    insulin glargine (LANTUS) 100 units/mL subcutaneous injection Inject 35 unit at bedtime 10 mL 0    linaGLIPtin (Tradjenta) 5 MG TABS Take 5 mg by mouth daily 30 tablet 3    repaglinide (PRANDIN) 0 5 mg tablet Take 1 tablet (0 5 mg total) by mouth 2 (two) times a day before meals 60 tablet 4     No current facility-administered medications for this visit  Allergies   Allergen Reactions    Apixaban Vomiting and GI Intolerance     Other reaction(s): Vomiting    Trulicity [Dulaglutide] Vomiting     Nausea vomiting       Objective   Vitals: Blood pressure 128/72, pulse 71, temperature 97 5 °F (36 4 °C), weight 96 2 kg (212 lb), not currently breastfeeding  Physical Exam  Vitals signs reviewed  Constitutional:       General: She is not in acute distress  Appearance: Normal appearance  She is not ill-appearing  HENT:      Head: Normocephalic and atraumatic  Nose: Nose normal    Eyes:      Extraocular Movements: Extraocular movements intact  Conjunctiva/sclera: Conjunctivae normal    Neck:      Musculoskeletal: Normal range of motion and neck supple  Cardiovascular:      Rate and Rhythm: Normal rate and regular rhythm  Pulses: Normal pulses  Heart sounds: Normal heart sounds  Pulmonary:      Effort: Pulmonary effort is normal  No respiratory distress  Breath sounds: Normal breath sounds  Musculoskeletal: Normal range of motion  General: No swelling  Skin:     General: Skin is warm  Neurological:      General: No focal deficit present  Mental Status: She is alert and oriented to person, place, and time  Psychiatric:         Mood and Affect: Mood normal          Behavior: Behavior normal          The history was obtained from the review of the chart, patient      Lab Results:   Lab Results   Component Value Date/Time    Hemoglobin A1C 11 9 (H) 11/28/2020 08:04 AM    Hemoglobin A1C 12 5 (A) 11/27/2020 10:21 AM    Hemoglobin A1C 12 0 (H) 06/05/2020 09:17 AM    WBC 8 40 02/03/2021 05:45 AM    WBC 7 60 01/31/2021 05:13 AM    WBC 7 50 01/30/2021 09:17 AM    Hemoglobin 12 8 02/03/2021 05:45 AM    Hemoglobin 12 6 01/31/2021 05:13 AM    Hemoglobin 12 9 01/30/2021 09:17 AM    Hematocrit 39 0 (L) 02/03/2021 05:45 AM    Hematocrit 38 4 (L) 01/31/2021 05:13 AM    Hematocrit 39 5 (L) 01/30/2021 09:17 AM    MCV 92 02/03/2021 05:45 AM    MCV 92 01/31/2021 05:13 AM    MCV 91 01/30/2021 09:17 AM    Platelets 894 15/23/3560 05:45 AM    Platelets 819 62/19/8081 05:13 AM    Platelets 505 61/96/4302 09:17 AM    BUN 29 (H) 02/03/2021 05:45 AM    BUN 28 (H) 01/31/2021 05:13 AM    BUN 36 (H) 01/30/2021 09:17 AM    Potassium 3 8 02/03/2021 05:45 AM    Potassium 3 8 01/31/2021 05:13 AM    Potassium 4 4 01/30/2021 09:17 AM    Chloride 100 02/03/2021 05:45 AM    Chloride 104 01/31/2021 05:13 AM    Chloride 103 01/30/2021 09:17 AM    CO2 29 02/03/2021 05:45 AM    CO2 27 01/31/2021 05:13 AM    CO2 27 01/30/2021 09:17 AM    Creatinine 1 06 02/03/2021 05:45 AM    Creatinine 0 90 01/31/2021 05:13 AM    Creatinine 0 99 01/30/2021 09:17 AM    AST 11 (L) 01/31/2021 05:13 AM    AST 14 01/30/2021 09:17 AM    AST 18 11/28/2020 08:04 AM    ALT 12 01/31/2021 05:13 AM    ALT 14 01/30/2021 09:17 AM    ALT 21 11/28/2020 08:04 AM    Albumin 3 6 01/31/2021 05:13 AM    Albumin 4 0 01/30/2021 09:17 AM    Albumin 3 5 11/28/2020 08:04 AM    HDL, Direct 56 01/30/2021 09:17 AM    HDL, Direct 62 11/28/2020 08:04 AM    HDL, Direct 69 06/05/2020 09:17 AM    Triglycerides 90 01/30/2021 09:17 AM    Triglycerides 155 (H) 11/28/2020 08:04 AM    Triglycerides 128 06/05/2020 09:17 AM           Imaging Studies: I have personally reviewed pertinent reports  Portions of the record may have been created with voice recognition software  Occasional wrong word or "sound a like" substitutions may have occurred due to the inherent limitations of voice recognition software  Read the chart carefully and recognize, using context, where substitutions have occurred

## 2021-03-23 NOTE — PATIENT INSTRUCTIONS
Take prandin 0 5 mg before breakfast and dinner   Take tradjenta 5 mg po daily   Take lantus 35 units at bedtime   Check blood sugars twice a day, and goal for blood sugars 80- 160 mg/dl       Hypoglycemia instructions   [facility]  3/23/[facility]21  [id number]    Low Blood Sugar    Steps to treat low blood sugar  1  Test blood sugar if you have symptoms of low blood sugar:   Low Blood Sugar Symptoms:  o Sweaty  o Dizzy  o Rapid heartbeat  o Shaky    o Bad mood  o Hungry      2  Treat blood sugar less than 70 with 15 grams of fast-acting carbohydrate:   Examples of 15 grams Fast-Acting Carbohydrate:  o 4 oz juice  o 4 oz regular soda  o 3-4 glucose tablets (chew)  o 3-4 hard candies (chew)              3    Wait 15 minutes and test your blood sugar again           4   If blood sugar is less than 100, repeat steps 2-3       5  When your blood sugar is 100 or more, eat a snack if it will be longer than one hour until your next meal  The snack should be 15 grams of carbohydrate and a protein:   Examples of snacks:  o ½ sandwich  o 6 crackers with cheese  o Piece of fruit with cheese or peanut butter  o 6 crackers with peanut butter

## 2021-03-30 ENCOUNTER — TELEPHONE (OUTPATIENT)
Dept: ENDOCRINOLOGY | Facility: CLINIC | Age: 66
End: 2021-03-30

## 2021-03-30 NOTE — TELEPHONE ENCOUNTER
lucy rubin called  She will not be seeing the pt then in June when her labs are due    So she is asking if we can put new orders in for these labs and she will let the pt know to call and set it up herself for home draw

## 2021-04-29 ENCOUNTER — TELEPHONE (OUTPATIENT)
Dept: FAMILY MEDICINE CLINIC | Facility: CLINIC | Age: 66
End: 2021-04-29

## 2021-04-30 ENCOUNTER — TELEPHONE (OUTPATIENT)
Dept: FAMILY MEDICINE CLINIC | Facility: CLINIC | Age: 66
End: 2021-04-30

## 2021-04-30 DIAGNOSIS — E11.65 TYPE 2 DIABETES MELLITUS WITH HYPERGLYCEMIA, WITH LONG-TERM CURRENT USE OF INSULIN (HCC): Primary | Chronic | ICD-10-CM

## 2021-04-30 DIAGNOSIS — Z79.4 TYPE 2 DIABETES MELLITUS WITH HYPERGLYCEMIA, WITH LONG-TERM CURRENT USE OF INSULIN (HCC): Primary | Chronic | ICD-10-CM

## 2021-04-30 RX ORDER — BLOOD SUGAR DIAGNOSTIC
STRIP MISCELLANEOUS
Qty: 100 STRIP | Refills: 3 | Status: SHIPPED | OUTPATIENT
Start: 2021-04-30 | End: 2022-01-04 | Stop reason: HOSPADM

## 2021-04-30 RX ORDER — BLOOD-GLUCOSE METER
EACH MISCELLANEOUS 2 TIMES DAILY
Qty: 1 KIT | Refills: 0 | Status: SHIPPED | OUTPATIENT
Start: 2021-04-30

## 2021-04-30 RX ORDER — LANCETS
EACH MISCELLANEOUS 2 TIMES DAILY
Qty: 102 EACH | Refills: 3 | Status: SHIPPED | OUTPATIENT
Start: 2021-04-30

## 2021-04-30 NOTE — TELEPHONE ENCOUNTER
Gary Soto from patient insurance called regarding need for patient to have test strips and lancets changed to meet new formulary  Test strips need to be Accuchek Guide and lancets need to be FastClix  Patient uses [facility]  Pt was seen in February  Have placed orders

## 2021-05-03 DIAGNOSIS — E11.9 TYPE 2 DIABETES MELLITUS WITHOUT COMPLICATION, WITH LONG-TERM CURRENT USE OF INSULIN (HCC): ICD-10-CM

## 2021-05-03 DIAGNOSIS — Z79.4 TYPE 2 DIABETES MELLITUS WITHOUT COMPLICATION, WITH LONG-TERM CURRENT USE OF INSULIN (HCC): ICD-10-CM

## 2021-05-05 RX ORDER — PEN NEEDLE, DIABETIC 31 GX5/16"
NEEDLE, DISPOSABLE MISCELLANEOUS
Qty: 100 EACH | Refills: 0 | Status: SHIPPED | OUTPATIENT
Start: 2021-05-05 | End: 2021-08-04

## 2021-05-26 ENCOUNTER — OFFICE VISIT (OUTPATIENT)
Dept: FAMILY MEDICINE CLINIC | Facility: CLINIC | Age: 66
End: 2021-05-26
Payer: COMMERCIAL

## 2021-05-26 VITALS
DIASTOLIC BLOOD PRESSURE: 80 MMHG | WEIGHT: 206 LBS | SYSTOLIC BLOOD PRESSURE: 134 MMHG | TEMPERATURE: 97 F | HEIGHT: 65 IN | BODY MASS INDEX: 34.32 KG/M2

## 2021-05-26 DIAGNOSIS — R49.0 DYSPHONIA: ICD-10-CM

## 2021-05-26 DIAGNOSIS — E11.65 TYPE 2 DIABETES MELLITUS WITH HYPERGLYCEMIA, WITH LONG-TERM CURRENT USE OF INSULIN (HCC): Primary | Chronic | ICD-10-CM

## 2021-05-26 DIAGNOSIS — I48.0 PAROXYSMAL ATRIAL FIBRILLATION (HCC): ICD-10-CM

## 2021-05-26 DIAGNOSIS — E11.36 DIABETIC CATARACT (HCC): ICD-10-CM

## 2021-05-26 DIAGNOSIS — I63.9 CEREBROVASCULAR ACCIDENT (CVA), UNSPECIFIED MECHANISM (HCC): ICD-10-CM

## 2021-05-26 DIAGNOSIS — Z79.4 TYPE 2 DIABETES MELLITUS WITH HYPERGLYCEMIA, WITH LONG-TERM CURRENT USE OF INSULIN (HCC): Primary | Chronic | ICD-10-CM

## 2021-05-26 PROCEDURE — 99214 OFFICE O/P EST MOD 30 MIN: CPT | Performed by: FAMILY MEDICINE

## 2021-05-28 NOTE — PROGRESS NOTES
Assessment/Plan:    79-year-old woman with: 2 diabetic with diabetic cataract, history of stroke and previous  respiratory failure with resulting dysphonia  Discussed workup and treatment options with risks and benefits will continue current medications will refer to Ophthalmology encouraged follow-up with ENT in her other specialists discussed supportive care return parameters and follow-up in 6 months    No problem-specific Assessment & Plan notes found for this encounter  Diagnoses and all orders for this visit:    Type 2 diabetes mellitus with hyperglycemia, with long-term current use of insulin ([facility] [address] )  -     Ambulatory referral to Ophthalmology; Future    Paroxysmal atrial fibrillation (HCC)  -     rivaroxaban (XARELTO) 20 mg tablet; Take 1 tablet (20 mg total) by mouth daily with breakfast    Diabetic cataract ([address] )    Cerebrovascular accident (CVA), unspecified mechanism ([address] )    Dysphonia    Other orders  -     Cancel: HIV 1/2 Antigen/Antibody (4th Generation) w Reflex [facility]; Future          Subjective:     Chief Complaint   Patient presents with    Follow-up     3 month follow up  Patient would like to know if she is cleared to drive,        Patient ID: Ty Siddiqui is a 63 y o  female  Patient is a 79-year-old woman who presents for follow-up on type 2 diabetic with diabetic cataract, history of stroke and previous  respiratory failure with resulting dysphonia patient admits being stable on medications denies acute complaints at this time no fevers chills nausea vomiting tolerating p o  Intake no other complaints at this time acutely      The following portions of the patient's history were reviewed and updated as appropriate: allergies, current medications, past family history, past medical history, past social history, past surgical history and problem list     Review of Systems   Constitutional: Negative  HENT: Negative  Eyes: Negative  Respiratory: Negative      Cardiovascular: Negative  Gastrointestinal: Negative  Endocrine: Negative  Genitourinary: Negative  Musculoskeletal: Negative  Allergic/Immunologic: Negative  Neurological: Negative  Hematological: Negative  Psychiatric/Behavioral: Negative  All other systems reviewed and are negative  Objective:      /80 (BP Location: Right arm, Patient Position: Sitting, Cuff Size: Adult)   Temp (!) 97 °F (36 1 °C) (Tympanic)   Ht 5' 5" (1 651 m)   Wt 93 4 kg (206 lb)   LMP  (LMP Unknown)   BMI 34 28 kg/m²          Physical Exam  Constitutional:       Appearance: She is well-developed  HENT:      Head: Atraumatic  Right Ear: External ear normal       Left Ear: External ear normal    Eyes:      Conjunctiva/sclera: Conjunctivae normal       Pupils: Pupils are equal, round, and reactive to light  Neck:      Musculoskeletal: Normal range of motion  Cardiovascular:      Rate and Rhythm: Normal rate and regular rhythm  Heart sounds: Normal heart sounds  Pulmonary:      Effort: Pulmonary effort is normal  No respiratory distress  Breath sounds: Normal breath sounds  Abdominal:      General: Bowel sounds are normal  There is no distension  Palpations: Abdomen is soft  Tenderness: There is no abdominal tenderness  There is no guarding or rebound  Musculoskeletal: Normal range of motion  Skin:     General: Skin is warm and dry  Neurological:      Mental Status: She is alert and oriented to person, place, and time  Cranial Nerves: No cranial nerve deficit  Psychiatric:         Behavior: Behavior normal          Thought Content: Thought content normal          Judgment: Judgment normal          BMI Counseling: Body mass index is 34 28 kg/m²  The BMI is above normal  Nutrition recommendations include encouraging healthy choices of fruits and vegetables  Exercise recommendations include moderate physical activity 150 minutes/week

## 2021-06-25 ENCOUNTER — TELEPHONE (OUTPATIENT)
Dept: LAB | Facility: HOSPITAL | Age: 66
End: 2021-06-25

## 2021-06-25 NOTE — TELEPHONE ENCOUNTER
Patient's appt is Monday morning, will have to go to [facility] lab tomorrow morning

## 2021-06-26 ENCOUNTER — LAB (OUTPATIENT)
Dept: LAB | Facility: CLINIC | Age: 66
End: 2021-06-26
Payer: COMMERCIAL

## 2021-06-26 DIAGNOSIS — J38.6 POSTERIOR GLOTTIC STENOSIS: ICD-10-CM

## 2021-06-26 DIAGNOSIS — E11.65 TYPE 2 DIABETES MELLITUS WITH HYPERGLYCEMIA, WITH LONG-TERM CURRENT USE OF INSULIN (HCC): Chronic | ICD-10-CM

## 2021-06-26 DIAGNOSIS — R06.02 SHORTNESS OF BREATH: ICD-10-CM

## 2021-06-26 DIAGNOSIS — Z79.4 TYPE 2 DIABETES MELLITUS WITH HYPERGLYCEMIA, WITH LONG-TERM CURRENT USE OF INSULIN (HCC): Chronic | ICD-10-CM

## 2021-06-26 DIAGNOSIS — K21.9 GASTROESOPHAGEAL REFLUX DISEASE, UNSPECIFIED WHETHER ESOPHAGITIS PRESENT: ICD-10-CM

## 2021-06-26 DIAGNOSIS — R13.14 PHARYNGOESOPHAGEAL DYSPHAGIA: ICD-10-CM

## 2021-06-26 DIAGNOSIS — J38.02 BILATERAL VOCAL FOLD PARESIS: ICD-10-CM

## 2021-06-26 DIAGNOSIS — R49.0 DYSPHONIA: ICD-10-CM

## 2021-06-26 LAB
ANION GAP SERPL CALCULATED.3IONS-SCNC: 6 MMOL/L (ref 4–13)
BUN SERPL-MCNC: 21 MG/DL (ref 5–25)
CALCIUM SERPL-MCNC: 9 MG/DL (ref 8.3–10.1)
CHLORIDE SERPL-SCNC: 108 MMOL/L (ref 100–108)
CO2 SERPL-SCNC: 25 MMOL/L (ref 21–32)
CREAT SERPL-MCNC: 0.89 MG/DL (ref 0.6–1.3)
EST. AVERAGE GLUCOSE BLD GHB EST-MCNC: 166 MG/DL
GFR SERPL CREATININE-BSD FRML MDRD: 69 ML/MIN/1.73SQ M
GLUCOSE P FAST SERPL-MCNC: 97 MG/DL (ref 65–99)
HBA1C MFR BLD: 7.4 %
POTASSIUM SERPL-SCNC: 4.1 MMOL/L (ref 3.5–5.3)
SODIUM SERPL-SCNC: 139 MMOL/L (ref 136–145)

## 2021-06-26 PROCEDURE — 83516 IMMUNOASSAY NONANTIBODY: CPT

## 2021-06-26 PROCEDURE — 86618 LYME DISEASE ANTIBODY: CPT

## 2021-06-26 PROCEDURE — 86038 ANTINUCLEAR ANTIBODIES: CPT

## 2021-06-26 PROCEDURE — 83036 HEMOGLOBIN GLYCOSYLATED A1C: CPT

## 2021-06-26 PROCEDURE — 80048 BASIC METABOLIC PNL TOTAL CA: CPT

## 2021-06-26 PROCEDURE — 36415 COLL VENOUS BLD VENIPUNCTURE: CPT

## 2021-06-26 PROCEDURE — 86003 ALLG SPEC IGE CRUDE XTRC EA: CPT

## 2021-06-26 PROCEDURE — 86430 RHEUMATOID FACTOR TEST QUAL: CPT

## 2021-06-28 ENCOUNTER — OFFICE VISIT (OUTPATIENT)
Dept: ENDOCRINOLOGY | Facility: CLINIC | Age: 66
End: 2021-06-28
Payer: COMMERCIAL

## 2021-06-28 VITALS
BODY MASS INDEX: 35.32 KG/M2 | DIASTOLIC BLOOD PRESSURE: 96 MMHG | WEIGHT: 212 LBS | SYSTOLIC BLOOD PRESSURE: 134 MMHG | HEART RATE: 72 BPM | TEMPERATURE: 98.3 F | HEIGHT: 65 IN

## 2021-06-28 DIAGNOSIS — E11.65 TYPE 2 DIABETES MELLITUS WITH HYPERGLYCEMIA, WITH LONG-TERM CURRENT USE OF INSULIN (HCC): Primary | ICD-10-CM

## 2021-06-28 DIAGNOSIS — Z79.4 TYPE 2 DIABETES MELLITUS WITH HYPERGLYCEMIA, WITH LONG-TERM CURRENT USE OF INSULIN (HCC): Primary | ICD-10-CM

## 2021-06-28 DIAGNOSIS — E78.2 MIXED HYPERLIPIDEMIA: ICD-10-CM

## 2021-06-28 DIAGNOSIS — I10 ESSENTIAL HYPERTENSION: ICD-10-CM

## 2021-06-28 LAB
RHEUMATOID FACT SER QL LA: NEGATIVE
RYE IGE QN: NEGATIVE

## 2021-06-28 PROCEDURE — 99214 OFFICE O/P EST MOD 30 MIN: CPT | Performed by: PHYSICIAN ASSISTANT

## 2021-06-28 NOTE — PROGRESS NOTES
Patient Progress Note      CC: DM      Referring Provider  Imani Vogt Md  [address] [place] [address],  [address] [facility]     History of Present Illness:   Maciej Weinberg is a 63 y o  female with a history of type 2 diabetes with long term use of insulin  Diabetes course has been stable  Denies recent illness or hospitalizations  Denies any issues with her current regimen  Home glucose monitoring: are not performed as she is having issues with her meter  Will have staff teach her today how to use accuchek meter       Current regimen: Lantus 35 units QHS, Tradjenta 5 mg daily, Prandin 0 5 mg BID  Compliant most of the time, denies any side effects from medications  Injects in: abdomen  Rotates sites: Yes  Hypoglycemic episodes: No, never  H/o of hypoglycemia causing hospitalization or Intervention such as glucagon injection  or ambulance call :  No  Hypoglycemia symptoms: never less than 70 mg/dl  Treatment of hypoglycemia: discussed treatment     Medic alert tag: recommended: Yes     Diabetes education: Yes  Diet: 3 meals per day, 1-2 snack per day  Timing of meals is predictable  Diabetic diet compliance:  noncompliant some of the time  Activity: Daily activity is predictable: Yes  No routine exercise              Ophthamology: May 2021, retinopathy +  Podiatry: foot exam UTD, May 2021     Has hypertension: on ACE inhibitor/ARB, compliant most of the time  Has hyperlipidemia: on statin - tolerating well, no myalgias  compliant most of the time, denies any side effects from medications  Thyroid disorders: No  History of pancreatitis: No    Patient Active Problem List   Diagnosis    Paroxysmal atrial fibrillation (HCC)    Stroke ([facility] [address] )    Blurry vision    Diabetic cataract ([facility] )    Dysphagia    Dysphonia    Glottic stenosis    Granulation tissue of site of tracheostomy    Heart disease    Insomnia    Incomplete emptying of bladder    Nausea    Severe obesity (BMI 35 0-39  9) with comorbidity ([address] )  Tracheal stenosis    Type 2 diabetes mellitus with hyperglycemia, with long-term current use of insulin (HCC)    Acute bronchitis due to other specified organisms    GERD (gastroesophageal reflux disease)    Hyperlipidemia    Edema    Diarrhea    Essential hypertension    Routine health maintenance    Current episode of major depressive disorder without prior episode    Medicare annual wellness visit, subsequent    YOLIE (obstructive sleep apnea)    Dermatitis    Muscle tension dysphonia    Reflux laryngitis    Glottic insufficiency    History of stroke    Weakness of both lower extremities      Past Medical History:   Diagnosis Date    Abdominal fibromatosis     Diabetes mellitus ([address] )     Hyperlipemia     Hypertension     Pneumonia     Stroke [facility])       Past Surgical History:   Procedure Laterality Date    CATARACT EXTRACTION Bilateral     CATARACT EXTRACTION, BILATERAL      COLONOSCOPY      EGD      LAPAROTOMY      Exploratory;  Last Assessed 10/17/2017    PEG TUBE PLACEMENT      PEG TUBE REMOVAL        Family History   Problem Relation Age of Onset    No Known Problems Mother     No Known Problems Father      Social History     Tobacco Use    Smoking status: Never Smoker    Smokeless tobacco: Never Used   Substance Use Topics    Alcohol use: Not Currently     Comment: Socially     Allergies   Allergen Reactions    Apixaban Vomiting and GI Intolerance     Other reaction(s): Vomiting    Trulicity [Dulaglutide] Vomiting     Nausea vomiting         Current Outpatient Medications:     Accu-Chek FastClix Lancets MISC, Use 2 (two) times a day, Disp: 102 each, Rfl: 3    acetaminophen (TYLENOL) 325 mg tablet, Take 2 tablets (650 mg total) by mouth 4 (four) times a day as needed for mild pain, headaches or fever, Disp: , Rfl: 0    albuterol (VENTOLIN HFA) 90 mcg/act inhaler, Inhale 2 puffs every 4 (four) hours as needed for wheezing, Disp: 1 Inhaler, Rfl: 0    atorvastatin (LIPITOR) 80 mg tablet, Take 1 tablet (80 mg total) by mouth daily with dinner, Disp: 90 tablet, Rfl: 1    B-D ULTRAFINE III SHORT PEN 31G X 8 MM MISC, use as directed, Disp: 100 each, Rfl: 0    B-D ULTRAFINE III SHORT PEN 31G X 8 MM MISC, USE AS DIRECTED, Disp: 100 each, Rfl: 0    Blood Glucose Monitoring Suppl (Accu-Chek Guide) w/Device KIT, Use 2 (two) times a day, Disp: 1 kit, Rfl: 0    escitalopram (LEXAPRO) 5 mg tablet, Take 1 tablet (5 mg total) by mouth daily, Disp: 90 tablet, Rfl: 1    famotidine (PEPCID) 20 mg tablet, Take 1 tablet (20 mg total) by mouth daily, Disp: 90 tablet, Rfl: 1    glucose blood (Accu-Chek Guide) test strip, Patient tests blood glucose twice daily, Disp: 100 strip, Rfl: 3    insulin glargine (LANTUS) 100 units/mL subcutaneous injection, Inject 35 unit at bedtime, Disp: 10 mL, Rfl: 0    Insulin Pen Needle (PEN NEEDLES 5/16") 30G X 8 MM MISC, by Does not apply route daily, Disp: 50 each, Rfl: 0    linaGLIPtin (Tradjenta) 5 MG TABS, Take 5 mg by mouth daily, Disp: 30 tablet, Rfl: 3    lisinopril (ZESTRIL) 5 mg tablet, Take 1 tablet (5 mg total) by mouth daily, Disp: 90 tablet, Rfl: 1    metoprolol tartrate (LOPRESSOR) 50 mg tablet, Take 1 tablet (50 mg total) by mouth every 12 (twelve) hours, Disp: 180 tablet, Rfl: 1    pantoprazole (PROTONIX) 40 mg tablet, Take 40 mg by mouth daily, Disp: , Rfl:     repaglinide (PRANDIN) 0 5 mg tablet, Take 1 tablet (0 5 mg total) by mouth 2 (two) times a day before meals, Disp: 60 tablet, Rfl: 4    rivaroxaban (XARELTO) 20 mg tablet, Take 1 tablet (20 mg total) by mouth daily with breakfast, Disp: 90 tablet, Rfl: 1  Review of Systems   Constitutional: Negative for activity change, appetite change, fatigue and unexpected weight change  HENT: Negative for trouble swallowing  Eyes: Negative for visual disturbance  Respiratory: Negative for shortness of breath  Cardiovascular: Negative for chest pain and palpitations  Gastrointestinal: Positive for diarrhea  Negative for constipation  Endocrine: Negative for polydipsia and polyuria  Musculoskeletal: Negative  Skin: Negative for wound  Neurological: Negative for numbness  Psychiatric/Behavioral: Negative  Physical Exam:  Body mass index is 35 28 kg/m²  /96   Pulse 72   Temp 98 3 °F (36 8 °C) (Tympanic)   Ht 5' 5" (1 651 m)   Wt 96 2 kg (212 lb)   LMP  (LMP Unknown)   BMI 35 28 kg/m²    Wt Readings from Last 3 Encounters:   06/28/21 96 2 kg (212 lb)   05/26/21 93 4 kg (206 lb)   03/23/21 96 2 kg (212 lb)       Physical Exam  Vitals and nursing note reviewed  Constitutional:       Appearance: She is well-developed  HENT:      Head: Normocephalic  Eyes:      General: No scleral icterus  Pupils: Pupils are equal, round, and reactive to light  Neck:      Thyroid: No thyromegaly  Cardiovascular:      Rate and Rhythm: Normal rate and regular rhythm  Pulses:           Radial pulses are 2+ on the right side and 2+ on the left side  Heart sounds: No murmur heard  Pulmonary:      Effort: Pulmonary effort is normal  No respiratory distress  Breath sounds: Normal breath sounds  No wheezing  Musculoskeletal:      Cervical back: Neck supple  Skin:     General: Skin is warm and dry  Neurological:      Mental Status: She is alert  Patient's shoes and socks were not removed        Labs:   Component      Latest Ref Rng & Units 11/28/2020 3/23/2021 6/26/2021   Sodium      136 - 145 mmol/L 133 (L)  139   Potassium      3 5 - 5 3 mmol/L 4 8  4 1   Chloride      100 - 108 mmol/L 104  108   CO2      21 - 32 mmol/L 26  25   Anion Gap      4 - 13 mmol/L 3 (L)  6   BUN      5 - 25 mg/dL 29 (H)  21   Creatinine      0 60 - 1 30 mg/dL 1 21  0 89   GLUCOSE FASTING      65 - 99 mg/dL 326 (H)  97   Calcium      8 3 - 10 1 mg/dL 9 2  9 0   AST      5 - 45 U/L 18     ALT      12 - 78 U/L 21     Alkaline Phosphatase      46 - 116 U/L 63 Total Protein      6 4 - 8 2 g/dL 7 6     Albumin      3 5 - 5 0 g/dL 3 5     TOTAL BILIRUBIN      0 20 - 1 00 mg/dL 0 56     eGFR      ml/min/1 73sq m 48  69   Cholesterol      50 - 200 mg/dL 167     Triglycerides      <=150 mg/dL 155 (H)     HDL      >=40 mg/dL 62     LDL Calculated      0 - 100 mg/dL 74     Hemoglobin A1C      Normal 3 8-5 6%; PreDiabetic 5 7-6 4%; Diabetic >=6 5%; Glycemic control for adults with diabetes <7 0% % 11 9 (H) 9 3 (A) 7 4 (H)   eAG, EST AVG Glucose      mg/dl 295  166       Plan:    Diagnoses and all orders for this visit:    Type 2 diabetes mellitus with hyperglycemia, with long-term current use of insulin (HCC)  HGA1C 7 4%  Improved  Treatment regimen: continue current treatment  Advised to check BG levels twice a day and send log  Discussed intensive insulin regimen does increase risk for hypoglycemia  Episodes of hypoglycemia can lead to permanent disability and death  Discussed risks/complications associated with uncontrolled diabetes  Advised to adhere to diabetic diet, and recommended staying active/exercising routinely as tolerated  Keep carbohydrates consistent to limit blood glucose fluctuations  Advised to call if blood sugars less than 70 mg/dl or over 300 mg/dl  Check blood glucose 2 times a day  Discussed symptoms and treatment of hypoglycemia  Discussed use of CGM to collect additional blood glucose data to reveal trends and patterns that can be used to optimize treatment plan  Recommended routine follow-up with podiatry and ophthalmology  Send log in 2 weeks  Ordered blood work to complete prior to next visit  -     Hemoglobin A1C; Future  -     Basic metabolic panel; Future  -     Microalbumin / creatinine urine ratio;  Future    Essential hypertension  Blood pressure elevated  Advised patient to monitor BP at home and follow-up with PCP if remaining elevated  For now continue current treatment  BP Readings from Last 3 Encounters:   06/28/21 134/96   05/26/21 134/80   03/23/21 128/72         -     Basic metabolic panel; Future    Mixed hyperlipidemia  LDL previously 74  Continue statin therapy  Managed by PCP       Discussed with the patient diagnosis and treatment and all questions fully answered  She will call me if any problems arise  Counseled patient on diagnostic results, prognosis, risk and benefit of treatment options, instruction for management, importance of treatment compliance, risk factor reduction and impressions        Dayan Smith PA-C

## 2021-06-28 NOTE — PATIENT INSTRUCTIONS
Hypoglycemia instructions   Sasha Armstrong  6/28/2021  [id number]    Low Blood Sugar    Steps to treat low blood sugar  1  Test blood sugar if you have symptoms of low blood sugar:   Low Blood Sugar Symptoms:  o Sweaty  o Dizzy  o Rapid heartbeat  o Shaky  o Bad mood  o Hungry      2  Treat blood sugar less than 70 with 15 grams of fast-acting carbohydrate:   Examples of 15 grams Fast-Acting Carbohydrate:  o 4 oz juice  o 4 oz regular soda  o 3-4 glucose tablets (chew)  o 3-4 hard candies (chew)          3  Wait 15 minutes and test your blood sugar again     4   If blood sugar is less than 100, repeat steps 2-3     5  When your blood sugar is 100 or more, eat a snack if it will be longer than one hour until your next meal  The snack should be 15 grams of carbohydrate and a protein:   Examples of snacks:  o ½ sandwich  o 6 crackers with cheese  o Piece of fruit with cheese or peanut butter  o 6 crackers with peanut butter

## 2021-06-29 LAB — B BURGDOR IGG+IGM SER-ACNC: 25

## 2021-07-05 LAB
GLIADIN IGG SER IA-ACNC: 2 UNITS (ref 0–19)
GLIADIN PEPTIDE+TTG IGA+IGG SER QL IA: NEGATIVE
Lab: NORMAL
NOTE: NORMAL
WHEAT IGE QN: <0.1 KU/L

## 2021-08-02 DIAGNOSIS — E11.65 TYPE 2 DIABETES MELLITUS WITH HYPERGLYCEMIA, WITH LONG-TERM CURRENT USE OF INSULIN (HCC): Chronic | ICD-10-CM

## 2021-08-02 DIAGNOSIS — Z79.4 TYPE 2 DIABETES MELLITUS WITH HYPERGLYCEMIA, WITH LONG-TERM CURRENT USE OF INSULIN (HCC): Chronic | ICD-10-CM

## 2021-08-02 RX ORDER — INSULIN GLARGINE 100 [IU]/ML
INJECTION, SOLUTION SUBCUTANEOUS
Qty: 10 ML | Refills: 0 | Status: CANCELLED
Start: 2021-08-02

## 2021-08-03 DIAGNOSIS — Z79.4 TYPE 2 DIABETES MELLITUS WITH HYPERGLYCEMIA, WITH LONG-TERM CURRENT USE OF INSULIN (HCC): Chronic | ICD-10-CM

## 2021-08-03 DIAGNOSIS — E11.65 TYPE 2 DIABETES MELLITUS WITH HYPERGLYCEMIA, WITH LONG-TERM CURRENT USE OF INSULIN (HCC): Chronic | ICD-10-CM

## 2021-08-03 RX ORDER — INSULIN GLARGINE 100 [IU]/ML
INJECTION, SOLUTION SUBCUTANEOUS
Qty: 10 ML | Refills: 0
Start: 2021-08-03 | End: 2021-08-03 | Stop reason: SDUPTHER

## 2021-08-04 RX ORDER — INSULIN GLARGINE 100 [IU]/ML
INJECTION, SOLUTION SUBCUTANEOUS
Qty: 10 ML | Refills: 0 | Status: SHIPPED | OUTPATIENT
Start: 2021-08-04 | End: 2021-08-05 | Stop reason: SDUPTHER

## 2021-08-05 DIAGNOSIS — Z79.4 TYPE 2 DIABETES MELLITUS WITH HYPERGLYCEMIA, WITH LONG-TERM CURRENT USE OF INSULIN (HCC): Chronic | ICD-10-CM

## 2021-08-05 DIAGNOSIS — E11.65 TYPE 2 DIABETES MELLITUS WITH HYPERGLYCEMIA, WITH LONG-TERM CURRENT USE OF INSULIN (HCC): Chronic | ICD-10-CM

## 2021-08-05 RX ORDER — FLUOXETINE 20 MG/1
20 TABLET ORAL DAILY
COMMUNITY
End: 2022-01-04 | Stop reason: HOSPADM

## 2021-08-05 RX ORDER — INSULIN GLARGINE 100 [IU]/ML
INJECTION, SOLUTION SUBCUTANEOUS
Qty: 10 ML | Refills: 0 | Status: SHIPPED | OUTPATIENT
Start: 2021-08-05 | End: 2021-09-08 | Stop reason: SDUPTHER

## 2021-08-05 NOTE — TELEPHONE ENCOUNTER
Pharmacy called regarding the med lantus -solistar pen - this is what the patient is requesting     She is at the pharmacy now  and it was sent to the wrong [facility]

## 2021-08-05 NOTE — TELEPHONE ENCOUNTER
Patient called in stating the pharmacy can not fill the pens for her insulin   Contacted pharmacy and

## 2021-08-06 DIAGNOSIS — Z79.4 TYPE 2 DIABETES MELLITUS WITH HYPERGLYCEMIA, WITH LONG-TERM CURRENT USE OF INSULIN (HCC): Primary | ICD-10-CM

## 2021-08-06 DIAGNOSIS — Z79.4 TYPE 2 DIABETES MELLITUS WITH HYPERGLYCEMIA, WITH LONG-TERM CURRENT USE OF INSULIN (HCC): ICD-10-CM

## 2021-08-06 DIAGNOSIS — E11.65 TYPE 2 DIABETES MELLITUS WITH HYPERGLYCEMIA, WITH LONG-TERM CURRENT USE OF INSULIN (HCC): ICD-10-CM

## 2021-08-06 DIAGNOSIS — E11.65 TYPE 2 DIABETES MELLITUS WITH HYPERGLYCEMIA, WITH LONG-TERM CURRENT USE OF INSULIN (HCC): Primary | ICD-10-CM

## 2021-08-06 RX ORDER — INSULIN GLARGINE 100 [IU]/ML
35 INJECTION, SOLUTION SUBCUTANEOUS
Qty: 15 ML | Refills: 1 | Status: SHIPPED | OUTPATIENT
Start: 2021-08-06 | End: 2021-08-06 | Stop reason: SDUPTHER

## 2021-08-06 RX ORDER — INSULIN GLARGINE 100 [IU]/ML
35 INJECTION, SOLUTION SUBCUTANEOUS
Qty: 15 ML | Refills: 0 | Status: SHIPPED | OUTPATIENT
Start: 2021-08-06 | End: 2021-09-08 | Stop reason: SDUPTHER

## 2021-08-06 NOTE — TELEPHONE ENCOUNTER
[facility]   [address]?  Evan luo    [phone]      Pt needs her lantus pen using 35 at hs     Pt is on vacation and has none left

## 2021-08-19 DIAGNOSIS — E11.65 TYPE 2 DIABETES MELLITUS WITH HYPERGLYCEMIA, WITH LONG-TERM CURRENT USE OF INSULIN (HCC): Chronic | ICD-10-CM

## 2021-08-19 DIAGNOSIS — Z79.4 TYPE 2 DIABETES MELLITUS WITH HYPERGLYCEMIA, WITH LONG-TERM CURRENT USE OF INSULIN (HCC): Chronic | ICD-10-CM

## 2021-08-19 DIAGNOSIS — F32.9 CURRENT EPISODE OF MAJOR DEPRESSIVE DISORDER WITHOUT PRIOR EPISODE, UNSPECIFIED DEPRESSION EPISODE SEVERITY: ICD-10-CM

## 2021-08-19 RX ORDER — ESCITALOPRAM OXALATE 5 MG/1
TABLET ORAL
Qty: 90 TABLET | Refills: 1 | Status: SHIPPED | OUTPATIENT
Start: 2021-08-19 | End: 2022-05-20 | Stop reason: SDUPTHER

## 2021-08-19 RX ORDER — LINAGLIPTIN 5 MG/1
TABLET, FILM COATED ORAL
Qty: 30 TABLET | Refills: 3 | Status: SHIPPED | OUTPATIENT
Start: 2021-08-19 | End: 2022-02-15

## 2021-08-26 ENCOUNTER — TELEPHONE (OUTPATIENT)
Dept: FAMILY MEDICINE CLINIC | Facility: CLINIC | Age: 66
End: 2021-08-26

## 2021-08-26 NOTE — TELEPHONE ENCOUNTER
Recommend beginning with lower extremity compression such as Tubigrip she can come to the office to try a it appear she would like  If this is not improving let us know    The Lasix tends to be the last resort because of effects on the kidneys, dehydration, increased urination and electrolyte issues

## 2021-08-26 NOTE — TELEPHONE ENCOUNTER
Dr Mk John,    Pt called in and said her legs and ankles are filling up with fluid and would like a water pill     Please advise

## 2021-08-27 ENCOUNTER — TELEPHONE (OUTPATIENT)
Dept: FAMILY MEDICINE CLINIC | Facility: CLINIC | Age: 66
End: 2021-08-27

## 2021-09-08 ENCOUNTER — OFFICE VISIT (OUTPATIENT)
Dept: FAMILY MEDICINE CLINIC | Facility: CLINIC | Age: 66
End: 2021-09-08
Payer: COMMERCIAL

## 2021-09-08 VITALS
WEIGHT: 215 LBS | HEIGHT: 65 IN | BODY MASS INDEX: 35.82 KG/M2 | SYSTOLIC BLOOD PRESSURE: 130 MMHG | TEMPERATURE: 96.4 F | OXYGEN SATURATION: 96 % | DIASTOLIC BLOOD PRESSURE: 84 MMHG | HEART RATE: 92 BPM

## 2021-09-08 DIAGNOSIS — Z00.00 MEDICARE ANNUAL WELLNESS VISIT, SUBSEQUENT: ICD-10-CM

## 2021-09-08 DIAGNOSIS — I48.0 PAROXYSMAL ATRIAL FIBRILLATION (HCC): ICD-10-CM

## 2021-09-08 DIAGNOSIS — E11.65 TYPE 2 DIABETES MELLITUS WITH HYPERGLYCEMIA, WITH LONG-TERM CURRENT USE OF INSULIN (HCC): Chronic | ICD-10-CM

## 2021-09-08 DIAGNOSIS — I10 ESSENTIAL HYPERTENSION: Chronic | ICD-10-CM

## 2021-09-08 DIAGNOSIS — Z79.4 TYPE 2 DIABETES MELLITUS WITH HYPERGLYCEMIA, WITH LONG-TERM CURRENT USE OF INSULIN (HCC): Chronic | ICD-10-CM

## 2021-09-08 DIAGNOSIS — Z23 NEED FOR IMMUNIZATION AGAINST INFLUENZA: Primary | ICD-10-CM

## 2021-09-08 DIAGNOSIS — K21.9 GASTROESOPHAGEAL REFLUX DISEASE, UNSPECIFIED WHETHER ESOPHAGITIS PRESENT: Chronic | ICD-10-CM

## 2021-09-08 DIAGNOSIS — E13.9 OTHER SPECIFIED DIABETES MELLITUS WITHOUT COMPLICATION, WITH LONG-TERM CURRENT USE OF INSULIN (HCC): ICD-10-CM

## 2021-09-08 DIAGNOSIS — I63.9 CEREBROVASCULAR ACCIDENT (CVA), UNSPECIFIED MECHANISM (HCC): ICD-10-CM

## 2021-09-08 DIAGNOSIS — R11.2 NAUSEA AND VOMITING, INTRACTABILITY OF VOMITING NOT SPECIFIED, UNSPECIFIED VOMITING TYPE: ICD-10-CM

## 2021-09-08 DIAGNOSIS — Z79.4 OTHER SPECIFIED DIABETES MELLITUS WITHOUT COMPLICATION, WITH LONG-TERM CURRENT USE OF INSULIN (HCC): ICD-10-CM

## 2021-09-08 PROCEDURE — 90686 IIV4 VACC NO PRSV 0.5 ML IM: CPT

## 2021-09-08 PROCEDURE — 99214 OFFICE O/P EST MOD 30 MIN: CPT | Performed by: FAMILY MEDICINE

## 2021-09-08 PROCEDURE — G0439 PPPS, SUBSEQ VISIT: HCPCS | Performed by: FAMILY MEDICINE

## 2021-09-08 PROCEDURE — G0008 ADMIN INFLUENZA VIRUS VAC: HCPCS

## 2021-09-08 RX ORDER — ONDANSETRON 4 MG/1
4 TABLET, ORALLY DISINTEGRATING ORAL EVERY 8 HOURS PRN
Qty: 30 TABLET | Refills: 1 | Status: SHIPPED | OUTPATIENT
Start: 2021-09-08

## 2021-09-08 RX ORDER — ATORVASTATIN CALCIUM 80 MG/1
80 TABLET, FILM COATED ORAL
Qty: 90 TABLET | Refills: 1 | Status: SHIPPED | OUTPATIENT
Start: 2021-09-08 | End: 2022-05-20 | Stop reason: SDUPTHER

## 2021-09-08 RX ORDER — INSULIN GLARGINE 100 [IU]/ML
35 INJECTION, SOLUTION SUBCUTANEOUS
Qty: 15 ML | Refills: 2 | Status: SHIPPED | OUTPATIENT
Start: 2021-09-08 | End: 2022-03-16 | Stop reason: SDUPTHER

## 2021-09-08 RX ORDER — FAMOTIDINE 20 MG/1
20 TABLET, FILM COATED ORAL DAILY
Qty: 90 TABLET | Refills: 1 | Status: SHIPPED | OUTPATIENT
Start: 2021-09-08 | End: 2022-05-20 | Stop reason: SDUPTHER

## 2021-09-08 RX ORDER — REPAGLINIDE 0.5 MG/1
0.5 TABLET ORAL
Qty: 180 TABLET | Refills: 1 | Status: ON HOLD | OUTPATIENT
Start: 2021-09-08 | End: 2022-01-04 | Stop reason: SDUPTHER

## 2021-09-08 RX ORDER — LISINOPRIL 5 MG/1
5 TABLET ORAL DAILY
Qty: 90 TABLET | Refills: 1 | Status: SHIPPED | OUTPATIENT
Start: 2021-09-08 | End: 2022-05-20 | Stop reason: SDUPTHER

## 2021-09-08 RX ORDER — INSULIN GLARGINE 100 [IU]/ML
INJECTION, SOLUTION SUBCUTANEOUS
Qty: 10 ML | Refills: 2 | Status: SHIPPED | OUTPATIENT
Start: 2021-09-08 | End: 2021-12-14

## 2021-09-08 RX ORDER — METOPROLOL TARTRATE 50 MG/1
50 TABLET, FILM COATED ORAL EVERY 12 HOURS SCHEDULED
Qty: 180 TABLET | Refills: 1 | Status: SHIPPED | OUTPATIENT
Start: 2021-09-08 | End: 2022-05-03 | Stop reason: SDUPTHER

## 2021-09-08 RX ORDER — PEN NEEDLE, DIABETIC 30 GX5/16"
NEEDLE, DISPOSABLE MISCELLANEOUS DAILY
Qty: 50 EACH | Refills: 2 | Status: SHIPPED | OUTPATIENT
Start: 2021-09-08

## 2021-09-08 NOTE — PROGRESS NOTES
Assessment and Plan:     Problem List Items Addressed This Visit        Digestive    GERD (gastroesophageal reflux disease) (Chronic)       Endocrine    Type 2 diabetes mellitus with hyperglycemia, with long-term current use of insulin (HCC) (Chronic)       Cardiovascular and Mediastinum    Paroxysmal atrial fibrillation (HCC) (Chronic)    Essential hypertension (Chronic)    Stroke ([facility] )      Other Visit Diagnoses     Other specified diabetes mellitus without complication, with long-term current use of insulin ([facility] )               Preventive health issues were discussed with patient, and age appropriate screening tests were ordered as noted in patient's After Visit Summary  Personalized health advice and appropriate referrals for health education or preventive services given if needed, as noted in patient's After Visit Summary  History of Present Illness:     Patient presents for Medicare Annual Wellness visit    Patient Care Team:  Shiva Mccormick MD as PCP - General (Family Medicine)  Wade Avalos MD as PCP - Endocrinology (Endocrinology)  Shiva Mccormick MD as PCP - [address] [place] [address] (RTE)     Problem List:     Patient Active Problem List   Diagnosis    [facility] )    Stroke ([facility] )    Blurry vision    Diabetic cataract ([facility] )    Dysphagia    Dysphonia    Glottic stenosis    Granulation tissue of site of tracheostomy    Heart disease    Insomnia    Incomplete emptying of bladder    Nausea    Severe obesity (BMI 35 0-39  9) with comorbidity ([facility] )    Tracheal stenosis    Type 2 diabetes mellitus with hyperglycemia, with long-term current use of insulin (HCC)    Acute bronchitis due to other specified organisms    GERD (gastroesophageal reflux disease)    Hyperlipidemia    Edema    Diarrhea    Essential hypertension    Routine health maintenance    Current episode of major depressive disorder without prior episode    Medicare annual wellness visit, subsequent    YOLIE (obstructive sleep apnea)    Dermatitis    Muscle tension dysphonia    Reflux laryngitis    Glottic insufficiency    History of stroke    Weakness of both lower extremities      Past Medical and Surgical History:     Past Medical History:   Diagnosis Date    Abdominal fibromatosis     Diabetes mellitus ([address] )     Hyperlipemia     Hypertension     Pneumonia     Stroke [facility])      Past Surgical History:   Procedure Laterality Date    CATARACT EXTRACTION Bilateral     CATARACT EXTRACTION, BILATERAL      COLONOSCOPY      EGD      LAPAROTOMY      Exploratory; Last Assessed 10/17/2017    PEG TUBE PLACEMENT      PEG TUBE REMOVAL        Family History:     Family History   Problem Relation Age of Onset    No Known Problems Mother     No Known Problems Father       Social History:     Social History     Socioeconomic History    Marital status:      Spouse name: None    Number of children: None    Years of education: None    Highest education level: None   Occupational History    None   Tobacco Use    Smoking status: Never Smoker    Smokeless tobacco: Never Used   Vaping Use    Vaping Use: Never used   Substance and Sexual Activity    Alcohol use: Not Currently     Comment: Socially    Drug use: No    Sexual activity: Not Currently   Other Topics Concern    None   Social History Narrative    None     Social Determinants of Health     Financial Resource Strain:     Difficulty of Paying Living Expenses:    Food Insecurity:     Worried About Running Out of Food in the Last Year:     Ran Out of Food in the Last Year:    Transportation Needs:     Lack of Transportation (Medical):      Lack of Transportation (Non-Medical):    Physical Activity:     Days of Exercise per Week:     Minutes of Exercise per Session:    Stress:     Feeling of Stress :    Social Connections:     Frequency of Communication with Friends and Family:     Frequency of Social Gatherings with Friends and Family:     Attends [employer] Services:     Active Member of Clubs or Organizations:     Attends Club or Organization Meetings:     Marital Status:    Intimate Partner Violence:     Fear of Current or Ex-Partner:     Emotionally Abused:     Physically Abused:     Sexually Abused:       Medications and Allergies:     Current Outpatient Medications   Medication Sig Dispense Refill    Accu-Chek FastClix Lancets MISC Use 2 (two) times a day 102 each 3    acetaminophen (TYLENOL) 325 mg tablet Take 2 tablets (650 mg total) by mouth 4 (four) times a day as needed for mild pain, headaches or fever  0    albuterol (VENTOLIN HFA) 90 mcg/act inhaler Inhale 2 puffs every 4 (four) hours as needed for wheezing 1 Inhaler 0    atorvastatin (LIPITOR) 80 mg tablet Take 1 tablet (80 mg total) by mouth daily with dinner 90 tablet 1    B-D ULTRAFINE III SHORT PEN 31G X 8 MM MISC use as directed 100 each 0    B-D ULTRAFINE III SHORT PEN 31G X 8 MM MISC USE AS DIRECTED 100 each 0    Blood Glucose Monitoring Suppl (Accu-Chek Guide) w/Device KIT Use 2 (two) times a day 1 kit 0    escitalopram (LEXAPRO) 5 mg tablet take 1 tablet by mouth once daily 90 tablet 1    famotidine (PEPCID) 20 mg tablet Take 1 tablet (20 mg total) by mouth daily 90 tablet 1    Fluoxetine HCl, PMDD, 20 MG TABS Take 20 mg by mouth daily      glucose blood (Accu-Chek Guide) test strip Patient tests blood glucose twice daily 100 strip 3    insulin glargine (Lantus SoloStar) 100 units/mL injection pen Inject 35 Units under the skin daily at bedtime 15 mL 0    insulin glargine (LANTUS) 100 units/mL subcutaneous injection Inject 35 unit at bedtime 10 mL 0    Insulin Pen Needle (PEN NEEDLES 5/16") 30G X 8 MM MISC by Does not apply route daily 50 each 0    lisinopril (ZESTRIL) 5 mg tablet Take 1 tablet (5 mg total) by mouth daily 90 tablet 1    metoprolol tartrate (LOPRESSOR) 50 mg tablet Take 1 tablet (50 mg total) by mouth every 12 (twelve) hours 180 tablet 1    pantoprazole (PROTONIX) 40 mg tablet Take 40 mg by mouth daily      repaglinide (PRANDIN) 0 5 mg tablet Take 1 tablet (0 5 mg total) by mouth 2 (two) times a day before meals 60 tablet 4    rivaroxaban (XARELTO) 20 mg tablet Take 1 tablet (20 mg total) by mouth daily with breakfast 90 tablet 1    Tradjenta 5 MG TABS take 1 tablet by mouth daily 30 tablet 3     No current facility-administered medications for this visit  Allergies   Allergen Reactions    Apixaban Vomiting and GI Intolerance     Other reaction(s): Vomiting    Trulicity [Dulaglutide] Vomiting     Nausea vomiting      Immunizations:     Immunization History   Administered Date(s) Administered    INFLUENZA 11/04/2015, 10/01/2017, 10/30/2020    Pneumococcal Polysaccharide PPV23 11/04/2015      Health Maintenance:         Topic Date Due    HIV Screening  Never done    Breast Cancer Screening: Mammogram  Never done    Colorectal Cancer Screening  Never done    Hepatitis C Screening  Completed         Topic Date Due    COVID-19 Vaccine (1) Never done    DTaP,Tdap,and Td Vaccines (1 - Tdap) Never done    Influenza Vaccine (1) 09/01/2021      Medicare Health Risk Assessment:     /84 (BP Location: Right arm, Patient Position: Sitting, Cuff Size: Adult)   Pulse 92   Temp (!) 96 4 °F (35 8 °C) (Tympanic)   Ht 5' 5" (1 651 m)   Wt 97 5 kg (215 lb)   LMP  (LMP Unknown)   SpO2 96%   BMI 35 78 kg/m²      Chin Martinez is here for her Subsequent Wellness visit  Last Medicare Wellness visit information reviewed, patient interviewed and updates made to the record today  Health Risk Assessment:   Patient rates overall health as good  Patient feels that their physical health rating is slightly worse  Patient is satisfied with their life  Eyesight was rated as same  Hearing was rated as same  Patient feels that their emotional and mental health rating is much better  Patients states they are sometimes angry  Patient states they are often unusually tired/fatigued  Pain experienced in the last 7 days has been some  Patient's pain rating has been 7/10  Patient states that she has experienced no weight loss or gain in last 6 months  Depression Screening:   PHQ-2 Score: 0  PHQ-9 Score: 0      Fall Risk Screening: In the past year, patient has experienced: history of falling in past year    Number of falls: 2 or more  Injured during fall?: No    Feels unsteady when standing or walking?: Yes    Worried about falling?: Yes      Urinary Incontinence Screening:   Patient has leaked urine accidently in the last six months  Home Safety:  Patient does not have trouble with stairs inside or outside of their home  Patient has working smoke alarms and has working carbon monoxide detector  Home safety hazards include: none  Nutrition:   Current diet is Regular  Medications:   Patient is not currently taking any over-the-counter supplements  Patient is able to manage medications  Activities of Daily Living (ADLs)/Instrumental Activities of Daily Living (IADLs):   Walk and transfer into and out of bed and chair?: Yes  Dress and groom yourself?: Yes    Bathe or shower yourself?: Yes    Feed yourself? Yes  Do your laundry/housekeeping?: Yes  Manage your money, pay your bills and track your expenses?: Yes  Make your own meals?: Yes    Do your own shopping?: Yes    Previous Hospitalizations:   Any hospitalizations or ED visits within the last 12 months?: Yes      Advance Care Planning:   Living will: Yes    Durable POA for healthcare:  Yes    Advanced directive: Yes      Cognitive Screening:   Provider or family/friend/caregiver concerned regarding cognition?: No    PREVENTIVE SCREENINGS      Cardiovascular Screening:    General: Screening Not Indicated and History Lipid Disorder      Diabetes Screening:     General: Screening Not Indicated and History Diabetes      Colorectal Cancer Screening:     General: Patient Declines      Breast Cancer Screening:     General: Patient Declines      Cervical Cancer Screening:    General: Screening Not Indicated      Osteoporosis Screening:    General: Patient Declines      Abdominal Aortic Aneurysm (AAA) Screening:        General: Patient Declines      Lung Cancer Screening:     General: Patient Declines      Hepatitis C Screening:    General: Screening Current      Abhilash Bean MD

## 2021-09-10 NOTE — PROGRESS NOTES
Assessment/Plan:    45-year-old woman with: nausea, diabetes, history of CVA, paroxysmal AFib type 2 diabetes  Discussed workup and treatment options with risks and benefits will continue current medications and Zofran discussed supportive care return parameters follow-up in 3-4 minutes    No problem-specific Assessment & Plan notes found for this encounter  Diagnoses and all orders for this visit:    Need for immunization against influenza  -     FLUZONE: influenza vaccine, quadrivalent, 0 5 mL    Essential hypertension  -     lisinopril (ZESTRIL) 5 mg tablet; Take 1 tablet (5 mg total) by mouth daily    Gastroesophageal reflux disease, unspecified whether esophagitis present  -     famotidine (PEPCID) 20 mg tablet; Take 1 tablet (20 mg total) by mouth daily    Type 2 diabetes mellitus with hyperglycemia, with long-term current use of insulin ([facility])  -     repaglinide (PRANDIN) 0 5 mg tablet; Take 1 tablet (0 5 mg total) by mouth 2 (two) times a day before meals  -     insulin glargine (LANTUS) 100 units/mL subcutaneous injection; Inject 35 unit at bedtime  -     insulin glargine (Lantus SoloStar) 100 units/mL injection pen; Inject 35 Units under the skin daily at bedtime    Paroxysmal atrial fibrillation (HCC)  -     metoprolol tartrate (LOPRESSOR) 50 mg tablet; Take 1 tablet (50 mg total) by mouth every 12 (twelve) hours    Cerebrovascular accident (CVA), unspecified mechanism (HCC)  -     atorvastatin (LIPITOR) 80 mg tablet; Take 1 tablet (80 mg total) by mouth daily with dinner    Other specified diabetes mellitus without complication, with long-term current use of insulin ([facility])  -     Insulin Pen Needle (Pen Needles 5/16") 30G X 8 MM MISC; Use daily    Nausea and vomiting, intractability of vomiting not specified, unspecified vomiting type  -     ondansetron (ZOFRAN-ODT) 4 mg disintegrating tablet;  Take 1 tablet (4 mg total) by mouth every 8 (eight) hours as needed for nausea or vomiting Subjective:     Chief Complaint   Patient presents with    Medicare Wellness Visit        Patient ID: Ashlyn Taylor is a 61 y o  female  Patient is a 63-year-old woman presents for follow-up on nausea, diabetes, history of CVA, paroxysmal AFib type 2 diabetes encouraged she admits being stable medications says that she is having some nausea and like something to use as needed no fevers tolerating p o  intake no other complaints at this time      The following portions of the patient's history were reviewed and updated as appropriate: allergies, current medications, past family history, past medical history, past social history, past surgical history and problem list     Review of Systems   Constitutional: Negative  HENT: Negative  Eyes: Negative  Respiratory: Negative  Cardiovascular: Negative  Gastrointestinal: Positive for nausea  Endocrine: Negative  Genitourinary: Negative  Musculoskeletal: Negative  Allergic/Immunologic: Negative  Neurological: Negative  Hematological: Negative  Psychiatric/Behavioral: Negative  All other systems reviewed and are negative  Objective:      /84 (BP Location: Right arm, Patient Position: Sitting, Cuff Size: Adult)   Pulse 92   Temp (!) 96 4 °F (35 8 °C) (Tympanic)   Ht 5' 5" (1 651 m)   Wt 97 5 kg (215 lb)   LMP  (LMP Unknown)   SpO2 96%   BMI 35 78 kg/m²          Physical Exam  Constitutional:       Appearance: She is well-developed  HENT:      Head: Atraumatic  Right Ear: External ear normal       Left Ear: External ear normal    Eyes:      Conjunctiva/sclera: Conjunctivae normal       Pupils: Pupils are equal, round, and reactive to light  Cardiovascular:      Rate and Rhythm: Normal rate and regular rhythm  Heart sounds: Normal heart sounds  Pulmonary:      Effort: Pulmonary effort is normal  No respiratory distress  Breath sounds: Normal breath sounds     Abdominal:      General: Bowel sounds are normal  There is no distension  Palpations: Abdomen is soft  Tenderness: There is no abdominal tenderness  There is no guarding or rebound  Musculoskeletal:         General: Normal range of motion  Cervical back: Normal range of motion  Skin:     General: Skin is warm and dry  Neurological:      Mental Status: She is alert and oriented to person, place, and time  Cranial Nerves: No cranial nerve deficit  Psychiatric:         Behavior: Behavior normal          Thought Content: Thought content normal          Judgment: Judgment normal            BMI Counseling: Body mass index is 35 78 kg/m²  The BMI is above normal  Nutrition recommendations include encouraging healthy choices of fruits and vegetables  Exercise recommendations include moderate physical activity 150 minutes/week

## 2021-11-03 ENCOUNTER — TELEPHONE (OUTPATIENT)
Dept: ENDOCRINOLOGY | Facility: CLINIC | Age: 66
End: 2021-11-03

## 2021-11-04 ENCOUNTER — LAB (OUTPATIENT)
Dept: LAB | Facility: CLINIC | Age: 66
End: 2021-11-04
Payer: COMMERCIAL

## 2021-11-04 DIAGNOSIS — I10 ESSENTIAL HYPERTENSION: ICD-10-CM

## 2021-11-04 DIAGNOSIS — E11.65 TYPE 2 DIABETES MELLITUS WITH HYPERGLYCEMIA, WITH LONG-TERM CURRENT USE OF INSULIN (HCC): ICD-10-CM

## 2021-11-04 DIAGNOSIS — Z79.4 TYPE 2 DIABETES MELLITUS WITH HYPERGLYCEMIA, WITH LONG-TERM CURRENT USE OF INSULIN (HCC): ICD-10-CM

## 2021-11-04 LAB
ANION GAP SERPL CALCULATED.3IONS-SCNC: 2 MMOL/L (ref 4–13)
BUN SERPL-MCNC: 25 MG/DL (ref 5–25)
CALCIUM SERPL-MCNC: 9.4 MG/DL (ref 8.3–10.1)
CHLORIDE SERPL-SCNC: 105 MMOL/L (ref 100–108)
CO2 SERPL-SCNC: 28 MMOL/L (ref 21–32)
CREAT SERPL-MCNC: 1.02 MG/DL (ref 0.6–1.3)
CREAT UR-MCNC: 88.7 MG/DL
EST. AVERAGE GLUCOSE BLD GHB EST-MCNC: 223 MG/DL
GFR SERPL CREATININE-BSD FRML MDRD: 58 ML/MIN/1.73SQ M
GLUCOSE P FAST SERPL-MCNC: 137 MG/DL (ref 65–99)
HBA1C MFR BLD: 9.4 %
MICROALBUMIN UR-MCNC: 1220 MG/L (ref 0–20)
MICROALBUMIN/CREAT 24H UR: 1375 MG/G CREATININE (ref 0–30)
POTASSIUM SERPL-SCNC: 4.7 MMOL/L (ref 3.5–5.3)
SODIUM SERPL-SCNC: 135 MMOL/L (ref 136–145)

## 2021-11-04 PROCEDURE — 80048 BASIC METABOLIC PNL TOTAL CA: CPT

## 2021-11-04 PROCEDURE — 82043 UR ALBUMIN QUANTITATIVE: CPT

## 2021-11-04 PROCEDURE — 82570 ASSAY OF URINE CREATININE: CPT

## 2021-11-04 PROCEDURE — 83036 HEMOGLOBIN GLYCOSYLATED A1C: CPT

## 2021-11-04 PROCEDURE — 36415 COLL VENOUS BLD VENIPUNCTURE: CPT

## 2021-11-09 ENCOUNTER — TELEPHONE (OUTPATIENT)
Dept: ENDOCRINOLOGY | Facility: CLINIC | Age: 66
End: 2021-11-09

## 2021-11-16 DIAGNOSIS — J38.6 GLOTTIC STENOSIS: ICD-10-CM

## 2021-11-16 DIAGNOSIS — J39.8 TRACHEAL STENOSIS: ICD-10-CM

## 2021-11-16 DIAGNOSIS — R13.14 PHARYNGOESOPHAGEAL DYSPHAGIA: ICD-10-CM

## 2021-11-16 DIAGNOSIS — R49.0 MUSCLE TENSION DYSPHONIA: ICD-10-CM

## 2021-11-16 DIAGNOSIS — I63.9 CEREBROVASCULAR ACCIDENT (CVA), UNSPECIFIED MECHANISM (HCC): Primary | ICD-10-CM

## 2021-11-16 DIAGNOSIS — R29.898 WEAKNESS OF BOTH LOWER EXTREMITIES: ICD-10-CM

## 2021-11-18 ENCOUNTER — EVALUATION (OUTPATIENT)
Dept: PHYSICAL THERAPY | Facility: CLINIC | Age: 66
End: 2021-11-18
Payer: COMMERCIAL

## 2021-11-18 ENCOUNTER — TELEPHONE (OUTPATIENT)
Dept: FAMILY MEDICINE CLINIC | Facility: CLINIC | Age: 66
End: 2021-11-18

## 2021-11-18 DIAGNOSIS — R26.81 GAIT INSTABILITY: ICD-10-CM

## 2021-11-18 DIAGNOSIS — I63.9 CEREBROVASCULAR ACCIDENT (CVA), UNSPECIFIED MECHANISM (HCC): Primary | ICD-10-CM

## 2021-11-18 DIAGNOSIS — Z91.81 RISK FOR FALLS: ICD-10-CM

## 2021-11-18 PROCEDURE — 97110 THERAPEUTIC EXERCISES: CPT | Performed by: PHYSICAL THERAPIST

## 2021-11-18 PROCEDURE — 97163 PT EVAL HIGH COMPLEX 45 MIN: CPT | Performed by: PHYSICAL THERAPIST

## 2021-11-19 ENCOUNTER — TELEPHONE (OUTPATIENT)
Dept: FAMILY MEDICINE CLINIC | Facility: CLINIC | Age: 66
End: 2021-11-19

## 2021-11-19 DIAGNOSIS — J39.8 TRACHEAL STENOSIS: ICD-10-CM

## 2021-11-19 DIAGNOSIS — R13.14 PHARYNGOESOPHAGEAL DYSPHAGIA: ICD-10-CM

## 2021-11-19 DIAGNOSIS — J38.6 GLOTTIC STENOSIS: Primary | ICD-10-CM

## 2021-11-24 ENCOUNTER — OFFICE VISIT (OUTPATIENT)
Dept: PHYSICAL THERAPY | Facility: CLINIC | Age: 66
End: 2021-11-24
Payer: COMMERCIAL

## 2021-11-24 DIAGNOSIS — I63.9 CEREBROVASCULAR ACCIDENT (CVA), UNSPECIFIED MECHANISM (HCC): Primary | ICD-10-CM

## 2021-11-24 DIAGNOSIS — Z91.81 RISK FOR FALLS: ICD-10-CM

## 2021-11-24 DIAGNOSIS — R26.81 GAIT INSTABILITY: ICD-10-CM

## 2021-11-24 PROCEDURE — 97530 THERAPEUTIC ACTIVITIES: CPT | Performed by: PHYSICAL THERAPIST

## 2021-11-24 PROCEDURE — 97112 NEUROMUSCULAR REEDUCATION: CPT | Performed by: PHYSICAL THERAPIST

## 2021-11-24 PROCEDURE — 97110 THERAPEUTIC EXERCISES: CPT | Performed by: PHYSICAL THERAPIST

## 2021-11-30 ENCOUNTER — APPOINTMENT (OUTPATIENT)
Dept: PHYSICAL THERAPY | Facility: CLINIC | Age: 66
End: 2021-11-30
Payer: COMMERCIAL

## 2021-12-01 ENCOUNTER — OFFICE VISIT (OUTPATIENT)
Dept: PHYSICAL THERAPY | Facility: CLINIC | Age: 66
End: 2021-12-01
Payer: COMMERCIAL

## 2021-12-01 DIAGNOSIS — R26.81 GAIT INSTABILITY: ICD-10-CM

## 2021-12-01 DIAGNOSIS — Z91.81 RISK FOR FALLS: ICD-10-CM

## 2021-12-01 DIAGNOSIS — I63.9 CEREBROVASCULAR ACCIDENT (CVA), UNSPECIFIED MECHANISM (HCC): Primary | ICD-10-CM

## 2021-12-01 PROCEDURE — 97110 THERAPEUTIC EXERCISES: CPT

## 2021-12-01 PROCEDURE — 97112 NEUROMUSCULAR REEDUCATION: CPT

## 2021-12-01 PROCEDURE — 97530 THERAPEUTIC ACTIVITIES: CPT

## 2021-12-02 ENCOUNTER — APPOINTMENT (OUTPATIENT)
Dept: SPEECH THERAPY | Facility: CLINIC | Age: 66
End: 2021-12-02
Payer: COMMERCIAL

## 2021-12-08 ENCOUNTER — OFFICE VISIT (OUTPATIENT)
Dept: PHYSICAL THERAPY | Facility: CLINIC | Age: 66
End: 2021-12-08
Payer: COMMERCIAL

## 2021-12-08 ENCOUNTER — EVALUATION (OUTPATIENT)
Dept: SPEECH THERAPY | Facility: CLINIC | Age: 66
End: 2021-12-08
Payer: COMMERCIAL

## 2021-12-08 DIAGNOSIS — J38.02 BILATERAL VOCAL CORD PARALYSIS: ICD-10-CM

## 2021-12-08 DIAGNOSIS — I63.9 CEREBROVASCULAR ACCIDENT (CVA), UNSPECIFIED MECHANISM (HCC): Primary | ICD-10-CM

## 2021-12-08 DIAGNOSIS — J39.8 TRACHEAL STENOSIS: Primary | ICD-10-CM

## 2021-12-08 DIAGNOSIS — Z87.19 HISTORY OF GASTROESOPHAGEAL REFLUX (GERD): ICD-10-CM

## 2021-12-08 DIAGNOSIS — R26.81 GAIT INSTABILITY: ICD-10-CM

## 2021-12-08 DIAGNOSIS — J38.6 GLOTTIC STENOSIS: ICD-10-CM

## 2021-12-08 DIAGNOSIS — Z91.81 RISK FOR FALLS: ICD-10-CM

## 2021-12-08 PROCEDURE — 92524 BEHAVRAL QUALIT ANALYS VOICE: CPT | Performed by: NURSE PRACTITIONER

## 2021-12-08 PROCEDURE — 97112 NEUROMUSCULAR REEDUCATION: CPT | Performed by: PHYSICAL THERAPIST

## 2021-12-08 PROCEDURE — 97110 THERAPEUTIC EXERCISES: CPT | Performed by: PHYSICAL THERAPIST

## 2021-12-13 ENCOUNTER — HOSPITAL ENCOUNTER (INPATIENT)
Facility: HOSPITAL | Age: 66
LOS: 7 days | DRG: 065 | End: 2021-12-21
Attending: EMERGENCY MEDICINE | Admitting: STUDENT IN AN ORGANIZED HEALTH CARE EDUCATION/TRAINING PROGRAM
Payer: COMMERCIAL

## 2021-12-13 ENCOUNTER — APPOINTMENT (EMERGENCY)
Dept: CT IMAGING | Facility: HOSPITAL | Age: 66
DRG: 065 | End: 2021-12-13
Payer: COMMERCIAL

## 2021-12-13 DIAGNOSIS — I63.9 CVA (CEREBRAL VASCULAR ACCIDENT) (HCC): Primary | ICD-10-CM

## 2021-12-13 DIAGNOSIS — R20.0 LEFT SIDED NUMBNESS: ICD-10-CM

## 2021-12-13 PROBLEM — J20.8 ACUTE BRONCHITIS DUE TO OTHER SPECIFIED ORGANISMS: Status: RESOLVED | Noted: 2018-10-25 | Resolved: 2021-12-13

## 2021-12-13 LAB
ALBUMIN SERPL BCP-MCNC: 3.5 G/DL (ref 3.5–5)
ALP SERPL-CCNC: 47 U/L (ref 34–104)
ALT SERPL W P-5'-P-CCNC: 31 U/L (ref 7–52)
ANION GAP SERPL CALCULATED.3IONS-SCNC: 10 MMOL/L (ref 4–13)
AST SERPL W P-5'-P-CCNC: 37 U/L (ref 13–39)
BASOPHILS # BLD AUTO: 0.04 THOUSANDS/ΜL (ref 0–0.1)
BASOPHILS NFR BLD AUTO: 1 % (ref 0–1)
BILIRUB SERPL-MCNC: 0.44 MG/DL (ref 0.2–1)
BUN SERPL-MCNC: 31 MG/DL (ref 5–25)
CALCIUM SERPL-MCNC: 8.9 MG/DL (ref 8.4–10.2)
CARDIAC TROPONIN I PNL SERPL HS: 4 NG/L
CHLORIDE SERPL-SCNC: 104 MMOL/L (ref 96–108)
CO2 SERPL-SCNC: 20 MMOL/L (ref 21–32)
CREAT SERPL-MCNC: 1.1 MG/DL (ref 0.6–1.3)
EOSINOPHIL # BLD AUTO: 0.25 THOUSAND/ΜL (ref 0–0.61)
EOSINOPHIL NFR BLD AUTO: 3 % (ref 0–6)
ERYTHROCYTE [DISTWIDTH] IN BLOOD BY AUTOMATED COUNT: 12.7 % (ref 11.6–15.1)
FLUAV RNA RESP QL NAA+PROBE: NEGATIVE
FLUBV RNA RESP QL NAA+PROBE: NEGATIVE
GFR SERPL CREATININE-BSD FRML MDRD: 53 ML/MIN/1.73SQ M
GLUCOSE SERPL-MCNC: 249 MG/DL (ref 65–140)
HCT VFR BLD AUTO: 41.3 % (ref 34.8–46.1)
HGB BLD-MCNC: 13.4 G/DL (ref 11.5–15.4)
IMM GRANULOCYTES # BLD AUTO: 0.02 THOUSAND/UL (ref 0–0.2)
IMM GRANULOCYTES NFR BLD AUTO: 0 % (ref 0–2)
LYMPHOCYTES # BLD AUTO: 2.67 THOUSANDS/ΜL (ref 0.6–4.47)
LYMPHOCYTES NFR BLD AUTO: 37 % (ref 14–44)
MCH RBC QN AUTO: 30.7 PG (ref 26.8–34.3)
MCHC RBC AUTO-ENTMCNC: 32.4 G/DL (ref 31.4–37.4)
MCV RBC AUTO: 95 FL (ref 82–98)
MONOCYTES # BLD AUTO: 0.56 THOUSAND/ΜL (ref 0.17–1.22)
MONOCYTES NFR BLD AUTO: 8 % (ref 4–12)
NEUTROPHILS # BLD AUTO: 3.71 THOUSANDS/ΜL (ref 1.85–7.62)
NEUTS SEG NFR BLD AUTO: 51 % (ref 43–75)
NRBC BLD AUTO-RTO: 0 /100 WBCS
PLATELET # BLD AUTO: 250 THOUSANDS/UL (ref 149–390)
PMV BLD AUTO: 11.4 FL (ref 8.9–12.7)
POTASSIUM SERPL-SCNC: 4.4 MMOL/L (ref 3.5–5.3)
PROT SERPL-MCNC: 6.6 G/DL (ref 6.4–8.4)
RBC # BLD AUTO: 4.37 MILLION/UL (ref 3.81–5.12)
RSV RNA RESP QL NAA+PROBE: NEGATIVE
SARS-COV-2 RNA RESP QL NAA+PROBE: NEGATIVE
SODIUM SERPL-SCNC: 134 MMOL/L (ref 135–147)
TSH SERPL DL<=0.05 MIU/L-ACNC: 2.41 UIU/ML (ref 0.45–5.33)
WBC # BLD AUTO: 7.25 THOUSAND/UL (ref 4.31–10.16)

## 2021-12-13 PROCEDURE — 36415 COLL VENOUS BLD VENIPUNCTURE: CPT | Performed by: EMERGENCY MEDICINE

## 2021-12-13 PROCEDURE — 70498 CT ANGIOGRAPHY NECK: CPT

## 2021-12-13 PROCEDURE — 84443 ASSAY THYROID STIM HORMONE: CPT | Performed by: EMERGENCY MEDICINE

## 2021-12-13 PROCEDURE — 84484 ASSAY OF TROPONIN QUANT: CPT | Performed by: EMERGENCY MEDICINE

## 2021-12-13 PROCEDURE — G1004 CDSM NDSC: HCPCS

## 2021-12-13 PROCEDURE — 85025 COMPLETE CBC W/AUTO DIFF WBC: CPT | Performed by: EMERGENCY MEDICINE

## 2021-12-13 PROCEDURE — 93005 ELECTROCARDIOGRAM TRACING: CPT

## 2021-12-13 PROCEDURE — 70496 CT ANGIOGRAPHY HEAD: CPT

## 2021-12-13 PROCEDURE — 99285 EMERGENCY DEPT VISIT HI MDM: CPT

## 2021-12-13 PROCEDURE — 0241U HB NFCT DS VIR RESP RNA 4 TRGT: CPT | Performed by: EMERGENCY MEDICINE

## 2021-12-13 PROCEDURE — 80053 COMPREHEN METABOLIC PANEL: CPT | Performed by: EMERGENCY MEDICINE

## 2021-12-13 RX ADMIN — IOHEXOL 85 ML: 350 INJECTION, SOLUTION INTRAVENOUS at 19:49

## 2021-12-14 ENCOUNTER — APPOINTMENT (OUTPATIENT)
Dept: MRI IMAGING | Facility: HOSPITAL | Age: 66
DRG: 065 | End: 2021-12-14
Payer: COMMERCIAL

## 2021-12-14 ENCOUNTER — APPOINTMENT (OUTPATIENT)
Dept: NON INVASIVE DIAGNOSTICS | Facility: HOSPITAL | Age: 66
DRG: 065 | End: 2021-12-14
Payer: COMMERCIAL

## 2021-12-14 PROBLEM — I63.50 RIGHT PONTINE STROKE (HCC): Status: ACTIVE | Noted: 2021-12-13

## 2021-12-14 LAB
2HR DELTA HS TROPONIN: 0 NG/L
4HR DELTA HS TROPONIN: 1 NG/L
AORTIC ROOT: 3.1 CM
AORTIC VALVE MEAN VELOCITY: 6.4 M/S
APICAL FOUR CHAMBER EJECTION FRACTION: 73 %
ATRIAL RATE: 84 BPM
AV AREA BY CONTINUOUS VTI: 2.7 CM2
AV AREA PEAK VELOCITY: 2.7 CM2
AV LVOT MEAN GRADIENT: 2 MMHG
AV LVOT PEAK GRADIENT: 4 MMHG
AV MEAN GRADIENT: 2 MMHG
AV PEAK GRADIENT: 4 MMHG
AV VALVE AREA: 2.7 CM2
CARDIAC TROPONIN I PNL SERPL HS: 4 NG/L
CARDIAC TROPONIN I PNL SERPL HS: 5 NG/L
CHOLEST SERPL-MCNC: 116 MG/DL
DOP CALC AO VTI: 20.8 CM
DOP CALC LVOT AREA: 2.83 CM2
DOP CALC LVOT DIAMETER: 1.9 CM
DOP CALC LVOT PEAK VEL VTI: 19.8 CM
DOP CALC LVOT PEAK VEL: 0.96 M/S
DOP CALC LVOT STROKE INDEX: 28.2 ML/M2
DOP CALC LVOT STROKE VOLUME: 56.11 CM3
DOP CALC MV VTI: 42.52 CM
E WAVE DECELERATION TIME: 354 MS
FRACTIONAL SHORTENING: 38 % (ref 28–44)
GLUCOSE SERPL-MCNC: 199 MG/DL (ref 65–140)
GLUCOSE SERPL-MCNC: 201 MG/DL (ref 65–140)
GLUCOSE SERPL-MCNC: 235 MG/DL (ref 65–140)
GLUCOSE SERPL-MCNC: 253 MG/DL (ref 65–140)
GLUCOSE SERPL-MCNC: 265 MG/DL (ref 65–140)
HDLC SERPL-MCNC: 49 MG/DL
INTERVENTRICULAR SEPTUM IN DIASTOLE (PARASTERNAL SHORT AXIS VIEW): 0.9 CM
LAAS-AP2: 20.9 CM2
LAAS-AP4: 21.3 CM2
LDLC SERPL CALC-MCNC: 52 MG/DL (ref 0–100)
LEFT INTERNAL DIMENSION IN SYSTOLE: 2.6 CM (ref 2.1–4)
LEFT VENTRICULAR INTERNAL DIMENSION IN DIASTOLE: 4.2 CM (ref 5.97–8.9)
LEFT VENTRICULAR POSTERIOR WALL IN END DIASTOLE: 0.9 CM
LEFT VENTRICULAR STROKE VOLUME: 56 ML
MV E'TISSUE VEL-LAT: 8 CM/S
MV E'TISSUE VEL-SEP: 8 CM/S
MV MEAN GRADIENT: 3 MMHG
MV PEAK A VEL: 1.49 M/S
MV PEAK E VEL: 116 CM/S
MV PEAK GRADIENT: 9 MMHG
MV VALVE AREA BY CONTINUITY EQUATION: 1.32 CM2
P AXIS: 56 DEGREES
PR INTERVAL: 180 MS
QRS AXIS: 35 DEGREES
QRSD INTERVAL: 64 MS
QT INTERVAL: 384 MS
QTC INTERVAL: 453 MS
RIGHT ATRIAL 2D VOLUME: 41 ML
RIGHT ATRIUM AREA SYSTOLE A4C: 15.8 CM2
RIGHT VENTRICLE ID DIMENSION: 2.4 CM
SL CV LV EF: 65
SL CV PED ECHO LEFT VENTRICLE DIASTOLIC VOLUME (MOD BIPLANE) 2D: 80 ML
SL CV PED ECHO LEFT VENTRICLE SYSTOLIC VOLUME (MOD BIPLANE) 2D: 24 ML
T WAVE AXIS: 49 DEGREES
TRICUSPID VALVE PEAK E WAVE VELOCITY: 0.14 M/S
TRICUSPID VALVE PEAK REGURGITATION VELOCITY: 2.44 M/S
TRICUSPID VALVE S': 59 CM/S
TRIGL SERPL-MCNC: 73 MG/DL
TV PEAK GRADIENT: 24 MMHG
VENTRICULAR RATE: 84 BPM
Z-SCORE OF LEFT VENTRICULAR DIMENSION IN END SYSTOLE: -5.51

## 2021-12-14 PROCEDURE — 97163 PT EVAL HIGH COMPLEX 45 MIN: CPT

## 2021-12-14 PROCEDURE — 99285 EMERGENCY DEPT VISIT HI MDM: CPT | Performed by: EMERGENCY MEDICINE

## 2021-12-14 PROCEDURE — 82948 REAGENT STRIP/BLOOD GLUCOSE: CPT

## 2021-12-14 PROCEDURE — C8929 TTE W OR WO FOL WCON,DOPPLER: HCPCS

## 2021-12-14 PROCEDURE — 36415 COLL VENOUS BLD VENIPUNCTURE: CPT | Performed by: EMERGENCY MEDICINE

## 2021-12-14 PROCEDURE — 97166 OT EVAL MOD COMPLEX 45 MIN: CPT

## 2021-12-14 PROCEDURE — 80061 LIPID PANEL: CPT | Performed by: PHYSICIAN ASSISTANT

## 2021-12-14 PROCEDURE — 84484 ASSAY OF TROPONIN QUANT: CPT | Performed by: EMERGENCY MEDICINE

## 2021-12-14 PROCEDURE — 93306 TTE W/DOPPLER COMPLETE: CPT | Performed by: INTERNAL MEDICINE

## 2021-12-14 PROCEDURE — 99220 PR INITIAL OBSERVATION CARE/DAY 70 MINUTES: CPT | Performed by: STUDENT IN AN ORGANIZED HEALTH CARE EDUCATION/TRAINING PROGRAM

## 2021-12-14 PROCEDURE — 70551 MRI BRAIN STEM W/O DYE: CPT

## 2021-12-14 PROCEDURE — 99215 OFFICE O/P EST HI 40 MIN: CPT | Performed by: PSYCHIATRY & NEUROLOGY

## 2021-12-14 PROCEDURE — G1004 CDSM NDSC: HCPCS

## 2021-12-14 PROCEDURE — 93010 ELECTROCARDIOGRAM REPORT: CPT | Performed by: INTERNAL MEDICINE

## 2021-12-14 RX ORDER — LISINOPRIL 10 MG/1
5 TABLET ORAL DAILY
Status: DISCONTINUED | OUTPATIENT
Start: 2021-12-14 | End: 2021-12-21 | Stop reason: HOSPADM

## 2021-12-14 RX ORDER — INSULIN GLARGINE 100 [IU]/ML
35 INJECTION, SOLUTION SUBCUTANEOUS
Status: DISCONTINUED | OUTPATIENT
Start: 2021-12-14 | End: 2021-12-18

## 2021-12-14 RX ORDER — ESCITALOPRAM OXALATE 10 MG/1
5 TABLET ORAL DAILY
Status: DISCONTINUED | OUTPATIENT
Start: 2021-12-14 | End: 2021-12-21 | Stop reason: HOSPADM

## 2021-12-14 RX ORDER — ASPIRIN 81 MG/1
81 TABLET ORAL DAILY
Status: DISCONTINUED | OUTPATIENT
Start: 2021-12-14 | End: 2021-12-21 | Stop reason: HOSPADM

## 2021-12-14 RX ORDER — ATORVASTATIN CALCIUM 80 MG/1
80 TABLET, FILM COATED ORAL
Status: DISCONTINUED | OUTPATIENT
Start: 2021-12-14 | End: 2021-12-21 | Stop reason: HOSPADM

## 2021-12-14 RX ORDER — FAMOTIDINE 20 MG/1
20 TABLET, FILM COATED ORAL DAILY
Status: DISCONTINUED | OUTPATIENT
Start: 2021-12-14 | End: 2021-12-21 | Stop reason: HOSPADM

## 2021-12-14 RX ORDER — METOPROLOL TARTRATE 50 MG/1
50 TABLET, FILM COATED ORAL EVERY 12 HOURS SCHEDULED
Status: DISCONTINUED | OUTPATIENT
Start: 2021-12-14 | End: 2021-12-21 | Stop reason: HOSPADM

## 2021-12-14 RX ORDER — PANTOPRAZOLE SODIUM 40 MG/1
40 TABLET, DELAYED RELEASE ORAL
Status: DISCONTINUED | OUTPATIENT
Start: 2021-12-14 | End: 2021-12-21 | Stop reason: HOSPADM

## 2021-12-14 RX ADMIN — PANTOPRAZOLE SODIUM 40 MG: 40 TABLET, DELAYED RELEASE ORAL at 09:32

## 2021-12-14 RX ADMIN — INSULIN LISPRO 3 UNITS: 100 INJECTION, SOLUTION INTRAVENOUS; SUBCUTANEOUS at 17:07

## 2021-12-14 RX ADMIN — ESCITALOPRAM OXALATE 5 MG: 10 TABLET ORAL at 09:33

## 2021-12-14 RX ADMIN — INSULIN LISPRO 3 UNITS: 100 INJECTION, SOLUTION INTRAVENOUS; SUBCUTANEOUS at 12:20

## 2021-12-14 RX ADMIN — METOPROLOL TARTRATE 50 MG: 50 TABLET, FILM COATED ORAL at 02:31

## 2021-12-14 RX ADMIN — INSULIN GLARGINE 35 UNITS: 100 INJECTION, SOLUTION SUBCUTANEOUS at 22:20

## 2021-12-14 RX ADMIN — INSULIN LISPRO 2 UNITS: 100 INJECTION, SOLUTION INTRAVENOUS; SUBCUTANEOUS at 09:36

## 2021-12-14 RX ADMIN — ATORVASTATIN CALCIUM 80 MG: 80 TABLET, FILM COATED ORAL at 17:07

## 2021-12-14 RX ADMIN — METOPROLOL TARTRATE 50 MG: 50 TABLET, FILM COATED ORAL at 09:35

## 2021-12-14 RX ADMIN — LISINOPRIL 5 MG: 10 TABLET ORAL at 09:32

## 2021-12-14 RX ADMIN — PERFLUTREN 0.4 ML/MIN: 6.52 INJECTION, SUSPENSION INTRAVENOUS at 08:46

## 2021-12-14 RX ADMIN — FAMOTIDINE 20 MG: 20 TABLET ORAL at 09:32

## 2021-12-14 RX ADMIN — INSULIN LISPRO 2 UNITS: 100 INJECTION, SOLUTION INTRAVENOUS; SUBCUTANEOUS at 21:14

## 2021-12-14 RX ADMIN — METOPROLOL TARTRATE 50 MG: 50 TABLET, FILM COATED ORAL at 21:13

## 2021-12-14 RX ADMIN — RIVAROXABAN 20 MG: 10 TABLET, FILM COATED ORAL at 09:33

## 2021-12-14 RX ADMIN — INSULIN GLARGINE 35 UNITS: 100 INJECTION, SOLUTION SUBCUTANEOUS at 02:31

## 2021-12-14 RX ADMIN — ASPIRIN 81 MG: 81 TABLET, COATED ORAL at 13:05

## 2021-12-15 ENCOUNTER — APPOINTMENT (OUTPATIENT)
Dept: PHYSICAL THERAPY | Facility: CLINIC | Age: 66
End: 2021-12-15
Payer: COMMERCIAL

## 2021-12-15 ENCOUNTER — APPOINTMENT (OUTPATIENT)
Dept: SPEECH THERAPY | Facility: CLINIC | Age: 66
End: 2021-12-15
Payer: COMMERCIAL

## 2021-12-15 LAB
BACTERIA UR QL AUTO: ABNORMAL /HPF
BILIRUB UR QL STRIP: NEGATIVE
CLARITY UR: ABNORMAL
COLOR UR: ABNORMAL
GLUCOSE SERPL-MCNC: 153 MG/DL (ref 65–140)
GLUCOSE SERPL-MCNC: 169 MG/DL (ref 65–140)
GLUCOSE SERPL-MCNC: 196 MG/DL (ref 65–140)
GLUCOSE SERPL-MCNC: 206 MG/DL (ref 65–140)
GLUCOSE UR STRIP-MCNC: ABNORMAL MG/DL
HGB UR QL STRIP.AUTO: ABNORMAL
KETONES UR STRIP-MCNC: NEGATIVE MG/DL
LEUKOCYTE ESTERASE UR QL STRIP: NEGATIVE
NITRITE UR QL STRIP: POSITIVE
NON-SQ EPI CELLS URNS QL MICRO: ABNORMAL /HPF
PH UR STRIP.AUTO: 5.5 [PH]
PROT UR STRIP-MCNC: ABNORMAL MG/DL
RBC #/AREA URNS AUTO: ABNORMAL /HPF
SP GR UR STRIP.AUTO: >=1.03 (ref 1–1.03)
TRI-PHOS CRY URNS QL MICRO: ABNORMAL /HPF
UROBILINOGEN UR QL STRIP.AUTO: 0.2 E.U./DL
WBC #/AREA URNS AUTO: ABNORMAL /HPF

## 2021-12-15 PROCEDURE — 97116 GAIT TRAINING THERAPY: CPT

## 2021-12-15 PROCEDURE — 97530 THERAPEUTIC ACTIVITIES: CPT

## 2021-12-15 PROCEDURE — 97535 SELF CARE MNGMENT TRAINING: CPT

## 2021-12-15 PROCEDURE — 92610 EVALUATE SWALLOWING FUNCTION: CPT

## 2021-12-15 PROCEDURE — 99232 SBSQ HOSP IP/OBS MODERATE 35: CPT | Performed by: STUDENT IN AN ORGANIZED HEALTH CARE EDUCATION/TRAINING PROGRAM

## 2021-12-15 PROCEDURE — 81001 URINALYSIS AUTO W/SCOPE: CPT | Performed by: EMERGENCY MEDICINE

## 2021-12-15 PROCEDURE — 82948 REAGENT STRIP/BLOOD GLUCOSE: CPT

## 2021-12-15 PROCEDURE — 97110 THERAPEUTIC EXERCISES: CPT

## 2021-12-15 RX ADMIN — METOPROLOL TARTRATE 50 MG: 50 TABLET, FILM COATED ORAL at 08:10

## 2021-12-15 RX ADMIN — PANTOPRAZOLE SODIUM 40 MG: 40 TABLET, DELAYED RELEASE ORAL at 05:44

## 2021-12-15 RX ADMIN — INSULIN LISPRO 2 UNITS: 100 INJECTION, SOLUTION INTRAVENOUS; SUBCUTANEOUS at 17:21

## 2021-12-15 RX ADMIN — LISINOPRIL 5 MG: 10 TABLET ORAL at 08:10

## 2021-12-15 RX ADMIN — METOPROLOL TARTRATE 50 MG: 50 TABLET, FILM COATED ORAL at 21:20

## 2021-12-15 RX ADMIN — INSULIN GLARGINE 35 UNITS: 100 INJECTION, SOLUTION SUBCUTANEOUS at 21:55

## 2021-12-15 RX ADMIN — INSULIN LISPRO 1 UNITS: 100 INJECTION, SOLUTION INTRAVENOUS; SUBCUTANEOUS at 12:26

## 2021-12-15 RX ADMIN — ATORVASTATIN CALCIUM 80 MG: 80 TABLET, FILM COATED ORAL at 17:22

## 2021-12-15 RX ADMIN — INSULIN LISPRO 1 UNITS: 100 INJECTION, SOLUTION INTRAVENOUS; SUBCUTANEOUS at 08:12

## 2021-12-15 RX ADMIN — RIVAROXABAN 20 MG: 10 TABLET, FILM COATED ORAL at 08:10

## 2021-12-15 RX ADMIN — ASPIRIN 81 MG: 81 TABLET, COATED ORAL at 08:07

## 2021-12-15 RX ADMIN — ESCITALOPRAM OXALATE 5 MG: 10 TABLET ORAL at 08:07

## 2021-12-15 RX ADMIN — INSULIN LISPRO 1 UNITS: 100 INJECTION, SOLUTION INTRAVENOUS; SUBCUTANEOUS at 21:21

## 2021-12-15 RX ADMIN — FAMOTIDINE 20 MG: 20 TABLET ORAL at 08:10

## 2021-12-16 LAB
ANION GAP SERPL CALCULATED.3IONS-SCNC: 5 MMOL/L (ref 4–13)
BUN SERPL-MCNC: 36 MG/DL (ref 5–25)
CALCIUM SERPL-MCNC: 8.8 MG/DL (ref 8.4–10.2)
CHLORIDE SERPL-SCNC: 103 MMOL/L (ref 96–108)
CO2 SERPL-SCNC: 28 MMOL/L (ref 21–32)
CREAT SERPL-MCNC: 1.1 MG/DL (ref 0.6–1.3)
GFR SERPL CREATININE-BSD FRML MDRD: 53 ML/MIN/1.73SQ M
GLUCOSE SERPL-MCNC: 145 MG/DL (ref 65–140)
GLUCOSE SERPL-MCNC: 177 MG/DL (ref 65–140)
GLUCOSE SERPL-MCNC: 189 MG/DL (ref 65–140)
GLUCOSE SERPL-MCNC: 203 MG/DL (ref 65–140)
GLUCOSE SERPL-MCNC: 265 MG/DL (ref 65–140)
POTASSIUM SERPL-SCNC: 4 MMOL/L (ref 3.5–5.3)
SODIUM SERPL-SCNC: 136 MMOL/L (ref 135–147)

## 2021-12-16 PROCEDURE — 99232 SBSQ HOSP IP/OBS MODERATE 35: CPT | Performed by: STUDENT IN AN ORGANIZED HEALTH CARE EDUCATION/TRAINING PROGRAM

## 2021-12-16 PROCEDURE — 97530 THERAPEUTIC ACTIVITIES: CPT

## 2021-12-16 PROCEDURE — 36415 COLL VENOUS BLD VENIPUNCTURE: CPT | Performed by: STUDENT IN AN ORGANIZED HEALTH CARE EDUCATION/TRAINING PROGRAM

## 2021-12-16 PROCEDURE — 80048 BASIC METABOLIC PNL TOTAL CA: CPT | Performed by: STUDENT IN AN ORGANIZED HEALTH CARE EDUCATION/TRAINING PROGRAM

## 2021-12-16 PROCEDURE — 97110 THERAPEUTIC EXERCISES: CPT

## 2021-12-16 PROCEDURE — 82948 REAGENT STRIP/BLOOD GLUCOSE: CPT

## 2021-12-16 PROCEDURE — 97535 SELF CARE MNGMENT TRAINING: CPT

## 2021-12-16 RX ADMIN — INSULIN GLARGINE 35 UNITS: 100 INJECTION, SOLUTION SUBCUTANEOUS at 22:48

## 2021-12-16 RX ADMIN — ASPIRIN 81 MG: 81 TABLET, COATED ORAL at 08:31

## 2021-12-16 RX ADMIN — LISINOPRIL 5 MG: 10 TABLET ORAL at 08:29

## 2021-12-16 RX ADMIN — RIVAROXABAN 20 MG: 10 TABLET, FILM COATED ORAL at 08:29

## 2021-12-16 RX ADMIN — FAMOTIDINE 20 MG: 20 TABLET ORAL at 08:29

## 2021-12-16 RX ADMIN — METOPROLOL TARTRATE 50 MG: 50 TABLET, FILM COATED ORAL at 22:48

## 2021-12-16 RX ADMIN — ATORVASTATIN CALCIUM 80 MG: 80 TABLET, FILM COATED ORAL at 17:36

## 2021-12-16 RX ADMIN — INSULIN LISPRO 2 UNITS: 100 INJECTION, SOLUTION INTRAVENOUS; SUBCUTANEOUS at 22:47

## 2021-12-16 RX ADMIN — PANTOPRAZOLE SODIUM 40 MG: 40 TABLET, DELAYED RELEASE ORAL at 05:22

## 2021-12-16 RX ADMIN — INSULIN LISPRO 1 UNITS: 100 INJECTION, SOLUTION INTRAVENOUS; SUBCUTANEOUS at 08:28

## 2021-12-16 RX ADMIN — ESCITALOPRAM OXALATE 5 MG: 10 TABLET ORAL at 08:30

## 2021-12-16 RX ADMIN — METOPROLOL TARTRATE 50 MG: 50 TABLET, FILM COATED ORAL at 08:31

## 2021-12-16 RX ADMIN — INSULIN LISPRO 2 UNITS: 100 INJECTION, SOLUTION INTRAVENOUS; SUBCUTANEOUS at 17:37

## 2021-12-17 ENCOUNTER — APPOINTMENT (OUTPATIENT)
Dept: PHYSICAL THERAPY | Facility: CLINIC | Age: 66
End: 2021-12-17
Payer: COMMERCIAL

## 2021-12-17 LAB
GLUCOSE SERPL-MCNC: 143 MG/DL (ref 65–140)
GLUCOSE SERPL-MCNC: 196 MG/DL (ref 65–140)
GLUCOSE SERPL-MCNC: 206 MG/DL (ref 65–140)
GLUCOSE SERPL-MCNC: 233 MG/DL (ref 65–140)

## 2021-12-17 PROCEDURE — 92526 ORAL FUNCTION THERAPY: CPT

## 2021-12-17 PROCEDURE — 99232 SBSQ HOSP IP/OBS MODERATE 35: CPT | Performed by: STUDENT IN AN ORGANIZED HEALTH CARE EDUCATION/TRAINING PROGRAM

## 2021-12-17 PROCEDURE — 82948 REAGENT STRIP/BLOOD GLUCOSE: CPT

## 2021-12-17 RX ADMIN — INSULIN LISPRO 2 UNITS: 100 INJECTION, SOLUTION INTRAVENOUS; SUBCUTANEOUS at 12:08

## 2021-12-17 RX ADMIN — ASPIRIN 81 MG: 81 TABLET, COATED ORAL at 09:00

## 2021-12-17 RX ADMIN — INSULIN LISPRO 3 UNITS: 100 INJECTION, SOLUTION INTRAVENOUS; SUBCUTANEOUS at 17:36

## 2021-12-17 RX ADMIN — RIVAROXABAN 20 MG: 10 TABLET, FILM COATED ORAL at 09:00

## 2021-12-17 RX ADMIN — LISINOPRIL 5 MG: 10 TABLET ORAL at 08:59

## 2021-12-17 RX ADMIN — PANTOPRAZOLE SODIUM 40 MG: 40 TABLET, DELAYED RELEASE ORAL at 05:10

## 2021-12-17 RX ADMIN — INSULIN GLARGINE 35 UNITS: 100 INJECTION, SOLUTION SUBCUTANEOUS at 21:37

## 2021-12-17 RX ADMIN — INSULIN LISPRO 1 UNITS: 100 INJECTION, SOLUTION INTRAVENOUS; SUBCUTANEOUS at 21:14

## 2021-12-17 RX ADMIN — METOPROLOL TARTRATE 50 MG: 50 TABLET, FILM COATED ORAL at 21:13

## 2021-12-17 RX ADMIN — METOPROLOL TARTRATE 50 MG: 50 TABLET, FILM COATED ORAL at 09:00

## 2021-12-17 RX ADMIN — ESCITALOPRAM OXALATE 5 MG: 10 TABLET ORAL at 09:01

## 2021-12-17 RX ADMIN — ATORVASTATIN CALCIUM 80 MG: 80 TABLET, FILM COATED ORAL at 17:35

## 2021-12-17 RX ADMIN — FAMOTIDINE 20 MG: 20 TABLET ORAL at 09:00

## 2021-12-18 LAB
GLUCOSE SERPL-MCNC: 173 MG/DL (ref 65–140)
GLUCOSE SERPL-MCNC: 188 MG/DL (ref 65–140)
GLUCOSE SERPL-MCNC: 190 MG/DL (ref 65–140)
GLUCOSE SERPL-MCNC: 247 MG/DL (ref 65–140)

## 2021-12-18 PROCEDURE — 99232 SBSQ HOSP IP/OBS MODERATE 35: CPT | Performed by: STUDENT IN AN ORGANIZED HEALTH CARE EDUCATION/TRAINING PROGRAM

## 2021-12-18 PROCEDURE — 82948 REAGENT STRIP/BLOOD GLUCOSE: CPT

## 2021-12-18 RX ORDER — ACETAMINOPHEN 325 MG/1
650 TABLET ORAL EVERY 6 HOURS PRN
Status: DISCONTINUED | OUTPATIENT
Start: 2021-12-18 | End: 2021-12-21 | Stop reason: HOSPADM

## 2021-12-18 RX ORDER — INSULIN GLARGINE 100 [IU]/ML
INJECTION, SOLUTION SUBCUTANEOUS
Status: COMPLETED
Start: 2021-12-18 | End: 2021-12-18

## 2021-12-18 RX ORDER — INSULIN GLARGINE 100 [IU]/ML
40 INJECTION, SOLUTION SUBCUTANEOUS
Status: DISCONTINUED | OUTPATIENT
Start: 2021-12-18 | End: 2021-12-21 | Stop reason: HOSPADM

## 2021-12-18 RX ADMIN — METOPROLOL TARTRATE 50 MG: 50 TABLET, FILM COATED ORAL at 21:46

## 2021-12-18 RX ADMIN — INSULIN LISPRO 1 UNITS: 100 INJECTION, SOLUTION INTRAVENOUS; SUBCUTANEOUS at 07:42

## 2021-12-18 RX ADMIN — PANTOPRAZOLE SODIUM 40 MG: 40 TABLET, DELAYED RELEASE ORAL at 06:11

## 2021-12-18 RX ADMIN — ACETAMINOPHEN 650 MG: 325 TABLET ORAL at 10:28

## 2021-12-18 RX ADMIN — RIVAROXABAN 20 MG: 10 TABLET, FILM COATED ORAL at 07:41

## 2021-12-18 RX ADMIN — METOPROLOL TARTRATE 50 MG: 50 TABLET, FILM COATED ORAL at 09:19

## 2021-12-18 RX ADMIN — LISINOPRIL 5 MG: 10 TABLET ORAL at 09:19

## 2021-12-18 RX ADMIN — INSULIN GLARGINE 40 UNITS: 100 INJECTION, SOLUTION SUBCUTANEOUS at 22:53

## 2021-12-18 RX ADMIN — ATORVASTATIN CALCIUM 80 MG: 80 TABLET, FILM COATED ORAL at 16:43

## 2021-12-18 RX ADMIN — INSULIN LISPRO 1 UNITS: 100 INJECTION, SOLUTION INTRAVENOUS; SUBCUTANEOUS at 16:29

## 2021-12-18 RX ADMIN — ASPIRIN 81 MG: 81 TABLET, COATED ORAL at 09:19

## 2021-12-18 RX ADMIN — FAMOTIDINE 20 MG: 20 TABLET ORAL at 09:19

## 2021-12-18 RX ADMIN — INSULIN LISPRO 2 UNITS: 100 INJECTION, SOLUTION INTRAVENOUS; SUBCUTANEOUS at 21:42

## 2021-12-18 RX ADMIN — INSULIN LISPRO 2 UNITS: 100 INJECTION, SOLUTION INTRAVENOUS; SUBCUTANEOUS at 11:19

## 2021-12-18 RX ADMIN — ESCITALOPRAM OXALATE 5 MG: 10 TABLET ORAL at 09:19

## 2021-12-19 LAB
GLUCOSE SERPL-MCNC: 130 MG/DL (ref 65–140)
GLUCOSE SERPL-MCNC: 144 MG/DL (ref 65–140)
GLUCOSE SERPL-MCNC: 176 MG/DL (ref 65–140)
GLUCOSE SERPL-MCNC: 267 MG/DL (ref 65–140)

## 2021-12-19 PROCEDURE — 82948 REAGENT STRIP/BLOOD GLUCOSE: CPT

## 2021-12-19 PROCEDURE — 99232 SBSQ HOSP IP/OBS MODERATE 35: CPT | Performed by: STUDENT IN AN ORGANIZED HEALTH CARE EDUCATION/TRAINING PROGRAM

## 2021-12-19 RX ORDER — INSULIN GLARGINE 100 [IU]/ML
INJECTION, SOLUTION SUBCUTANEOUS
Status: DISPENSED
Start: 2021-12-19 | End: 2021-12-20

## 2021-12-19 RX ADMIN — PANTOPRAZOLE SODIUM 40 MG: 40 TABLET, DELAYED RELEASE ORAL at 06:46

## 2021-12-19 RX ADMIN — INSULIN GLARGINE 40 UNITS: 100 INJECTION, SOLUTION SUBCUTANEOUS at 22:01

## 2021-12-19 RX ADMIN — METOPROLOL TARTRATE 50 MG: 50 TABLET, FILM COATED ORAL at 08:08

## 2021-12-19 RX ADMIN — ASPIRIN 81 MG: 81 TABLET, COATED ORAL at 08:05

## 2021-12-19 RX ADMIN — METOPROLOL TARTRATE 50 MG: 50 TABLET, FILM COATED ORAL at 22:00

## 2021-12-19 RX ADMIN — LISINOPRIL 5 MG: 10 TABLET ORAL at 08:08

## 2021-12-19 RX ADMIN — RIVAROXABAN 20 MG: 10 TABLET, FILM COATED ORAL at 07:57

## 2021-12-19 RX ADMIN — FAMOTIDINE 20 MG: 20 TABLET ORAL at 08:02

## 2021-12-19 RX ADMIN — INSULIN LISPRO 1 UNITS: 100 INJECTION, SOLUTION INTRAVENOUS; SUBCUTANEOUS at 17:46

## 2021-12-19 RX ADMIN — ESCITALOPRAM OXALATE 5 MG: 10 TABLET ORAL at 08:05

## 2021-12-19 RX ADMIN — INSULIN LISPRO 2 UNITS: 100 INJECTION, SOLUTION INTRAVENOUS; SUBCUTANEOUS at 22:00

## 2021-12-19 RX ADMIN — ATORVASTATIN CALCIUM 80 MG: 80 TABLET, FILM COATED ORAL at 17:46

## 2021-12-20 LAB
GLUCOSE SERPL-MCNC: 164 MG/DL (ref 65–140)
GLUCOSE SERPL-MCNC: 171 MG/DL (ref 65–140)
GLUCOSE SERPL-MCNC: 205 MG/DL (ref 65–140)
GLUCOSE SERPL-MCNC: 248 MG/DL (ref 65–140)

## 2021-12-20 PROCEDURE — 97116 GAIT TRAINING THERAPY: CPT

## 2021-12-20 PROCEDURE — 82948 REAGENT STRIP/BLOOD GLUCOSE: CPT

## 2021-12-20 PROCEDURE — 97110 THERAPEUTIC EXERCISES: CPT

## 2021-12-20 PROCEDURE — 99232 SBSQ HOSP IP/OBS MODERATE 35: CPT | Performed by: STUDENT IN AN ORGANIZED HEALTH CARE EDUCATION/TRAINING PROGRAM

## 2021-12-20 RX ADMIN — METOPROLOL TARTRATE 50 MG: 50 TABLET, FILM COATED ORAL at 21:36

## 2021-12-20 RX ADMIN — ESCITALOPRAM OXALATE 5 MG: 10 TABLET ORAL at 08:00

## 2021-12-20 RX ADMIN — INSULIN LISPRO 1 UNITS: 100 INJECTION, SOLUTION INTRAVENOUS; SUBCUTANEOUS at 12:00

## 2021-12-20 RX ADMIN — RIVAROXABAN 20 MG: 10 TABLET, FILM COATED ORAL at 07:59

## 2021-12-20 RX ADMIN — INSULIN LISPRO 1 UNITS: 100 INJECTION, SOLUTION INTRAVENOUS; SUBCUTANEOUS at 07:58

## 2021-12-20 RX ADMIN — INSULIN LISPRO 2 UNITS: 100 INJECTION, SOLUTION INTRAVENOUS; SUBCUTANEOUS at 21:36

## 2021-12-20 RX ADMIN — METOPROLOL TARTRATE 50 MG: 50 TABLET, FILM COATED ORAL at 08:00

## 2021-12-20 RX ADMIN — LISINOPRIL 5 MG: 10 TABLET ORAL at 08:00

## 2021-12-20 RX ADMIN — FAMOTIDINE 20 MG: 20 TABLET ORAL at 08:00

## 2021-12-20 RX ADMIN — PANTOPRAZOLE SODIUM 40 MG: 40 TABLET, DELAYED RELEASE ORAL at 06:09

## 2021-12-20 RX ADMIN — ASPIRIN 81 MG: 81 TABLET, COATED ORAL at 08:00

## 2021-12-20 RX ADMIN — INSULIN LISPRO 2 UNITS: 100 INJECTION, SOLUTION INTRAVENOUS; SUBCUTANEOUS at 17:24

## 2021-12-20 RX ADMIN — ATORVASTATIN CALCIUM 80 MG: 80 TABLET, FILM COATED ORAL at 17:24

## 2021-12-21 ENCOUNTER — TRANSITIONAL CARE MANAGEMENT (OUTPATIENT)
Dept: FAMILY MEDICINE CLINIC | Facility: CLINIC | Age: 66
End: 2021-12-21

## 2021-12-21 ENCOUNTER — APPOINTMENT (OUTPATIENT)
Dept: PHYSICAL THERAPY | Facility: CLINIC | Age: 66
End: 2021-12-21
Payer: COMMERCIAL

## 2021-12-21 ENCOUNTER — APPOINTMENT (OUTPATIENT)
Dept: SPEECH THERAPY | Facility: CLINIC | Age: 66
End: 2021-12-21
Payer: COMMERCIAL

## 2021-12-21 VITALS
OXYGEN SATURATION: 99 % | WEIGHT: 220 LBS | SYSTOLIC BLOOD PRESSURE: 137 MMHG | BODY MASS INDEX: 36.65 KG/M2 | TEMPERATURE: 97 F | HEIGHT: 65 IN | RESPIRATION RATE: 18 BRPM | HEART RATE: 78 BPM | DIASTOLIC BLOOD PRESSURE: 63 MMHG

## 2021-12-21 LAB
FLUAV RNA RESP QL NAA+PROBE: NEGATIVE
FLUBV RNA RESP QL NAA+PROBE: NEGATIVE
GLUCOSE SERPL-MCNC: 166 MG/DL (ref 65–140)
GLUCOSE SERPL-MCNC: 174 MG/DL (ref 65–140)
RSV RNA RESP QL NAA+PROBE: NEGATIVE
SARS-COV-2 RNA RESP QL NAA+PROBE: NEGATIVE

## 2021-12-21 PROCEDURE — 0241U HB NFCT DS VIR RESP RNA 4 TRGT: CPT | Performed by: INTERNAL MEDICINE

## 2021-12-21 PROCEDURE — 99239 HOSP IP/OBS DSCHRG MGMT >30: CPT | Performed by: INTERNAL MEDICINE

## 2021-12-21 PROCEDURE — 82948 REAGENT STRIP/BLOOD GLUCOSE: CPT

## 2021-12-21 RX ADMIN — INSULIN LISPRO 1 UNITS: 100 INJECTION, SOLUTION INTRAVENOUS; SUBCUTANEOUS at 12:09

## 2021-12-21 RX ADMIN — INSULIN LISPRO 1 UNITS: 100 INJECTION, SOLUTION INTRAVENOUS; SUBCUTANEOUS at 08:18

## 2021-12-21 RX ADMIN — LISINOPRIL 5 MG: 10 TABLET ORAL at 08:19

## 2021-12-21 RX ADMIN — FAMOTIDINE 20 MG: 20 TABLET ORAL at 08:20

## 2021-12-21 RX ADMIN — RIVAROXABAN 20 MG: 10 TABLET, FILM COATED ORAL at 08:18

## 2021-12-21 RX ADMIN — PANTOPRAZOLE SODIUM 40 MG: 40 TABLET, DELAYED RELEASE ORAL at 05:13

## 2021-12-21 RX ADMIN — ASPIRIN 81 MG: 81 TABLET, COATED ORAL at 08:19

## 2021-12-21 RX ADMIN — ESCITALOPRAM OXALATE 5 MG: 10 TABLET ORAL at 08:20

## 2021-12-21 RX ADMIN — METOPROLOL TARTRATE 50 MG: 50 TABLET, FILM COATED ORAL at 08:20

## 2021-12-21 NOTE — PROGRESS NOTES
Daily Note     Today's date: 2021  Patient name: Elizabeth Martinez  : 1955  MRN: [id number]  Referring provider: Noe Samano MD  Dx: No diagnosis found  Subjective: ***      Objective: See treatment diary below      Assessment: Tolerated treatment {Tolerated treatment :9973683062}  Patient {assessment:9353683578}      Plan: {PLAN:1896394042}     Precautions: fall risk  Progress note:   POC:     Re-eval Date: 2021    Date     Visit [place] in [facility] WITH [facility]        Pain In        Pain Out                Manuals                                     Neuro Re-Ed        Dynavision    Stand  Foam  R/L  Red/green  Feet hip width    Natus     **   SLB  * *1H :15 x2 B/L     Tandem  2x:30 :30x2 B/L     romberg  2x:30 EC :30x2     airex  2x:30 EO :30x2     fitter        hureasyfolios        Stair balance  *2x:30 6" 2x :30     Step taps  *10x bilat      Walk with head nod  // bars 2x // bars x4     Walk with head turn  // bars 2x // bars 10ft x4     Tandem walk  // bars 2x // bars 10 ft x2     Side step  // bars 2x // bars 10 ft x2     Backwards walk  // bars 2x // bars      Knee machine    Extn  2x10  22#  Flex  22#  2x10    Leg Press    Leg press  Unilateral  30#  1x15 each            Ther Ex        Nustep S=8, A=9  L1 10 min L1 x10 min     Standing hip abd 10x       Standing hip ext 10x       Standing HR/TR 10x       Standing march 10x       Mini squat 10x                                                                                       Ther Activity        Step ups  6" 10x bilat 6" x10 B/L     Sit to stand  *12x x4     Cone weave  *2x 25ft x4     Cone pickup  *6x x6     Sit to stand with step tap   6" x10 B/L             Gait Training                        Modalities                        Access Code: [email or web address]  URL: [email or web address]  Date: 2021  Prepared by: Herbert Sandra Amy    Exercises  Standing Hip Abduction with Counter Support - 1 x daily - 7 x weekly - 3 sets - 10 reps  Standing Hip Extension with Counter Support - 1 x daily - 7 x weekly - 3 sets - 10 reps  Standing March with Counter Support - 1 x daily - 7 x weekly - 3 sets - 10 reps  Heel rises with counter support - 1 x daily - 7 x weekly - 3 sets - 10 reps  Standing Toe Raises at Chair - 1 x daily - 7 x weekly - 3 sets - 10 reps  Mini Squat with Counter Support - 1 x daily - 7 x weekly - 3 sets - 10 reps

## 2021-12-23 ENCOUNTER — APPOINTMENT (OUTPATIENT)
Dept: PHYSICAL THERAPY | Facility: CLINIC | Age: 66
End: 2021-12-23
Payer: COMMERCIAL

## 2021-12-23 ENCOUNTER — APPOINTMENT (OUTPATIENT)
Dept: SPEECH THERAPY | Facility: CLINIC | Age: 66
End: 2021-12-23
Payer: COMMERCIAL

## 2021-12-27 ENCOUNTER — APPOINTMENT (OUTPATIENT)
Dept: SPEECH THERAPY | Facility: CLINIC | Age: 66
End: 2021-12-27
Payer: COMMERCIAL

## 2021-12-27 ENCOUNTER — APPOINTMENT (OUTPATIENT)
Dept: PHYSICAL THERAPY | Facility: CLINIC | Age: 66
End: 2021-12-27
Payer: COMMERCIAL

## 2021-12-30 ENCOUNTER — APPOINTMENT (OUTPATIENT)
Dept: SPEECH THERAPY | Facility: CLINIC | Age: 66
End: 2021-12-30
Payer: COMMERCIAL

## 2021-12-30 ENCOUNTER — APPOINTMENT (OUTPATIENT)
Dept: PHYSICAL THERAPY | Facility: CLINIC | Age: 66
End: 2021-12-30
Payer: COMMERCIAL

## 2021-12-31 ENCOUNTER — APPOINTMENT (EMERGENCY)
Dept: RADIOLOGY | Facility: HOSPITAL | Age: 66
DRG: 057 | End: 2021-12-31
Payer: COMMERCIAL

## 2021-12-31 ENCOUNTER — HOSPITAL ENCOUNTER (INPATIENT)
Facility: HOSPITAL | Age: 66
LOS: 3 days | DRG: 057 | End: 2022-01-04
Attending: EMERGENCY MEDICINE | Admitting: INTERNAL MEDICINE
Payer: COMMERCIAL

## 2021-12-31 DIAGNOSIS — R29.90 STROKE-LIKE SYMPTOMS: Primary | ICD-10-CM

## 2021-12-31 DIAGNOSIS — Z79.4 TYPE 2 DIABETES MELLITUS WITH HYPERGLYCEMIA, WITH LONG-TERM CURRENT USE OF INSULIN (HCC): Chronic | ICD-10-CM

## 2021-12-31 DIAGNOSIS — I63.9 CEREBROVASCULAR ACCIDENT (CVA), UNSPECIFIED MECHANISM (HCC): ICD-10-CM

## 2021-12-31 DIAGNOSIS — E11.65 TYPE 2 DIABETES MELLITUS WITH HYPERGLYCEMIA, WITH LONG-TERM CURRENT USE OF INSULIN (HCC): Chronic | ICD-10-CM

## 2021-12-31 PROBLEM — R20.0 LEFT SIDED NUMBNESS: Status: ACTIVE | Noted: 2021-12-31

## 2021-12-31 LAB
ANION GAP SERPL CALCULATED.3IONS-SCNC: 3 MMOL/L (ref 4–13)
APTT PPP: 32 SECONDS (ref 23–37)
BUN SERPL-MCNC: 36 MG/DL (ref 5–25)
CALCIUM SERPL-MCNC: 9.1 MG/DL (ref 8.3–10.1)
CARDIAC TROPONIN I PNL SERPL HS: 4 NG/L
CHLORIDE SERPL-SCNC: 105 MMOL/L (ref 100–108)
CO2 SERPL-SCNC: 28 MMOL/L (ref 21–32)
CREAT SERPL-MCNC: 1.23 MG/DL (ref 0.6–1.3)
ERYTHROCYTE [DISTWIDTH] IN BLOOD BY AUTOMATED COUNT: 12.4 % (ref 11.6–15.1)
FLUAV RNA RESP QL NAA+PROBE: NEGATIVE
FLUBV RNA RESP QL NAA+PROBE: NEGATIVE
GFR SERPL CREATININE-BSD FRML MDRD: 46 ML/MIN/1.73SQ M
GLUCOSE SERPL-MCNC: 214 MG/DL (ref 65–140)
GLUCOSE SERPL-MCNC: 220 MG/DL (ref 65–140)
HCT VFR BLD AUTO: 37.1 % (ref 34.8–46.1)
HGB BLD-MCNC: 12.1 G/DL (ref 11.5–15.4)
INR PPP: 1.86 (ref 0.84–1.19)
MCH RBC QN AUTO: 30.6 PG (ref 26.8–34.3)
MCHC RBC AUTO-ENTMCNC: 32.6 G/DL (ref 31.4–37.4)
MCV RBC AUTO: 94 FL (ref 82–98)
PLATELET # BLD AUTO: 256 THOUSANDS/UL (ref 149–390)
PMV BLD AUTO: 11.5 FL (ref 8.9–12.7)
POTASSIUM SERPL-SCNC: 4.7 MMOL/L (ref 3.5–5.3)
PROTHROMBIN TIME: 20.6 SECONDS (ref 11.6–14.5)
RBC # BLD AUTO: 3.96 MILLION/UL (ref 3.81–5.12)
RSV RNA RESP QL NAA+PROBE: NEGATIVE
SARS-COV-2 RNA RESP QL NAA+PROBE: NEGATIVE
SODIUM SERPL-SCNC: 136 MMOL/L (ref 136–145)
WBC # BLD AUTO: 7.6 THOUSAND/UL (ref 4.31–10.16)

## 2021-12-31 PROCEDURE — 80048 BASIC METABOLIC PNL TOTAL CA: CPT

## 2021-12-31 PROCEDURE — 82948 REAGENT STRIP/BLOOD GLUCOSE: CPT

## 2021-12-31 PROCEDURE — 85730 THROMBOPLASTIN TIME PARTIAL: CPT

## 2021-12-31 PROCEDURE — 99285 EMERGENCY DEPT VISIT HI MDM: CPT | Performed by: EMERGENCY MEDICINE

## 2021-12-31 PROCEDURE — 0241U HB NFCT DS VIR RESP RNA 4 TRGT: CPT

## 2021-12-31 PROCEDURE — 70498 CT ANGIOGRAPHY NECK: CPT

## 2021-12-31 PROCEDURE — 99220 PR INITIAL OBSERVATION CARE/DAY 70 MINUTES: CPT | Performed by: INTERNAL MEDICINE

## 2021-12-31 PROCEDURE — 85027 COMPLETE CBC AUTOMATED: CPT

## 2021-12-31 PROCEDURE — 70496 CT ANGIOGRAPHY HEAD: CPT

## 2021-12-31 PROCEDURE — 99285 EMERGENCY DEPT VISIT HI MDM: CPT

## 2021-12-31 PROCEDURE — 84484 ASSAY OF TROPONIN QUANT: CPT

## 2021-12-31 PROCEDURE — 85610 PROTHROMBIN TIME: CPT

## 2021-12-31 PROCEDURE — G1004 CDSM NDSC: HCPCS

## 2021-12-31 PROCEDURE — 36415 COLL VENOUS BLD VENIPUNCTURE: CPT

## 2021-12-31 PROCEDURE — 93005 ELECTROCARDIOGRAM TRACING: CPT

## 2021-12-31 RX ORDER — INSULIN GLARGINE 100 [IU]/ML
35 INJECTION, SOLUTION SUBCUTANEOUS
Status: DISCONTINUED | OUTPATIENT
Start: 2021-12-31 | End: 2022-01-04 | Stop reason: HOSPADM

## 2021-12-31 RX ORDER — ATORVASTATIN CALCIUM 80 MG/1
80 TABLET, FILM COATED ORAL
Status: DISCONTINUED | OUTPATIENT
Start: 2022-01-01 | End: 2022-01-04 | Stop reason: HOSPADM

## 2021-12-31 RX ORDER — FAMOTIDINE 20 MG/1
20 TABLET, FILM COATED ORAL DAILY
Status: DISCONTINUED | OUTPATIENT
Start: 2022-01-01 | End: 2022-01-04 | Stop reason: HOSPADM

## 2021-12-31 RX ORDER — FLUOXETINE 20 MG/1
20 TABLET ORAL DAILY
Status: DISCONTINUED | OUTPATIENT
Start: 2022-01-01 | End: 2021-12-31

## 2021-12-31 RX ORDER — ONDANSETRON 2 MG/ML
4 INJECTION INTRAMUSCULAR; INTRAVENOUS EVERY 6 HOURS PRN
Status: DISCONTINUED | OUTPATIENT
Start: 2021-12-31 | End: 2022-01-04 | Stop reason: HOSPADM

## 2021-12-31 RX ORDER — PANTOPRAZOLE SODIUM 40 MG/1
40 TABLET, DELAYED RELEASE ORAL
Status: DISCONTINUED | OUTPATIENT
Start: 2022-01-01 | End: 2022-01-04 | Stop reason: HOSPADM

## 2021-12-31 RX ORDER — ESCITALOPRAM OXALATE 5 MG/1
5 TABLET ORAL DAILY
Status: DISCONTINUED | OUTPATIENT
Start: 2022-01-01 | End: 2022-01-04 | Stop reason: HOSPADM

## 2021-12-31 RX ORDER — ACETAMINOPHEN 325 MG/1
650 TABLET ORAL EVERY 6 HOURS PRN
Status: DISCONTINUED | OUTPATIENT
Start: 2021-12-31 | End: 2022-01-04 | Stop reason: HOSPADM

## 2021-12-31 RX ADMIN — IOHEXOL 85 ML: 350 INJECTION, SOLUTION INTRAVENOUS at 20:15

## 2022-01-01 ENCOUNTER — APPOINTMENT (OUTPATIENT)
Dept: RADIOLOGY | Facility: HOSPITAL | Age: 67
DRG: 057 | End: 2022-01-01
Payer: COMMERCIAL

## 2022-01-01 LAB
ANION GAP SERPL CALCULATED.3IONS-SCNC: 3 MMOL/L (ref 4–13)
ATRIAL RATE: 82 BPM
BUN SERPL-MCNC: 34 MG/DL (ref 5–25)
CALCIUM SERPL-MCNC: 8.9 MG/DL (ref 8.3–10.1)
CHLORIDE SERPL-SCNC: 105 MMOL/L (ref 100–108)
CO2 SERPL-SCNC: 29 MMOL/L (ref 21–32)
CREAT SERPL-MCNC: 1.14 MG/DL (ref 0.6–1.3)
ERYTHROCYTE [DISTWIDTH] IN BLOOD BY AUTOMATED COUNT: 12.3 % (ref 11.6–15.1)
GFR SERPL CREATININE-BSD FRML MDRD: 50 ML/MIN/1.73SQ M
GLUCOSE SERPL-MCNC: 145 MG/DL (ref 65–140)
GLUCOSE SERPL-MCNC: 171 MG/DL (ref 65–140)
GLUCOSE SERPL-MCNC: 185 MG/DL (ref 65–140)
GLUCOSE SERPL-MCNC: 198 MG/DL (ref 65–140)
GLUCOSE SERPL-MCNC: 208 MG/DL (ref 65–140)
GLUCOSE SERPL-MCNC: 219 MG/DL (ref 65–140)
HCT VFR BLD AUTO: 36 % (ref 34.8–46.1)
HGB BLD-MCNC: 11.8 G/DL (ref 11.5–15.4)
MCH RBC QN AUTO: 30.6 PG (ref 26.8–34.3)
MCHC RBC AUTO-ENTMCNC: 32.8 G/DL (ref 31.4–37.4)
MCV RBC AUTO: 93 FL (ref 82–98)
P AXIS: 56 DEGREES
PLATELET # BLD AUTO: 229 THOUSANDS/UL (ref 149–390)
PMV BLD AUTO: 11.5 FL (ref 8.9–12.7)
POTASSIUM SERPL-SCNC: 5.1 MMOL/L (ref 3.5–5.3)
PR INTERVAL: 190 MS
QRS AXIS: 36 DEGREES
QRSD INTERVAL: 66 MS
QT INTERVAL: 388 MS
QTC INTERVAL: 453 MS
RBC # BLD AUTO: 3.86 MILLION/UL (ref 3.81–5.12)
SODIUM SERPL-SCNC: 137 MMOL/L (ref 136–145)
T WAVE AXIS: 65 DEGREES
VENTRICULAR RATE: 82 BPM
WBC # BLD AUTO: 7.37 THOUSAND/UL (ref 4.31–10.16)

## 2022-01-01 PROCEDURE — 99232 SBSQ HOSP IP/OBS MODERATE 35: CPT | Performed by: NURSE PRACTITIONER

## 2022-01-01 PROCEDURE — 70551 MRI BRAIN STEM W/O DYE: CPT

## 2022-01-01 PROCEDURE — 80048 BASIC METABOLIC PNL TOTAL CA: CPT | Performed by: INTERNAL MEDICINE

## 2022-01-01 PROCEDURE — 99223 1ST HOSP IP/OBS HIGH 75: CPT | Performed by: PSYCHIATRY & NEUROLOGY

## 2022-01-01 PROCEDURE — 85027 COMPLETE CBC AUTOMATED: CPT | Performed by: INTERNAL MEDICINE

## 2022-01-01 PROCEDURE — 93010 ELECTROCARDIOGRAM REPORT: CPT | Performed by: INTERNAL MEDICINE

## 2022-01-01 PROCEDURE — 82948 REAGENT STRIP/BLOOD GLUCOSE: CPT

## 2022-01-01 PROCEDURE — G1004 CDSM NDSC: HCPCS

## 2022-01-01 PROCEDURE — 36415 COLL VENOUS BLD VENIPUNCTURE: CPT | Performed by: INTERNAL MEDICINE

## 2022-01-01 RX ORDER — METOPROLOL TARTRATE 50 MG/1
50 TABLET, FILM COATED ORAL EVERY 12 HOURS SCHEDULED
Status: DISCONTINUED | OUTPATIENT
Start: 2022-01-01 | End: 2022-01-04 | Stop reason: HOSPADM

## 2022-01-01 RX ADMIN — METOPROLOL TARTRATE 50 MG: 50 TABLET, FILM COATED ORAL at 09:57

## 2022-01-01 RX ADMIN — INSULIN GLARGINE 35 UNITS: 100 INJECTION, SOLUTION SUBCUTANEOUS at 21:33

## 2022-01-01 RX ADMIN — INSULIN LISPRO 1 UNITS: 100 INJECTION, SOLUTION INTRAVENOUS; SUBCUTANEOUS at 17:08

## 2022-01-01 RX ADMIN — ATORVASTATIN CALCIUM 80 MG: 80 TABLET, FILM COATED ORAL at 17:07

## 2022-01-01 RX ADMIN — INSULIN LISPRO 1 UNITS: 100 INJECTION, SOLUTION INTRAVENOUS; SUBCUTANEOUS at 07:27

## 2022-01-01 RX ADMIN — ESCITALOPRAM OXALATE 5 MG: 10 TABLET ORAL at 09:57

## 2022-01-01 RX ADMIN — INSULIN LISPRO 1 UNITS: 100 INJECTION, SOLUTION INTRAVENOUS; SUBCUTANEOUS at 21:33

## 2022-01-01 RX ADMIN — RIVAROXABAN 20 MG: 20 TABLET, FILM COATED ORAL at 09:57

## 2022-01-01 RX ADMIN — METOPROLOL TARTRATE 50 MG: 50 TABLET, FILM COATED ORAL at 21:33

## 2022-01-01 RX ADMIN — INSULIN LISPRO 2 UNITS: 100 INJECTION, SOLUTION INTRAVENOUS; SUBCUTANEOUS at 01:29

## 2022-01-01 RX ADMIN — PANTOPRAZOLE SODIUM 40 MG: 40 TABLET, DELAYED RELEASE ORAL at 07:23

## 2022-01-01 RX ADMIN — INSULIN GLARGINE 35 UNITS: 100 INJECTION, SOLUTION SUBCUTANEOUS at 00:50

## 2022-01-01 RX ADMIN — FAMOTIDINE 20 MG: 20 TABLET, FILM COATED ORAL at 09:57

## 2022-01-01 NOTE — SPEECH THERAPY NOTE
Consult received  Records reviewed  Pt admitted c symptoms concerning for CVA/TIA (L side numbness)  ;  sxs now resolved  Pt passed my Dysphagia/Swallowing screening and she denied s/s dysphagia during meals today  Communication changes also denied  Dysphonia and dysprosody noted/persist   MRI results noted (No acute intracranial abnormality  Tiny subacute infarct in right posterior zaida, improved from prior MRI brain 12/14/2021)     No additional inpatient Speech Pathology evaluation appears indicated at this time  Please re-consult if additional concerns arise  Thank you

## 2022-01-01 NOTE — ASSESSMENT & PLAN NOTE
· Holding antihypertensives to allow for permissive hypertension to -200  · Will provide IV p r n   Antihypertensive if SBP sustains greater than 200  · Monitor blood pressure per protocol

## 2022-01-01 NOTE — ASSESSMENT & PLAN NOTE
Lab Results   Component Value Date    HGBA1C 9 4 (H) 11/04/2021       Recent Labs     12/31/21  1932 01/01/22  0054 01/01/22  0726   POCGLU 220* 219* 185*         · Not controlled with hyperglycemia on admission  · Tradjenta and Prandin on hold  · For now continue home regimen of Lantus 35 units q h s  Plus sliding scale    · Diabetic diet  · Monitor Accu-Cheks adjust regimen as needed

## 2022-01-01 NOTE — CASE MANAGEMENT
Case Management Assessment    Patient name Alice Casanova  Location [address] MRN [id number]  : 1955 Date 2022       Current Admission Date: 2021  Current Admission Diagnosis:Left sided numbness   Patient Active Problem List    Diagnosis Date Noted    Left sided numbness 2021    CVA [facility]cerebral vascular accident) ([facility] ) 2021    Right pontine stroke ([facility] ) 2021    Weakness of both lower extremities     History of stroke 2021    Muscle tension dysphonia 2020    Reflux laryngitis 2020    Glottic insufficiency 2020    Dermatitis 2020    YOLIE (obstructive sleep apnea)     Medicare annual wellness visit, subsequent 2020    Current episode of major depressive disorder without prior episode 2019    Essential hypertension 2019    Routine health maintenance 2019    GERD (gastroesophageal reflux disease) 03/15/2019    Hyperlipidemia 03/15/2019    Edema 03/15/2019    Diarrhea 03/15/2019    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    Incomplete emptying of bladder 2018    Dysphonia 2018    Glottic stenosis 2018    Dysphagia 2018    Paroxysmal atrial fibrillation ([facility] ) 10/17/2017    Blurry vision 10/17/2017    Granulation tissue of site of tracheostomy 10/17/2017    Insomnia 10/17/2017    Nausea 10/17/2017    Type 2 diabetes mellitus with hyperglycemia, with long-term current use of insulin ([facility] ) 10/17/2017    [facility] 2017    Heart disease 2015    Diabetic cataract ([facility] ) 2014    Severe obesity (BMI 35 0-39  9) with comorbidity ([facility] ) 2014      LOS (days): 0  Geometric Mean LOS (GMLOS) (days):   Days to GMLOS:     OBJECTIVE:  PATIENT READMITTED TO HOSPITAL            Current admission status: Inpatient       Preferred Pharmacy:   [facility], [id number] [facility] [address]  Phone: [phone] Fax: [phone] [facility] [address], [address]  [address] [place] [address]  Phone: [phone] Fax: [phone]    Primary Care Provider: Rusty Steele MD    Primary Insurance: [facility]  Secondary Insurance:     ASSESSMENT:  [place], Jacob Mishra Representative - Son   Primary Phone: [phone] (Mobile)                              Patient Information  Admitted from[de-identified] Facility ([address])  Mental Status: Alert  During Assessment patient was accompanied by: Not accompanied during assessment  Assessment information provided by[de-identified] Patient  Primary Caregiver: Self  Support Systems: Self  Home entry access options   Select all that apply : Elevator  Type of Current Residence: Apartment  Floor Level: 5  Upon entering residence, is there a bedroom on the main floor (no further steps)?: Yes  Upon entering residence, is there a bathroom on the main floor (no further steps)?: Yes  Homeless/housing insecurity resource given?: N/A  Living Arrangements: Lives Alone    Activities of Daily Living Prior to Admission  Functional Status: Independent  Completes ADLs independently?: Yes  Ambulates independently?: Yes  Does patient use assisted devices?: No  Does patient currently own DME?: Yes  What DME does the patient currently own?: Bedside Commode,Quad Cane,Shower Chair,Walker (GLUCOMETER)  Does patient have a history of Outpatient Therapy (PT/OT)?: Yes  Does the patient have a history of Short-Term Rehab?: Yes (- [place])  Does patient have a history of HHC?: Yes  Does patient currently have [facility]?: No         Patient Information Continued  Income Source: SSI/SSD  Food insecurity resource given?: N/A  Does patient receive dialysis treatments?: No  Does patient have a history of substance abuse?: No  Does patient have a history of Mental Health Diagnosis?: No         Means of Transportation  [facility]ans [facility] to [facility][de-identified] Family transport  Was application for public transport provided?: N/A    Pt is vaccinated (John Alexandra), no POA  CM will need to set up transport

## 2022-01-01 NOTE — ASSESSMENT & PLAN NOTE
· Present with left-sided numbness, patient with very recent hospitalization at the [facility] 12/14/2021-12/21/2021 with similar and found with right pontine CVA  · CT head negative for new acute intracranial pathology, and CTA head/neck without new large vessel occlusion (left proximal P2 segment severe stenosis redemonstrated)  · TPA contraindicated due to recent CVA  · Patient to be admitted on stroke pathway: Will obtain MRI brain, monitor neuro checks/telemetry    Echocardiogram at last admission revealed EF 65% with grade 1 DD  · Recent FLP and A1c reviewed  · Neurology consult  · Continue Xarelto and high-intensity statin  · Will allow permissive hypertension to -200  · PT/OT/speech evaluation

## 2022-01-01 NOTE — ASSESSMENT & PLAN NOTE
· Currently normal sinus rhythm  · Continue metoprolol tartrate  · Anticoagulation with Xarelto, continue

## 2022-01-01 NOTE — ASSESSMENT & PLAN NOTE
Presented with left-sided numbness, patient with very recent hospitalization at the [facility] 12/14/2021-12/21/2021 with similar and found with right pontine CVA  Has chronic right upper extremity weakness from prior stroke and left lower extremity weakness from recent stroke  · CT head negative for new acute intracranial pathology, and CTA head/neck without new large vessel occlusion (left proximal P2 segment severe stenosis redemonstrated)  · TPA contraindicated due to recent CVA  · Patient to be admitted on stroke pathway:  MRI brain pending, monitor neuro checks/telemetry  · Echocardiogram at last admission revealed EF 65% with grade 1 DD  · Recent FLP and A1c reviewed  · Neurology following  · Continue Xarelto and high-intensity statin  · PT/OT/speech evaluations, was a good [facility] prior to admission

## 2022-01-01 NOTE — ASSESSMENT & PLAN NOTE
Lab Results   Component Value Date    HGBA1C 9 4 (H) 11/04/2021       Recent Labs     12/31/21 1932   POCGLU 220*       Blood Sugar Average: Last 72 hrs:  (P) 220   · Not controlled with hyperglycemia on admission  · For now continue Lantus 35 units q h s   Adjust insulin regimen as needed  · Add SSI with Accu-Cheks, carb controlled diet  · Initiate hypoglycemia protocol

## 2022-01-01 NOTE — ASSESSMENT & PLAN NOTE
Lab Results   Component Value Date    HGBA1C 9 4 (H) 11/04/2021       Recent Labs     12/31/21  1932 01/01/22  0054 01/01/22  0726   POCGLU 220* 219* 185*       Blood Sugar Average: Last 72 hrs:  (P) 208     Insulin dependent DM 2  Recent HgbA1c of 9 4%  Continue with insulin regimen as per primary team  Goal of euglycemia with glucose < 180

## 2022-01-01 NOTE — PROGRESS NOTES
[facility]  Progress Note - Erasmo Lopez 1955, 61 y o  female MRN: [id number]  Unit/Bed#: [facility] 723-01 Encounter: [id number]  Primary Care Provider: Bennie Hanley MD   Date and time admitted to hospital: 12/31/2021  7:16 PM    * Left sided numbness  Assessment & Plan  Presented with left-sided numbness, patient with very recent hospitalization at the [facility] 12/14/2021-12/21/2021 with similar and found with right pontine CVA  Has chronic right upper extremity weakness from prior stroke and left lower extremity weakness from recent stroke  · CT head negative for new acute intracranial pathology, and CTA head/neck without new large vessel occlusion (left proximal P2 segment severe stenosis redemonstrated)  · TPA contraindicated due to recent CVA  · Patient to be admitted on stroke pathway:  MRI brain pending, monitor neuro checks/telemetry  · Echocardiogram at last admission revealed EF 65% with grade 1 DD  · Recent FLP and A1c reviewed  · Neurology following  · Continue Xarelto and high-intensity statin  · PT/OT/speech evaluations, was a good [facility] prior to admission  CVA (cerebral vascular accident) [facility])  Assessment & Plan  · History of multiple prior CVAs including most recently 12/14/2021 with newly discovered right pontine CVA  · Workup on stroke pathway as above given recurrent left-sided numbness    Tracheal stenosis  Assessment & Plan  · Of historical note and with vocal cord paralysis  · Speech consult, per patient, not on dysphagia diet at baseline    · Continue follow-up with ENT as outpatient    Type 2 diabetes mellitus with hyperglycemia, with long-term current use of insulin [facility])  Assessment & Plan  Lab Results   Component Value Date    HGBA1C 9 4 (H) 11/04/2021       Recent Labs     12/31/21  1932 01/01/22  0054 01/01/22  0726   POCGLU 220* 219* 185*         · Not controlled with hyperglycemia on admission  · Tradjenta and Prandin on hold  · For now continue home regimen of Lantus 35 units q h s  Plus sliding scale  · Diabetic diet  · Monitor Accu-Cheks adjust regimen as needed    Paroxysmal atrial fibrillation (HCC)  Assessment & Plan  · Currently normal sinus rhythm  · Continue metoprolol tartrate  · Anticoagulation with Xarelto, continue    GERD (gastroesophageal reflux disease)  Assessment & Plan  · Stable, continue PPI    Essential hypertension  Assessment & Plan  · Will continue beta-blocker  Hold lisinopril to allow for permissive hypertension  · Will provide IV p r n  Antihypertensive if SBP sustains greater than 200  · Monitor blood pressure per protocol    Hyperlipidemia  Assessment & Plan  · Recent FLP reviewed  Continue atorvastatin 80 mg daily with dinner    YOLIE (obstructive sleep apnea)  Assessment & Plan  · Noted in history however per patient "mild"  · Does not wear CPAP/oxygen      VTE Pharmacologic Prophylaxis: VTE Score: 8 Moderate Risk (Score 3-4) - Pharmacological DVT Prophylaxis Ordered: rivaroxaban (Xarelto)  Patient Centered Rounds: I performed bedside rounds with nursing staff today  Discussions with Specialists or Other Care Team Provider: nursing    Education and Discussions with Family / Patient: Attempted to update  (son) via phone  Left voicemail  Time Spent for Care: 30 minutes  More than 50% of total time spent on counseling and coordination of care as described above  Current Length of Stay: 0 day(s)  Current Patient Status: Observation   Certification Statement: The patient will continue to require additional inpatient hospital stay due to Stroke workup, Neurology consult  Discharge Plan: Anticipate discharge in 24-48 hrs to rehab facility  Code Status: Level 3 - DNAR and DNI    Subjective:   Patient offers no acute complaints  No further numbness    Reports that she has chronic right upper extremity weakness from old stroke and left lower extremity weakness from recent stroke  Objective:     Vitals:   Temp (24hrs), Av 5 °F (36 4 °C), Min:97 5 °F (36 4 °C), Max:97 5 °F (36 4 °C)    Temp:  [97 5 °F (36 4 °C)] 97 5 °F (36 4 °C)  HR:  [80-85] 83  Resp:  [16-25] 20  BP: (121-144)/(55-80) 143/70  SpO2:  [77 %-99 %] 96 %  Body mass index is 35 78 kg/m²  Input and Output Summary (last 24 hours):   No intake or output data in the 24 hours ending 22 0948    Physical Exam:   Physical Exam  Vitals and nursing note reviewed  Constitutional:       Appearance: She is obese  Cardiovascular:      Rate and Rhythm: Normal rate  Pulmonary:      Breath sounds: Normal breath sounds  Abdominal:      Tenderness: There is no abdominal tenderness  Musculoskeletal:         General: No swelling  Comments: Bilateral foot drop   Skin:     General: Skin is warm  Neurological:      Mental Status: She is alert and oriented to person, place, and time  Mental status is at baseline  Comments: Chronic vocal cord paralysis  Left lower extremity weakness noted, right upper extremity weakness noted     Psychiatric:         Mood and Affect: Mood normal           Additional Data:     Labs:  Results from last 7 days   Lab Units 22  0447   WBC Thousand/uL 7 37   HEMOGLOBIN g/dL 11 8   HEMATOCRIT % 36 0   PLATELETS Thousands/uL 229     Results from last 7 days   Lab Units 22  0447   SODIUM mmol/L 137   POTASSIUM mmol/L 5 1   CHLORIDE mmol/L 105   CO2 mmol/L 29   BUN mg/dL 34*   CREATININE mg/dL 1 14   ANION GAP mmol/L 3*   CALCIUM mg/dL 8 9   GLUCOSE RANDOM mg/dL 198*     Results from last 7 days   Lab Units 21   INR  1 86*     Results from last 7 days   Lab Units 22  0726 22  0054 21  1932   POC GLUCOSE mg/dl 185* 219* 220*               Lines/Drains:  Invasive Devices  Report    Peripheral Intravenous Line            Peripheral IV 21 Right Hand 19 days    Peripheral IV 21 Left Forearm <1 day                  Telemetry:  Telemetry Orders (From admission, onward)             24 Hour Telemetry Monitoring  Continuous x 24 Hours (Telem)        References:    Telemetry Guidelines   Question:  Reason for 24 Hour Telemetry  Answer:  Acute CVA (>24 hrs old)                 Telemetry Reviewed: Normal Sinus Rhythm  Indication for Continued Telemetry Use: Acute CVA             Imaging: No pertinent imaging reviewed  Recent Cultures (last 7 days):         Last 24 Hours Medication List:   Current Facility-Administered Medications   Medication Dose Route Frequency Provider Last Rate    acetaminophen  650 mg Oral Q6H PRN Ivar Tez, DO      atorvastatin  80 mg Oral Daily With [employer], DO      escitalopram  5 mg Oral Daily Ivar Tez, DO      famotidine  20 mg Oral Daily Ivar Tez, DO      insulin glargine  35 Units Subcutaneous HS Ivar Tez, DO      insulin lispro  1-5 Units Subcutaneous HS Ivar Tez, DO      insulin lispro  1-6 Units Subcutaneous TID AC Ivar Tez, DO      metoprolol tartrate  50 mg Oral Q12H [address] ALEC Allison      ondansetron  4 mg Intravenous Q6H PRN Ivar Tez, DO      pantoprazole  40 mg Oral Early Morning Ivar Tez, DO      rivaroxaban  20 mg Oral Daily With Breakfast Ivar Tez, DO          Today, Patient Was Seen By: ALEC Yost    **Please Note: This note may have been constructed using a voice recognition system  **

## 2022-01-01 NOTE — ASSESSMENT & PLAN NOTE
· History of multiple prior CVAs including most recently 12/14/2021 with newly discovered right pontine CVA  · Workup on stroke pathway as above given recurrent left-sided numbness

## 2022-01-01 NOTE — ED NOTES
per PT and EMS, symptoms started around 18  30PM 12/31/2021     Alessandra Banks, ARELIS  12/31/21 [address] , RN  12/31/21 [address] , RN  12/31/21 9409

## 2022-01-01 NOTE — ASSESSMENT & PLAN NOTE
Prior bilateral strokes in 2017 and recent right pontine stroke on 12/13/21  Has residual right-sided weakness and hoarseness from initial strokes   Continue secondary stroke prevention

## 2022-01-01 NOTE — UTILIZATION REVIEW
Initial Clinical Review    Admission: Date/Time/Statement:   Admission Orders (From admission, onward)     Ordered        01/01/22 1351  Inpatient Admission  Once            12/31/21 2238  Place in Observation  Once                      Placed in Observation status on 12/31 and changed to inpatient admission on 1/1 due to         Orders Placed This Encounter   Procedures    Inpatient Admission     Standing Status:   Standing     Number of Occurrences:   1     Order Specific Question:   Level of Care     Answer:   Med Surg [16]     Order Specific Question:   Estimated length of stay     Answer:   More than 2 Midnights     Order Specific Question:   Certification     Answer:   I certify that inpatient services are medically necessary for this patient for a duration of greater than two midnights  See H&P and MD Progress Notes for additional information about the patient's course of treatment  ED Arrival Information     Expected Arrival Acuity    - 12/31/2021 19:16 Emergent         Means of arrival Escorted by Service Admission type    [facility] ([address]) Hospitalist Emergency         Arrival complaint            Chief Complaint   Patient presents with    CVA/TIA-like Symptoms     L sided tingling and weakness; hx stroke 2 weeks ago with R sided deficites       Initial Presentation:  60 yo f with a pmh of CVA's  With recent hospitalization at [address] [place] [address] 12/14-12/24 eval for L sided numbness and was found to have a new R pontine CVA, proximal atrial fibrillation (on Xarelto), DM2, on insulin, and HTN  Today she reports left side numbness that began about 1 hour prior to ED arrival  Initially she reported Left arm and later extending to her left shoulder and tingling  Of left side of her face  She notes difficulty gripping objects  Stroke alert was called, Radiology studies redemonstrated known severe stenosis  At the left P2 segment  TPA contraindicated given recent CVA    Pt's symptoms resolved during the ED evaluation  Neurology  Note - 12/31 -  Worsening of recent L sided stroke symptoms - TPA not given contraindication of recent acute CVA  Plan unclear recurrence vs acute CVA, Given mild symptoms and low NIH, recommend continue ASA and       Date: 1/1/2021   Day 2:  Pt with no further numbness, she reports she continues with her chronic  RUE, and LLE  Weakness  Plan to  allow permissive Hypertension, continue Beta Blocker  But hold lisinopril  She is on sliding scale insulin, Hold Tradjenta and Prandin  Dm diet , Monitor and adjust as needed              ED Triage Vitals   Temperature Pulse Respirations Blood Pressure SpO2   12/31/21 2045 12/31/21 1928 12/31/21 1928 12/31/21 1928 12/31/21 1928   97 5 °F (36 4 °C) 81 18 134/80 98 %      Temp Source Heart Rate Source Patient Position - Orthostatic VS BP Location FiO2 (%)   12/31/21 2045 12/31/21 1928 12/31/21 1928 12/31/21 1928 --   Tympanic Monitor Lying Right arm       Pain Score       12/31/21 1928       No Pain          Wt Readings from Last 1 Encounters:   12/31/21 97 5 kg (215 lb)     Additional Vital Signs:  Date/Time Temp Pulse Resp BP MAP (mmHg) SpO2 O2 Device Patient Position - Orthostatic VS   01/01/22 0600 -- 83 20 143/70 -- 96 % -- --   01/01/22 0400 -- 82 20 132/75 -- 96 % None (Room air) --   01/01/22 0030 -- 82 19 121/57 82 93 % -- --   01/01/22 0000 -- 81 19 124/60 85 93 % None (Room air) Lying   12/31/21 2245 -- 83 20 133/63 -- 96 % -- --   12/31/21 2240 -- 81 18 -- -- 93 % -- --   12/31/21 2235 -- 80 19 -- -- 93 % -- --   12/31/21 2230 -- 80 18 135/64 -- 92 % -- --   12/31/21 2225 -- 80 18 -- -- 94 % -- --   12/31/21 2220 -- 80 17 -- -- 93 % -- --   12/31/21 2215 -- 80 18 122/57 -- 93 % -- --   12/31/21 2210 -- 82 17 -- -- 95 % -- --   12/31/21 2205 -- 81 20 -- -- 94 % -- --   12/31/21 2200 -- 81 19 121/55 -- 93 % -- --   12/31/21 2155 -- 82 19 -- -- 94 % -- --   12/31/21 2150 -- 81 19 -- -- 94 % -- --   12/31/21 2145 -- 80 19 123/59 -- 95 % None (Room air) --   12/31/21 2140 -- 80 20 -- -- 95 % -- --   12/31/21 2135 -- 82 20 -- -- 94 % -- --   12/31/21 2130 -- 80 19 127/63 -- 95 % None (Room air) Lying   12/31/21 2125 -- 81 16 -- -- 96 % -- --   12/31/21 2120 -- 84 19 -- -- 95 % -- --   12/31/21 2115 -- 80 20 128/57 -- 95 % None (Room air) Lying   12/31/21 2110 -- 81 22 -- -- 97 % -- --   12/31/21 2105 -- 82 25 Abnormal  -- -- 97 % -- --   12/31/21 2100 -- 81 18 131/62 -- 96 % -- --   12/31/21 2055 -- 80 18 -- -- 96 % -- --   12/31/21 2050 -- 81 18 -- -- 98 % -- --   12/31/21 20:46:54 -- 80 18 132/62 -- 97 % -- --   12/31/21 2045 97 5 °F (36 4 °C) 80 19 132/62 -- 96 % None (Room air) --   12/31/21 2040 -- 81 18 -- -- 98 % -- --   12/31/21 2035 -- 80 17 135/66 -- 96 % -- --   12/31/21 2030 -- 81 17 -- -- 98 % -- --   12/31/21 2025 -- 82 16 -- -- 98 % -- --   12/31/21 2022 -- 81 21 -- -- 99 % -- --   12/31/21 2015 -- 80 18 127/65 -- 98 % None (Room air) Lying   12/31/21 2008 -- 80 20 -- -- -- -- --   12/31/21 2005 -- 81 20 -- -- -- -- --   12/31/21 2000 -- 83 20 -- -- -- -- --   12/31/21 1958 -- -- -- -- -- 77 % Abnormal  -- --   12/31/21 1955 -- 85 20 -- -- -- -- --   12/31/21 1954 -- 80 21 -- -- 98 % -- --   12/31/21 1950 -- 81 23 Abnormal  -- -- 97 % -- --   12/31/21 1945 -- 81 22 144/65 94 97 % --      Pertinent Labs/Diagnostic Test Results:   Results from last 7 days   Lab Units 12/31/21 2040   SARS-COV-2  Negative     Results from last 7 days   Lab Units 01/01/22 0447 12/31/21 2012   WBC Thousand/uL 7 37 7 60   HEMOGLOBIN g/dL 11 8 12 1   HEMATOCRIT % 36 0 37 1   PLATELETS Thousands/uL 229 256         Results from last 7 days   Lab Units 01/01/22 0447 12/31/21 2012   SODIUM mmol/L 137 136   POTASSIUM mmol/L 5 1 4 7   CHLORIDE mmol/L 105 105   CO2 mmol/L 29 28   ANION GAP mmol/L 3* 3*   BUN mg/dL 34* 36*   CREATININE mg/dL 1 14 1 23   EGFR ml/min/1 73sq m 50 46   CALCIUM mg/dL 8 9 9 1         Results from last 7 days   Lab Units 01/01/22  1124 01/01/22  0726 01/01/22  0054 12/31/21  1932   POC GLUCOSE mg/dl 145* 185* 219* 220*     Results from last 7 days   Lab Units 01/01/22  0447 12/31/21 2012   GLUCOSE RANDOM mg/dL 198* 214*       Results from last 7 days   Lab Units 12/31/21 2012   HS TNI 0HR ng/L 4         Results from last 7 days   Lab Units 12/31/21 2012   PROTIME seconds 20 6*   INR  1 86*   PTT seconds 32       Results from last 7 days   Lab Units 12/31/21 2040   INFLUENZA A PCR  Negative   INFLUENZA B PCR  Negative   RSV PCR  Negative     CT Brain - 12/31 -  No acute  CTA head & neck - 12/31 - Negative CTA head and neck for large vessel occlusion, dissection, or aneurysm  Unchanged severe short-segment stenosis in left PCA proximal P2 segment   No other sites of high-grade stenosis  MRI Brain  12/14 - Recent lacunar infarcts are identified in the right zaida on series 2 image 9-11   No evidence of hemorrhage  Mild to moderate chronic microangiopathic changes are noted  Chronic infarct left centrum semiovale  MRI Brain 1/1 - No acute intracranial abnormality  Tiny subacute infarct in right posterior zaida, improved from prior MRI brain 12/14/2021       ED Treatment:   Medication Administration from 12/31/2021 1915 to 01/01/2022 0912       Date/Time Order Dose Route Action Comments     12/31/2021 2015 iohexol (OMNIPAQUE) 350 MG/ML injection (SINGLE-DOSE) 85 mL 85 mL Intravenous Given      01/01/2022 0050 insulin glargine (LANTUS) subcutaneous injection 35 Units 0 35 mL 35 Units Subcutaneous Given      01/01/2022 0723 pantoprazole (PROTONIX) EC tablet 40 mg 40 mg Oral Given      01/01/2022 0727 insulin lispro (HumaLOG) 100 units/mL subcutaneous injection 1-6 Units 1 Units Subcutaneous Given bs = 185     01/01/2022 0129 insulin lispro (HumaLOG) 100 units/mL subcutaneous injection 1-5 Units 2 Units Subcutaneous Given bs 219     01/01/2022 0052 insulin lispro (HumaLOG) 100 units/mL subcutaneous injection 1-5 Units 0 Units Subcutaneous Hold         Past Medical History:   Diagnosis Date    Abdominal fibromatosis     Diabetes mellitus ([place] [address] )     Hyperlipemia     Hypertension     Pneumonia     Stroke ([place] [address] )      Present on Admission:   Left sided numbness   Paroxysmal atrial fibrillation (HCC)   GERD (gastroesophageal reflux disease)   CVA (cerebral vascular accident) ([place] [address] )   Hyperlipidemia   Essential hypertension   Tracheal stenosis   YOLIE (obstructive sleep apnea)      Admitting Diagnosis: CVA (cerebral vascular accident) ([place] [address] ) [I63 9]  Stroke-like symptoms [R29 90]  Cerebrovascular accident (CVA), unspecified mechanism ([place] [address] ) [I63 9]  Age/Sex: 61 y o  female       Admission Orders:  Scheduled Medications:  atorvastatin, 80 mg, Oral, Daily With Dinner  escitalopram, 5 mg, Oral, Daily  famotidine, 20 mg, Oral, Daily  insulin glargine, 35 Units, Subcutaneous, HS  insulin lispro, 1-5 Units, Subcutaneous, HS  insulin lispro, 1-6 Units, Subcutaneous, TID AC  pantoprazole, 40 mg, Oral, Early Morning  rivaroxaban, 20 mg, Oral, Daily With Breakfast      Continuous IV Infusions:     PRN Meds:  acetaminophen, 650 mg, Oral, Q6H PRN  ondansetron, 4 mg, Intravenous, Q6H PRN    Nursing Orders -  VS - Neuro checks q 1 x 4 q 2 x 8 then q 4 -  Dysphagia eval - SCD's to le's - Telem - Continuous pulse ox monitoring - up with assistance - cons carb diet    Network Utilization Review Department  ATTENTION: Please call with any questions or concerns to [phone] and carefully listen to the prompts so that you are directed to the right person  All voicemails are confidential   Josette Doing all requests for admission clinical reviews, approved or denied determinations and any other requests to dedicated fax number below belonging to the campus where the patient is receiving treatment   List of dedicated fax numbers for the Facilities:  FACILITY NAME CINTHIA Moore 25 DENIALS (Administrative/Medical Necessity) [phone] [address] (Maternity/NICU/Pediatrics) 261 [facility],7Th Floor [facility] 40 125 [facility]  [phone] [address] 150 [facility] [place] Chiaraida Miquel Lauren [address] [address] [address] Aziza Ortiz 1481 [address] [facility] [facility] [phone]

## 2022-01-01 NOTE — ASSESSMENT & PLAN NOTE
· Of historical note and with vocal cord paralysis  · Speech consult, per patient, not on dysphagia diet at baseline    · Continue follow-up with ENT as outpatient

## 2022-01-01 NOTE — QUICK NOTE
Stroke alert 7:50 pm  Neurology call back 7:50 pm  Last normal 6:30 pm  nih 1  Iv tpa not administered given absolute contraindication given recent acute cva couple weeks ago  Ct head without contrast no acute hemorrage or early evidence of infarct  cta head/neck with no lvo  Worsening of recent left sided stroke symptoms, specifically residual lue numbness distally however one hour ago numbness moved up extremity proximally and simultanously left side of face  Recent history:  12/13 pt had acute rt pontine cva with lue/lle weakness and numbness  Baby aspirin added then  cta head/neck showed left p2 severe stenosis  Likely not etiology of acute cva which more likely was small vessel thrombosis lacunar etiology  Prior history:  Pt has hx of residual rt sided weakness from chronic stroke, appears to be possibly from chronic left corona radiata cva      Pt found to have afib in 2017 when had bilateral infarcts and went to [facility]      A/P: recrudence vs acute cva  Unclear at this point  Given mild symptoms and low nih, would recommend continuing current aspirin and xarelto regimen and admitting on stroke pathway  sbp 140-200  Will not need to repeat 2d echo   Neurology consult for am

## 2022-01-01 NOTE — CONSULTS
NEUROLOGY RESIDENCY CONSULT NOTE     Name: Royce Cartwright   Age & Sex: 61 y o  female   MRN: [id number]  Unit/Bed#: QCB   Encounter: [id number]  Length of Stay: [address] will need follow up in in 4 weeks with neurovascular AP or attending  She will not require outpatient neurological testing  Pending for discharge: No additional neurological recommendations at this time  Neurology will sign off of case  Please contact with any additional questions or concerns  ASSESSMENT & PLAN     * Left sided numbness  Assessment & Plan  This is a 61 y o  female with history of multiple prior strokes, non-valvular A-fib, HTN, hyperlipidemia, and DM2  She presented for worsening of her recent stroke symptoms include LUE/LLE weakness and numbness which started approximately 1 hour prior to arrival  Stroke alert was called and patient was found to have an NIHSS of 1  CT and CTA were negative for acute findings  Patient was not a candidate for tPA given recent stroke and current anticoagulation use  Recent history of right pontine infarct on 12/13/21  Presenting symptoms at that time were LUE/LLE weakness and numbness  Workup also was remarkable for a short segment of severe stenosis in the left proximal P2 segment  Patient has been complaint with her Xarelto and has not missed any doses  Workup:  - Echo 12/14/21: LVEF 65%, G1DD  - Labs 12/14/21: HgbA2c 9 4% and LDL 52  - MRI brain: no acute ischemic changes    Impression: Exacerbation of prior stroke symptoms  With negative MRI, likely either recrudescence or possibly sensory seizure  Plan:  - Can discontinue stroke pathway  - Recent echo and labs were reviewed  - Continue home Xarelto and atorvastatin  - As symptoms have resolved, BP goal of normotension  - No additional neurological recommendations  Please contact with additional questions or concerns      CVA (cerebral vascular accident) [facility])  Assessment & Plan  Prior bilateral strokes in 2017 and recent right pontine stroke on 12/13/21  Has residual right-sided weakness and hoarseness from initial strokes  Continue secondary stroke prevention    Paroxysmal atrial fibrillation (HCC)  Assessment & Plan  [id number] score of 5  Continue home Xarelto for anticoagulation    Essential hypertension  Assessment & Plan  Normotensive BP goal < 140 mmHg    Hyperlipidemia  Assessment & Plan  Continue home atorvastatin 80 mg daily    Type 2 diabetes mellitus with hyperglycemia, with long-term current use of insulin [facility])  Assessment & Plan  Lab Results   Component Value Date    HGBA1C 9 4 (H) 11/04/2021       Recent Labs     12/31/21  1932 01/01/22  0054 01/01/22  0726   POCGLU 220* 219* 185*       Blood Sugar Average: Last 72 hrs:  (P) 208     Insulin dependent DM 2  Recent HgbA1c of 9 4%  Continue with insulin regimen as per primary team  Goal of euglycemia with glucose < 180  SUBJECTIVE     Reason for Consult / Principal Problem: stroke-like symptoms  Hx and PE limited by: n/a    HPI: Emi Doan is a 61 y o  female with history of prior strokes including recent right pontine stroke on 12/13/21, non-valvular A-fib on Xarelto, diabetes, HTN, and hyperlipidemia  She presented to the ED for worsening of her recent stroke symptoms  Last known normal was 18:30 on 12/31  Patient presented to the hospital approximately 1 hour later and stroke alert was called at 19:50  NIHSS of 1  CT/CTA were negative for acute findings  CTA also redemonstrated known left P2 short-segment severe stenosis  Patient was not a candidate for tPA given her recent stroke and anticoagulation use  Admitted on stroke pathway for further evaluation  Today, patient reports that her symptoms have resolved  She continues to have mild numbness in both her left hand and left foot which has been present since the time of her stroke  The numbness in her face, upper arm, and upper leg are completely resolved   She feels back to her pre-hospital baseline and is eager to return to [facility] for rehab  Per review of records, patient diagnosed with A-fib in 2017 and at that time had bilateral infarcts  Residual deficits of right-sided weakness and hoarse voice  Presented to [facility] on 12/13 for new left upper and lower extremity weakness and numbness  Was found to have a new right pontine stroke which explains her symptoms  Also noted to have short-segment severe stenosis of the left P2 segment  Started on ASA in addition to her home Xarelto and atorvastatin  Discharged to rehab in stable condition  Recent echo on 12/14/21 revealed LVEF of 65%, G1DD  HgbA1c was 9 4% and LDL 52 as of 12/14/21  Inpatient consult to Neurology  Consult performed by: James Grimm DO  Consult ordered by: Laureano Avalos DO          Historical Information   Past Medical History:   Diagnosis Date    Abdominal fibromatosis     Diabetes mellitus ([facility] [address] )     Hyperlipemia     Hypertension     Pneumonia     Stroke [facility])      Past Surgical History:   Procedure Laterality Date    CATARACT EXTRACTION Bilateral     CATARACT EXTRACTION, BILATERAL      COLONOSCOPY      EGD      LAPAROTOMY      Exploratory;  Last Assessed 10/17/2017    PEG TUBE PLACEMENT      PEG TUBE REMOVAL       Social History   Social History     Substance and Sexual Activity   Alcohol Use Not Currently    Comment: Socially     Social History     Substance and Sexual Activity   Drug Use No     E-Cigarette/Vaping    E-Cigarette Use Never User      E-Cigarette/Vaping Substances    Nicotine No     THC No     CBD No     Flavoring No     Other No     Unknown No      Social History     Tobacco Use   Smoking Status Never Smoker   Smokeless Tobacco Never Used     Family History:   Family History   Problem Relation Age of Onset    No Known Problems Mother     No Known Problems Father      Meds/Allergies   all current active meds have been reviewed, current meds:   Current Facility-Administered Medications   Medication Dose Route Frequency    acetaminophen (TYLENOL) tablet 650 mg  650 mg Oral Q6H PRN    atorvastatin (LIPITOR) tablet 80 mg  80 mg Oral Daily With Dinner    escitalopram (LEXAPRO) tablet 5 mg  5 mg Oral Daily    famotidine (PEPCID) tablet 20 mg  20 mg Oral Daily    insulin glargine (LANTUS) subcutaneous injection 35 Units 0 35 mL  35 Units Subcutaneous HS    insulin lispro (HumaLOG) 100 units/mL subcutaneous injection 1-5 Units  1-5 Units Subcutaneous HS    insulin lispro (HumaLOG) 100 units/mL subcutaneous injection 1-6 Units  1-6 Units Subcutaneous TID AC    metoprolol tartrate (LOPRESSOR) tablet 50 mg  50 mg Oral Q12H AVERY    ondansetron (ZOFRAN) injection 4 mg  4 mg Intravenous Q6H PRN    pantoprazole (PROTONIX) EC tablet 40 mg  40 mg Oral Early Morning    rivaroxaban (XARELTO) tablet 20 mg  20 mg Oral Daily With Breakfast    and PTA meds:   Prior to Admission Medications   Prescriptions Last Dose Informant Patient Reported? Taking?    Accu-Chek FastClix Lancets MISC   No No   Sig: Use 2 (two) times a day   B-D ULTRAFINE III SHORT PEN 31G X 8 MM MISC  Self No No   Sig: use as directed   B-D ULTRAFINE III SHORT PEN 31G X 8 MM MISC   No No   Sig: USE AS DIRECTED   Blood Glucose Monitoring Suppl (Accu-Chek Guide) w/Device KIT   No No   Sig: Use 2 (two) times a day   Fluoxetine HCl, PMDD, 20 MG TABS 1/1/2022 at Unknown time  Yes Yes   Sig: Take 20 mg by mouth daily   Insulin Pen Needle (Pen Needles 5/16") 30G X 8 MM MISC   No No   Sig: Use daily   Tradjenta 5 MG TABS   No No   Sig: take 1 tablet by mouth daily   Xarelto 20 MG tablet 1/1/2022 at Unknown time  No Yes   Sig: take 1 tablet by mouth once daily with BREAKFAST   atorvastatin (LIPITOR) 80 mg tablet   No No   Sig: Take 1 tablet (80 mg total) by mouth daily with dinner   escitalopram (LEXAPRO) 5 mg tablet 1/1/2022 at Unknown time  No Yes   Sig: take 1 tablet by mouth once daily   famotidine (PEPCID) 20 mg tablet 1/1/2022 at Unknown time  No Yes   Sig: Take 1 tablet (20 mg total) by mouth daily   glucose blood (Accu-Chek Guide) test strip   No No   Sig: Patient tests blood glucose twice daily   insulin glargine (Lantus SoloStar) 100 units/mL injection pen   No No   Sig: Inject 35 Units under the skin daily at bedtime   lisinopril (ZESTRIL) 5 mg tablet   No No   Sig: Take 1 tablet (5 mg total) by mouth daily   metoprolol tartrate (LOPRESSOR) 50 mg tablet 1/1/2022 at Unknown time  No Yes   Sig: Take 1 tablet (50 mg total) by mouth every 12 (twelve) hours   ondansetron (ZOFRAN-ODT) 4 mg disintegrating tablet   No No   Sig: Take 1 tablet (4 mg total) by mouth every 8 (eight) hours as needed for nausea or vomiting   pantoprazole (PROTONIX) 40 mg tablet 1/1/2022 at Unknown time Self Yes Yes   Sig: Take 40 mg by mouth daily   repaglinide (PRANDIN) 0 5 mg tablet   No No   Sig: Take 1 tablet (0 5 mg total) by mouth 2 (two) times a day before meals      Facility-Administered Medications: None     Allergies   Allergen Reactions    Apixaban Vomiting and GI Intolerance     Other reaction(s): Vomiting    Trulicity [Dulaglutide] Vomiting     Nausea vomiting       Review of previous medical records was completed  Review of Systems   Constitutional: Negative for chills and fever  HENT: Negative for ear pain and sore throat          + hoarse voice   Eyes: Negative for pain and visual disturbance  Respiratory: Negative for cough and shortness of breath  Cardiovascular: Negative for chest pain and palpitations  Gastrointestinal: Negative for abdominal pain and vomiting  Genitourinary: Negative for dysuria and hematuria  Musculoskeletal: Negative for arthralgias and back pain  Skin: Negative for color change and rash  Neurological: Positive for numbness  Negative for dizziness, tremors, seizures, syncope, facial asymmetry, speech difficulty, weakness, light-headedness and headaches     Psychiatric/Behavioral: Negative for confusion and dysphoric mood  All other systems reviewed and are negative  OBJECTIVE     Patient ID: Dorcas Lugo is a 61 y o  female  Vitals:   Vitals:    22 0000 22 0030 22 0400 22 0600   BP: 124/60 121/57 132/75 143/70   BP Location: Right arm      Pulse: 81 82 82 83   Resp:  20 20   Temp:       TempSrc:       SpO2: 93% 93% 96% 96%   Weight:          Body mass index is 35 78 kg/m²  No intake or output data in the 24 hours ending 22 0845    Temperature:   Temp (24hrs), Av 5 °F (36 4 °C), Min:97 5 °F (36 4 °C), Max:97 5 °F (36 4 °C)    Temperature: 97 5 °F (36 4 °C)    Invasive Devices: Invasive Devices  Report    Peripheral Intravenous Line            Peripheral IV 21 Right Hand 19 days    Peripheral IV 21 Left Forearm <1 day                Physical Exam  Vitals and nursing note reviewed  Constitutional:       General: She is not in acute distress  Appearance: She is obese  She is ill-appearing (chronic)  She is not toxic-appearing  HENT:      Head: Normocephalic and atraumatic  Right Ear: External ear normal       Left Ear: External ear normal       Nose: Nose normal       Mouth/Throat:      Mouth: Mucous membranes are moist       Pharynx: Oropharynx is clear  Eyes:      General: No scleral icterus  Extraocular Movements: Extraocular movements intact and EOM normal       Conjunctiva/sclera: Conjunctivae normal       Pupils: Pupils are equal, round, and reactive to light  Cardiovascular:      Rate and Rhythm: Normal rate  Pulses: Normal pulses  Pulmonary:      Effort: Pulmonary effort is normal  No respiratory distress  Abdominal:      General: Abdomen is flat  There is no distension  Musculoskeletal:      Cervical back: Neck supple  Skin:     General: Skin is warm and dry  Neurological:      Mental Status: She is alert and oriented to person, place, and time        Coordination: Finger-Nose-Finger Test normal    Psychiatric: Mood and Affect: Mood normal          Behavior: Behavior normal           Neurologic Exam     Mental Status   Oriented to person, place, and time  Language intact  Hypophonic and hoarse voice  Cranial Nerves     CN II   Visual fields full to confrontation  CN III, IV, VI   Pupils are equal, round, and reactive to light  Extraocular motions are normal    Conjugate gaze: present    CN VII   Facial expression full, symmetric  CN VIII   Hearing: intact    Motor Exam Mild generalized weakness but strength is 4+ to 5- and symmetric throughout     Sensory Exam   Decreased sensation to light touch in left hand and left foot     Gait, Coordination, and Reflexes     Coordination   Finger to nose coordination: normal    Tremor   Resting tremor: absent  Intention tremor: absent  Action tremor: absent    Reflexes   Right plantar: normal  Left plantar: normal       LABORATORY DATA     Labs: I have personally reviewed pertinent reports  Results from last 7 days   Lab Units 01/01/22 0447 12/31/21 2012   WBC Thousand/uL 7 37 7 60   HEMOGLOBIN g/dL 11 8 12 1   HEMATOCRIT % 36 0 37 1   PLATELETS Thousands/uL 229 256      Results from last 7 days   Lab Units 01/01/22 0447 12/31/21 2012   POTASSIUM mmol/L 5 1 4 7   CHLORIDE mmol/L 105 105   CO2 mmol/L 29 28   BUN mg/dL 34* 36*   CREATININE mg/dL 1 14 1 23   CALCIUM mg/dL 8 9 9 1              Results from last 7 days   Lab Units 12/31/21 2012   INR  1 86*   PTT seconds 32               IMAGING & DIAGNOSTIC TESTING     Radiology Results: I have personally reviewed pertinent reports  and I have personally reviewed pertinent films in PACS    CT stroke alert brain  Result Date: 12/31/2021  Impression: No acute intracranial abnormality    Findings were directly discussed with Yocasta Wagner at 8:28 PM  Workstation performed: [id number]     CTA stroke alert (head/neck)  Result Date: 12/31/2021  Impression: Negative CTA head and neck for large vessel occlusion, dissection, or aneurysm  Unchanged severe short-segment stenosis in left PCA proximal P2 segment  No other sites of high-grade stenosis  Findings were directly discussed with Gaby Villalba at 8:28 PM  Workstation performed: [id number]       Other Diagnostic Testing: I have personally reviewed pertinent reports  ACTIVE MEDICATIONS     Current Facility-Administered Medications   Medication Dose Route Frequency    acetaminophen (TYLENOL) tablet 650 mg  650 mg Oral Q6H PRN    atorvastatin (LIPITOR) tablet 80 mg  80 mg Oral Daily With Dinner    escitalopram (LEXAPRO) tablet 5 mg  5 mg Oral Daily    famotidine (PEPCID) tablet 20 mg  20 mg Oral Daily    insulin glargine (LANTUS) subcutaneous injection 35 Units 0 35 mL  35 Units Subcutaneous HS    insulin lispro (HumaLOG) 100 units/mL subcutaneous injection 1-5 Units  1-5 Units Subcutaneous HS    insulin lispro (HumaLOG) 100 units/mL subcutaneous injection 1-6 Units  1-6 Units Subcutaneous TID AC    ondansetron (ZOFRAN) injection 4 mg  4 mg Intravenous Q6H PRN    pantoprazole (PROTONIX) EC tablet 40 mg  40 mg Oral Early Morning    rivaroxaban (XARELTO) tablet 20 mg  20 mg Oral Daily With Breakfast       Prior to Admission medications    Medication Sig Start Date End Date Taking?  Authorizing Provider   Accu-Chek FastClix Lancets MISC Use 2 (two) times a day 4/30/21   Jarvis Guillory,    atorvastatin (LIPITOR) 80 mg tablet Take 1 tablet (80 mg total) by mouth daily with dinner 9/8/21   MD ERNESTO Duggan ULTRAFINE III SHORT PEN 31G X 8 MM MISC use as directed 5/21/19   MD ERNESTO Duggan ULTRAFINE III SHORT PEN 31G X 8 MM MISC USE AS DIRECTED 8/4/21   Imani Vogt MD   Blood Glucose Monitoring Suppl (Accu-Chek Guide) w/Device KIT Use 2 (two) times a day 4/30/21   Jarvis Guillory DO   escitalopram (LEXAPRO) 5 mg tablet take 1 tablet by mouth once daily 8/19/21   Imani Vogt MD   famotidine (PEPCID) 20 mg tablet Take 1 tablet (20 mg total) by mouth daily 9/8/21   Sher Dumont MD   Fluoxetine HCl, PMDD, 20 MG TABS Take 20 mg by mouth daily    Historical Provider, MD   glucose blood (Accu-Chek Guide) test strip Patient tests blood glucose twice daily 4/30/21   Marcia Fischer DO   insulin glargine (Lantus SoloStar) 100 units/mL injection pen Inject 35 Units under the skin daily at bedtime 9/8/21   Sher Dumont MD   Insulin Pen Needle (Pen Needles 5/16") 30G X 8 MM MISC Use daily 9/8/21   Sher Dumont MD   lisinopril (ZESTRIL) 5 mg tablet Take 1 tablet (5 mg total) by mouth daily 9/8/21   Sher Dumont MD   metoprolol tartrate (LOPRESSOR) 50 mg tablet Take 1 tablet (50 mg total) by mouth every 12 (twelve) hours 9/8/21   Sher Dumont MD   ondansetron (ZOFRAN-ODT) 4 mg disintegrating tablet Take 1 tablet (4 mg total) by mouth every 8 (eight) hours as needed for nausea or vomiting 9/8/21   Sher Dumont MD   pantoprazole (PROTONIX) 40 mg tablet Take 40 mg by mouth daily    Historical Provider, MD   repaglinide (PRANDIN) 0 5 mg tablet Take 1 tablet (0 5 mg total) by mouth 2 (two) times a day before meals 9/8/21   Sher Dumont MD   Tradjenta 5 MG TABS take 1 tablet by mouth daily 8/19/21   Giovanni Bunch MD   Xarelto 20 MG tablet take 1 tablet by mouth once daily with BREAKFAST 12/7/21   Sher Dumont MD         CODE STATUS & ADVANCED DIRECTIVES     Code Status: Level 3 - DNAR and DNI  Advance Directive and Living Will:      Power of :    POLST:        VTE Pharmacologic Prophylaxis: Rivaroxaban (Xarelto)  VTE Mechanical Prophylaxis: sequential compression device    ==  Maria D Sauer DO   [facility] Neurology Residency, PGY-2

## 2022-01-01 NOTE — PLAN OF CARE
Problem: Potential for Falls  Goal: Patient will remain free of falls  Description: INTERVENTIONS:  - Educate patient/family on patient safety including physical limitations  - Instruct patient to call for assistance with activity   - Consult OT/PT to assist with strengthening/mobility   - Keep Call bell within reach  - Keep bed low and locked with side rails adjusted as appropriate  - Keep care items and personal belongings within reach  - Initiate and maintain comfort rounds  - Make Fall Risk Sign visible to staff  - Offer Toileting every 3 Hours, in advance of need  - Initiate/Maintain 3alarm  - Obtain necessary fall risk management equipment: 3  - Apply yellow socks and bracelet for high fall risk patients  - Consider moving patient to room near nurses station  Outcome: Progressing     Problem: Prexisting or High Potential for Compromised Skin Integrity  Goal: Skin integrity is maintained or improved  Description: INTERVENTIONS:  - Identify patients at risk for skin breakdown  - Assess and monitor skin integrity  - Assess and monitor nutrition and hydration status  - Monitor labs   - Assess for incontinence   - Turn and reposition patient  - Assist with mobility/ambulation  - Relieve pressure over bony prominences  - Avoid friction and shearing  - Provide appropriate hygiene as needed including keeping skin clean and dry  - Evaluate need for skin moisturizer/barrier cream  - Collaborate with interdisciplinary team   - Patient/family teaching  - Consider wound care consult   Outcome: Progressing     Problem: MOBILITY - ADULT  Goal: Maintain or return to baseline ADL function  Description: INTERVENTIONS:  -  Assess patient's ability to carry out ADLs; assess patient's baseline for ADL function and identify physical deficits which impact ability to perform ADLs (bathing, care of mouth/teeth, toileting, grooming, dressing, etc )  - Assess/evaluate cause of self-care deficits   - Assess range of motion  - Assess patient's mobility; develop plan if impaired  - Assess patient's need for assistive devices and provide as appropriate  - Encourage maximum independence but intervene and supervise when necessary  - Involve family in performance of ADLs  - Assess for home care needs following discharge   - Consider OT consult to assist with ADL evaluation and planning for discharge  - Provide patient education as appropriate  Outcome: Progressing  Goal: Maintains/Returns to pre admission functional level  Description: INTERVENTIONS:  - Perform BMAT or MOVE assessment daily    - Set and communicate daily mobility goal to care team and patient/family/caregiver  - Collaborate with rehabilitation services on mobility goals if consulted  - Perform Range of Motion 3 times a day  - Reposition patient every 3 hours  - Dangle patient 3 times a day  - Stand patient 3 times a day  - Ambulate patient 3 times a day  - Out of bed to chair 3 times a day   - Out of bed for meals 3 times a day  - Out of bed for toileting  - Record patient progress and toleration of activity level   Outcome: Progressing     Problem: Neurological Deficit  Goal: Neurological status is stable or improving  Description: Interventions:  - Monitor and assess patient's level of consciousness, motor function, sensory function, and level of assistance needed for ADLs  - Monitor and report changes from baseline  Collaborate with interdisciplinary team to initiate plan and implement interventions as ordered  - Provide and maintain a safe environment  - Consider seizure precautions  - Consider fall precautions  - Consider aspiration precautions  - Consider bleeding precautions  Outcome: Progressing     Problem: Activity Intolerance/Impaired Mobility  Goal: Mobility/activity is maintained at optimum level for patient  Description: Interventions:  - Assess and monitor patient  barriers to mobility and need for assistive/adaptive devices    - Assess patient's emotional response to limitations  - Collaborate with interdisciplinary team and initiate plans and interventions as ordered  - Encourage independent activity per ability   - Maintain proper body alignment  - Perform active/passive rom as tolerated/ordered  - Plan activities to conserve energy   - Turn patient as appropriate  Outcome: Progressing     Problem: Communication Impairment  Goal: Ability to express needs and understand communication  Description: Assess patient's communication skills and ability to understand information  Patient will demonstrate use of effective communication techniques, alternative methods of communication and understanding even if not able to speak  - Encourage communication and provide alternate methods of communication as needed  - Collaborate with case management/ for discharge needs  - Include patient/family/caregiver in decisions related to communication  Outcome: Progressing     Problem: Potential for Aspiration  Goal: Non-ventilated patient's risk of aspiration is minimized  Description: Assess and monitor vital signs, respiratory status, and labs (WBC)  Monitor for signs of aspiration (tachypnea, cough, rales, wheezing, cyanosis, fever)  - Assess and monitor patient's ability to swallow  - Place patient up in chair to eat if possible  - HOB up at 90 degrees to eat if unable to get patient up into chair   - Supervise patient during oral intake  - Instruct patient/ family to take small bites  - Instruct patient/ family to take small single sips when taking liquids  - Follow patient-specific strategies generated by speech pathologist   Outcome: Progressing  Goal: Ventilated patient's risk of aspiration is minimized  Description: Assess and monitor vital signs, respiratory status, airway cuff pressure, and labs (WBC)  Monitor for signs of aspiration (tachypnea, cough, rales, wheezing, cyanosis, fever)      - Elevate head of bed 30 degrees if patient has tube feeding   - Monitor tube feeding  Outcome: Progressing     Problem: Nutrition  Goal: Nutrition/Hydration status is improving  Description: Monitor and assess patient's nutrition/hydration status for malnutrition (ex- brittle hair, bruises, dry skin, pale skin and conjunctiva, muscle wasting, smooth red tongue, and disorientation)  Collaborate with interdisciplinary team and initiate plan and interventions as ordered  Monitor patient's weight and dietary intake as ordered or per policy  Utilize nutrition screening tool and intervene per policy  Determine patient's food preferences and provide high-protein, high-caloric foods as appropriate  - Assist patient with eating   - Allow adequate time for meals   - Encourage patient to take dietary supplement as ordered  - Collaborate with clinical nutritionist   - Include patient/family/caregiver in decisions related to nutrition    Outcome: Progressing

## 2022-01-01 NOTE — H&P
Herbert 88 1955, 61 y o  female MRN: [id number]  Unit/Bed#: QCB Encounter: [id number]  Primary Care Provider: Bennie Hanley MD   Date and time admitted to hospital: 12/31/2021  7:16 PM    * Left sided numbness  Assessment & Plan  · Present with left-sided numbness, patient with very recent hospitalization at the [facility] 12/14/2021-12/21/2021 with similar and found with right pontine CVA  · CT head negative for new acute intracranial pathology, and CTA head/neck without new large vessel occlusion (left proximal P2 segment severe stenosis redemonstrated)  · TPA contraindicated due to recent CVA  · Patient to be admitted on stroke pathway: Will obtain MRI brain, monitor neuro checks/telemetry  Echocardiogram at last admission revealed EF 65% with grade 1 DD  · Recent FLP and A1c reviewed  · Neurology consult  · Continue Xarelto and high-intensity statin  · Will allow permissive hypertension to -200  · PT/OT/speech evaluation    CVA (cerebral vascular accident) [facility])  Assessment & Plan  · History of multiple prior CVAs including most recently 12/14/2021 with newly discovered right pontine CVA  · Workup on stroke pathway as above given recurrent left-sided numbness    Essential hypertension  Assessment & Plan  · Holding antihypertensives to allow for permissive hypertension to -200  · Will provide IV p r n  Antihypertensive if SBP sustains greater than 200  · Monitor blood pressure per protocol    Hyperlipidemia  Assessment & Plan  · Recent FLP reviewed    Continue atorvastatin 80 mg daily with dinner    GERD (gastroesophageal reflux disease)  Assessment & Plan  · Stable, continue PPI    Type 2 diabetes mellitus with hyperglycemia, with long-term current use of insulin [facility])  Assessment & Plan  Lab Results   Component Value Date    HGBA1C 9 4 (H) 11/04/2021       Recent Labs     12/31/21  1932   POCGLU 220*       Blood Sugar Average: Last 72 hrs:  (P) 220   · Not controlled with hyperglycemia on admission  · For now continue Lantus 35 units q h s  Adjust insulin regimen as needed  · Add SSI with Accu-Cheks, carb controlled diet  · Initiate hypoglycemia protocol    Tracheal stenosis  Assessment & Plan  · Of historical note and with vocal cord paralysis  · Continue follow-up with ENT as outpatient    Paroxysmal atrial fibrillation (HCC)  Assessment & Plan  · Currently normal sinus rhythm  · Continue metoprolol tartrate  · Anticoagulation with Xarelto, continue      VTE Prophylaxis: Rivaroxaban (Xarelto)  / sequential compression device   Code Status: Level 3 - DNAR and DNI  POLST: POLST form is not discussed and not completed at this time  Discussion with family:  Offered however patient declines at this time    Anticipated Length of Stay:  Patient will be admitted on an Observation basis with an anticipated length of stay of  less than 2 midnights  Justification for Hospital Stay: Please see detailed plans noted above  Chief Complaint:     Left-sided numbness  History of Present Illness:  Mandi Del Cid is a 61 y o  female who presented as a stroke alert with left-sided numbness  She has a past medical history significant for CVAs with very recent hospitalization at the [facility] 12/14/2021-12/21/2021 also presenting at that time with left-sided numbness and found with new right pontine CVA, proximal atrial fibrillation anticoagulated on Xarelto, type 2 diabetes on insulin therapy, hypertension  States this episode of numbness began approximately 1 hour prior to ED arrival initially left lower arm but subsequently extending into the left shoulder along with tingling of left side of her face  Did note some mild difficulty gripping objects, but denied new symptoms in her left leg, additionally denied any apparent facial droop, change in speech, headache, or dizziness/lightheadedness    A stroke alert was called shortly after ED arrival, with CT head negative for new acute intracranial pathology and CTA head/neck negative for large vessel occlusion and redemonstrating known severe stenosis at the left P2 segment  TPA was contraindicated given recent CVA; of note patient had resolution of symptoms during ED evaluation  Given her recent CVA she is admitted on stroke pathway for further evaluation  Currently, patient is lying in bed in no acute distress and comfortable appearing  Continues to endorse that her prior numbness remains resolved  Offers no other acute complaints at this time  Review of Systems:    Constitutional:  Denies fever or chills   Eyes:  Denies change in visual acuity   HENT:  Denies nasal congestion or sore throat   Respiratory:  Denies cough or shortness of breath   Cardiovascular:  Denies chest pain or edema   GI:  Denies abdominal pain, nausea, vomiting, bloody stools or diarrhea   :  Denies dysuria   Musculoskeletal:  Denies back pain or joint pain   Integument:  Denies rash   Neurologic:  Denies headache, focal weakness but endorsed sensory changes  Endocrine:  Denies polyuria or polydipsia   Lymphatic:  Denies swollen glands   Psychiatric:  Denies depression or anxiety     Past Medical and Surgical History:   Past Medical History:   Diagnosis Date    Abdominal fibromatosis     Diabetes mellitus ([address] )     Hyperlipemia     Hypertension     Pneumonia     Stroke [facility])      Past Surgical History:   Procedure Laterality Date    CATARACT EXTRACTION Bilateral     CATARACT EXTRACTION, BILATERAL      COLONOSCOPY      EGD      LAPAROTOMY      Exploratory;  Last Assessed 10/17/2017    PEG TUBE PLACEMENT      PEG TUBE REMOVAL         Meds/Allergies:    Current Facility-Administered Medications:     acetaminophen (TYLENOL) tablet 650 mg, 650 mg, Oral, Q6H PRN, Yoav Fry DO    atorvastatin (LIPITOR) tablet 80 mg, 80 mg, Oral, Daily With Dinner, Yoav Fry DO    escitalopram (LEXAPRO) tablet 5 mg, 5 mg, Oral, Daily, Shruti Waiteara, DO    famotidine (PEPCID) tablet 20 mg, 20 mg, Oral, Daily, Shruti Garcia, DO    insulin glargine (LANTUS) subcutaneous injection 35 Units 0 35 mL, 35 Units, Subcutaneous, HS, Shruti Jose, DO    insulin lispro (HumaLOG) 100 units/mL subcutaneous injection 1-5 Units, 1-5 Units, Subcutaneous, HS, Shruti Jose, DO    insulin lispro (HumaLOG) 100 units/mL subcutaneous injection 1-6 Units, 1-6 Units, Subcutaneous, TID AC **AND** Fingerstick Glucose (POCT), , , TID AC, Shruti Garcia, DO    ondansetron [facility]) injection 4 mg, 4 mg, Intravenous, Q6H PRN, Shruti Garcia,     pantoprazole (PROTONIX) EC tablet 40 mg, 40 mg, Oral, Early Morning, Shruti Garcia, DO    rivaroxaban (XARELTO) tablet 20 mg, 20 mg, Oral, Daily With Breakfast, Shruti Garcia,     Current Outpatient Medications:     Accu-Chek FastClix Lancets MISC, Use 2 (two) times a day, Disp: 102 each, Rfl: 3    atorvastatin (LIPITOR) 80 mg tablet, Take 1 tablet (80 mg total) by mouth daily with dinner, Disp: 90 tablet, Rfl: 1    B-D ULTRAFINE III SHORT PEN 31G X 8 MM MISC, use as directed, Disp: 100 each, Rfl: 0    B-D ULTRAFINE III SHORT PEN 31G X 8 MM MISC, USE AS DIRECTED, Disp: 100 each, Rfl: 0    Blood Glucose Monitoring Suppl (Accu-Chek Guide) w/Device KIT, Use 2 (two) times a day, Disp: 1 kit, Rfl: 0    escitalopram (LEXAPRO) 5 mg tablet, take 1 tablet by mouth once daily, Disp: 90 tablet, Rfl: 1    famotidine (PEPCID) 20 mg tablet, Take 1 tablet (20 mg total) by mouth daily, Disp: 90 tablet, Rfl: 1    Fluoxetine HCl, PMDD, 20 MG TABS, Take 20 mg by mouth daily, Disp: , Rfl:     glucose blood (Accu-Chek Guide) test strip, Patient tests blood glucose twice daily, Disp: 100 strip, Rfl: 3    insulin glargine (Lantus SoloStar) 100 units/mL injection pen, Inject 35 Units under the skin daily at bedtime, Disp: 15 mL, Rfl: 2    Insulin Pen Needle (Pen Needles 5/16") 30G X 8 MM MISC, Use daily, Disp: 50 each, Rfl: 2    lisinopril (ZESTRIL) 5 mg tablet, Take 1 tablet (5 mg total) by mouth daily, Disp: 90 tablet, Rfl: 1    metoprolol tartrate (LOPRESSOR) 50 mg tablet, Take 1 tablet (50 mg total) by mouth every 12 (twelve) hours, Disp: 180 tablet, Rfl: 1    ondansetron (ZOFRAN-ODT) 4 mg disintegrating tablet, Take 1 tablet (4 mg total) by mouth every 8 (eight) hours as needed for nausea or vomiting, Disp: 30 tablet, Rfl: 1    pantoprazole (PROTONIX) 40 mg tablet, Take 40 mg by mouth daily, Disp: , Rfl:     repaglinide (PRANDIN) 0 5 mg tablet, Take 1 tablet (0 5 mg total) by mouth 2 (two) times a day before meals, Disp: 180 tablet, Rfl: 1    Tradjenta 5 MG TABS, take 1 tablet by mouth daily, Disp: 30 tablet, Rfl: 3    Xarelto 20 MG tablet, take 1 tablet by mouth once daily with BREAKFAST, Disp: 90 tablet, Rfl: 1    Allergies:    Allergies   Allergen Reactions    Apixaban Vomiting and GI Intolerance     Other reaction(s): Vomiting    Trulicity [Dulaglutide] Vomiting     Nausea vomiting     History:  Marital Status:      Substance Use History:   Social History     Substance and Sexual Activity   Alcohol Use Not Currently    Comment: Socially     Social History     Tobacco Use   Smoking Status Never Smoker   Smokeless Tobacco Never Used     Social History     Substance and Sexual Activity   Drug Use No       Family History:  Family History   Problem Relation Age of Onset    No Known Problems Mother     No Known Problems Father        Physical Exam:     Vitals:   Blood Pressure: 124/60 (01/01/22 0000)  Pulse: 81 (01/01/22 0000)  Temperature: 97 5 °F (36 4 °C) (12/31/21 2045)  Temp Source: Tympanic (12/31/21 2045)  Respirations: 19 (01/01/22 0000)  Weight - Scale: 97 5 kg (215 lb) (12/31/21 2046)  SpO2: 93 % (01/01/22 0000)    Constitutional:  Well developed, well nourished, no acute distress, non-toxic appearance   Eyes:  PERRL, conjunctiva normal   HENT:  Atraumatic, external ears normal, nose normal, oropharynx moist, no pharyngeal exudates, somewhat hoarse voice  Neck- normal range of motion, no tenderness, supple   Respiratory:  No respiratory distress, normal breath sounds, no rales, no wheezing   Cardiovascular:  Normal rate, normal rhythm, no murmurs, no gallops, no rubs   GI:  Soft, nondistended, normal bowel sounds, nontender, no organomegaly, no mass, no rebound, no guarding   :  No costovertebral angle tenderness   Musculoskeletal:  No edema, no tenderness, no deformities  Back- no tenderness  Integument:  Well hydrated, no rash   Lymphatic:  No lymphadenopathy noted   Neurologic:  Alert &awake, communicative, CN 2-12 normal, normal motor function except slight LUE and LLE drift, normal sensory function, no focal deficits noted   Psychiatric:  Speech and behavior appropriate       Lab Results: I have personally reviewed pertinent reports  Results from last 7 days   Lab Units 12/31/21 2012   WBC Thousand/uL 7 60   HEMOGLOBIN g/dL 12 1   HEMATOCRIT % 37 1   PLATELETS Thousands/uL 256     Results from last 7 days   Lab Units 12/31/21 2012   POTASSIUM mmol/L 4 7   CHLORIDE mmol/L 105   CO2 mmol/L 28   BUN mg/dL 36*   CREATININE mg/dL 1 23   CALCIUM mg/dL 9 1     Results from last 7 days   Lab Units 12/31/21 2012   INR  1 86*       EKG:  Normal sinus rhythm HR 82    Imaging: I have personally reviewed pertinent reports  CTA head and neck with and without contrast    Result Date: 12/13/2021  Narrative: CTA NECK AND BRAIN WITH AND WITHOUT CONTRAST INDICATION: Neuro deficit, acute, stroke suspected right sided weakness COMPARISON:   None  TECHNIQUE:  Routine CT imaging of the Brain without contrast   Post contrast imaging was performed after administration of iodinated contrast through the neck and brain  Post contrast axial 0 625 mm images timed to opacify the arterial system  3D rendering was performed on an independent workstation  MIP reconstructions performed   Coronal reconstructions were performed of the noncontrast portion of the brain  Radiation dose length product (DLP) for this visit:  078 9732 1744 mGy-cm   This examination, like all CT scans performed in the [facility], was performed utilizing techniques to minimize radiation dose exposure, including the use of iterative reconstruction and automated exposure control  IV Contrast:  85 mL of iohexol (OMNIPAQUE)  IMAGE QUALITY:   Diagnostic FINDINGS: NONCONTRAST BRAIN PARENCHYMA:  No evidence of acute intracranial hemorrhage  Microangiopathy  Left frontal corona radiata /periventricular low-attenuation possibly subacute ischemia, correlate with noncontrast brain MRI  VENTRICLES AND EXTRA-AXIAL SPACES:  Normal for the patient's age  VISUALIZED ORBITS AND PARANASAL SINUSES:  Unremarkable  CERVICAL VASCULATURE AORTIC ARCH AND GREAT VESSELS:  Normal aortic arch and great vessel origins  Normal visualized subclavian vessels  RIGHT VERTEBRAL ARTERY CERVICAL SEGMENT:  Normal origin  The vessel is normal in caliber throughout the neck  LEFT VERTEBRAL ARTERY CERVICAL SEGMENT:  Normal origin  The vessel is normal in caliber throughout the neck  RIGHT EXTRACRANIAL CAROTID SEGMENT:  Normal caliber common carotid artery  Normal bifurcation and cervical internal carotid artery  No stenosis or dissection  LEFT EXTRACRANIAL CAROTID SEGMENT:  Normal caliber common carotid artery  Normal bifurcation and cervical internal carotid artery  No stenosis or dissection  NASCET criteria was used to determine the degree of internal carotid artery diameter stenosis  INTRACRANIAL VASCULATURE INTERNAL CAROTID ARTERIES:  Normal enhancement of the intracranial portions of the internal carotid arteries  Normal ophthalmic artery origins  Normal ICA terminus  ANTERIOR CIRCULATION:  Symmetric A1 segments and anterior cerebral arteries with normal enhancement  Normal anterior communicating artery   MIDDLE CEREBRAL ARTERY CIRCULATION:  M1 segment and middle cerebral artery branches demonstrate normal enhancement bilaterally  DISTAL VERTEBRAL ARTERIES:  Normal distal vertebral arteries  Posterior inferior cerebellar artery origins are normal  Normal vertebral basilar junction  BASILAR ARTERY:  Basilar artery is normal in caliber  Normal superior cerebellar arteries  POSTERIOR CEREBRAL ARTERIES: Both posterior cerebral arteries arises from the basilar tip  Left proximal P2 segment 7 mm length high-grade critical stenosis without occlusion  Normal posterior communicating arteries  VENOUS STRUCTURES:  Normal  NON VASCULAR ANATOMY BONY STRUCTURES:  No acute osseous abnormality  SOFT TISSUES OF THE NECK:  Normal  THORACIC INLET:  Unremarkable  Impression: No evidence of acute intracranial hemorrhage  Left proximal P2 segment 7 mm length high-grade severe stenosis without occlusion  Left frontal corona radiata /periventricular low-attenuation possibly subacute ischemia, correlate with noncontrast brain MRI  Workstation performed: [id number]     MRI brain wo contrast    Result Date: 12/14/2021  Narrative: MRI BRAIN WITHOUT CONTRAST INDICATION: Rule out CVA  COMPARISON:   None  TECHNIQUE:  Sagittal T1, axial T2, axial FLAIR, axial T1, axial [place] and axial diffusion imaging  IMAGE QUALITY:  Diagnostic  FINDINGS: BRAIN PARENCHYMA:  Foci of diffusion abnormality identified in the right zaida dorsally on series 2 image 9-11 without evidence of hemorrhage  This is compatible with recent infarct  Small scattered hyperintensities on T2/FLAIR imaging are noted in the periventricular and subcortical white matter demonstrating an appearance that is statistically most likely to represent mild to moderate microangiopathic change  Chronic infarct left centrum semiovale extends into the left lentiform nucleus  VENTRICLES:  Normal for the patient's age   SELLA AND PITUITARY GLAND:  Normal  ORBITS:  Normal  PARANASAL SINUSES:  Normal  VASCULATURE:  Evaluation of the major intracranial vasculature demonstrates appropriate flow voids  CALVARIUM AND SKULL BASE:  Normal  EXTRACRANIAL SOFT TISSUES:  Normal      Impression: Recent lacunar infarcts are identified in the right zaida on series 2 image 9-11  No evidence of hemorrhage  Mild to moderate chronic microangiopathic changes are noted  Chronic infarct left centrum semiovale  Workstation performed: [id number]     CT stroke alert brain    Result Date: 12/31/2021  Narrative: CT BRAIN - STROKE ALERT PROTOCOL INDICATION:   Neuro deficit, acute, stroke suspected Worsened left-sided numbness  COMPARISON:  Same day CTA stroke alert head neck  MRI brain without contrast December 14, 2021  TECHNIQUE:  CT examination of the brain was performed  In addition to axial images, coronal reformatted images were created and submitted for interpretation  Radiation dose length product (DLP) for this visit:  856 05 mGy-cm   This examination, like all CT scans performed in the [facility], was performed utilizing techniques to minimize radiation dose exposure, including the use of iterative  reconstruction and automated exposure control  IMAGE QUALITY:  Diagnostic  FINDINGS:  PARENCHYMA: Decreased attenuation is noted in periventricular and subcortical white matter demonstrating an appearance that is statistically most likely to represent mild microangiopathic change  Unchanged old lacunar infarct in left corona radiata  Previously noted subacute infarct in right zaida is not well seen by CT  Unchanged wallerian degeneration in left corticospinal tract extending into left zaida  No CT signs of acute infarction  No intracranial mass, mass effect or midline shift  No acute parenchymal hemorrhage  Mild calcification of the cavernous internal carotid ateries  VENTRICLES AND EXTRA-AXIAL SPACES:  Normal for patient's age  VISUALIZED ORBITS AND PARANASAL SINUSES:  Bilateral cataract surgery  Clear sinuses   CALVARIUM AND EXTRACRANIAL SOFT TISSUES:   Normal      Impression: No acute intracranial abnormality  Findings were directly discussed with Marlen Love at 8:28 PM  Workstation performed: [id number]     CTA stroke alert (head/neck)    Result Date: 12/31/2021  Narrative: CTA NECK AND BRAIN WITH CONTRAST INDICATION: Neuro deficit, acute, stroke suspected Worsened left-sided numbness COMPARISON:   Same day CT stroke alert brain  MRI brain without contrast December 14, 2021  CTA head and neck with and without contrast TECHNIQUE:   Post contrast imaging was performed after administration of iodinated contrast through the neck and brain  Post contrast axial 0 625 mm images timed to opacify the arterial system  3D rendering was performed on an independent workstation  MIP reconstructions performed  Coronal reconstructions were performed of the noncontrast portion of the brain  Radiation dose length product (DLP) for this visit:  573 8 mGy-cm   This examination, like all CT scans performed in the [facility], was performed utilizing techniques to minimize radiation dose exposure, including the use of iterative reconstruction and automated exposure control  IV Contrast:  85 mL of iohexol (OMNIPAQUE)  IMAGE QUALITY:   Diagnostic FINDINGS: CERVICAL VASCULATURE AORTIC ARCH AND GREAT VESSELS:  Mild atherosclerotic disease of the arch, proximal great vessels and visualized subclavian vessels  Common origin of brachiocephalic and left common carotid artery, normal variant  No significant stenosis  RIGHT VERTEBRAL ARTERY CERVICAL SEGMENT:  Normal origin  The vessel is normal in caliber throughout the neck  LEFT VERTEBRAL ARTERY CERVICAL SEGMENT:  Normal origin  The vessel is normal in caliber throughout the neck  RIGHT EXTRACRANIAL CAROTID SEGMENT:  Normal caliber common carotid artery  Normal bifurcation and cervical internal carotid artery  No stenosis or dissection   LEFT EXTRACRANIAL CAROTID SEGMENT:  Normal caliber common carotid artery  Normal bifurcation and cervical internal carotid artery  No stenosis or dissection  NASCET criteria was used to determine the degree of internal carotid artery diameter stenosis  INTRACRANIAL VASCULATURE INTERNAL CAROTID ARTERIES: Mild calcified atherosclerotic disease of bilateral ICA cavernous segments without significant stenosis  Normal enhancement of the intracranial portions of the internal carotid arteries  Normal ophthalmic artery origins  Normal ICA terminus  ANTERIOR CIRCULATION:  Symmetric A1 segments and anterior cerebral arteries with normal enhancement  Azygous A2 segment, normal variant  Normal anterior communicating artery  MIDDLE CEREBRAL ARTERY CIRCULATION:  M1 segment and middle cerebral artery branches demonstrate normal enhancement bilaterally  DISTAL VERTEBRAL ARTERIES:  Normal distal vertebral arteries  Posterior inferior cerebellar artery origins are normal  Normal vertebral basilar junction  BASILAR ARTERY:  Basilar artery is normal in caliber  Normal superior cerebellar arteries  POSTERIOR CEREBRAL ARTERIES: Both posterior cerebral arteries arises from the basilar tip  Both arteries demonstrate normal enhancement  Unchanged severe-short segment stenosis in left PCA proximal P2 segment  VENOUS STRUCTURES:  Normal  NON VASCULAR ANATOMY BONY STRUCTURES:  No acute osseous abnormality  Multilevel degenerative changes of cervical spine, worse at C6-C7  SOFT TISSUES OF THE NECK:  Unremarkable  THORACIC INLET:  Subsegmental atelectasis in right upper lobe  Impression: Negative CTA head and neck for large vessel occlusion, dissection, or aneurysm  Unchanged severe short-segment stenosis in left PCA proximal P2 segment  No other sites of high-grade stenosis   Findings were directly discussed with Jonn Suarez at 8:28 PM  Workstation performed: [id number]     Echo complete w/ contrast if indicated    Result Date: 12/14/2021  Narrative: Toni Ibrahim  Left Ventricle: Left ventricular cavity size is normal  Wall thickness is mild-moderately increased  The left ventricular ejection fraction is 65%  Systolic function is normal  Wall motion is normal  Diastolic function is mildly abnormal, consistent with grade I (abnormal) relaxation    Left Atrium: The atrium is mildly dilated    Mitral Valve: There is significant annular calcification  There is mild regurgitation    Tricuspid Valve: There is mild regurgitation  ** Please Note: Dragon 360 Dictation voice to text software was used in the creation of this document   **

## 2022-01-01 NOTE — CASE MANAGEMENT
Case Management Progress Note    Patient name Kamryn Lorenzo  Location [address] 002-72 MRN [id number]  : 1955 Date 2022       LOS (days): 0  Geometric Mean LOS (GMLOS) (days):   Days to GMLOS:        OBJECTIVE:        Current admission status: Inpatient  Preferred Pharmacy:   [facility], [address]   [place] [id number]  Phone: [phone] Fax: [phone] [facility] [address], [address]  [address] [place] [address]  Phone: [phone] Fax: [phone]    Primary Care Provider: Tamra Powers MD    Primary Insurance: [employer] [facility] REP  Secondary Insurance:     PROGRESS NOTE:      Per NP Michell pt can return top GS, MRI is negative and pt is wanting to return  CM confirmed this with pt over phone call  CM called IRAIS Jones and left a VM for Garrison Kelly   CM placed referral via [place]

## 2022-01-01 NOTE — ASSESSMENT & PLAN NOTE
· Will continue beta-blocker  Hold lisinopril to allow for permissive hypertension  · Will provide IV p r n   Antihypertensive if SBP sustains greater than 200  · Monitor blood pressure per protocol

## 2022-01-01 NOTE — ED ATTENDING ATTESTATION
12/31/2021  ITeddy MD, saw and evaluated the patient  I have discussed the patient with the resident/non-physician practitioner and agree with the resident's/non-physician practitioner's findings, Plan of Care, and MDM as documented in the resident's/non-physician practitioner's note, except where noted  All available labs and Radiology studies were reviewed  I was present for key portions of any procedure(s) performed by the resident/non-physician practitioner and I was immediately available to provide assistance  At this point I agree with the current assessment done in the Emergency Department    I have conducted an independent evaluation of this patient a history and physical is as follows:  Pt recently had cva with L sided symptoms Pt is on blood thinners  1 hour pta started with L sided numbenss worse than in past no new weakness or change in speech PE: alert nad heart reg lungs clear abd soft nontender neuro some decreased sensation L ue no weakness MDM: stroke alert called   ED Course         Critical Care Time  Procedures

## 2022-01-01 NOTE — ED NOTES
Difficulty in obtainin pt access; 3 RNs attempted; Physician was make aware and came to bedside to place Adalgisa Driver 13 , RN  12/31/21 2014

## 2022-01-01 NOTE — ASSESSMENT & PLAN NOTE
This is a 61 y o  female with history of multiple prior strokes, non-valvular A-fib, HTN, hyperlipidemia, and DM2  She presented for worsening of her recent stroke symptoms include LUE/LLE weakness and numbness which started approximately 1 hour prior to arrival  Stroke alert was called and patient was found to have an NIHSS of 1  CT and CTA were negative for acute findings  Patient was not a candidate for tPA given recent stroke and current anticoagulation use  Recent history of right pontine infarct on 12/13/21  Presenting symptoms at that time were LUE/LLE weakness and numbness  Workup also was remarkable for a short segment of severe stenosis in the left proximal P2 segment  Patient has been complaint with her Xarelto and has not missed any doses  Workup:  - Echo 12/14/21: LVEF 65%, G1DD  - Labs 12/14/21: HgbA2c 9 4% and LDL 52  - MRI brain: no acute ischemic changes    Impression: Exacerbation of prior stroke symptoms  With negative MRI, likely either recrudescence or possibly sensory seizure  Plan:  - Can discontinue stroke pathway  - Recent echo and labs were reviewed  - Continue home Xarelto and atorvastatin  - As symptoms have resolved, BP goal of normotension  - No additional neurological recommendations  Please contact with additional questions or concerns

## 2022-01-01 NOTE — ED NOTES
Pt reports no more tingling on L side! States her sensation has returned to her baseline   Physicians notified     Estela Baltazar RN  12/31/21 [id number]

## 2022-01-01 NOTE — ED PROVIDER NOTES
History  Chief Complaint   Patient presents with    CVA/TIA-like Symptoms     L sided tingling and weakness; hx stroke 2 weeks ago with R sided deficites     66F PMH multiple strokes, AFib on Xarelto, diabetes, hypertension presented to the emergency department with worsened left-sided numbness  She had a stroke in 2017 with residual right-sided weakness and hoarseness, and a recent stroke 2 weeks ago with residual left-sided weakness and left-sided numbness  The left-sided numbness originally was in the left lower arm but today 1 hour prior to arrival had acute worsening of the numbness that extended more proximally up to the left shoulder, as well as tingling on the left side of her face  She denies headache, confusion, vision changes, or changes in weakness  Prior to Admission Medications   Prescriptions Last Dose Informant Patient Reported? Taking?    Accu-Chek FastClix Lancets MISC   No No   Sig: Use 2 (two) times a day   B-D ULTRAFINE III SHORT PEN 31G X 8 MM MISC  Self No No   Sig: use as directed   B-D ULTRAFINE III SHORT PEN 31G X 8 MM MISC   No No   Sig: USE AS DIRECTED   Blood Glucose Monitoring Suppl (Accu-Chek Guide) w/Device KIT   No No   Sig: Use 2 (two) times a day   Fluoxetine HCl, PMDD, 20 MG TABS 1/1/2022 at Unknown time  Yes Yes   Sig: Take 20 mg by mouth daily   Insulin Pen Needle (Pen Needles 5/16") 30G X 8 MM MISC   No No   Sig: Use daily   Tradjenta 5 MG TABS   No No   Sig: take 1 tablet by mouth daily   Xarelto 20 MG tablet 1/1/2022 at Unknown time  No Yes   Sig: take 1 tablet by mouth once daily with BREAKFAST   atorvastatin (LIPITOR) 80 mg tablet   No No   Sig: Take 1 tablet (80 mg total) by mouth daily with dinner   escitalopram (LEXAPRO) 5 mg tablet 1/1/2022 at Unknown time  No Yes   Sig: take 1 tablet by mouth once daily   famotidine (PEPCID) 20 mg tablet 1/1/2022 at Unknown time  No Yes   Sig: Take 1 tablet (20 mg total) by mouth daily   glucose blood (Accu-Chek Guide) test strip   No No   Sig: Patient tests blood glucose twice daily   insulin glargine (Lantus SoloStar) 100 units/mL injection pen   No No   Sig: Inject 35 Units under the skin daily at bedtime   lisinopril (ZESTRIL) 5 mg tablet   No No   Sig: Take 1 tablet (5 mg total) by mouth daily   metoprolol tartrate (LOPRESSOR) 50 mg tablet 1/1/2022 at Unknown time  No Yes   Sig: Take 1 tablet (50 mg total) by mouth every 12 (twelve) hours   ondansetron (ZOFRAN-ODT) 4 mg disintegrating tablet   No No   Sig: Take 1 tablet (4 mg total) by mouth every 8 (eight) hours as needed for nausea or vomiting   pantoprazole (PROTONIX) 40 mg tablet 1/1/2022 at Unknown time Self Yes Yes   Sig: Take 40 mg by mouth daily   repaglinide (PRANDIN) 0 5 mg tablet   No No   Sig: Take 1 tablet (0 5 mg total) by mouth 2 (two) times a day before meals      Facility-Administered Medications: None       Past Medical History:   Diagnosis Date    Abdominal fibromatosis     Diabetes mellitus ([facility] [address]     Hyperlipemia     Hypertension     Pneumonia     Stroke [facility])        Past Surgical History:   Procedure Laterality Date    CATARACT EXTRACTION Bilateral     CATARACT EXTRACTION, BILATERAL      COLONOSCOPY      EGD      LAPAROTOMY      Exploratory; Last Assessed 10/17/2017    PEG TUBE PLACEMENT      PEG TUBE REMOVAL         Family History   Problem Relation Age of Onset    No Known Problems Mother     No Known Problems Father      I have reviewed and agree with the history as documented      E-Cigarette/Vaping    E-Cigarette Use Never User      E-Cigarette/Vaping Substances    Nicotine No     THC No     CBD No     Flavoring No     Other No     Unknown No      Social History     Tobacco Use    Smoking status: Never Smoker    Smokeless tobacco: Never Used   Vaping Use    Vaping Use: Never used   Substance Use Topics    Alcohol use: Not Currently     Comment: Socially    Drug use: No        Review of Systems   Constitutional: Negative for fever    Eyes: Negative for visual disturbance  Respiratory: Negative for shortness of breath  Cardiovascular: Negative for chest pain  Gastrointestinal: Positive for nausea  Negative for abdominal pain, diarrhea and vomiting  Neurological: Positive for numbness  Negative for headaches  Weakness: Chronic  All other systems reviewed and are negative  Physical Exam  ED Triage Vitals   Temperature Pulse Respirations Blood Pressure SpO2   12/31/21 2045 12/31/21 1928 12/31/21 1928 12/31/21 1928 12/31/21 1928   97 5 °F (36 4 °C) 81 18 134/80 98 %      Temp Source Heart Rate Source Patient Position - Orthostatic VS BP Location FiO2 (%)   12/31/21 2045 12/31/21 1928 12/31/21 1928 12/31/21 1928 --   Tympanic Monitor Lying Right arm       Pain Score       12/31/21 1928       No Pain             Orthostatic Vital Signs  Vitals:    01/01/22 1900 01/01/22 2100 01/01/22 2142 01/02/22 0000   BP: 124/69 134/77 135/70 138/84   Pulse: 78 74 74 76   Patient Position - Orthostatic VS:           Physical Exam  Vitals and nursing note reviewed  Constitutional:       General: She is not in acute distress  Appearance: She is not toxic-appearing  HENT:      Head: Normocephalic  Mouth/Throat:      Mouth: Mucous membranes are moist    Eyes:      Pupils: Pupils are equal, round, and reactive to light  Cardiovascular:      Rate and Rhythm: Normal rate and regular rhythm  Heart sounds: Normal heart sounds  No murmur heard  Pulmonary:      Effort: Pulmonary effort is normal  No respiratory distress  Breath sounds: Normal breath sounds  No wheezing, rhonchi or rales  Abdominal:      General: Abdomen is flat  There is no distension  Palpations: Abdomen is soft  Tenderness: There is no abdominal tenderness  There is no guarding or rebound  Skin:     General: Skin is warm and dry  Neurological:      Mental Status: She is alert        Comments: NIHSS 1:  Left arm and leg sensation present but decreased compared to right side  Sensation symmetric throughout face           ED Medications  Medications   atorvastatin (LIPITOR) tablet 80 mg (80 mg Oral Given 1/1/22 1707)   escitalopram (LEXAPRO) tablet 5 mg (5 mg Oral Given 1/1/22 0957)   famotidine (PEPCID) tablet 20 mg (20 mg Oral Given 1/1/22 0957)   insulin glargine (LANTUS) subcutaneous injection 35 Units 0 35 mL (35 Units Subcutaneous Given 1/1/22 2133)   rivaroxaban (XARELTO) tablet 20 mg (20 mg Oral Given 1/1/22 0957)   pantoprazole (PROTONIX) EC tablet 40 mg (40 mg Oral Given 1/1/22 0723)   ondansetron (ZOFRAN) injection 4 mg (has no administration in time range)   acetaminophen (TYLENOL) tablet 650 mg (has no administration in time range)   insulin lispro (HumaLOG) 100 units/mL subcutaneous injection 1-6 Units (1 Units Subcutaneous Given 1/1/22 1708)   insulin lispro (HumaLOG) 100 units/mL subcutaneous injection 1-5 Units (1 Units Subcutaneous Given 1/1/22 2133)   metoprolol tartrate (LOPRESSOR) tablet 50 mg (50 mg Oral Given 1/1/22 2133)   iohexol (OMNIPAQUE) 350 MG/ML injection (SINGLE-DOSE) 85 mL (85 mL Intravenous Given 12/31/21 2015)       Diagnostic Studies  Results Reviewed     Procedure Component Value Units Date/Time    Fingerstick Glucose (POCT) [[id number]]  (Abnormal) Collected: 01/01/22 0726    Lab Status: Final result Updated: 01/01/22 0731     POC Glucose 185 mg/dl     Basic metabolic panel [[id number]]  (Abnormal) Collected: 01/01/22 0447    Lab Status: Final result Specimen: Blood from Arm, Left Updated: 01/01/22 0583     Sodium 137 mmol/L      Potassium 5 1 mmol/L      Chloride 105 mmol/L      CO2 29 mmol/L      ANION GAP 3 mmol/L      BUN 34 mg/dL      Creatinine 1 14 mg/dL      Glucose 198 mg/dL      Calcium 8 9 mg/dL      eGFR 50 ml/min/1 73sq m     Narrative:      [place] guidelines for Chronic Kidney Disease (CKD):     Stage 1 with normal or high GFR (GFR > 90 mL/min/1 73 square meters)    Stage 2 Mild CKD (GFR = 60-89 mL/min/1 73 square meters)    Stage 3A Moderate CKD (GFR = 45-59 mL/min/1 73 square meters)    Stage 3B Moderate CKD (GFR = 30-44 mL/min/1 73 square meters)    Stage 4 Severe CKD (GFR = 15-29 mL/min/1 73 square meters)    Stage 5 End Stage CKD (GFR <15 mL/min/1 73 square meters)  Note: GFR calculation is accurate only with a steady state creatinine    CBC (With Platelets) [[id number]]  (Normal) Collected: 01/01/22 0447    Lab Status: Final result Specimen: Blood from Arm, Left Updated: 01/01/22 0501     WBC 7 37 Thousand/uL      RBC 3 86 Million/uL      Hemoglobin 11 8 g/dL      Hematocrit 36 0 %      MCV 93 fL      MCH 30 6 pg      MCHC 32 8 g/dL      RDW 12 3 %      Platelets 108 Thousands/uL      MPV 11 5 fL     Fingerstick Glucose (POCT) [[id number]]  (Abnormal) Collected: 01/01/22 0054    Lab Status: Final result Updated: 01/01/22 0056     POC Glucose 219 mg/dl     COVID/FLU/RSV - 2 hour TAT [[id number]]  (Normal) Collected: 12/31/21 2040    Lab Status: Final result Specimen: Nares from Nose Updated: 12/31/21 2202     SARS-CoV-2 Negative     INFLUENZA A PCR Negative     INFLUENZA B PCR Negative     RSV PCR Negative    Narrative:           Amairani Arredondo [[id number]]  (Abnormal) Collected: 12/31/21 2012    Lab Status: Final result Specimen: Blood from Arm, Left Updated: 12/31/21 2115     Protime 20 6 seconds      INR 1 86    APTT [[id number]]  (Normal) Collected: 12/31/21 2012    Lab Status: Final result Specimen: Blood from Arm, Left Updated: 12/31/21 2115     PTT 32 seconds     HS Troponin 0hr (reflex protocol) [[id number]]  (Normal) Collected: 12/31/21 2012    Lab Status: Final result Specimen: Blood from Arm, Left Updated: 12/31/21 2105     hs TnI 0hr 4 ng/L     Basic metabolic panel [[id number]]  (Abnormal) Collected: 12/31/21 2012    Lab Status: Final result Specimen: Blood from Arm, Left Updated: 12/31/21 2051     Sodium 136 mmol/L      Potassium 4 7 mmol/L      Chloride 105 mmol/L CO2 28 mmol/L      ANION GAP 3 mmol/L      BUN 36 mg/dL      Creatinine 1 23 mg/dL      Glucose 214 mg/dL      Calcium 9 1 mg/dL      eGFR 46 ml/min/1 73sq m     Narrative:      [place] guidelines for Chronic Kidney Disease (CKD):     Stage 1 with normal or high GFR (GFR > 90 mL/min/1 73 square meters)    Stage 2 Mild CKD (GFR = 60-89 mL/min/1 73 square meters)    Stage 3A Moderate CKD (GFR = 45-59 mL/min/1 73 square meters)    Stage 3B Moderate CKD (GFR = 30-44 mL/min/1 73 square meters)    Stage 4 Severe CKD (GFR = 15-29 mL/min/1 73 square meters)    Stage 5 End Stage CKD (GFR <15 mL/min/1 73 square meters)  Note: GFR calculation is accurate only with a steady state creatinine    CBC and Platelet [[id number]]  (Normal) Collected: 12/31/21 2012    Lab Status: Final result Specimen: Blood from Arm, Left Updated: 12/31/21 2023     WBC 7 60 Thousand/uL      RBC 3 96 Million/uL      Hemoglobin 12 1 g/dL      Hematocrit 37 1 %      MCV 94 fL      MCH 30 6 pg      MCHC 32 6 g/dL      RDW 12 4 %      Platelets 841 Thousands/uL      MPV 11 5 fL     Fingerstick Glucose (POCT) [[id number]]  (Abnormal) Collected: 12/31/21 1932    Lab Status: Final result Updated: 12/31/21 1933     POC Glucose 220 mg/dl                  MRI brain wo contrast   Final Result by Ross Diggs MD (01/01 1335)      No acute intracranial abnormality  Tiny subacute infarct in right posterior zaida, improved from prior MRI brain 12/14/2021  Workstation performed: [id number]         CT stroke alert brain   Final Result by Ross Diggs MD (12/31 2034)      No acute intracranial abnormality  Findings were directly discussed with Amy Torres at 8:28 PM       Workstation performed: [id number]         CTA stroke alert (head/neck)   Final Result by Ross Diggs MD (12/31 2041)      Negative CTA head and neck for large vessel occlusion, dissection, or aneurysm        Unchanged severe short-segment stenosis in left PCA proximal P2 segment  No other sites of high-grade stenosis  Findings were directly discussed with Honey Powell at 8:28 PM                            Workstation performed: [id number]               Procedures  Procedures      ED Course                  Stroke Assessment     Row Name 12/31/21 1930             NIH Stroke Scale    Interval --      Level of Consciousness (1a ) 0      LOC Questions (1b ) 0      LOC Commands (1c ) 0      Best Gaze (2 ) 0      Visual (3 ) 0      Facial Palsy (4 ) 0      Motor Arm, Left (5a ) 0      Motor Arm, Right (5b ) 0      Motor Leg, Left (6a ) 0      Motor Leg, Right (6b ) 0      Limb Ataxia (7 ) 0      Sensory (8 ) 1      Best Language (9 ) 0      Dysarthria (10 ) 0      Extinction and Inattention (11 ) (Formerly Neglect) 0      Total 1                                    MDM  Number of Diagnoses or Management Options  Stroke-like symptoms  Diagnosis management comments: 66F PMH multiple strokes, AFib on Xarelto, diabetes, hypertension presented to the emergency department with worsened left-sided numbness  NIHSS 1 for left-sided numbness  Stroke alert called, neurology consulted  CTA head without acute changes from prior study  Plan for admission to medicine under stroke pathway        Disposition  Final diagnoses:   Stroke-like symptoms     Time reflects when diagnosis was documented in both MDM as applicable and the Disposition within this note     Time User Action Codes Description Comment    12/31/2021  7:51 PM Abigail Chou Add [R29 90] Stroke-like symptoms     12/31/2021 10:48 PM Adelaida KRUGER Add [I63 9] Cerebrovascular accident (CVA), unspecified mechanism [facility])       ED Disposition     ED Disposition Condition Date/Time Comment    Admit Stable Fri Dec 31, 2021 10:37 PM Case was discussed with Dr Lulú Sweeney and the patient's admission status was agreed to be Admission Status: observation status to the service of Dr Lulú Sweeney          Follow-up Information    None         Current Discharge Medication List      CONTINUE these medications which have NOT CHANGED    Details   escitalopram (LEXAPRO) 5 mg tablet take 1 tablet by mouth once daily  Qty: 90 tablet, Refills: 1    Associated Diagnoses: Current episode of major depressive disorder without prior episode, unspecified depression episode severity      famotidine (PEPCID) 20 mg tablet Take 1 tablet (20 mg total) by mouth daily  Qty: 90 tablet, Refills: 1    Associated Diagnoses: Gastroesophageal reflux disease, unspecified whether esophagitis present      Fluoxetine HCl, PMDD, 20 MG TABS Take 20 mg by mouth daily      metoprolol tartrate (LOPRESSOR) 50 mg tablet Take 1 tablet (50 mg total) by mouth every 12 (twelve) hours  Qty: 180 tablet, Refills: 1    Associated Diagnoses: Paroxysmal atrial fibrillation (HCC)      pantoprazole (PROTONIX) 40 mg tablet Take 40 mg by mouth daily      Xarelto 20 MG tablet take 1 tablet by mouth once daily with BREAKFAST  Qty: 90 tablet, Refills: 1    Associated Diagnoses: Paroxysmal atrial fibrillation (HCC)      Accu-Chek FastClix Lancets MISC Use 2 (two) times a day  Qty: 102 each, Refills: 3    Comments: Per insurance formulary, lancets need to be changed to [employer]  Associated Diagnoses: Type 2 diabetes mellitus with hyperglycemia, with long-term current use of insulin ([facility])      atorvastatin (LIPITOR) 80 mg tablet Take 1 tablet (80 mg total) by mouth daily with dinner  Qty: 90 tablet, Refills: 1    Associated Diagnoses: [facility]      ! ! B-D ULTRAFINE III SHORT PEN 31G X 8 MM MISC use as directed  Qty: 100 each, Refills: 0    Comments: Patient needs appointment before more refills  Associated Diagnoses: Type 2 diabetes mellitus with hyperglycemia, without long-term current use of insulin ([facility]      ! ! B-D ULTRAFINE III SHORT PEN 31G X 8 MM MISC USE AS DIRECTED  Qty: 100 each, Refills: 0 Associated Diagnoses: Type 2 diabetes mellitus without complication, with long-term current use of insulin ([facility])      Blood Glucose Monitoring Suppl (Accu-Chek Guide) w/Device KIT Use 2 (two) times a day  Qty: 1 kit, Refills: 0    Comments: Per insurance formulary, patient needs to be changed to [employer] Guide glucometer system  Associated Diagnoses: Type 2 diabetes mellitus with hyperglycemia, with long-term current use of insulin ([facility])      glucose blood (Accu-Chek Guide) test strip Patient tests blood glucose twice daily  Qty: 100 strip, Refills: 3    Comments: Per insurance formulary, patient glucose strips need to be changed to Accu-chek Guide  Associated Diagnoses: Type 2 diabetes mellitus with hyperglycemia, with long-term current use of insulin ([facility])      insulin glargine (Lantus SoloStar) 100 units/mL injection pen Inject 35 Units under the skin daily at bedtime  Qty: 15 mL, Refills: 2    Associated Diagnoses: Type 2 diabetes mellitus with hyperglycemia, with long-term current use of insulin ([facility] [address] )      ! !  Insulin Pen Needle (Pen Needles 5/16") 30G X 8 MM MISC Use daily  Qty: 50 each, Refills: 2    Associated Diagnoses: Other specified diabetes mellitus without complication, with long-term current use of insulin ([facility])      lisinopril (ZESTRIL) 5 mg tablet Take 1 tablet (5 mg total) by mouth daily  Qty: 90 tablet, Refills: 1    Associated Diagnoses: Essential hypertension      ondansetron (ZOFRAN-ODT) 4 mg disintegrating tablet Take 1 tablet (4 mg total) by mouth every 8 (eight) hours as needed for nausea or vomiting  Qty: 30 tablet, Refills: 1    Associated Diagnoses: Nausea and vomiting, intractability of vomiting not specified, unspecified vomiting type      repaglinide (PRANDIN) 0 5 mg tablet Take 1 tablet (0 5 mg total) by mouth 2 (two) times a day before meals  Qty: 180 tablet, Refills: 1    Associated Diagnoses: Type 2 diabetes mellitus with hyperglycemia, with long-term current use of insulin ([facility]) Tradjenta 5 MG TABS take 1 tablet by mouth daily  Qty: 30 tablet, Refills: 3    Associated Diagnoses: Type 2 diabetes mellitus with hyperglycemia, with long-term current use of insulin ([facility]       ! ! - Potential duplicate medications found  Please discuss with provider  No discharge procedures on file  PDMP Review       Value Time User    PDMP Reviewed  Yes 12/31/2021 10:45 PM Terri Hull DO           ED Provider  Attending physically available and evaluated Kamryn Maura ANN managed the patient along with the ED Attending      Electronically Signed by         Stan Sarabia MD  01/02/22 2563

## 2022-01-01 NOTE — UTILIZATION REVIEW
Inpatient Admission Authorization Request   NOTIFICATION OF INPATIENT ADMISSION/INPATIENT AUTHORIZATION REQUEST   SERVICING FACILITY:   [facility]  Address: [address] [place] [address], [address] [place] [address]  Tax ID: [id number]  NPI: [id number]  Place of Service: Inpatient [address] [facility] Code: 24     ATTENDING PROVIDER:  Attending Name and NPI#: Comfort Krishna Md [[id number]]  Address: [address] [place] [address], [address] [place] [address]  Phone: [phone]     UTILIZATION REVIEW CONTACT:  Giovani Donaldson Utilization   Network Utilization Review Department  Phone: [phone]  Fax: [phone]  Email: [email or web address]     PHYSICIAN ADVISORY SERVICES:  FOR RHBT-FJ-USFI REVIEW - MEDICAL NECESSITY DENIAL  Phone: [phone]  Fax: [phone]  Email: [email or web address]     TYPE OF REQUEST:  Inpatient Status     ADMISSION INFORMATION:  ADMISSION DATE/TIME: 1/1/22  1:51 PM  PATIENT DIAGNOSIS CODE/DESCRIPTION:  CVA (cerebral vascular accident) ([facility] [address] ) [I63 9]  Stroke-like symptoms [R29 90]  Cerebrovascular accident (CVA), unspecified mechanism ([address] ) [I63 9]  DISCHARGE DATE/TIME: No discharge date for patient encounter  DISCHARGE DISPOSITION (IF DISCHARGED): [facility]     IMPORTANT INFORMATION:  Please contact the Giovani Donaldson directly with any questions or concerns regarding this request  Department voicemails are confidential     Send requests for admission clinical reviews, concurrent reviews, approvals, and administrative denials due to lack of clinical to fax [phone]         Inpatient Admission Authorization Request   NOTIFICATION OF INPATIENT ADMISSION/INPATIENT AUTHORIZATION REQUEST   SERVICING FACILITY:   [facility]  Address: [address] [place] [address], [address] [place] [address]  Tax ID: [id number]  NPI: [id number]  Place of Service: Inpatient [address] [facility]  [address]  Place of Service Code: 24     ATTENDING PROVIDER:  Lucy Name and NPI#: Lexie Mcdermott Md [[id number]]  Address: [address] [place] [address]  Phone: [phone]     UTILIZATION REVIEW CONTACT:  Mirian Hickey Utilization   Network Utilization Review Department  Phone: [phone]  Fax: [phone]  Email: [email or web address]     PHYSICIAN ADVISORY SERVICES:  FOR RUWB-BZ-SAYW REVIEW - MEDICAL NECESSITY DENIAL  Phone: [phone]  Fax: [phone]  Email: [email or web address]     TYPE OF REQUEST:  Inpatient Status     ADMISSION INFORMATION:  ADMISSION DATE/TIME: 1/1/22  1:51 PM  PATIENT DIAGNOSIS CODE/DESCRIPTION:  CVA (cerebral vascular accident) ([facility] [address] ) [I63 9]  Stroke-like symptoms [R29 90]  Cerebrovascular accident (CVA), unspecified mechanism ([facility] [address] ) [I63 9]  DISCHARGE DATE/TIME: No discharge date for patient encounter  DISCHARGE DISPOSITION (IF DISCHARGED): [facility]     IMPORTANT INFORMATION:  Please contact the Mirian Hickey directly with any questions or concerns regarding this request  Department voicemails are confidential     Send requests for admission clinical reviews, concurrent reviews, approvals, and administrative denials due to lack of clinical to fax [phone]

## 2022-01-02 LAB
GLUCOSE SERPL-MCNC: 134 MG/DL (ref 65–140)
GLUCOSE SERPL-MCNC: 160 MG/DL (ref 65–140)
GLUCOSE SERPL-MCNC: 174 MG/DL (ref 65–140)
GLUCOSE SERPL-MCNC: 207 MG/DL (ref 65–140)

## 2022-01-02 PROCEDURE — 97163 PT EVAL HIGH COMPLEX 45 MIN: CPT

## 2022-01-02 PROCEDURE — 82948 REAGENT STRIP/BLOOD GLUCOSE: CPT

## 2022-01-02 PROCEDURE — 99232 SBSQ HOSP IP/OBS MODERATE 35: CPT | Performed by: NURSE PRACTITIONER

## 2022-01-02 PROCEDURE — 97167 OT EVAL HIGH COMPLEX 60 MIN: CPT

## 2022-01-02 RX ORDER — LISINOPRIL 2.5 MG/1
5 TABLET ORAL DAILY
Status: DISCONTINUED | OUTPATIENT
Start: 2022-01-03 | End: 2022-01-04 | Stop reason: HOSPADM

## 2022-01-02 RX ADMIN — INSULIN LISPRO 1 UNITS: 100 INJECTION, SOLUTION INTRAVENOUS; SUBCUTANEOUS at 12:27

## 2022-01-02 RX ADMIN — INSULIN LISPRO 1 UNITS: 100 INJECTION, SOLUTION INTRAVENOUS; SUBCUTANEOUS at 17:09

## 2022-01-02 RX ADMIN — INSULIN GLARGINE 35 UNITS: 100 INJECTION, SOLUTION SUBCUTANEOUS at 23:01

## 2022-01-02 RX ADMIN — METOPROLOL TARTRATE 50 MG: 50 TABLET, FILM COATED ORAL at 08:43

## 2022-01-02 RX ADMIN — FAMOTIDINE 20 MG: 20 TABLET, FILM COATED ORAL at 08:43

## 2022-01-02 RX ADMIN — METOPROLOL TARTRATE 50 MG: 50 TABLET, FILM COATED ORAL at 23:01

## 2022-01-02 RX ADMIN — RIVAROXABAN 20 MG: 20 TABLET, FILM COATED ORAL at 08:43

## 2022-01-02 RX ADMIN — ATORVASTATIN CALCIUM 80 MG: 80 TABLET, FILM COATED ORAL at 17:09

## 2022-01-02 RX ADMIN — ESCITALOPRAM OXALATE 5 MG: 10 TABLET ORAL at 08:43

## 2022-01-02 RX ADMIN — INSULIN LISPRO 1 UNITS: 100 INJECTION, SOLUTION INTRAVENOUS; SUBCUTANEOUS at 23:01

## 2022-01-02 RX ADMIN — PANTOPRAZOLE SODIUM 40 MG: 40 TABLET, DELAYED RELEASE ORAL at 05:44

## 2022-01-02 NOTE — ASSESSMENT & PLAN NOTE
Lab Results   Component Value Date    HGBA1C 9 4 (H) 11/04/2021       Recent Labs     01/01/22  1124 01/01/22  1622 01/01/22 2044 01/02/22  0627   POCGLU 145* 171* 208* 134         · Not controlled with hyperglycemia on admission  · Tradjenta and Prandin on hold  · For now continue home regimen of Lantus 35 units q h s , added sliding scale    · Diabetic diet  · Monitor Accu-Cheks adjust regimen as needed

## 2022-01-02 NOTE — ASSESSMENT & PLAN NOTE
Presented with left-sided numbness, patient with very recent hospitalization at the [facility] 12/14/2021-12/21/2021 with similar and found with right pontine CVA  Has chronic right upper extremity weakness from prior stroke and left lower extremity weakness from recent stroke  · CT head negative for new acute intracranial pathology, and CTA head/neck without new large vessel occlusion (left proximal P2 segment severe stenosis redemonstrated)  · TPA contraindicated due to recent CVA  · Patient admitted under stroke pathway, seen by Neurology  MRI brain negative for acute findings  Symptoms likely recrudescence from recent stroke  · Echocardiogram at last admission revealed EF 65% with grade 1 DD  · Recent FLP and A1c reviewed  · Continue Xarelto and high-intensity statin  · Outpatient neuro f/u in 4 weeks  · PT/OT evaluations pending however anticipate will need to continue rehab course  Was at [facility] prior to admission, case management attempting to get patient back there

## 2022-01-02 NOTE — CASE MANAGEMENT
Case Management Discharge Planning Note    Patient name Orvin Soulier  [facility] [address] [facility] [id number]  : 1955 Date 2022       Current Admission Date: 2021  Current Admission Diagnosis:Left sided numbness   Patient Active Problem List    Diagnosis Date Noted    Left sided numbness 2021    CVA (cerebral vascular accident) ([facility] [address] ) 2021    Right pontine stroke ([facility] [address] ) 2021    Weakness of both lower extremities     History of stroke 2021    Muscle tension dysphonia 2020    Reflux laryngitis 2020    Glottic insufficiency 2020    Dermatitis 2020    YOLIE (obstructive sleep apnea)     Medicare annual wellness visit, subsequent 2020    Current episode of major depressive disorder without prior episode 2019    Essential hypertension 2019    Routine health maintenance 2019    GERD (gastroesophageal reflux disease) 03/15/2019    Hyperlipidemia 03/15/2019    Edema 03/15/2019    Diarrhea 03/15/2019    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    Incomplete emptying of bladder 2018    Dysphonia 2018    Glottic stenosis 2018    Dysphagia 2018    Paroxysmal atrial fibrillation ([facility] [address] ) 10/17/2017    Blurry vision 10/17/2017    Granulation tissue of site of tracheostomy 10/17/2017    Insomnia 10/17/2017    Nausea 10/17/2017    Type 2 diabetes mellitus with hyperglycemia, with long-term current use of insulin ([facility] [address] ) 10/17/2017    [facility] 2017    Heart disease 2015    Diabetic cataract ([facility] [address] ) 2014    Severe obesity (BMI 35 0-39  9) with comorbidity ([facility] [address] ) 2014      LOS (days): 1  Geometric Mean LOS (GMLOS) (days):   Days to GMLOS:     OBJECTIVE:  Risk of Unplanned Readmission Score: 19         Current admission status: Inpatient   Preferred Pharmacy:   [facility], [address] [address] [facility] [address]  Phone: [phone] Fax: [phone]  [address], [address]  [address] [place] [address] [email or web address]  Phone: [phone] Fax: [phone]    Primary Care Provider: Frederico Sacks, MD    Primary Insurance: [facility]  Secondary Insurance:     DISCHARGE DETAILS:    CM spoke with Jovani Baker (unit clerk at [facility])  CM informed her that pt is medically stable to return and if they can take her back  Jovani Baker stated that she will speak with her supervisor and give CM a call back  Per Jovani Baker a new insurance [facility] is needed in order for pt to return  CM asked NP Michell to consult PT/OT, notes are needed for insurance auth

## 2022-01-02 NOTE — PROGRESS NOTES
[address] [facility]  Progress Note - Divine Gomez 1955, 61 y o  female MRN: [id number]  Unit/Bed#: [facility] 723-01 Encounter: [id number]  Primary Care Provider: Sirena Rodas MD   Date and time admitted to hospital: 12/31/2021  7:16 PM    * Left sided numbness  Assessment & Plan  Presented with left-sided numbness, patient with very recent hospitalization at the [facility] 12/14/2021-12/21/2021 with similar and found with right pontine CVA  Has chronic right upper extremity weakness from prior stroke and left lower extremity weakness from recent stroke  · CT head negative for new acute intracranial pathology, and CTA head/neck without new large vessel occlusion (left proximal P2 segment severe stenosis redemonstrated)  · TPA contraindicated due to recent CVA  · Patient admitted under stroke pathway, seen by Neurology  MRI brain negative for acute findings  Symptoms likely recrudescence from recent stroke  · Echocardiogram at last admission revealed EF 65% with grade 1 DD  · Recent FLP and A1c reviewed  · Continue Xarelto and high-intensity statin  · Outpatient neuro f/u in 4 weeks  · PT/OT evaluations pending however anticipate will need to continue rehab course  Was at [facility] prior to admission, case management attempting to get patient back there       CVA (cerebral vascular accident) [facility])  Assessment & Plan  · History of multiple prior CVAs including most recently 12/14/2021 with newly discovered right pontine CVA  · Plan as above    Tracheal stenosis  Assessment & Plan  · Of historical note and with vocal cord paralysis  · Continue follow-up with ENT as outpatient    Type 2 diabetes mellitus with hyperglycemia, with long-term current use of insulin [facility])  Assessment & Plan  Lab Results   Component Value Date    HGBA1C 9 4 (H) 11/04/2021       Recent Labs     01/01/22  1124 01/01/22  1622 01/01/22  2044 01/02/22  0627   POCGLU 145* 171* 208* 134 · Not controlled with hyperglycemia on admission  · Tradjenta and Prandin on hold  · For now continue home regimen of Lantus 35 units q h s , added sliding scale  · Diabetic diet  · Monitor Accu-Cheks adjust regimen as needed    Paroxysmal atrial fibrillation (HCC)  Assessment & Plan  · Currently normal sinus rhythm  · Continue metoprolol tartrate  · Anticoagulation with Xarelto, continue    GERD (gastroesophageal reflux disease)  Assessment & Plan  · Stable, continue PPI    Essential hypertension  Assessment & Plan  · Will continue beta-blocker and ACEI  · Monitor blood pressure per protocol    Hyperlipidemia  Assessment & Plan  · Recent FLP reviewed  Continue atorvastatin 80 mg daily with dinner    YOLIE (obstructive sleep apnea)  Assessment & Plan  · Noted in history however per patient "mild"  · Does not wear CPAP/oxygen      VTE Pharmacologic Prophylaxis: VTE Score: 8 Moderate Risk (Score 3-4) - Pharmacological DVT Prophylaxis Ordered: rivaroxaban (Xarelto)  Patient Centered Rounds: I performed bedside rounds with nursing staff today  Discussions with Specialists or Other Care Team Provider: nursing, case management Wilbert Carbajal)     Education and Discussions with Family / Patient: Updated  (son) via phone  Time Spent for Care: 20 minutes  More than 50% of total time spent on counseling and coordination of care as described above  Current Length of Stay: 1 day(s)  Current Patient Status: Inpatient   Certification Statement: The patient will continue to require additional inpatient hospital stay due to pending PT/OT evaluations and d/c planning to rehab  Discharge Plan: Anticipate discharge tomorrow to rehab facility  Code Status: Level 3 - DNAR and DNI    Subjective:   Patient offers no acute complaints  No further tingling, numbness      Objective:     Vitals:   Temp (24hrs), Av 8 °F (36 6 °C), Min:97 5 °F (36 4 °C), Max:98 °F (36 7 °C)    Temp:  [97 5 °F (36 4 °C)-98 °F (36 7 °C)] 97 5 °F (36 4 °C)  HR:  [64-78] 71  Resp:  [18] 18  BP: (124-154)/(67-99) 149/99  SpO2:  [93 %-99 %] 94 %  Body mass index is 35 78 kg/m²  Input and Output Summary (last 24 hours): Intake/Output Summary (Last 24 hours) at 1/2/2022 1018  Last data filed at 1/1/2022 1115  Gross per 24 hour   Intake 120 ml   Output 0 ml   Net 120 ml       Physical Exam:   Physical Exam  Vitals and nursing note reviewed  Constitutional:       Appearance: She is obese  Cardiovascular:      Rate and Rhythm: Normal rate  Pulmonary:      Breath sounds: Normal breath sounds  Abdominal:      Tenderness: There is no abdominal tenderness  Musculoskeletal:         General: No swelling  Comments: B/L foot drop   Skin:     General: Skin is warm  Neurological:      Mental Status: She is alert and oriented to person, place, and time  Mental status is at baseline  Comments: Chronic vocal cord paralysis  Left lower extremity weakness noted, right upper extremity weakness noted      Psychiatric:         Mood and Affect: Mood normal           Additional Data:     Labs:  Results from last 7 days   Lab Units 01/01/22  0447   WBC Thousand/uL 7 37   HEMOGLOBIN g/dL 11 8   HEMATOCRIT % 36 0   PLATELETS Thousands/uL 229     Results from last 7 days   Lab Units 01/01/22  0447   SODIUM mmol/L 137   POTASSIUM mmol/L 5 1   CHLORIDE mmol/L 105   CO2 mmol/L 29   BUN mg/dL 34*   CREATININE mg/dL 1 14   ANION GAP mmol/L 3*   CALCIUM mg/dL 8 9   GLUCOSE RANDOM mg/dL 198*     Results from last 7 days   Lab Units 12/31/21  2012   INR  1 86*     Results from last 7 days   Lab Units 01/02/22  0627 01/01/22  2044 01/01/22  1622 01/01/22  1124 01/01/22  0726 01/01/22  0054 12/31/21  1932   POC GLUCOSE mg/dl 134 208* 171* 145* 185* 219* 220*               Lines/Drains:  Invasive Devices  Report    Peripheral Intravenous Line            Peripheral IV 12/13/21 Right Hand 20 days    Peripheral IV 12/31/21 Left Forearm 1 day Imaging: No pertinent imaging reviewed  Recent Cultures (last 7 days):         Last 24 Hours Medication List:   Current Facility-Administered Medications   Medication Dose Route Frequency Provider Last Rate    acetaminophen  650 mg Oral Q6H PRN Deejay Passe, DO      atorvastatin  80 mg Oral Daily With [employer], DO      escitalopram  5 mg Oral Daily Deejay Passe, DO      famotidine  20 mg Oral Daily Deejay Passe, DO      insulin glargine  35 Units Subcutaneous HS Deejay Passe, DO      insulin lispro  1-5 Units Subcutaneous HS Deejay Passe, DO      insulin lispro  1-6 Units Subcutaneous TID AC Deejay Passe, DO      [START ON 1/3/2022] lisinopril  5 mg Oral Daily ALEC Allison      metoprolol tartrate  50 mg Oral Q12H [address] ALEC Allison      ondansetron  4 mg Intravenous Q6H PRN Deejay Passe, DO      pantoprazole  40 mg Oral Early Morning Deejay Passe, DO      rivaroxaban  20 mg Oral Daily With Breakfast Deejay Passe, DO          Today, Patient Was Seen By: ALEC Muñoz    **Please Note: This note may have been constructed using a voice recognition system  **

## 2022-01-02 NOTE — PLAN OF CARE
Problem: PHYSICAL THERAPY ADULT  Goal: Performs mobility at highest level of function for planned discharge setting  See evaluation for individualized goals  Description: Treatment/Interventions: LE strengthening/ROM,Functional transfer training,ADL retraining,Elevations,Therapeutic exercise,Patient/family training,Cognitive reorientation,Endurance training,Gait training,Bed mobility          See flowsheet documentation for full assessment, interventions and recommendations  Note: Prognosis: Good  Problem List: Decreased strength,Decreased endurance,Impaired balance,Decreased mobility,Decreased cognition,Impaired judgement,Decreased safety awareness  Assessment: Pt is 61 y o  female seen for high complexity PT evaluation s/p admission to [facility] on 12/31/21 with left sided numbness and L sided weakness  Recent [facility] 12/14/21-12/21/21 with right pontine CVA; discharged to [facility]  Comorbidities affecting pt's physical performance at time of assessment include: tracheal stenosis, type 2 DM, paroxysmal a fib, GERD, esesntial HTN, hyperlipidemia, and YOLIE  Prior to CVA, pt was independent with all ADLs, IADLs anAd functional mobility without DME  Pt resided in an apartment with elevator access  Currently, pt required supervision for bed mobility, min A x 1 for functional transfers and min A x 1 for ambulation with RW  Pt with poor coordination with the RW and veered toward the right  Patient's decreased mobility level and increased fall risk is secondary to deficits in L sided weakness, coordination, standing balance, ambulatory balance, activity tolerance, and safety wareness  Current clinical presentation is unstable/unpredictable seen in pt's presentation of ongoing medical management, increased reliance on assistance compared to PLOF, impaired judgement/safety awareness, and significant PMH   Pt to benefit from continued PT tx to address deficits as defined above and maximize level of functional independent mobility and consistency  From PT/mobility standpoint, recommendation at time of d/c would be return back to rehab  Barriers to Discharge: Inaccessible home environment,Decreased caregiver support        PT Discharge Recommendation: Post acute rehabilitation services          See flowsheet documentation for full assessment

## 2022-01-02 NOTE — PHYSICAL THERAPY NOTE
Physical Therapy Evaluation    Patient Name: Lucero ROCKZ Date: 1/2/2022     Problem List  Principal Problem:    Left sided numbness  Active Problems:    Paroxysmal atrial fibrillation ([address] )    Tracheal stenosis    Type 2 diabetes mellitus with hyperglycemia, with long-term current use of insulin ([facility])    GERD (gastroesophageal reflux disease)    Hyperlipidemia    Essential hypertension    YOLIE (obstructive sleep apnea)    CVA (cerebral vascular accident) [facility])       Past Medical History  Past Medical History:   Diagnosis Date    Abdominal fibromatosis     Diabetes mellitus ([facility] )     Hyperlipemia     Hypertension     Pneumonia     Stroke [facility])         Past Surgical History  Past Surgical History:   Procedure Laterality Date    CATARACT EXTRACTION Bilateral     CATARACT EXTRACTION, BILATERAL      COLONOSCOPY      EGD      LAPAROTOMY      Exploratory; Last Assessed 10/17/2017    PEG TUBE PLACEMENT      PEG TUBE REMOVAL           01/02/22 0901   PT Last Visit   PT Visit Date 01/02/22   Note Type   Note type Evaluation   Pain Assessment   Pain Assessment Tool 0-10   Pain Score No Pain   Restrictions/Precautions   Weight Bearing Precautions Per Order No   Other Precautions Chair Alarm; Bed Alarm;Pain   Home Living   Type of Home Apartment  (no MICHELLE,elevator access; came from [facility])   Home Layout One level   Home Equipment   (rollator, does not use at baseline)   Additional Comments Admitted from [facility] due to recent CVA   Typically, lives alone and independent with all mobility and ADLs without use of DME   Prior Function   Level of [place] Independent with ADLs and functional mobility   Lives With Alone   Receives Help From Family   ADL Assistance Independent   IADLs Independent   Falls in the last 6 months 0   Vocational Retired   Cognition   Overall Cognitive Status WFL   Arousal/Participation Cooperative   Orientation Level Oriented X4 Memory Within functional limits   Following Commands Follows all commands and directions without difficulty   Comments Very motiviated to return back to [facility]   RLE Assessment   RLE Assessment   (Grossly 4-/5)   LLE Assessment   LLE Assessment WFL   Light Touch   LLE Light Touch   (numbness)   Bed Mobility   Supine to Sit 5  Supervision   Additional items Assist x 1;Assist x 3; Increased time required; Bedrails   Sit to Supine 5  Supervision   Additional items Assist x 1; Increased time required; Bedrails;Verbal cues   Transfers   Sit to Stand 4  Minimal assistance   Additional items Assist x 1; Increased time required;Verbal cues   Stand to Sit 4  Minimal assistance   Additional items Assist x 1; Increased time required;Verbal cues   Additional Comments With RW   Ambulation/Elevation   Gait pattern Improper Weight shift; Ataxia; Excessively slow; Foward flexed; Wide MAXWELL   Gait Assistance 4  Minimal assist   Additional items Assist x 1;Verbal cues   Assistive Device Rolling walker   Distance 60 feet x 2, veer towards right, poor RW managment, unstable espcially with rotational turns   Balance   Static Sitting Fair -   Dynamic Sitting Fair -   Static Standing Poor +   Dynamic Standing Poor +   Ambulatory Poor +   Activity Tolerance   Activity Tolerance Patient limited by fatigue   Medical Staff Made Aware Co-eval with OT due to complexity   Nurse Made Aware Yes   Assessment   Prognosis Good   Problem List Decreased strength;Decreased endurance; Impaired balance;Decreased mobility; Decreased cognition; Impaired judgement;Decreased safety awareness   Assessment Pt is 61 y o  female seen for high complexity PT evaluation s/p admission to [facility] on 12/31/21 with left sided numbness and L sided weakness  Recent hospitalization at [facility] 12/14/21-12/21/21 with right pontine CVA; discharged to [facility]   Comorbidities affecting pt's physical performance at time of assessment include: tracheal stenosis, type 2 DM, paroxysmal a fib, GERD, esesntial HTN, hyperlipidemia, and YOLIE  Prior to CVA, pt was independent with all ADLs, IADLs anAd functional mobility without DME  Pt resided in an apartment with elevator access  Currently, pt required supervision for bed mobility, min A x 1 for functional transfers and min A x 1 for ambulation with RW  Pt with poor coordination with the RW and veered toward the right  Patient's decreased mobility level and increased fall risk is secondary to deficits in L sided weakness, coordination, standing balance, ambulatory balance, activity tolerance, and safety wareness  Current clinical presentation is unstable/unpredictable seen in pt's presentation of ongoing medical management, increased reliance on assistance compared to PLOF, impaired judgement/safety awareness, and significant PMH  Pt to benefit from continued PT tx to address deficits as defined above and maximize level of functional independent mobility and consistency  From PT/mobility standpoint, recommendation at time of d/c would be return back to rehab  Barriers to Discharge Inaccessible home environment;Decreased caregiver support   Goals   Patient Goals To go back to [facility]   STG Expiration Date 01/12/22   Short Term Goal #1 In 1-2 weeks, the patient will complete the following 1) Perform all aspect of bed mobility independently 2) Perform functional transfer with LRAD independently  3) Ambulate >150 feet with LRAD at mod I level  4) Negotiate 12 steps with 1 handrail and supervision 5) Patient will improve dynamic standing balance from poor + to fair + in order to perform everyday activities  6) Patient will continue with ongoing rehab services for family education, DME, and D/C planning    Plan   Treatment/Interventions LE strengthening/ROM; Functional transfer training;ADL retraining;Elevations; Therapeutic exercise;Patient/family training;Cognitive reorientation; Endurance training;Gait training;Bed mobility   PT Frequency   (3-5x/wk) Recommendation   PT Discharge Recommendation Post acute rehabilitation services   AM-PAC Basic Mobility Inpatient   Turning in Bed Without Bedrails 3   Lying on Back to Sitting on Edge of Flat Bed 2   Moving Bed to Chair 3   Standing Up From Chair 3   Walk in Room 3   Climb 3-5 Stairs 2   Basic Mobility Inpatient Raw Score 16   Basic Mobility Standardized Score 38 32   Highest Level Of Mobility   -[facility] Goal 5: Stand one or more mins   JH-HLM Highest Level of Mobility 7: Walk 25 feet or more   -HLM Goal Achieved Yes   Barthel Index   Feeding 5   Bathing 0   Grooming Score 5   Dressing Score 5   Bladder Score 10   Bowels Score 10   Toilet Use Score 5   Transfers (Bed/Chair) Score 10   Mobility (Level Surface) Score 10   Stairs Score 5   Barthel Index Score 65   End of Consult   Patient Position at End of Consult Supine;Bed/Chair alarm activated; All needs within reach       Mikael Arredondo PT, DPT

## 2022-01-02 NOTE — PROGRESS NOTES
Pt resting in bed denies any pain or discomfort at this time,  Normal sinus on telemetry, call bell within reach, bed alarm on, will continue to monitor

## 2022-01-02 NOTE — OCCUPATIONAL THERAPY NOTE
Occupational Therapy Evaluation     Patient Name: Amy REAL'S Date: 1/2/2022  Problem List  Principal Problem:    Left sided numbness  Active Problems:    Paroxysmal atrial fibrillation (HCC)    Tracheal stenosis    Type 2 diabetes mellitus with hyperglycemia, with long-term current use of insulin (HCC)    GERD (gastroesophageal reflux disease)    Hyperlipidemia    Essential hypertension    YOLIE (obstructive sleep apnea)    CVA (cerebral vascular accident) [facility])    Past Medical History  Past Medical History:   Diagnosis Date    Abdominal fibromatosis     Diabetes mellitus ([address] )     Hyperlipemia     Hypertension     Pneumonia     Stroke [facility])      Past Surgical History  Past Surgical History:   Procedure Laterality Date    CATARACT EXTRACTION Bilateral     CATARACT EXTRACTION, BILATERAL      COLONOSCOPY      EGD      LAPAROTOMY      Exploratory; Last Assessed 10/17/2017    PEG TUBE PLACEMENT      PEG TUBE REMOVAL            01/02/22 0902   OT Last Visit   OT Visit Date 01/02/22   Note Type   Note type Evaluation   Restrictions/Precautions   Weight Bearing Precautions Per Order No   Other Precautions Chair Alarm; Bed Alarm; Fall Risk   Pain Assessment   Pain Assessment Tool 0-10   Pain Score No Pain   Home Living   Type of [address] [place] [address] One level;Elevator  (lives on 5th floor with elevator access, 0STE)   Bathroom Shower/Tub Tub/shower unit   Bathroom Toilet Standard  (has toilet raiser but not currently on toilet )   Bathroom Equipment Grab bars in shower; Shower chair;Commode   Home Equipment Cane;Walker  (+ rollator  Did not use prior to CVA )   Prior Function   Level of [place] Independent with ADLs and functional mobility   Lives With Alone   Receives Help From Family   ADL Assistance Independent   IADLs Independent   Falls in the last 6 months 1 to 4  (1)   Vocational Retired   Comments Pt admitted from [facility] s/p CVA   Prior to stroke pt was I with ADLs/IADLs   Lifestyle Autonomy Pt admitted from [facility]  Prior to stroke, pt was I with ADLs/IADLs, fnxl mobility, (+)   Reciprocal Relationships local son    Service to Others Retired - worked in the "[employer]   [employer]  43  (WDL) [address] "hopefully I can go back today" (to [address])   ADL   Where Assessed Edge of bed   Eating Assistance 5  Supervision/Setup   Grooming Assistance 4  Minimal Assistance   [address] 4  Minimal Assistance   LB [address] 2  [address] [facility] 4  [address]  [facility]  2  Maximal Assistance   Bed Mobility   Supine to Sit 4  Minimal assistance   Additional items Assist x 1;HOB elevated; Increased time required  (A for trunk)   Sit to Supine 5  Supervision   Additional items Assist x 1;HOB elevated; Increased time required   Transfers   Sit to Stand 4  Minimal assistance   Additional items Assist x 1; Increased time required   Stand to Sit 4  Minimal assistance   Additional items Assist x 1; Increased time required   Additional Comments Transfers with RW   Functional Mobility   Functional Mobility 4  Minimal assistance   Additional Comments Ax1, increased cueing for RW mgmt    Additional items Rolling walker   Balance   Static Sitting Fair   Dynamic Sitting Fair -   Static Standing Poor +   Dynamic Standing Poor +   Ambulatory Poor +   Activity Tolerance   Activity Tolerance Patient tolerated treatment well   Medical Staff Made Aware PT [address] [facility] [place]   Nurse Made Aware RN clearance for session    RUE Assessment   RUE Assessment WFL   LUE Assessment   LUE Assessment WFL   Hand Function   Gross Motor Coordination Functional   Fine Motor Coordination Functional   Sensation   Additional Comments Pt reports improved    Vision - Complex Assessment   Ocular Range of Motion WFL   Head Position WDL   Tracking Able to track stimulus in all quads without difficulty   Perception   Inattention/Neglect Appears intact   Cognition   Overall Cognitive Status WFL   Arousal/Participation Responsive; Alert; Cooperative   Attention Within functional limits   Orientation Level Oriented X4   Memory Within functional limits   Following Commands Follows all commands and directions without difficulty   Comments Pt very pleasant and cooperative t/o session  Motivated to return to rehab and improve her performance   Assessment   Limitation Decreased ADL status; Decreased UE strength;Decreased endurance;Decreased self-care trans;Decreased high-level ADLs   Prognosis Good   Assessment Pt is a 61 y o  female admitted to [facility] on 12/31/2021 w/ Left sided numbness  Pt with recent CVA (12/14) + tracheal stenosis  has a past medical history of Abdominal fibromatosis, Diabetes mellitus, Hyperlipemia, Hypertension, Pneumonia, and Stroke  Pt with active OT orders and up with assistance  orders  Pt seen as a co-evaluation with PT due to the patient's co-morbidities, clinically unstable presentation/clinical complexity, and present impairments  [facility]  [facility]  Pt was I w/  ADLS and IADLS, (+) drove  Upon evaluation, pt currently requires MIN A with RW for transfers and mobility  Pt currently requires S eating, MIN A grooming, MIN A UB ADLs, MAX A LB ADLs, and MAX A toileting  Pt is limited at this time 2*: endurance, activity tolerance, functional mobility, balance, functional standing tolerance, decreased I w/ ADLS/IADLS and strength  The following Occupational Performance Areas to address include: eating, grooming, bathing/shower, toilet hygiene, dressing, health maintenance, functional mobility, community mobility and clothing management  Pt to benefit from immediate acute skilled OT to address above deficits, improve overall functional independence, maximize fnxl mobility and reduce caregiver burden   From OT standpoint, recommendation at time of d/c would be return to [facility]  Pt was left after session with all current needs met  The patient's raw score on the AM-PAC Daily Activity inpatient short form is 16, standardized score is 35 96, less than 39 4  Patients at this level are likely to benefit from discharge to post-acute rehabilitation services  Please refer to the recommendation of the Occupational Therapist for safe discharge planning  Goals   Patient Goals to return to [facility] Time Frame 10-14   Plan   Treatment Interventions ADL retraining;Functional transfer training;UE strengthening/ROM; Endurance training;Patient/family training;Equipment evaluation/education; Compensatory technique education;Continued evaluation; Activityengagement; Energy conservation   Goal Expiration Date 01/16/22   OT Frequency 3-5x/wk   Recommendation   OT Discharge Recommendation Post acute rehabilitation services   OT - OK to Discharge Yes  (to rehab when medically stable )   [facility] Daily Activity Inpatient   Lower Body Dressing 2   Bathing 2   Toileting 2   Upper Body Dressing 3   Grooming 3   Eating 3   Daily Activity Raw Score 15   Daily Activity Standardized Score (Calc for Raw Score >=11) 34 69   AM-[facility] Applied Cognition Inpatient   Following a Speech/Presentation 4   Understanding Ordinary Conversation 4   Taking Medications 4   Remembering Where Things Are Placed or Put Away 4   Remembering List of 4-5 Errands 4   Taking Care of Complicated Tasks 3   Applied Cognition Raw Score 23   Applied Cognition Standardized Score 53 08   Barthel Index   Feeding 5   Bathing 0   Grooming Score 0   Dressing Score 5   Bladder Score 10   Bowels Score 10   Toilet Use Score 5   Transfers (Bed/Chair) Score 10   Mobility (Level Surface) Score 10   Stairs Score 5   Barthel Index Score 60        GOALS    - Pt will complete UB dressing/self care/bathing w/ mod I using adaptive device and DME as needed    - Pt will complete LB dressing/self care/bathing w/ mod I using adaptive device and DME as needed    - Pt will complete toileting w/ mod I w/ G hygiene/thoroughness using DME as needed    - Pt will improve functional transfers to Mod I on/off all surfaces using DME as needed w/ G balance/safety     - Pt will improve functional mobility during ADL/IADL/leisure tasks to Mod I using DME as needed w/ G balance/safety     - Pt will demonstrate G carryover of pt/caregiver education and training as appropriate      - Pt will demonstrate 100% carryover of energy conservation techniques t/o functional I/ADL/leisure tasks w/o cues s/p skilled education    - Pt will increase static and dynamic standing/sitting balance to F+ using AD/DME as needed to increase safety and I during fnxl transfers and ADLs    - Pt will maintain upright sitting position for at least 20 min during dynamic fnxl activity with G balance and endurance to improve ADL performance and independence, as well as reduce risk of falls     - Pt will participate in simulated IADL management task with DME as needed to increase independence to  w/ G safety and endurance    Mark Huber MS, OTR/L

## 2022-01-02 NOTE — ASSESSMENT & PLAN NOTE
· History of multiple prior CVAs including most recently 12/14/2021 with newly discovered right pontine CVA  · Plan as above

## 2022-01-02 NOTE — PLAN OF CARE
Problem: OCCUPATIONAL THERAPY ADULT  Goal: Performs self-care activities at highest level of function for planned discharge setting  See evaluation for individualized goals  Description: Treatment Interventions: ADL retraining,Functional transfer training,UE strengthening/ROM,Endurance training,Patient/family training,Equipment evaluation/education,Compensatory technique education,Continued evaluation,Activityengagement,Energy conservation          See flowsheet documentation for full assessment, interventions and recommendations  Note: Limitation: Decreased ADL status,Decreased UE strength,Decreased endurance,Decreased self-care trans,Decreased high-level ADLs  Prognosis: Good  Assessment: Pt is a 61 y o  female admitted to [facility] on 12/31/2021 w/ Left sided numbness  Pt with recent CVA (12/14) + tracheal stenosis  has a past medical history of Abdominal fibromatosis, Diabetes mellitus, Hyperlipemia, Hypertension, Pneumonia, and Stroke  Pt with active OT orders and up with assistance  orders  Pt seen as a co-evaluation with PT due to the patient's co-morbidities, clinically unstable presentation/clinical complexity, and present impairments  [facility]  [facility]  Pt was I w/  ADLS and IADLS, (+) drove  Upon evaluation, pt currently requires MIN A with RW for transfers and mobility  Pt currently requires S eating, MIN A grooming, MIN A UB ADLs, MAX A LB ADLs, and MAX A toileting  Pt is limited at this time 2*: endurance, activity tolerance, functional mobility, balance, functional standing tolerance, decreased I w/ ADLS/IADLS and strength  The following Occupational Performance Areas to address include: eating, grooming, bathing/shower, toilet hygiene, dressing, health maintenance, functional mobility, community mobility and clothing management   Pt to benefit from immediate acute skilled OT to address above deficits, improve overall functional independence, maximize fnxl mobility and reduce caregiver burden  From OT standpoint, recommendation at time of d/c would be return to [facility]  Pt was left after session with all current needs met  The patient's raw score on the AM-PAC Daily Activity inpatient short form is 16, standardized score is 35 96, less than 39 4  Patients at this level are likely to benefit from discharge to post-acute rehabilitation services  Please refer to the recommendation of the Occupational Therapist for safe discharge planning       OT Discharge Recommendation: Post acute rehabilitation services  OT - OK to Discharge: Yes (to rehab when medically stable )

## 2022-01-03 LAB
GLUCOSE SERPL-MCNC: 167 MG/DL (ref 65–140)
GLUCOSE SERPL-MCNC: 173 MG/DL (ref 65–140)
GLUCOSE SERPL-MCNC: 177 MG/DL (ref 65–140)
GLUCOSE SERPL-MCNC: 213 MG/DL (ref 65–140)

## 2022-01-03 PROCEDURE — 99233 SBSQ HOSP IP/OBS HIGH 50: CPT | Performed by: INTERNAL MEDICINE

## 2022-01-03 PROCEDURE — 82948 REAGENT STRIP/BLOOD GLUCOSE: CPT

## 2022-01-03 RX ADMIN — INSULIN LISPRO 1 UNITS: 100 INJECTION, SOLUTION INTRAVENOUS; SUBCUTANEOUS at 21:59

## 2022-01-03 RX ADMIN — RIVAROXABAN 20 MG: 20 TABLET, FILM COATED ORAL at 07:28

## 2022-01-03 RX ADMIN — PANTOPRAZOLE SODIUM 40 MG: 40 TABLET, DELAYED RELEASE ORAL at 05:19

## 2022-01-03 RX ADMIN — INSULIN LISPRO 1 UNITS: 100 INJECTION, SOLUTION INTRAVENOUS; SUBCUTANEOUS at 07:28

## 2022-01-03 RX ADMIN — ATORVASTATIN CALCIUM 80 MG: 80 TABLET, FILM COATED ORAL at 16:04

## 2022-01-03 RX ADMIN — METOPROLOL TARTRATE 50 MG: 50 TABLET, FILM COATED ORAL at 21:59

## 2022-01-03 RX ADMIN — INSULIN LISPRO 1 UNITS: 100 INJECTION, SOLUTION INTRAVENOUS; SUBCUTANEOUS at 16:04

## 2022-01-03 RX ADMIN — METOPROLOL TARTRATE 50 MG: 50 TABLET, FILM COATED ORAL at 09:56

## 2022-01-03 RX ADMIN — FAMOTIDINE 20 MG: 20 TABLET, FILM COATED ORAL at 09:56

## 2022-01-03 RX ADMIN — ESCITALOPRAM OXALATE 5 MG: 10 TABLET ORAL at 09:56

## 2022-01-03 RX ADMIN — LISINOPRIL 5 MG: 5 TABLET ORAL at 09:56

## 2022-01-03 RX ADMIN — INSULIN GLARGINE 35 UNITS: 100 INJECTION, SOLUTION SUBCUTANEOUS at 21:59

## 2022-01-03 RX ADMIN — INSULIN LISPRO 2 UNITS: 100 INJECTION, SOLUTION INTRAVENOUS; SUBCUTANEOUS at 11:41

## 2022-01-03 NOTE — RESTORATIVE TECHNICIAN NOTE
Restorative Technician Note      Patient Name: Tyler Rios     Restorative Tech Visit Date: 01/03/22  Note Type: Mobility  Patient Position Upon Consult: Supine  Activity Performed: Ambulated; Other (Comment) (Ax2)  Assistive Device: Roller walker  Patient Position at End of Consult: Supine;  All needs within reach; Bed/Chair alarm activated    Harini Hummel  DPT, Restorative Technician

## 2022-01-03 NOTE — CASE MANAGEMENT
Case Management Discharge Planning Note    Patient name Tyler Rios  [address] [facility] [id number]  : 1955 Date 1/3/2022       Current Admission Date: 2021  Current Admission Diagnosis:Left sided numbness   Patient Active Problem List    Diagnosis Date Noted    Left sided numbness 2021    [facility]A [facility]cere[facility]) ([facility] ) 2021    Right pontine stroke ([facility] ) 2021    Weakness of both lower extremities     History of stroke 2021    Muscle tension dysphonia 2020    Reflux laryngitis 2020    Glottic insufficiency 2020    Dermatitis 2020    YOLIE (obstructive sleep apnea)     Medicare annual wellness visit, subsequent 2020    Current episode of major depressive disorder without prior episode 2019    Essential hypertension 2019    Routine health maintenance 2019    GERD (gastroesophageal reflux disease) 03/15/2019    Hyperlipidemia 03/15/2019    Edema 03/15/2019    Diarrhea 03/15/2019    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    Incomplete emptying of bladder 2018    Dysphonia 2018    Glottic stenosis 2018    Dysphagia 2018    Paroxysmal atrial fibrillation ([address] ) 10/17/2017    Blurry vision 10/17/2017    Granulation tissue of site of tracheostomy 10/17/2017    Insomnia 10/17/2017    Nausea 10/17/2017    Type 2 diabetes mellitus with hyperglycemia, with long-term current use of insulin ([facility] ) 10/17/2017    Stroke ([facility] ) 2017    Heart disease 2015    Diabetic cataract ([address] ) 2014    Severe obesity (BMI 35 0-39  9) with comorbidity ([address] ) 2014      LOS (days): 2  Geometric Mean LOS (GMLOS) (days):   Days to GMLOS:     OBJECTIVE:  Risk of Unplanned Readmission Score: 19     Current admission status: Inpatient   Preferred Pharmacy:   [address] [place] [address] [phone] [facility] [address]  Phone: [phone] Fax: [phone] [facility] [email or web address]]y [address], [address]  [address] [place] [address]  Phone: [phone] Fax: [phone]    Primary Care Provider: Reema Pablo MD    Primary Insurance: [address] [place] [address]  Secondary Insurance:     DISCHARGE DETAILS:    Additional Comments: Phone call to Ann Hidalgo @ [facility] [phone]  CM asked if pt is able to return  Ann Hidalgo stated she needs to provide insurance updated notes  CM advsied she was seen by PT/OT yesterday  Ann Hidalgo will review  Pt was supposed to be d/c this week and family was coming in for family teaching  Dr has said that if auth received he would take her back to get her to where she was

## 2022-01-03 NOTE — PLAN OF CARE
Problem: Potential for Falls  Goal: Patient will remain free of falls  Description: INTERVENTIONS:  - Educate patient/family on patient safety including physical limitations  - Instruct patient to call for assistance with activity   - Consult OT/PT to assist with strengthening/mobility   - Keep Call bell within reach  - Keep bed low and locked with side rails adjusted as appropriate  - Keep care items and personal belongings within reach  - Initiate and maintain comfort rounds  - Make Fall Risk Sign visible to staff  - Apply yellow socks and bracelet for high fall risk patients  - Consider moving patient to room near nurses station  Outcome: Progressing     Problem: Prexisting or High Potential for Compromised Skin Integrity  Goal: Skin integrity is maintained or improved  Description: INTERVENTIONS:  - Identify patients at risk for skin breakdown  - Assess and monitor skin integrity  - Assess and monitor nutrition and hydration status  - Monitor labs   - Assess for incontinence   - Turn and reposition patient  - Assist with mobility/ambulation  - Relieve pressure over bony prominences  - Avoid friction and shearing  - Provide appropriate hygiene as needed including keeping skin clean and dry  - Evaluate need for skin moisturizer/barrier cream  - Collaborate with interdisciplinary team   - Patient/family teaching  - Consider wound care consult   Outcome: Progressing     Problem: MOBILITY - ADULT  Goal: Maintain or return to baseline ADL function  Description: INTERVENTIONS:  -  Assess patient's ability to carry out ADLs; assess patient's baseline for ADL function and identify physical deficits which impact ability to perform ADLs (bathing, care of mouth/teeth, toileting, grooming, dressing, etc )  - Assess/evaluate cause of self-care deficits   - Assess range of motion  - Assess patient's mobility; develop plan if impaired  - Assess patient's need for assistive devices and provide as appropriate  - Encourage maximum independence but intervene and supervise when necessary  - Involve family in performance of ADLs  - Assess for home care needs following discharge   - Consider OT consult to assist with ADL evaluation and planning for discharge  - Provide patient education as appropriate  Outcome: Progressing  Goal: Maintains/Returns to pre admission functional level  Description: INTERVENTIONS:  - Perform BMAT or MOVE assessment daily    - Set and communicate daily mobility goal to care team and patient/family/caregiver  - Collaborate with rehabilitation services on mobility goals if consulted  - Out of bed for toileting  - Record patient progress and toleration of activity level   Outcome: Progressing     Problem: Neurological Deficit  Goal: Neurological status is stable or improving  Description: Interventions:  - Monitor and assess patient's level of consciousness, motor function, sensory function, and level of assistance needed for ADLs  - Monitor and report changes from baseline  Collaborate with interdisciplinary team to initiate plan and implement interventions as ordered  - Provide and maintain a safe environment  - Consider seizure precautions  - Consider fall precautions  - Consider aspiration precautions  - Consider bleeding precautions  Outcome: Progressing     Problem: Activity Intolerance/Impaired Mobility  Goal: Mobility/activity is maintained at optimum level for patient  Description: Interventions:  - Assess and monitor patient  barriers to mobility and need for assistive/adaptive devices  - Assess patient's emotional response to limitations  - Collaborate with interdisciplinary team and initiate plans and interventions as ordered  - Encourage independent activity per ability   - Maintain proper body alignment  - Perform active/passive rom as tolerated/ordered    - Plan activities to conserve energy   - Turn patient as appropriate  Outcome: Progressing     Problem: Communication Impairment  Goal: Ability to express needs and understand communication  Description: Assess patient's communication skills and ability to understand information  Patient will demonstrate use of effective communication techniques, alternative methods of communication and understanding even if not able to speak  - Encourage communication and provide alternate methods of communication as needed  - Collaborate with case management/ for discharge needs  - Include patient/family/caregiver in decisions related to communication  Outcome: Progressing     Problem: Potential for Aspiration  Goal: Non-ventilated patient's risk of aspiration is minimized  Description: Assess and monitor vital signs, respiratory status, and labs (WBC)  Monitor for signs of aspiration (tachypnea, cough, rales, wheezing, cyanosis, fever)  - Assess and monitor patient's ability to swallow  - Place patient up in chair to eat if possible  - HOB up at 90 degrees to eat if unable to get patient up into chair   - Supervise patient during oral intake  - Instruct patient/ family to take small bites  - Instruct patient/ family to take small single sips when taking liquids  - Follow patient-specific strategies generated by speech pathologist   Outcome: Progressing  Goal: Ventilated patient's risk of aspiration is minimized  Description: Assess and monitor vital signs, respiratory status, airway cuff pressure, and labs (WBC)  Monitor for signs of aspiration (tachypnea, cough, rales, wheezing, cyanosis, fever)  - Elevate head of bed 30 degrees if patient has tube feeding   - Monitor tube feeding  Outcome: Progressing     Problem: Nutrition  Goal: Nutrition/Hydration status is improving  Description: Monitor and assess patient's nutrition/hydration status for malnutrition (ex- brittle hair, bruises, dry skin, pale skin and conjunctiva, muscle wasting, smooth red tongue, and disorientation)   Collaborate with interdisciplinary team and initiate plan and interventions as ordered  Monitor patient's weight and dietary intake as ordered or per policy  Utilize nutrition screening tool and intervene per policy  Determine patient's food preferences and provide high-protein, high-caloric foods as appropriate  - Assist patient with eating   - Allow adequate time for meals   - Encourage patient to take dietary supplement as ordered  - Collaborate with clinical nutritionist   - Include patient/family/caregiver in decisions related to nutrition  Outcome: Progressing     Problem: Nutrition/Hydration-ADULT  Goal: Nutrient/Hydration intake appropriate for improving, restoring or maintaining nutritional needs  Description: Monitor and assess patient's nutrition/hydration status for malnutrition  Collaborate with interdisciplinary team and initiate plan and interventions as ordered  Monitor patient's weight and dietary intake as ordered or per policy  Utilize nutrition screening tool and intervene as necessary  Determine patient's food preferences and provide high-protein, high-caloric foods as appropriate       INTERVENTIONS:  - Monitor oral intake, urinary output, labs, and treatment plans  - Assess nutrition and hydration status and recommend course of action  - Evaluate amount of meals eaten  - Assist patient with eating if necessary   - Allow adequate time for meals  - Recommend/ encourage appropriate diets, oral nutritional supplements, and vitamin/mineral supplements  - Order, calculate, and assess calorie counts as needed  - Recommend, monitor, and adjust tube feedings and TPN/PPN based on assessed needs  - Assess need for intravenous fluids  - Provide specific nutrition/hydration education as appropriate  - Include patient/family/caregiver in decisions related to nutrition  Outcome: Progressing

## 2022-01-03 NOTE — PLAN OF CARE
Problem: Potential for Falls  Goal: Patient will remain free of falls  Description: INTERVENTIONS:  - Educate patient/family on patient safety including physical limitations  - Instruct patient to call for assistance with activity   - Consult OT/PT to assist with strengthening/mobility   - Keep Call bell within reach  - Keep bed low and locked with side rails adjusted as appropriate  - Keep care items and personal belongings within reach  - Initiate and maintain comfort rounds  - Make Fall Risk Sign visible to staff  - Offer Toileting every 2 Hours, in advance of need  - Initiate/Maintain bed alarm  - Apply yellow socks and bracelet for high fall risk patients  - Consider moving patient to room near nurses station  Outcome: Progressing     Problem: Prexisting or High Potential for Compromised Skin Integrity  Goal: Skin integrity is maintained or improved  Description: INTERVENTIONS:  - Identify patients at risk for skin breakdown  - Assess and monitor skin integrity  - Assess and monitor nutrition and hydration status  - Monitor labs   - Assess for incontinence   - Turn and reposition patient  - Assist with mobility/ambulation  - Relieve pressure over bony prominences  - Avoid friction and shearing  - Provide appropriate hygiene as needed including keeping skin clean and dry  - Evaluate need for skin moisturizer/barrier cream  - Collaborate with interdisciplinary team   - Patient/family teaching  - Consider wound care consult   Outcome: Progressing     Problem: MOBILITY - ADULT  Goal: Maintain or return to baseline ADL function  Description: INTERVENTIONS:  -  Assess patient's ability to carry out ADLs; assess patient's baseline for ADL function and identify physical deficits which impact ability to perform ADLs (bathing, care of mouth/teeth, toileting, grooming, dressing, etc )  - Assess/evaluate cause of self-care deficits   - Assess range of motion  - Assess patient's mobility; develop plan if impaired  - Assess patient's need for assistive devices and provide as appropriate  - Encourage maximum independence but intervene and supervise when necessary  - Involve family in performance of ADLs  - Assess for home care needs following discharge   - Consider OT consult to assist with ADL evaluation and planning for discharge  - Provide patient education as appropriate  Outcome: Progressing  Goal: Maintains/Returns to pre admission functional level  Description: INTERVENTIONS:  - Perform BMAT or MOVE assessment daily    - Set and communicate daily mobility goal to care team and patient/family/caregiver  - Collaborate with rehabilitation services on mobility goals if consulted  - Ambulate patient 2 times a day  - Out of bed to chair 2 times a day   - Out of bed for meals 2 times a day  - Out of bed for toileting  - Record patient progress and toleration of activity level   Outcome: Progressing     Problem: Neurological Deficit  Goal: Neurological status is stable or improving  Description: Interventions:  - Monitor and assess patient's level of consciousness, motor function, sensory function, and level of assistance needed for ADLs  - Monitor and report changes from baseline  Collaborate with interdisciplinary team to initiate plan and implement interventions as ordered  - Provide and maintain a safe environment  - Consider seizure precautions  - Consider fall precautions  - Consider aspiration precautions  - Consider bleeding precautions  Outcome: Progressing     Problem: Activity Intolerance/Impaired Mobility  Goal: Mobility/activity is maintained at optimum level for patient  Description: Interventions:  - Assess and monitor patient  barriers to mobility and need for assistive/adaptive devices  - Assess patient's emotional response to limitations  - Collaborate with interdisciplinary team and initiate plans and interventions as ordered    - Encourage independent activity per ability   - Maintain proper body alignment  - Perform active/passive rom as tolerated/ordered  - Plan activities to conserve energy   - Turn patient as appropriate  Outcome: Progressing     Problem: Communication Impairment  Goal: Ability to express needs and understand communication  Description: Assess patient's communication skills and ability to understand information  Patient will demonstrate use of effective communication techniques, alternative methods of communication and understanding even if not able to speak  - Encourage communication and provide alternate methods of communication as needed  - Collaborate with case management/ for discharge needs  - Include patient/family/caregiver in decisions related to communication  Outcome: Progressing     Problem: Potential for Aspiration  Goal: Non-ventilated patient's risk of aspiration is minimized  Description: Assess and monitor vital signs, respiratory status, and labs (WBC)  Monitor for signs of aspiration (tachypnea, cough, rales, wheezing, cyanosis, fever)  - Assess and monitor patient's ability to swallow  - Place patient up in chair to eat if possible  - HOB up at 90 degrees to eat if unable to get patient up into chair   - Supervise patient during oral intake  - Instruct patient/ family to take small bites  - Instruct patient/ family to take small single sips when taking liquids  - Follow patient-specific strategies generated by speech pathologist   Outcome: Progressing  Goal: Ventilated patient's risk of aspiration is minimized  Description: Assess and monitor vital signs, respiratory status, airway cuff pressure, and labs (WBC)  Monitor for signs of aspiration (tachypnea, cough, rales, wheezing, cyanosis, fever)  - Elevate head of bed 30 degrees if patient has tube feeding   - Monitor tube feeding    Outcome: Progressing     Problem: Nutrition  Goal: Nutrition/Hydration status is improving  Description: Monitor and assess patient's nutrition/hydration status for malnutrition (ex- brittle hair, bruises, dry skin, pale skin and conjunctiva, muscle wasting, smooth red tongue, and disorientation)  Collaborate with interdisciplinary team and initiate plan and interventions as ordered  Monitor patient's weight and dietary intake as ordered or per policy  Utilize nutrition screening tool and intervene per policy  Determine patient's food preferences and provide high-protein, high-caloric foods as appropriate  - Assist patient with eating   - Allow adequate time for meals   - Encourage patient to take dietary supplement as ordered  - Collaborate with clinical nutritionist   - Include patient/family/caregiver in decisions related to nutrition  Outcome: Progressing     Problem: Nutrition/Hydration-ADULT  Goal: Nutrient/Hydration intake appropriate for improving, restoring or maintaining nutritional needs  Description: Monitor and assess patient's nutrition/hydration status for malnutrition  Collaborate with interdisciplinary team and initiate plan and interventions as ordered  Monitor patient's weight and dietary intake as ordered or per policy  Utilize nutrition screening tool and intervene as necessary  Determine patient's food preferences and provide high-protein, high-caloric foods as appropriate       INTERVENTIONS:  - Monitor oral intake, urinary output, labs, and treatment plans  - Assess nutrition and hydration status and recommend course of action  - Evaluate amount of meals eaten  - Assist patient with eating if necessary   - Allow adequate time for meals  - Recommend/ encourage appropriate diets, oral nutritional supplements, and vitamin/mineral supplements  - Order, calculate, and assess calorie counts as needed  - Recommend, monitor, and adjust tube feedings and TPN/PPN based on assessed needs  - Assess need for intravenous fluids  - Provide specific nutrition/hydration education as appropriate  - Include patient/family/caregiver in decisions related to nutrition  Outcome: Progressing

## 2022-01-03 NOTE — RESTORATIVE TECHNICIAN NOTE
Restorative Technician Note      Patient Name: Anton Armijo     Restorative Tech Visit Date: 01/03/22  Note Type: Mobility  Patient Position Upon Consult: Supine  Activity Performed: Ambulated; Other (Comment) (Ax2)  Assistive Device: Roller walker  Education Provided: Yes (Educated/encouraged pt to ambulate with assistance 3-4 x's/day )  Patient Position at End of Consult: Supine;  All needs within reach; Bed/Chair alarm activated  Jing SCHULTZ, Restorative Technician, [employer]

## 2022-01-04 ENCOUNTER — TRANSITIONAL CARE MANAGEMENT (OUTPATIENT)
Dept: FAMILY MEDICINE CLINIC | Facility: CLINIC | Age: 67
End: 2022-01-04

## 2022-01-04 VITALS
WEIGHT: 215 LBS | RESPIRATION RATE: 16 BRPM | HEART RATE: 74 BPM | BODY MASS INDEX: 35.78 KG/M2 | TEMPERATURE: 98.7 F | DIASTOLIC BLOOD PRESSURE: 72 MMHG | SYSTOLIC BLOOD PRESSURE: 122 MMHG | OXYGEN SATURATION: 93 %

## 2022-01-04 PROBLEM — Z86.73 HISTORY OF CVA (CEREBROVASCULAR ACCIDENT): Status: ACTIVE | Noted: 2021-12-31

## 2022-01-04 LAB
FLUAV RNA RESP QL NAA+PROBE: NEGATIVE
FLUBV RNA RESP QL NAA+PROBE: NEGATIVE
GLUCOSE SERPL-MCNC: 129 MG/DL (ref 65–140)
GLUCOSE SERPL-MCNC: 181 MG/DL (ref 65–140)
RSV RNA RESP QL NAA+PROBE: NEGATIVE
SARS-COV-2 RNA RESP QL NAA+PROBE: NEGATIVE

## 2022-01-04 PROCEDURE — 97116 GAIT TRAINING THERAPY: CPT

## 2022-01-04 PROCEDURE — 99239 HOSP IP/OBS DSCHRG MGMT >30: CPT | Performed by: INTERNAL MEDICINE

## 2022-01-04 PROCEDURE — 0241U HB NFCT DS VIR RESP RNA 4 TRGT: CPT | Performed by: INTERNAL MEDICINE

## 2022-01-04 PROCEDURE — 97110 THERAPEUTIC EXERCISES: CPT

## 2022-01-04 PROCEDURE — 82948 REAGENT STRIP/BLOOD GLUCOSE: CPT

## 2022-01-04 RX ORDER — REPAGLINIDE 0.5 MG/1
0.5 TABLET ORAL
Qty: 180 TABLET | Refills: 0 | Status: SHIPPED | OUTPATIENT
Start: 2022-01-04 | End: 2022-03-25

## 2022-01-04 RX ADMIN — INSULIN LISPRO 1 UNITS: 100 INJECTION, SOLUTION INTRAVENOUS; SUBCUTANEOUS at 11:31

## 2022-01-04 RX ADMIN — LISINOPRIL 5 MG: 5 TABLET ORAL at 09:44

## 2022-01-04 RX ADMIN — FAMOTIDINE 20 MG: 20 TABLET, FILM COATED ORAL at 09:44

## 2022-01-04 RX ADMIN — METOPROLOL TARTRATE 50 MG: 50 TABLET, FILM COATED ORAL at 09:45

## 2022-01-04 RX ADMIN — RIVAROXABAN 20 MG: 20 TABLET, FILM COATED ORAL at 09:45

## 2022-01-04 RX ADMIN — PANTOPRAZOLE SODIUM 40 MG: 40 TABLET, DELAYED RELEASE ORAL at 06:24

## 2022-01-04 RX ADMIN — ESCITALOPRAM OXALATE 5 MG: 10 TABLET ORAL at 09:44

## 2022-01-04 NOTE — PLAN OF CARE
Arterial Line  Date/Time: 9/9/2020 12:27 PM  Performed by: Monique Silverio MD  Authorized by: Monique Silverio MD     General Information and Staff    Procedure Start:  9/9/2020 12:28 PM  Procedure End:  9/9/2020 12:32 PM  Anesthesiologist:  Dana David Problem: Potential for Falls  Goal: Patient will remain free of falls  Description: INTERVENTIONS:  - Educate patient/family on patient safety including physical limitations  - Instruct patient to call for assistance with activity   - Consult OT/PT to assist with strengthening/mobility   - Keep Call bell within reach  - Keep bed low and locked with side rails adjusted as appropriate  - Keep care items and personal belongings within reach  - Initiate and maintain comfort rounds  - Make Fall Risk Sign visible to staff  - Offer Toileting every  Hours, in advance of need  - Initiate/Maintain alarm  - Obtain necessary fall risk management equipment:   - Apply yellow socks and bracelet for high fall risk patients  - Consider moving patient to room near nurses station  Outcome: Progressing     Problem: Prexisting or High Potential for Compromised Skin Integrity  Goal: Skin integrity is maintained or improved  Description: INTERVENTIONS:  - Identify patients at risk for skin breakdown  - Assess and monitor skin integrity  - Assess and monitor nutrition and hydration status  - Monitor labs   - Assess for incontinence   - Turn and reposition patient  - Assist with mobility/ambulation  - Relieve pressure over bony prominences  - Avoid friction and shearing  - Provide appropriate hygiene as needed including keeping skin clean and dry  - Evaluate need for skin moisturizer/barrier cream  - Collaborate with interdisciplinary team   - Patient/family teaching  - Consider wound care consult   Outcome: Progressing     Problem: MOBILITY - ADULT  Goal: Maintain or return to baseline ADL function  Description: INTERVENTIONS:  -  Assess patient's ability to carry out ADLs; assess patient's baseline for ADL function and identify physical deficits which impact ability to perform ADLs (bathing, care of mouth/teeth, toileting, grooming, dressing, etc )  - Assess/evaluate cause of self-care deficits   - Assess range of motion  - Assess patient's mobility; develop plan if impaired  - Assess patient's need for assistive devices and provide as appropriate  - Encourage maximum independence but intervene and supervise when necessary  - Involve family in performance of ADLs  - Assess for home care needs following discharge   - Consider OT consult to assist with ADL evaluation and planning for discharge  - Provide patient education as appropriate  Outcome: Progressing  Goal: Maintains/Returns to pre admission functional level  Description: INTERVENTIONS:  - Perform BMAT or MOVE assessment daily    - Set and communicate daily mobility goal to care team and patient/family/caregiver  - Collaborate with rehabilitation services on mobility goals if consulted  - Perform Range of Motion  times a day  - Reposition patient every  hours  - Dangle patient  times a day  - Stand patient  times a day  - Ambulate patient  times a day  - Out of bed to chair  times a day   - Out of bed for meals times a day  - Out of bed for toileting  - Record patient progress and toleration of activity level   Outcome: Progressing     Problem: Neurological Deficit  Goal: Neurological status is stable or improving  Description: Interventions:  - Monitor and assess patient's level of consciousness, motor function, sensory function, and level of assistance needed for ADLs  - Monitor and report changes from baseline  Collaborate with interdisciplinary team to initiate plan and implement interventions as ordered  - Provide and maintain a safe environment  - Consider seizure precautions  - Consider fall precautions  - Consider aspiration precautions  - Consider bleeding precautions  Outcome: Progressing     Problem: Activity Intolerance/Impaired Mobility  Goal: Mobility/activity is maintained at optimum level for patient  Description: Interventions:  - Assess and monitor patient  barriers to mobility and need for assistive/adaptive devices    - Assess patient's emotional response to limitations  - Collaborate with interdisciplinary team and initiate plans and interventions as ordered  - Encourage independent activity per ability   - Maintain proper body alignment  - Perform active/passive rom as tolerated/ordered  - Plan activities to conserve energy   - Turn patient as appropriate  Outcome: Progressing     Problem: Communication Impairment  Goal: Ability to express needs and understand communication  Description: Assess patient's communication skills and ability to understand information  Patient will demonstrate use of effective communication techniques, alternative methods of communication and understanding even if not able to speak  - Encourage communication and provide alternate methods of communication as needed  - Collaborate with case management/ for discharge needs  - Include patient/family/caregiver in decisions related to communication  Outcome: Progressing     Problem: Potential for Aspiration  Goal: Non-ventilated patient's risk of aspiration is minimized  Description: Assess and monitor vital signs, respiratory status, and labs (WBC)  Monitor for signs of aspiration (tachypnea, cough, rales, wheezing, cyanosis, fever)  - Assess and monitor patient's ability to swallow  - Place patient up in chair to eat if possible  - HOB up at 90 degrees to eat if unable to get patient up into chair   - Supervise patient during oral intake  - Instruct patient/ family to take small bites  - Instruct patient/ family to take small single sips when taking liquids  - Follow patient-specific strategies generated by speech pathologist   Outcome: Progressing  Goal: Ventilated patient's risk of aspiration is minimized  Description: Assess and monitor vital signs, respiratory status, airway cuff pressure, and labs (WBC)  Monitor for signs of aspiration (tachypnea, cough, rales, wheezing, cyanosis, fever)      - Elevate head of bed 30 degrees if patient has tube feeding   - Monitor tube feeding  Outcome: Progressing     Problem: Nutrition  Goal: Nutrition/Hydration status is improving  Description: Monitor and assess patient's nutrition/hydration status for malnutrition (ex- brittle hair, bruises, dry skin, pale skin and conjunctiva, muscle wasting, smooth red tongue, and disorientation)  Collaborate with interdisciplinary team and initiate plan and interventions as ordered  Monitor patient's weight and dietary intake as ordered or per policy  Utilize nutrition screening tool and intervene per policy  Determine patient's food preferences and provide high-protein, high-caloric foods as appropriate  - Assist patient with eating   - Allow adequate time for meals   - Encourage patient to take dietary supplement as ordered  - Collaborate with clinical nutritionist   - Include patient/family/caregiver in decisions related to nutrition  Outcome: Progressing     Problem: Nutrition/Hydration-ADULT  Goal: Nutrient/Hydration intake appropriate for improving, restoring or maintaining nutritional needs  Description: Monitor and assess patient's nutrition/hydration status for malnutrition  Collaborate with interdisciplinary team and initiate plan and interventions as ordered  Monitor patient's weight and dietary intake as ordered or per policy  Utilize nutrition screening tool and intervene as necessary  Determine patient's food preferences and provide high-protein, high-caloric foods as appropriate       INTERVENTIONS:  - Monitor oral intake, urinary output, labs, and treatment plans  - Assess nutrition and hydration status and recommend course of action  - Evaluate amount of meals eaten  - Assist patient with eating if necessary   - Allow adequate time for meals  - Recommend/ encourage appropriate diets, oral nutritional supplements, and vitamin/mineral supplements  - Order, calculate, and assess calorie counts as needed  - Recommend, monitor, and adjust tube feedings and TPN/PPN based on assessed needs  - Assess need for intravenous fluids  - Provide specific nutrition/hydration education as appropriate  - Include patient/family/caregiver in decisions related to nutrition  Outcome: Progressing

## 2022-01-04 NOTE — RESTORATIVE TECHNICIAN NOTE
Restorative Technician Note      Patient Name: Amalia Brooks     Note Type: Mobility  Patient Position Upon Consult: Bedside chair  Activity Performed: Ambulated; Dangled; Stood  Assistive Device: Roller walker  Education Provided: Yes (Educated/encouraged pt to ambulate with assistance 3-4 x's/day )  Patient Position at End of Consult:  Other (comment) (Assisted pt to the wheelchair for discharge )    Jeff SCHULTZ, Restorative Technician, [employer]

## 2022-01-04 NOTE — CASE MANAGEMENT
Case Management Discharge Planning Note    Patient name Paul Ziegler  Location [address] 723/PPHP 234-22 MRN [id number]  : 1955 Date 2022       Current Admission Date: 2021  Current Admission Diagnosis:Left sided numbness   Patient Active Problem List    Diagnosis Date Noted    Left sided numbness 2021    History of CVA (cerebrovascular accident) 2021    Right pontine stroke ([address] ) 2021    Weakness of both lower extremities     History of stroke 2021    Muscle tension dysphonia 2020    Reflux laryngitis 2020    Glottic insufficiency 2020    Dermatitis 2020    YOLIE (obstructive sleep apnea)     Medicare annual wellness visit, subsequent 2020    Current episode of major depressive disorder without prior episode 2019    Essential hypertension 2019    Routine health maintenance 2019    GERD (gastroesophageal reflux disease) 03/15/2019    Hyperlipidemia 03/15/2019    Edema 03/15/2019    Diarrhea 03/15/2019    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    Incomplete emptying of bladder 2018    Dysphonia 2018    Glottic stenosis 2018    Dysphagia 2018    Paroxysmal atrial fibrillation ([address] ) 10/17/2017    Blurry vision 10/17/2017    Granulation tissue of site of tracheostomy 10/17/2017    Insomnia 10/17/2017    Nausea 10/17/2017    Type 2 diabetes mellitus with hyperglycemia, with long-term current use of insulin ([facility] ) 10/17/2017    Stroke ([facility] ) 2017    Heart disease 2015    Diabetic cataract ([facility] ) 2014    Severe obesity (BMI 35 0-39  9) with comorbidity ([facility] ) 2014      LOS (days): 3  Geometric Mean LOS (GMLOS) (days):   Days to GMLOS:     OBJECTIVE:  Risk of Unplanned Readmission Score: 15     Current admission status: Inpatient   Preferred Pharmacy:   [facility]  [address] [facility] [address]  Phone: [phone] Fax: [phone] [facility] [address]  [address] [place] [address]  Phone: [phone] Fax: [phone]    Primary Care Provider: Andres Lewis MD    Primary Insurance: [facility]  Secondary Insurance:     DISCHARGE DETAILS:    Freedom of Choice: Yes  Comments - Freedom of Choice: Pt to return to [facility]  CM contacted family/caregiver?: Yes  Were Treatment Team discharge recommendations reviewed with patient/caregiver?: Yes  Did patient/caregiver verbalize understanding of patient care needs?: Yes  Were patient/caregiver advised of the risks associated with not following Treatment Team discharge recommendations?: Yes    Contacts  Contact Method: Phone    Requested [address]         Is the patient interested in [facility] at discharge?: No    DME Referral Provided  Referral made for DME?: No    Other Referral/Resources/Interventions Provided:  Referral Comments: Pt being d/c back to [facility] room 306-B    Treatment Team Recommendation: Acute Rehab  Discharge Destination Plan[de-identified] Acute Rehab  Transport at Discharge : [facility]  Dispatcher Contacted: Yes  Number/Name of Dispatcher: [phone]  Transported by [facility] and Unit #): [facility]  ETA of Transport (Date): 01/04/22  ETA of Transport (Time): 1230    Transfer Mode: Stretcher  Accompanied by: Alone    IMM Given (Date):: 01/04/22  IMM Given to[de-identified] Patient  Family notified[de-identified] Phone call to pt son Ayaka Mora pt will be returning to [facility] and will be going to room 306-B  Pt will be transrpoted via ambulance  Additional Comments: Phone call from [facility] stated that Christine Pisano has been received and pt is able to return  CM will set up transport and advise of time and ambulance company   Pt will retun to rm 306-B call number is [phone] fax [phone]    Accepting Facility Name, [facility] State : [facility]  Receiving Facility/Agency Phone Number: [phone]  Facility/Agency Fax Number: [phone]

## 2022-01-04 NOTE — ASSESSMENT & PLAN NOTE
Lab Results   Component Value Date    HGBA1C 9 4 (H) 11/04/2021       Recent Labs     01/02/22  2119 01/03/22  0601 01/03/22  1100 01/03/22  1603   POCGLU 207* 167* 213* 173*         · Not controlled with hyperglycemia on admission  · Tradjenta and Prandin on hold  · For now continue home regimen of Lantus 35 units q h s , added sliding scale    · Diabetic diet  · Monitor Accu-Cheks adjust regimen as needed

## 2022-01-04 NOTE — DISCHARGE SUMMARY
Discharging Physician / Practitioner: Peter Cox MD  PCP: Alex Cormier MD  Admission Date:   Admission Orders (From admission, onward)     Ordered        01/01/22 1351  Inpatient Admission  Once            12/31/21 2238  Place in Observation  Once                      Discharge Date: 01/04/22    Principal discharge diagnosis:  Left sided numbness likely recrudescence of prior stroke symptoms    Secondary diagnoses:  1  History of stroke - bilateral strokes in 2017 and recent right pontine stroke on 12/13/2021  2  Paroxysmal atrial fibrillation  3  Hypertension  4  Hyperlipidemia  5  Type 2 diabetes  6  Tracheal stenosis  7  GERD  8  History of YOLIE - not on CPAP    Consultations During Hospital Stay:  Neurology    Procedures Performed:   1  CT stroke alert brain - no acute intracranial abnormality  2  CTA head and neck - Negative CTA head and neck for large vessel occlusion, dissection, or aneurysm  Unchanged severe short-segment stenosis in left PCA proximal P2 segment  No other sites of high-grade stenosis  3  MRI brain - No acute intracranial abnormality  Tiny subacute infarct in right posterior zaida, improved from prior MRI brain 12/14/2021  Hospital Course:   Royce Cartwright is a 61 y o  female patient with a past medical history as detailed above who originally presented to the hospital on 12/31/2021 due to worsening of left upper and lower extremity weakness and numbness which had been present during her recent stroke  MRI revealed no new stroke  Her symptoms returned to baseline  She was evaluated in consultation with Neurology who felt her symptoms were due to recrudescence of her prior stroke symptoms  Her home medications Xarelto 20 mg daily and atorvastatin 80 mg daily were continued  She is being discharged back to [facility] to continue her rehab       Condition at Discharge: stable    Discharge Day Visit / Exam:   * Please refer to separate progress note for these details *    Discussion with Family: Attempted to update  (son) via phone  Left voicemail  Discharge instructions/Information to patient and family:   See after visit summary for information provided to patient and family  Provisions for Follow-Up Care:  See after visit summary for information related to follow-up care and any pertinent home health orders  Disposition:   Acute Rehab at [facility]    Planned Readmission: No     Discharge Statement:  I spent 40 minutes discharging the patient  This time was spent on the day of discharge  I had direct contact with the patient on the day of discharge  Greater than 50% of the total time was spent examining patient, answering all patient questions, arranging and discussing plan of care with patient as well as directly providing post-discharge instructions  Additional time then spent on discharge activities  Discharge Medications:  See after visit summary for reconciled discharge medications provided to patient and/or family        **Please Note: This note may have been constructed using a voice recognition system**

## 2022-01-04 NOTE — PHYSICAL THERAPY NOTE
PHYSICAL THERAPY NOTE  Patient Name: Anton Armijo  ZECZV'X Date: 1/4/2022 01/04/22 8878   PT Last Visit   PT Visit Date 01/04/22   Note Type   Note Type Treatment   Pain Assessment   Pain Assessment Tool 0-10   Pain Score No Pain   Restrictions/Precautions   Weight Bearing Precautions Per Order No   Other Precautions Chair Alarm; Bed Alarm; Fall Risk   General   Chart Reviewed Yes   Response to Previous Treatment Patient with no complaints from previous session  Family/Caregiver Present No   Cognition   Overall Cognitive Status WFL   Arousal/Participation Alert; Responsive; Cooperative   Attention Within functional limits   Orientation Level Oriented to person;Oriented to place   Following Commands Follows one step commands without difficulty   Comments Pt very pleasant and cooperative   Subjective   Subjective "I want to go back to [facility] today"   Bed Mobility   Supine to Sit 4  Minimal assistance   Additional items Assist x 1;HOB elevated; Increased time required;Verbal cues;LE management   Sit to Supine Unable to assess   Additional Comments Pt lying supine upon PT arrival  Pt returned seated OOB at end of session w/ all needs within reach   Transfers   Sit to Stand 4  Minimal assistance   Additional items Assist x 1; Increased time required;Verbal cues   Stand to Sit 4  Minimal assistance   Additional items Assist x 1; Increased time required;Verbal cues   Ambulation/Elevation   Gait pattern Excessively slow; Short stride; Inconsistent steve   Gait Assistance 4  Minimal assist   Additional items Assist x 1;Verbal cues   Assistive Device Rolling walker   Distance 100ft + 100ft   Balance   Static Sitting Fair   Dynamic Sitting Fair -   Static Standing Fair -   Dynamic Standing Poor +   Ambulatory Poor +   Endurance Deficit   Endurance Deficit Yes   Endurance Deficit Description weakness, fatigue   Activity Tolerance   Activity Tolerance Patient tolerated treatment well   Nurse Made Aware yes   Exercises   Hip Abduction Standing;Bilateral;10 reps   Hip Extension Standing;Bilateral;10 reps   Marching Standing;Bilateral;10 reps   Assessment   Prognosis Good   Problem List Decreased strength;Decreased endurance; Impaired balance;Decreased mobility   Assessment Pt presents with decreased mobility, strength, balance, and activity tolerance  Pt seen for PT session focusing on bed mobility, transfers, standing tolerance/balance, mobility, and LE strengthening  Pt continues to require Min A for all transfers and mobility 2' LLE weakness  Pt able to progress mobility distance as compared to previous session  Pt able to tolerate standing therex w/ BUE on HR  Pt continues to demonstrate progression w/ therapy but remains below functional baseline  Pt continues to benefit from skilled therapy to maximize functional independence  Recommendation at this time is rehab  Pt would benefit from continued ambulation, functional transfers, strength, balance, and activity tolerance   Goals   Patient Goals to return to [facility] Expiration Date 01/12/22   Plan   Treatment/Interventions Functional transfer training;LE strengthening/ROM; Therapeutic exercise; Endurance training;Patient/family training;Equipment eval/education; Bed mobility;Gait training;Spoke to nursing   Progress Progressing toward goals   PT Frequency 3-5x/wk   Recommendation   PT Discharge Recommendation Post acute rehabilitation services   AM-PAC Basic Mobility Inpatient   Turning in Bed Without Bedrails 3   Lying on Back to Sitting on Edge of Flat Bed 3   Moving Bed to Chair 3   Standing Up From Chair 3   Walk in Room 3   Climb 3-5 Stairs 2   Basic Mobility Inpatient Raw Score 17   Basic Mobility Standardized Score 39 67   Highest Level Of Mobility   JH-HLM Goal 5: Stand one or more mins   JH-HLM Highest Level of Mobility 7: Walk 25 feet or more   JH-HLM Goal Achieved Yes     The patient's AM-PAC Basic Mobility Inpatient Short Form Raw Score is 17  A Raw score of greater than 16 suggests the patient may benefit from discharge to home  Please also refer to the recommendation of the Physical Therapist for safe discharge planning      Ernst Ordaz PT, DPT

## 2022-01-04 NOTE — RESTORATIVE TECHNICIAN NOTE
Restorative Technician Note      Patient Name: Yusuf Daniels     Note Type: Mobility  Patient Position Upon Consult: Supine  Activity Performed: Ambulated; Dangled; Stood  Assistive Device: Roller walker  Education Provided: Yes (Educated/encouraged pt to ambulate with assistance 3-4 x's/day )  Patient Position at End of Consult: Supine;  All needs within reach; Bed/Chair alarm activated    Juan SCHULTZ, Restorative Technician, [employer]

## 2022-01-04 NOTE — ASSESSMENT & PLAN NOTE
Presented with left-sided numbness, patient with very recent hospitalization at the [facility] 12/14/2021-12/21/2021 with similar and found with right pontine CVA  Has chronic right upper extremity weakness from prior stroke and left lower extremity weakness from recent stroke  · CT head negative for new acute intracranial pathology, and CTA head/neck without new large vessel occlusion (left proximal P2 segment severe stenosis redemonstrated)  · TPA contraindicated due to recent CVA  · Patient admitted under stroke pathway, seen by Neurology  MRI brain negative for acute findings  Symptoms likely recrudescence from recent stroke  · Echocardiogram at last admission revealed EF 65% with grade 1 DD  · Recent FLP and A1c reviewed  · Continue Xarelto and high-intensity statin  · Outpatient neuro f/u in 4 weeks  · PT/OT evaluations pending however anticipate will need to continue rehab course  Was [facility] prior to admission, case management attempting to get patient back there

## 2022-01-04 NOTE — PROGRESS NOTES
[address] [facility]  Progress Note - Sasha Aas 1955, 61 y o  female MRN: [id number]  Unit/Bed#: [facility] 723-01 Encounter: [id number]  Primary Care Provider: Stacey Cerda MD   Date and time admitted to hospital: 12/31/2021  7:16 PM    * Left sided numbness  Assessment & Plan  Presented with left-sided numbness, patient with very recent hospitalization at the [facility] 12/14/2021-12/21/2021 with similar and found with right pontine CVA  Has chronic right upper extremity weakness from prior stroke and left lower extremity weakness from recent stroke  · CT head negative for new acute intracranial pathology, and CTA head/neck without new large vessel occlusion (left proximal P2 segment severe stenosis redemonstrated)  · TPA contraindicated due to recent CVA  · Patient admitted under stroke pathway, seen by Neurology  MRI brain negative for acute findings  Symptoms likely recrudescence from recent stroke  · Echocardiogram at last admission revealed EF 65% with grade 1 DD  · Recent FLP and A1c reviewed  · Continue Xarelto and high-intensity statin  · Outpatient neuro f/u in 4 weeks  · PT/OT evaluations pending however anticipate will need to continue rehab course  Was at [facility] prior to admission, case management attempting to get patient back there  CVA (cerebral vascular accident) [facility])  Assessment & Plan  · History of multiple prior CVAs including most recently 12/14/2021 with newly discovered right pontine CVA  · Plan as above    YOLIE (obstructive sleep apnea)  Assessment & Plan  · Noted in history however per patient "mild"  · Does not wear CPAP/oxygen    Essential hypertension  Assessment & Plan  · Will continue beta-blocker and ACEI  · Monitor blood pressure per protocol    Hyperlipidemia  Assessment & Plan  · Recent FLP reviewed    Continue atorvastatin 80 mg daily with dinner    GERD (gastroesophageal reflux disease)  Assessment & Plan  · Stable, continue PPI    Type 2 diabetes mellitus with hyperglycemia, with long-term current use of insulin [facility])  Assessment & Plan  Lab Results   Component Value Date    HGBA1C 9 4 (H) 2021       Recent Labs     22  2119 22  0601 22  1100 22  1603   POCGLU 207* 167* 213* 173*         · Not controlled with hyperglycemia on admission  · Tradjenta and Prandin on hold  · For now continue home regimen of Lantus 35 units q h s , added sliding scale  · Diabetic diet  · Monitor Accu-Cheks adjust regimen as needed    Tracheal stenosis  Assessment & Plan  · Of historical note and with vocal cord paralysis  · Continue follow-up with ENT as outpatient    Paroxysmal atrial fibrillation (HCC)  Assessment & Plan  · Currently normal sinus rhythm  · Continue metoprolol tartrate  · Anticoagulation with Xarelto, continue             VTE Pharmacologic Prophylaxis: VTE Score: 8 High Risk (Score >/= 5) - Pharmacological DVT Prophylaxis Ordered: rivaroxaban (Xarelto)  Sequential Compression Devices Ordered  Patient Centered Rounds: I performed bedside rounds with nursing staff today  Discussions with Specialists or Other Care Team Provider:      Education and Discussions with Family / Patient: Patient declined call to   Time Spent for Care: 30 minutes  More than 50% of total time spent on counseling and coordination of care as described above  Current Length of Stay: 2 day(s)  Current Patient Status: Inpatient   Certification Statement: The patient will continue to require additional inpatient hospital stay due to rehab placement  Discharge Plan: Anticipate discharge in 24-48 hrs to rehab facility  Code Status: Level 3 - DNAR and DNI    Subjective:   Patient sitting in bed eating  She is wants physical therapy to see him       Objective:     Vitals:   Temp (24hrs), Av 4 °F (36 9 °C), Min:98 °F (36 7 °C), Max:98 7 °F (37 1 °C)    Temp:  [98 °F (36 7 °C)-98 7 °F (37 1 °C)] 98 7 °F (37 1 °C)  HR:  [69-79] 76  Resp:  [18] 18  BP: (126-144)/(66-78) 126/72  SpO2:  [93 %-98 %] 93 %  Body mass index is 35 78 kg/m²  Input and Output Summary (last 24 hours): Intake/Output Summary (Last 24 hours) at 1/3/2022 1957  Last data filed at 1/3/2022 0730  Gross per 24 hour   Intake --   Output 575 ml   Net -575 ml       Physical Exam:   Physical Exam  Vitals and nursing note reviewed  Constitutional:       General: She is not in acute distress  Appearance: She is well-developed  She is obese  HENT:      Head: Normocephalic and atraumatic  Eyes:      Conjunctiva/sclera: Conjunctivae normal    Cardiovascular:      Rate and Rhythm: Normal rate and regular rhythm  Heart sounds: No murmur heard  Pulmonary:      Effort: Pulmonary effort is normal  No respiratory distress  Breath sounds: Normal breath sounds  Abdominal:      Palpations: Abdomen is soft  Tenderness: There is no abdominal tenderness  Musculoskeletal:      Cervical back: Neck supple  Skin:     General: Skin is warm and dry  Neurological:      Mental Status: She is alert     Psychiatric:         Mood and Affect: Mood normal            Additional Data:     Labs:  Results from last 7 days   Lab Units 01/01/22  0447   WBC Thousand/uL 7 37   HEMOGLOBIN g/dL 11 8   HEMATOCRIT % 36 0   PLATELETS Thousands/uL 229     Results from last 7 days   Lab Units 01/01/22  0447   SODIUM mmol/L 137   POTASSIUM mmol/L 5 1   CHLORIDE mmol/L 105   CO2 mmol/L 29   BUN mg/dL 34*   CREATININE mg/dL 1 14   ANION GAP mmol/L 3*   CALCIUM mg/dL 8 9   GLUCOSE RANDOM mg/dL 198*     Results from last 7 days   Lab Units 12/31/21  2012   INR  1 86*     Results from last 7 days   Lab Units 01/03/22  1603 01/03/22  1100 01/03/22  0601 01/02/22  2119 01/02/22  1555 01/02/22  1102 01/02/22  0627 01/01/22  2044 01/01/22  1622 01/01/22  1124 01/01/22  0726 01/01/22  0054   POC GLUCOSE mg/dl 173* 213* 167* 207* 174* 160* 134 208* 171* 145* 185* 219* Lines/Drains:  Invasive Devices  Report    Peripheral Intravenous Line            Peripheral IV 12/13/21 Right Hand 21 days    Peripheral IV 12/31/21 Left Forearm 2 days                      Imaging: Reviewed radiology reports from this admission including: chest xray    Recent Cultures (last 7 days):         Last 24 Hours Medication List:   Current Facility-Administered Medications   Medication Dose Route Frequency Provider Last Rate    acetaminophen  650 mg Oral Q6H PRN Lew Castillo, DO      atorvastatin  80 mg Oral Daily With Sadi Puentes, DO      escitalopram  5 mg Oral Daily Lew Castillo, DO      famotidine  20 mg Oral Daily Hoy Castillo, DO      insulin glargine  35 Units Subcutaneous HS Hoy Castillo, DO      insulin lispro  1-5 Units Subcutaneous HS Lew Castillo, DO      insulin lispro  1-6 Units Subcutaneous TID AC Lew Castillo, DO      lisinopril  5 mg Oral Daily ALEC Allison      metoprolol tartrate  50 mg Oral Q12H [address] [place] ALEC Baer      ondansetron  4 mg Intravenous Q6H PRN Lew Castillo, DO      pantoprazole  40 mg Oral Early Morning Lew Castillo, DO      rivaroxaban  20 mg Oral Daily With Breakfast Lew Castillo DO          Today, Patient Was Seen By: Nathalie Rendon MD    **Please Note: This note may have been constructed using a voice recognition system  **

## 2022-01-04 NOTE — PLAN OF CARE
Problem: Neurological Deficit  Goal: Neurological status is stable or improving  Description: Interventions:  - Monitor and assess patient's level of consciousness, motor function, sensory function, and level of assistance needed for ADLs  - Monitor and report changes from baseline  Collaborate with interdisciplinary team to initiate plan and implement interventions as ordered  - Provide and maintain a safe environment  - Consider seizure precautions  - Consider fall precautions  - Consider aspiration precautions  - Consider bleeding precautions  1/4/2022 1032 by Wilfredo Eldridge RN  Outcome: Progressing  1/4/2022 1032 by Wilfredo Eldridge RN  Outcome: Progressing     Problem: Communication Impairment  Goal: Ability to express needs and understand communication  Description: Assess patient's communication skills and ability to understand information  Patient will demonstrate use of effective communication techniques, alternative methods of communication and understanding even if not able to speak  - Encourage communication and provide alternate methods of communication as needed  - Collaborate with case management/ for discharge needs  - Include patient/family/caregiver in decisions related to communication    1/4/2022 1032 by Wilfredo Eldridge RN  Outcome: Progressing  1/4/2022 1032 by Wilfredo Eldridge RN  Outcome: Progressing

## 2022-01-05 NOTE — ASSESSMENT & PLAN NOTE
Lab Results   Component Value Date    HGBA1C 9 4 (H) 11/04/2021       Recent Labs     01/03/22  1603 01/03/22  2113 01/04/22  0627 01/04/22  1053   POCGLU 173* 177* 129 181*         · Restart Tradjenta and Prandin on discharge   · Ct insulin regimen

## 2022-01-05 NOTE — ASSESSMENT & PLAN NOTE
Presented with left-sided numbness, patient with very recent hospitalization at the [facility] 12/14/2021-12/21/2021 with similar and found with right pontine CVA  Has chronic right upper extremity weakness from prior stroke and left lower extremity weakness from recent stroke  · CT head negative for new acute intracranial pathology, and CTA head/neck without new large vessel occlusion (left proximal P2 segment severe stenosis redemonstrated)  · TPA contraindicated due to recent CVA  · Patient admitted under stroke pathway, seen by Neurology  MRI brain negative for acute findings  Symptoms likely recrudescence from recent stroke  · Echocardiogram at last admission revealed EF 65% with grade 1 DD  · Recent FLP and A1c reviewed  · Continue Xarelto and high-intensity statin  · Outpatient neuro f/u in 4 weeks  · PT/OT evaluations pending however anticipate will need to continue rehab course   Was at [facility] prior to admission - discharge back there

## 2022-01-05 NOTE — UTILIZATION REVIEW
Notification of Discharge   [facility]  Please be advised that this patient has been discharge from our facility  Below you will find the admission and discharge date and time including the patients disposition  UTILIZATION REVIEW CONTACT:  [employer] [employer]  Utilization   Network Utilization Review Department  Phone: [phone] x carefully listen to the prompts  All voicemails are confidential   Email: [email or web address]     PHYSICIAN ADVISORY SERVICES:  FOR [id number] REVIEW - MEDICAL NECESSITY DENIAL  Phone: [phone]  Fax: [phone]  Email: [email or web address]     PRESENTATION DATE: 12/31/2021  7:16 PM  OBERVATION ADMISSION DATE:   INPATIENT ADMISSION DATE: 1/1/22  1:51 PM   DISCHARGE DATE: 1/4/2022  1:50 PM  DISPOSITION: Non [facility] Non [facility]      IMPORTANT INFORMATION:  Send all requests for admission clinical reviews, approved or denied determinations and any other requests to dedicated fax number below belonging to the campus where the patient is receiving treatment   List of dedicated fax numbers:  [address] [place] [address] DENIALS (Administrative/Medical Necessity) [phone]   [address]  (Maternity/NICU/Pediatrics) [phone]   Kenneth Arzate [phone]   [address] [facility] [address] [phone]   [address] [place] [address] [phone]   [facility] [address] [facility] [phone]   [facility]  [phone]   [address] [facility] [address]  [address] [facility] [address] [facility] [phone]

## 2022-01-05 NOTE — PROGRESS NOTES
[facility]  Progress Note - Tyler Rios 1955, 61 y o  female MRN: [id number]  Unit/Bed#: CW2 217-01 Encounter: [id number]  Primary Care Provider: Andres Lewis MD   Date and time admitted to hospital: 12/31/2021  7:16 PM    * Left sided numbness  Assessment & Plan  Presented with left-sided numbness, patient with very recent hospitalization at the [facility] 12/14/2021-12/21/2021 with similar and found with right pontine CVA  Has chronic right upper extremity weakness from prior stroke and left lower extremity weakness from recent stroke  · CT head negative for new acute intracranial pathology, and CTA head/neck without new large vessel occlusion (left proximal P2 segment severe stenosis redemonstrated)  · TPA contraindicated due to recent CVA  · Patient admitted under stroke pathway, seen by Neurology  MRI brain negative for acute findings  Symptoms likely recrudescence from recent stroke  · Echocardiogram at last admission revealed EF 65% with grade 1 DD  · Recent FLP and A1c reviewed  · Continue Xarelto and high-intensity statin  · Outpatient neuro f/u in 4 weeks  · PT/OT evaluations pending however anticipate will need to continue rehab course  Was at [facility] prior to admission - discharge back there      History of CVA (cerebrovascular accident)  Assessment & Plan  · History of multiple prior CVAs including most recently 12/14/2021 with newly discovered right pontine CVA  · Plan as above    YOLIE (obstructive sleep apnea)  Assessment & Plan  · Noted in history however per patient "mild"  · Does not wear CPAP/oxygen    Essential hypertension  Assessment & Plan  · Will continue beta-blocker and ACEI  · Monitor blood pressure per protocol    Hyperlipidemia  Assessment & Plan  · Recent FLP reviewed    Continue atorvastatin 80 mg daily with dinner    GERD (gastroesophageal reflux disease)  Assessment & Plan  · Stable, continue PPI    Type 2 diabetes mellitus with hyperglycemia, with long-term current use of insulin [facility])  Assessment & Plan  Lab Results   Component Value Date    HGBA1C 9 4 (H) 11/04/2021       Recent Labs     01/03/22  1603 01/03/22  2113 01/04/22  0627 01/04/22  1053   POCGLU 173* 177* 129 181*         · Restart Tradjenta and Prandin on discharge   · Ct insulin regimen    Tracheal stenosis  Assessment & Plan  · Of historical note and with vocal cord paralysis  · Continue follow-up with ENT as outpatient    Paroxysmal atrial fibrillation (HCC)  Assessment & Plan  · Currently normal sinus rhythm  · Continue metoprolol tartrate  · Anticoagulation with Xarelto, continue    VTE Pharmacologic Prophylaxis: VTE Score: 8 High Risk (Score >/= 5) - Pharmacological DVT Prophylaxis Ordered: rivaroxaban (Xarelto)  Sequential Compression Devices Ordered  Patient Centered Rounds: Discussed with RN    Education and Discussions with Family / Patient: Attempted to update  (son) via phone  Left voicemail  Time Spent for Care: 20 minutes  More than 50% of total time spent on counseling and coordination of care as described above  Current Length of Stay: 3 day(s)  Current Patient Status: Inpatient   Discharge Plan: Discharge to [facility] today    Subjective:   Feels well    Objective:     Vitals:     HR:  [74] 74  Resp:  [16] 16  BP: (122)/(72) 122/72  SpO2:  [93 %] 93 %  Body mass index is 35 78 kg/m²  Physical Exam:   Physical Exam  Vitals reviewed  HENT:      Head: Normocephalic  Nose: Nose normal       Mouth/Throat:      Mouth: Mucous membranes are moist    Eyes:      Extraocular Movements: Extraocular movements intact  Cardiovascular:      Rate and Rhythm: Normal rate and regular rhythm  Pulmonary:      Effort: Pulmonary effort is normal  No respiratory distress  Breath sounds: Normal breath sounds  No wheezing  Abdominal:      General: Bowel sounds are normal  There is no distension        Palpations: Abdomen is soft  Tenderness: There is no abdominal tenderness  Musculoskeletal:         General: No swelling  Cervical back: Neck supple  Skin:     General: Skin is warm and dry  Neurological:      Mental Status: She is alert and oriented to person, place, and time  Psychiatric:         Mood and Affect: Mood normal           Additional Data:     Labs:  Results from last 7 days   Lab Units 01/01/22  0447   WBC Thousand/uL 7 37   HEMOGLOBIN g/dL 11 8   HEMATOCRIT % 36 0   PLATELETS Thousands/uL 229     Results from last 7 days   Lab Units 01/01/22  0447   SODIUM mmol/L 137   POTASSIUM mmol/L 5 1   CHLORIDE mmol/L 105   CO2 mmol/L 29   BUN mg/dL 34*   CREATININE mg/dL 1 14   ANION GAP mmol/L 3*   CALCIUM mg/dL 8 9   GLUCOSE RANDOM mg/dL 198*     Results from last 7 days   Lab Units 12/31/21  2012   INR  1 86*     Results from last 7 days   Lab Units 01/04/22  1053 01/04/22  0627 01/03/22  2113 01/03/22  1603 01/03/22  1100 01/03/22  0601 01/02/22  2119 01/02/22  1555 01/02/22  1102 01/02/22  0627 01/01/22  2044 01/01/22  1622   POC GLUCOSE mg/dl 181* 129 177* 173* 213* 167* 207* 174* 160* 134 208* 171*               Lines/Drains:  Invasive Devices  Report    None                                  Today, Patient Was Seen By: Essie Zaldivar MD    **Please Note: This note may have been constructed using a voice recognition system  **

## 2022-01-13 ENCOUNTER — TELEPHONE (OUTPATIENT)
Dept: NEUROLOGY | Facility: CLINIC | Age: 67
End: 2022-01-13

## 2022-01-13 NOTE — TELEPHONE ENCOUNTER
Spoke with Pt sched HFU appt with Veena Germain on 2/03/22 at 9:45 am      HFU/SLB/Left sided numbness/DC1/4    Note from Chart:         Matthew Payan will need follow up in in 4 weeks with neurovascular AP or attending  She will not require outpatient neurological testing

## 2022-01-24 ENCOUNTER — TELEPHONE (OUTPATIENT)
Dept: NEUROLOGY | Facility: CLINIC | Age: 67
End: 2022-01-24

## 2022-02-09 ENCOUNTER — OFFICE VISIT (OUTPATIENT)
Dept: FAMILY MEDICINE CLINIC | Facility: CLINIC | Age: 67
End: 2022-02-09
Payer: COMMERCIAL

## 2022-02-09 VITALS
WEIGHT: 220 LBS | HEIGHT: 65 IN | DIASTOLIC BLOOD PRESSURE: 90 MMHG | BODY MASS INDEX: 36.65 KG/M2 | OXYGEN SATURATION: 96 % | SYSTOLIC BLOOD PRESSURE: 118 MMHG | HEART RATE: 84 BPM | TEMPERATURE: 97.8 F

## 2022-02-09 DIAGNOSIS — I48.0 PAROXYSMAL ATRIAL FIBRILLATION (HCC): ICD-10-CM

## 2022-02-09 DIAGNOSIS — N18.30 STAGE 3 CHRONIC KIDNEY DISEASE, UNSPECIFIED WHETHER STAGE 3A OR 3B CKD (HCC): ICD-10-CM

## 2022-02-09 DIAGNOSIS — E66.01 SEVERE OBESITY (BMI 35.0-39.9) WITH COMORBIDITY (HCC): ICD-10-CM

## 2022-02-09 DIAGNOSIS — Z79.4 TYPE 2 DIABETES MELLITUS WITH HYPERGLYCEMIA, WITH LONG-TERM CURRENT USE OF INSULIN (HCC): ICD-10-CM

## 2022-02-09 DIAGNOSIS — I63.9 CEREBROVASCULAR ACCIDENT (CVA), UNSPECIFIED MECHANISM (HCC): ICD-10-CM

## 2022-02-09 DIAGNOSIS — Z12.11 SCREENING FOR COLON CANCER: Primary | ICD-10-CM

## 2022-02-09 DIAGNOSIS — E11.65 TYPE 2 DIABETES MELLITUS WITH HYPERGLYCEMIA, WITH LONG-TERM CURRENT USE OF INSULIN (HCC): ICD-10-CM

## 2022-02-09 DIAGNOSIS — I69.354 HEMIPLEGIA AND HEMIPARESIS FOLLOWING CEREBRAL INFARCTION AFFECTING LEFT NON-DOMINANT SIDE (HCC): ICD-10-CM

## 2022-02-09 PROCEDURE — 99214 OFFICE O/P EST MOD 30 MIN: CPT | Performed by: FAMILY MEDICINE

## 2022-02-11 PROBLEM — Z98.890 GRANULATION TISSUE OF SITE OF TRACHEOSTOMY: Status: RESOLVED | Noted: 2017-10-17 | Resolved: 2022-02-11

## 2022-02-11 PROBLEM — L92.9 GRANULATION TISSUE OF SITE OF TRACHEOSTOMY: Status: RESOLVED | Noted: 2017-10-17 | Resolved: 2022-02-11

## 2022-02-11 NOTE — PATIENT INSTRUCTIONS
10% - bad control"> 10% - bad control,Hemoglobin A1c (HbA1c) greater than 10% indicating poor diabetic control,Haemoglobin A1c greater than 10% indicating poor diabetic control">   Diabetes Mellitus Type 2 in Adults, Ambulatory Care   GENERAL INFORMATION:   Diabetes mellitus type 2  is a disease that affects how your body uses glucose (sugar)  Insulin helps move sugar out of the blood so it can be used for energy  Normally, when the blood sugar level increases, the pancreas makes more insulin  Type 2 diabetes develops because either the body cannot make enough insulin, or it cannot use the insulin correctly  After many years, your pancreas may stop making insulin  Common symptoms include the following:   · More hunger or thirst than usual     · Frequent urination     · Weight loss without trying     · Blurred vision  Seek immediate care for the following symptoms:   · Severe abdominal pain, or pain that spreads to your back  You may also be vomiting  · Trouble staying awake or focusing    · Shaking or sweating    · Blurred or double vision    · Breath has a fruity, sweet smell    · Breathing is deep and labored, or rapid and shallow    · Heartbeat is fast and weak  Treatment for diabetes mellitus type 2  includes keeping your blood sugar at a normal level  You must eat the right foods, and exercise regularly  You may also need medicine if you cannot control your blood sugar level with nutrition and exercise  Manage diabetes mellitus type 2:   · Check your blood sugar level  You will be taught how to check a small drop of blood in a glucose monitor  Ask your healthcare provider when and how often to check during the day  Ask your healthcare provider what your blood sugar levels should be when you check them  · Keep track of carbohydrates (sugar and starchy foods)  Your blood sugar level can get too high if you eat too many carbohydrates   Your dietitian will help you plan meals and snacks that have the right amount of carbohydrates  · Eat low-fat foods  Some examples are skinless chicken and low-fat milk  · Eat less sodium (salt)  Some examples of high-sodium foods to limit are soy sauce, potato chips, and soup  Do not add salt to food you cook  Limit your use of table salt  · Eat high-fiber foods  Foods that are a good source of fiber include vegetables, whole grain bread, and beans  · Limit alcohol  Alcohol affects your blood sugar level and can make it harder to manage your diabetes  Women should limit alcohol to 1 drink a day  Men should limit alcohol to 2 drinks a day  A drink of alcohol is 12 ounces of beer, 5 ounces of wine, or 1½ ounces of liquor  · Get regular exercise  Exercise can help keep your blood sugar level steady, decrease your risk of heart disease, and help you lose weight  Exercise for at least 30 minutes, 5 days a week  Include muscle strengthening activities 2 days each week  Work with your healthcare provider to create an exercise plan  · Check your feet each day  for injuries or open sores  Ask your healthcare provider for activities you can do if you have an open sore  · Quit smoking  If you smoke, it is never too late to quit  Smoking can worsen the problems that may occur with diabetes  Ask your healthcare provider for information about how to stop smoking if you are having trouble quitting  · Ask about your weight:  Ask healthcare providers if you need to lose weight, and how much to lose  Ask them to help you with a weight loss program  Even a 10 to 15 pound weight loss can help you manage your blood sugar level  · Carry medical alert identification  Wear medical alert jewelry or carry a card that says you have diabetes  Ask your healthcare provider where to get these items  · Ask about vaccines  Diabetes puts you at risk of serious illness if you get the flu, pneumonia, or hepatitis   Ask your healthcare provider if you should get a flu, pneumonia, or hepatitis B vaccine, and when to get the vaccine  Follow up with your healthcare provider as directed:  Write down your questions so you remember to ask them during your visits  CARE AGREEMENT:   You have the right to help plan your care  Learn about your health condition and how it may be treated  Discuss treatment options with your caregivers to decide what care you want to receive  You always have the right to refuse treatment  The above information is an  only  It is not intended as medical advice for individual conditions or treatments  Talk to your doctor, nurse or pharmacist before following any medical regimen to see if it is safe and effective for you  © 2014 [address] is for End User's use only and may not be sold, redistributed or otherwise used for commercial purposes  All illustrations and images included in CareNotes® are the copyrighted property of A D A M , Inc  or Marlon Ortiz

## 2022-02-11 NOTE — PROGRESS NOTES
Assessment/Plan:    16-year-old woman with:  type 2 diabetes with diabetic nephropathy and CKD 3 severe obesity hemiplegia and hemiparesis status post CVA paroxysmal AFib  Discussed workup and treatment options with risks and benefits encouraged close follow-up with Endocrine will refer for CGM will check labs will continue current medications discussed supportive care return parameters follow-up in 3 mo  No problem-specific Assessment & Plan notes found for this encounter  Diagnoses and all orders for this visit:    Screening for colon cancer  -     Ambulatory referral for colonoscopy; Future  -     Continous glucose monitoring dexcom placement; Future  -     Continous glucose monitoring dexcom intrepretation; Future  -     CBC and differential; Future  -     Comprehensive metabolic panel; Future  -     TSH, 3rd generation with Free T4 reflex; Future  -     Lipid Panel with Direct LDL reflex; Future  -     Hemoglobin A1C; Future  -     Microalbumin / creatinine urine ratio  -     UA (URINE) with reflex to Scope    Paroxysmal atrial fibrillation (HCC)  -     rivaroxaban (Xarelto) 20 mg tablet; Take 1 tablet (20 mg total) by mouth daily with breakfast  -     Continous glucose monitoring dexcom placement; Future  -     Continous glucose monitoring dexcom intrepretation; Future  -     CBC and differential; Future  -     Comprehensive metabolic panel; Future  -     TSH, 3rd generation with Free T4 reflex; Future  -     Lipid Panel with Direct LDL reflex; Future  -     Hemoglobin A1C; Future  -     Microalbumin / creatinine urine ratio  -     UA (URINE) with reflex to Scope    Cerebrovascular accident (CVA), unspecified mechanism ([address] )  -     rivaroxaban (Xarelto) 20 mg tablet; Take 1 tablet (20 mg total) by mouth daily with breakfast  -     Continous glucose monitoring dexcom placement; Future  -     Continous glucose monitoring dexcom intrepretation;  Future  -     CBC and differential; Future  - Comprehensive metabolic panel; Future  -     TSH, 3rd generation with Free T4 reflex; Future  -     Lipid Panel with Direct LDL reflex; Future  -     Hemoglobin A1C; Future  -     Microalbumin / creatinine urine ratio  -     UA (URINE) with reflex to Scope    Hemiplegia and hemiparesis following cerebral infarction affecting left non-dominant side (HCC)  -     Continous glucose monitoring dexcom placement; Future  -     Continous glucose monitoring dexcom intrepretation; Future  -     CBC and differential; Future  -     Comprehensive metabolic panel; Future  -     TSH, 3rd generation with Free T4 reflex; Future  -     Lipid Panel with Direct LDL reflex; Future  -     Hemoglobin A1C; Future  -     Microalbumin / creatinine urine ratio  -     UA (URINE) with reflex to Scope    Stage 3 chronic kidney disease, unspecified whether stage 3a or 3b CKD ([facility] )  -     Continous glucose monitoring dexcom placement; Future  -     Continous glucose monitoring dexcom intrepretation; Future  -     CBC and differential; Future  -     Comprehensive metabolic panel; Future  -     TSH, 3rd generation with Free T4 reflex; Future  -     Lipid Panel with Direct LDL reflex; Future  -     Hemoglobin A1C; Future  -     Microalbumin / creatinine urine ratio  -     UA (URINE) with reflex to Scope    Severe obesity (BMI 35 0-39  9) with comorbidity ([facility] )  -     Continous glucose monitoring dexcom placement; Future  -     Continous glucose monitoring dexcom intrepretation; Future  -     CBC and differential; Future  -     Comprehensive metabolic panel; Future  -     TSH, 3rd generation with Free T4 reflex; Future  -     Lipid Panel with Direct LDL reflex; Future  -     Hemoglobin A1C; Future  -     Microalbumin / creatinine urine ratio  -     UA (URINE) with reflex to Scope    Type 2 diabetes mellitus with hyperglycemia, with long-term current use of insulin ([facility])  -     HEMOGLOBIN A1C W/ EAG ESTIMATION;  Future  -     Continous glucose monitoring dexcom placement; Future  -     Continous glucose monitoring dexcom intrepretation; Future  -     CBC and differential; Future  -     Comprehensive metabolic panel; Future  -     TSH, 3rd generation with Free T4 reflex; Future  -     Lipid Panel with Direct LDL reflex; Future  -     Hemoglobin A1C; Future  -     Microalbumin / creatinine urine ratio  -     UA (URINE) with reflex to Scope          Subjective:     Chief Complaint   Patient presents with    Follow-up     ER visit, stroke        Patient ID: Elizabeth Martinez is a 61 y o  female  Patient is a 72-year-old woman who presents for follow-up on type 2 diabetes with diabetic nephropathy and CKD 3 severe obesity hemiplegia and hemiparesis status post CVA paroxysmal AFib patient admits being stable medications denies acute complaints no fevers chills nausea vomiting tolerating p o  intake no other complaints at this time      The following portions of the patient's history were reviewed and updated as appropriate: allergies, current medications, past family history, past medical history, past social history, past surgical history and problem list     Review of Systems   Constitutional: Negative  HENT: Negative  Eyes: Negative  Respiratory: Negative  Cardiovascular: Negative  Gastrointestinal: Negative  Endocrine: Negative  Genitourinary: Negative  Musculoskeletal: Negative  Allergic/Immunologic: Negative  Neurological: Negative  Hematological: Negative  Psychiatric/Behavioral: Negative  All other systems reviewed and are negative  Objective:      /90 (BP Location: Left arm, Patient Position: Sitting, Cuff Size: Standard)   Pulse 84   Temp 97 8 °F (36 6 °C) (Tympanic)   Ht 5' 5" (1 651 m)   Wt 99 8 kg (220 lb)   LMP  (LMP Unknown)   SpO2 96%   BMI 36 61 kg/m²          Physical Exam  Constitutional:       Appearance: She is well-developed  HENT:      Head: Atraumatic        Right Ear: External ear normal  Left Ear: External ear normal    Eyes:      Conjunctiva/sclera: Conjunctivae normal       Pupils: Pupils are equal, round, and reactive to light  Cardiovascular:      Rate and Rhythm: Normal rate and regular rhythm  Heart sounds: Normal heart sounds  Pulmonary:      Effort: Pulmonary effort is normal  No respiratory distress  Breath sounds: Normal breath sounds  Abdominal:      General: There is no distension  Palpations: Abdomen is soft  Tenderness: There is no abdominal tenderness  There is no guarding or rebound  Musculoskeletal:         General: Normal range of motion  Cervical back: Normal range of motion  Skin:     General: Skin is warm and dry  Neurological:      Mental Status: She is alert and oriented to person, place, and time  Cranial Nerves: No cranial nerve deficit  Psychiatric:         Behavior: Behavior normal          Thought Content: Thought content normal          Judgment: Judgment normal          BMI Counseling: Body mass index is 36 61 kg/m²  The BMI is above normal  Nutrition recommendations include encouraging healthy choices of fruits and vegetables  Exercise recommendations include moderate physical activity 150 minutes/week  Rationale for BMI follow-up plan is due to patient being overweight or obese

## 2022-02-27 ENCOUNTER — APPOINTMENT (EMERGENCY)
Dept: CT IMAGING | Facility: HOSPITAL | Age: 67
End: 2022-02-27
Payer: COMMERCIAL

## 2022-02-27 ENCOUNTER — HOSPITAL ENCOUNTER (EMERGENCY)
Facility: HOSPITAL | Age: 67
Discharge: HOME | End: 2022-02-27
Attending: EMERGENCY MEDICINE | Admitting: INTERNAL MEDICINE
Payer: COMMERCIAL

## 2022-02-27 VITALS
OXYGEN SATURATION: 97 % | SYSTOLIC BLOOD PRESSURE: 166 MMHG | HEART RATE: 77 BPM | DIASTOLIC BLOOD PRESSURE: 74 MMHG | RESPIRATION RATE: 18 BRPM | TEMPERATURE: 97.6 F

## 2022-02-27 DIAGNOSIS — S09.90XA CLOSED HEAD INJURY, INITIAL ENCOUNTER: ICD-10-CM
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
## 2022-02-27 PROCEDURE — 99281 EMR DPT VST MAYX REQ PHY/QHP: CPT | Performed by: INTERNAL MEDICINE

## 2022-02-27 PROCEDURE — 99284 EMERGENCY DEPT VISIT MOD MDM: CPT

## 2022-02-27 PROCEDURE — 70450 CT HEAD/BRAIN W/O DYE: CPT

## 2022-02-27 NOTE — ED PROVIDER NOTES
History  Chief Complaint   Patient presents with    Fall     head strike on thinners     HPI    Prior to Admission Medications   Prescriptions Last Dose Informant Patient Reported? Taking?    Accu-Chek FastClix Lancets MISC   No No   Sig: Use 2 (two) times a day   Patient not taking: Reported on 2/11/2022    B-D ULTRAFINE III SHORT PEN 31G X 8 MM MISC  Self No No   Sig: use as directed   B-D ULTRAFINE III SHORT PEN 31G X 8 MM MISC   No No   Sig: USE AS DIRECTED   Blood Glucose Monitoring Suppl (Accu-Chek Guide) w/Device KIT   No No   Sig: Use 2 (two) times a day   Patient not taking: Reported on 2/11/2022    Insulin Pen Needle (Pen Needles 5/16") 30G X 8 MM MISC   No No   Sig: Use daily   Patient not taking: Reported on 2/11/2022    Tradjenta 5 MG TABS   No No   Sig: take 1 tablet by mouth once daily   Xarelto 20 MG tablet   No No   Sig: take 1 tablet by mouth once daily with BREAKFAST   atorvastatin (LIPITOR) 80 mg tablet   No No   Sig: Take 1 tablet (80 mg total) by mouth daily with dinner   Patient not taking: Reported on 2/11/2022    escitalopram (LEXAPRO) 5 mg tablet   No No   Sig: take 1 tablet by mouth once daily   famotidine (PEPCID) 20 mg tablet   No No   Sig: Take 1 tablet (20 mg total) by mouth daily   insulin glargine (Lantus SoloStar) 100 units/mL injection pen   No No   Sig: Inject 35 Units under the skin daily at bedtime   Patient not taking: Reported on 2/11/2022    lisinopril (ZESTRIL) 5 mg tablet   No No   Sig: Take 1 tablet (5 mg total) by mouth daily   metoprolol tartrate (LOPRESSOR) 50 mg tablet   No No   Sig: Take 1 tablet (50 mg total) by mouth every 12 (twelve) hours   ondansetron (ZOFRAN-ODT) 4 mg disintegrating tablet   No No   Sig: Take 1 tablet (4 mg total) by mouth every 8 (eight) hours as needed for nausea or vomiting   pantoprazole (PROTONIX) 40 mg tablet   No No   Sig: take 1 tablet by mouth once daily   repaglinide (PRANDIN) 0 5 mg tablet   No No   Sig: Take 1 tablet (0 5 mg total) by mouth 3 (three) times a day before meals   rivaroxaban (Xarelto) 20 mg tablet   No No   Sig: Take 1 tablet (20 mg total) by mouth daily with breakfast      Facility-Administered Medications: None       Past Medical History:   Diagnosis Date    Abdominal fibromatosis     Diabetes mellitus ([facility] )     Hyperlipemia     Hypertension     Pneumonia     [facility]brum[email or web address] 12/17/2021    Stroke [facility])        Past Surgical History:   Procedure Laterality Date    CATARACT EXTRACTION Bilateral     CATARACT EXTRACTION, BILATERAL      COLONOSCOPY      EGD      LAPAROTOMY      Exploratory; Last Assessed 10/17/2017    PEG TUBE PLACEMENT      PEG TUBE REMOVAL         Family History   Problem Relation Age of Onset    No Known Problems Mother     No Known Problems Father      I have reviewed and agree with the history as documented      E-Cigarette/Vaping    E-Cigarette Use Never User      E-Cigarette/Vaping Substances    Nicotine No     THC No     CBD No     Flavoring No     Other No     Unknown No      Social History     Tobacco Use    Smoking status: Never Smoker    Smokeless tobacco: Never Used   Vaping Use    Vaping Use: Never used   Substance Use Topics    Alcohol use: Not Currently     Comment: Socially    Drug use: No       Review of Systems    Physical Exam  Physical Exam    Vital Signs  ED Triage Vitals [02/27/22 1600]   Temperature Pulse Respirations Blood Pressure SpO2   97 6 °F (36 4 °C) 80 18 122/73 97 %      Temp Source Heart Rate Source Patient Position - Orthostatic VS BP Location FiO2 (%)   Temporal Monitor Lying -- --      Pain Score       --           Vitals:    02/27/22 1600 02/27/22 1615 02/27/22 1630 02/27/22 1645   BP: 122/73 160/70 163/72 161/70   Pulse: 80 79 76 76   Patient Position - Orthostatic VS: Lying Lying Lying          Visual Acuity  Visual Acuity      Most Recent Value   L Pupil Size (mm) 3   R Pupil Size (mm) 3          ED Medications  Medications - No data to display    Diagnostic Studies  Results Reviewed     None                 TRAUMA - CT head wo contrast    by Gene Renie Hammans, DO (02/27 6833)                 Procedures  Procedures         ED Course                                             MDM    Disposition  Final diagnoses:   None     ED Disposition     None      Follow-up Information    None         Patient's Medications   Discharge Prescriptions    No medications on file       No discharge procedures on file      PDMP Review       Value Time User    PDMP Reviewed  Yes 12/31/2021 10:45 PM Pedrito Rico DO          ED Provider  Electronically Signed by           Flora Landaverde MD  02/27/22 7287

## 2022-03-02 ENCOUNTER — TELEPHONE (OUTPATIENT)
Dept: NEUROLOGY | Facility: CLINIC | Age: 67
End: 2022-03-02

## 2022-03-02 NOTE — TELEPHONE ENCOUNTER
Lucon Kirk called to reschedule her appointment for 3/8/22 with Xochitl Steward because she does not feel good because she fell on 2/27/22 and was in the hospital  She asked to have a virtual appt        I rescheduled her for 3/21/22 at 10:30 am with Xochitl Steward  Virtual via [facility] : [phone]

## 2022-03-16 ENCOUNTER — TELEPHONE (OUTPATIENT)
Dept: FAMILY MEDICINE CLINIC | Facility: CLINIC | Age: 67
End: 2022-03-16

## 2022-03-16 DIAGNOSIS — E11.65 TYPE 2 DIABETES MELLITUS WITH HYPERGLYCEMIA, WITH LONG-TERM CURRENT USE OF INSULIN (HCC): Chronic | ICD-10-CM

## 2022-03-16 DIAGNOSIS — Z79.4 TYPE 2 DIABETES MELLITUS WITH HYPERGLYCEMIA, WITH LONG-TERM CURRENT USE OF INSULIN (HCC): Chronic | ICD-10-CM

## 2022-03-16 RX ORDER — INSULIN GLARGINE 100 [IU]/ML
35 INJECTION, SOLUTION SUBCUTANEOUS
Qty: 15 ML | Refills: 0 | Status: SHIPPED | OUTPATIENT
Start: 2022-03-16 | End: 2022-03-25 | Stop reason: SDUPTHER

## 2022-03-16 NOTE — TELEPHONE ENCOUNTER
Patient called and said that her Endo doctor changed, and she is looking for a refill of her insulin  I do not see any insulin in her chart to refill       Please advise

## 2022-03-21 ENCOUNTER — TELEMEDICINE (OUTPATIENT)
Dept: NEUROLOGY | Facility: CLINIC | Age: 67
End: 2022-03-21
Payer: COMMERCIAL

## 2022-03-21 DIAGNOSIS — E78.5 HYPERLIPIDEMIA, UNSPECIFIED HYPERLIPIDEMIA TYPE: Chronic | ICD-10-CM

## 2022-03-21 DIAGNOSIS — I48.0 PAROXYSMAL ATRIAL FIBRILLATION (HCC): Chronic | ICD-10-CM

## 2022-03-21 DIAGNOSIS — I10 ESSENTIAL HYPERTENSION: Chronic | ICD-10-CM

## 2022-03-21 DIAGNOSIS — E11.65 TYPE 2 DIABETES MELLITUS WITH HYPERGLYCEMIA, WITH LONG-TERM CURRENT USE OF INSULIN (HCC): Primary | Chronic | ICD-10-CM

## 2022-03-21 DIAGNOSIS — G47.33 OSA (OBSTRUCTIVE SLEEP APNEA): ICD-10-CM

## 2022-03-21 DIAGNOSIS — Z79.4 TYPE 2 DIABETES MELLITUS WITH HYPERGLYCEMIA, WITH LONG-TERM CURRENT USE OF INSULIN (HCC): Primary | Chronic | ICD-10-CM

## 2022-03-21 DIAGNOSIS — Z86.73 HISTORY OF CVA (CEREBROVASCULAR ACCIDENT): ICD-10-CM

## 2022-03-21 PROCEDURE — 99214 OFFICE O/P EST MOD 30 MIN: CPT | Performed by: PHYSICIAN ASSISTANT

## 2022-03-21 NOTE — ASSESSMENT & PLAN NOTE
Lab Results   Component Value Date    HGBA1C 9 4 (H) 11/04/2021     · Monitored daily, average glucose 100-120

## 2022-03-21 NOTE — PROGRESS NOTES
Virtual Regular Visit    Verification of patient location:  Patient is located in the following state in which I hold an active license [place]      Assessment/Plan:    Problem List Items Addressed This Visit        Endocrine    Type 2 diabetes mellitus with hyperglycemia, with long-term current use of insulin ([facility] ) - Primary (Chronic)       Lab Results   Component Value Date    HGBA1C 9 4 (H) 11/04/2021     · Monitored daily, average glucose 100-120            Respiratory    YOLIE (obstructive sleep apnea)     · Noncompliant with CPAP            Cardiovascular and Mediastinum    Paroxysmal atrial fibrillation (HCC) (Chronic)     · On Xalerto 20mg daily, does take with food  Essential hypertension (Chronic)     · Average -150/90's  · Goal <130/80  · Monitored daily             Other    Hyperlipidemia (Chronic)  · High dose statin therapy     History of CVA (cerebrovascular accident)     · CTA H/N: Negative for large vessel occlusion, dissection, or aneurysm  Unchanged severe short-segment stenosis in left PCA proximal P2 segment  No other sites of high-grade stenosis  · MRI brain: Recent lacunar infarcts are identified in the right zaida  No evidence of hemorrhage  Mild to moderate chronic microangiopathic changes are noted  Chronic infarct left centrum semiovale  · Etiology: small vessel   · Control risk factors including blood pressure, glucose and cholesterol  · Continue on aspirin 81mg, xalerto 20mg daily and atorvastatin 80mg daily  · Avoid NSAIDS  · Continue physical and speech therapy                    Reason for visit is   Chief Complaint   Patient presents with    Virtual Regular Visit        Encounter provider Sparkle Liriano PA-C    Provider located at [address] [place] [address] [address]  [phone]      Recent Visits  No visits were found meeting these conditions    Showing recent visits within past 7 days and meeting all other requirements  Today's Visits  Date Type Provider Dept   03/21/22 [facility] [address] today's visits and meeting all other requirements  Future Appointments  No visits were found meeting these conditions  Showing future appointments within next 150 days and meeting all other requirements       The patient was identified by name and date of birth  Amalia Brooks was informed that this is a telemedicine visit and that the visit is being conducted through [facility] and patient was informed this is a secure, HIPAA-complaint platform  She agrees to proceed     My office door was closed  No one else was in the room  She acknowledged consent and understanding of privacy and security of the video platform  The patient has agreed to participate and understands they can discontinue the visit at any time  Patient is aware this is a billable service  Subjective  Amalia Brooks is a right handed 61 y o  female PMH of multiple prior strokes, afib, HTN, HLD, DM2 who is following up in regards to her hospitalization for stroke like symptoms  She presented on 01/01/2022 with worsening of her left sided weakness/numbness  NIHSS 1  MRI was negative for acute infarct but she did recently have a right pontine infarct 12/13/21, etiology small vessel  She is currently on Xalerto 20mg and aspirin 81mg and atorvastatin 80mg  She is compliant with her medications without any significant side effects  She denies any new stroke like symptoms or additional events of worsening symptoms  She mobilizes with a walker  She admits to falls, most recent 3 weeks ago  She tripped over the [place] [id number]  The next day she woke up she was all black and blue  She was evaluated in the hospital     Residual: left sided numbness/tingling/dysesthetic pain in arm and leg  Weakness  Therapies: PT/OT/ST - currently getting PT once per week, ST once a week   She feels like she is having improvement in her speech and weakness  HPI     Past Medical History:   Diagnosis Date    Abdominal fibromatosis     Diabetes mellitus [facility] )     Hyperlipemia     Hypertension     Pneumonia     Stroke (cerebrum) ([facility] ) 12/17/2021    [facility]roke [facility])        Past Surgical History:   Procedure Laterality Date    CATARACT EXTRACTION Bilateral     CATARACT EXTRACTION, BILATERAL      COLONOSCOPY      EGD      LAPAROTOMY      Exploratory;  Last Assessed 10/17/2017    PEG TUBE PLACEMENT      PEG TUBE REMOVAL         Current Outpatient Medications   Medication Sig Dispense Refill    Aspirin 81 MG CAPS Take 1 tablet by mouth in the morning      B-D ULTRAFINE III SHORT PEN 31G X 8 MM MISC use as directed 100 each 0    B-D ULTRAFINE III SHORT PEN 31G X 8 MM MISC USE AS DIRECTED 100 each 0    escitalopram (LEXAPRO) 5 mg tablet take 1 tablet by mouth once daily 90 tablet 1    famotidine (PEPCID) 20 mg tablet Take 1 tablet (20 mg total) by mouth daily 90 tablet 1    insulin glargine (Lantus SoloStar) 100 units/mL injection pen Inject 35 Units under the skin daily at bedtime 15 mL 0    lisinopril (ZESTRIL) 5 mg tablet Take 1 tablet (5 mg total) by mouth daily 90 tablet 1    metoprolol tartrate (LOPRESSOR) 50 mg tablet Take 1 tablet (50 mg total) by mouth every 12 (twelve) hours 180 tablet 1    ondansetron (ZOFRAN-ODT) 4 mg disintegrating tablet Take 1 tablet (4 mg total) by mouth every 8 (eight) hours as needed for nausea or vomiting 30 tablet 1    pantoprazole (PROTONIX) 40 mg tablet take 1 tablet by mouth once daily 30 tablet 1    repaglinide (PRANDIN) 0 5 mg tablet Take 1 tablet (0 5 mg total) by mouth 3 (three) times a day before meals 180 tablet 0    rivaroxaban (Xarelto) 20 mg tablet Take 1 tablet (20 mg total) by mouth daily with breakfast 90 tablet 3    Tradjenta 5 MG TABS take 1 tablet by mouth once daily 30 tablet 3    Xarelto 20 MG tablet take 1 tablet by mouth once daily with BREAKFAST 90 tablet 1    Accu-Chek FastClix Lancets MISC Use 2 (two) times a day (Patient not taking: Reported on 2/11/2022 ) 102 each 3    atorvastatin (LIPITOR) 80 mg tablet Take 1 tablet (80 mg total) by mouth daily with dinner (Patient not taking: Reported on 2/11/2022 ) 90 tablet 1    Blood Glucose Monitoring Suppl (Accu-Chek Guide) w/Device KIT Use 2 (two) times a day (Patient not taking: Reported on 2/11/2022 ) 1 kit 0    Insulin Pen Needle (Pen Needles 5/16") 30G X 8 MM MISC Use daily (Patient not taking: Reported on 2/11/2022 ) 50 each 2     No current facility-administered medications for this visit  Allergies   Allergen Reactions    Apixaban Vomiting and GI Intolerance     Other reaction(s): Vomiting    Trulicity [Dulaglutide] Vomiting     Nausea vomiting       Review of Systems   Constitutional: Negative  Negative for appetite change and fever  HENT: Negative  Negative for hearing loss, tinnitus, trouble swallowing and voice change  Eyes: Negative  Negative for photophobia and pain  Respiratory: Negative  Negative for shortness of breath  Cardiovascular: Negative  Negative for palpitations  Gastrointestinal: Negative  Negative for nausea and vomiting  Endocrine: Negative  Negative for cold intolerance  Genitourinary: Negative  Negative for dysuria, frequency and urgency  Musculoskeletal: Negative  Negative for myalgias and neck pain  Skin: Negative  Negative for rash  Neurological: Negative  Negative for dizziness, tremors, seizures, syncope, facial asymmetry, speech difficulty, weakness, light-headedness, numbness and headaches  Hematological: Negative  Does not bruise/bleed easily  Psychiatric/Behavioral: Negative  Negative for confusion, hallucinations and sleep disturbance  All other systems reviewed and are negative  ROS reviewed and edited as needed  Video Exam    There were no vitals filed for this visit      Physical Exam   CONSTITUTIONAL:  Well developed, well nourished, well groomed  No dysmorphic features  MENTAL STATUS  Orientation: Alert and oriented  Fund of knowledge: Intact  PSYCHIATRIC:  Normal behavior and appropriate affect    CRANIAL NERVES  Conjugate gaze  Extraocular movements intact  No nystagmus  Facial sensation normal V1-V3  Facial movements normal and symmetric  Intact gross hearing bilaterally  Intact trapezius  Tongue protrudes to the midline      COORDINATION   Finger to nose: normal bilaterally    MOTOR (Upper and lower extremities)   Bulk/tone/abnormal movement: Normal muscle bulk and tone  Moving upper extremities antigravity  I spent 30 minutes directly with the patient during this visit    [address] verbally agrees to participate in [employer]  Pt is aware that [employer] could be limited without vital signs or the ability to perform a full hands-on physical exam  Veronica Brandt understands she or the provider may request at any time to terminate the video visit and request the patient to seek care or treatment in person

## 2022-03-21 NOTE — ASSESSMENT & PLAN NOTE
· CTA H/N: Negative for large vessel occlusion, dissection, or aneurysm  Unchanged severe short-segment stenosis in left PCA proximal P2 segment  No other sites of high-grade stenosis  · MRI brain: Recent lacunar infarcts are identified in the right zaida  No evidence of hemorrhage  Mild to moderate chronic microangiopathic changes are noted  Chronic infarct left centrum semiovale    · Etiology: small vessel   · Control risk factors including blood pressure, glucose and cholesterol  · Continue on aspirin 81mg, xalerto 20mg daily and atorvastatin 80mg daily  · Avoid NSAIDS  · Continue physical and speech therapy

## 2022-03-25 ENCOUNTER — OFFICE VISIT (OUTPATIENT)
Dept: ENDOCRINOLOGY | Facility: CLINIC | Age: 67
End: 2022-03-25
Payer: COMMERCIAL

## 2022-03-25 VITALS
BODY MASS INDEX: 36.65 KG/M2 | WEIGHT: 220 LBS | DIASTOLIC BLOOD PRESSURE: 78 MMHG | SYSTOLIC BLOOD PRESSURE: 132 MMHG | HEIGHT: 65 IN

## 2022-03-25 DIAGNOSIS — I10 ESSENTIAL HYPERTENSION: Chronic | ICD-10-CM

## 2022-03-25 DIAGNOSIS — E78.5 HYPERLIPIDEMIA, UNSPECIFIED HYPERLIPIDEMIA TYPE: Chronic | ICD-10-CM

## 2022-03-25 DIAGNOSIS — Z79.4 TYPE 2 DIABETES MELLITUS WITH HYPERGLYCEMIA, WITH LONG-TERM CURRENT USE OF INSULIN (HCC): Primary | ICD-10-CM

## 2022-03-25 DIAGNOSIS — E11.65 TYPE 2 DIABETES MELLITUS WITH HYPERGLYCEMIA, WITH LONG-TERM CURRENT USE OF INSULIN (HCC): Primary | ICD-10-CM

## 2022-03-25 DIAGNOSIS — G47.33 OSA (OBSTRUCTIVE SLEEP APNEA): ICD-10-CM

## 2022-03-25 PROBLEM — R26.89 ABNORMALITY OF GAIT DUE TO IMPAIRMENT OF BALANCE: Status: ACTIVE | Noted: 2022-02-15

## 2022-03-25 PROBLEM — N39.3 STRESS INCONTINENCE: Status: ACTIVE | Noted: 2019-05-07

## 2022-03-25 LAB
SL AMB POCT GLUCOSE BLD: 210
SL AMB POCT HEMOGLOBIN AIC: 8.3 (ref ?–6.5)

## 2022-03-25 PROCEDURE — 83036 HEMOGLOBIN GLYCOSYLATED A1C: CPT | Performed by: NURSE PRACTITIONER

## 2022-03-25 PROCEDURE — 99214 OFFICE O/P EST MOD 30 MIN: CPT | Performed by: NURSE PRACTITIONER

## 2022-03-25 PROCEDURE — 82948 REAGENT STRIP/BLOOD GLUCOSE: CPT | Performed by: NURSE PRACTITIONER

## 2022-03-25 RX ORDER — BLOOD-GLUCOSE METER
EACH MISCELLANEOUS
Qty: 1 EACH | Refills: 0 | Status: SHIPPED | OUTPATIENT
Start: 2022-03-25

## 2022-03-25 RX ORDER — BLOOD-GLUCOSE METER
EACH MISCELLANEOUS 2 TIMES DAILY
Qty: 1 KIT | Refills: 0 | Status: SHIPPED | OUTPATIENT
Start: 2022-03-25 | End: 2022-04-24

## 2022-03-25 RX ORDER — INSULIN GLARGINE 100 [IU]/ML
40 INJECTION, SOLUTION SUBCUTANEOUS
Qty: 30 ML | Refills: 1 | Status: SHIPPED | OUTPATIENT
Start: 2022-03-25 | End: 2022-07-28 | Stop reason: SDUPTHER

## 2022-03-25 RX ORDER — REPAGLINIDE 2 MG/1
2 TABLET ORAL
Qty: 180 TABLET | Refills: 1 | Status: SHIPPED | OUTPATIENT
Start: 2022-03-25 | End: 2022-07-28 | Stop reason: SDUPTHER

## 2022-03-25 NOTE — ASSESSMENT & PLAN NOTE
Patient is poorly controlled with hyperglycemia  Counseled on pathophysiology of diabetes  Counseled on negative, long-term effects associated with uncontrolled diabetes including neuropathy, nephropathy, retinopathy, heart attack, and stroke  Patient would be able to test her blood sugars with more ease if she had a lancet device  She is able to use her right hand normally and I instructed her to use her right hand to test her blood sugar in her left fingers  Will contact insurance to see what they cover  Increase Lantus to 40 units nightly  Increase Prandin to 2 mg twice daily with breakfast and dinner  Counseled on appropriate hypoglycemia surveillance and treatment  Reviewed symptoms of hypoglycemia  Patient knows to notify me should she experience these  One she has inappropriate lancing device and glucometer, test blood sugar twice daily  Requested that she document these numbers and bring them to her next appointment  Check labs prior to next appointment in 4 months    Lab Results   Component Value Date    HGBA1C 8 3 (A) 03/25/2022

## 2022-03-25 NOTE — PROGRESS NOTES
Established Patient Progress Note      Chief Complaint   Patient presents with    Diabetes Type 2        History of Present Illness:   Emi Doan is a 61 y o  female with T2M presenting to the office today for follow up  Patient had been following with [facility] in [facility]  Her last appointment was 6/28/2021  Past medical history significant for hypertension, hyperlipidemia, atrial fibrillation, and multiple strokes  Unfortunately, patient suffered a right pontine infarct in December  She does have L sided hemiparesis  Patient reports that she has been making an effort to be more mindful of her carbohydrate intake  She is eating 3 meals daily  She does report occasional noncompliance with recommended diet  Unfortunately, she is not able to exercise due to left-sided hemiparesis  However, she is currently undergoing physical therapy through [facility]  Her primary complaint today is difficulty using her lancets  Therefore, she is not checking her blood sugars  She used to use a freestyle Lite meter however insurance switched his formulary and she was given an Accu-Chek meter that she finds very difficult to use  She denies symptoms of hyperglycemia including polydipsia, polyuria, and blurred vision  Component      Latest Ref Rng & Units 3/23/2021 6/26/2021 11/4/2021 3/25/2022   Hemoglobin A1C      6 5 9 3 (A) 7 4 (H) 9 4 (H) 8 3 (A)     Component      Latest Ref Rng & Units 1/1/2022   eGFR      ml/min/1 73sq m 50       POC  mg/dL     Past medications include Trulicity which the patient could not tolerate due to GI side effects, namely nausea  Current regimen:   Lantus 35 units  Prandin 0 5 mg TID- patient reports taking only with breakfast and dinner as she forgets her mid day dose  Tradjenta 5 mg    Last Eye Exam:  05/26/2021; positive for severe retinopathy in bilateral eyes; macular edema was also noted    Last Foot Exam:  At today's visit; 03/25/2022    For hyperlipidemia, she is taking 80 mg of atorvastatin nightly  She denies myalgias  For hypertension, she is taking 5 mg of lisinopril daily  She denies headache and cough  Patient Active Problem List   Diagnosis    Atrial fibrillation ([facility] )    [facility]) [facility]    Blurry vision    Diabetic cataract ([facility]    Dysphagia    Dysphonia    Glottic stenosis    Heart disease    Insomnia    Incomplete emptying of bladder    Nausea    Severe obesity (BMI 35 0-39  9) with comorbidity ([place] [address] )    Tracheal stenosis    Type 2 diabetes mellitus with hyperglycemia, with long-term current use of insulin ([facility]    GERD (gastroesophageal reflux disease)    Hyperlipidemia    Edema    Diarrhea    Essential hypertension    Routine health maintenance    Current episode of major depressive disorder without prior episode    Medicare annual wellness visit, subsequent    YOLIE (obstructive sleep apnea)    Dermatitis    Muscle tension dysphonia    Reflux laryngitis    Glottic insufficiency    History of stroke    Weakness of both lower extremities    [facility] pontine stroke ([facility]    Left sided numbness    History of CVA (cerebrovascular accident)    Hemiplegia and hemiparesis following cerebral infarction affecting left non-dominant side ([facility])    Stage 3 chronic kidney disease, unspecified whether stage 3a or 3b CKD ([facility] )    Abnormality of gait due to impairment of balance    Stress incontinence      Past Medical History:   Diagnosis Date    Abdominal fibromatosis     Diabetes mellitus ([facility] )     Hyperlipemia     Hypertension     Pneumonia     Stroke (cerebrum) ([facility] ) 12/17/2021    Stroke [facility])       Past Surgical History:   Procedure Laterality Date    CATARACT EXTRACTION Bilateral     CATARACT EXTRACTION, BILATERAL      COLONOSCOPY      EGD      LAPAROTOMY      Exploratory;  Last Assessed 10/17/2017    PEG TUBE PLACEMENT      PEG TUBE REMOVAL        Family History   Problem Relation Age of Onset    No Known Problems Mother     No Known Problems Father      Social History     Tobacco Use    Smoking status: Never Smoker    Smokeless tobacco: Never Used   Substance Use Topics    Alcohol use: Not Currently     Comment: Socially     Allergies   Allergen Reactions    Apixaban Vomiting and GI Intolerance     Other reaction(s): Vomiting    Dulaglutide Vomiting     Nausea vomiting         Current Outpatient Medications:     Aspirin 81 MG CAPS, Take 1 tablet by mouth in the morning, Disp: , Rfl:     atorvastatin (LIPITOR) 80 mg tablet, Take 1 tablet (80 mg total) by mouth daily with dinner, Disp: 90 tablet, Rfl: 1    B-D ULTRAFINE III SHORT PEN 31G X 8 MM MISC, use as directed, Disp: 100 each, Rfl: 0    B-D ULTRAFINE III SHORT PEN 31G X 8 MM MISC, USE AS DIRECTED, Disp: 100 each, Rfl: 0    escitalopram (LEXAPRO) 5 mg tablet, take 1 tablet by mouth once daily, Disp: 90 tablet, Rfl: 1    famotidine (PEPCID) 20 mg tablet, Take 1 tablet (20 mg total) by mouth daily, Disp: 90 tablet, Rfl: 1    insulin glargine (Lantus SoloStar) 100 units/mL injection pen, Inject 40 Units under the skin daily at bedtime, Disp: 30 mL, Rfl: 1    Insulin Pen Needle (Pen Needles 5/16") 30G X 8 MM MISC, Use daily, Disp: 50 each, Rfl: 2    lisinopril (ZESTRIL) 5 mg tablet, Take 1 tablet (5 mg total) by mouth daily, Disp: 90 tablet, Rfl: 1    metoprolol tartrate (LOPRESSOR) 50 mg tablet, Take 1 tablet (50 mg total) by mouth every 12 (twelve) hours, Disp: 180 tablet, Rfl: 1    ondansetron (ZOFRAN-ODT) 4 mg disintegrating tablet, Take 1 tablet (4 mg total) by mouth every 8 (eight) hours as needed for nausea or vomiting, Disp: 30 tablet, Rfl: 1    pantoprazole (PROTONIX) 40 mg tablet, take 1 tablet by mouth once daily, Disp: 30 tablet, Rfl: 1    rivaroxaban (Xarelto) 20 mg tablet, Take 1 tablet (20 mg total) by mouth daily with breakfast, Disp: 90 tablet, Rfl: 3    Tradjenta 5 MG TABS, take 1 tablet by mouth once daily, Disp: 30 tablet, Rfl: 3    Accu-Chek FastClix Lancets MISC, Use 2 (two) times a day (Patient not taking: Reported on 2/11/2022 ), Disp: 102 each, Rfl: 3    Blood Glucose Monitoring Suppl (Accu-Chek Guide) w/Device KIT, Use 2 (two) times a day (Patient not taking: Reported on 2/11/2022 ), Disp: 1 kit, Rfl: 0    repaglinide (PRANDIN) 2 mg tablet, Take 1 tablet (2 mg total) by mouth 2 (two) times a day before meals, Disp: 180 tablet, Rfl: 1    Review of Systems   Constitutional: Positive for fatigue  Negative for activity change, appetite change and unexpected weight change  HENT: Positive for voice change (Weak, hoarse voice)  Negative for dental problem, sore throat and trouble swallowing  Eyes: Positive for visual disturbance  Respiratory: Negative for cough, chest tightness and shortness of breath  Cardiovascular: Negative for chest pain, palpitations and leg swelling  Gastrointestinal: Negative for constipation, diarrhea, nausea and vomiting  Endocrine: Negative for polydipsia, polyphagia and polyuria  Genitourinary: Negative for frequency  Musculoskeletal: Positive for arthralgias, back pain and gait problem  Negative for joint swelling and myalgias  Skin: Negative for wound  Allergic/Immunologic: Positive for environmental allergies  Negative for food allergies  Neurological: Positive for weakness and numbness  Negative for dizziness, tremors, light-headedness and headaches  Psychiatric/Behavioral: Positive for decreased concentration  Negative for dysphoric mood and sleep disturbance  The patient is not nervous/anxious  Physical Exam:  Body mass index is 36 61 kg/m²  /78   Ht 5' 5" (1 651 m)   Wt 99 8 kg (220 lb)   LMP  (LMP Unknown)   BMI 36 61 kg/m²    Wt Readings from Last 3 Encounters:   03/25/22 99 8 kg (220 lb)   02/09/22 99 8 kg (220 lb)   01/20/22 97 5 kg (215 lb)       Physical Exam  Vitals reviewed     Constitutional:       General: She is not in acute distress  Appearance: She is well-developed  She is obese  She is not ill-appearing  HENT:      Head: Normocephalic and atraumatic  Eyes:      Pupils: Pupils are equal, round, and reactive to light  Neck:      Thyroid: No thyromegaly  Cardiovascular:      Rate and Rhythm: Normal rate and regular rhythm  Pulses: Normal pulses  no weak pulses          Dorsalis pedis pulses are 2+ on the right side and 2+ on the left side  Posterior tibial pulses are 2+ on the right side and 2+ on the left side  Heart sounds: Normal heart sounds  Pulmonary:      Effort: Pulmonary effort is normal       Breath sounds: Normal breath sounds  Abdominal:      General: Bowel sounds are normal  There is no distension  Palpations: Abdomen is soft  Tenderness: There is no abdominal tenderness  Musculoskeletal:      Cervical back: Normal range of motion and neck supple  Right lower leg: Edema (Trace) present  Left lower leg: Edema (Trace) present  Feet:      Right foot:      Skin integrity: Dry skin present  No ulcer, skin breakdown, erythema, warmth or callus  Left foot:      Skin integrity: Dry skin present  No ulcer, skin breakdown, erythema, warmth or callus  Lymphadenopathy:      Cervical: No cervical adenopathy  Skin:     General: Skin is warm and dry  Capillary Refill: Capillary refill takes less than 2 seconds  Neurological:      Mental Status: She is alert and oriented to person, place, and time  Gait: Gait abnormal (Using walker)  Psychiatric:         Mood and Affect: Mood normal          Behavior: Behavior normal        Patient's shoes and socks removed  Right Foot/Ankle   Right Foot Inspection  Skin Exam: skin normal, skin intact and dry skin  No warmth, no callus, no erythema, no maceration, no abnormal color, no pre-ulcer, no ulcer and no callus  Toe Exam: ROM and strength within normal limits    no right toe deformity (Significant onychomycosis)    Sensory   Vibration: diminished  Proprioception: intact  Monofilament testing: intact    Vascular  Capillary refills: < 3 seconds  The right DP pulse is 2+  The right PT pulse is 2+  Left Foot/Ankle  Left Foot Inspection  Skin Exam: skin normal, skin intact and dry skin  No warmth, no erythema, no maceration, normal color, no pre-ulcer, no ulcer and no callus  Toe Exam: ROM and strength within normal limits  No left toe deformity (Significant onychomycosis)  Sensory   Vibration: diminished  Proprioception: intact  Monofilament testing: intact    Vascular  Capillary refills: < 3 seconds  The left DP pulse is 2+  The left PT pulse is 2+  Assign Risk Category  No deformity present  No loss of protective sensation  No weak pulses  Risk: 0      Labs:   Lab Results   Component Value Date    HGBA1C 8 3 (A) 03/25/2022    HGBA1C 9 4 (H) 11/04/2021    HGBA1C 7 4 (H) 06/26/2021     Lab Results   Component Value Date    CREATININE 1 14 01/01/2022    CREATININE 1 23 12/31/2021    CREATININE 1 10 12/16/2021    BUN 34 (H) 01/01/2022    K 5 1 01/01/2022     01/01/2022    CO2 29 01/01/2022     eGFR   Date Value Ref Range Status   01/01/2022 50 ml/min/1 73sq m Final     Lab Results   Component Value Date    HDL 49 (L) 12/14/2021    TRIG 73 12/14/2021     Lab Results   Component Value Date    ALT 31 12/13/2021    AST 37 12/13/2021    ALKPHOS 47 12/13/2021     Lab Results   Component Value Date    YTL7DBKUUBKT 2 409 12/13/2021    ZBW6CLZTUDPB 1 800 01/30/2021     No results found for: FREET4, TSI    Impression & Plan:    Problem List Items Addressed This Visit        Endocrine    Type 2 diabetes mellitus with hyperglycemia, with long-term current use of insulin ([address] ) - Primary (Chronic)     Patient is poorly controlled with hyperglycemia  Counseled on pathophysiology of diabetes   Counseled on negative, long-term effects associated with uncontrolled diabetes including neuropathy, nephropathy, retinopathy, heart attack, and stroke  Patient would be able to test her blood sugars with more ease if she had a lancet device  She is able to use her right hand normally and I instructed her to use her right hand to test her blood sugar in her left fingers  Will contact insurance to see what they cover  Increase Lantus to 40 units nightly  Increase Prandin to 2 mg twice daily with breakfast and dinner  Counseled on appropriate hypoglycemia surveillance and treatment  Reviewed symptoms of hypoglycemia  Patient knows to notify me should she experience these  One she has inappropriate lancing device and glucometer, test blood sugar twice daily  Requested that she document these numbers and bring them to her next appointment  Check labs prior to next appointment in 4 months  Lab Results   Component Value Date    HGBA1C 8 3 (A) 03/25/2022            Relevant Medications    repaglinide (PRANDIN) 2 mg tablet    insulin glargine (Lantus SoloStar) 100 units/mL injection pen    Other Relevant Orders    POCT blood glucose (Completed)    POCT hemoglobin A1c (Completed)    Diabetic foot exam    Comprehensive metabolic panel Lab Collect    HEMOGLOBIN A1C W/ EAG ESTIMATION Lab Collect    Microalbumin / creatinine urine ratio Lab Collect    Lipid panel Lab Collect Lab Collect       Respiratory    YOLIE (obstructive sleep apnea)     Patient reports she is unable to tolerate CPAP  Cardiovascular and Mediastinum    Essential hypertension (Chronic)     /78  Continue current regimen  Other    Hyperlipidemia (Chronic)     Check fasting lipid panel  Continue statin  Orders Placed This Encounter   Procedures    Diabetic foot exam     Standing Status:   Future     Standing Expiration Date:   3/25/2023    Comprehensive metabolic panel Lab Collect     This is a patient instruction: Patient fasting for 8 hours or longer recommended       Standing Status:   Future     Standing Expiration Date:   3/25/2023    HEMOGLOBIN A1C W/ EAG ESTIMATION Lab Collect     Standing Status:   Future     Standing Expiration Date:   3/25/2023    Microalbumin / creatinine urine ratio Lab Collect     Standing Status:   Future     Standing Expiration Date:   3/25/2023    Lipid panel Lab Collect Lab Collect     This is a patient instruction: This test requires patient fasting for 10-12 hours or longer  Drinking of black coffee or black tea is acceptable  Standing Status:   Future     Standing Expiration Date:   3/25/2023    POCT blood glucose    POCT hemoglobin A1c       Patient Instructions   1  Increase Lantus to 40 units nightly  2  Increase Prandin  Take 2 mg twice daily before breakfast and dinner  3  We will contact your insurance and ask about a glucometer and lancet device so you can check your sugars  Discussed with the patient and all questioned fully answered  She will call me if any problems arise  Follow-up appointment in 4 months       Counseled patient on diagnostic results, prognosis, risk and benefit of treatment options, instruction for management, importance of treatment compliance, Risk  factor reduction and impressions    ALEC Ledesma Laceration of scalp, initial encounter

## 2022-03-25 NOTE — PATIENT INSTRUCTIONS
1  Increase Lantus to 40 units nightly  2  Increase Prandin  Take 2 mg twice daily before breakfast and dinner  3  We will contact your insurance and ask about a glucometer and lancet device so you can check your sugars

## 2022-04-27 DIAGNOSIS — K21.9 GASTROESOPHAGEAL REFLUX DISEASE, UNSPECIFIED WHETHER ESOPHAGITIS PRESENT: ICD-10-CM

## 2022-04-27 RX ORDER — PANTOPRAZOLE SODIUM 40 MG/1
TABLET, DELAYED RELEASE ORAL
Qty: 30 TABLET | Refills: 1 | Status: SHIPPED | OUTPATIENT
Start: 2022-04-27 | End: 2022-07-14

## 2022-05-03 DIAGNOSIS — I63.9 CEREBROVASCULAR ACCIDENT (CVA), UNSPECIFIED MECHANISM (HCC): ICD-10-CM

## 2022-05-03 DIAGNOSIS — I48.0 PAROXYSMAL ATRIAL FIBRILLATION (HCC): ICD-10-CM

## 2022-05-03 RX ORDER — METOPROLOL TARTRATE 50 MG/1
50 TABLET, FILM COATED ORAL EVERY 12 HOURS SCHEDULED
Qty: 180 TABLET | Refills: 1 | Status: SHIPPED | OUTPATIENT
Start: 2022-05-03

## 2022-05-17 DIAGNOSIS — Z12.39 ENCOUNTER FOR SCREENING FOR MALIGNANT NEOPLASM OF BREAST, UNSPECIFIED SCREENING MODALITY: Primary | ICD-10-CM

## 2022-05-20 ENCOUNTER — OFFICE VISIT (OUTPATIENT)
Dept: FAMILY MEDICINE CLINIC | Facility: CLINIC | Age: 67
End: 2022-05-20
Payer: COMMERCIAL

## 2022-05-20 VITALS
BODY MASS INDEX: 35.65 KG/M2 | TEMPERATURE: 99.1 F | SYSTOLIC BLOOD PRESSURE: 118 MMHG | OXYGEN SATURATION: 98 % | WEIGHT: 214 LBS | HEIGHT: 65 IN | DIASTOLIC BLOOD PRESSURE: 84 MMHG | HEART RATE: 76 BPM

## 2022-05-20 DIAGNOSIS — I63.9 CEREBROVASCULAR ACCIDENT (CVA), UNSPECIFIED MECHANISM (HCC): ICD-10-CM

## 2022-05-20 DIAGNOSIS — F32.9 CURRENT EPISODE OF MAJOR DEPRESSIVE DISORDER WITHOUT PRIOR EPISODE, UNSPECIFIED DEPRESSION EPISODE SEVERITY: ICD-10-CM

## 2022-05-20 DIAGNOSIS — Z79.4 TYPE 2 DIABETES MELLITUS WITH HYPERGLYCEMIA, WITH LONG-TERM CURRENT USE OF INSULIN (HCC): Chronic | ICD-10-CM

## 2022-05-20 DIAGNOSIS — E11.65 TYPE 2 DIABETES MELLITUS WITH HYPERGLYCEMIA, WITH LONG-TERM CURRENT USE OF INSULIN (HCC): Chronic | ICD-10-CM

## 2022-05-20 DIAGNOSIS — K21.9 GASTROESOPHAGEAL REFLUX DISEASE, UNSPECIFIED WHETHER ESOPHAGITIS PRESENT: Chronic | ICD-10-CM

## 2022-05-20 DIAGNOSIS — N32.81 OAB (OVERACTIVE BLADDER): Primary | ICD-10-CM

## 2022-05-20 DIAGNOSIS — I48.0 PAROXYSMAL ATRIAL FIBRILLATION (HCC): ICD-10-CM

## 2022-05-20 DIAGNOSIS — I10 ESSENTIAL HYPERTENSION: Chronic | ICD-10-CM

## 2022-05-20 PROCEDURE — 99214 OFFICE O/P EST MOD 30 MIN: CPT | Performed by: FAMILY MEDICINE

## 2022-05-20 RX ORDER — FAMOTIDINE 20 MG/1
20 TABLET, FILM COATED ORAL DAILY
Qty: 90 TABLET | Refills: 1 | Status: SHIPPED | OUTPATIENT
Start: 2022-05-20 | End: 2022-06-23 | Stop reason: SDUPTHER

## 2022-05-20 RX ORDER — LINAGLIPTIN 5 MG/1
5 TABLET, FILM COATED ORAL DAILY
Qty: 90 TABLET | Refills: 1 | Status: SHIPPED | OUTPATIENT
Start: 2022-05-20

## 2022-05-20 RX ORDER — LISINOPRIL 5 MG/1
5 TABLET ORAL DAILY
Qty: 90 TABLET | Refills: 1 | Status: SHIPPED | OUTPATIENT
Start: 2022-05-20

## 2022-05-20 RX ORDER — ONDANSETRON 4 MG/1
4 TABLET, ORALLY DISINTEGRATING ORAL EVERY 8 HOURS PRN
Qty: 60 TABLET | Refills: 1 | Status: SHIPPED | OUTPATIENT
Start: 2022-05-20

## 2022-05-20 RX ORDER — ESCITALOPRAM OXALATE 5 MG/1
5 TABLET ORAL DAILY
Qty: 90 TABLET | Refills: 1 | Status: SHIPPED | OUTPATIENT
Start: 2022-05-20

## 2022-05-20 RX ORDER — ATORVASTATIN CALCIUM 80 MG/1
80 TABLET, FILM COATED ORAL
Qty: 90 TABLET | Refills: 1 | Status: SHIPPED | OUTPATIENT
Start: 2022-05-20

## 2022-05-23 NOTE — PATIENT INSTRUCTIONS
10% - bad control"> 10% - bad control,Hemoglobin A1c (HbA1c) greater than 10% indicating poor diabetic control,Haemoglobin A1c greater than 10% indicating poor diabetic control">   Diabetes Mellitus Type 2 in Adults, Ambulatory Care   GENERAL INFORMATION:   Diabetes mellitus type 2  is a disease that affects how your body uses glucose (sugar)  Insulin helps move sugar out of the blood so it can be used for energy  Normally, when the blood sugar level increases, the pancreas makes more insulin  Type 2 diabetes develops because either the body cannot make enough insulin, or it cannot use the insulin correctly  After many years, your pancreas may stop making insulin  Common symptoms include the following:   · More hunger or thirst than usual     · Frequent urination     · Weight loss without trying     · Blurred vision  Seek immediate care for the following symptoms:   · Severe abdominal pain, or pain that spreads to your back  You may also be vomiting  · Trouble staying awake or focusing    · Shaking or sweating    · Blurred or double vision    · Breath has a fruity, sweet smell    · Breathing is deep and labored, or rapid and shallow    · Heartbeat is fast and weak  Treatment for diabetes mellitus type 2  includes keeping your blood sugar at a normal level  You must eat the right foods, and exercise regularly  You may also need medicine if you cannot control your blood sugar level with nutrition and exercise  Manage diabetes mellitus type 2:   · Check your blood sugar level  You will be taught how to check a small drop of blood in a glucose monitor  Ask your healthcare provider when and how often to check during the day  Ask your healthcare provider what your blood sugar levels should be when you check them  · Keep track of carbohydrates (sugar and starchy foods)  Your blood sugar level can get too high if you eat too many carbohydrates   Your dietitian will help you plan meals and snacks that have the right amount of carbohydrates  · Eat low-fat foods  Some examples are skinless chicken and low-fat milk  · Eat less sodium (salt)  Some examples of high-sodium foods to limit are soy sauce, potato chips, and soup  Do not add salt to food you cook  Limit your use of table salt  · Eat high-fiber foods  Foods that are a good source of fiber include vegetables, whole grain bread, and beans  · Limit alcohol  Alcohol affects your blood sugar level and can make it harder to manage your diabetes  Women should limit alcohol to 1 drink a day  Men should limit alcohol to 2 drinks a day  A drink of alcohol is 12 ounces of beer, 5 ounces of wine, or 1½ ounces of liquor  · Get regular exercise  Exercise can help keep your blood sugar level steady, decrease your risk of heart disease, and help you lose weight  Exercise for at least 30 minutes, 5 days a week  Include muscle strengthening activities 2 days each week  Work with your healthcare provider to create an exercise plan  · Check your feet each day  for injuries or open sores  Ask your healthcare provider for activities you can do if you have an open sore  · Quit smoking  If you smoke, it is never too late to quit  Smoking can worsen the problems that may occur with diabetes  Ask your healthcare provider for information about how to stop smoking if you are having trouble quitting  · Ask about your weight:  Ask healthcare providers if you need to lose weight, and how much to lose  Ask them to help you with a weight loss program  Even a 10 to 15 pound weight loss can help you manage your blood sugar level  · Carry medical alert identification  Wear medical alert jewelry or carry a card that says you have diabetes  Ask your healthcare provider where to get these items  · Ask about vaccines  Diabetes puts you at risk of serious illness if you get the flu, pneumonia, or hepatitis   Ask your healthcare provider if you should get a flu, pneumonia, or hepatitis B vaccine, and when to get the vaccine  Follow up with your healthcare provider as directed:  Write down your questions so you remember to ask them during your visits  CARE AGREEMENT:   You have the right to help plan your care  Learn about your health condition and how it may be treated  Discuss treatment options with your caregivers to decide what care you want to receive  You always have the right to refuse treatment  The above information is an  only  It is not intended as medical advice for individual conditions or treatments  Talk to your doctor, nurse or pharmacist before following any medical regimen to see if it is safe and effective for you  © 2014 [address] is for End User's use only and may not be sold, redistributed or otherwise used for commercial purposes  All illustrations and images included in CareNotes® are the copyrighted property of A [employer] M [employer]  or Marlon Ortiz

## 2022-05-23 NOTE — PROGRESS NOTES
Assessment/Plan:    65-year-old woman with: post CVA, type 2 diabetes, major depressive disorder GERD hypertension along with paroxysmal AFib along with overactive bladder will begin trial of morbid trip will continue medications otherwise encouraged follow-up with specialists discussed supportive care return parameters otherwise    No problem-specific Assessment & Plan notes found for this encounter  Diagnoses and all orders for this visit:    OAB (overactive bladder)  -     Mirabegron ER 25 MG TB24; Take 25 mg by mouth in the morning  Cerebrovascular accident (CVA), unspecified mechanism ([address] )  -     atorvastatin (LIPITOR) 80 mg tablet; Take 1 tablet (80 mg total) by mouth daily with dinner  -     rivaroxaban (Xarelto) 20 mg tablet; Take 1 tablet (20 mg total) by mouth daily with breakfast    Current episode of major depressive disorder without prior episode, unspecified depression episode severity  -     escitalopram (LEXAPRO) 5 mg tablet; Take 1 tablet (5 mg total) by mouth in the morning  Gastroesophageal reflux disease, unspecified whether esophagitis present  -     famotidine (PEPCID) 20 mg tablet; Take 1 tablet (20 mg total) by mouth in the morning   -     ondansetron (Zofran ODT) 4 mg disintegrating tablet; Take 1 tablet (4 mg total) by mouth every 8 (eight) hours as needed for nausea or vomiting    Essential hypertension  -     lisinopril (ZESTRIL) 5 mg tablet; Take 1 tablet (5 mg total) by mouth in the morning  Type 2 diabetes mellitus with hyperglycemia, with long-term current use of insulin (HCC)  -     linaGLIPtin (Tradjenta) 5 MG TABS; Take 5 mg by mouth in the morning  Paroxysmal atrial fibrillation (HCC)  -     rivaroxaban (Xarelto) 20 mg tablet;  Take 1 tablet (20 mg total) by mouth daily with breakfast          Subjective:     Chief Complaint   Patient presents with    Follow-up     Patient is here for follow up appointment    Urinary Incontinence     Patient is having urinary incontinence and she is wondering if she can get something for it  Patient ID: Alea Stauffer is a 61 y o  female  Patient is a 72-year-old woman who presents for follow-up on post CVA, type 2 diabetes, major depressive disorder GERD hypertension along with paroxysmal AFib she admits being generally stable on medications but says that she has some urinary incontinence she would like some talk with no fevers chills nausea vomiting tolerating p o  intake no other complaints at this time      The following portions of the patient's history were reviewed and updated as appropriate: allergies, current medications, past family history, past medical history, past social history, past surgical history and problem list     Review of Systems   Constitutional: Negative  HENT: Negative  Eyes: Negative  Respiratory: Negative  Cardiovascular: Negative  Gastrointestinal: Negative  Endocrine: Negative  Genitourinary: Positive for frequency  Musculoskeletal: Negative  Allergic/Immunologic: Negative  Neurological: Negative  Hematological: Negative  Psychiatric/Behavioral: Negative  All other systems reviewed and are negative  Objective:      /84 (BP Location: Right arm, Patient Position: Sitting, Cuff Size: Standard)   Pulse 76   Temp 99 1 °F (37 3 °C) (Temporal)   Ht 5' 5" (1 651 m)   Wt 97 1 kg (214 lb)   LMP  (LMP Unknown)   SpO2 98%   BMI 35 61 kg/m²          Physical Exam  Constitutional:       Appearance: She is well-developed  HENT:      Head: Atraumatic  Right Ear: External ear normal       Left Ear: External ear normal    Eyes:      Conjunctiva/sclera: Conjunctivae normal       Pupils: Pupils are equal, round, and reactive to light  Cardiovascular:      Rate and Rhythm: Normal rate and regular rhythm  Heart sounds: Normal heart sounds  Pulmonary:      Effort: Pulmonary effort is normal  No respiratory distress        Breath sounds: Normal breath sounds  Abdominal:      General: There is no distension  Palpations: Abdomen is soft  Tenderness: There is no abdominal tenderness  There is no guarding or rebound  Musculoskeletal:         General: Normal range of motion  Cervical back: Normal range of motion  Skin:     General: Skin is warm and dry  Neurological:      Mental Status: She is alert and oriented to person, place, and time  Cranial Nerves: No cranial nerve deficit  Psychiatric:         Behavior: Behavior normal          Thought Content: Thought content normal          Judgment: Judgment normal          BMI Counseling: Body mass index is 35 61 kg/m²  The BMI is above normal  Nutrition recommendations include encouraging healthy choices of fruits and vegetables  Exercise recommendations include moderate physical activity 150 minutes/week  Rationale for BMI follow-up plan is due to patient being overweight or obese

## 2022-06-07 LAB
LEFT EYE DIABETIC RETINOPATHY: NORMAL
RIGHT EYE DIABETIC RETINOPATHY: NORMAL
SEVERITY (EYE EXAM): NORMAL

## 2022-06-11 ENCOUNTER — APPOINTMENT (EMERGENCY)
Dept: RADIOLOGY | Facility: HOSPITAL | Age: 67
DRG: 854 | End: 2022-06-11
Payer: COMMERCIAL

## 2022-06-11 ENCOUNTER — APPOINTMENT (EMERGENCY)
Dept: CT IMAGING | Facility: HOSPITAL | Age: 67
DRG: 854 | End: 2022-06-11
Payer: COMMERCIAL

## 2022-06-11 ENCOUNTER — HOSPITAL ENCOUNTER (INPATIENT)
Facility: HOSPITAL | Age: 67
LOS: 2 days | Discharge: HOME HEALTH | DRG: 854 | End: 2022-06-13
Attending: EMERGENCY MEDICINE | Admitting: HOSPITALIST
Payer: COMMERCIAL

## 2022-06-11 DIAGNOSIS — D72.829 LEUKOCYTOSIS: ICD-10-CM

## 2022-06-11 DIAGNOSIS — A41.9 SEPSIS WITHOUT ACUTE ORGAN DYSFUNCTION, DUE TO UNSPECIFIED ORGANISM (HCC): ICD-10-CM

## 2022-06-11 DIAGNOSIS — K35.80 ACUTE APPENDICITIS: Primary | ICD-10-CM

## 2022-06-11 DIAGNOSIS — I69.354 HEMIPLEGIA AND HEMIPARESIS FOLLOWING CEREBRAL INFARCTION AFFECTING LEFT NON-DOMINANT SIDE (HCC): Chronic | ICD-10-CM
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
## 2022-06-11 PROBLEM — I63.50 RIGHT PONTINE STROKE (HCC): Status: RESOLVED | Noted: 2021-12-13 | Resolved: 2022-06-11

## 2022-06-11 PROBLEM — Z00.00 ROUTINE HEALTH MAINTENANCE: Status: RESOLVED | Noted: 2019-04-17 | Resolved: 2022-06-11

## 2022-06-11 PROBLEM — R11.0 NAUSEA: Status: RESOLVED | Noted: 2017-10-17 | Resolved: 2022-06-11

## 2022-06-11 PROBLEM — N18.30 STAGE 3 CHRONIC KIDNEY DISEASE, UNSPECIFIED WHETHER STAGE 3A OR 3B CKD (HCC): Chronic | Status: ACTIVE | Noted: 2022-02-09

## 2022-06-11 PROBLEM — I48.91 ATRIAL FIBRILLATION (HCC): Chronic | Status: ACTIVE | Noted: 2017-10-17

## 2022-06-11 PROBLEM — R19.7 DIARRHEA: Status: RESOLVED | Noted: 2019-03-15 | Resolved: 2022-06-11

## 2022-06-11 PROBLEM — R20.0 LEFT SIDED NUMBNESS: Status: RESOLVED | Noted: 2021-12-31 | Resolved: 2022-06-11

## 2022-06-11 LAB
2HR DELTA HS TROPONIN: 2 NG/L
4HR DELTA HS TROPONIN: -1 NG/L
ABO GROUP BLD: NORMAL
ABO GROUP BLD: NORMAL
ALBUMIN SERPL BCP-MCNC: 3.6 G/DL (ref 3.5–5)
ALP SERPL-CCNC: 56 U/L (ref 34–104)
ALT SERPL W P-5'-P-CCNC: 27 U/L (ref 7–52)
ANION GAP SERPL CALCULATED.3IONS-SCNC: 10 MMOL/L (ref 4–13)
APTT PPP: 29 SECONDS (ref 23–37)
AST SERPL W P-5'-P-CCNC: 38 U/L (ref 13–39)
BASOPHILS # BLD AUTO: 0.03 THOUSANDS/ΜL (ref 0–0.1)
BASOPHILS NFR BLD AUTO: 0 % (ref 0–1)
BILIRUB SERPL-MCNC: 1.14 MG/DL (ref 0.2–1)
BLD GP AB SCN SERPL QL: NEGATIVE
BNP SERPL-MCNC: 77 PG/ML (ref 0–100)
BUN SERPL-MCNC: 36 MG/DL (ref 5–25)
CALCIUM SERPL-MCNC: 8.9 MG/DL (ref 8.4–10.2)
CARDIAC TROPONIN I PNL SERPL HS: 12 NG/L
CARDIAC TROPONIN I PNL SERPL HS: 13 NG/L
CARDIAC TROPONIN I PNL SERPL HS: 15 NG/L
CHLORIDE SERPL-SCNC: 97 MMOL/L (ref 96–108)
CK MB SERPL-MCNC: 1 % (ref 0–2.5)
CK MB SERPL-MCNC: 4.8 NG/ML (ref 0.6–6.3)
CK SERPL-CCNC: 493 U/L (ref 26–192)
CO2 SERPL-SCNC: 23 MMOL/L (ref 21–32)
CREAT SERPL-MCNC: 1.26 MG/DL (ref 0.6–1.3)
EOSINOPHIL # BLD AUTO: 0.06 THOUSAND/ΜL (ref 0–0.61)
EOSINOPHIL NFR BLD AUTO: 1 % (ref 0–6)
ERYTHROCYTE [DISTWIDTH] IN BLOOD BY AUTOMATED COUNT: 12.9 % (ref 11.6–15.1)
FLUAV RNA RESP QL NAA+PROBE: NEGATIVE
FLUBV RNA RESP QL NAA+PROBE: NEGATIVE
GFR SERPL CREATININE-BSD FRML MDRD: 45 ML/MIN/1.73SQ M
GLUCOSE SERPL-MCNC: 180 MG/DL (ref 65–140)
HCT VFR BLD AUTO: 38.3 % (ref 34.8–46.1)
HGB BLD-MCNC: 12.4 G/DL (ref 11.5–15.4)
IMM GRANULOCYTES # BLD AUTO: 0.04 THOUSAND/UL (ref 0–0.2)
IMM GRANULOCYTES NFR BLD AUTO: 0 % (ref 0–2)
INR PPP: 1.19 (ref 0.84–1.19)
LACTATE SERPL-SCNC: 0.9 MMOL/L (ref 0.5–2)
LYMPHOCYTES # BLD AUTO: 1.15 THOUSANDS/ΜL (ref 0.6–4.47)
LYMPHOCYTES NFR BLD AUTO: 9 % (ref 14–44)
MCH RBC QN AUTO: 30.9 PG (ref 26.8–34.3)
MCHC RBC AUTO-ENTMCNC: 32.4 G/DL (ref 31.4–37.4)
MCV RBC AUTO: 96 FL (ref 82–98)
MONOCYTES # BLD AUTO: 0.66 THOUSAND/ΜL (ref 0.17–1.22)
MONOCYTES NFR BLD AUTO: 5 % (ref 4–12)
NEUTROPHILS # BLD AUTO: 10.68 THOUSANDS/ΜL (ref 1.85–7.62)
NEUTS SEG NFR BLD AUTO: 85 % (ref 43–75)
NRBC BLD AUTO-RTO: 0 /100 WBCS
PLATELET # BLD AUTO: 149 THOUSANDS/UL (ref 149–390)
PMV BLD AUTO: 11.9 FL (ref 8.9–12.7)
POTASSIUM SERPL-SCNC: 3.9 MMOL/L (ref 3.5–5.3)
PROCALCITONIN SERPL-MCNC: 15.11 NG/ML
PROT SERPL-MCNC: 7.3 G/DL (ref 6.4–8.4)
PROTHROMBIN TIME: 15 SECONDS (ref 11.6–14.5)
RBC # BLD AUTO: 4.01 MILLION/UL (ref 3.81–5.12)
RH BLD: POSITIVE
RH BLD: POSITIVE
RSV RNA RESP QL NAA+PROBE: NEGATIVE
SARS-COV-2 RNA RESP QL NAA+PROBE: NEGATIVE
SODIUM SERPL-SCNC: 130 MMOL/L (ref 135–147)
SPECIMEN EXPIRATION DATE: NORMAL
TSH SERPL DL<=0.05 MIU/L-ACNC: 4.94 UIU/ML (ref 0.45–4.5)
WBC # BLD AUTO: 12.62 THOUSAND/UL (ref 4.31–10.16)

## 2022-06-11 PROCEDURE — 87040 BLOOD CULTURE FOR BACTERIA: CPT | Performed by: EMERGENCY MEDICINE

## 2022-06-11 PROCEDURE — 72125 CT NECK SPINE W/O DYE: CPT

## 2022-06-11 PROCEDURE — 83880 ASSAY OF NATRIURETIC PEPTIDE: CPT | Performed by: EMERGENCY MEDICINE

## 2022-06-11 PROCEDURE — 85730 THROMBOPLASTIN TIME PARTIAL: CPT | Performed by: EMERGENCY MEDICINE

## 2022-06-11 PROCEDURE — 87186 SC STD MICRODIL/AGAR DIL: CPT | Performed by: EMERGENCY MEDICINE

## 2022-06-11 PROCEDURE — 36415 COLL VENOUS BLD VENIPUNCTURE: CPT | Performed by: EMERGENCY MEDICINE

## 2022-06-11 PROCEDURE — 85025 COMPLETE CBC W/AUTO DIFF WBC: CPT | Performed by: EMERGENCY MEDICINE

## 2022-06-11 PROCEDURE — 84443 ASSAY THYROID STIM HORMONE: CPT | Performed by: EMERGENCY MEDICINE

## 2022-06-11 PROCEDURE — 86900 BLOOD TYPING SEROLOGIC ABO: CPT | Performed by: EMERGENCY MEDICINE

## 2022-06-11 PROCEDURE — 71250 CT THORAX DX C-: CPT

## 2022-06-11 PROCEDURE — G1004 CDSM NDSC: HCPCS

## 2022-06-11 PROCEDURE — 99223 1ST HOSP IP/OBS HIGH 75: CPT | Performed by: PHYSICIAN ASSISTANT

## 2022-06-11 PROCEDURE — 99285 EMERGENCY DEPT VISIT HI MDM: CPT | Performed by: EMERGENCY MEDICINE

## 2022-06-11 PROCEDURE — 82553 CREATINE MB FRACTION: CPT | Performed by: EMERGENCY MEDICINE

## 2022-06-11 PROCEDURE — 74176 CT ABD & PELVIS W/O CONTRAST: CPT

## 2022-06-11 PROCEDURE — 83605 ASSAY OF LACTIC ACID: CPT | Performed by: EMERGENCY MEDICINE

## 2022-06-11 PROCEDURE — 82550 ASSAY OF CK (CPK): CPT | Performed by: EMERGENCY MEDICINE

## 2022-06-11 PROCEDURE — 86850 RBC ANTIBODY SCREEN: CPT | Performed by: EMERGENCY MEDICINE

## 2022-06-11 PROCEDURE — 70450 CT HEAD/BRAIN W/O DYE: CPT

## 2022-06-11 PROCEDURE — 99285 EMERGENCY DEPT VISIT HI MDM: CPT

## 2022-06-11 PROCEDURE — 0241U HB NFCT DS VIR RESP RNA 4 TRGT: CPT | Performed by: EMERGENCY MEDICINE

## 2022-06-11 PROCEDURE — 87154 CUL TYP ID BLD PTHGN 6+ TRGT: CPT | Performed by: EMERGENCY MEDICINE

## 2022-06-11 PROCEDURE — 93005 ELECTROCARDIOGRAM TRACING: CPT

## 2022-06-11 PROCEDURE — 84145 PROCALCITONIN (PCT): CPT | Performed by: EMERGENCY MEDICINE

## 2022-06-11 PROCEDURE — 84484 ASSAY OF TROPONIN QUANT: CPT | Performed by: EMERGENCY MEDICINE

## 2022-06-11 PROCEDURE — 85610 PROTHROMBIN TIME: CPT | Performed by: EMERGENCY MEDICINE

## 2022-06-11 PROCEDURE — 87077 CULTURE AEROBIC IDENTIFY: CPT | Performed by: EMERGENCY MEDICINE

## 2022-06-11 PROCEDURE — 86901 BLOOD TYPING SEROLOGIC RH(D): CPT | Performed by: EMERGENCY MEDICINE

## 2022-06-11 PROCEDURE — 71045 X-RAY EXAM CHEST 1 VIEW: CPT

## 2022-06-11 PROCEDURE — 80053 COMPREHEN METABOLIC PANEL: CPT | Performed by: EMERGENCY MEDICINE

## 2022-06-11 RX ORDER — ENOXAPARIN SODIUM 100 MG/ML
40 INJECTION SUBCUTANEOUS DAILY
Status: DISCONTINUED | OUTPATIENT
Start: 2022-06-12 | End: 2022-06-11

## 2022-06-11 RX ORDER — SODIUM CHLORIDE 9 MG/ML
150 INJECTION, SOLUTION INTRAVENOUS CONTINUOUS
Status: DISCONTINUED | OUTPATIENT
Start: 2022-06-12 | End: 2022-06-13

## 2022-06-11 RX ORDER — INSULIN LISPRO 100 [IU]/ML
1-6 INJECTION, SOLUTION INTRAVENOUS; SUBCUTANEOUS EVERY 6 HOURS SCHEDULED
Status: DISCONTINUED | OUTPATIENT
Start: 2022-06-12 | End: 2022-06-12

## 2022-06-11 RX ADMIN — SODIUM CHLORIDE, SODIUM LACTATE, POTASSIUM CHLORIDE, AND CALCIUM CHLORIDE 1000 ML: .6; .31; .03; .02 INJECTION, SOLUTION INTRAVENOUS at 23:57

## 2022-06-11 RX ADMIN — Medication 4.5 G: at 23:52

## 2022-06-11 NOTE — ED PROVIDER NOTES
History  Chief Complaint   Patient presents with    Diarrhea     N/V/D for the past 2 days and fell on her side trying to clean toilet      HPI    This is a very pleasant, nontoxic, 61-year-old female presents the emergency department via [facility] secondary to a fall  Patient was doing housework and it lost her balance landing on her bottom, no head strike  EMS found the patient sitting on the floor, notes that her oxygen level was in the mid 80s 4 L was placed on the patient via nasal cannula patient oxygen came up to greater than 90%  No history of COPD  Does not wear home oxygen  Patient has a past medical history of overactive bladder, multiple CVAs, major depressive disorder, GERD, essential hypertension, type 2 diabetes with term insulin, paroxysmal atrial fibrillation on Xarelto  Uses is noted the patient has had nausea vomiting diarrhea times 48 hours  Patient's headache left-sided abdominal pain over last 24 hours  Patient did not fall onto her abdomen  Prior to Admission Medications   Prescriptions Last Dose Informant Patient Reported? Taking?    Accu-Chek FastClix Lancets MISC   No No   Sig: Use 2 (two) times a day   Patient not taking: No sig reported   Aspirin 81 MG CAPS   Yes No   Sig: Take 1 tablet by mouth in the morning   B-D ULTRAFINE III SHORT PEN 31G X 8 MM MISC  Self No No   Sig: use as directed   B-D ULTRAFINE III SHORT PEN 31G X 8 MM MISC   No No   Sig: USE AS DIRECTED   B-D ULTRAFINE III SHORT PEN 31G X 8 MM MISC   No No   Sig: use as directed   Blood Glucose Monitoring Suppl (Accu-Chek Guide) w/Device KIT   No No   Sig: Use 2 (two) times a day   Patient not taking: No sig reported   Blood Glucose Monitoring Suppl (OneTouch Verio) w/Device KIT   No No   Sig: Use 2 (two) times a day   Insulin Pen Needle (Pen Needles 5/16") 30G X 8 MM MISC   No No   Sig: Use daily   Lancet Devices (Lancet Device with Ejector) MISC   No No   Sig: Use to check blood sugar twice a day Mirabegron ER 25 MG TB24   No No   Sig: Take 25 mg by mouth in the morning  atorvastatin (LIPITOR) 80 mg tablet   No No   Sig: Take 1 tablet (80 mg total) by mouth daily with dinner   escitalopram (LEXAPRO) 5 mg tablet   No No   Sig: Take 1 tablet (5 mg total) by mouth in the morning  famotidine (PEPCID) 20 mg tablet   No No   Sig: Take 1 tablet (20 mg total) by mouth in the morning  insulin glargine (Lantus SoloStar) 100 units/mL injection pen   No No   Sig: Inject 40 Units under the skin daily at bedtime   linaGLIPtin (Tradjenta) 5 MG TABS   No No   Sig: Take 5 mg by mouth in the morning  lisinopril (ZESTRIL) 5 mg tablet   No No   Sig: Take 1 tablet (5 mg total) by mouth in the morning  metoprolol tartrate (LOPRESSOR) 50 mg tablet   No No   Sig: Take 1 tablet (50 mg total) by mouth every 12 (twelve) hours   ondansetron (ZOFRAN-ODT) 4 mg disintegrating tablet   No No   Sig: Take 1 tablet (4 mg total) by mouth every 8 (eight) hours as needed for nausea or vomiting   ondansetron (Zofran ODT) 4 mg disintegrating tablet   No No   Sig: Take 1 tablet (4 mg total) by mouth every 8 (eight) hours as needed for nausea or vomiting   pantoprazole (PROTONIX) 40 mg tablet   No No   Sig: take 1 tablet by mouth once daily   repaglinide (PRANDIN) 2 mg tablet   No No   Sig: Take 1 tablet (2 mg total) by mouth 2 (two) times a day before meals   rivaroxaban (Xarelto) 20 mg tablet   No No   Sig: Take 1 tablet (20 mg total) by mouth daily with breakfast      Facility-Administered Medications: None       Past Medical History:   Diagnosis Date    Abdominal fibromatosis     Diabetes mellitus ([facility] [address] )     Hyperlipemia     Hypertension     Pneumonia     Stroke (cerebrum) ([facility] [address] ) 12/17/2021    Stroke [facility])        Past Surgical History:   Procedure Laterality Date    CATARACT EXTRACTION Bilateral     CATARACT EXTRACTION, BILATERAL      COLONOSCOPY      EGD      LAPAROTOMY      Exploratory;  Last Assessed 10/17/2017    PEG TUBE PLACEMENT      PEG TUBE REMOVAL         Family History   Problem Relation Age of Onset    No Known Problems Mother     No Known Problems Father      I have reviewed and agree with the history as documented  E-Cigarette/Vaping    E-Cigarette Use Never User      E-Cigarette/Vaping Substances    Nicotine No     THC No     CBD No     Flavoring No     Other No     Unknown No      Social History     Tobacco Use    Smoking status: Never Smoker    Smokeless tobacco: Never Used   Vaping Use    Vaping Use: Never used   Substance Use Topics    Alcohol use: Not Currently     Comment: Socially    Drug use: No       Review of Systems   Constitutional: Negative  HENT: Negative  Eyes: Negative  Respiratory: Negative  Cardiovascular: Negative  Gastrointestinal: Positive for abdominal pain  Endocrine: Negative  Genitourinary: Negative  Musculoskeletal: Negative  Skin: Negative  Allergic/Immunologic: Negative  Neurological: Negative  Hematological: Negative  Psychiatric/Behavioral: Negative  Physical Exam  Physical Exam  Vitals and nursing note reviewed  Constitutional:       Appearance: Normal appearance  She is normal weight  HENT:      Head: Normocephalic and atraumatic  Comments: AIRWAY: Patient maintaining airway maintaining secretions  No stridor  No brawniness under the tongue  Uvula midline without edema  Phonation normal, trachea midline, no trismus, no tenderness along the sternocleidomastoid muscle group, patient is appropriately masked  No tenderness along the platysmas muscle group, no injuries noted in the zone 1, 2, 3 of the neck  Right Ear: External ear normal       Left Ear: External ear normal       Nose: Nose normal       Mouth/Throat:      Mouth: Mucous membranes are moist       Pharynx: Oropharynx is clear  Eyes:      Extraocular Movements: Extraocular movements intact        Conjunctiva/sclera: Conjunctivae normal       Pupils: Pupils are equal, round, and reactive to light  Cardiovascular:      Rate and Rhythm: Normal rate and regular rhythm  Pulses: Normal pulses  Heart sounds: Normal heart sounds  Comments: CARDIOVASCULAR / CIRCULATORY: Peripheral pulses in upper and lower extremity intact  Pulmonary:      Effort: Pulmonary effort is normal       Breath sounds: Normal breath sounds  Comments: BREATHING:  No flail segments noted on exam, equal breath sounds in the posterior and anterior lung fields, no tenderness upon palpation of the anterior and posterior ribcage's / thoracic chest wall  No bruising, no contusions, no outward signs of trauma or swelling noted  Abdominal:      General: Abdomen is flat  Bowel sounds are normal    Musculoskeletal:         General: Normal range of motion  Cervical back: Normal range of motion  Comments: DEFORMITY / DEFICIT: GCS: 15, LÓPEZ x 3, no outward signs of trauma  PELVIS STABLE  EXPOSURE: No outward signs of trauma  Skin:     General: Skin is warm  Capillary Refill: Capillary refill takes less than 2 seconds  Neurological:      Mental Status: She is alert and oriented to person, place, and time  Mental status is at baseline  Comments: L upper and lower extremity weakness noted from previous stroke  Psychiatric:         Mood and Affect: Mood normal          Behavior: Behavior normal          Thought Content:  Thought content normal          Judgment: Judgment normal          Vital Signs  ED Triage Vitals   Temperature Pulse Respirations Blood Pressure SpO2   06/11/22 1956 06/11/22 1950 06/11/22 1941 06/11/22 1941 06/11/22 1950   98 3 °F (36 8 °C) (!) 111 18 142/87 100 %      Temp Source Heart Rate Source Patient Position - Orthostatic VS BP Location FiO2 (%)   06/11/22 1956 06/11/22 1941 06/11/22 1941 06/11/22 1941 --   Oral Monitor Lying Left arm       Pain Score       --                  Vitals:    06/11/22 2300 06/11/22 2305 06/11/22 2310 06/11/22 2315   BP:       Pulse: 105 (!) 106 (!) 107 (!) 109   Patient Position - Orthostatic VS:             Visual Acuity  Visual Acuity    Flowsheet Row Most Recent Value   L Pupil Size (mm) 3   R Pupil Size (mm) 3          ED Medications  Medications   piperacillin-tazobactam (ZOSYN) IVPB 4 5 g (has no administration in time range)   enoxaparin (LOVENOX) subcutaneous injection 40 mg (has no administration in time range)   lactated ringers bolus 1,000 mL (has no administration in time range)     Followed by   lactated ringers bolus 1,000 mL (has no administration in time range)     Followed by   lactated ringers bolus 1,000 mL (has no administration in time range)   sodium chloride 0 9 % infusion (has no administration in time range)       Diagnostic Studies  Results Reviewed     Procedure Component Value Units Date/Time    Procalcitonin [[id number]]  (Abnormal) Collected: 06/11/22 2235    Lab Status: Final result Specimen: Blood from Arm, Right Updated: 06/11/22 2310     Procalcitonin 15 11 ng/ml     HS Troponin I 4hr [[id number]]  (Normal) Collected: 06/11/22 2235    Lab Status: Final result Specimen: Blood from Arm, Right Updated: 06/11/22 2309     hs TnI 4hr 12 ng/L      Delta 4hr hsTnI -1 ng/L     Lactic acid [[id number]]  (Normal) Collected: 06/11/22 2235    Lab Status: Final result Specimen: Blood from Arm, Right Updated: 06/11/22 2300     LACTIC ACID 0 9 mmol/L     Narrative:      Result may be elevated if tourniquet was used during collection  Blood culture #2 [[id number]] Collected: 06/11/22 2235    Lab Status: In process Specimen: Blood from Arm, Right Updated: 06/11/22 2240    Blood culture #1 [[id number]] Collected: 06/11/22 2235    Lab Status:  In process Specimen: Blood from Arm, Right Updated: 06/11/22 2240    HS Troponin I 2hr [[id number]]  (Normal) Collected: 06/11/22 2200    Lab Status: Final result Specimen: Blood from Arm, Right Updated: 06/11/22 2231     hs TnI 2hr 15 ng/L      Delta 2hr hsTnI 2 ng/L     UA w Reflex to Microscopic w Reflex to Culture [[id number]]     Lab Status: No result Specimen: Urine     CKMB [[id number]]  (Normal) Collected: 06/11/22 2009    Lab Status: Final result Specimen: Blood from Arm, Right Updated: 06/11/22 2107     CK-MB Index 1 0 %      CK-MB 4 8 ng/mL     COVID/FLU/RSV - 2 hour TAT [[id number]]  (Normal) Collected: 06/11/22 2003    Lab Status: Final result Specimen: Nares from Nose Updated: 06/11/22 2050     SARS-CoV-2 Negative     INFLUENZA A PCR Negative     INFLUENZA B PCR Negative     RSV PCR Negative    Narrative:      FOR PEDIATRIC PATIENTS - copy/paste COVID Guidelines URL to browser: [email or web address]  [employer]    SARS-CoV-2 assay is a Nucleic Acid Amplification assay intended for the  qualitative detection of nucleic acid from SARS-CoV-2 in nasopharyngeal  swabs  Results are for the presumptive identification of SARS-CoV-2 RNA  Positive results are indicative of infection with SARS-CoV-2, the virus  causing COVID-19, but do not rule out bacterial infection or co-infection  with other viruses  Laboratories within the [place] and its  territories are required to report all positive results to the appropriate  public health authorities  Negative results do not preclude SARS-CoV-2  infection and should not be used as the sole basis for treatment or other  patient management decisions  Negative results must be combined with  clinical observations, patient history, and epidemiological information  This test has not been FDA cleared or approved  This test has been authorized by FDA under an Emergency Use Authorization  (EUA)   This test is only authorized for the duration of time the  declaration that circumstances exist justifying the authorization of the  emergency use of an in vitro diagnostic tests for detection of SARS-CoV-2  virus and/or diagnosis of COVID-19 infection under section 564(b)(1) of  the Act, [place]  360bbb-3(b)(1), unless the authorization is terminated  or revoked sooner  The test has been validated but independent review by FDA  and CLIA is pending  Test performed using [employer] Gene[employer]pert: This RT-PCR assay targets N2,  a region unique to SARS-CoV-2  A conserved region in the E-gene was chosen  for pan-Sarbecovirus detection which includes SARS-CoV-2  TSH [[id number]]  (Abnormal) Collected: 06/11/22 2009    Lab Status: Final result Specimen: Blood from Arm, Right Updated: 06/11/22 2050     TSH 3RD GENERATON 4 942 uIU/mL     Narrative:      Patients undergoing fluorescein dye angiography may retain small amounts of fluorescein in the body for 48-72 hours post procedure  Samples containing fluorescein can produce falsely depressed TSH values  If the patient had this procedure,a specimen should be resubmitted post fluorescein clearance        HS Troponin 0hr (reflex protocol) [[id number]]  (Normal) Collected: 06/11/22 2009    Lab Status: Final result Specimen: Blood from Arm, Right Updated: 06/11/22 2041     hs TnI 0hr 13 ng/L     B-Type Natriuretic Peptide(BNP) AN, CA, EA Campuses Only [[id number]]  (Normal) Collected: 06/11/22 2009    Lab Status: Final result Specimen: Blood from Arm, Right Updated: 06/11/22 2040     BNP 77 pg/mL     CK Total with Reflex CKMB [[id number]]  (Abnormal) Collected: 06/11/22 2009    Lab Status: Final result Specimen: Blood from Arm, Right Updated: 06/11/22 2034     Total  U/L     Comprehensive metabolic panel [[id number]]  (Abnormal) Collected: 06/11/22 2009    Lab Status: Final result Specimen: Blood from Arm, Right Updated: 06/11/22 2034     Sodium 130 mmol/L      Potassium 3 9 mmol/L      Chloride 97 mmol/L      CO2 23 mmol/L      ANION GAP 10 mmol/L      BUN 36 mg/dL      Creatinine 1 26 mg/dL      Glucose 180 mg/dL      Calcium 8 9 mg/dL      AST 38 U/L      ALT 27 U/L      Alkaline Phosphatase 56 U/L      Total Protein 7 3 g/dL      Albumin 3 6 g/dL      Total Bilirubin 1 14 mg/dL      eGFR 45 ml/min/1 73sq m     Narrative:      [place] guidelines for Chronic Kidney Disease (CKD):     Stage 1 with normal or high GFR (GFR > 90 mL/min/1 73 square meters)    Stage 2 Mild CKD (GFR = 60-89 mL/min/1 73 square meters)    Stage 3A Moderate CKD (GFR = 45-59 mL/min/1 73 square meters)    Stage 3B Moderate CKD (GFR = 30-44 mL/min/1 73 square meters)    Stage 4 Severe CKD (GFR = 15-29 mL/min/1 73 square meters)    Stage 5 End Stage CKD (GFR <15 mL/min/1 73 square meters)  Note: GFR calculation is accurate only with a steady state creatinine    Protime-INR [[id number]]  (Abnormal) Collected: 06/11/22 2009    Lab Status: Final result Specimen: Blood from Arm, Right Updated: 06/11/22 2030     Protime 15 0 seconds      INR 1 19    APTT [[id number]]  (Normal) Collected: 06/11/22 2009    Lab Status: Final result Specimen: Blood from Arm, Right Updated: 06/11/22 2030     PTT 29 seconds     CBC and differential [[id number]]  (Abnormal) Collected: 06/11/22 2009    Lab Status: Final result Specimen: Blood from Arm, Right Updated: 06/11/22 2016     WBC 12 62 Thousand/uL      RBC 4 01 Million/uL      Hemoglobin 12 4 g/dL      Hematocrit 38 3 %      MCV 96 fL      MCH 30 9 pg      MCHC 32 4 g/dL      RDW 12 9 %      MPV 11 9 fL      Platelets 072 Thousands/uL      nRBC 0 /100 WBCs      Neutrophils Relative 85 %      Immat GRANS % 0 %      Lymphocytes Relative 9 %      Monocytes Relative 5 %      Eosinophils Relative 1 %      Basophils Relative 0 %      Neutrophils Absolute 10 68 Thousands/µL      Immature Grans Absolute 0 04 Thousand/uL      Lymphocytes Absolute 1 15 Thousands/µL      Monocytes Absolute 0 66 Thousand/µL      Eosinophils Absolute 0 06 Thousand/µL      Basophils Absolute 0 03 Thousands/µL     UA w Reflex to Microscopic w Reflex to Culture [[id number]]     Lab Status: No result Specimen: Urine                  TRAUMA - CT spine cervical wo contrast Final Result by Lesley Butts MD (06/11 2218)      No cervical spine fracture or traumatic malalignment  Workstation performed: [id number]         TRAUMA - CT head wo contrast   Final Result by Lesley Butts MD (06/11 2217)      No acute intracranial abnormality  Microangiopathic changes  Workstation performed: [id number]         CT chest abdomen pelvis wo contrast   Final Result by Lesley Butts MD (06/11 2216)      1  No evidence of traumatic injury on this limited noncontrast study  2   Findings consistent with acute appendicitis  No discrete fluid collection to indicate an abscess  Surgical consultation is recommended  3   Distended urinary bladder demonstrating mild circumferential wall thickening  Correlate with urinalysis to exclude cystitis  4   3 5 cm right ovarian cystic structure  Recommend follow-up with pelvic ultrasound in 3-6 months  5   Focal irregular subpleural opacity in the right lower lobe at the level of chronic rib deformities  This may be scar-related, however chest CT in 3 months is recommended to assess for stability  I personally discussed this study with Gertrude Unger III on 6/11/2022 at 10:12 PM                   Workstation performed: [id number]         CT recon only thoracolumbar (no charge)   Final Result by Lesley Butts MD (06/11 2218)      No fracture or traumatic subluxation  Workstation performed: [id number]         XR chest 1 view portable   ED Interpretation by Corky Lujan III, DO (06/11 2039)   Rib fracture on the 6th rib on the right side                   Procedures  ECG 12 Lead Documentation Only    Date/Time: 6/11/2022 9:58 PM  Performed by: Florian Silveira DO  Authorized by: Corky Lujan III, DO     Indications / Diagnosis:  Abdominal pain  ECG reviewed by me, the ED Provider: yes    Patient location:  ED  Comments:      I personally reviewed this EKG is performed the patient June 11, 2022, EKG was performed at 9:57 p m  and interpreted by me at 9:58 p m   Sinus tachycardia with ventricular rate of 106 beats per minute, OR interval 170 milliseconds remaining portion of was within normal limits  No diffuse elevations to indicate pericarditis  No coved ST elevations greater than 2mm with negative T waves in V1-3 to indicate concern for brugada  No biphasic T waves in V2, V3 to indicate Wellens (critical stenosis of LAD)  No elevation in aVR or deviation when compared to V1 (can be associated with ST depression in I,II, V4-6 when left main occlusion is present)  POC FAST US    Date/Time: 6/11/2022 8:43 PM  Performed by: Karen Hernadez DO  Authorized by: Karen Hernadez DO     Patient location:  ED  Other Assisting Provider: No    Procedure details:     Exam Type:  Diagnostic    Indications: blunt abdominal trauma and blunt chest trauma      Assess for:  Intra-abdominal fluid and pericardial effusion    Technique: FAST      Views obtained:  Heart - Pericardial sac, RUQ - Ballesteros's Pouch and LUQ - Splenorenal space    Image quality: diagnostic      Image availability:  Not saved  FAST Findings:     RUQ (Hepatorenal) free fluid: absent      LUQ (Splenorenal) free fluid: absent      Suprapubic free fluid: absent      Cardiac wall motion: identified      Pericardial effusion: absent    Interpretation:     Impressions: negative               ED Course  ED Course as of 06/11/22 2326   Sat Jun 11, 2022 2039 Reviewed chest x-ray noted the patient has a right-sided rib fracture, patient is a poor historian stated that she did not fall on to her chest   Due to this finding will upgrade the patient to a trauma evaluation  2043 TRAUMA EVAL CALLED BY ME,  called, primary RN informed  2048 FAST SCAN NEGATIVE  2214 Received call and from Radiology, acute appendicitis     2218 CT showed the following results: IMPRESSION:     1  No evidence of traumatic injury on this limited noncontrast study      2  Findings consistent with acute appendicitis  No discrete fluid collection to indicate an abscess  Surgical consultation is recommended      3  Distended urinary bladder demonstrating mild circumferential wall thickening  Correlate with urinalysis to exclude cystitis      4   3 5 cm right ovarian cystic structure  Recommend follow-up with pelvic ultrasound in 3-6 months      5  Focal irregular subpleural opacity in the right lower lobe at the level of chronic rib deformities  This may be scar-related, however chest CT in 3 months is recommended to assess for stability  2256 Case d/w Dr Yarely Gonzalez, due to the patient's significant medical problems, advised patient to proceed with admission to the hospital via the hospitalist service, he will see the patient in consultation, requested Zosyn to be started q 6 hours  2303 Case d/w ASHTYN Mccann PA-C, will accept to the [facility] service under the care of Dr Misty Whalen  HEART Risk Score    Flowsheet Row Most Recent Value   Heart Score Risk Calculator    History 0 Filed at: 06/11/2022 2150   ECG 0 Filed at: 06/11/2022 2150   Age 2 Filed at: 06/11/2022 2150   Risk Factors 1 Filed at: 06/11/2022 2150   Troponin 1 Filed at: 06/11/2022 2150   HEART Score 4 Filed at: 06/11/2022 2150                        SBIRT 22yo+    Flowsheet Row Most Recent Value   SBIRT (25 yo +)    In order to provide better care to our patients, we are screening all of our patients for alcohol and drug use  Would it be okay to ask you these screening questions? Yes Filed at: 06/11/2022 1954   Initial Alcohol Screen: US AUDIT-C     1  How often do you have a drink containing alcohol? 0 Filed at: 06/11/2022 1954   2  How many drinks containing alcohol do you have on a typical day you are drinking? 0 Filed at: 06/11/2022 1954   3a  Male UNDER 65:  How often do you have five or more drinks on one occasion? 0 Filed at: 06/11/2022 1954   3b  FEMALE Any Age, or MALE 65+: How often do you have 4 or more drinks on one occassion? 0 Filed at: 06/11/2022 1954   Audit-C Score 0 Filed at: 06/11/2022 1954   MIKAEL: How many times in the past year have you    Used an illegal drug or used a prescription medication for non-medical reasons? Never Filed at: 06/11/2022 1954                    MDM  Number of Diagnoses or Management Options  Acute appendicitis  Fall  Leukocytosis  Diagnosis management comments: Previous multiple strokes, left-sided weakness upper lower extremity which is chronic, presents the emergency department mechanical fall, patient noted she was on the ground for approximately 1 hour based on the CK levels most likely longer than that, no LOC, patient is a poor historian, CT the head cervical spine thoracic and lumbar recons negative  No traumatic injuries noted on the chest abdomen pelvis, patient had chronic rib fractures with chronic deformities ribs 1 through 7, please see report  Patient found to have appendicitis on CT scan with a leukocytosis, Zosyn ordered as per surgery, due to patient's complex medical problems, surgery requested patient be admitted to the hospitalist service  - Patient is able to range neck to greater than 45 degrees to LEFT and RIGHT  Patient is able to touch chin to chest and hyper-extend without eliciting pain  Patient has no midline tenderness   5/5  No numbness/tingling in the arms  2+ radials  No carotid bruit  Palpable carotid pulses that are equal      Portions of the record may have been created with voice recognition software  Occasional wrong word or "sound a like" substitutions may have occurred due to the inherent limitations of voice recognition software  Read the chart carefully and recognize, using context, where substitutions have occurred         Amount and/or Complexity of Data Reviewed  Clinical lab tests: ordered and reviewed  Tests in the radiology section of CPT®: ordered and reviewed  Tests in the medicine section of CPT®: ordered and reviewed  Discussion of test results with the performing providers: yes  Decide to obtain previous medical records or to obtain history from someone other than the patient: yes  Review and summarize past medical records: yes  Discuss the patient with other providers: yes  Independent visualization of images, tracings, or specimens: yes        Disposition  Final diagnoses:   Acute appendicitis   Fall   Leukocytosis     Time reflects when diagnosis was documented in both MDM as applicable and the Disposition within this note     Time User Action Codes Description Comment    6/11/2022 11:04 PM Vickie Ren Add [K35 80] Acute appendicitis     6/11/2022 11:04 PM [address] [place], [address] [place]  XXXA] Fall     6/11/2022 11:06 PM Vickie Ren Add [R09 02] Hypoxia     6/11/2022 11:06 PM Vickie Ren Remove [R09 [id number]] Hypoxia     6/11/2022 11:06 PM Vickie Ren Add [R13 [id number]] Leukocytosis       ED Disposition     ED Disposition   Admit    Condition   Stable    Date/Time   Sat Jun 11, 2022 11:04 PM    Comment   Case was discussed with ASHTYN Downing PA-C and the patient's admission status was agreed to be Admission Status: inpatient status to the service of Dr Jigna Souza   Follow-up Information    None         Patient's Medications   Discharge Prescriptions    No medications on file       No discharge procedures on file      PDMP Review       Value Time User    PDMP Reviewed  Yes 12/31/2021 10:45 PM Laura Calle DO          ED Provider  Electronically Signed by           Laura Lopes III, DO  06/11/22 1678

## 2022-06-12 ENCOUNTER — ANESTHESIA (INPATIENT)
Dept: PERIOP | Facility: HOSPITAL | Age: 67
DRG: 854 | End: 2022-06-12
Payer: COMMERCIAL

## 2022-06-12 ENCOUNTER — ANESTHESIA EVENT (INPATIENT)
Dept: PERIOP | Facility: HOSPITAL | Age: 67
DRG: 854 | End: 2022-06-12
Payer: COMMERCIAL

## 2022-06-12 PROBLEM — R93.89 ABNORMAL CT OF THE CHEST: Status: ACTIVE | Noted: 2022-06-12

## 2022-06-12 PROBLEM — N83.201 CYST OF RIGHT OVARY: Status: ACTIVE | Noted: 2022-06-12

## 2022-06-12 LAB
ANION GAP SERPL CALCULATED.3IONS-SCNC: 8 MMOL/L (ref 4–13)
BUN SERPL-MCNC: 28 MG/DL (ref 5–25)
CALCIUM SERPL-MCNC: 8.5 MG/DL (ref 8.4–10.2)
CHLORIDE SERPL-SCNC: 100 MMOL/L (ref 96–108)
CO2 SERPL-SCNC: 25 MMOL/L (ref 21–32)
CREAT SERPL-MCNC: 1.11 MG/DL (ref 0.6–1.3)
ERYTHROCYTE [DISTWIDTH] IN BLOOD BY AUTOMATED COUNT: 12.8 % (ref 11.6–15.1)
GFR SERPL CREATININE-BSD FRML MDRD: 52 ML/MIN/1.73SQ M
GLUCOSE SERPL-MCNC: 172 MG/DL (ref 65–140)
GLUCOSE SERPL-MCNC: 173 MG/DL (ref 65–140)
GLUCOSE SERPL-MCNC: 180 MG/DL (ref 65–140)
GLUCOSE SERPL-MCNC: 182 MG/DL (ref 65–140)
GLUCOSE SERPL-MCNC: 199 MG/DL (ref 65–140)
GLUCOSE SERPL-MCNC: 229 MG/DL (ref 65–140)
HCT VFR BLD AUTO: 33.5 % (ref 34.8–46.1)
HGB BLD-MCNC: 10.9 G/DL (ref 11.5–15.4)
MCH RBC QN AUTO: 31.2 PG (ref 26.8–34.3)
MCHC RBC AUTO-ENTMCNC: 32.5 G/DL (ref 31.4–37.4)
MCV RBC AUTO: 96 FL (ref 82–98)
PLATELET # BLD AUTO: 137 THOUSANDS/UL (ref 149–390)
PMV BLD AUTO: 12 FL (ref 8.9–12.7)
POTASSIUM SERPL-SCNC: 4.1 MMOL/L (ref 3.5–5.3)
PROCALCITONIN SERPL-MCNC: 12.13 NG/ML
RBC # BLD AUTO: 3.49 MILLION/UL (ref 3.81–5.12)
SODIUM SERPL-SCNC: 133 MMOL/L (ref 135–147)
WBC # BLD AUTO: 11.21 THOUSAND/UL (ref 4.31–10.16)

## 2022-06-12 PROCEDURE — 85027 COMPLETE CBC AUTOMATED: CPT | Performed by: PHYSICIAN ASSISTANT

## 2022-06-12 PROCEDURE — 80048 BASIC METABOLIC PNL TOTAL CA: CPT | Performed by: PHYSICIAN ASSISTANT

## 2022-06-12 PROCEDURE — 82948 REAGENT STRIP/BLOOD GLUCOSE: CPT

## 2022-06-12 PROCEDURE — 99222 1ST HOSP IP/OBS MODERATE 55: CPT | Performed by: SURGERY

## 2022-06-12 PROCEDURE — 36415 COLL VENOUS BLD VENIPUNCTURE: CPT | Performed by: PHYSICIAN ASSISTANT

## 2022-06-12 PROCEDURE — 88304 TISSUE EXAM BY PATHOLOGIST: CPT | Performed by: PATHOLOGY

## 2022-06-12 PROCEDURE — 99233 SBSQ HOSP IP/OBS HIGH 50: CPT | Performed by: HOSPITALIST

## 2022-06-12 PROCEDURE — NC001 PR NO CHARGE: Performed by: PHYSICIAN ASSISTANT

## 2022-06-12 PROCEDURE — 84145 PROCALCITONIN (PCT): CPT | Performed by: PHYSICIAN ASSISTANT

## 2022-06-12 PROCEDURE — 0DTJ4ZZ RESECTION OF APPENDIX, PERCUTANEOUS ENDOSCOPIC APPROACH: ICD-10-PCS | Performed by: SURGERY

## 2022-06-12 PROCEDURE — 44970 LAPAROSCOPY APPENDECTOMY: CPT | Performed by: PHYSICIAN ASSISTANT

## 2022-06-12 PROCEDURE — 44970 LAPAROSCOPY APPENDECTOMY: CPT | Performed by: SURGERY

## 2022-06-12 RX ORDER — INSULIN LISPRO 100 [IU]/ML
1-6 INJECTION, SOLUTION INTRAVENOUS; SUBCUTANEOUS
Status: DISCONTINUED | OUTPATIENT
Start: 2022-06-12 | End: 2022-06-12

## 2022-06-12 RX ORDER — HEPARIN SODIUM 5000 [USP'U]/ML
5000 INJECTION, SOLUTION INTRAVENOUS; SUBCUTANEOUS EVERY 8 HOURS SCHEDULED
Status: DISCONTINUED | OUTPATIENT
Start: 2022-06-12 | End: 2022-06-13 | Stop reason: HOSPADM

## 2022-06-12 RX ORDER — INSULIN LISPRO 100 [IU]/ML
1-6 INJECTION, SOLUTION INTRAVENOUS; SUBCUTANEOUS
Status: DISCONTINUED | OUTPATIENT
Start: 2022-06-13 | End: 2022-06-13 | Stop reason: HOSPADM

## 2022-06-12 RX ORDER — HYDROMORPHONE HCL/PF 1 MG/ML
0.2 SYRINGE (ML) INJECTION
Status: DISCONTINUED | OUTPATIENT
Start: 2022-06-12 | End: 2022-06-13 | Stop reason: HOSPADM

## 2022-06-12 RX ORDER — INSULIN LISPRO 100 [IU]/ML
1-5 INJECTION, SOLUTION INTRAVENOUS; SUBCUTANEOUS
Status: DISCONTINUED | OUTPATIENT
Start: 2022-06-12 | End: 2022-06-13 | Stop reason: HOSPADM

## 2022-06-12 RX ORDER — MAGNESIUM HYDROXIDE 1200 MG/15ML
LIQUID ORAL AS NEEDED
Status: DISCONTINUED | OUTPATIENT
Start: 2022-06-12 | End: 2022-06-12 | Stop reason: HOSPADM

## 2022-06-12 RX ORDER — GLYCOPYRROLATE 0.2 MG/ML
INJECTION INTRAMUSCULAR; INTRAVENOUS AS NEEDED
Status: DISCONTINUED | OUTPATIENT
Start: 2022-06-12 | End: 2022-06-12

## 2022-06-12 RX ORDER — INSULIN GLARGINE 100 [IU]/ML
15 INJECTION, SOLUTION SUBCUTANEOUS
Status: DISCONTINUED | OUTPATIENT
Start: 2022-06-12 | End: 2022-06-13 | Stop reason: HOSPADM

## 2022-06-12 RX ORDER — LIDOCAINE HYDROCHLORIDE 20 MG/ML
INJECTION, SOLUTION EPIDURAL; INFILTRATION; INTRACAUDAL; PERINEURAL AS NEEDED
Status: DISCONTINUED | OUTPATIENT
Start: 2022-06-12 | End: 2022-06-12

## 2022-06-12 RX ORDER — PROPOFOL 10 MG/ML
INJECTION, EMULSION INTRAVENOUS AS NEEDED
Status: DISCONTINUED | OUTPATIENT
Start: 2022-06-12 | End: 2022-06-12

## 2022-06-12 RX ORDER — ACETAMINOPHEN 325 MG/1
650 TABLET ORAL EVERY 6 HOURS PRN
Status: DISCONTINUED | OUTPATIENT
Start: 2022-06-12 | End: 2022-06-13 | Stop reason: HOSPADM

## 2022-06-12 RX ORDER — BUPIVACAINE HYDROCHLORIDE 5 MG/ML
INJECTION, SOLUTION PERINEURAL AS NEEDED
Status: DISCONTINUED | OUTPATIENT
Start: 2022-06-12 | End: 2022-06-12 | Stop reason: HOSPADM

## 2022-06-12 RX ORDER — HYDROCODONE BITARTRATE AND ACETAMINOPHEN 5; 325 MG/1; MG/1
1 TABLET ORAL EVERY 6 HOURS PRN
Status: DISCONTINUED | OUTPATIENT
Start: 2022-06-12 | End: 2022-06-13 | Stop reason: HOSPADM

## 2022-06-12 RX ORDER — FAMOTIDINE 20 MG/1
20 TABLET, FILM COATED ORAL DAILY
Status: DISCONTINUED | OUTPATIENT
Start: 2022-06-12 | End: 2022-06-13 | Stop reason: HOSPADM

## 2022-06-12 RX ORDER — METOPROLOL TARTRATE 50 MG/1
50 TABLET, FILM COATED ORAL EVERY 12 HOURS SCHEDULED
Status: DISCONTINUED | OUTPATIENT
Start: 2022-06-12 | End: 2022-06-13 | Stop reason: HOSPADM

## 2022-06-12 RX ORDER — ALBUTEROL SULFATE 2.5 MG/3ML
2.5 SOLUTION RESPIRATORY (INHALATION) ONCE AS NEEDED
Status: DISCONTINUED | OUTPATIENT
Start: 2022-06-12 | End: 2022-06-12 | Stop reason: HOSPADM

## 2022-06-12 RX ORDER — METOCLOPRAMIDE HYDROCHLORIDE 5 MG/ML
INJECTION INTRAMUSCULAR; INTRAVENOUS AS NEEDED
Status: DISCONTINUED | OUTPATIENT
Start: 2022-06-12 | End: 2022-06-12

## 2022-06-12 RX ORDER — CEFAZOLIN SODIUM 1 G/3ML
INJECTION, POWDER, FOR SOLUTION INTRAMUSCULAR; INTRAVENOUS AS NEEDED
Status: DISCONTINUED | OUTPATIENT
Start: 2022-06-12 | End: 2022-06-12

## 2022-06-12 RX ORDER — DEXAMETHASONE SODIUM PHOSPHATE 10 MG/ML
INJECTION, SOLUTION INTRAMUSCULAR; INTRAVENOUS AS NEEDED
Status: DISCONTINUED | OUTPATIENT
Start: 2022-06-12 | End: 2022-06-12

## 2022-06-12 RX ORDER — LISINOPRIL 5 MG/1
5 TABLET ORAL DAILY
Status: DISCONTINUED | OUTPATIENT
Start: 2022-06-12 | End: 2022-06-13 | Stop reason: HOSPADM

## 2022-06-12 RX ORDER — HYDROMORPHONE HCL/PF 1 MG/ML
0.5 SYRINGE (ML) INJECTION
Status: DISCONTINUED | OUTPATIENT
Start: 2022-06-12 | End: 2022-06-12 | Stop reason: HOSPADM

## 2022-06-12 RX ORDER — METOPROLOL TARTRATE 50 MG/1
50 TABLET, FILM COATED ORAL EVERY 12 HOURS SCHEDULED
Status: DISCONTINUED | OUTPATIENT
Start: 2022-06-12 | End: 2022-06-12

## 2022-06-12 RX ORDER — ASPIRIN 81 MG/1
81 TABLET ORAL DAILY
Status: DISCONTINUED | OUTPATIENT
Start: 2022-06-12 | End: 2022-06-13 | Stop reason: HOSPADM

## 2022-06-12 RX ORDER — ONDANSETRON 2 MG/ML
4 INJECTION INTRAMUSCULAR; INTRAVENOUS ONCE AS NEEDED
Status: DISCONTINUED | OUTPATIENT
Start: 2022-06-12 | End: 2022-06-12 | Stop reason: HOSPADM

## 2022-06-12 RX ORDER — ROCURONIUM BROMIDE 10 MG/ML
INJECTION, SOLUTION INTRAVENOUS AS NEEDED
Status: DISCONTINUED | OUTPATIENT
Start: 2022-06-12 | End: 2022-06-12

## 2022-06-12 RX ORDER — ESCITALOPRAM OXALATE 5 MG/1
5 TABLET ORAL DAILY
Status: DISCONTINUED | OUTPATIENT
Start: 2022-06-12 | End: 2022-06-13 | Stop reason: HOSPADM

## 2022-06-12 RX ORDER — FENTANYL CITRATE 50 UG/ML
INJECTION, SOLUTION INTRAMUSCULAR; INTRAVENOUS AS NEEDED
Status: DISCONTINUED | OUTPATIENT
Start: 2022-06-12 | End: 2022-06-12

## 2022-06-12 RX ORDER — PANTOPRAZOLE SODIUM 40 MG/1
40 TABLET, DELAYED RELEASE ORAL DAILY
Status: DISCONTINUED | OUTPATIENT
Start: 2022-06-12 | End: 2022-06-13 | Stop reason: HOSPADM

## 2022-06-12 RX ORDER — HYDROCODONE BITARTRATE AND ACETAMINOPHEN 5; 325 MG/1; MG/1
2 TABLET ORAL EVERY 6 HOURS PRN
Status: DISCONTINUED | OUTPATIENT
Start: 2022-06-12 | End: 2022-06-13 | Stop reason: HOSPADM

## 2022-06-12 RX ORDER — FENTANYL CITRATE/PF 50 MCG/ML
25 SYRINGE (ML) INJECTION
Status: DISCONTINUED | OUTPATIENT
Start: 2022-06-12 | End: 2022-06-12 | Stop reason: HOSPADM

## 2022-06-12 RX ORDER — NEOSTIGMINE METHYLSULFATE 1 MG/ML
INJECTION INTRAVENOUS AS NEEDED
Status: DISCONTINUED | OUTPATIENT
Start: 2022-06-12 | End: 2022-06-12

## 2022-06-12 RX ORDER — OXYBUTYNIN CHLORIDE 5 MG/1
5 TABLET, EXTENDED RELEASE ORAL DAILY
Status: DISCONTINUED | OUTPATIENT
Start: 2022-06-12 | End: 2022-06-13 | Stop reason: HOSPADM

## 2022-06-12 RX ORDER — ATORVASTATIN CALCIUM 80 MG/1
80 TABLET, FILM COATED ORAL
Status: DISCONTINUED | OUTPATIENT
Start: 2022-06-12 | End: 2022-06-13 | Stop reason: HOSPADM

## 2022-06-12 RX ORDER — ONDANSETRON 2 MG/ML
4 INJECTION INTRAMUSCULAR; INTRAVENOUS EVERY 6 HOURS PRN
Status: DISCONTINUED | OUTPATIENT
Start: 2022-06-12 | End: 2022-06-13 | Stop reason: HOSPADM

## 2022-06-12 RX ORDER — LISINOPRIL 5 MG/1
5 TABLET ORAL DAILY
Status: DISCONTINUED | OUTPATIENT
Start: 2022-06-12 | End: 2022-06-12

## 2022-06-12 RX ADMIN — LIDOCAINE HYDROCHLORIDE 100 MG: 20 INJECTION, SOLUTION EPIDURAL; INFILTRATION; INTRACAUDAL; PERINEURAL at 09:44

## 2022-06-12 RX ADMIN — FENTANYL CITRATE 25 MCG: 50 INJECTION, SOLUTION INTRAMUSCULAR; INTRAVENOUS at 10:17

## 2022-06-12 RX ADMIN — HEPARIN SODIUM 5000 UNITS: 5000 INJECTION INTRAVENOUS; SUBCUTANEOUS at 21:24

## 2022-06-12 RX ADMIN — NEOSTIGMINE METHYLSULFATE 3 MG: 1 INJECTION, SOLUTION INTRAVENOUS at 11:03

## 2022-06-12 RX ADMIN — PROPOFOL 150 MG: 10 INJECTION, EMULSION INTRAVENOUS at 09:44

## 2022-06-12 RX ADMIN — GLYCOPYRROLATE 0.4 MG: 0.2 INJECTION, SOLUTION INTRAMUSCULAR; INTRAVENOUS at 11:03

## 2022-06-12 RX ADMIN — INSULIN LISPRO 1 UNITS: 100 INJECTION, SOLUTION INTRAVENOUS; SUBCUTANEOUS at 08:26

## 2022-06-12 RX ADMIN — FENTANYL CITRATE 25 MCG: 50 INJECTION, SOLUTION INTRAMUSCULAR; INTRAVENOUS at 11:00

## 2022-06-12 RX ADMIN — ONDANSETRON 4 MG: 2 INJECTION INTRAMUSCULAR; INTRAVENOUS at 09:29

## 2022-06-12 RX ADMIN — ROCURONIUM BROMIDE 40 MG: 10 SOLUTION INTRAVENOUS at 09:44

## 2022-06-12 RX ADMIN — SODIUM CHLORIDE 150 ML/HR: 0.9 INJECTION, SOLUTION INTRAVENOUS at 08:18

## 2022-06-12 RX ADMIN — METOPROLOL TARTRATE 50 MG: 50 TABLET ORAL at 18:34

## 2022-06-12 RX ADMIN — LISINOPRIL 5 MG: 5 TABLET ORAL at 18:34

## 2022-06-12 RX ADMIN — ONDANSETRON 4 MG: 2 INJECTION INTRAMUSCULAR; INTRAVENOUS at 03:19

## 2022-06-12 RX ADMIN — SODIUM CHLORIDE 150 ML/HR: 0.9 INJECTION, SOLUTION INTRAVENOUS at 22:22

## 2022-06-12 RX ADMIN — CEFAZOLIN 2000 MG: 1 INJECTION, POWDER, FOR SOLUTION INTRAMUSCULAR; INTRAVENOUS at 09:52

## 2022-06-12 RX ADMIN — ONDANSETRON 4 MG: 2 INJECTION INTRAMUSCULAR; INTRAVENOUS at 18:39

## 2022-06-12 RX ADMIN — SODIUM CHLORIDE, SODIUM LACTATE, POTASSIUM CHLORIDE, AND CALCIUM CHLORIDE 1000 ML: .6; .31; .03; .02 INJECTION, SOLUTION INTRAVENOUS at 01:12

## 2022-06-12 RX ADMIN — INSULIN GLARGINE 15 UNITS: 100 INJECTION, SOLUTION SUBCUTANEOUS at 21:23

## 2022-06-12 RX ADMIN — Medication 4.5 G: at 14:06

## 2022-06-12 RX ADMIN — DEXAMETHASONE SODIUM PHOSPHATE 6 MG: 10 INJECTION INTRAMUSCULAR; INTRAVENOUS at 10:00

## 2022-06-12 RX ADMIN — INSULIN LISPRO 2 UNITS: 100 INJECTION, SOLUTION INTRAVENOUS; SUBCUTANEOUS at 21:23

## 2022-06-12 RX ADMIN — SODIUM CHLORIDE 150 ML/HR: 0.9 INJECTION, SOLUTION INTRAVENOUS at 14:06

## 2022-06-12 RX ADMIN — FENTANYL CITRATE 50 MCG: 50 INJECTION, SOLUTION INTRAMUSCULAR; INTRAVENOUS at 09:44

## 2022-06-12 RX ADMIN — METOPROLOL TARTRATE 50 MG: 50 TABLET ORAL at 01:40

## 2022-06-12 RX ADMIN — SODIUM CHLORIDE, SODIUM LACTATE, POTASSIUM CHLORIDE, AND CALCIUM CHLORIDE 1000 ML: .6; .31; .03; .02 INJECTION, SOLUTION INTRAVENOUS at 04:30

## 2022-06-12 RX ADMIN — INSULIN LISPRO 1 UNITS: 100 INJECTION, SOLUTION INTRAVENOUS; SUBCUTANEOUS at 16:21

## 2022-06-12 RX ADMIN — INSULIN LISPRO 2 UNITS: 100 INJECTION, SOLUTION INTRAVENOUS; SUBCUTANEOUS at 11:31

## 2022-06-12 RX ADMIN — Medication 4.5 G: at 19:38

## 2022-06-12 RX ADMIN — Medication 4.5 G: at 08:17

## 2022-06-12 RX ADMIN — INSULIN LISPRO 1 UNITS: 100 INJECTION, SOLUTION INTRAVENOUS; SUBCUTANEOUS at 01:10

## 2022-06-12 RX ADMIN — METOCLOPRAMIDE HYDROCHLORIDE 10 MG: 5 INJECTION INTRAMUSCULAR; INTRAVENOUS at 10:55

## 2022-06-12 RX ADMIN — ATORVASTATIN CALCIUM 80 MG: 80 TABLET, FILM COATED ORAL at 21:23

## 2022-06-12 NOTE — ASSESSMENT & PLAN NOTE
· Noted by tachycardia and leukocytosis, has elevated procalcitonin at 15 11 secondary to acute appendicitis  · Continue IV fluids  bolus with lactated Ringer's x3 then start normal saline  · IV antibiotics continue Zosyn per Dr Marcella Calhoun recommendation  · Lactic acid negative

## 2022-06-12 NOTE — ASSESSMENT & PLAN NOTE
Lab Results   Component Value Date    EGFR 52 06/12/2022    EGFR 45 06/11/2022    EGFR 50 01/01/2022    CREATININE 1 11 06/12/2022    CREATININE 1 26 06/11/2022    CREATININE 1 14 01/01/2022   · Creatinine stable  · Will continue to monitor  · Hyponatremia:-most likely a hypovolemic hyponatremia that has improved with IV fluids, will continue to monitor

## 2022-06-12 NOTE — ASSESSMENT & PLAN NOTE
· With vocal cord paralysis, patient usually follows with ENT in the outpatient setting  · Will monitor closely postop

## 2022-06-12 NOTE — PROGRESS NOTES
[address]  Progress Note - Nancy Frederick 1955, 61 y o  female MRN: [id number]  Unit/Bed#: OR [place] Encounter: [id number]  Primary Care Provider: June Nobles MD   Date and time admitted to hospital: 6/11/2022  7:36 PM    * Acute appendicitis  Assessment & Plan  · Status post a general surgical evaluation  · Patient to go to the OR today for an appendectomy  · Continue broad-spectrum IV antibiotics - IV Zosyn day 2  · Will monitor closely    Sepsis ([address] )  Assessment & Plan  · Sepsis parameters have significantly improved if not almost resolved  · Patient is no longer tachycardic white blood cell count has come down, procalcitonin testing has come down  · Most likely secondary to the acute appendicitis  · Continue IV Zosyn day 2  · Will repeat blood work in the a m      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, patient usually follows with ENT in the outpatient setting  · Will monitor closely postop    Type 2 diabetes mellitus with hyperglycemia, with long-term current use of insulin [facility])  Assessment & Plan  Lab Results   Component Value Date    HGBA1C 8 3 (A) 03/25/2022       Recent Labs     06/12/22  0037 06/12/22  0719   POCGLU 182* 173*       Blood Sugar Average: Last 72 hrs:  · (P) 177 5   · Oral medications remain on hold  · Target blood sugar for the hospital 140-180  · Continue Lantus, continue Accu-Cheks q 6 hours  · Once cleared for a diet postop, will resume home insulin dosing    Paroxysmal atrial fibrillation (HCC)  Assessment & Plan  · Continue rate per control with metoprolol postop  · Continue Xarelto for anticoagulation postop once cleared by surgery    Essential hypertension  Assessment & Plan  · Blood pressure is currently acceptable  · Continue present medications    GERD (gastroesophageal reflux disease)  Assessment & Plan  · Continue Pepcid and PPI    Hyperlipidemia  Assessment & Plan  · Resume statin when tolerating a diet    Hemiplegia and hemiparesis following cerebral infarction affecting left non-dominant side (HCC)  Assessment & Plan  · Continue Supportive care  · Resume aspirin, Xarelto, and Lipitor for secondary prevention postop    Stage 3 chronic kidney disease, unspecified whether stage 3a or 3b CKD [facility])  Assessment & Plan  Lab Results   Component Value Date    EGFR 52 06/12/2022    EGFR 45 06/11/2022    EGFR 50 01/01/2022    CREATININE 1 11 06/12/2022    CREATININE 1 26 06/11/2022    CREATININE 1 14 01/01/2022   · Creatinine stable  · Will continue to monitor  · Hyponatremia:-most likely a hypovolemic hyponatremia that has improved with IV fluids, will continue to monitor    YOLIE (obstructive sleep apnea)  Assessment & Plan  · Reports not able to tolerate CPAP    Cyst of right ovary  Assessment & Plan  · CT: 3 5 cm right ovarian cystic structure   Recommend follow-up with pelvic ultrasound in 3-6 months  · Follow up as OP    Abnormal CT of the chest  Assessment & Plan  · CT: Focal irregular subpleural opacity in the right lower lobe at the level of chronic rib deformities   This may be scar-related, however chest CT in 3 months is recommended to assess for stability  · CT chest in 3 months      VTE Prophylaxis:  Rivaroxaban (Xarelto)    Patient Centered Rounds: I have performed bedside rounds with nursing staff today      Discussions with Specialists or Other Care Team Provider:  General surgery, anesthesiology, nursing  Education and Discussions with Family / Patient:  Patient's son Jocelynn Gutierrez was brought up to par at phone #[phone] at 9:30 a m -all questions answered to his satisfaction    Current Length of Stay: 1 day(s)    Current Patient Status: Inpatient   Certification Statement: The patient will continue to require additional inpatient hospital stay due to The need for IV antibiotics, as well as an appendectomy    Discharge Plan:  Hopeful discharge planning in the next 24-48 hours based upon the postop course    Code Status: Level 1 - Full Code    Subjective:   Patient seen and examined, resting in bed, continues to complain of feeling like shit - continues to complain of nausea and is now dry heaving    Objective:     Vitals:   Temp (24hrs), Av 3 °F (36 8 °C), Min:98 3 °F (36 8 °C), Max:98 3 °F (36 8 °C)    Temp:  [98 3 °F (36 8 °C)] 98 3 °F (36 8 °C)  HR:  [] 83  Resp:  [18] 18  BP: (129-166)/(58-87) 137/58  SpO2:  [82 %-100 %] 96 %  There is no height or weight on file to calculate BMI  Input and Output Summary (last 24 hours): Intake/Output Summary (Last 24 hours) at 2022 0939  Last data filed at 2022 0817  Gross per 24 hour   Intake 3050 ml   Output --   Net 3050 ml       Physical Exam:   Physical Exam  Constitutional:       General: She is not in acute distress  Appearance: Normal appearance  She is normal weight  She is not ill-appearing  HENT:      Head: Normocephalic and atraumatic  Nose: Nose normal       Mouth/Throat:      Mouth: Mucous membranes are moist    Eyes:      Extraocular Movements: Extraocular movements intact  Pupils: Pupils are equal, round, and reactive to light  Cardiovascular:      Rate and Rhythm: Normal rate and regular rhythm  Pulses: Normal pulses  Heart sounds: Normal heart sounds  No murmur heard  No friction rub  No gallop  Pulmonary:      Effort: Pulmonary effort is normal  No respiratory distress  Breath sounds: Normal breath sounds  No wheezing, rhonchi or rales  Abdominal:      General: There is no distension  Palpations: Abdomen is soft  There is no mass  Tenderness: There is abdominal tenderness  Hernia: No hernia is present  Comments: Soft, tender to palpation in the right lower quadrant, no rebound, no guarding, nondistended, bowel sounds normoactive x4 quadrants   Musculoskeletal:         General: No swelling or tenderness  Normal range of motion  Cervical back: Normal range of motion and neck supple   No rigidity  Right lower leg: No edema  Left lower leg: No edema  Skin:     General: Skin is warm  Capillary Refill: Capillary refill takes less than 2 seconds  Findings: No erythema or rash  Neurological:      General: No focal deficit present  Mental Status: She is alert and oriented to person, place, and time  Mental status is at baseline  Cranial Nerves: No cranial nerve deficit  Motor: No weakness  Psychiatric:         Mood and Affect: Mood normal          Behavior: Behavior normal          Additional Data:     Labs:    Results from last 7 days   Lab Units 06/12/22 0429 06/11/22 2009   WBC Thousand/uL 11 21* 12 62*   HEMOGLOBIN g/dL 10 9* 12 4   HEMATOCRIT % 33 5* 38 3   PLATELETS Thousands/uL 137* 149   NEUTROS PCT %  --  85*   LYMPHS PCT %  --  9*   MONOS PCT %  --  5   EOS PCT %  --  1     Results from last 7 days   Lab Units 06/12/22 0429 06/11/22 2009   SODIUM mmol/L 133* 130*   POTASSIUM mmol/L 4 1 3 9   CHLORIDE mmol/L 100 97   CO2 mmol/L 25 23   BUN mg/dL 28* 36*   CREATININE mg/dL 1 11 1 26   CALCIUM mg/dL 8 5 8 9   ALK PHOS U/L  --  56   ALT U/L  --  27   AST U/L  --  38     Results from last 7 days   Lab Units 06/11/22 2009   INR  1 19     Results from last 7 days   Lab Units 06/12/22  0719 06/12/22  0037   POC GLUCOSE mg/dl 173* 182*           * I Have Reviewed All Lab Data Listed Above  * Additional Pertinent Lab Tests Reviewed:  [facility]  Reviewed    Imaging:  Imaging Reports Reviewed Today Include:  None    Recent Cultures (last 7 days):           Last 24 Hours Medication List:   Current Facility-Administered Medications   Medication Dose Route Frequency Provider Last Rate    aspirin  81 mg Oral Daily Joshua Farias PA-C      atorvastatin  80 mg Oral Daily With [place], [place]      escitalopram  5 mg Oral Daily [place], [place]      famotidine  20 mg Oral Daily Beverlee Ohm MARY Downing      insulin glargine  15 Units Subcutaneous HS Jone Ferguson PA-C      insulin lispro  1-6 Units Subcutaneous Q6H [address] [place], [place]      lisinopril  5 mg Oral Daily [place], [place]      metoprolol tartrate  50 mg Oral Q12H [address] [place], [place]      ondansetron  4 mg Intravenous Q6H PRN Jone Ferguson PA-C      oxybutynin  5 mg Oral Daily [place], [place]      pantoprazole  40 mg Oral Daily [place], [place]      piperacillin-tazobactam  4 5 g Intravenous Q6H [place]on [place] III, DO 0 g (06/12/22 0144)    rivaroxaban  20 mg Oral Daily With Breakfast [facility]      sodium chloride  150 mL/hr Intravenous Continuous JoneStephie Rivera 150 mL/hr (06/12/22 1813)        Today, Patient Was Seen By: Koki Murguia MD    ** Please Note: Dictation voice to text software may have been used in the creation of this document   **

## 2022-06-12 NOTE — ASSESSMENT & PLAN NOTE
· Continue rate per control with metoprolol postop  · Continue Xarelto for anticoagulation postop once cleared by surgery

## 2022-06-12 NOTE — ASSESSMENT & PLAN NOTE
· Status post a general surgical evaluation  · Patient to go to the OR today for an appendectomy  · Continue broad-spectrum IV antibiotics - IV Zosyn day 2  · Will monitor closely

## 2022-06-12 NOTE — ASSESSMENT & PLAN NOTE
Lab Results   Component Value Date    EGFR 45 06/11/2022    EGFR 50 01/01/2022    EGFR 46 12/31/2021    CREATININE 1 26 06/11/2022    CREATININE 1 14 01/01/2022    CREATININE 1 23 12/31/2021   · Creatinine stable

## 2022-06-12 NOTE — ANESTHESIA PREPROCEDURE EVALUATION
Procedure:  APPENDECTOMY LAPAROSCOPIC (N/A Abdomen)    Acute appendicitis, leukocystosis [address] 1  ECG: NSR  TTE: EF 65% with G1 DD, RV wnl  Hx CVA - x4, most recent in December  In the past required trach about 4 years ago  Walks with walker can walk without for very short distances  Afib on Xarelto last does Friday  HTN - lisinopril, metoprolol   IDDM-2 lantus 40 units  Obese class II  YOLIE - not compliant   Relevant Problems   CARDIO   (+) Essential hypertension   (+) Hyperlipidemia   (+) Paroxysmal atrial fibrillation (HCC)      ENDO   (+) Type 2 diabetes mellitus with hyperglycemia, with long-term current use of insulin (HCC)      GI/HEPATIC   (+) Dysphagia   (+) GERD (gastroesophageal reflux disease)      /RENAL   (+) Stage 3 chronic kidney disease, unspecified whether stage 3a or 3b CKD (HCC)      NEURO/PSYCH   (+) Cerebrovascular accident (CVA) ([address] )   (+) Current episode of major depressive disorder without prior episode   (+) History of CVA (cerebrovascular accident)   (+) History of stroke      PULMONARY   (+) YOLIE (obstructive sleep apnea)        Physical Exam    Airway    Mallampati score: II  TM Distance: >3 FB  Neck ROM: full     Dental       Cardiovascular      Pulmonary      Other Findings        Anesthesia Plan  ASA Score- 4 Emergent    Anesthesia Type- general with ASA Monitors  Additional Monitors:   Airway Plan: ETT  Plan Factors-Exercise tolerance (METS): >4 METS  Chart reviewed  EKG reviewed  Existing labs reviewed  Patient summary reviewed  Patient is not a current smoker  Induction- intravenous  Postoperative Plan-     Informed Consent- Anesthetic plan and risks discussed with patient  I personally reviewed this patient with the CRNA  Discussed and agreed on the Anesthesia Plan with the CRNA  Chan Queen

## 2022-06-12 NOTE — ASSESSMENT & PLAN NOTE
60 yo F hx CVA and prior ex lap HD#2 with abdominal pain, general malaise and CT evidence of acute appendicitis  Assessment:  Acute appendicitis    Plan:  Laparoscopic, possible open, appendectomy this morning  Dr Halie Hare to obtain informed consent  NPO, IVF, IV Abx, supportive measures  Admitted to [facility]

## 2022-06-12 NOTE — ASSESSMENT & PLAN NOTE
· CT: Focal irregular subpleural opacity in the right lower lobe at the level of chronic rib deformities   This may be scar-related, however chest CT in 3 months is recommended to assess for stability     · CT chest in 3 months

## 2022-06-12 NOTE — ASSESSMENT & PLAN NOTE
Lab Results   Component Value Date    HGBA1C 8 3 (A) 03/25/2022       Recent Labs     06/12/22  0037 06/12/22  0719   POCGLU 182* 173*       Blood Sugar Average: Last 72 hrs:  · (P) 177 5   · Oral medications remain on hold  · Target blood sugar for the hospital 140-180  · Continue Lantus, continue Accu-Cheks q 6 hours  · Once cleared for a diet postop, will resume home insulin dosing

## 2022-06-12 NOTE — INCIDENTAL FINDINGS
The following findings require follow up:  Radiographic finding   Finding: right ovarian cyst, right lower lobe abnormality   Follow up required: Pelvic US and CT chest   Follow up should be done within 3 month(s)    Please notify the following clinician to assist with the follow up:   Dr Elizabeth Lux

## 2022-06-12 NOTE — ASSESSMENT & PLAN NOTE
· CT: 3 5 cm right ovarian cystic structure   Recommend follow-up with pelvic ultrasound in 3-6 months     · Follow up as OP

## 2022-06-12 NOTE — ASSESSMENT & PLAN NOTE
· Sepsis parameters have significantly improved if not almost resolved  · Patient is no longer tachycardic white blood cell count has come down, procalcitonin testing has come down  · Most likely secondary to the acute appendicitis  · Continue IV Zosyn day 2  · Will repeat blood work in the a m

## 2022-06-12 NOTE — OP NOTE
OPERATIVE REPORT  PATIENT NAME: Karen Pantoja    :  1955  MRN: [id number]  Pt Location: [facility] OR ROOM 01    SURGERY DATE: 2022    Surgeon(s) and Role:     Pernell Mar MD - Primary     * Jason Black PA-C - Assisting  The PA was necessary to provide expert assistance; i e  in the form of providing optimal exposure with retraction, suturing, and assistance with dissection in order to perform the most efficient operation and in order to optimize patient safety in the abscence of a qualified surgical resident  Preop Diagnosis:  Acute appendicitis [K35 80]    Post-Op Diagnosis Codes:     * Acute appendicitis with localized peritonitis [K35 30]    Procedure(s) (LRB):  APPENDECTOMY LAPAROSCOPIC (N/A)    Specimen(s):  ID Type Source Tests Collected by Time Destination   1 :  Tissue Appendix TISSUE EXAM Kev Cope MD 2022 1011        Estimated Blood Loss:   Minimal    Drains:  Urethral Catheter Latex 16 Fr  (Active)   Number of days: 0       Anesthesia Type:   General    Operative Indications:  Acute appendicitis [K35 80]  Patient is a 70-year-old female presenting with signs and symptoms of acute appendicitis for which definitive treatment by laparoscopic appendectomy is now indicated  Operative Findings: At the time of laparoscopy, 4 quadrants of the abdomen were inspected  The right upper quadrant was normal   The left upper quadrant was normal   The left lower quadrant was normal   The right lower quadrant harbor an acutely inflamed appendix  A laparoscopic appendectomy performed in the routine fashion after which the right lower quadrant wound inspected  The good hemostasis was found and the procedure completed uneventfully  Complications:   None    Procedure and Technique:  Patient taken to the operating room  They are properly identified monitored and anesthetized  The received antibiotics perioperatively  Donohue catheter placed  Abdomen prepped and draped  Time-out performed  Skin incised in left upper quadrant  Trocar advanced bluntly  Pneumoperitoneum established to 15 mmHg  Two additional working ports placed the hypogastrium left lower quadrant  Laparoscopic appendectomy performed in the routine fashion  During the course of the dissection the mesoappendix taken with combination Harmonic kassy and clip on the pending seal artery  Base of the appendix divided with a single fire of the Endo-MICHELE stapler  Base of the appendix then oversewn with 2 interrupted sutures of 0 Vicryl to buttress the stump closure and the procedure completed with irrigation of the right lower quadrant with warm saline  All ports removed  Fascial defect in the left upper quadrant closed with an an 0 Vicryl suture  Skin closed with subcuticular 4 Monocryl suture  Wounds infiltrated 0 5% Marcaine dressed sterilely  Patient extubated taken recovery in stable condition     I was present for the entire procedure    Patient Disposition:  PACU       SIGNATURE: Jose Casillas MD  DATE: June 12, 2022  TIME: 11:00 AM

## 2022-06-12 NOTE — ANESTHESIA POSTPROCEDURE EVALUATION
Post-Op Assessment Note    CV Status:  Stable  Pain Score: 0    Pain management: adequate     Mental Status:  Alert and awake   Hydration Status:  Euvolemic   PONV Controlled:  Controlled   Airway Patency:  Patent  Airway: intubated   Two or more mitigation strategies used for obstructive sleep apnea   Post Op Vitals Reviewed: Yes            No complications documented      BP   135/62   Temp   99 2   Pulse  72   Resp 16   SpO2 97

## 2022-06-12 NOTE — DISCHARGE INSTRUCTIONS
[facility]    Discharge Instructions  Light activity for 2 weeks  No heavy lifting for 2 weeks  Max 10 lbs for 2 weeks  No driving for 3-7 days or until pain is well controlled  Surgical glue will fall off with time  Take discharge medications as prescribed  Notify our office for nausea, vomiting, fever, diarrhea, chest pain, trouble breathing  Follow up in our office in 2 weeks or sooner if needed  Call with additional questions or concerns [phone]

## 2022-06-12 NOTE — ED NOTES
Pt cleaned and sheets changed  Purewick placed to get urine sample  Pt repositioned, call bell in reach, and bed is low and in locked position       Rosalba Clark  06/12/22 4751

## 2022-06-12 NOTE — H&P
[address] [facility] 1955, 61 y o  female MRN: [id number]  Unit/Bed#: ED 28 Encounter: [id number]  Primary Care Provider: Anny Busby MD   Date and time admitted to hospital: 6/11/2022  7:36 PM    * Sepsis [facility])  Assessment & Plan  · Noted by tachycardia and leukocytosis, has elevated procalcitonin at 15 11 secondary to acute appendicitis  · Continue IV fluids  bolus with lactated Ringer's x3 then start normal saline  · IV antibiotics continue Zosyn per Dr Cata Palencia recommendation  · Lactic acid negative    Acute appendicitis  Assessment & Plan  · IV antibiotic  · Surgical consult  · NPO  · Pain control    Type 2 diabetes mellitus with hyperglycemia, with long-term current use of insulin (HCC)  Assessment & Plan  Lab Results   Component Value Date    HGBA1C 8 3 (A) 03/25/2022       No results for input(s): POCGLU in the last 72 hours  Blood Sugar Average: Last 72 hrs:  ·  hold home meds  · Sliding scale insulin coverage with Q 6 hour checks while NPO, resume insulin when tolerating diet    Paroxysmal atrial fibrillation (HCC)  Assessment & Plan  · Continue rate per control with metoprolol  · Continue Xarelto for anticoagulation    Abnormal CT of the chest  Assessment & Plan  · CT: Focal irregular subpleural opacity in the right lower lobe at the level of chronic rib deformities   This may be scar-related, however chest CT in 3 months is recommended to assess for stability  · CT chest in 3 months    Cyst of right ovary  Assessment & Plan  · CT: 3 5 cm right ovarian cystic structure   Recommend follow-up with pelvic ultrasound in 3-6 months     · Follow up as OP    Stage 3 chronic kidney disease, unspecified whether stage 3a or 3b CKD [facility])  Assessment & Plan  Lab Results   Component Value Date    EGFR 45 06/11/2022    EGFR 50 01/01/2022    EGFR 46 12/31/2021    CREATININE 1 26 06/11/2022    CREATININE 1 14 01/01/2022    CREATININE 1 23 12/31/2021   · Creatinine stable    Hemiplegia and hemiparesis following cerebral infarction affecting left non-dominant side (HCC)  Assessment & Plan  · Supportive care    YOLIE (obstructive sleep apnea)  Assessment & Plan  · Reports not able tolerate CPAP    Essential hypertension  Assessment & Plan  · Continue home medications with hold parameters    Hyperlipidemia  Assessment & Plan  · Resume statin when tolerating a diet    GERD (gastroesophageal reflux disease)  Assessment & Plan  · Continue Pepcid and PPI    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  · Follows with ENT as an outpatient    VTE Pharmacologic Prophylaxis: VTE Score: 5 High Risk (Score >/= 5) - Pharmacological DVT Prophylaxis Ordered: rivaroxaban (Xarelto)  Sequential Compression Devices Ordered  Code Status: Level 1 - Full Code   Discussion with patient    Anticipated Length of Stay: Patient will be admitted on an inpatient basis with an anticipated length of stay of greater than 2 midnights secondary to IV antibiotics, follow-up cultures, specialist input  Chief Complaint:  Diarrhea with fall    History of Present Illness:  Aleja Martell is a 61 y o  female with a PMH of obesity with BMI 35, diabetes mellitus type 2 on insulin, hypertension, hyperlipidemia, paroxysmal atrial fibrillation on anticoagulation, history of stroke with deficits who presents with diarrhea with fall  Patient was brought in by EMS after trying to do some cleaning and lost her balance landed on her buttocks  She did not hit her head  EMS found the patient sitting on the floor in oxygen was in the mid 80s and was placed on 4 L nasal cannula and improved to the 90s  Patient has no home oxygen  Patient was admitted under trauma workup and In the emergency room patient reported abdominal pain  There were no findings from the trauma except acute appendicitis  ED provider reviewed with surgery recommended to admit to Medicine Service      Patient reports she was having diarrhea and vomiting yesterday and today  She vomited here in the ED, no diarrhea today  She fell when she was cleaning onto her buttock, no head injury  She noted lower abdomen pain that would come and go  She reports water is the only thing she needs thickened  She reports no appetite right now  Her abdomen feels bloated    Review of Systems:  Review of Systems   Constitutional: Positive for chills  Negative for fever  HENT: Positive for trouble swallowing  Negative for rhinorrhea and sore throat  Eyes: Negative for discharge and redness  Respiratory: Negative for cough and shortness of breath  Cardiovascular: Negative for chest pain and leg swelling  Gastrointestinal: Positive for abdominal pain, diarrhea, nausea and vomiting  Genitourinary: Negative for dysuria and hematuria  Musculoskeletal: Negative for back pain and neck pain  Skin: Negative for rash and wound  Neurological: Positive for weakness  Negative for dizziness and headaches  Psychiatric/Behavioral: Negative for agitation and confusion  Past Medical and Surgical History:   Past Medical History:   Diagnosis Date    Abdominal fibromatosis     Diabetes mellitus ([facility] )     Hyperlipemia     Hypertension     Pneumonia     Right pontine stroke ([facility] ) 12/13/2021    Stroke (cerebrum) ([facility] ) 12/17/2021    Stroke [facility])        Past Surgical History:   Procedure Laterality Date    CATARACT EXTRACTION, BILATERAL      COLONOSCOPY      EGD      LAPAROTOMY      Exploratory; Last Assessed 10/17/2017    PEG TUBE PLACEMENT      PEG TUBE REMOVAL         Meds/Allergies:  Prior to Admission medications    Medication Sig Start Date End Date Taking?  Authorizing Provider   Accu-Chek FastClix Lancets MISC Use 2 (two) times a day  Patient not taking: No sig reported 4/30/21   Pattie Hidalgo,    Aspirin 81 MG CAPS Take 1 tablet by mouth in the morning    Historical Provider, MD   atorvastatin (LIPITOR) 80 mg tablet Take 1 tablet (80 mg total) by mouth daily with dinner 5/20/22   MD ERNESTO Villafuerte ULTRAFINE III SHORT PEN 31G X 8 MM MISC use as directed 5/21/19   MD ERNESTO Villafuerte ULTRAFINE III SHORT PEN 31G X 8 MM MISC USE AS DIRECTED 8/4/21   MD ERNESTO Villafuerte ULTRAFINE III SHORT PEN 31G X 8 MM MISC use as directed 5/25/22   Elmer Chaudhary MD   Blood Glucose Monitoring Suppl (Accu-Chek Guide) w/Device KIT Use 2 (two) times a day  Patient not taking: No sig reported 4/30/21   De Matsoraya, DO   Blood Glucose Monitoring Suppl (OneTouch Verio) w/Device KIT Use 2 (two) times a day 3/25/22 4/24/22  ALEC Woods   escitalopram (LEXAPRO) 5 mg tablet Take 1 tablet (5 mg total) by mouth in the morning  5/20/22   Elmer Chaudhary MD   famotidine (PEPCID) 20 mg tablet Take 1 tablet (20 mg total) by mouth in the morning  5/20/22   Elmer Chaudhary MD   insulin glargine (Lantus SoloStar) 100 units/mL injection pen Inject 40 Units under the skin daily at bedtime 3/25/22   ALEC Woods   Insulin Pen Needle (Pen Needles 5/16") 30G X 8 MM MISC Use daily 9/8/21   Elmer Chaudhary MD   Lancet Devices (Lancet Device with Ejector) MISC Use to check blood sugar twice a day 3/25/22   ALEC Woods   linaGLIPtin (Tradjenta) 5 MG TABS Take 5 mg by mouth in the morning  5/20/22   Elmer Chaudhary MD   lisinopril (ZESTRIL) 5 mg tablet Take 1 tablet (5 mg total) by mouth in the morning  5/20/22   Elmer Chaudhary MD   metoprolol tartrate (LOPRESSOR) 50 mg tablet Take 1 tablet (50 mg total) by mouth every 12 (twelve) hours 5/3/22   Elmer Chaudhary MD   Mirabegron ER 25 MG TB24 Take 25 mg by mouth in the morning  5/20/22   Elmer Chaudhary MD   ondansetron (Zofran ODT) 4 mg disintegrating tablet Take 1 tablet (4 mg total) by mouth every 8 (eight) hours as needed for nausea or vomiting 5/20/22   Elmer Chaudhary MD   ondansetron (ZOFRAN-ODT) 4 mg disintegrating tablet Take 1 tablet (4 mg total) by mouth every 8 (eight) hours as needed for nausea or vomiting 9/8/21   Olga Lidia Nieto MD   pantoprazole (PROTONIX) 40 mg tablet take 1 tablet by mouth once daily 4/27/22   Olga Lidia Nieto MD   repaglinide (PRANDIN) 2 mg tablet Take 1 tablet (2 mg total) by mouth 2 (two) times a day before meals 3/25/22   ALEC Colby   rivaroxaban (Xarelto) 20 mg tablet Take 1 tablet (20 mg total) by mouth daily with breakfast 5/20/22   Olga Lidia Nieto MD     I have reviewed home medications with patient personally  Allergies: Allergies   Allergen Reactions    Apixaban Vomiting and GI Intolerance     Other reaction(s): Vomiting    Dulaglutide Vomiting     Nausea vomiting       Social History:  Marital Status:    Occupation: retired  Patient Pre-hospital Living Situation: Home  Patient Pre-hospital Level of Mobility: walks with walker  Patient Pre-hospital Diet Restrictions: none except thickened water  Substance Use History:   Social History     Substance and Sexual Activity   Alcohol Use Not Currently    Comment: Socially     Social History     Tobacco Use   Smoking Status Never Smoker   Smokeless Tobacco Never Used     Social History     Substance and Sexual Activity   Drug Use No       Family History:  Family History   Problem Relation Age of Onset    No Known Problems Mother     No Known Problems Father        Physical Exam:     Vitals:   Blood Pressure: 129/75 (06/11/22 2246)  Pulse: (!) 109 (06/11/22 2315)  Temperature: 98 3 °F (36 8 °C) (06/11/22 1956)  Temp Source: Oral (06/11/22 2246)  Respirations: 18 (06/11/22 2246)  SpO2: 97 % (06/11/22 2315)    Physical Exam  Vitals reviewed  Constitutional:       General: She is not in acute distress  Appearance: Normal appearance  She is obese  Comments: Pleasant elderly  female   HENT:      Head: Normocephalic and atraumatic        Nose: Nose normal       Mouth/Throat:      Comments: Hoarse voice - chronic  Eyes: General:         Right eye: No discharge  Left eye: No discharge  Extraocular Movements: Extraocular movements intact  Conjunctiva/sclera: Conjunctivae normal    Cardiovascular:      Rate and Rhythm: Regular rhythm  Tachycardia present  Pulmonary:      Effort: Pulmonary effort is normal  No respiratory distress  Breath sounds: Normal breath sounds  No wheezing  Abdominal:      General: Bowel sounds are normal  There is no distension  Palpations: Abdomen is soft  Tenderness: There is abdominal tenderness in the right upper quadrant, right lower quadrant and left lower quadrant  There is no guarding  Musculoskeletal:         General: No swelling or tenderness  Normal range of motion  Cervical back: Normal range of motion  Right lower leg: No edema  Left lower leg: No edema  Skin:     General: Skin is warm and dry  Capillary Refill: Capillary refill takes less than 2 seconds  Neurological:      General: No focal deficit present  Mental Status: She is alert and oriented to person, place, and time  Mental status is at baseline  Comments: Positive chronic residual left-sided weakness of the upper and lower extremity   Psychiatric:         Mood and Affect: Mood normal          Behavior: Behavior normal          Thought Content:  Thought content normal          Judgment: Judgment normal           Additional Data:     Lab Results:  Results from last 7 days   Lab Units 06/11/22 2009   WBC Thousand/uL 12 62*   HEMOGLOBIN g/dL 12 4   HEMATOCRIT % 38 3   PLATELETS Thousands/uL 149   NEUTROS PCT % 85*   LYMPHS PCT % 9*   MONOS PCT % 5   EOS PCT % 1     Results from last 7 days   Lab Units 06/11/22 2009   SODIUM mmol/L 130*   POTASSIUM mmol/L 3 9   CHLORIDE mmol/L 97   CO2 mmol/L 23   BUN mg/dL 36*   CREATININE mg/dL 1 26   ANION GAP mmol/L 10   CALCIUM mg/dL 8 9   ALBUMIN g/dL 3 6   TOTAL BILIRUBIN mg/dL 1 14*   ALK PHOS U/L 56   ALT U/L 27   AST U/L 38 GLUCOSE RANDOM mg/dL 180*     Results from last 7 days   Lab Units 06/11/22 2009   INR  1 19     Results from last 7 days   Lab Units 06/12/22  0037   POC GLUCOSE mg/dl 182*         Results from last 7 days   Lab Units 06/11/22  2235   LACTIC ACID mmol/L 0 9   PROCALCITONIN ng/ml 15 11*       Imaging: Reviewed radiology reports from this admission including: chest CT scan, abdominal/pelvic CT and CT head chest x-ray read pending  TRAUMA - CT spine cervical wo contrast   Final Result by Anai Newman MD (06/11 2218)      No cervical spine fracture or traumatic malalignment  Workstation performed: [id number]         TRAUMA - CT head wo contrast   Final Result by Anai Newman MD (06/11 2217)      No acute intracranial abnormality  Microangiopathic changes  Workstation performed: [id number]         CT chest abdomen pelvis wo contrast   Final Result by Anai Newman MD (06/11 2216)      1  No evidence of traumatic injury on this limited noncontrast study  2   Findings consistent with acute appendicitis  No discrete fluid collection to indicate an abscess  Surgical consultation is recommended  3   Distended urinary bladder demonstrating mild circumferential wall thickening  Correlate with urinalysis to exclude cystitis  4   3 5 cm right ovarian cystic structure  Recommend follow-up with pelvic ultrasound in 3-6 months  5   Focal irregular subpleural opacity in the right lower lobe at the level of chronic rib deformities  This may be scar-related, however chest CT in 3 months is recommended to assess for stability  I personally discussed this study with Ramila Centeno III on 6/11/2022 at 10:12 PM                   Workstation performed: [id number]         CT recon only thoracolumbar (no charge)   Final Result by Anai Newman MD (06/11 2218)      No fracture or traumatic subluxation                 Workstation performed: [id number]         XR chest 1 view portable   ED Interpretation by Flora Espinoza III, DO (06/11 2039)   Rib fracture on the 6th rib on the right side  ** Please Note: This note has been constructed using a voice recognition system   **

## 2022-06-12 NOTE — CONSULTS
[address] [facility] 1955, 61 y o  female MRN: [id number]  Unit/Bed#: ED 28 Encounter: [id number]  Primary Care Provider: Nico Lewis MD   Date and time admitted to hospital: 6/11/2022  7:36 PM    Inpatient consult to Acute Care Surgery  Consult performed by: Huong Black PA-C  Consult ordered by: Madhav Ruby PA-C          * Acute appendicitis  Assessment & Plan  62 yo F hx CVA and prior ex lap HD#2 with abdominal pain, general malaise and CT evidence of acute appendicitis  Assessment:  Acute appendicitis    Plan:  Laparoscopic, possible open, appendectomy this morning  Dr Mary Wagenr to obtain informed consent  NPO, IVF, IV Abx, supportive measures  Admitted to [facility]  History of Present Illness   HPI:  Karen Pantoja is a 61 y o  female who presents with general malaise and abdominal pain with associated nausea  Symptoms x 3 days  Review of Systems    Historical Information   Past Medical History:   Diagnosis Date    Abdominal fibromatosis     [facility] mellitus [facility] )     Hyperlipemia     Hypertension     Pneumonia     Right pontine stroke ([facility] ) 12/13/2021    Stroke (cerebrum) ([facility] ) 12/17/2021    Stroke [facility])      Past Surgical History:   Procedure Laterality Date    CATARACT EXTRACTION, BILATERAL      COLONOSCOPY      EGD      LAPAROTOMY      Exploratory;  Last Assessed 10/17/2017    PEG TUBE PLACEMENT      PEG TUBE REMOVAL       Social History   Social History     Substance and Sexual Activity   Alcohol Use Not Currently    Comment: Socially     Social History     Substance and Sexual Activity   Drug Use No     Social History     Tobacco Use   Smoking Status Never Smoker   Smokeless Tobacco Never Used     E-Cigarette/Vaping    E-Cigarette Use Never User      E-Cigarette/Vaping Substances    Nicotine No     THC No     CBD No     Flavoring No     Other No     Unknown No      Family History:   Family History Problem Relation Age of Onset    No Known Problems Mother     No Known Problems Father        Meds/Allergies   PTA meds:   Prior to Admission Medications   Prescriptions Last Dose Informant Patient Reported? Taking? Accu-Chek FastClix Lancets MISC   No No   Sig: Use 2 (two) times a day   Patient not taking: No sig reported   Aspirin 81 MG CAPS   Yes No   Sig: Take 1 tablet by mouth in the morning   B-D ULTRAFINE III SHORT PEN 31G X 8 MM MISC  Self No No   Sig: use as directed   B-D ULTRAFINE III SHORT PEN 31G X 8 MM MISC   No No   Sig: USE AS DIRECTED   B-D ULTRAFINE III SHORT PEN 31G X 8 MM MISC   No No   Sig: use as directed   Blood Glucose Monitoring Suppl (Accu-Chek Guide) w/Device KIT   No No   Sig: Use 2 (two) times a day   Patient not taking: No sig reported   Blood Glucose Monitoring Suppl (OneTouch Verio) w/Device KIT   No No   Sig: Use 2 (two) times a day   Insulin Pen Needle (Pen Needles 5/16") 30G X 8 MM MISC   No No   Sig: Use daily   Lancet Devices (Lancet Device with Ejector) MISC   No No   Sig: Use to check blood sugar twice a day   Mirabegron ER 25 MG TB24   No No   Sig: Take 25 mg by mouth in the morning  atorvastatin (LIPITOR) 80 mg tablet   No No   Sig: Take 1 tablet (80 mg total) by mouth daily with dinner   escitalopram (LEXAPRO) 5 mg tablet   No No   Sig: Take 1 tablet (5 mg total) by mouth in the morning  famotidine (PEPCID) 20 mg tablet   No No   Sig: Take 1 tablet (20 mg total) by mouth in the morning  insulin glargine (Lantus SoloStar) 100 units/mL injection pen   No No   Sig: Inject 40 Units under the skin daily at bedtime   linaGLIPtin (Tradjenta) 5 MG TABS   No No   Sig: Take 5 mg by mouth in the morning  lisinopril (ZESTRIL) 5 mg tablet   No No   Sig: Take 1 tablet (5 mg total) by mouth in the morning     metoprolol tartrate (LOPRESSOR) 50 mg tablet   No No   Sig: Take 1 tablet (50 mg total) by mouth every 12 (twelve) hours   ondansetron (ZOFRAN-ODT) 4 mg disintegrating tablet   No No   Sig: Take 1 tablet (4 mg total) by mouth every 8 (eight) hours as needed for nausea or vomiting   ondansetron (Zofran ODT) 4 mg disintegrating tablet   No No   Sig: Take 1 tablet (4 mg total) by mouth every 8 (eight) hours as needed for nausea or vomiting   pantoprazole (PROTONIX) 40 mg tablet   No No   Sig: take 1 tablet by mouth once daily   repaglinide (PRANDIN) 2 mg tablet   No No   Sig: Take 1 tablet (2 mg total) by mouth 2 (two) times a day before meals   rivaroxaban (Xarelto) 20 mg tablet   No No   Sig: Take 1 tablet (20 mg total) by mouth daily with breakfast      Facility-Administered Medications: None     Allergies   Allergen Reactions    Apixaban Vomiting and GI Intolerance     Other reaction(s): Vomiting    Dulaglutide Vomiting     Nausea vomiting       Objective   First Vitals:   Blood Pressure: 142/87 (06/11/22 1941)  Pulse: (!) 111 (06/11/22 1950)  Temperature: 98 3 °F (36 8 °C) (06/11/22 1956)  Temp Source: Oral (06/11/22 1956)  Respirations: 18 (06/11/22 1941)  SpO2: 100 % (06/11/22 1950)    Current Vitals:   Blood Pressure: 137/58 (06/12/22 0430)  Pulse: 83 (06/12/22 0715)  Temperature: 98 3 °F (36 8 °C) (06/11/22 1956)  Temp Source: Oral (06/11/22 2246)  Respirations: 18 (06/12/22 0430)  SpO2: 96 % (06/12/22 0430)      Intake/Output Summary (Last 24 hours) at 6/12/2022 0803  Last data filed at 6/12/2022 0430  Gross per 24 hour   Intake 1050 ml   Output --   Net 1050 ml       Invasive Devices  Report    Peripheral Intravenous Line  Duration           Peripheral IV 06/11/22 Right Antecubital <1 day                Physical Exam  Vitals and nursing note reviewed  Constitutional:       General: She is not in acute distress  Appearance: She is well-developed  She is not diaphoretic  HENT:      Head: Normocephalic and atraumatic  Mouth/Throat:      Comments: Soft whisper voice    Eyes:      Conjunctiva/sclera: Conjunctivae normal       Pupils: Pupils are equal, round, and reactive to light  Cardiovascular:      Rate and Rhythm: Normal rate and regular rhythm  Pulmonary:      Effort: Pulmonary effort is normal  No respiratory distress  Breath sounds: Normal breath sounds  Abdominal:      Comments: Soft, non-distended, TTP in RLQ with guarding  No rigidity  Musculoskeletal:         General: Normal range of motion  Cervical back: Normal range of motion  Skin:     General: Skin is warm and dry  Capillary Refill: Capillary refill takes less than 2 seconds  Neurological:      Mental Status: She is alert and oriented to person, place, and time  Comments: Alert and oriented, good historian, some difficulty remembering remote details of prior admissions  Psychiatric:         Behavior: Behavior normal          Lab Results:   I have personally reviewed pertinent lab results  , CBC:   Lab Results   Component Value Date    WBC 11 21 (H) 06/12/2022    HGB 10 9 (L) 06/12/2022    HCT 33 5 (L) 06/12/2022    MCV 96 06/12/2022     (L) 06/12/2022    MCH 31 2 06/12/2022    MCHC 32 5 06/12/2022    RDW 12 8 06/12/2022    MPV 12 0 06/12/2022    NRBC 0 06/11/2022   , CMP:   Lab Results   Component Value Date    SODIUM 133 (L) 06/12/2022    K 4 1 06/12/2022     06/12/2022    CO2 25 06/12/2022    BUN 28 (H) 06/12/2022    CREATININE 1 11 06/12/2022    CALCIUM 8 5 06/12/2022    AST 38 06/11/2022    ALT 27 06/11/2022    ALKPHOS 56 06/11/2022    EGFR 52 06/12/2022     Imaging: I have personally reviewed pertinent reports  EKG, Pathology, and Other Studies: I have personally reviewed pertinent reports  Code Status: Level 1 - Full Code  Advance Directive and Living Will:      Power of :    POLST:      Counseling / Coordination of Care  Total floor / unit time spent today 5 minutes  Greater than 50% of total time was spent with the patient and / or family counseling and / or coordination of care    A description of the counseling / coordination of care: treatment planning

## 2022-06-12 NOTE — ED NOTES
Zosyn for 0500 cannot be given at this time due to the med not being in stock  Message sent to pharmacy about re-timing dose       Juan Nice  06/12/22 0544

## 2022-06-12 NOTE — PLAN OF CARE
Problem: Potential for Falls  Goal: Patient will remain free of falls  Description: INTERVENTIONS:  - Educate patient/family on patient safety including physical limitations  - Instruct patient to call for assistance with activity   - Consult OT/PT to assist with strengthening/mobility   - Keep Call bell within reach  - Keep bed low and locked with side rails adjusted as appropriate  - Keep care items and personal belongings within reach  - Initiate and maintain comfort rounds  - Make Fall Risk Sign visible to staff  - Offer Toileting every 2 Hours, in advance of need  - Initiate/Maintain bed alarm  - Obtain necessary fall risk management equipment  - Apply yellow socks and bracelet for high fall risk patients  - Consider moving patient to room near nurses station  Outcome: Progressing     Problem: MOBILITY - ADULT  Goal: Maintain or return to baseline ADL function  Description: INTERVENTIONS:  -  Assess patient's ability to carry out ADLs; assess patient's baseline for ADL function and identify physical deficits which impact ability to perform ADLs (bathing, care of mouth/teeth, toileting, grooming, dressing, etc )  - Assess/evaluate cause of self-care deficits   - Assess range of motion  - Assess patient's mobility; develop plan if impaired  - Assess patient's need for assistive devices and provide as appropriate  - Encourage maximum independence but intervene and supervise when necessary  - Involve family in performance of ADLs  - Assess for home care needs following discharge   - Consider OT consult to assist with ADL evaluation and planning for discharge  - Provide patient education as appropriate  Outcome: Progressing  Goal: Maintains/Returns to pre admission functional level  Description: INTERVENTIONS:  - Perform BMAT or MOVE assessment daily    - Set and communicate daily mobility goal to care team and patient/family/caregiver     - Collaborate with rehabilitation services on mobility goals if consulted  - Perform Range of Motion 2 times a day  - Reposition patient every 2 hours    - Dangle patient 2 times a day  - Stand patient 2 times a day  - Ambulate patient 2 times a day  - Out of bed to chair 3 times a day   - Out of bed for meals 3 times a day  - Out of bed for toileting  - Record patient progress and toleration of activity level   Outcome: Progressing     Problem: PAIN - ADULT  Goal: Verbalizes/displays adequate comfort level or baseline comfort level  Description: Interventions:  - Encourage patient to monitor pain and request assistance  - Assess pain using appropriate pain scale  - Administer analgesics based on type and severity of pain and evaluate response  - Implement non-pharmacological measures as appropriate and evaluate response  - Consider cultural and social influences on pain and pain management  - Notify physician/advanced practitioner if interventions unsuccessful or patient reports new pain  Outcome: Progressing     Problem: INFECTION - ADULT  Goal: Absence or prevention of progression during hospitalization  Description: INTERVENTIONS:  - Assess and monitor for signs and symptoms of infection  - Monitor lab/diagnostic results  - Monitor all insertion sites, i e  indwelling lines, tubes, and drains  - Monitor endotracheal if appropriate and nasal secretions for changes in amount and color  - [place] appropriate cooling/warming therapies per order  - Administer medications as ordered  - Instruct and encourage patient and family to use good hand hygiene technique  - Identify and instruct in appropriate isolation precautions for identified infection/condition  Outcome: Progressing  Goal: Absence of fever/infection during neutropenic period  Description: INTERVENTIONS:  - Monitor WBC    Outcome: Progressing     Problem: SAFETY ADULT  Goal: Patient will remain free of falls  Description: INTERVENTIONS:  - Educate patient/family on patient safety including physical limitations  - Instruct patient to call for assistance with activity   - Consult OT/PT to assist with strengthening/mobility   - Keep Call bell within reach  - Keep bed low and locked with side rails adjusted as appropriate  - Keep care items and personal belongings within reach  - Initiate and maintain comfort rounds  - Make Fall Risk Sign visible to staff  - Offer Toileting every 2 Hours, in advance of need  - Initiate/Maintain bed alarm  - Obtain necessary fall risk management equipment  - Apply yellow socks and bracelet for high fall risk patients  - Consider moving patient to room near nurses station  Outcome: Progressing  Goal: Maintain or return to baseline ADL function  Description: INTERVENTIONS:  -  Assess patient's ability to carry out ADLs; assess patient's baseline for ADL function and identify physical deficits which impact ability to perform ADLs (bathing, care of mouth/teeth, toileting, grooming, dressing, etc )  - Assess/evaluate cause of self-care deficits   - Assess range of motion  - Assess patient's mobility; develop plan if impaired  - Assess patient's need for assistive devices and provide as appropriate  - Encourage maximum independence but intervene and supervise when necessary  - Involve family in performance of ADLs  - Assess for home care needs following discharge   - Consider OT consult to assist with ADL evaluation and planning for discharge  - Provide patient education as appropriate  Outcome: Progressing  Goal: Maintains/Returns to pre admission functional level  Description: INTERVENTIONS:  - Perform BMAT or MOVE assessment daily    - Set and communicate daily mobility goal to care team and patient/family/caregiver  - Collaborate with rehabilitation services on mobility goals if consulted  - Perform Range of Motion 2 times a day  - Reposition patient every 2 hours    - Dangle patient 2 times a day  - Stand patient 2 times a day  - Ambulate patient 2 times a day  - Out of bed to chair 3 times a day   - Out of bed for meals 3 times a day  - Out of bed for toileting  - Record patient progress and toleration of activity level   Outcome: Progressing     Problem: DISCHARGE PLANNING  Goal: Discharge to home or other facility with appropriate resources  Description: INTERVENTIONS:  - Identify barriers to discharge w/patient and caregiver  - Arrange for needed discharge resources and transportation as appropriate  - Identify discharge learning needs (meds, wound care, etc )  - Refer to Case Management Department for coordinating discharge planning if the patient needs post-hospital services based on physician/advanced practitioner order or complex needs related to functional status, cognitive ability, or social support system  Outcome: Progressing     Problem: Knowledge Deficit  Goal: Patient/family/caregiver demonstrates understanding of disease process, treatment plan, medications, and discharge instructions  Description: Complete learning assessment and assess knowledge base    Interventions:  - Provide teaching at level of understanding  - Provide teaching via preferred learning methods  Outcome: Progressing     Problem: SKIN/TISSUE INTEGRITY - ADULT  Goal: Incision(s), wounds(s) or drain site(s) healing without S/S of infection  Description: INTERVENTIONS  - Assess and document dressing, incision, wound bed, drain sites and surrounding tissue  - Provide patient and family education  - Perform skin care/dressing changes every shift   Outcome: Progressing

## 2022-06-12 NOTE — ASSESSMENT & PLAN NOTE
Lab Results   Component Value Date    HGBA1C 8 3 (A) 03/25/2022       No results for input(s): POCGLU in the last 72 hours      Blood Sugar Average: Last 72 hrs:  ·  hold home meds  · Sliding scale insulin coverage with Q 6 hour checks while NPO, resume insulin when tolerating diet

## 2022-06-12 NOTE — ED NOTES
Patient returned from recovery  Patient has 3 abdominal sites that are surgically glued and intact  Peripheral skin intact  Patient denies need for pain medication  Vital signs are stable  2 liters of 02 applied via NC for comfort  Patient has pure wick in place  Donohue had been placed for surgery but removed in PACU at 1106 approximately  Lilo Piña RN  06/12/22 0412

## 2022-06-13 VITALS
HEART RATE: 77 BPM | TEMPERATURE: 98.4 F | SYSTOLIC BLOOD PRESSURE: 109 MMHG | OXYGEN SATURATION: 93 % | DIASTOLIC BLOOD PRESSURE: 60 MMHG | RESPIRATION RATE: 17 BRPM

## 2022-06-13 LAB
ALBUMIN SERPL BCP-MCNC: 2.8 G/DL (ref 3.5–5)
ALP SERPL-CCNC: 47 U/L (ref 34–104)
ALT SERPL W P-5'-P-CCNC: 15 U/L (ref 7–52)
ANION GAP SERPL CALCULATED.3IONS-SCNC: 10 MMOL/L (ref 4–13)
AST SERPL W P-5'-P-CCNC: 22 U/L (ref 13–39)
BASOPHILS # BLD AUTO: 0.01 THOUSANDS/ΜL (ref 0–0.1)
BASOPHILS NFR BLD AUTO: 0 % (ref 0–1)
BILIRUB SERPL-MCNC: 0.8 MG/DL (ref 0.2–1)
BUN SERPL-MCNC: 21 MG/DL (ref 5–25)
CALCIUM ALBUM COR SERPL-MCNC: 9.1 MG/DL (ref 8.3–10.1)
CALCIUM SERPL-MCNC: 8.1 MG/DL (ref 8.4–10.2)
CHLORIDE SERPL-SCNC: 106 MMOL/L (ref 96–108)
CO2 SERPL-SCNC: 23 MMOL/L (ref 21–32)
CREAT SERPL-MCNC: 0.91 MG/DL (ref 0.6–1.3)
EOSINOPHIL # BLD AUTO: 0 THOUSAND/ΜL (ref 0–0.61)
EOSINOPHIL NFR BLD AUTO: 0 % (ref 0–6)
ERYTHROCYTE [DISTWIDTH] IN BLOOD BY AUTOMATED COUNT: 12.6 % (ref 11.6–15.1)
GFR SERPL CREATININE-BSD FRML MDRD: 66 ML/MIN/1.73SQ M
GLUCOSE SERPL-MCNC: 186 MG/DL (ref 65–140)
GLUCOSE SERPL-MCNC: 191 MG/DL (ref 65–140)
GLUCOSE SERPL-MCNC: 265 MG/DL (ref 65–140)
GLUCOSE SERPL-MCNC: 272 MG/DL (ref 65–140)
HCT VFR BLD AUTO: 33.3 % (ref 34.8–46.1)
HGB BLD-MCNC: 10.6 G/DL (ref 11.5–15.4)
IMM GRANULOCYTES # BLD AUTO: 0.02 THOUSAND/UL (ref 0–0.2)
IMM GRANULOCYTES NFR BLD AUTO: 0 % (ref 0–2)
LYMPHOCYTES # BLD AUTO: 0.95 THOUSANDS/ΜL (ref 0.6–4.47)
LYMPHOCYTES NFR BLD AUTO: 11 % (ref 14–44)
MCH RBC QN AUTO: 30.4 PG (ref 26.8–34.3)
MCHC RBC AUTO-ENTMCNC: 31.8 G/DL (ref 31.4–37.4)
MCV RBC AUTO: 95 FL (ref 82–98)
MONOCYTES # BLD AUTO: 0.48 THOUSAND/ΜL (ref 0.17–1.22)
MONOCYTES NFR BLD AUTO: 5 % (ref 4–12)
NEUTROPHILS # BLD AUTO: 7.42 THOUSANDS/ΜL (ref 1.85–7.62)
NEUTS SEG NFR BLD AUTO: 84 % (ref 43–75)
NRBC BLD AUTO-RTO: 0 /100 WBCS
PLATELET # BLD AUTO: 125 THOUSANDS/UL (ref 149–390)
PMV BLD AUTO: 11.7 FL (ref 8.9–12.7)
POTASSIUM SERPL-SCNC: 4 MMOL/L (ref 3.5–5.3)
PROCALCITONIN SERPL-MCNC: 6.4 NG/ML
PROT SERPL-MCNC: 5.5 G/DL (ref 6.4–8.4)
RBC # BLD AUTO: 3.49 MILLION/UL (ref 3.81–5.12)
SODIUM SERPL-SCNC: 139 MMOL/L (ref 135–147)
WBC # BLD AUTO: 8.88 THOUSAND/UL (ref 4.31–10.16)

## 2022-06-13 PROCEDURE — 99024 POSTOP FOLLOW-UP VISIT: CPT

## 2022-06-13 PROCEDURE — 82948 REAGENT STRIP/BLOOD GLUCOSE: CPT

## 2022-06-13 PROCEDURE — 99239 HOSP IP/OBS DSCHRG MGMT >30: CPT | Performed by: HOSPITALIST

## 2022-06-13 PROCEDURE — 84145 PROCALCITONIN (PCT): CPT | Performed by: PHYSICIAN ASSISTANT

## 2022-06-13 PROCEDURE — 85025 COMPLETE CBC W/AUTO DIFF WBC: CPT | Performed by: PHYSICIAN ASSISTANT

## 2022-06-13 PROCEDURE — 80053 COMPREHEN METABOLIC PANEL: CPT | Performed by: PHYSICIAN ASSISTANT

## 2022-06-13 RX ORDER — METRONIDAZOLE 500 MG/1
500 TABLET ORAL EVERY 8 HOURS SCHEDULED
Qty: 24 TABLET | Refills: 0 | Status: SHIPPED | OUTPATIENT
Start: 2022-06-13 | End: 2022-06-21

## 2022-06-13 RX ORDER — AMOXICILLIN AND CLAVULANATE POTASSIUM 875; 125 MG/1; MG/1
1 TABLET, FILM COATED ORAL EVERY 12 HOURS SCHEDULED
Status: DISCONTINUED | OUTPATIENT
Start: 2022-06-13 | End: 2022-06-13

## 2022-06-13 RX ORDER — CIPROFLOXACIN 500 MG/1
500 TABLET, FILM COATED ORAL EVERY 12 HOURS SCHEDULED
Status: DISCONTINUED | OUTPATIENT
Start: 2022-06-13 | End: 2022-06-13 | Stop reason: HOSPADM

## 2022-06-13 RX ORDER — CIPROFLOXACIN 500 MG/1
500 TABLET, FILM COATED ORAL EVERY 12 HOURS SCHEDULED
Qty: 16 TABLET | Refills: 0 | Status: SHIPPED | OUTPATIENT
Start: 2022-06-13 | End: 2022-06-21

## 2022-06-13 RX ORDER — METRONIDAZOLE 500 MG/1
500 TABLET ORAL EVERY 8 HOURS SCHEDULED
Status: DISCONTINUED | OUTPATIENT
Start: 2022-06-13 | End: 2022-06-13 | Stop reason: HOSPADM

## 2022-06-13 RX ORDER — AMOXICILLIN AND CLAVULANATE POTASSIUM 875; 125 MG/1; MG/1
1 TABLET, FILM COATED ORAL EVERY 12 HOURS SCHEDULED
Qty: 16 TABLET | Refills: 0 | Status: SHIPPED | OUTPATIENT
Start: 2022-06-13 | End: 2022-06-13

## 2022-06-13 RX ADMIN — CIPROFLOXACIN HYDROCHLORIDE 500 MG: 500 TABLET, FILM COATED ORAL at 11:21

## 2022-06-13 RX ADMIN — HEPARIN SODIUM 5000 UNITS: 5000 INJECTION INTRAVENOUS; SUBCUTANEOUS at 05:16

## 2022-06-13 RX ADMIN — METRONIDAZOLE 500 MG: 500 TABLET ORAL at 15:06

## 2022-06-13 RX ADMIN — INSULIN LISPRO 3 UNITS: 100 INJECTION, SOLUTION INTRAVENOUS; SUBCUTANEOUS at 12:03

## 2022-06-13 RX ADMIN — FAMOTIDINE 20 MG: 20 TABLET, FILM COATED ORAL at 08:49

## 2022-06-13 RX ADMIN — METOPROLOL TARTRATE 50 MG: 50 TABLET ORAL at 08:49

## 2022-06-13 RX ADMIN — Medication 4.5 G: at 01:43

## 2022-06-13 RX ADMIN — PANTOPRAZOLE SODIUM 40 MG: 40 TABLET, DELAYED RELEASE ORAL at 08:49

## 2022-06-13 RX ADMIN — ESCITALOPRAM 5 MG: 5 TABLET, FILM COATED ORAL at 08:49

## 2022-06-13 RX ADMIN — INSULIN LISPRO 1 UNITS: 100 INJECTION, SOLUTION INTRAVENOUS; SUBCUTANEOUS at 08:49

## 2022-06-13 RX ADMIN — SODIUM CHLORIDE 150 ML/HR: 0.9 INJECTION, SOLUTION INTRAVENOUS at 04:56

## 2022-06-13 RX ADMIN — ASPIRIN 81 MG: 81 TABLET, COATED ORAL at 08:49

## 2022-06-13 RX ADMIN — Medication 4.5 G: at 08:49

## 2022-06-13 RX ADMIN — OXYBUTYNIN 5 MG: 5 TABLET, FILM COATED, EXTENDED RELEASE ORAL at 08:49

## 2022-06-13 NOTE — CASE MANAGEMENT
Case Management Discharge Planning Note    Patient name Spencer Alonso  Location /-96 MRN [id number]  : 1955 Date 2022       Current Admission Date: 2022  Current Admission Diagnosis:Acute appendicitis   Patient Active Problem List    Diagnosis Date Noted    Cyst of right ovary 2022    Abnormal CT of the chest 2022    Sepsis ([facility] [address] [facility] ) 2022    Acute appendicitis 2022    Abnormality of gait due to impairment of balance 02/15/2022    Hemiplegia and hemiparesis following cerebral infarction affecting left non-dominant side ([facility] ) 2022    Stage 3 chronic kidney disease, unspecified whether stage 3a or 3b CKD ([place] [address] ) 2022    History of CVA (cerebrovascular accident) 2021    Weakness of both lower extremities     History of stroke 2021    Muscle tension dysphonia 2020    Reflux laryngitis 2020    Glottic insufficiency 2020    Dermatitis 2020    YOLIE (obstructive sleep apnea)     Medicare annual wellness visit, subsequent 2020    Current episode of major depressive disorder without prior episode 2019    Stress incontinence 2019    Essential hypertension 2019    GERD (gastroesophageal reflux disease) 03/15/2019    Hyperlipidemia 03/15/2019    Edema 03/15/2019    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    Incomplete emptying of bladder 2018    Dysphonia 2018    Glottic stenosis 2018    Dysphagia 2018    Paroxysmal atrial fibrillation ([facility] ) 10/17/2017    Blurry vision 10/17/2017    Insomnia 10/17/2017    Type 2 diabetes mellitus with hyperglycemia, with long-term current use of insulin ([facility] ) 10/17/2017    Cerebrovascular accident (CVA) ([facility] ) 2017    Heart disease 2015    Diabetic cataract ([facility] ) 2014    Severe obesity (BMI 35 0-39  9) with comorbidity ([facility] ) 2014      LOS (days): 2  Geometric Mean LOS (GMLOS) (days): 5 30  Days to GMLOS:3 7 OBJECTIVE:  Risk of Unplanned Readmission Score: 21 11         Current admission status: Inpatient   Preferred Pharmacy:   [facility], [address]  [address] [facility] [address]  Phone: [phone] Fax: [phone]  [address], [address]  [address] [place] [address]  Phone: [phone] Fax: [phone]    Primary Care Provider: Srinivasa Small MD    Primary Insurance: Garth Davis [address] [place] [address]  Secondary Insurance:     DISCHARGE DETAILS:    [address]         Is the patient interested in [facility] at discharge?: Yes  [address] requested[de-identified] Nursing, Physical [address] Name[de-identified] Other ([address])  [facility] Provider[de-identified] PCP  Home Health Services Needed[de-identified] Post-Op Care and Assessment, Evaluate Functional Status and Safety, Gait/ADL Training  Homebound Criteria Met[de-identified] Uses an Assist Device (i e  cane, walker, etc)  Supporting Clincal Findings[de-identified] Limited Endurance

## 2022-06-13 NOTE — ASSESSMENT & PLAN NOTE
Lab Results   Component Value Date    EGFR 66 06/13/2022    EGFR 52 06/12/2022    EGFR 45 06/11/2022    CREATININE 0 91 06/13/2022    CREATININE 1 11 06/12/2022    CREATININE 1 26 06/11/2022   · Creatinine has remained stable, will need to continue periodic outpatient BMP monitoring via her PCP  · Hypovolemic hyponatremia has resolved with IV fluid therapy

## 2022-06-13 NOTE — PLAN OF CARE
Problem: Potential for Falls  Goal: Patient will remain free of falls  Description: INTERVENTIONS:  - Educate patient/family on patient safety including physical limitations  - Instruct patient to call for assistance with activity   - Consult OT/PT to assist with strengthening/mobility   - Keep Call bell within reach  - Keep bed low and locked with side rails adjusted as appropriate  - Keep care items and personal belongings within reach  - Initiate and maintain comfort rounds  - Make Fall Risk Sign visible to staff  - Offer Toileting every 2 Hours, in advance of need  - Initiate/Maintain bed alarm  - Obtain necessary fall risk management equipment  - Apply yellow socks and bracelet for high fall risk patients  - Consider moving patient to room near nurses station  Outcome: Progressing     Problem: MOBILITY - ADULT  Goal: Maintain or return to baseline ADL function  Description: INTERVENTIONS:  -  Assess patient's ability to carry out ADLs; assess patient's baseline for ADL function and identify physical deficits which impact ability to perform ADLs (bathing, care of mouth/teeth, toileting, grooming, dressing, etc )  - Assess/evaluate cause of self-care deficits   - Assess range of motion  - Assess patient's mobility; develop plan if impaired  - Assess patient's need for assistive devices and provide as appropriate  - Encourage maximum independence but intervene and supervise when necessary  - Involve family in performance of ADLs  - Assess for home care needs following discharge   - Consider OT consult to assist with ADL evaluation and planning for discharge  - Provide patient education as appropriate  Outcome: Progressing  Goal: Maintains/Returns to pre admission functional level  Description: INTERVENTIONS:  - Perform BMAT or MOVE assessment daily    - Set and communicate daily mobility goal to care team and patient/family/caregiver     - Collaborate with rehabilitation services on mobility goals if consulted  - Perform Range of Motion 2 times a day  - Reposition patient every 2 hours    - Dangle patient 2 times a day  - Stand patient 2 times a day  - Ambulate patient 2 times a day  - Out of bed to chair 3 times a day   - Out of bed for meals 3 times a day  - Out of bed for toileting  - Record patient progress and toleration of activity level   Outcome: Progressing     Problem: PAIN - ADULT  Goal: Verbalizes/displays adequate comfort level or baseline comfort level  Description: Interventions:  - Encourage patient to monitor pain and request assistance  - Assess pain using appropriate pain scale  - Administer analgesics based on type and severity of pain and evaluate response  - Implement non-pharmacological measures as appropriate and evaluate response  - Consider cultural and social influences on pain and pain management  - Notify physician/advanced practitioner if interventions unsuccessful or patient reports new pain  Outcome: Progressing     Problem: INFECTION - ADULT  Goal: Absence or prevention of progression during hospitalization  Description: INTERVENTIONS:  - Assess and monitor for signs and symptoms of infection  - Monitor lab/diagnostic results  - Monitor all insertion sites, i e  indwelling lines, tubes, and drains  - Monitor endotracheal if appropriate and nasal secretions for changes in amount and color  - [place] appropriate cooling/warming therapies per order  - Administer medications as ordered  - Instruct and encourage patient and family to use good hand hygiene technique  - Identify and instruct in appropriate isolation precautions for identified infection/condition  Outcome: Progressing  Goal: Absence of fever/infection during neutropenic period  Description: INTERVENTIONS:  - Monitor WBC    Outcome: Progressing     Problem: SAFETY ADULT  Goal: Patient will remain free of falls  Description: INTERVENTIONS:  - Educate patient/family on patient safety including physical limitations  - Instruct patient to call for assistance with activity   - Consult OT/PT to assist with strengthening/mobility   - Keep Call bell within reach  - Keep bed low and locked with side rails adjusted as appropriate  - Keep care items and personal belongings within reach  - Initiate and maintain comfort rounds  - Make Fall Risk Sign visible to staff  - Offer Toileting every 2 Hours, in advance of need  - Initiate/Maintain bed alarm  - Obtain necessary fall risk management equipment  - Apply yellow socks and bracelet for high fall risk patients  - Consider moving patient to room near nurses station  Outcome: Progressing  Goal: Maintain or return to baseline ADL function  Description: INTERVENTIONS:  -  Assess patient's ability to carry out ADLs; assess patient's baseline for ADL function and identify physical deficits which impact ability to perform ADLs (bathing, care of mouth/teeth, toileting, grooming, dressing, etc )  - Assess/evaluate cause of self-care deficits   - Assess range of motion  - Assess patient's mobility; develop plan if impaired  - Assess patient's need for assistive devices and provide as appropriate  - Encourage maximum independence but intervene and supervise when necessary  - Involve family in performance of ADLs  - Assess for home care needs following discharge   - Consider OT consult to assist with ADL evaluation and planning for discharge  - Provide patient education as appropriate  Outcome: Progressing  Goal: Maintains/Returns to pre admission functional level  Description: INTERVENTIONS:  - Perform BMAT or MOVE assessment daily    - Set and communicate daily mobility goal to care team and patient/family/caregiver  - Collaborate with rehabilitation services on mobility goals if consulted  - Perform Range of Motion 2 times a day  - Reposition patient every 2 hours    - Dangle patient 2 times a day  - Stand patient 2 times a day  - Ambulate patient 2 times a day  - Out of bed to chair 3 times a day   - Out of bed for meals 3 times a day  - Out of bed for toileting  - Record patient progress and toleration of activity level   Outcome: Progressing     Problem: DISCHARGE PLANNING  Goal: Discharge to home or other facility with appropriate resources  Description: INTERVENTIONS:  - Identify barriers to discharge w/patient and caregiver  - Arrange for needed discharge resources and transportation as appropriate  - Identify discharge learning needs (meds, wound care, etc )  - Refer to Case Management Department for coordinating discharge planning if the patient needs post-hospital services based on physician/advanced practitioner order or complex needs related to functional status, cognitive ability, or social support system  Outcome: Progressing     Problem: Knowledge Deficit  Goal: Patient/family/caregiver demonstrates understanding of disease process, treatment plan, medications, and discharge instructions  Description: Complete learning assessment and assess knowledge base    Interventions:  - Provide teaching at level of understanding  - Provide teaching via preferred learning methods  Outcome: Progressing     Problem: SKIN/TISSUE INTEGRITY - ADULT  Goal: Incision(s), wounds(s) or drain site(s) healing without S/S of infection  Description: INTERVENTIONS  - Assess and document dressing, incision, wound bed, drain sites and surrounding tissue  - Provide patient and family education  - Perform skin care/dressing changes every shift   Outcome: Progressing     Problem: Prexisting or High Potential for Compromised Skin Integrity  Goal: Skin integrity is maintained or improved  Description: INTERVENTIONS:  - Identify patients at risk for skin breakdown  - Assess and monitor skin integrity  - Assess and monitor nutrition and hydration status  - Monitor labs   - Assess for incontinence   - Turn and reposition patient  - Assist with mobility/ambulation  - Relieve pressure over bony prominences  - Avoid friction and shearing  - Provide appropriate hygiene as needed including keeping skin clean and dry  - Evaluate need for skin moisturizer/barrier cream  - Collaborate with interdisciplinary team   - Patient/family teaching  - Consider wound care consult   Outcome: Progressing

## 2022-06-13 NOTE — ASSESSMENT & PLAN NOTE
· Continue Supportive care  · Discharge home on aspirin, Xarelto, and Lipitor for secondary prevention

## 2022-06-13 NOTE — UTILIZATION REVIEW
Initial Clinical Review    Admission: Date/Time/Statement:   Admission Orders (From admission, onward)     Ordered        06/11/22 2306  INPATIENT ADMISSION  Once                      Orders Placed This Encounter   Procedures    INPATIENT ADMISSION     Standing Status:   Standing     Number of Occurrences:   1     Order Specific Question:   Level of Care     Answer:   Med Surg [16]     Order Specific Question:   Estimated length of stay     Answer:   More than 2 Midnights     Order Specific Question:   Certification     Answer:   I certify that inpatient services are medically necessary for this patient for a duration of greater than two midnights  See H&P and MD Progress Notes for additional information about the patient's course of treatment  ED Arrival Information     Expected   -    Arrival   6/11/2022 19:36    Acuity   Urgent            Means of arrival   Ambulance    Escorted by   [address]    Admission type   Urgent            Arrival complaint   fall           Chief Complaint   Patient presents with    Diarrhea     N/V/D for the past 2 days and fell on her side trying to clean toilet        Initial Presentation: 61 y o  female   To ER via EMS from home s/p  fall  Patient was doing housework and it lost her balance landing on her bottom, no head strike  EMS found the patient  W/rm air sat of  mid 80s  -  Placed on 4L NC oxygen  W/sat >90%  No h/o copd   Reports  N/V/D multiple times over past 48 hrs with current HA  (vomited x1 in ER)   Abd tender RUQ, RLQ, LLQ;  No guarding  CT head neg  CTAP   consistent with acute appendicitis  Tachycardic, leukocytosis   Admit   IP status,  MS loc  For management of  Sepsis 2/2 acute appendicitis (with peritonitis per OP note)  : Will consult surgery,  Keep npo,  Provide IVF hydration and IV pain/nausea control  Xrays/exam to r/o any injury assoc/with fall     PMH:   overactive bladder, 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, major depressive disorder, GERD, essential hypertension, type 2 diabetes with long term insulin, paroxysmal atrial fibrillation on Xarelto,  Tracheal stenosis,  YOLIE  Not able to tolerate CPAP;  CKD 3      Date:  6/12      Day 2:  Trauma xrays neg for injury  Required zofran x2 thru noc; Today to OR for   LAP APPY  Under general anesthesia  (completed around noon)   Findings:   right lower quadrant harbor an acutely inflamed appendix     A laparoscopic appendectomy performed in the routine fashion - procedure completed uneventfully     Routine  postop care - [facility]     6/13 -  DC home on 8 more days worth of metronidazole and Cipro      -----------------------------------------------------------------------------------------------------------------------------------------------------------------------------------------------    ED Triage Vitals   Temperature Pulse Respirations Blood Pressure SpO2   06/11/22 1956 06/11/22 1950 06/11/22 1941 06/11/22 1941 06/11/22 1950   98 3 °F (36 8 °C) (!) 111 18 142/87 100 %      Temp Source Heart Rate Source Patient Position - Orthostatic VS BP Location FiO2 (%)   06/11/22 1956 06/11/22 1941 06/11/22 1941 06/11/22 1941 --   Oral Monitor Lying Left arm       Pain Score       06/12/22 1122       No Pain          Wt Readings from Last 1 Encounters:   05/20/22 97 1 kg (214 lb)     Additional Vital Signs:   06/12/22 19:44:18 -- 69 -- -- -- 95 % -- -- --   06/12/22 1826 -- -- -- -- -- -- 2 L/min -- --   06/12/22 17:41:03 98 6 °F (37 °C) 83 20 170/90 117 97 % 2 L/min Nasal cannula Lying   06/12/22 1600 -- 77 16 180/79 Abnormal  -- 98 % -- Nasal cannula Lying   06/12/22 1504 98 2 °F (36 8 °C) -- -- -- -- -- -- -- --   06/12/22 1230 -- 73 16 156/70 -- 97 % -- Nasal cannula Lying   06/12/22 1215 97 8 °F (36 6 °C) 76 18 157/80 -- 95 % 2 L/min Nasal cannula --   06/12/22 1146 99 6 °F (37 6 °C) 74 22 145/68 98 94 % -- None (Room air) --   06/12/22 1122 -- 75 22 136/62 89 98 % 3 L/min Nasal cannula --   06/12/22 1112 99 2 °F (37 3 °C) 83 20 135/62 91 99 % 3 L/min Nasal cannula --   06/11/22 2200 -- 105 -- 158/80 -- 95 % -- -- --   06/11/22 2157 -- -- -- 157/81 -- -- -- -- --   06/11/22 2155 -- 105 -- -- -- 94 % -- -- --   06/11/22 2150 -- 104 -- -- -- 95 % -- -- --   06/11/22 21:46:09 -- 102 18 132/76 -- 96 % -- None (Room air) Lying   06/11/22 2145 -- 102 -- -- -- 95 % -- -- --   06/11/22 2100 -- 102 -- -- -- 96 % -- -- --   06/11/22 2055 -- 105 -- -- -- 82 %  -- -- --   06/11/22 2050 -- 104 -- -- -- 95 % -- -- --   06/11/22 20:46:56 -- 105 18 142/87 -- 99 % -- None (Room air) Lying   06/11/22 2045 -- 105 -- -- -- 97 % -- -- --   06/11/22 2006 -- -- -- -- -- 100 % 3 L/min Nasal cannula --   06/11/22 1950 98 3 111 -- -- -- 100 % 4 L/min Nasal cannula --       Pertinent Labs/Diagnostic Test Results:   6/11  EKG  NSR w/o ST deviations    TRAUMA - CT spine cervical wo contrast   Final Result by Chano Burt MD (06/11 2218)   No cervical spine fracture or traumatic malalignment  TRAUMA - CT head wo contrast   Final Result by Chano Burt MD (06/11 2217)   No acute intracranial abnormality  Microangiopathic changes  CT chest abdomen pelvis wo contrast   Final Result by Chano Burt MD (06/11 2216)   1  No evidence of traumatic injury on this limited noncontrast study  2   Findings consistent with acute appendicitis  No discrete fluid collection to indicate an abscess  Surgical consultation is recommended  3   Distended urinary bladder demonstrating mild circumferential wall thickening  Correlate with urinalysis to exclude cystitis  4   3 5 cm right ovarian cystic structure  Recommend follow-up with pelvic ultrasound in 3-6 months  5   Focal irregular subpleural opacity in the right lower lobe at the level of chronic rib deformities    This may be scar-related, however chest CT in 3 months is recommended to assess for stability  CT recon only thoracolumbar (no charge)   Final Result by Miriam Dao MD (06/11 2218)   No fracture or traumatic subluxation  XR chest 1 view portable   Final Result by Mata Huertas MD (06/12 1010)   No acute cardiopulmonary disease             Results from last 7 days   Lab Units 06/11/22 2003   SARS-COV-2  Negative     Results from last 7 days   Lab Units 06/12/22  0429 06/11/22 2009   WBC Thousand/uL 11 21* 12 62*   HEMOGLOBIN g/dL 10 9* 12 4   HEMATOCRIT % 33 5* 38 3   PLATELETS Thousands/uL 137* 149   NEUTROS ABS Thousands/µL  --  10 68*         Results from last 7 days   Lab Units 06/12/22  0429 06/11/22 2009   SODIUM mmol/L 133* 130*   POTASSIUM mmol/L 4 1 3 9   CHLORIDE mmol/L 100 97   CO2 mmol/L 25 23   ANION GAP mmol/L 8 10   BUN mg/dL 28* 36*   CREATININE mg/dL 1 11 1 26   EGFR ml/min/1 73sq m 52 45   CALCIUM mg/dL 8 5 8 9     Results from last 7 days   Lab Units 06/11/22 2009   AST U/L 38   ALT U/L 27   ALK PHOS U/L 56   TOTAL PROTEIN g/dL 7 3   ALBUMIN g/dL 3 6   TOTAL BILIRUBIN mg/dL 1 14*     Results from last 7 days   Lab Units 06/12/22  1536 06/12/22  1118 06/12/22  0719 06/12/22  0037   POC GLUCOSE mg/dl 180* 199* 173* 182*     Results from last 7 days   Lab Units 06/12/22  0429 06/11/22 2009   GLUCOSE RANDOM mg/dL 172* 180*     Results from last 7 days   Lab Units 06/11/22 2009   CK TOTAL U/L 493*   CK MB INDEX % 1 0   CK MB ng/mL 4 8     Results from last 7 days   Lab Units 06/11/22  2235 06/11/22  2200 06/11/22 2009   HS TNI 0HR ng/L  --   --  13   HS TNI 2HR ng/L  --  15  --    HSTNI D2 ng/L  --  2  --    HS TNI 4HR ng/L 12  --   --    HSTNI D4 ng/L -1  --   --          Results from last 7 days   Lab Units 06/11/22 2009   PROTIME seconds 15 0*   INR  1 19   PTT seconds 29     Results from last 7 days   Lab Units 06/11/22 2009   TSH 3RD GENERATON uIU/mL 4 942*     Results from last 7 days   Lab Units 06/12/22  0429 06/11/22  2236 PROCALCITONIN ng/ml 12 13* 15 11*     Results from last 7 days   Lab Units 06/11/22  2235   LACTIC ACID mmol/L 0 9             Results from last 7 days   Lab Units 06/11/22 2009   BNP pg/mL 77     Results from last 7 days   Lab Units 06/11/22 2003   INFLUENZA A PCR  Negative   INFLUENZA B PCR  Negative   RSV PCR  Negative     Results from last 7 days   Lab Units 06/11/22  2235   BLOOD CULTURE  Received in Microbiology Lab  Culture in Progress  Received in Microbiology Lab  Culture in Progress                 ED Treatment:   Medication Administration from 06/11/2022 1936 to 06/12/2022 1728       Date/Time Order Dose Route Action     06/12/2022 1406 piperacillin-tazobactam (ZOSYN) IVPB 4 5 g 4 5 g Intravenous New Bag     06/12/2022 0817 piperacillin-tazobactam (ZOSYN) IVPB 4 5 g 4 5 g Intravenous New Bag     06/11/2022 2352 piperacillin-tazobactam (ZOSYN) IVPB 4 5 g 4 5 g Intravenous New Bag     06/11/2022 2357 lactated ringers bolus 1,000 mL 1,000 mL Intravenous New Bag     06/12/2022 0112 lactated ringers bolus 1,000 mL 1,000 mL Intravenous New Bag     06/12/2022 0430 lactated ringers bolus 1,000 mL 1,000 mL Intravenous New Bag     06/12/2022 1406 sodium chloride 0 9 % infusion 150 mL/hr Intravenous New Bag     06/12/2022 0818 sodium chloride 0 9 % infusion 150 mL/hr Intravenous New Bag     06/12/2022 0826 insulin lispro (HumaLOG) 100 units/mL subcutaneous injection 1-6 Units 1 Units Subcutaneous Given     06/12/2022 0110 insulin lispro (HumaLOG) 100 units/mL subcutaneous injection 1-6 Units 1 Units Subcutaneous Given     06/12/2022 0140 metoprolol tartrate (LOPRESSOR) tablet 50 mg 50 mg Oral Given     06/12/2022 0929 ondansetron (ZOFRAN) injection 4 mg 4 mg Intravenous Given     06/12/2022 0319 ondansetron (ZOFRAN) injection 4 mg 4 mg Intravenous Given     06/12/2022 1621 insulin lispro (HumaLOG) 100 units/mL subcutaneous injection 1-6 Units 1 Units Subcutaneous Given     06/12/2022 1131 insulin lispro (HumaLOG) 100 units/mL subcutaneous injection 1-6 Units 2 Units Subcutaneous Given        Past Medical History:   Diagnosis Date    Abdominal fibromatosis     Diabetes mellitus ([place] [address] )     Hyperlipemia     Hypertension     Pneumonia     Right pontine stroke ([place] [address] ) 12/13/2021    Stroke (cerebrum) ([place] [address] ) 12/17/2021    Stroke ([place] [address] )      Present on Admission:   Sepsis ([place] [address] )   Acute appendicitis   Paroxysmal atrial fibrillation (HCC)   Stage 3 chronic kidney disease, unspecified whether stage 3a or 3b CKD ([place] [address] )   Tracheal stenosis   Hyperlipidemia   Essential hypertension   GERD (gastroesophageal reflux disease)   YOLIE (obstructive sleep apnea)   Cyst of right ovary   Abnormal CT of the chest      Admitting Diagnosis: Diarrhea [R19 7]  Leukocytosis [D72 829]  Acute appendicitis [K35 80]  Fall [W19  XXXA]  Age/Sex: 61 y o  female  Admission Orders:   See above note;   accucks qid w/SSI,  SCD;s    Postop Scheduled Medications:  aspirin, 81 mg, Oral, Daily  atorvastatin, 80 mg, Oral, Daily With Dinner  escitalopram, 5 mg, Oral, Daily  famotidine, 20 mg, Oral, Daily  heparin (porcine), 5,000 Units, Subcutaneous, Q8H [address]  insulin glargine, 15 Units, Subcutaneous, HS  insulin lispro, 1-5 Units, Subcutaneous, HS  [START ON 6/13/2022] insulin lispro, 1-6 Units, Subcutaneous, TID AC  lisinopril, 5 mg, Oral, Daily  metoprolol tartrate, 50 mg, Oral, Q12H AVERY  oxybutynin, 5 mg, Oral, Daily  pantoprazole, 40 mg, Oral, Daily  piperacillin-tazobactam, 4 5 g, Intravenous, Q6H      Continuous IV Infusions:  sodium chloride, 150 mL/hr, Intravenous, Continuous      PRN Meds:  POSTOP   acetaminophen, 650 mg, Oral, Q6H PRN  HYDROcodone-acetaminophen, 1 tablet, Oral, Q6H PRN  HYDROcodone-acetaminophen, 2 tablet, Oral, Q6H PRN  HYDROmorphone, 0 2 mg, Intravenous, Q3H PRN  ondansetron, 4 mg, Intravenous, Q6H PRN     6/12 @ 1840          IP CONSULT TO ACUTE CARE SURGERY  IP CONSULT TO CASE MANAGEMENT    Network Utilization Review Department  ATTENTION: Please call with any questions or concerns to [phone] and carefully listen to the prompts so that you are directed to the right person  All voicemails are confidential   Maxine Harley all requests for admission clinical reviews, approved or denied determinations and any other requests to dedicated fax number below belonging to the campus where the patient is receiving treatment   List of dedicated fax numbers for the Facilities:  [address] DENIALS (Administrative/Medical Necessity) [phone]   [address] (Maternity/NICU/Pediatrics) [phone]   [address]  [address] [address] [place] [address] [address] [facility] [place] [address] [phone]

## 2022-06-13 NOTE — ASSESSMENT & PLAN NOTE
Lab Results   Component Value Date    HGBA1C 8 3 (A) 03/25/2022       Recent Labs     06/12/22  1118 06/12/22  1536 06/12/22 2045 06/13/22  0542   POCGLU 199* 180* 229* 186*       Blood Sugar Average: Last 72 hrs:  · (P) 191 5   · DC home on pre-admit meds and treatment doses

## 2022-06-13 NOTE — DISCHARGE SUMMARY
Armen 47  Discharge- [facility] 1955, 61 y o  female MRN: [id number]  Unit/Bed#: -01 Encounter: [id number]  Primary Care Provider: June Nobles MD   Date and time admitted to hospital: 6/11/2022  7:36 PM    * Acute appendicitis  Assessment & Plan  · Status post a general surgical evaluation  · Status post appendectomy on 06/12/2022  · Patient tolerated the procedure well, no complications  · Status post treatment of IV Zosyn  · Patient has been cleared from a surgical standpoint for discharge  · Will DC home on 8 more days worth of metronidazole and Cipro  · Outpatient follow-up with General surgery, and PCP    [facility] ([facility]  Assessment & Plan  · Sepsis parameters have resolved  · Patient is no longer tachycardic, leukocytosis has resolved, and procalcitonin is down trending  · Blood cultures x2 sets are positive for Bacteroides fragilis, and Staph coagulase negative Staph epidermidis  · DC home on 8 more days with the Flagyl, and ciprofloxacin  · Outpatient follow-up with PCP    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, patient usually follows with ENT in the outpatient setting  · Okay for discharge home    Type 2 diabetes mellitus with hyperglycemia, with long-term current use of insulin [facility])  Assessment & Plan  Lab Results   Component Value Date    HGBA1C 8 3 (A) 03/25/2022       Recent Labs     06/12/22  1118 06/12/22  1536 06/12/22  2045 06/13/22  0542   POCGLU 199* 180* 229* 186*       Blood Sugar Average: Last 72 hrs:  · (P) 191 5   · DC home on pre-admit meds and treatment doses    Paroxysmal atrial fibrillation (HCC)  Assessment & Plan  · DC home on metoprolol for rate control  · DC home on Xarelto for anticoagulation purposes-okay to resume as per General surgery    Essential hypertension  Assessment & Plan  · DC home on pre-admit meds and pre-admission dosages    GERD (gastroesophageal reflux disease)  Assessment & Plan  · Continue Pepcid and PPI post discharge    Hyperlipidemia  Assessment & Plan  · DC home on statin therapy    Hemiplegia and hemiparesis following cerebral infarction affecting left non-dominant side ([address] )  Assessment & Plan  · Continue Supportive care  · Discharge home on aspirin, Xarelto, and Lipitor for secondary prevention    Stage 3 chronic kidney disease, unspecified whether stage 3a or 3b CKD [facility])  Assessment & Plan  Lab Results   Component Value Date    EGFR 66 06/13/2022    EGFR 52 06/12/2022    EGFR 45 06/11/2022    CREATININE 0 91 06/13/2022    CREATININE 1 11 06/12/2022    CREATININE 1 26 06/11/2022   · Creatinine has remained stable, will need to continue periodic outpatient BMP monitoring via her PCP  · Hypovolemic hyponatremia has resolved with IV fluid therapy    YOLIE (obstructive sleep apnea)  Assessment & Plan  · Reports not able to tolerate CPAP    Cyst of right ovary  Assessment & Plan  · CT: 3 5 cm right ovarian cystic structure   Recommend follow-up with pelvic ultrasound in 3-6 months  · Follow up as OP    Abnormal CT of the chest  Assessment & Plan  · CT: Focal irregular subpleural opacity in the right lower lobe at the level of chronic rib deformities   This may be scar-related, however chest CT in 3 months is recommended to assess for stability     · CT chest in 3 months        Discharging Physician / Practitioner: Shreyas Melgar MD  PCP: Warren Mcguire MD  Admission Date:   Admission Orders (From admission, onward)     Ordered        06/11/22 2306  INPATIENT ADMISSION  Once                      Discharge Date: 06/13/22    Medical Problems             Resolved Problems  Date Reviewed: 6/12/2022   None                 Consultations During Hospital Stay:  · General surgery    Procedures Performed:   · Laparoscopic appendectomy on 06/12/2022 by General surgery    Significant Findings / Test Results:   · Trauma CT spine-no cervical spine fracture or traumatic malalignment  · Trauma CT head without contrast:- no acute intracranial abnormality  Microangiopathic changes  · CT chest, abdomen, pelvis without contrast:-1   No evidence of traumatic injury on this limited noncontrast study  2   Findings consistent with acute appendicitis   No discrete fluid collection to indicate an abscess   Surgical consultation is recommended  3   Distended urinary bladder demonstrating mild circumferential wall thickening   Correlate with urinalysis to exclude cystitis  4   3 5 cm right ovarian cystic structure   Recommend follow-up with pelvic ultrasound in 3-6 months  5   Focal irregular subpleural opacity in the right lower lobe at the level of chronic rib deformities   This may be scar-related, however chest CT in 3 months is recommended   · CT thoracolumbar spine-no fracture or traumatic subluxation  · X-ray chest-no acute cardiopulmonary disease    Incidental Findings:   · Pelvic cyst, subpleural opacity in the right lower lobe - findings reviewed with the patient, additionally incidental findings report sent to patient's PCP     Test Results Pending at Discharge (will require follow up): · None     Outpatient Tests Requested:  · None    Complications:     None    Reason for Admission:  Sepsis secondary to acute appendicitis, status post fall prior to arrival    Hospital Course:     Gillian Guzman is a 61 y o  female patient who originally presented to the hospital on 6/11/2022 due to a fall  Please refer to the initial history and physical examination completed by Vu Anne for the initial presenting features and complaints  In brief, the patient is a 82-year-old female, who was admitted to the hospital after she initially presented to the emergency room with a fall, and had reported nausea, and vomiting prior to coming into the hospital also  She had an extensive trauma workup with the testing as resulted above  She was found to have an acute appendicitis  She was started on IV Zosyn    A general surgical consultation was obtained  Patient was taken to the OR on 06/12/2022 and had a successful laparoscopic appendectomy performed  Antibiotics were continued postop  She was noted to have Bacteroides fragilis on blood culture results  Additionally she was also noted to have coagulase-negative Staph epidermidis  She did very well postop, and was deemed surgically, and medically stable for discharge on the morning of 06/13/2022  After being treated with IV Zosyn, she was transitioned to complete 8 more days worth of oral metronidazole and ciprofloxacin in the outpatient setting  No other changes made to her pre-admission medications and go to the pre-admission doses  Patient will follow up outpatient with both her PCP, and General surgery  Please refer to the assessment/plan portion of this discharge summary as outlined above for the remainder of the details in regard to her stay  Please see above list of diagnoses and related plan for additional information  Condition at Discharge: good     Discharge Day Visit / Exam:     Subjective:  Patient seen and examined, looks and feels well, is tolerating a diet well, wants to go home  Vitals: Blood Pressure: 115/60 (06/13/22 0829)  Pulse: 86 (06/13/22 0829)  Temperature: 98 °F (36 7 °C) (06/13/22 0829)  Temp Source: Oral (06/13/22 0829)  Respirations: 18 (06/13/22 0829)  SpO2: 100 % (06/13/22 0829)  Exam:   Physical Exam  Constitutional:       General: She is not in acute distress  Appearance: Normal appearance  She is normal weight  She is not ill-appearing  HENT:      Head: Normocephalic and atraumatic  Nose: Nose normal       Mouth/Throat:      Mouth: Mucous membranes are moist    Eyes:      Extraocular Movements: Extraocular movements intact  Pupils: Pupils are equal, round, and reactive to light  Cardiovascular:      Rate and Rhythm: Normal rate and regular rhythm  Pulses: Normal pulses  Heart sounds: Normal heart sounds  No murmur heard  No friction rub  No gallop  Pulmonary:      Effort: Pulmonary effort is normal  No respiratory distress  Breath sounds: Normal breath sounds  No wheezing, rhonchi or rales  Abdominal:      General: There is no distension  Palpations: Abdomen is soft  There is no mass  Tenderness: There is no abdominal tenderness  Hernia: No hernia is present  Musculoskeletal:         General: No swelling or tenderness  Normal range of motion  Cervical back: Normal range of motion and neck supple  No rigidity  Right lower leg: No edema  Left lower leg: No edema  Skin:     General: Skin is warm  Capillary Refill: Capillary refill takes less than 2 seconds  Findings: No erythema or rash  Neurological:      General: No focal deficit present  Mental Status: She is alert and oriented to person, place, and time  Mental status is at baseline  Cranial Nerves: No cranial nerve deficit  Motor: No weakness  Psychiatric:         Mood and Affect: Mood normal          Behavior: Behavior normal          Discussion with Family:  Attempts to call the patient's son Mark Christie on day of discharge-no answer    Discharge instructions/Information to patient and family:   See after visit summary for information provided to patient and family  Provisions for Follow-Up Care:  See after visit summary for information related to follow-up care and any pertinent home health orders  Disposition:     Home      Planned Readmission:    None     Discharge Statement:  I spent 40 minutes discharging the patient  This time was spent on the day of discharge  I had direct contact with the patient on the day of discharge  Greater than 50% of the total time was spent examining patient, answering all patient questions, arranging and discussing plan of care with patient as well as directly providing post-discharge instructions    Additional time then spent on discharge activities  Discharge Medications:  See after visit summary for reconciled discharge medications provided to patient and family        ** Please Note: This note has been constructed using a voice recognition system **

## 2022-06-13 NOTE — DISCHARGE INSTR - AVS FIRST PAGE
Dear Chanel Yo,     It was our pleasure to care for you here at [facility]  It is our hope that we were always able to exceed the expected standards for your care during your stay  You were hospitalized due to acute appendicitis  You were cared for on the medical/surgical floor by Agnes Edwards MD with the [facility] who covers for your primary care physician (PCP), Florinda Carter MD, while you were hospitalized  You were additionally seen by the [facility] associates - Dr Caridad Echols  If you have any questions or concerns related to this hospitalization, you may contact us at [phone]  For follow up as well as any medication refills, we recommend that you follow up with your primary care physician  A registered nurse will reach out to you by phone within a few days after your discharge to answer any additional questions that you may have after going home    However, at this time we provide for you here, the most important instructions / recommendations at discharge:     Notable Medication Adjustments -   New prescriptions ciprofloxacin 500 mg-take 1 tablet every 12 hours for 8 days  New prescription metronidazole 500 mg-take 1 tablet every 8 hours for 8 days  Okay to resume all of her preadmission medications at the preadmission doses  Testing Required after Discharge -   To be further determined in the outpatient setting by a primary care provider, and/or by General surgery  Important follow up information -   Please follow up with the providers as outlined in the discharge packet  Other Instructions -   Please ensure that your primary care provider orders a repeat CT scan of the chest, and a pelvic ultrasound within 3-6 months  Please maintain a healthy diabetic diet  Please review this entire after visit summary as additional general instructions including medication list, appointments, activity, diet, any pertinent wound care, and other additional recommendations from your care team that may be provided for you        Sincerely,     Annabelle Kessler MD

## 2022-06-13 NOTE — ASSESSMENT & PLAN NOTE
· Sepsis parameters have resolved  · Patient is no longer tachycardic, leukocytosis has resolved, and procalcitonin is down trending  · Blood cultures x2 sets are positive for Bacteroides fragilis, and Staph coagulase negative Staph epidermidis  · DC home on 8 more days with the Flagyl, and ciprofloxacin  · Outpatient follow-up with PCP

## 2022-06-13 NOTE — CASE MANAGEMENT
Case Management Assessment & Discharge Planning Note    Patient name Nancy Forest  Location /-25 MRN [id number]  : 1955 Date 2022       Current Admission Date: 2022  Current Admission Diagnosis:Acute appendicitis   Patient Active Problem List    Diagnosis Date Noted    Cyst of right ovary 2022    Abnormal CT of the chest 2022    Sepsis ([facility] ) 2022    Acute appendicitis 2022    Abnormality of gait due to impairment of balance 02/15/2022    Hemiplegia and hemiparesis following cerebral infarction affecting left non-dominant side [facility] ) 2022    Stage 3 chronic kidney disease, unspecified whether stage 3a or 3b CKD ([facility] ) 2022    History of CVA (cerebrovascular accident) 2021    Weakness of both lower extremities     History of stroke 2021    Muscle tension dysphonia 2020    Reflux laryngitis 2020    Glottic insufficiency 2020    Dermatitis 2020    YOLIE (obstructive sleep apnea)     Medicare annual wellness visit, subsequent 2020    Current episode of major depressive disorder without prior episode 2019    Stress incontinence 2019    Essential hypertension 2019    GERD (gastroesophageal reflux disease) 03/15/2019    Hyperlipidemia 03/15/2019    Edema 03/15/2019    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    Incomplete emptying of bladder 2018    Dysphonia 2018    Glottic stenosis 2018    Dysphagia 2018    Paroxysmal atrial fibrillation ([facility] ) 10/17/2017    Blurry vision 10/17/2017    Insomnia 10/17/2017    Type 2 diabetes mellitus with hyperglycemia, with long-term current use of insulin ([facility] ) 10/17/2017    Cerebrovascular accident (CVA) ([facility] ) 2017    Heart disease 2015    Diabetic cataract ([facility] ) 2014    Severe obesity (BMI 35 0-39  9) with comorbidity ([facility] ) 2014      LOS (days): 2  Geometric Mean LOS (GMLOS) (days):   Days to GMLOS:     OBJECTIVE:    Risk of Unplanned Readmission Score: 20 65         Current admission status: Inpatient    Preferred Pharmacy:   [facility], [address]  [address] [facility] [address]  Phone: [phone] Fax: [phone] [facility] [address], [address]  [address] [place] [facility] [address]  Phone: [phone] Fax: [phone]    Primary Care Provider: Jaylan Olivier MD    Primary Insurance: [facility]  Secondary Insurance:     ASSESSMENT:  [address], [address] Representative - Son   Primary Phone: [phone] (Mobile)                 DISCHARGE DETAILS:    Discharge planning discussed with[de-identified] Patient and son Haylie Rider of Choice: Yes     CM contacted family/caregiver?: Yes     Did patient/caregiver verbalize understanding of patient care needs?: Yes  Were patient/caregiver advised of the risks associated with not following Treatment Team discharge recommendations?: Yes    Contacts  Patient Contacts:  Tete Smith  Relationship to Patient[de-identified] Family  Contact Method: Phone  Phone Number: [phone]  Reason/Outcome: Discharge Planning, Emergency [address]         Is the patient interested in [facility] at discharge?: Yes  [address] requested[de-identified] Nursing, Physical [address] Provider[de-identified] PCP  Home Health Services Needed[de-identified] Post-Op Care and Assessment, Evaluate Functional Status and Safety, Gait/ADL Training  Homebound Criteria Met[de-identified] Uses an Assist Device (i e  cane, walker, etc)  Supporting Clincal Findings[de-identified] Fatigues Easliy in [employer], Limited Endurance    Other Referral/Resources/Interventions Provided:  Interventions: [facility]    Treatment Team Recommendation: Home with Edouard Mishra at Discharge : Family     Additional Comments: Met with patient at bedside to discuss discharge planning  Patient currently goes to OP PT at [facility] in [facility]  Uses transportation thru [employer] patient unsure what company transports  Per son [facility] has set up transport  Call to [facility] [phone] and notified [place] of admission at [facility]  Cancelled OP appointment and patient will need a new script to go to OP therapy  Patient interested in [address] at discharge  No preferance in [address] agency  Referrals made via [place]  Called and spoke with son Nancy Correia and discussed same    Nancy Correia can transport home today between 4-5 pm

## 2022-06-13 NOTE — ASSESSMENT & PLAN NOTE
· Status post a general surgical evaluation  · Status post appendectomy on 06/12/2022  · Patient tolerated the procedure well, no complications  · Status post treatment of IV Zosyn  · Patient has been cleared from a surgical standpoint for discharge  · Will DC home on 8 more days worth of metronidazole and Cipro  · Outpatient follow-up with General surgery, and PCP

## 2022-06-13 NOTE — PROGRESS NOTES
Progress Note - General Surgery   Neha Roberto 61 y o  female MRN: [id number]  Unit/Bed#: -01 Encounter: [id number]    Assessment:  53-year-old female presenting with acute appendicitis  POD#1 s/p laparoscopic appendectomy  AVSS, on 2 L via NC   cc  WBC 8 88 from 11 21  HGB 10 6 from 10 9  CR  0 91 from 1 11  Procalcitonin 6 40 from 12 13    Plan:  Advance diet, if patient tolerates without increase in abdominal pain, nausea, vomiting may be discharged   Discontinue IVF  IV Zosyn, may transition to PO augmentin on discharge   Antiemetics and analgesics prn   Medical management per SLIM, appreciate recommendations     Subjective/Objective   Chief Complaint: None     Subjective: No acute overnight events  Patient doing well and offers no acute complaints  Tolerating full liquid diet without increase in abdominal pain, nausea, vomiting  Admits to passing minimal flatus and last bowel movement yesterday  Denies chest pain, palpitations, change in shortness of breath, calf tenderness  Denies fever, chills  Objective:   Blood pressure 99/68, pulse 73, temperature 97 9 °F (36 6 °C), resp  rate 18, SpO2 97 %, not currently breastfeeding  ,There is no height or weight on file to calculate BMI  Intake/Output Summary (Last 24 hours) at 6/13/2022 0736  Last data filed at 6/13/2022 0453  Gross per 24 hour   Intake 3000 ml   Output 250 ml   Net 2750 ml       Invasive Devices  Report    Peripheral Intravenous Line  Duration           Peripheral IV 06/11/22 Right Antecubital 1 day    Peripheral IV 06/12/22 Right Hand <1 day          Drain  Duration           External Urinary Catheter <1 day              Physical Exam  Vitals and nursing note reviewed  Constitutional:       General: She is not in acute distress  Appearance: Normal appearance  She is obese  She is not ill-appearing or toxic-appearing  HENT:      Head: Normocephalic and atraumatic     Cardiovascular:      Rate and Rhythm: Normal rate and regular rhythm  Pulses: Normal pulses  Heart sounds: Normal heart sounds  No murmur heard  No friction rub  No gallop  Pulmonary:      Effort: Pulmonary effort is normal  No respiratory distress  Breath sounds: No wheezing or rales  Abdominal:      General: A surgical scar is present  Bowel sounds are decreased  There is no distension  Palpations: Abdomen is soft  Tenderness: There is no abdominal tenderness  There is no guarding or rebound  Comments: Incisions are clean, dry, intact with surgical glue in place   Musculoskeletal:         General: Normal range of motion  Skin:     General: Skin is warm and dry  Capillary Refill: Capillary refill takes less than 2 seconds  Neurological:      General: No focal deficit present  Mental Status: She is alert and oriented to person, place, and time  Psychiatric:         Mood and Affect: Mood normal          Behavior: Behavior normal          Thought Content: Thought content normal        Lab, Imaging and other studies:  I have personally reviewed pertinent lab results      CBC:   Lab Results   Component Value Date    WBC 8 88 06/13/2022    HGB 10 6 (L) 06/13/2022    HCT 33 3 (L) 06/13/2022    MCV 95 06/13/2022     (L) 06/13/2022    MCH 30 4 06/13/2022    MCHC 31 8 06/13/2022    RDW 12 6 06/13/2022    MPV 11 7 06/13/2022    NRBC 0 06/13/2022     CMP:   Lab Results   Component Value Date    SODIUM 139 06/13/2022    K 4 0 06/13/2022     06/13/2022    CO2 23 06/13/2022    BUN 21 06/13/2022    CREATININE 0 91 06/13/2022    CALCIUM 8 1 (L) 06/13/2022    AST 22 06/13/2022    ALT 15 06/13/2022    ALKPHOS 47 06/13/2022    EGFR 66 06/13/2022     VTE Pharmacologic Prophylaxis: Heparin  VTE Mechanical Prophylaxis: sequential compression device    Catherine Amaro PA-C

## 2022-06-13 NOTE — ASSESSMENT & PLAN NOTE
· With vocal cord paralysis, patient usually follows with ENT in the outpatient setting  · Okay for discharge home

## 2022-06-13 NOTE — ASSESSMENT & PLAN NOTE
· DC home on metoprolol for rate control  · DC home on Xarelto for anticoagulation purposes-okay to resume as per General surgery

## 2022-06-13 NOTE — PLAN OF CARE
Problem: Potential for Falls  Goal: Patient will remain free of falls  Description: INTERVENTIONS:  - Educate patient/family on patient safety including physical limitations  - Instruct patient to call for assistance with activity   - Consult OT/PT to assist with strengthening/mobility   - Keep Call bell within reach  - Keep bed low and locked with side rails adjusted as appropriate  - Keep care items and personal belongings within reach  - Initiate and maintain comfort rounds  - Make Fall Risk Sign visible to staff  - Offer Toileting every 2 Hours, in advance of need  - Initiate/Maintain bed alarm  - Obtain necessary fall risk management equipment  - Apply yellow socks and bracelet for high fall risk patients  - Consider moving patient to room near nurses station  Outcome: Progressing

## 2022-06-14 LAB
ATRIAL RATE: 105 BPM
ATRIAL RATE: 106 BPM
P AXIS: 54 DEGREES
P AXIS: 62 DEGREES
PR INTERVAL: 164 MS
PR INTERVAL: 172 MS
QRS AXIS: 27 DEGREES
QRS AXIS: 38 DEGREES
QRSD INTERVAL: 60 MS
QRSD INTERVAL: 72 MS
QT INTERVAL: 324 MS
QT INTERVAL: 336 MS
QTC INTERVAL: 430 MS
QTC INTERVAL: 444 MS
T WAVE AXIS: 26 DEGREES
T WAVE AXIS: 55 DEGREES
VENTRICULAR RATE: 105 BPM
VENTRICULAR RATE: 106 BPM

## 2022-06-14 PROCEDURE — 93010 ELECTROCARDIOGRAM REPORT: CPT | Performed by: INTERNAL MEDICINE

## 2022-06-15 ENCOUNTER — TRANSITIONAL CARE MANAGEMENT (OUTPATIENT)
Dept: FAMILY MEDICINE CLINIC | Facility: CLINIC | Age: 67
End: 2022-06-15

## 2022-06-16 LAB
B FRAGILIS DNA BLD POS QL NAA+NON-PROBE: DETECTED
BACTERIA BLD CULT: ABNORMAL
GRAM STN SPEC: ABNORMAL
MECA+MECC ISLT/SPM QL: DETECTED
S EPIDERMIDIS DNA BLD POS QL NAA+NON-PRB: DETECTED

## 2022-06-17 ENCOUNTER — TELEPHONE (OUTPATIENT)
Dept: FAMILY MEDICINE CLINIC | Facility: CLINIC | Age: 67
End: 2022-06-17

## 2022-06-17 NOTE — TELEPHONE ENCOUNTER
Saba called to inform you that the cipro and the zofran are interacting with the lexapro  Please advise as to what you would like to do?

## 2022-06-23 ENCOUNTER — OFFICE VISIT (OUTPATIENT)
Dept: FAMILY MEDICINE CLINIC | Facility: CLINIC | Age: 67
End: 2022-06-23
Payer: COMMERCIAL

## 2022-06-23 VITALS
DIASTOLIC BLOOD PRESSURE: 80 MMHG | HEIGHT: 65 IN | OXYGEN SATURATION: 97 % | WEIGHT: 211.4 LBS | HEART RATE: 78 BPM | SYSTOLIC BLOOD PRESSURE: 130 MMHG | TEMPERATURE: 98.8 F | RESPIRATION RATE: 18 BRPM | BODY MASS INDEX: 35.22 KG/M2

## 2022-06-23 DIAGNOSIS — Z90.49 STATUS POST APPENDECTOMY: ICD-10-CM

## 2022-06-23 DIAGNOSIS — K21.9 GASTROESOPHAGEAL REFLUX DISEASE, UNSPECIFIED WHETHER ESOPHAGITIS PRESENT: Chronic | ICD-10-CM

## 2022-06-23 DIAGNOSIS — D69.6 PLATELETS DECREASED (HCC): ICD-10-CM

## 2022-06-23 DIAGNOSIS — N83.201 CYST OF RIGHT OVARY: Primary | ICD-10-CM

## 2022-06-23 DIAGNOSIS — K35.80 ACUTE APPENDICITIS, UNSPECIFIED ACUTE APPENDICITIS TYPE: ICD-10-CM

## 2022-06-23 DIAGNOSIS — R91.1 NODULE OF RIGHT LUNG: ICD-10-CM

## 2022-06-23 PROCEDURE — 99495 TRANSJ CARE MGMT MOD F2F 14D: CPT | Performed by: FAMILY MEDICINE

## 2022-06-23 RX ORDER — FAMOTIDINE 40 MG/1
40 TABLET, FILM COATED ORAL DAILY
Qty: 90 TABLET | Refills: 1 | Status: SHIPPED | OUTPATIENT
Start: 2022-06-23

## 2022-06-23 RX ORDER — LANCING DEVICE/LANCETS
KIT MISCELLANEOUS 2 TIMES DAILY
COMMUNITY
Start: 2022-03-29

## 2022-06-23 RX ORDER — CLOTRIMAZOLE 1 %
CREAM (GRAM) TOPICAL
COMMUNITY
Start: 2022-04-12

## 2022-06-23 NOTE — PROGRESS NOTES
Chief Complaint   Patient presents with    Transition of Care Management     6/11-13/22 Acute Appendicitis       Assessment/Plan:     57-year-old woman with: Transitional care management visit status post recent hospital stay for appendectomy for treatment take of acute appendicitis in the setting of patient with greater thrombocytopenia and ovarian cyst along with lung nodule ordered pelvic ultrasound along with follow-up lung CT encouraged follow-up with surgeon discussed supportive care return parameters continue medications follow-up in 3 months    No problem-specific Assessment & Plan notes found for this encounter  Diagnoses and all orders for this visit:    Cyst of right ovary  -     US pelvis complete non OB; Future    Gastroesophageal reflux disease, unspecified whether esophagitis present  -     famotidine (PEPCID) 40 MG tablet; Take 1 tablet (40 mg total) by mouth daily    Nodule of right lung  -     CT lung nodule follow-up; Future    Platelets decreased ([address] )    Acute appendicitis, unspecified acute appendicitis type    Status post appendectomy    Other orders  -     clotrimazole (LOTRIMIN) 1 % cream; APPLY TO AFFECTED AREAS OF THE LEFT FOOT TOPICALLY ONCE IN THE MO     (REFER TO PRESCRIPTION NOTES)  -     Lancets Misc  (Accu-Chek Softclix Lancet Dev) KIT; 2 (two) times a day Check blood sugar         Subjective:     Patient ID: Chanel Yo is a 61 y o  female      Patient is a 57-year-old woman who presents for transitional care management visit she was experiencing acute onset of abdominal pain went to the emergency room was diagnosed with acute appendicitis and treated with urgent surgery the setting of patient with underlying GERD and thrombocytopenia patient admits being generally stable and she is healing well workup did find an 0 on ovarian cyst along with lung nodule that needs follow-up no fevers chills nausea vomiting tolerating p o  intake no other complaints acutely      Review of Systems   Constitutional: Negative  HENT: Negative  Eyes: Negative  Respiratory: Negative  Cardiovascular: Negative  Gastrointestinal: Negative  Endocrine: Negative  Genitourinary: Negative  Musculoskeletal: Negative  Allergic/Immunologic: Negative  Neurological: Negative  Hematological: Negative  Psychiatric/Behavioral: Negative  All other systems reviewed and are negative  Objective:     Physical Exam  Constitutional:       Appearance: She is well-developed  HENT:      Head: Atraumatic  Right Ear: External ear normal       Left Ear: External ear normal    Eyes:      Conjunctiva/sclera: Conjunctivae normal       Pupils: Pupils are equal, round, and reactive to light  Cardiovascular:      Rate and Rhythm: Normal rate and regular rhythm  Heart sounds: Normal heart sounds  Pulmonary:      Effort: Pulmonary effort is normal  No respiratory distress  Breath sounds: Normal breath sounds  Abdominal:      General: There is no distension  Palpations: Abdomen is soft  Tenderness: There is no abdominal tenderness  There is no guarding or rebound  Musculoskeletal:         General: Normal range of motion  Cervical back: Normal range of motion  Skin:     General: Skin is warm and dry  Neurological:      Mental Status: She is alert and oriented to person, place, and time  Cranial Nerves: No cranial nerve deficit  Psychiatric:         Behavior: Behavior normal          Thought Content: Thought content normal          Judgment: Judgment normal            Vitals:    06/23/22 1252   BP: 130/80   BP Location: Left arm   Patient Position: Sitting   Cuff Size: Large   Pulse: 78   Resp: 18   Temp: 98 8 °F (37 1 °C)   TempSrc: Tympanic   SpO2: 97%   Weight: 95 9 kg (211 lb 6 4 oz)   Height: 5' 5" (1 651 m)       Transitional Care Management Review:  Shayy Serrano is a 61 y o  female here for TCM follow up       During the TCM phone call patient stated:    TCM Call (since 5/27/2022)     Date and time call was made  6/15/2022  9:32 AM    Patient was hospitialized at  [address]    Date of Admission  06/11/22    Date of discharge  06/13/22    Diagnosis  ACUTE APPENDICITIS    Were the patients medications reviewed and updated  Yes    Current Symptoms  None      TCM Call (since 5/27/2022)     Post hospital issues  None    Scheduled for follow up? Yes    Did you obtain your prescribed medications  Yes    Do you need help managing your prescriptions or medications  No    Is transportation to your appointment needed  No    I have advised the patient to call PCP with any new or worsening symptoms  Lane NGUYEN  Family    The type of support provided  Financial; Physical    Do you have social support  Yes, as much as I need    Are you recieving any outpatient services  Yes    What type of services  [facility]    Are you recieving home care services  Yes    Types of home care services  Nurse visit    Are you using any community resources  No    Current waiver services  No    Have you fallen in the last 12 months  No    Interperter language line needed  No    Counseling  Patient          Nico Lewis MD    BMI Counseling: Body mass index is 35 18 kg/m²  The BMI is above normal  Nutrition recommendations include encouraging healthy choices of fruits and vegetables  Exercise recommendations include moderate physical activity 150 minutes/week  Rationale for BMI follow-up plan is due to patient being overweight or obese

## 2022-06-24 ENCOUNTER — TELEPHONE (OUTPATIENT)
Dept: ADMINISTRATIVE | Facility: OTHER | Age: 67
End: 2022-06-24

## 2022-06-24 NOTE — TELEPHONE ENCOUNTER
----- Message from Azul Farah MA sent at 6/23/2022  1:47 PM EDT -----  Regarding: Request Quality  06/23/22 1:47 PM    Hello, our patient Spencer Alonso has had Diabetic Eye Exam completed/performed  Please assist in updating the patient chart by making an External outreach to [facility] facility located in [place]  The date of service is [id number]      Thank you,  Azul Farah MA  [address] [facility] PRIMARY CARE [facility]

## 2022-06-24 NOTE — LETTER
Diabetic Eye Exam Form    Date Requested: 22  Patient: Milad Montana  Patient : 1955   Referring Provider: Warren Mcguire MD    DIABETIC Eye Exam Date _______________________________    Type of Exam MUST be documented for Diabetic Eye Exams  Please CHECK ONE  Retinal Exam       Dilated Retinal Exam       OCT       Optomap-Iris Exam      Fundus Photography     Left Eye - Please check Retinopathy AND Type or No Retinopathy      Exam did show retinopathy    Exam did not show retinopathy         Mild     Proliferative           Moderate    Severe            None         Right Eye - Please check Retinopathy AND Type or No Retinopathy     Exam did show retinopathy    Exam did not show retinopathy         Mild     Proliferative        Moderate    Severe        None       Comments __________________________________________________________    Practice Providing Exam ______________________________________________    Exam Performed By (print name) _______________________________________      Provider Signature ___________________________________________________    These reports are needed for  compliance  Please fax this completed form and a copy of the Diabetic Eye Exam report to our office located at [place] [address] as soon as possible via [phone] carlie Meek: Phone [phone]  We thank you for your assistance in treating our mutual patient

## 2022-06-24 NOTE — TELEPHONE ENCOUNTER
Upon review of the In Basket request and the patient's chart, initial outreach has been made via fax, please see Contacts section for details       Thank you  Prakash Skinner MA

## 2022-06-27 ENCOUNTER — OFFICE VISIT (OUTPATIENT)
Dept: SURGERY | Facility: CLINIC | Age: 67
End: 2022-06-27

## 2022-06-27 DIAGNOSIS — K35.80 ACUTE APPENDICITIS: Primary | ICD-10-CM

## 2022-06-27 PROBLEM — Z90.49 STATUS POST APPENDECTOMY: Status: ACTIVE | Noted: 2022-06-27

## 2022-06-27 PROCEDURE — 99024 POSTOP FOLLOW-UP VISIT: CPT | Performed by: SURGERY

## 2022-06-27 NOTE — ASSESSMENT & PLAN NOTE
Patient returns for routine scheduled follow-up status post laparoscopic appendectomy  Patient voiced no complaints today referable to her abdomen  On physical examination she is well-appearing the surgical wounds clean dry intact  Her abdomen is nontender  The operative report provided to the patient for her permanent record  Will notify her by phone with the results of the pathology  Patient has been instructed to resume normal levels of activity  All questions answered to her satisfaction

## 2022-06-27 NOTE — PROGRESS NOTES
Assessment/Plan:    Acute appendicitis  Patient returns for routine scheduled follow-up status post laparoscopic appendectomy  Patient voiced no complaints today referable to her abdomen  On physical examination she is well-appearing the surgical wounds clean dry intact  Her abdomen is nontender  The operative report provided to the patient for her permanent record  Will notify her by phone with the results of the pathology  Patient has been instructed to resume normal levels of activity  All questions answered to her satisfaction  Diagnoses and all orders for this visit:    Acute appendicitis          Subjective:      Patient ID: Karen Pantoja is a 61 y o  female  HPI    The following portions of the patient's history were reviewed and updated as appropriate: allergies, current medications, past family history, past medical history, past social history, past surgical history and problem list     Review of Systems   Constitutional: Negative for chills and fever  HENT: Negative for ear pain and sore throat  Eyes: Negative for pain and visual disturbance  Respiratory: Negative for cough and shortness of breath  Cardiovascular: Negative for chest pain and palpitations  Gastrointestinal: Negative for abdominal pain and vomiting  Genitourinary: Negative for dysuria and hematuria  Musculoskeletal: Negative for arthralgias and back pain  Skin: Negative for color change and rash  Neurological: Negative for seizures and syncope  All other systems reviewed and are negative  Objective:      LMP  (LMP Unknown)          Physical Exam  Constitutional:       Appearance: She is well-developed  HENT:      Head: Normocephalic and atraumatic  Eyes:      Conjunctiva/sclera: Conjunctivae normal       Pupils: Pupils are equal, round, and reactive to light  Cardiovascular:      Rate and Rhythm: Normal rate and regular rhythm     Pulmonary:      Effort: Pulmonary effort is normal  Breath sounds: Normal breath sounds  Abdominal:      General: Bowel sounds are normal       Palpations: Abdomen is soft  Musculoskeletal:         General: Normal range of motion  Cervical back: Normal range of motion and neck supple  Skin:     General: Skin is warm and dry  Neurological:      Mental Status: She is alert and oriented to person, place, and time  Psychiatric:         Behavior: Behavior normal          Thought Content:  Thought content normal          Judgment: Judgment normal

## 2022-06-27 NOTE — LETTER
June 27, 2022     Tamar Marion, [address]    Patient: Gillian Guzman   YOB: 1955   Date of Visit: 6/27/2022       Dear Dr Mike Duron: Thank you for referring Joan Pettit to me for evaluation  Below are my notes for this consultation  If you have questions, please do not hesitate to call me  I look forward to following your patient along with you  Sincerely,        Silverio Gibson MD        CC: No Recipients  Silverio Gibson MD  6/27/2022  3:15 PM  Incomplete  Assessment/Plan:    Acute appendicitis  Patient returns for routine scheduled follow-up status post laparoscopic appendectomy  Patient voiced no complaints today referable to her abdomen  On physical examination she is well-appearing the surgical wounds clean dry intact  Her abdomen is nontender  The operative report provided to the patient for her permanent record  Will notify her by phone with the results of the pathology  Patient has been instructed to resume normal levels of activity  All questions answered to her satisfaction  Diagnoses and all orders for this visit:    Acute appendicitis          Subjective:      Patient ID: Gillian Guzman is a 61 y o  female  HPI    The following portions of the patient's history were reviewed and updated as appropriate: allergies, current medications, past family history, past medical history, past social history, past surgical history and problem list     Review of Systems   Constitutional: Negative for chills and fever  HENT: Negative for ear pain and sore throat  Eyes: Negative for pain and visual disturbance  Respiratory: Negative for cough and shortness of breath  Cardiovascular: Negative for chest pain and palpitations  Gastrointestinal: Negative for abdominal pain and vomiting  Genitourinary: Negative for dysuria and hematuria  Musculoskeletal: Negative for arthralgias and back pain     Skin: Negative for color change and rash  Neurological: Negative for seizures and syncope  All other systems reviewed and are negative  Objective:      LMP  (LMP Unknown)          Physical Exam  Constitutional:       Appearance: She is well-developed  HENT:      Head: Normocephalic and atraumatic  Eyes:      Conjunctiva/sclera: Conjunctivae normal       Pupils: Pupils are equal, round, and reactive to light  Cardiovascular:      Rate and Rhythm: Normal rate and regular rhythm  Pulmonary:      Effort: Pulmonary effort is normal       Breath sounds: Normal breath sounds  Abdominal:      General: Bowel sounds are normal       Palpations: Abdomen is soft  Musculoskeletal:         General: Normal range of motion  Cervical back: Normal range of motion and neck supple  Skin:     General: Skin is warm and dry  Neurological:      Mental Status: She is alert and oriented to person, place, and time  Psychiatric:         Behavior: Behavior normal          Thought Content:  Thought content normal          Judgment: Judgment normal

## 2022-06-28 ENCOUNTER — TELEPHONE (OUTPATIENT)
Dept: SURGERY | Facility: CLINIC | Age: 67
End: 2022-06-28

## 2022-06-28 NOTE — TELEPHONE ENCOUNTER
Upon review of the In Basket request we were able to locate, review, and update the patient chart as requested for Diabetic Eye Exam     Any additional questions or concerns should be emailed to the Practice Liaisons via [email or web address] email, please do not reply via In Basket      Thank you  Teressa Ricardo MA

## 2022-06-29 NOTE — QUICK NOTE
Addendum to Consult note dated 6/12/22 8:03 Am by myself:    Review of systems:  No fever or chills  No chest pain or palpitations  No dyspnea or cough  No dysuria  No swelling of hands/legs

## 2022-07-14 DIAGNOSIS — K21.9 GASTROESOPHAGEAL REFLUX DISEASE, UNSPECIFIED WHETHER ESOPHAGITIS PRESENT: ICD-10-CM

## 2022-07-14 RX ORDER — PANTOPRAZOLE SODIUM 40 MG/1
TABLET, DELAYED RELEASE ORAL
Qty: 30 TABLET | Refills: 1 | Status: SHIPPED | OUTPATIENT
Start: 2022-07-14 | End: 2022-07-28 | Stop reason: SDUPTHER

## 2022-07-28 ENCOUNTER — OFFICE VISIT (OUTPATIENT)
Dept: ENDOCRINOLOGY | Facility: CLINIC | Age: 67
End: 2022-07-28
Payer: COMMERCIAL

## 2022-07-28 VITALS
SYSTOLIC BLOOD PRESSURE: 128 MMHG | DIASTOLIC BLOOD PRESSURE: 78 MMHG | BODY MASS INDEX: 33.99 KG/M2 | OXYGEN SATURATION: 98 % | HEIGHT: 65 IN | HEART RATE: 78 BPM | WEIGHT: 204 LBS

## 2022-07-28 DIAGNOSIS — I10 ESSENTIAL HYPERTENSION: Chronic | ICD-10-CM

## 2022-07-28 DIAGNOSIS — E78.5 HYPERLIPIDEMIA, UNSPECIFIED HYPERLIPIDEMIA TYPE: Chronic | ICD-10-CM

## 2022-07-28 DIAGNOSIS — E11.65 TYPE 2 DIABETES MELLITUS WITH HYPERGLYCEMIA, WITH LONG-TERM CURRENT USE OF INSULIN (HCC): Primary | ICD-10-CM

## 2022-07-28 DIAGNOSIS — Z79.4 TYPE 2 DIABETES MELLITUS WITH HYPERGLYCEMIA, WITH LONG-TERM CURRENT USE OF INSULIN (HCC): Primary | ICD-10-CM

## 2022-07-28 DIAGNOSIS — K21.9 GASTROESOPHAGEAL REFLUX DISEASE, UNSPECIFIED WHETHER ESOPHAGITIS PRESENT: ICD-10-CM

## 2022-07-28 DIAGNOSIS — G47.33 OSA (OBSTRUCTIVE SLEEP APNEA): Chronic | ICD-10-CM

## 2022-07-28 LAB — SL AMB POCT HEMOGLOBIN AIC: 8 (ref ?–6.5)

## 2022-07-28 PROCEDURE — 83036 HEMOGLOBIN GLYCOSYLATED A1C: CPT | Performed by: NURSE PRACTITIONER

## 2022-07-28 PROCEDURE — 99214 OFFICE O/P EST MOD 30 MIN: CPT | Performed by: NURSE PRACTITIONER

## 2022-07-28 RX ORDER — INSULIN GLARGINE 100 [IU]/ML
40 INJECTION, SOLUTION SUBCUTANEOUS
Qty: 30 ML | Refills: 1 | Status: SHIPPED | OUTPATIENT
Start: 2022-07-28 | End: 2022-10-12

## 2022-07-28 RX ORDER — PANTOPRAZOLE SODIUM 40 MG/1
40 TABLET, DELAYED RELEASE ORAL DAILY
Qty: 30 TABLET | Refills: 1 | Status: SHIPPED | OUTPATIENT
Start: 2022-07-28 | End: 2022-10-05

## 2022-07-28 RX ORDER — REPAGLINIDE 2 MG/1
2 TABLET ORAL
Qty: 180 TABLET | Refills: 1 | Status: SHIPPED | OUTPATIENT
Start: 2022-07-28

## 2022-07-28 RX ORDER — METFORMIN HYDROCHLORIDE 500 MG/1
500 TABLET, EXTENDED RELEASE ORAL 2 TIMES DAILY WITH MEALS
Qty: 180 TABLET | Refills: 1 | Status: SHIPPED | OUTPATIENT
Start: 2022-07-28 | End: 2023-01-24

## 2022-07-28 NOTE — PROGRESS NOTES
Established Patient Progress Note      Chief Complaint   Patient presents with    Diabetes Type 2     Patient not checking sugars in about two weeks as she can not see        History of Present Illness:   Dave Lenz is a 61 y o  female with hypertension, hyperlipidemia, and type 2 diabetes with long term use of insulin  Reports complications of multiple strokes and retinopathy  Denies recent severe hypoglycemic or severe hyperglycemic episodes  Denies any issues with her current regimen  home glucose monitoring: are performed sporadically-patient is having a difficult time seeing well enough to test her blood sugars  At last appointment on 03/25/2022, Lantus was increased to 40 units nightly  Prandin was increased to 2 mg twice daily with breakfast and dinner  Since her last appointment, she was unfortunately hospitalized due to acute appendicitis  She underwent an appendectomy  She reports feeling better today  However, she is currently struggling with reduced vision due to DR  She is being treated with regular injections  Component      Latest Ref Rng & Units 11/4/2021 3/25/2022          10:12 AM 10:58 AM   Hemoglobin A1C      6 5 9 4 (H) 8 3 (A)     Point of care hemoglobin A1c today is 8%  Component      Latest Ref Rng & Units 6/12/2022 6/13/2022           4:29 AM  4:24 AM   eGFR      ml/min/1 73sq m 52 66       Home blood glucose readings:  None for review       Current regimen:   Lantus 40 units nightly-patient reports she is doing her best to give herself 40 units but cannot see the pen well enough to know for sure  Tradjenta 5 mg daily  Prandin 2 mg twice daily before meals    Last Eye Exam: UTD  Last Foot Exam: UTD    For hypertension, she is taking 5 mg of lisinopril daily 50 mg of metoprolol tartrate twice daily  She denies headache, cough, orthostatic hypotension  For hyperlipidemia, she is taking 80 mg of atorvastatin nightly  She denies statin induced myalgias      Patient Active Problem List   Diagnosis    Paroxysmal atrial fibrillation (HCC)    [facility]A) [facility]    Blurry vision    Diabetic cataract [facility]    Dysphagia    Dysphonia    Glottic stenosis    Heart disease    Insomnia    Incomplete emptying of bladder    Severe obesity (BMI 35 0-39  9) with comorbidity ([facility]    Tracheal stenosis    Type 2 diabetes mellitus with hyperglycemia, with long-term current use of insulin (HCC)    GERD (gastroesophageal reflux disease)    Hyperlipidemia    Edema    Essential hypertension    Current episode of major depressive disorder without prior episode    Medicare annual wellness visit, subsequent    YOLIE (obstructive sleep apnea)    Dermatitis    Muscle tension dysphonia    Reflux laryngitis    Glottic insufficiency    History of stroke    Weakness of both lower extremities    History of CVA (cerebrovascular accident)    Hemiplegia and hemiparesis following cerebral infarction affecting left non-dominant side ([facility]    Stage 3 chronic kidney disease, unspecified whether stage 3a or 3b CKD ([facility])    Abnormality of gait due to impairment of balance    Stress incontinence    Sepsis ([facility]    Cyst of right ovary    Abnormal CT of the chest    Platelets decreased ([facility]    Status post appendectomy      Past Medical History:   Diagnosis Date    Abdominal fibromatosis     Diabetes mellitus ([facility]     Hyperlipemia     Hypertension     Obesity     Pneumonia     Right pontine stroke ([facility] 12/13/2021    [facility] 12/17/20[facility]    Stroke [facility])       Past Surgical History:   Procedure Laterality Date    APPENDECTOMY LAPAROSCOPIC N/A 6/12/2022    Procedure: APPENDECTOMY LAPAROSCOPIC;  Surgeon: Manjula Dee MD;  Location: [facility];  Service: General    CATARACT EXTRACTION, BILATERAL      COLONOSCOPY      EGD      LAPAROTOMY      Exploratory;  Last Assessed 10/17/2017    PEG TUBE PLACEMENT      PEG TUBE REMOVAL        Family History Problem Relation Age of Onset    No Known Problems Mother     No Known Problems Father      Social History     Tobacco Use    Smoking status: Never Smoker    Smokeless tobacco: Never Used   Substance Use Topics    Alcohol use: Never     Comment: Socially     Allergies   Allergen Reactions    Apixaban Vomiting and GI Intolerance     Other reaction(s): Vomiting    Dulaglutide Vomiting     Nausea vomiting         Current Outpatient Medications:     Aspirin 81 MG CAPS, Take 1 tablet by mouth in the morning, Disp: , Rfl:     atorvastatin (LIPITOR) 80 mg tablet, Take 1 tablet (80 mg total) by mouth daily with dinner, Disp: 90 tablet, Rfl: 1    B-D ULTRAFINE III SHORT PEN 31G X 8 MM MISC, use as directed, Disp: 100 each, Rfl: 0    B-D ULTRAFINE III SHORT PEN 31G X 8 MM MISC, USE AS DIRECTED, Disp: 100 each, Rfl: 0    B-D ULTRAFINE III SHORT PEN 31G X 8 MM MISC, use as directed, Disp: 100 each, Rfl: 1    clotrimazole (LOTRIMIN) 1 % cream, APPLY TO AFFECTED AREAS OF THE LEFT FOOT TOPICALLY ONCE IN THE MO     (REFER TO PRESCRIPTION NOTES)  , Disp: , Rfl:     escitalopram (LEXAPRO) 5 mg tablet, Take 1 tablet (5 mg total) by mouth in the morning , Disp: 90 tablet, Rfl: 1    famotidine (PEPCID) 40 MG tablet, Take 1 tablet (40 mg total) by mouth daily, Disp: 90 tablet, Rfl: 1    Insulin Glargine Solostar (Lantus SoloStar) 100 UNIT/ML SOPN, Inject 40 Units under the skin daily at bedtime, Disp: 30 mL, Rfl: 1    Insulin Pen Needle (Pen Needles 5/16") 30G X 8 MM MISC, Use daily, Disp: 50 each, Rfl: 2    Lancet Devices (Lancet Device with Ejector) MISC, Use to check blood sugar twice a day, Disp: 1 each, Rfl: 0    Lancets Misc   (Accu-Chek Softclix Lancet Dev) KIT, 2 (two) times a day Check blood sugar, Disp: , Rfl:     linaGLIPtin (Tradjenta) 5 MG TABS, Take 5 mg by mouth in the morning , Disp: 90 tablet, Rfl: 1    lisinopril (ZESTRIL) 5 mg tablet, Take 1 tablet (5 mg total) by mouth in the morning , Disp: 90 tablet, Rfl: 1    metFORMIN (GLUCOPHAGE-XR) 500 mg 24 hr tablet, Take 1 tablet (500 mg total) by mouth 2 (two) times a day with meals, Disp: 180 tablet, Rfl: 1    metoprolol tartrate (LOPRESSOR) 50 mg tablet, Take 1 tablet (50 mg total) by mouth every 12 (twelve) hours, Disp: 180 tablet, Rfl: 1    Mirabegron ER 25 MG TB24, Take 25 mg by mouth in the morning , Disp: 90 tablet, Rfl: 1    ondansetron (Zofran ODT) 4 mg disintegrating tablet, Take 1 tablet (4 mg total) by mouth every 8 (eight) hours as needed for nausea or vomiting, Disp: 60 tablet, Rfl: 1    ondansetron (ZOFRAN-ODT) 4 mg disintegrating tablet, Take 1 tablet (4 mg total) by mouth every 8 (eight) hours as needed for nausea or vomiting, Disp: 30 tablet, Rfl: 1    pantoprazole (PROTONIX) 40 mg tablet, Take 1 tablet (40 mg total) by mouth daily, Disp: 30 tablet, Rfl: 1    repaglinide (PRANDIN) 2 mg tablet, Take 1 tablet (2 mg total) by mouth 2 (two) times a day before meals, Disp: 180 tablet, Rfl: 1    rivaroxaban (Xarelto) 20 mg tablet, Take 1 tablet (20 mg total) by mouth daily with breakfast, Disp: 90 tablet, Rfl: 1    Accu-Chek FastClix Lancets MISC, Use 2 (two) times a day (Patient not taking: No sig reported), Disp: 102 each, Rfl: 3    Blood Glucose Monitoring Suppl (Accu-Chek Guide) w/Device KIT, Use 2 (two) times a day (Patient not taking: No sig reported), Disp: 1 kit, Rfl: 0    Blood Glucose Monitoring Suppl (OneTouch Verio) w/Device KIT, Use 2 (two) times a day, Disp: 1 kit, Rfl: 0    Review of Systems   Constitutional: Positive for fatigue  Negative for activity change, appetite change and unexpected weight change  HENT: Negative for dental problem, sore throat, trouble swallowing and voice change  Eyes: Positive for visual disturbance  Respiratory: Negative for cough, chest tightness and shortness of breath  Cardiovascular: Negative for chest pain, palpitations and leg swelling     Gastrointestinal: Negative for constipation, diarrhea, nausea and vomiting  Endocrine: Negative for polydipsia, polyphagia and polyuria  Genitourinary: Negative for frequency  Musculoskeletal: Positive for arthralgias and gait problem  Negative for back pain and myalgias  Skin: Negative for wound  Allergic/Immunologic: Positive for environmental allergies  Negative for food allergies  Neurological: Negative for dizziness, weakness, light-headedness, numbness and headaches  Hematological: Does not bruise/bleed easily  Psychiatric/Behavioral: Positive for confusion (Poor memory)  Negative for decreased concentration, dysphoric mood and sleep disturbance  The patient is not nervous/anxious  Physical Exam:  Body mass index is 33 95 kg/m²  /78   Pulse 78   Ht 5' 5" (1 651 m)   Wt 92 5 kg (204 lb)   LMP  (LMP Unknown)   SpO2 98%   BMI 33 95 kg/m²    Wt Readings from Last 3 Encounters:   07/28/22 92 5 kg (204 lb)   06/23/22 95 9 kg (211 lb 6 4 oz)   05/20/22 97 1 kg (214 lb)       Physical Exam  Vitals reviewed  Constitutional:       General: She is not in acute distress  Appearance: She is well-developed  She is obese  She is not ill-appearing  HENT:      Head: Normocephalic and atraumatic  Eyes:      Pupils: Pupils are equal, round, and reactive to light  Neck:      Thyroid: No thyromegaly  Cardiovascular:      Rate and Rhythm: Normal rate and regular rhythm  Pulses: Normal pulses  Heart sounds: Normal heart sounds  Pulmonary:      Effort: Pulmonary effort is normal       Breath sounds: Normal breath sounds  Abdominal:      General: Bowel sounds are normal  There is no distension  Palpations: Abdomen is soft  Tenderness: There is no abdominal tenderness  Musculoskeletal:      Cervical back: Normal range of motion and neck supple  Right lower leg: No edema  Left lower leg: No edema  Lymphadenopathy:      Cervical: No cervical adenopathy  Skin:     General: Skin is warm and dry  Capillary Refill: Capillary refill takes less than 2 seconds  Neurological:      Mental Status: She is alert and oriented to person, place, and time  Gait: Gait normal    Psychiatric:         Mood and Affect: Mood normal          Behavior: Behavior normal            Labs:   Lab Results   Component Value Date    HGBA1C 8 0 (A) 07/28/2022    HGBA1C 8 3 (A) 03/25/2022    HGBA1C 9 4 (H) 11/04/2021     Lab Results   Component Value Date    CREATININE 0 91 06/13/2022    CREATININE 1 11 06/12/2022    CREATININE 1 26 06/11/2022    BUN 21 06/13/2022    K 4 0 06/13/2022     06/13/2022    CO2 23 06/13/2022     eGFR   Date Value Ref Range Status   06/13/2022 66 ml/min/1 73sq m Final     Lab Results   Component Value Date    HDL 49 (L) 12/14/2021    TRIG 73 12/14/2021     Lab Results   Component Value Date    ALT 15 06/13/2022    AST 22 06/13/2022    ALKPHOS 47 06/13/2022     Lab Results   Component Value Date    OUU9BLJJCSIM 4 942 (H) 06/11/2022    WYV5TLDWSOVP 2 409 12/13/2021    RIP4ZMEAQXGR 1 800 01/30/2021     No results found for: Jorge Loya    Impression & Plan:    Problem List Items Addressed This Visit        Digestive    GERD (gastroesophageal reflux disease) (Chronic)    Relevant Medications    pantoprazole (PROTONIX) 40 mg tablet       Endocrine    Type 2 diabetes mellitus with hyperglycemia, with long-term current use of insulin (HCC) - Primary (Chronic)     Control has improved slightly but patient remains above goal  Reviewed how to administer insulin in light of her reduced vision  Recommend starting Metformin  Patient had been on this in the past but reported GI s/e  However, in reviewing the chart, she was taking basic metformin at 1,000 mg BID  Will trial 500 mg of metformin XR twice daily with meals  Reviewed MOA as well as potential s/e  She knows to notify me should she experience these  Once her vision starts to improve, instructed her to resume testing her blood sugars   Continue to S on healthy diet  Patient knows to notify me with any episodes of hypoglycemia  Check labs prior to next appointment in 4 months  Lab Results   Component Value Date    HGBA1C 8 0 (A) 07/28/2022            Relevant Medications    repaglinide (PRANDIN) 2 mg tablet    pantoprazole (PROTONIX) 40 mg tablet    metFORMIN (GLUCOPHAGE-XR) 500 mg 24 hr tablet    Insulin Glargine Solostar (Lantus SoloStar) 100 UNIT/ML SOPN    Other Relevant Orders    POCT hemoglobin A1c (Completed)    Hemoglobin A1C    Comprehensive metabolic panel    Lipid Panel with Direct LDL reflex    Microalbumin / creatinine urine ratio       Respiratory    YOLIE (obstructive sleep apnea) (Chronic)     Reports she is unable to tolerate CPAP  Cardiovascular and Mediastinum    Essential hypertension (Chronic)     BP stable 128/70  Continue current regimen  Other    Hyperlipidemia (Chronic)     Check fasting lipid panel  Continue statin  Orders Placed This Encounter   Procedures    Hemoglobin A1C     Standing Status:   Future     Standing Expiration Date:   7/28/2023    Comprehensive metabolic panel     This is a patient instruction: Patient fasting for 8 hours or longer recommended  Standing Status:   Future     Standing Expiration Date:   7/28/2023    Lipid Panel with Direct LDL reflex     This is a patient instruction: This test requires patient fasting for 10-12 hours or longer  Drinking of black coffee or black tea is acceptable  Standing Status:   Future     Standing Expiration Date:   7/28/2023    Microalbumin / creatinine urine ratio     Standing Status:   Future     Standing Expiration Date:   7/28/2023    POCT hemoglobin A1c       There are no Patient Instructions on file for this visit  Discussed with the patient and all questioned fully answered  She will call me if any problems arise  Follow-up appointment in 4 months       Counseled patient on diagnostic results, prognosis, risk and benefit of treatment options, instruction for management, importance of treatment compliance, Risk  factor reduction and impressions    ALEC Tucker

## 2022-07-28 NOTE — ASSESSMENT & PLAN NOTE
Control has improved slightly but patient remains above goal  Reviewed how to administer insulin in light of her reduced vision  Recommend starting Metformin  Patient had been on this in the past but reported GI s/e  However, in reviewing the chart, she was taking basic metformin at 1,000 mg BID  Will trial 500 mg of metformin XR twice daily with meals  Reviewed MOA as well as potential s/e  She knows to notify me should she experience these  Once her vision starts to improve, instructed her to resume testing her blood sugars  Continue to S on healthy diet  Patient knows to notify me with any episodes of hypoglycemia  Check labs prior to next appointment in 4 months    Lab Results   Component Value Date    HGBA1C 8 0 (A) 07/28/2022

## 2022-08-22 ENCOUNTER — HOSPITAL ENCOUNTER (OUTPATIENT)
Dept: ULTRASOUND IMAGING | Facility: HOSPITAL | Age: 67
Discharge: HOME | End: 2022-08-22
Payer: COMMERCIAL

## 2022-08-22 ENCOUNTER — HOSPITAL ENCOUNTER (OUTPATIENT)
Dept: CT IMAGING | Facility: HOSPITAL | Age: 67
Discharge: HOME | End: 2022-08-22
Payer: COMMERCIAL

## 2022-08-22 DIAGNOSIS — N83.201 CYST OF RIGHT OVARY: ICD-10-CM

## 2022-08-22 DIAGNOSIS — R91.1 NODULE OF RIGHT LUNG: ICD-10-CM

## 2022-08-22 PROCEDURE — 71250 CT THORAX DX C-: CPT

## 2022-08-22 PROCEDURE — 76830 TRANSVAGINAL US NON-OB: CPT

## 2022-08-22 PROCEDURE — 76856 US EXAM PELVIC COMPLETE: CPT

## 2022-08-26 DIAGNOSIS — N83.8 OVARIAN MASS: Primary | ICD-10-CM

## 2022-08-29 ENCOUNTER — PATIENT OUTREACH (OUTPATIENT)
Dept: CASE MANAGEMENT | Facility: HOSPITAL | Age: 67
End: 2022-08-29

## 2022-08-29 ENCOUNTER — TELEPHONE (OUTPATIENT)
Dept: GYNECOLOGIC ONCOLOGY | Facility: CLINIC | Age: 67
End: 2022-08-29

## 2022-08-29 DIAGNOSIS — N83.8 OVARIAN MASS: Primary | ICD-10-CM

## 2022-08-29 NOTE — PROGRESS NOTES
OncSW referral received, chart review completed, pt will consult with Dr Gee Saldaña later this week, MSW will reach out to assess for needs next week if appropriate

## 2022-09-01 ENCOUNTER — CONSULT (OUTPATIENT)
Dept: GYNECOLOGIC ONCOLOGY | Facility: CLINIC | Age: 67
End: 2022-09-01
Payer: COMMERCIAL

## 2022-09-01 ENCOUNTER — TELEPHONE (OUTPATIENT)
Dept: HEMATOLOGY ONCOLOGY | Facility: CLINIC | Age: 67
End: 2022-09-01

## 2022-09-01 VITALS
BODY MASS INDEX: 32.99 KG/M2 | RESPIRATION RATE: 16 BRPM | WEIGHT: 198 LBS | DIASTOLIC BLOOD PRESSURE: 78 MMHG | HEIGHT: 65 IN | OXYGEN SATURATION: 96 % | TEMPERATURE: 98.6 F | SYSTOLIC BLOOD PRESSURE: 128 MMHG | HEART RATE: 96 BPM

## 2022-09-01 DIAGNOSIS — N83.201 CYST OF RIGHT OVARY: Primary | ICD-10-CM

## 2022-09-01 DIAGNOSIS — N83.8 OVARIAN MASS: ICD-10-CM

## 2022-09-01 PROCEDURE — 99205 OFFICE O/P NEW HI 60 MIN: CPT | Performed by: OBSTETRICS & GYNECOLOGY

## 2022-09-01 PROCEDURE — 87624 HPV HI-RISK TYP POOLED RSLT: CPT | Performed by: OBSTETRICS & GYNECOLOGY

## 2022-09-01 PROCEDURE — 88175 CYTOPATH C/V AUTO FLUID REDO: CPT | Performed by: OBSTETRICS & GYNECOLOGY

## 2022-09-01 RX ORDER — GABAPENTIN 100 MG/1
100 CAPSULE ORAL ONCE
Status: CANCELLED | OUTPATIENT
Start: 2022-09-30 | End: 2022-09-01

## 2022-09-01 RX ORDER — ACETAMINOPHEN 325 MG/1
975 TABLET ORAL ONCE
Status: CANCELLED | OUTPATIENT
Start: 2022-09-30 | End: 2022-09-01

## 2022-09-01 RX ORDER — ENOXAPARIN SODIUM 100 MG/ML
40 INJECTION SUBCUTANEOUS
Status: CANCELLED | OUTPATIENT
Start: 2022-09-30 | End: 2022-10-01

## 2022-09-01 RX ORDER — CEFAZOLIN SODIUM 2 G/50ML
2000 SOLUTION INTRAVENOUS ONCE
Status: CANCELLED | OUTPATIENT
Start: 2022-09-30 | End: 2022-09-01

## 2022-09-01 NOTE — PROGRESS NOTES
Assessment/Plan:    Problem List Items Addressed This Visit        Endocrine    Cyst of right ovary - Primary     The patient has been recently diagnosed with a ovarian mass     All new solid masses run approximately 10 to 40% risk of ovarian cancer  These generally do not resolve  This has been stable over the past 3 months  Additionally the patient has recently undergone a laparoscopic surgery and found no intra-abdominal carcinomatosis or significant abnormality  The patient does have significant comorbidities including diabetes atrial fibrillation and CVA approximately 6 months ago  The patient is functionally doing well  She has recently undergone a laparoscopic surgery for appendectomy and has tolerated this without difficulty  We have discussed treatment options including observation with follow-up in 2 months including possible ultrasound at that time, or immediate surgery  We have recommended the following:    Robotically assisted total laparoscopic hysterectomy bilateral salpingo-oophorectomy with possible pelvic and para-aortic lymph node dissection staging biopsies including omentectomy based on findings at frozen section     Have discussed risks and benefits of the procedure including bleeding requiring transfusion infection, infection, damage to local structures including bowel bladder ureter and other local organs  We discussed the risk of deep venous thrombosis  An open procedure may be required  All of these complications are in the 2-4% range of likelihood  The patient understands the risks and benefits of the procedure and has signed an informed consent  I personally signed the consent form with her  She does understand that further treatment including chemotherapy radiation therapy or hormones may be required based on the final postoperative pathologic diagnosis and staging  Standard preoperative testing including type and screen is CBC CPM P chest x-ray and EKG will be ordered  Consultation to the patient's family doctor for preoperative evaluation will also be ordered  With regard to management of preoperative medications we have recommended the following:  One aspirin stop 1 week prior to surgery  Metformin  Hold day of surgery to surgery  Xarelto    stop Two days prior to surgery  Prandin hold day of surgery  Overall consultation took 60 min with greater than 50% in dedicated toward discussion time  Other    Ovarian mass    Relevant Orders    Case request operating room: HYSTERECTOMY LAPAROSCOPIC TOTAL ([address]) bilateral salpingo-oophorectomy with possible staging W/ ROBOTICS (Completed)    Ambulatory referral to Family Practice    Type and screen    Comprehensive metabolic panel    CBC and differential    HEMOGLOBIN A1C W/ EAG ESTIMATION        EKG 12 lead    XR chest pa & lateral              CHIEF COMPLAINT:  Persistent ovarian cyst multiple medical problems          Patient ID: Erasmo Lopez is a 74 y o  female  Patient is very pleasant 45-year-old female with history of multiple medical problems including diabetes CVA on chronic anticoagulation  She had a fall in June of 2021 and underwent a CT scan to rule out hemorrhage  A 3 cm adnexal cyst was noted  It was recommended follow-up with ultrasound in 3 months  The patient recently underwent a pelvic ultrasound which reveals the following:  FINDINGS:     UTERUS:  The uterus is anteverted in position, measuring 7 4 x 3 5 x 4 6 cm  Volume = 62 mL  Myometrial echotexture is heterogeneous  Nabothian cysts in the lower uterine and cervical segments  Numerous heterogeneous partially calcified presumed fibroids as follows:  1  Right anterior lower body intramural/subserosal measuring 1 2 cm  2   Left anterior body intramural measuring 1 3 cm   3   Left posterior body intramural measuring 2 0 cm    The cervix appears within normal limits      ENDOMETRIUM:    The endometrial echo complex has an AP caliber of 4 mm   Trace endometrial canal fluid, commonly the result of cervical canal stenosis  No polyps or masses      OVARIES/ADNEXA:  Right ovary:  4 8 x 3 7 x 2 7 cm  24 7 mL  Complex lobular cystic structure with at least partial internal septations measuring 4 2 x 3 3 x 2 8 cm 1 measured as a single structure  Previously measuring up to 4 2 cm by CT  Some color flow within septations  Subcentimeter mural nodularity seen   at the periphery on the sweep images  Preserved Color flow by Doppler interrogation  No spectral analysis      Left ovary:  2 9 x 1 7 x 1 4 cm  3 7 mL  No suspicious left ovarian abnormality  Doppler flow within normal limits      No suspicious adnexal mass or loculated collections  There is no free fluid         IMPRESSION:  Normal sized myomatous uterus  Normal thickness endometrium with trace endometrial canal fluid noted, commonly the result of cervical canal stenosis  Normal left ovary      Enlarged right ovary with preserved blood flow  Complex multilocular cystic lesion with thick vascular septations as well as peripheral mural nodularity measuring up to 4 2 cm  Stable by size compared to prior CT from June  According to the  O - rads ultrasound risk stratification and management system: This is a Score 5-high risk (greater than 50% chance of malignancy) (management by gyn onc)    Today, the patient is doing well  She denies significant abdominal pain, pelvic pain, nausea, vomiting, constipation, diarrhea, fevers, chills, or vaginal bleeding  Patient has recently undergone laparoscopic appendectomy for a cute appendicitis in June of 2022  This procedure was uncomplicated and the patient has done well  No significant intra-abdominal pathology was noted  Review of Systems   Constitutional: Negative  HENT: Negative  Eyes: Negative  Respiratory: Negative  Cardiovascular: Negative  Gastrointestinal: Negative  Endocrine: Negative  Genitourinary: Negative  Musculoskeletal: Negative  Skin: Negative  Neurological: Negative  Hematological: Negative  Psychiatric/Behavioral: Negative  Current Outpatient Medications   Medication Sig Dispense Refill    Aspirin 81 MG CAPS Take 1 tablet by mouth in the morning      atorvastatin (LIPITOR) 80 mg tablet Take 1 tablet (80 mg total) by mouth daily with dinner 90 tablet 1    B-D ULTRAFINE III SHORT PEN 31G X 8 MM MISC use as directed 100 each 0    B-D ULTRAFINE III SHORT PEN 31G X 8 MM MISC USE AS DIRECTED 100 each 0    B-D ULTRAFINE III SHORT PEN 31G X 8 MM MISC use as directed 100 each 1    clotrimazole (LOTRIMIN) 1 % cream APPLY TO AFFECTED AREAS OF THE LEFT FOOT TOPICALLY ONCE IN THE MO     (REFER TO PRESCRIPTION NOTES)   escitalopram (LEXAPRO) 5 mg tablet Take 1 tablet (5 mg total) by mouth in the morning  90 tablet 1    famotidine (PEPCID) 40 MG tablet Take 1 tablet (40 mg total) by mouth daily 90 tablet 1    Insulin Glargine Solostar (Lantus SoloStar) 100 UNIT/ML SOPN Inject 40 Units under the skin daily at bedtime 30 mL 1    Insulin Pen Needle (Pen Needles 5/16") 30G X 8 MM MISC Use daily 50 each 2    Lancet Devices (Lancet Device with Ejector) MISC Use to check blood sugar twice a day 1 each 0    Lancets Misc  (Accu-Chek Softclix Lancet Dev) KIT 2 (two) times a day Check blood sugar      linaGLIPtin (Tradjenta) 5 MG TABS Take 5 mg by mouth in the morning  90 tablet 1    lisinopril (ZESTRIL) 5 mg tablet Take 1 tablet (5 mg total) by mouth in the morning  90 tablet 1    metFORMIN (GLUCOPHAGE-XR) 500 mg 24 hr tablet Take 1 tablet (500 mg total) by mouth 2 (two) times a day with meals 180 tablet 1    metoprolol tartrate (LOPRESSOR) 50 mg tablet Take 1 tablet (50 mg total) by mouth every 12 (twelve) hours 180 tablet 1    Mirabegron ER 25 MG TB24 Take 25 mg by mouth in the morning   90 tablet 1    ondansetron (Zofran ODT) 4 mg disintegrating tablet Take 1 tablet (4 mg total) by mouth every 8 (eight) hours as needed for nausea or vomiting 60 tablet 1    ondansetron (ZOFRAN-ODT) 4 mg disintegrating tablet Take 1 tablet (4 mg total) by mouth every 8 (eight) hours as needed for nausea or vomiting 30 tablet 1    pantoprazole (PROTONIX) 40 mg tablet Take 1 tablet (40 mg total) by mouth daily 30 tablet 1    repaglinide (PRANDIN) 2 mg tablet Take 1 tablet (2 mg total) by mouth 2 (two) times a day before meals 180 tablet 1    rivaroxaban (Xarelto) 20 mg tablet Take 1 tablet (20 mg total) by mouth daily with breakfast 90 tablet 1    Accu-Chek FastClix Lancets MISC Use 2 (two) times a day (Patient not taking: No sig reported) 102 each 3    Blood Glucose Monitoring Suppl (Accu-Chek Guide) w/Device KIT Use 2 (two) times a day (Patient not taking: No sig reported) 1 kit 0    Blood Glucose Monitoring Suppl (OneTouch Verio) w/Device KIT Use 2 (two) times a day 1 kit 0     No current facility-administered medications for this visit  Allergies   Allergen Reactions    Apixaban Vomiting and GI Intolerance     Other reaction(s): Vomiting    Dulaglutide Vomiting     Nausea vomiting       Past Medical History:   Diagnosis Date    Abdominal fibromatosis     Diabetes mellitus ([facility] [address] )     Hyperlipemia     Hypertension     Obesity     Pneumonia     Right pontine stroke ([facility] [address] ) 12/13/2021    Stroke (cerebrum) ([facility] [address] ) 12/17/2021    Stroke [facility])        Past Surgical History:   Procedure Laterality Date    APPENDECTOMY LAPAROSCOPIC N/A 6/12/2022    Procedure: APPENDECTOMY LAPAROSCOPIC;  Surgeon: Natasha Hidalgo MD;  Location: [facility];  Service: General    CATARACT EXTRACTION, BILATERAL      COLONOSCOPY      EGD      LAPAROTOMY      Exploratory; Last Assessed 10/17/2017    PEG TUBE PLACEMENT      PEG TUBE REMOVAL         OB History    No obstetric history on file           Family History   Problem Relation Age of Onset    No Known Problems Mother     No Known Problems Father        The following portions of the patient's history were reviewed and updated as appropriate: allergies, current medications, past family history, past social history, past surgical history and problem list       Objective:    Blood pressure 128/78, pulse 96, temperature 98 6 °F (37 °C), temperature source Temporal, resp  rate 16, height 5' 5" (1 651 m), weight 89 8 kg (198 lb), SpO2 96 %, not currently breastfeeding  Body mass index is 32 95 kg/m²  Physical Exam  Constitutional:       Appearance: She is well-developed  HENT:      Head: Normocephalic and atraumatic  Eyes:      Pupils: Pupils are equal, round, and reactive to light  Cardiovascular:      Rate and Rhythm: Normal rate and regular rhythm  Heart sounds: Normal heart sounds  Pulmonary:      Effort: Pulmonary effort is normal  No respiratory distress  Breath sounds: Normal breath sounds  Chest:   Breasts:      Right: No supraclavicular adenopathy  Left: No supraclavicular adenopathy  Abdominal:      General: Bowel sounds are normal  There is no distension  Palpations: Abdomen is soft  Abdomen is not rigid  Tenderness: There is no abdominal tenderness  There is no guarding or rebound  Genitourinary:     Comments: -Normal external female genitalia, normal [place]'s [place]'s glands                  -Normal midline urethral meatus  No lesions notes                  -Bladder without fullness mass or tenderness                  -Vagina without lesion or discharge No significant cystocele or rectocele noted                  -Cervix normal appearing without visible lesions                  -Uterus with normal contour, mobility  No tenderness,                  -Adnexae without  mass or tenderness                  - Anus without fissure of lesion    Musculoskeletal:         General: Normal range of motion  Cervical back: Normal range of motion and neck supple  Lymphadenopathy:      Cervical: No cervical adenopathy        Upper Body:      Right upper body: No supraclavicular adenopathy  Left upper body: No supraclavicular adenopathy  Skin:     General: Skin is warm and dry  Neurological:      Mental Status: She is alert and oriented to person, place, and time  Motor: Weakness present  Gait: Gait abnormal       Comments: Status post CVA with right partial hemiplegia walks with a walker difficulty moving right hand     Psychiatric:         Behavior: Behavior normal            No results found for:   Lab Results   Component Value Date    WBC 8 88 06/13/2022    HGB 10 6 (L) 06/13/2022    HCT 33 3 (L) 06/13/2022    MCV 95 06/13/2022     (L) 06/13/2022     Lab Results   Component Value Date    K 4 0 06/13/2022     06/13/2022    CO2 23 06/13/2022    BUN 21 06/13/2022    CREATININE 0 91 06/13/2022    GLUF 137 (H) 11/04/2021    CALCIUM 8 1 (L) 06/13/2022    CORRECTEDCA 9 1 06/13/2022    AST 22 06/13/2022    ALT 15 06/13/2022    ALKPHOS 47 06/13/2022    EGFR 66 06/13/2022

## 2022-09-01 NOTE — TELEPHONE ENCOUNTER
Spoke to PCP about pre op appt, said they would keep her existing appt and make it a pre op  9/21/22   Patient confirmed

## 2022-09-01 NOTE — ASSESSMENT & PLAN NOTE
The patient has been recently diagnosed with a ovarian mass     All new solid masses run approximately 10 to 40% risk of ovarian cancer  These generally do not resolve  This has been stable over the past 3 months  Additionally the patient has recently undergone a laparoscopic surgery and found no intra-abdominal carcinomatosis or significant abnormality  The patient does have significant comorbidities including diabetes atrial fibrillation and CVA approximately 6 months ago  The patient is functionally doing well  She has recently undergone a laparoscopic surgery for appendectomy and has tolerated this without difficulty  We have discussed treatment options including observation with follow-up in 2 months including possible ultrasound at that time, or immediate surgery  We have recommended the following:    Robotically assisted total laparoscopic hysterectomy bilateral salpingo-oophorectomy with possible pelvic and para-aortic lymph node dissection staging biopsies including omentectomy based on findings at frozen section     Have discussed risks and benefits of the procedure including bleeding requiring transfusion infection, infection, damage to local structures including bowel bladder ureter and other local organs  We discussed the risk of deep venous thrombosis  An open procedure may be required  All of these complications are in the 2-4% range of likelihood  The patient understands the risks and benefits of the procedure and has signed an informed consent  I personally signed the consent form with her  She does understand that further treatment including chemotherapy radiation therapy or hormones may be required based on the final postoperative pathologic diagnosis and staging  Standard preoperative testing including type and screen is CBC CPM P chest x-ray and EKG will be ordered  Consultation to the patient's family doctor for preoperative evaluation will also be ordered    With regard to management of preoperative medications we have recommended the following:  One aspirin stop 1 week prior to surgery  Metformin  Hold day of surgery to surgery  Xarelto    stop Two days prior to surgery  Prandin hold day of surgery  Overall consultation took 60 min with greater than 50% in dedicated toward discussion time

## 2022-09-01 NOTE — H&P (VIEW-ONLY)
Assessment/Plan:    Problem List Items Addressed This Visit        Endocrine    Cyst of right ovary - Primary     The patient has been recently diagnosed with a ovarian mass     All new solid masses run approximately 10 to 40% risk of ovarian cancer  These generally do not resolve  This has been stable over the past 3 months  Additionally the patient has recently undergone a laparoscopic surgery and found no intra-abdominal carcinomatosis or significant abnormality  The patient does have significant comorbidities including diabetes atrial fibrillation and CVA approximately 6 months ago  The patient is functionally doing well  She has recently undergone a laparoscopic surgery for appendectomy and has tolerated this without difficulty  We have discussed treatment options including observation with follow-up in 2 months including possible ultrasound at that time, or immediate surgery  We have recommended the following:    Robotically assisted total laparoscopic hysterectomy bilateral salpingo-oophorectomy with possible pelvic and para-aortic lymph node dissection staging biopsies including omentectomy based on findings at frozen section     Have discussed risks and benefits of the procedure including bleeding requiring transfusion infection, infection, damage to local structures including bowel bladder ureter and other local organs  We discussed the risk of deep venous thrombosis  An open procedure may be required  All of these complications are in the 2-4% range of likelihood  The patient understands the risks and benefits of the procedure and has signed an informed consent  I personally signed the consent form with her  She does understand that further treatment including chemotherapy radiation therapy or hormones may be required based on the final postoperative pathologic diagnosis and staging  Standard preoperative testing including type and screen is CBC CPM P chest x-ray and EKG will be ordered  Consultation to the patient's family doctor for preoperative evaluation will also be ordered  With regard to management of preoperative medications we have recommended the following:  One aspirin stop 1 week prior to surgery  Metformin  Hold day of surgery to surgery  Xarelto    stop Two days prior to surgery  Prandin hold day of surgery  Overall consultation took 60 min with greater than 50% in dedicated toward discussion time  Other    Ovarian mass    Relevant Orders    Case request operating room: HYSTERECTOMY LAPAROSCOPIC TOTAL ([facility]) bilateral salpingo-oophorectomy with possible staging W/ ROBOTICS (Completed)    Ambulatory referral to Family Practice    Type and screen    Comprehensive metabolic panel    CBC and differential    HEMOGLOBIN A1C W/ EAG ESTIMATION        EKG 12 lead    XR chest pa & lateral              CHIEF COMPLAINT:  Persistent ovarian cyst multiple medical problems          Patient ID: Marylou Ortega is a 74 y o  female  Patient is very pleasant 82-year-old female with history of multiple medical problems including diabetes CVA on chronic anticoagulation  She had a fall in June of 2021 and underwent a CT scan to rule out hemorrhage  A 3 cm adnexal cyst was noted  It was recommended follow-up with ultrasound in 3 months  The patient recently underwent a pelvic ultrasound which reveals the following:  FINDINGS:     UTERUS:  The uterus is anteverted in position, measuring 7 4 x 3 5 x 4 6 cm  Volume = 62 mL  Myometrial echotexture is heterogeneous  Nabothian cysts in the lower uterine and cervical segments  Numerous heterogeneous partially calcified presumed fibroids as follows:  1  Right anterior lower body intramural/subserosal measuring 1 2 cm  2   Left anterior body intramural measuring 1 3 cm   3   Left posterior body intramural measuring 2 0 cm    The cervix appears within normal limits      ENDOMETRIUM:    The endometrial echo complex has an AP caliber of 4 mm   Trace endometrial canal fluid, commonly the result of cervical canal stenosis  No polyps or masses      OVARIES/ADNEXA:  Right ovary:  4 8 x 3 7 x 2 7 cm  24 7 mL  Complex lobular cystic structure with at least partial internal septations measuring 4 2 x 3 3 x 2 8 cm 1 measured as a single structure  Previously measuring up to 4 2 cm by CT  Some color flow within septations  Subcentimeter mural nodularity seen   at the periphery on the sweep images  Preserved Color flow by Doppler interrogation  No spectral analysis      Left ovary:  2 9 x 1 7 x 1 4 cm  3 7 mL  No suspicious left ovarian abnormality  Doppler flow within normal limits      No suspicious adnexal mass or loculated collections  There is no free fluid         IMPRESSION:  Normal sized myomatous uterus  Normal thickness endometrium with trace endometrial canal fluid noted, commonly the result of cervical canal stenosis  Normal left ovary      Enlarged right ovary with preserved blood flow  Complex multilocular cystic lesion with thick vascular septations as well as peripheral mural nodularity measuring up to 4 2 cm  Stable by size compared to prior CT from June  According to the  O - rads ultrasound risk stratification and management system: This is a Score 5-high risk (greater than 50% chance of malignancy) (management by gyn onc)    Today, the patient is doing well  She denies significant abdominal pain, pelvic pain, nausea, vomiting, constipation, diarrhea, fevers, chills, or vaginal bleeding  Patient has recently undergone laparoscopic appendectomy for a cute appendicitis in June of 2022  This procedure was uncomplicated and the patient has done well  No significant intra-abdominal pathology was noted  Review of Systems   Constitutional: Negative  HENT: Negative  Eyes: Negative  Respiratory: Negative  Cardiovascular: Negative  Gastrointestinal: Negative  Endocrine: Negative  Genitourinary: Negative  Musculoskeletal: Negative  Skin: Negative  Neurological: Negative  Hematological: Negative  Psychiatric/Behavioral: Negative  Current Outpatient Medications   Medication Sig Dispense Refill    Aspirin 81 MG CAPS Take 1 tablet by mouth in the morning      atorvastatin (LIPITOR) 80 mg tablet Take 1 tablet (80 mg total) by mouth daily with dinner 90 tablet 1    B-D ULTRAFINE III SHORT PEN 31G X 8 MM MISC use as directed 100 each 0    B-D ULTRAFINE III SHORT PEN 31G X 8 MM MISC USE AS DIRECTED 100 each 0    B-D ULTRAFINE III SHORT PEN 31G X 8 MM MISC use as directed 100 each 1    clotrimazole (LOTRIMIN) 1 % cream APPLY TO AFFECTED AREAS OF THE LEFT FOOT TOPICALLY ONCE IN THE MO     (REFER TO PRESCRIPTION NOTES)   escitalopram (LEXAPRO) 5 mg tablet Take 1 tablet (5 mg total) by mouth in the morning  90 tablet 1    famotidine (PEPCID) 40 MG tablet Take 1 tablet (40 mg total) by mouth daily 90 tablet 1    Insulin Glargine Solostar (Lantus SoloStar) 100 UNIT/ML SOPN Inject 40 Units under the skin daily at bedtime 30 mL 1    Insulin Pen Needle (Pen Needles 5/16") 30G X 8 MM MISC Use daily 50 each 2    Lancet Devices (Lancet Device with Ejector) MISC Use to check blood sugar twice a day 1 each 0    Lancets Misc  (Accu-Chek Softclix Lancet Dev) KIT 2 (two) times a day Check blood sugar      linaGLIPtin (Tradjenta) 5 MG TABS Take 5 mg by mouth in the morning  90 tablet 1    lisinopril (ZESTRIL) 5 mg tablet Take 1 tablet (5 mg total) by mouth in the morning  90 tablet 1    metFORMIN (GLUCOPHAGE-XR) 500 mg 24 hr tablet Take 1 tablet (500 mg total) by mouth 2 (two) times a day with meals 180 tablet 1    metoprolol tartrate (LOPRESSOR) 50 mg tablet Take 1 tablet (50 mg total) by mouth every 12 (twelve) hours 180 tablet 1    Mirabegron ER 25 MG TB24 Take 25 mg by mouth in the morning   90 tablet 1    ondansetron (Zofran ODT) 4 mg disintegrating tablet Take 1 tablet (4 mg total) by mouth every 8 (eight) hours as needed for nausea or vomiting 60 tablet 1    ondansetron (ZOFRAN-ODT) 4 mg disintegrating tablet Take 1 tablet (4 mg total) by mouth every 8 (eight) hours as needed for nausea or vomiting 30 tablet 1    pantoprazole (PROTONIX) 40 mg tablet Take 1 tablet (40 mg total) by mouth daily 30 tablet 1    repaglinide (PRANDIN) 2 mg tablet Take 1 tablet (2 mg total) by mouth 2 (two) times a day before meals 180 tablet 1    rivaroxaban (Xarelto) 20 mg tablet Take 1 tablet (20 mg total) by mouth daily with breakfast 90 tablet 1    Accu-Chek FastClix Lancets MISC Use 2 (two) times a day (Patient not taking: No sig reported) 102 each 3    Blood Glucose Monitoring Suppl (Accu-Chek Guide) w/Device KIT Use 2 (two) times a day (Patient not taking: No sig reported) 1 kit 0    Blood Glucose Monitoring Suppl (OneTouch Verio) w/Device KIT Use 2 (two) times a day 1 kit 0     No current facility-administered medications for this visit  Allergies   Allergen Reactions    Apixaban Vomiting and GI Intolerance     Other reaction(s): Vomiting    Dulaglutide Vomiting     Nausea vomiting       Past Medical History:   Diagnosis Date    Abdominal fibromatosis     Diabetes mellitus ([facility] [address] )     Hyperlipemia     Hypertension     Obesity     Pneumonia     Right pontine stroke ([facility] [address] ) 12/13/2021    Stroke (cerebrum) ([facility] [address] ) 12/17/2021    Stroke [facility])        Past Surgical History:   Procedure Laterality Date    APPENDECTOMY LAPAROSCOPIC N/A 6/12/2022    Procedure: APPENDECTOMY LAPAROSCOPIC;  Surgeon: Indigo Mckinney MD;  Location: [facility];  Service: General    CATARACT EXTRACTION, BILATERAL      COLONOSCOPY      EGD      LAPAROTOMY      Exploratory; Last Assessed 10/17/2017    PEG TUBE PLACEMENT      PEG TUBE REMOVAL         OB History    No obstetric history on file           Family History   Problem Relation Age of Onset    No Known Problems Mother     No Known Problems Father        The following portions of the patient's history were reviewed and updated as appropriate: allergies, current medications, past family history, past social history, past surgical history and problem list       Objective:    Blood pressure 128/78, pulse 96, temperature 98 6 °F (37 °C), temperature source Temporal, resp  rate 16, height 5' 5" (1 651 m), weight 89 8 kg (198 lb), SpO2 96 %, not currently breastfeeding  Body mass index is 32 95 kg/m²  Physical Exam  Constitutional:       Appearance: She is well-developed  HENT:      Head: Normocephalic and atraumatic  Eyes:      Pupils: Pupils are equal, round, and reactive to light  Cardiovascular:      Rate and Rhythm: Normal rate and regular rhythm  Heart sounds: Normal heart sounds  Pulmonary:      Effort: Pulmonary effort is normal  No respiratory distress  Breath sounds: Normal breath sounds  Chest:   Breasts:      Right: No supraclavicular adenopathy  Left: No supraclavicular adenopathy  Abdominal:      General: Bowel sounds are normal  There is no distension  Palpations: Abdomen is soft  Abdomen is not rigid  Tenderness: There is no abdominal tenderness  There is no guarding or rebound  Genitourinary:     Comments: -Normal external female genitalia, normal Bartholin's and [place]'s glands                  -Normal midline urethral meatus  No lesions notes                  -Bladder without fullness mass or tenderness                  -Vagina without lesion or discharge No significant cystocele or rectocele noted                  -Cervix normal appearing without visible lesions                  -Uterus with normal contour, mobility  No tenderness,                  -Adnexae without  mass or tenderness                  - Anus without fissure of lesion    Musculoskeletal:         General: Normal range of motion  Cervical back: Normal range of motion and neck supple  Lymphadenopathy:      Cervical: No cervical adenopathy        Upper Body:      Right upper body: No supraclavicular adenopathy  Left upper body: No supraclavicular adenopathy  Skin:     General: Skin is warm and dry  Neurological:      Mental Status: She is alert and oriented to person, place, and time  Motor: Weakness present  Gait: Gait abnormal       Comments: Status post CVA with right partial hemiplegia walks with a walker difficulty moving right hand     Psychiatric:         Behavior: Behavior normal            No results found for:   Lab Results   Component Value Date    WBC 8 88 06/13/2022    HGB 10 6 (L) 06/13/2022    HCT 33 3 (L) 06/13/2022    MCV 95 06/13/2022     (L) 06/13/2022     Lab Results   Component Value Date    K 4 0 06/13/2022     06/13/2022    CO2 23 06/13/2022    BUN 21 06/13/2022    CREATININE 0 91 06/13/2022    GLUF 137 (H) 11/04/2021    CALCIUM 8 1 (L) 06/13/2022    CORRECTEDCA 9 1 06/13/2022    AST 22 06/13/2022    ALT 15 06/13/2022    ALKPHOS 47 06/13/2022    EGFR 66 06/13/2022

## 2022-09-01 NOTE — PATIENT INSTRUCTIONS
One week prior to surgery to stop taking aspirin  To days prior to surgery  Taking Xarelto  Day of surgery hold metformin and Prandin

## 2022-09-06 ENCOUNTER — DOCUMENTATION (OUTPATIENT)
Dept: HEMATOLOGY ONCOLOGY | Facility: CLINIC | Age: 67
End: 2022-09-06

## 2022-09-07 LAB
HPV HR 12 DNA CVX QL NAA+PROBE: NEGATIVE
HPV16 DNA CVX QL NAA+PROBE: NEGATIVE
HPV18 DNA CVX QL NAA+PROBE: NEGATIVE

## 2022-09-10 ENCOUNTER — APPOINTMENT (OUTPATIENT)
Dept: LAB | Facility: MEDICAL CENTER | Age: 67
End: 2022-09-10
Payer: COMMERCIAL

## 2022-09-10 DIAGNOSIS — N83.8 OVARIAN MASS: ICD-10-CM

## 2022-09-10 LAB
ALBUMIN SERPL BCP-MCNC: 3.3 G/DL (ref 3.5–5)
ALP SERPL-CCNC: 60 U/L (ref 46–116)
ALT SERPL W P-5'-P-CCNC: 41 U/L (ref 12–78)
ANION GAP SERPL CALCULATED.3IONS-SCNC: 5 MMOL/L (ref 4–13)
AST SERPL W P-5'-P-CCNC: 41 U/L (ref 5–45)
BASOPHILS # BLD AUTO: 0.05 THOUSANDS/ΜL (ref 0–0.1)
BASOPHILS NFR BLD AUTO: 1 % (ref 0–1)
BILIRUB SERPL-MCNC: 0.61 MG/DL (ref 0.2–1)
BUN SERPL-MCNC: 26 MG/DL (ref 5–25)
CALCIUM ALBUM COR SERPL-MCNC: 10 MG/DL (ref 8.3–10.1)
CALCIUM SERPL-MCNC: 9.4 MG/DL (ref 8.3–10.1)
CANCER AG125 SERPL-ACNC: 6.3 U/ML (ref 0–30)
CHLORIDE SERPL-SCNC: 107 MMOL/L (ref 96–108)
CO2 SERPL-SCNC: 24 MMOL/L (ref 21–32)
CREAT SERPL-MCNC: 1.15 MG/DL (ref 0.6–1.3)
EOSINOPHIL # BLD AUTO: 0.19 THOUSAND/ΜL (ref 0–0.61)
EOSINOPHIL NFR BLD AUTO: 3 % (ref 0–6)
ERYTHROCYTE [DISTWIDTH] IN BLOOD BY AUTOMATED COUNT: 13.2 % (ref 11.6–15.1)
EST. AVERAGE GLUCOSE BLD GHB EST-MCNC: 151 MG/DL
GFR SERPL CREATININE-BSD FRML MDRD: 50 ML/MIN/1.73SQ M
GLUCOSE P FAST SERPL-MCNC: 66 MG/DL (ref 65–99)
HBA1C MFR BLD: 6.9 %
HCT VFR BLD AUTO: 37.8 % (ref 34.8–46.1)
HGB BLD-MCNC: 12.1 G/DL (ref 11.5–15.4)
IMM GRANULOCYTES # BLD AUTO: 0.02 THOUSAND/UL (ref 0–0.2)
IMM GRANULOCYTES NFR BLD AUTO: 0 % (ref 0–2)
LYMPHOCYTES # BLD AUTO: 1.95 THOUSANDS/ΜL (ref 0.6–4.47)
LYMPHOCYTES NFR BLD AUTO: 32 % (ref 14–44)
MCH RBC QN AUTO: 30.4 PG (ref 26.8–34.3)
MCHC RBC AUTO-ENTMCNC: 32 G/DL (ref 31.4–37.4)
MCV RBC AUTO: 95 FL (ref 82–98)
MONOCYTES # BLD AUTO: 0.56 THOUSAND/ΜL (ref 0.17–1.22)
MONOCYTES NFR BLD AUTO: 9 % (ref 4–12)
NEUTROPHILS # BLD AUTO: 3.4 THOUSANDS/ΜL (ref 1.85–7.62)
NEUTS SEG NFR BLD AUTO: 55 % (ref 43–75)
NRBC BLD AUTO-RTO: 0 /100 WBCS
PLATELET # BLD AUTO: 241 THOUSANDS/UL (ref 149–390)
PMV BLD AUTO: 12 FL (ref 8.9–12.7)
POTASSIUM SERPL-SCNC: 4.5 MMOL/L (ref 3.5–5.3)
PROT SERPL-MCNC: 8 G/DL (ref 6.4–8.4)
RBC # BLD AUTO: 3.98 MILLION/UL (ref 3.81–5.12)
SODIUM SERPL-SCNC: 136 MMOL/L (ref 135–147)
WBC # BLD AUTO: 6.17 THOUSAND/UL (ref 4.31–10.16)

## 2022-09-10 PROCEDURE — 86304 IMMUNOASSAY TUMOR CA 125: CPT

## 2022-09-10 PROCEDURE — 86900 BLOOD TYPING SEROLOGIC ABO: CPT

## 2022-09-10 PROCEDURE — 86850 RBC ANTIBODY SCREEN: CPT

## 2022-09-10 PROCEDURE — 80053 COMPREHEN METABOLIC PANEL: CPT

## 2022-09-10 PROCEDURE — 83036 HEMOGLOBIN GLYCOSYLATED A1C: CPT

## 2022-09-10 PROCEDURE — 36415 COLL VENOUS BLD VENIPUNCTURE: CPT

## 2022-09-10 PROCEDURE — 86901 BLOOD TYPING SEROLOGIC RH(D): CPT

## 2022-09-10 PROCEDURE — 85025 COMPLETE CBC W/AUTO DIFF WBC: CPT

## 2022-09-11 LAB
ABO GROUP BLD: NORMAL
BLD GP AB SCN SERPL QL: NEGATIVE
RH BLD: POSITIVE
SPECIMEN EXPIRATION DATE: NORMAL

## 2022-09-13 LAB
LAB AP GYN PRIMARY INTERPRETATION: NORMAL
Lab: NORMAL

## 2022-09-21 ENCOUNTER — CONSULT (OUTPATIENT)
Dept: FAMILY MEDICINE CLINIC | Facility: CLINIC | Age: 67
End: 2022-09-21
Payer: COMMERCIAL

## 2022-09-21 VITALS
OXYGEN SATURATION: 98 % | WEIGHT: 200 LBS | HEART RATE: 107 BPM | TEMPERATURE: 97.7 F | DIASTOLIC BLOOD PRESSURE: 70 MMHG | SYSTOLIC BLOOD PRESSURE: 120 MMHG | BODY MASS INDEX: 33.28 KG/M2

## 2022-09-21 DIAGNOSIS — Z12.11 COLON CANCER SCREENING: Primary | ICD-10-CM

## 2022-09-21 DIAGNOSIS — Z00.00 MEDICARE ANNUAL WELLNESS VISIT, SUBSEQUENT: ICD-10-CM

## 2022-09-21 DIAGNOSIS — Z12.31 ENCOUNTER FOR SCREENING MAMMOGRAM FOR MALIGNANT NEOPLASM OF BREAST: ICD-10-CM

## 2022-09-21 DIAGNOSIS — Z01.818 PREOPERATIVE CLEARANCE: ICD-10-CM

## 2022-09-21 DIAGNOSIS — N93.9 ABNORMAL UTERINE BLEEDING (AUB): ICD-10-CM

## 2022-09-21 PROCEDURE — G0439 PPPS, SUBSEQ VISIT: HCPCS | Performed by: FAMILY MEDICINE

## 2022-09-21 PROCEDURE — 99213 OFFICE O/P EST LOW 20 MIN: CPT | Performed by: FAMILY MEDICINE

## 2022-09-21 NOTE — PROGRESS NOTES
Assessment and Plan:     Problem List Items Addressed This Visit        Genitourinary    Abnormal uterine bleeding (AUB)       Other    Medicare annual wellness visit, subsequent    Preoperative clearance      Other Visit Diagnoses     Colon cancer screening    -  Primary    Relevant Orders    Cologuard    Encounter for screening mammogram for malignant neoplasm of breast        Relevant Orders    Mammo screening bilateral w 3d & cad        82-year-old woman with:  Preop clearance for upcoming total hysterectomy due to bleeding along with Medicare well visit  Patient is appropriate risk and may proceed to the OR with acceptable risk  Regarding Medicare annual well visit discussed various safety and safety and health maintenance issues including healthy diet like the Mediterranean diet exercise healthy weight as tolerated ample sleep stress reduction strategies discussed supportive care return parameters otherwise    BMI Counseling: Body mass index is 33 28 kg/m²  The BMI is above normal  Nutrition recommendations include encouraging healthy choices of fruits and vegetables  Exercise recommendations include moderate physical activity 150 minutes/week  Rationale for BMI follow-up plan is due to patient being overweight or obese  Preventive health issues were discussed with patient, and age appropriate screening tests were ordered as noted in patient's After Visit Summary  Personalized health advice and appropriate referrals for health education or preventive services given if needed, as noted in patient's After Visit Summary       History of Present Illness:     Patient presents for a Medicare Wellness Visit    Patient is a 82-year-old woman who presents for preop clearance for upcoming total abdominal hysterectomy patient admits she is had some bleeding and she is being planned for hysterectomy no chest pain shortness breast no dizziness lightheadedness or near-syncope no fevers chills nausea vomiting she admits good functional capacity no other complaints at this time she is also here for Medicare annual well visit she admits she is trying to be active in eat healthfully she sleeps well no other health maintenance issues     Patient Care Team:  Irma Niño MD as PCP - General (Family Medicine)  Luna Ordoñez MD as PCP - Endocrinology (Endocrinology)  Irma Niño MD as PCP - [address] [place] [address] (RTE)  Irma Niño MD as PCP - [facility] (RTE)  Martha Alcaraz, MSW as  Care Manager (Oncology)     Review of Systems:     Review of Systems   Constitutional: Negative  HENT: Negative  Eyes: Negative  Respiratory: Negative  Cardiovascular: Negative  Gastrointestinal: Negative  Endocrine: Negative  Genitourinary: Negative  Musculoskeletal: Negative  Allergic/Immunologic: Negative  Neurological: Negative  Hematological: Negative  Psychiatric/Behavioral: Negative  All other systems reviewed and are negative  Problem List:     Patient Active Problem List   Diagnosis    Paroxysmal atrial fibrillation (HCC)    Cerebrovascular accident (CVA) ([address] )    Blurry vision    Diabetic cataract ([address] )    Dysphagia    Dysphonia    Glottic stenosis    Heart disease    Insomnia    Incomplete emptying of bladder    Severe obesity (BMI 35 0-39  9) with comorbidity ([address] )    Tracheal stenosis    Type 2 diabetes mellitus with hyperglycemia, with long-term current use of insulin (HCC)    GERD (gastroesophageal reflux disease)    Hyperlipidemia    Edema    Essential hypertension    Current episode of major depressive disorder without prior episode    Medicare annual wellness visit, subsequent    Preoperative clearance    YOLIE (obstructive sleep apnea)    Dermatitis    Muscle tension dysphonia    Reflux laryngitis    Glottic insufficiency    History of stroke    Weakness of both lower extremities    History of CVA (cerebrovascular accident)    Hemiplegia and hemiparesis following cerebral infarction affecting left non-dominant side (HCC)    Stage 3 chronic kidney disease, unspecified whether stage 3a or 3b CKD ([facility] )    Abnormality of gait due to impairment of balance    Stress incontinence    Sepsis ([facility] )    Cyst of right ovary    Abnormal CT of the chest    Platelets decreased ([facility]    Status post appendectomy    Ovarian mass    Abnormal uterine bleeding (AUB)      Past Medical and Surgical History:     Past Medical History:   Diagnosis Date    Abdominal fibromatosis     Arthritis     Depression     Diabetes mellitus (HCC)     GERD (gastroesophageal reflux disease)     Hyperlipemia     Hypertension     Obesity     Osteoporosis     Pneumonia     Right pontine stroke ([facility] ) 12/13/2021    Stroke (cerebrum) ([place] [id number] ) 12/17/2021    Stroke [facility])      Past Surgical History:   Procedure Laterality Date    APPENDECTOMY LAPAROSCOPIC N/A 6/12/2022    Procedure: APPENDECTOMY LAPAROSCOPIC;  Surgeon: Vasile Yuan MD;  Location: [facility];  Service: General    CATARACT EXTRACTION, BILATERAL      COLONOSCOPY      EGD      LAPAROTOMY      Exploratory;  Last Assessed 10/17/2017    PEG TUBE PLACEMENT      PEG TUBE REMOVAL        Family History:     Family History   Problem Relation Age of Onset    No Known Problems Mother     No Known Problems Father       Social History:     Social History     Socioeconomic History    Marital status:      Spouse name: None    Number of children: None    Years of education: None    Highest education level: None   Occupational History    Occupation: disabled   Tobacco Use    Smoking status: Never Smoker    Smokeless tobacco: Never Used   Vaping Use    Vaping Use: Never used   Substance and Sexual Activity    Alcohol use: Not Currently     Comment: Socially    Drug use: Never    Sexual activity: Not Currently   Other Topics Concern    None   Social History Narrative    None Social Determinants of Health     Financial Resource Strain: Low Risk     Difficulty of Paying Living Expenses: Not hard at all   Food Insecurity: No Food Insecurity    Worried About Running Out of Food in the Last Year: Never true    James of Food in the Last Year: Never true   Transportation Needs: No Transportation Needs    Lack of Transportation (Medical): No    Lack of Transportation (Non-Medical): No   Physical Activity: Not on file   Stress: Not on file   Social Connections: Not on file   Intimate Partner Violence: Not on file   Housing Stability: Low Risk     Unable to Pay for Housing in the Last Year: No    Number of Places Lived in the Last Year: 1    Unstable Housing in the Last Year: No      Medications and Allergies:     Current Outpatient Medications   Medication Sig Dispense Refill    Aspirin 81 MG CAPS Take 1 tablet by mouth in the morning      atorvastatin (LIPITOR) 80 mg tablet Take 1 tablet (80 mg total) by mouth daily with dinner 90 tablet 1    B-D ULTRAFINE III SHORT PEN 31G X 8 MM MISC use as directed 100 each 0    B-D ULTRAFINE III SHORT PEN 31G X 8 MM MISC USE AS DIRECTED 100 each 0    B-D ULTRAFINE III SHORT PEN 31G X 8 MM MISC use as directed 100 each 1    Blood Glucose Monitoring Suppl (OneTouch Verio) w/Device KIT Use 2 (two) times a day 1 kit 0    clotrimazole (LOTRIMIN) 1 % cream APPLY TO AFFECTED AREAS OF THE LEFT FOOT TOPICALLY ONCE IN THE MO     (REFER TO PRESCRIPTION NOTES)   escitalopram (LEXAPRO) 5 mg tablet Take 1 tablet (5 mg total) by mouth in the morning   90 tablet 1    famotidine (PEPCID) 40 MG tablet Take 1 tablet (40 mg total) by mouth daily 90 tablet 1    Insulin Glargine Solostar (Lantus SoloStar) 100 UNIT/ML SOPN Inject 40 Units under the skin daily at bedtime 30 mL 1    Insulin Pen Needle (Pen Needles 5/16") 30G X 8 MM MISC Use daily 50 each 2    Lancet Devices (Lancet Device with Ejector) MISC Use to check blood sugar twice a day 1 each 0  Lancets Misc  (Accu-Chek Softclix Lancet Dev) KIT 2 (two) times a day Check blood sugar      linaGLIPtin (Tradjenta) 5 MG TABS Take 5 mg by mouth in the morning  90 tablet 1    lisinopril (ZESTRIL) 5 mg tablet Take 1 tablet (5 mg total) by mouth in the morning  90 tablet 1    metFORMIN (GLUCOPHAGE-XR) 500 mg 24 hr tablet Take 1 tablet (500 mg total) by mouth 2 (two) times a day with meals 180 tablet 1    metoprolol tartrate (LOPRESSOR) 50 mg tablet Take 1 tablet (50 mg total) by mouth every 12 (twelve) hours 180 tablet 1    Mirabegron ER 25 MG TB24 Take 25 mg by mouth in the morning  90 tablet 1    ondansetron (Zofran ODT) 4 mg disintegrating tablet Take 1 tablet (4 mg total) by mouth every 8 (eight) hours as needed for nausea or vomiting 60 tablet 1    pantoprazole (PROTONIX) 40 mg tablet Take 1 tablet (40 mg total) by mouth daily 30 tablet 1    repaglinide (PRANDIN) 2 mg tablet Take 1 tablet (2 mg total) by mouth 2 (two) times a day before meals 180 tablet 1    rivaroxaban (Xarelto) 20 mg tablet Take 1 tablet (20 mg total) by mouth daily with breakfast 90 tablet 1    Accu-Chek FastClix Lancets MISC Use 2 (two) times a day (Patient not taking: No sig reported) 102 each 3    Blood Glucose Monitoring Suppl (Accu-Chek Guide) w/Device KIT Use 2 (two) times a day (Patient not taking: No sig reported) 1 kit 0    ondansetron (ZOFRAN-ODT) 4 mg disintegrating tablet Take 1 tablet (4 mg total) by mouth every 8 (eight) hours as needed for nausea or vomiting 30 tablet 1     No current facility-administered medications for this visit       Allergies   Allergen Reactions    Apixaban Vomiting and GI Intolerance     Other reaction(s): Vomiting    Dulaglutide Vomiting     Nausea vomiting      Immunizations:     Immunization History   Administered Date(s) Administered    COVID-19 PFIZER VACCINE 0 3 ML IM 05/05/2021, 06/02/2021, 12/01/2021    INFLUENZA 11/04/2015, 10/01/2017, 10/30/2020    Influenza A Monovalent (H5n1), Adjuvanted, National Stock3 10/01/2021    Influenza, injectable, quadrivalent, preservative free 0 5 mL 09/08/2021    Pneumococcal Polysaccharide PPV23 11/04/2015      Health Maintenance:         Topic Date Due    HIV Screening  Never done    Breast Cancer Screening: Mammogram  Never done    Colorectal Cancer Screening  Never done    Hepatitis C Screening  Completed         Topic Date Due    Pneumococcal Vaccine: 65+ Years (2 - PCV) 11/04/2016    COVID-19 Vaccine (4 - Booster for Pfizer series) 04/01/2022    Influenza Vaccine (1) 09/01/2022      Medicare Screening Tests and Risk Assessments:     Naomi Woodruff is here for her Subsequent Wellness visit  Last Medicare Wellness visit information reviewed, patient interviewed and updates made to the record today  Health Risk Assessment:   Patient rates overall health as good  Patient feels that their physical health rating is same  Patient is satisfied with their life  Eyesight was rated as same  Hearing was rated as same  Patient feels that their emotional and mental health rating is same  Patients states they are never, rarely angry  Patient states they are never, rarely unusually tired/fatigued  Pain experienced in the last 7 days has been none  Patient states that she has experienced no weight loss or gain in last 6 months  Depression Screening:   PHQ-9 Score: 0      Fall Risk Screening: In the past year, patient has experienced: no history of falling in past year      Urinary Incontinence Screening:   Patient has not leaked urine accidently in the last six months  Home Safety:  Patient does not have trouble with stairs inside or outside of their home  Patient has working smoke alarms and has working carbon monoxide detector  Home safety hazards include: none  Nutrition:   Current diet is Regular  Medications:   Patient is able to manage medications       Activities of Daily Living (ADLs)/Instrumental Activities of Daily Living (IADLs): Walk and transfer into and out of bed and chair?: Yes  Dress and groom yourself?: Yes    Bathe or shower yourself?: Yes    Feed yourself? Yes  Do your laundry/housekeeping?: Yes  Manage your money, pay your bills and track your expenses?: Yes  Make your own meals?: Yes    Do your own shopping?: Yes    Advance Care Planning:   Living will: Yes    Durable POA for healthcare: Yes    Advanced directive: Yes      Cognitive Screening:   Provider or family/friend/caregiver concerned regarding cognition?: No    PREVENTIVE SCREENINGS      Cardiovascular Screening:    General: Screening Not Indicated and History Lipid Disorder      Diabetes Screening:     General: Screening Not Indicated and History Diabetes      Colorectal Cancer Screening:     General: Screening Current      Breast Cancer Screening:     General: Risks and Benefits Discussed    Due for: Mammogram        Cervical Cancer Screening:    General: Screening Not Indicated      Osteoporosis Screening:    General: Patient Declines      Abdominal Aortic Aneurysm (AAA) Screening:        General: Screening Not Indicated      Lung Cancer Screening:     General: Screening Not Indicated      Hepatitis C Screening:    General: Screening Current    Screening, Brief Intervention, and Referral to Treatment (SBIRT)    Screening      Single Item Drug Screening:  How often have you used an illegal drug (including marijuana) or a prescription medication for non-medical reasons in the past year? never    Single Item Drug Screen Score: 0  Interpretation: Negative screen for possible drug use disorder    No exam data present     Physical Exam:     /70   Pulse (!) 107   Temp 97 7 °F (36 5 °C)   Wt 90 7 kg (200 lb)   LMP  (LMP Unknown)   SpO2 98%   BMI 33 28 kg/m²     Physical Exam  Constitutional:       General: She is not in acute distress  Appearance: She is well-developed  She is not diaphoretic  HENT:      Head: Normocephalic and atraumatic        Right Ear: External ear normal       Left Ear: External ear normal       Nose: Nose normal       Mouth/Throat:      Pharynx: No oropharyngeal exudate  Eyes:      General:         Right eye: No discharge  Left eye: No discharge  Conjunctiva/sclera: Conjunctivae normal       Pupils: Pupils are equal, round, and reactive to light  Neck:      Thyroid: No thyromegaly  Trachea: No tracheal deviation  Cardiovascular:      Rate and Rhythm: Normal rate and regular rhythm  Heart sounds: Normal heart sounds  No murmur heard  No friction rub  No gallop  Pulmonary:      Effort: Pulmonary effort is normal  No respiratory distress  Breath sounds: Normal breath sounds  Abdominal:      General: There is no distension  Palpations: Abdomen is soft  Tenderness: There is no abdominal tenderness  There is no guarding or rebound  Musculoskeletal:         General: Normal range of motion  Lymphadenopathy:      Cervical: No cervical adenopathy  Skin:     General: Skin is warm  Neurological:      Mental Status: She is alert and oriented to person, place, and time  Cranial Nerves: No cranial nerve deficit  Psychiatric:         Behavior: Behavior normal          Thought Content:  Thought content normal          Judgment: Judgment normal           Sarbjit Warner MD

## 2022-09-23 ENCOUNTER — APPOINTMENT (OUTPATIENT)
Dept: RADIOLOGY | Facility: MEDICAL CENTER | Age: 67
End: 2022-09-23
Payer: COMMERCIAL

## 2022-09-23 ENCOUNTER — ANESTHESIA EVENT (OUTPATIENT)
Dept: PERIOP | Facility: HOSPITAL | Age: 67
End: 2022-09-23
Payer: COMMERCIAL

## 2022-09-23 DIAGNOSIS — N83.8 OVARIAN MASS: ICD-10-CM

## 2022-09-23 PROCEDURE — 71046 X-RAY EXAM CHEST 2 VIEWS: CPT

## 2022-09-26 PROBLEM — N93.9 ABNORMAL UTERINE BLEEDING (AUB): Status: ACTIVE | Noted: 2022-09-26

## 2022-09-27 ENCOUNTER — TELEPHONE (OUTPATIENT)
Dept: FAMILY MEDICINE CLINIC | Facility: CLINIC | Age: 67
End: 2022-09-27

## 2022-09-27 NOTE — TELEPHONE ENCOUNTER
She should stop 72 hrs before surgery and restart after surgery as long as pt is ok with the potential risk

## 2022-09-27 NOTE — PRE-PROCEDURE INSTRUCTIONS
Pre-Surgery Instructions:   Medication Instructions    atorvastatin (LIPITOR) 80 mg tablet Take day of surgery   clotrimazole (LOTRIMIN) 1 % cream Hold day of surgery   escitalopram (LEXAPRO) 5 mg tablet Take day of surgery   famotidine (PEPCID) 40 MG tablet Take day of surgery   Insulin Glargine Solostar (Lantus SoloStar) 100 UNIT/ML SOPN Take night before surgery    linaGLIPtin (Tradjenta) 5 MG TABS Hold day of surgery   lisinopril (ZESTRIL) 5 mg tablet Hold day of surgery   metFORMIN (GLUCOPHAGE-XR) 500 mg 24 hr tablet Hold day of surgery   metoprolol tartrate (LOPRESSOR) 50 mg tablet Take day of surgery   Mirabegron ER 25 MG TB24 Hold day of surgery   ondansetron (ZOFRAN-ODT) 4 mg disintegrating tablet Uses PRN- OK to take day of surgery    pantoprazole (PROTONIX) 40 mg tablet Take day of surgery   repaglinide (PRANDIN) 2 mg tablet Hold day of surgery   rivaroxaban (Xarelto) 20 mg tablet Instructions provided by MD   Pre procedure instructions reviewed verbalizes understanding  NPO after MN  Bathing reviewed  Morning meds with water  No NSAIDS  Stop supplements/vitamins  Dr Damon [facility] office called 9/27 to directly call patient for Xarelto and ASA instructions    Pt aware to expect call 9/27 from MD

## 2022-09-27 NOTE — TELEPHONE ENCOUNTER
Patient needs instructions on her Saima Givens she is having a hysterectomy with staging on 9/30  Pt also stopped taking the aspirn on her own  Please call patient and address but of these medication concerns as soon as possible

## 2022-09-28 RX ORDER — FENTANYL CITRATE/PF 50 MCG/ML
25 SYRINGE (ML) INJECTION
Status: CANCELLED | OUTPATIENT
Start: 2022-09-28

## 2022-09-28 RX ORDER — ONDANSETRON 2 MG/ML
4 INJECTION INTRAMUSCULAR; INTRAVENOUS ONCE AS NEEDED
Status: CANCELLED | OUTPATIENT
Start: 2022-09-28

## 2022-09-28 RX ORDER — HYDROMORPHONE HCL/PF 1 MG/ML
0.5 SYRINGE (ML) INJECTION
Status: CANCELLED | OUTPATIENT
Start: 2022-09-28

## 2022-09-30 ENCOUNTER — HOSPITAL ENCOUNTER (OUTPATIENT)
Facility: HOSPITAL | Age: 67
Setting detail: OUTPATIENT SURGERY
Discharge: HOME | End: 2022-09-30
Attending: OBSTETRICS & GYNECOLOGY | Admitting: OBSTETRICS & GYNECOLOGY
Payer: COMMERCIAL

## 2022-09-30 ENCOUNTER — ANESTHESIA (OUTPATIENT)
Dept: PERIOP | Facility: HOSPITAL | Age: 67
End: 2022-09-30
Payer: COMMERCIAL

## 2022-09-30 VITALS
HEART RATE: 81 BPM | RESPIRATION RATE: 16 BRPM | DIASTOLIC BLOOD PRESSURE: 71 MMHG | OXYGEN SATURATION: 98 % | TEMPERATURE: 98.8 F | SYSTOLIC BLOOD PRESSURE: 144 MMHG

## 2022-09-30 DIAGNOSIS — N18.30 STAGE 3 CHRONIC KIDNEY DISEASE, UNSPECIFIED WHETHER STAGE 3A OR 3B CKD (HCC): Primary | ICD-10-CM

## 2022-09-30 DIAGNOSIS — R06.89 AIRWAY CLEARANCE IMPAIRMENT: ICD-10-CM

## 2022-09-30 LAB
ABO GROUP BLD: NORMAL
GLUCOSE SERPL-MCNC: 190 MG/DL (ref 65–140)
RH BLD: POSITIVE

## 2022-09-30 PROCEDURE — 82948 REAGENT STRIP/BLOOD GLUCOSE: CPT

## 2022-09-30 RX ORDER — GABAPENTIN 100 MG/1
100 CAPSULE ORAL ONCE
Status: DISCONTINUED | OUTPATIENT
Start: 2022-09-30 | End: 2022-09-30 | Stop reason: HOSPADM

## 2022-09-30 RX ORDER — SODIUM CHLORIDE 9 MG/ML
125 INJECTION, SOLUTION INTRAVENOUS CONTINUOUS
Status: DISCONTINUED | OUTPATIENT
Start: 2022-09-30 | End: 2022-09-30 | Stop reason: HOSPADM

## 2022-09-30 RX ORDER — ENOXAPARIN SODIUM 100 MG/ML
40 INJECTION SUBCUTANEOUS
Status: DISCONTINUED | OUTPATIENT
Start: 2022-09-30 | End: 2022-09-30 | Stop reason: HOSPADM

## 2022-09-30 RX ORDER — CEFAZOLIN SODIUM 2 G/50ML
2000 SOLUTION INTRAVENOUS ONCE
Status: DISCONTINUED | OUTPATIENT
Start: 2022-09-30 | End: 2022-09-30 | Stop reason: HOSPADM

## 2022-09-30 RX ORDER — ACETAMINOPHEN 325 MG/1
975 TABLET ORAL ONCE
Status: DISCONTINUED | OUTPATIENT
Start: 2022-09-30 | End: 2022-09-30 | Stop reason: HOSPADM

## 2022-09-30 NOTE — PROGRESS NOTES
Late entry    Pt with history of tracheal stenosis  Last saw ENT January of 2022 in which the note stated that it was recommended to perform bronchoscopy to determine proper ETT size vs  Resection of "A-frame deformity" prior to gastric bypass surgery (surgery that was being considered at that time)  Pt has not followed up with ENT since  Pt did undergo emergent surgery 2 months ago (6/12/22) which per the notes resulted in having to switch ETT to a size 6 0 ETT via bougie 2/2 subglottic stenosis  Discussed this with the surgical team and also my concerns of subglottic stenosis leading to placement of a very small sized ETT which would make it extremely difficult to ventilate and oxygenate especially in the setting of obesity, intraperitoneal insufflation, and trendelenburg position for this case  Also, discussed with surgeon that if it's ok for the case to be delayed from a surgical standpoint for ENT consult/recommendations it would probably be best given last ENT visit stated that there was a recommendation for bronch to determine proper ETT size vs  Resection of "A-frame deformity"  Per surgeon okay to delay case for ENT consult  I discussed this patient and she agreed and understood      Delbert Jaquez

## 2022-09-30 NOTE — PROGRESS NOTES
Pt placed in [facility] private waiting room  Awaiting sons arrival for ride home and Dr Jeremy Maldonado to speak with them

## 2022-09-30 NOTE — PROGRESS NOTES
Dr Waldemar Hughes at pt bedside,reviewing reason for cancellation today & pt agrees with plan  I called pt son Janel File, he is aware of need to cancel

## 2022-09-30 NOTE — ANESTHESIA PREPROCEDURE EVALUATION
Procedure:  ROBOTIC [address] [place] [address] BSO, PSB STAGING (N/A Abdomen)    Relevant Problems   CARDIO   (+) Essential hypertension   (+) Hyperlipidemia   (+) Paroxysmal atrial fibrillation (HCC)      ENDO   (+) Type 2 diabetes mellitus with hyperglycemia, with long-term current use of insulin (HCC)      GI/HEPATIC   (+) Dysphagia   (+) GERD (gastroesophageal reflux disease)      /RENAL   (+) Stage 3 chronic kidney disease, unspecified whether stage 3a or 3b CKD (HCC)      NEURO/PSYCH   (+) Cerebrovascular accident (CVA) ([address] )   (+) Current episode of major depressive disorder without prior episode   (+) History of CVA (cerebrovascular accident)   (+) History of stroke      PULMONARY   (+) YOLIE (obstructive sleep apnea)      Nervous and Auditory   (+) Hemiplegia and hemiparesis following cerebral infarction affecting left non-dominant side (HCC)        Physical Exam    Airway    Mallampati score: II  TM Distance: >3 FB  Neck ROM: full     Dental   No notable dental hx     Cardiovascular  Rhythm: regular, Rate: normal, Cardiovascular exam normal    Pulmonary  Breath sounds clear to auscultation, Decreased breath sounds,     Other Findings        Anesthesia Plan  ASA Score- 3     Anesthesia Type- general with ASA Monitors  Additional Monitors:   Airway Plan:     Comment: See intubation note--lap appy ([place])  6 0 ETT  Plan Factors-    Existing labs reviewed  Patient summary reviewed  Patient is not a current smoker  Patient not instructed to abstain from smoking on day of procedure  Patient did not smoke on day of surgery  Induction- intravenous  Postoperative Plan-     Informed Consent- Anesthetic plan and risks discussed with patient

## 2022-09-30 NOTE — INTERVAL H&P NOTE
H&P reviewed  After examining the patient I find no changes in the patients condition since the H&P had been written  Patient is a pleasant 79-year-old female with a recently diagnosed solid adnexal mass  Overall risk for malignancy is approximately 10%  The patient underwent  testing preoperatively which was unremarkable  The patient has a poor airway and had previously been recommended to follow-up with ENT  She does see Dr Rehana Valverde for evaluation  The anesthesia team felt under comfortable intubating the patient until ENT evaluation as previously recommended was complete  Surgery will be delayed at this point until ENT evaluation  The patient will be discharged      Vitals:    09/30/22 0924   BP: 144/71   Pulse: 81   Resp: 16   Temp: 98 8 °F (37 1 °C)   SpO2: 98%

## 2022-10-11 PROBLEM — A41.9 SEPSIS (HCC): Status: RESOLVED | Noted: 2022-06-11 | Resolved: 2022-10-11

## 2022-10-12 ENCOUNTER — TELEPHONE (OUTPATIENT)
Dept: ENDOCRINOLOGY | Facility: CLINIC | Age: 67
End: 2022-10-12

## 2022-10-12 DIAGNOSIS — E11.65 TYPE 2 DIABETES MELLITUS WITH HYPERGLYCEMIA, WITH LONG-TERM CURRENT USE OF INSULIN (HCC): ICD-10-CM

## 2022-10-12 DIAGNOSIS — Z79.4 TYPE 2 DIABETES MELLITUS WITH HYPERGLYCEMIA, WITH LONG-TERM CURRENT USE OF INSULIN (HCC): ICD-10-CM

## 2022-10-12 RX ORDER — INSULIN GLARGINE 100 [IU]/ML
32 INJECTION, SOLUTION SUBCUTANEOUS
Qty: 30 ML | Refills: 1 | Status: SHIPPED | OUTPATIENT
Start: 2022-10-12

## 2022-10-12 NOTE — TELEPHONE ENCOUNTER
PT called our office since she had not heard from endo re: her sugars  I relayed what Sahara's note read and asked her to call Endo in the morning with an update on her sugars

## 2022-10-12 NOTE — TELEPHONE ENCOUNTER
Ok to reduce Lantus to 32 units nightly  Let us know if she continues to experience hypoglycemia symptoms

## 2022-10-12 NOTE — TELEPHONE ENCOUNTER
Patient called concerned that in the past 2 weeks she has been experiencing what she believes are low sugar events, patient is unable to check sugars as she has trouble seeing the meter so we cant get actually numbers however she is reporting Shaking, sweating, confusion at which point she will take a glucose tab and start to feel better  Patient has follow up appt in December, she does not have anyone assisting her at home   She is giving herself 40 units of Lantus, Tradjenta 5 mg, Pradin 2mg    Please Advise

## 2022-10-14 LAB — COLOGUARD RESULT REPORTABLE: NEGATIVE

## 2022-10-17 ENCOUNTER — TELEPHONE (OUTPATIENT)
Dept: FAMILY MEDICINE CLINIC | Facility: CLINIC | Age: 67
End: 2022-10-17

## 2022-10-17 NOTE — TELEPHONE ENCOUNTER
The above form was completed today 10/17/22; awaiting signature and date signed date From Dr Robert Cohen

## 2022-12-26 DIAGNOSIS — I48.0 PAROXYSMAL ATRIAL FIBRILLATION (HCC): ICD-10-CM

## 2022-12-26 DIAGNOSIS — K21.9 GASTROESOPHAGEAL REFLUX DISEASE, UNSPECIFIED WHETHER ESOPHAGITIS PRESENT: Chronic | ICD-10-CM

## 2022-12-27 RX ORDER — METOPROLOL TARTRATE 50 MG/1
TABLET, FILM COATED ORAL
Qty: 180 TABLET | Refills: 1 | Status: SHIPPED | OUTPATIENT
Start: 2022-12-27

## 2022-12-27 RX ORDER — FAMOTIDINE 40 MG/1
TABLET, FILM COATED ORAL
Qty: 90 TABLET | Refills: 1 | Status: SHIPPED | OUTPATIENT
Start: 2022-12-27

## 2022-12-27 RX ORDER — ONDANSETRON 4 MG/1
TABLET, ORALLY DISINTEGRATING ORAL
Qty: 60 TABLET | Refills: 1 | Status: SHIPPED | OUTPATIENT
Start: 2022-12-27

## 2022-12-28 DIAGNOSIS — E11.65 TYPE 2 DIABETES MELLITUS WITH HYPERGLYCEMIA, WITH LONG-TERM CURRENT USE OF INSULIN (HCC): Chronic | ICD-10-CM

## 2022-12-28 DIAGNOSIS — E13.9 OTHER SPECIFIED DIABETES MELLITUS WITHOUT COMPLICATION, WITH LONG-TERM CURRENT USE OF INSULIN (HCC): ICD-10-CM

## 2022-12-28 DIAGNOSIS — Z79.4 OTHER SPECIFIED DIABETES MELLITUS WITHOUT COMPLICATION, WITH LONG-TERM CURRENT USE OF INSULIN (HCC): ICD-10-CM

## 2022-12-28 DIAGNOSIS — Z79.4 TYPE 2 DIABETES MELLITUS WITH HYPERGLYCEMIA, WITH LONG-TERM CURRENT USE OF INSULIN (HCC): Chronic | ICD-10-CM

## 2022-12-28 RX ORDER — PEN NEEDLE, DIABETIC 31 GX5/16"
NEEDLE, DISPOSABLE MISCELLANEOUS AS NEEDED
Qty: 100 EACH | Refills: 1 | Status: SHIPPED | OUTPATIENT
Start: 2022-12-28

## 2022-12-28 RX ORDER — LANCETS
EACH MISCELLANEOUS 2 TIMES DAILY
Qty: 102 EACH | Refills: 3 | Status: SHIPPED | OUTPATIENT
Start: 2022-12-28

## 2022-12-28 RX ORDER — INSULIN GLARGINE 100 [IU]/ML
32 INJECTION, SOLUTION SUBCUTANEOUS
Qty: 30 ML | Refills: 1 | Status: SHIPPED | OUTPATIENT
Start: 2022-12-28

## 2022-12-28 RX ORDER — METFORMIN HYDROCHLORIDE 500 MG/1
500 TABLET, EXTENDED RELEASE ORAL 2 TIMES DAILY WITH MEALS
Qty: 180 TABLET | Refills: 1 | Status: SHIPPED | OUTPATIENT
Start: 2022-12-28 | End: 2023-06-26

## 2022-12-28 RX ORDER — BLOOD-GLUCOSE METER
EACH MISCELLANEOUS 2 TIMES DAILY
Qty: 1 KIT | Refills: 0 | Status: SHIPPED | OUTPATIENT
Start: 2022-12-28 | End: 2023-01-27

## 2022-12-28 RX ORDER — PEN NEEDLE, DIABETIC 30 GX5/16"
NEEDLE, DISPOSABLE MISCELLANEOUS DAILY
Qty: 50 EACH | Refills: 2 | Status: SHIPPED | OUTPATIENT
Start: 2022-12-28

## 2022-12-28 RX ORDER — BLOOD-GLUCOSE METER
EACH MISCELLANEOUS
Qty: 1 EACH | Refills: 0 | Status: SHIPPED | OUTPATIENT
Start: 2022-12-28

## 2022-12-28 RX ORDER — LINAGLIPTIN 5 MG/1
5 TABLET, FILM COATED ORAL EVERY MORNING
Qty: 90 TABLET | Refills: 1 | Status: SHIPPED | OUTPATIENT
Start: 2022-12-28

## 2022-12-28 RX ORDER — BLOOD-GLUCOSE METER
EACH MISCELLANEOUS 2 TIMES DAILY
Qty: 1 KIT | Refills: 0 | Status: SHIPPED | OUTPATIENT
Start: 2022-12-28

## 2022-12-28 RX ORDER — LANCING DEVICE/LANCETS
KIT MISCELLANEOUS
Qty: 1 KIT | Refills: 1 | Status: SHIPPED | OUTPATIENT
Start: 2022-12-28

## 2022-12-29 PROBLEM — Z86.73 HISTORY OF STROKE: Status: RESOLVED | Noted: 2021-01-30 | Resolved: 2022-12-29

## 2023-01-10 ENCOUNTER — PATIENT OUTREACH (OUTPATIENT)
Dept: CASE MANAGEMENT | Facility: HOSPITAL | Age: 68
End: 2023-01-10

## 2023-01-19 DIAGNOSIS — K21.9 GASTROESOPHAGEAL REFLUX DISEASE, UNSPECIFIED WHETHER ESOPHAGITIS PRESENT: ICD-10-CM

## 2023-01-19 DIAGNOSIS — Z79.4 TYPE 2 DIABETES MELLITUS WITH HYPERGLYCEMIA, WITH LONG-TERM CURRENT USE OF INSULIN (HCC): Chronic | ICD-10-CM

## 2023-01-19 DIAGNOSIS — E11.65 TYPE 2 DIABETES MELLITUS WITH HYPERGLYCEMIA, WITH LONG-TERM CURRENT USE OF INSULIN (HCC): Chronic | ICD-10-CM

## 2023-01-19 DIAGNOSIS — Z79.4 TYPE 2 DIABETES MELLITUS WITH HYPERGLYCEMIA, WITH LONG-TERM CURRENT USE OF INSULIN (HCC): ICD-10-CM

## 2023-01-19 DIAGNOSIS — E11.65 TYPE 2 DIABETES MELLITUS WITH HYPERGLYCEMIA, WITH LONG-TERM CURRENT USE OF INSULIN (HCC): ICD-10-CM

## 2023-01-19 RX ORDER — PANTOPRAZOLE SODIUM 40 MG/1
40 TABLET, DELAYED RELEASE ORAL DAILY
Qty: 90 TABLET | Refills: 1 | Status: SHIPPED | OUTPATIENT
Start: 2023-01-19

## 2023-01-19 RX ORDER — ONDANSETRON 4 MG/1
TABLET, ORALLY DISINTEGRATING ORAL
Qty: 60 TABLET | Refills: 1 | Status: SHIPPED | OUTPATIENT
Start: 2023-01-19

## 2023-01-19 RX ORDER — METFORMIN HYDROCHLORIDE 500 MG/1
500 TABLET, EXTENDED RELEASE ORAL 2 TIMES DAILY WITH MEALS
Qty: 180 TABLET | Refills: 1 | Status: SHIPPED | OUTPATIENT
Start: 2023-01-19 | End: 2023-07-18

## 2023-01-19 RX ORDER — PEN NEEDLE, DIABETIC 31 GX5/16"
NEEDLE, DISPOSABLE MISCELLANEOUS
Qty: 100 EACH | Refills: 1 | Status: SHIPPED | OUTPATIENT
Start: 2023-01-19

## 2023-01-23 DIAGNOSIS — Z79.4 TYPE 2 DIABETES MELLITUS WITH HYPERGLYCEMIA, WITH LONG-TERM CURRENT USE OF INSULIN (HCC): Chronic | ICD-10-CM

## 2023-01-23 DIAGNOSIS — E11.65 TYPE 2 DIABETES MELLITUS WITH HYPERGLYCEMIA, WITH LONG-TERM CURRENT USE OF INSULIN (HCC): Chronic | ICD-10-CM

## 2023-01-23 RX ORDER — LANCING DEVICE
EACH MISCELLANEOUS
Qty: 1 EACH | Refills: 0 | Status: SHIPPED | OUTPATIENT
Start: 2023-01-23

## 2023-01-25 ENCOUNTER — TELEPHONE (OUTPATIENT)
Dept: FAMILY MEDICINE CLINIC | Facility: CLINIC | Age: 68
End: 2023-01-25

## 2023-02-09 ENCOUNTER — OFFICE VISIT (OUTPATIENT)
Dept: FAMILY MEDICINE CLINIC | Facility: CLINIC | Age: 68
End: 2023-02-09

## 2023-02-09 VITALS
BODY MASS INDEX: 33.26 KG/M2 | WEIGHT: 199.6 LBS | SYSTOLIC BLOOD PRESSURE: 136 MMHG | HEIGHT: 65 IN | OXYGEN SATURATION: 99 % | HEART RATE: 91 BPM | DIASTOLIC BLOOD PRESSURE: 74 MMHG | TEMPERATURE: 98.7 F

## 2023-02-09 DIAGNOSIS — E11.65 TYPE 2 DIABETES MELLITUS WITH HYPERGLYCEMIA, WITH LONG-TERM CURRENT USE OF INSULIN (HCC): Primary | ICD-10-CM

## 2023-02-09 DIAGNOSIS — N18.30 STAGE 3 CHRONIC KIDNEY DISEASE, UNSPECIFIED WHETHER STAGE 3A OR 3B CKD (HCC): ICD-10-CM

## 2023-02-09 DIAGNOSIS — I48.0 PAROXYSMAL ATRIAL FIBRILLATION (HCC): ICD-10-CM

## 2023-02-09 DIAGNOSIS — D69.6 PLATELETS DECREASED (HCC): ICD-10-CM

## 2023-02-09 DIAGNOSIS — N32.81 OAB (OVERACTIVE BLADDER): ICD-10-CM

## 2023-02-09 DIAGNOSIS — I69.354 HEMIPLEGIA AND HEMIPARESIS FOLLOWING CEREBRAL INFARCTION AFFECTING LEFT NON-DOMINANT SIDE (HCC): ICD-10-CM

## 2023-02-09 DIAGNOSIS — Z79.4 TYPE 2 DIABETES MELLITUS WITH HYPERGLYCEMIA, WITH LONG-TERM CURRENT USE OF INSULIN (HCC): Primary | ICD-10-CM

## 2023-02-09 LAB — SL AMB POCT HEMOGLOBIN AIC: 6.4 (ref ?–6.5)

## 2023-02-09 RX ORDER — MIRABEGRON 50 MG/1
1 TABLET, FILM COATED, EXTENDED RELEASE ORAL DAILY
Qty: 90 TABLET | Refills: 1 | Status: SHIPPED | OUTPATIENT
Start: 2023-02-09 | End: 2023-02-10 | Stop reason: SDUPTHER

## 2023-02-09 RX ORDER — BLOOD SUGAR DIAGNOSTIC
STRIP MISCELLANEOUS
COMMUNITY
Start: 2022-12-29

## 2023-02-09 NOTE — PROGRESS NOTES
Diabetic Foot Exam    Patient's shoes and socks removed  Right Foot/Ankle   Right Foot Inspection  Skin Exam: skin normal, skin intact, dry skin, callus and callus  No warmth, no erythema, no maceration, no abnormal color, no pre-ulcer and no ulcer  Sensory   Monofilament testing: intact    Vascular  The right DP pulse is 2+  The right PT pulse is 2+  Left Foot/Ankle  Left Foot Inspection  Skin Exam: skin normal, skin intact, dry skin and callus  No warmth, no erythema, no maceration, normal color, no pre-ulcer and no ulcer  Sensory   Monofilament testing: intact    Vascular  The left DP pulse is 2+  The left PT pulse is 2+       Assign Risk Category  No deformity present  No loss of protective sensation  No weak pulses  Risk: 0

## 2023-02-10 DIAGNOSIS — N32.81 OAB (OVERACTIVE BLADDER): ICD-10-CM

## 2023-02-10 DIAGNOSIS — E11.65 TYPE 2 DIABETES MELLITUS WITH HYPERGLYCEMIA, WITH LONG-TERM CURRENT USE OF INSULIN (HCC): Chronic | ICD-10-CM

## 2023-02-10 DIAGNOSIS — Z79.4 TYPE 2 DIABETES MELLITUS WITH HYPERGLYCEMIA, WITH LONG-TERM CURRENT USE OF INSULIN (HCC): Chronic | ICD-10-CM

## 2023-02-10 RX ORDER — ONDANSETRON 4 MG/1
TABLET, ORALLY DISINTEGRATING ORAL
Qty: 60 TABLET | Refills: 1 | Status: SHIPPED | OUTPATIENT
Start: 2023-02-10

## 2023-02-10 RX ORDER — MIRABEGRON 50 MG/1
1 TABLET, FILM COATED, EXTENDED RELEASE ORAL DAILY
Qty: 90 TABLET | Refills: 1 | Status: SHIPPED | OUTPATIENT
Start: 2023-02-10

## 2023-02-10 NOTE — TELEPHONE ENCOUNTER
Pt called and stated if you can resubmit her medication Myrbetriq (Mirabegron ER) to [employer]

## 2023-02-13 DIAGNOSIS — I63.9 CEREBROVASCULAR ACCIDENT (CVA), UNSPECIFIED MECHANISM (HCC): ICD-10-CM

## 2023-02-13 RX ORDER — ATORVASTATIN CALCIUM 80 MG/1
TABLET, FILM COATED ORAL
Qty: 90 TABLET | Refills: 1 | Status: SHIPPED | OUTPATIENT
Start: 2023-02-13

## 2023-02-13 NOTE — PROGRESS NOTES
Assessment/Plan:    53-year-old woman with: history of hemiplegia and hemiparesis status post prior CVA type 2 diabetes overactive bladder paroxysmal A-fib thrombocytopenia and CKD3 discussed work-up and treatment options with risks and benefits we will continue current medications at this encourage follow-up with GI if her symptoms are not improving and also keeping a food diary discussed  Return parameters otherwise follow-up in 3 months    No problem-specific Assessment & Plan notes found for this encounter  Diagnoses and all orders for this visit:    Type 2 diabetes mellitus with hyperglycemia, with long-term current use of insulin (HCC)  -     POCT hemoglobin A1c    OAB (overactive bladder)  -     Discontinue: Mirabegron ER (Myrbetriq) 50 MG TB24; Take 1 tablet (50 mg total) by mouth in the morning    Hemiplegia and hemiparesis following cerebral infarction affecting left non-dominant side (HCC)    Paroxysmal atrial fibrillation (HCC)    Platelets decreased (HCC)    Stage 3 chronic kidney disease, unspecified whether stage 3a or 3b CKD ([facility] )    Other orders  -     OneTouch Ultra test strip          Subjective:     Chief Complaint   Patient presents with   • Fever     Feverish every time she eats  Osteo screening due (please place order) Microalbumin due please place order  Please place order for microalbumin patient can not give urine in the office  Patient ID: Gilberto Montes is a 74 y o  female  Patient is a 53-year-old woman who presents for follow-up on history of hemiplegia and hemiparesis status post prior CVA type 2 diabetes overactive bladder paroxysmal A-fib thrombocytopenia and CKD3 she admits being generally stable medications but says that she gets diaphoretic every time that she eats no fevers chills nausea vomiting tolerating p o  intake no other complaints at this time  She still    Fever  Associated symptoms include diaphoresis         The following portions of the patient's history were reviewed and updated as appropriate: allergies, current medications, past family history, past medical history, past patient is a 79-year-old woman who presents for follow-up on type 2 diabetes overactive bladder history of hemiplegia and hemiparesis status post social history, past surgical history and problem list     Review of Systems   Constitutional: Positive for diaphoresis  HENT: Negative  Eyes: Negative  Respiratory: Negative  Cardiovascular: Negative  Gastrointestinal: Negative  Endocrine: Negative  Genitourinary: Negative  Musculoskeletal: Negative  Allergic/Immunologic: Negative  Neurological: Negative  Hematological: Negative  Psychiatric/Behavioral: Negative  All other systems reviewed and are negative  Objective:      /74 (BP Location: Left arm, Patient Position: Sitting, Cuff Size: Large)   Pulse 91   Temp 98 7 °F (37 1 °C) (Tympanic)   Ht 5' 5" (1 651 m)   Wt 90 5 kg (199 lb 9 6 oz)   LMP  (LMP Unknown)   SpO2 99%   BMI 33 22 kg/m²          Physical Exam  Constitutional:       Appearance: She is well-developed  HENT:      Head: Atraumatic  Right Ear: External ear normal       Left Ear: External ear normal    Eyes:      Conjunctiva/sclera: Conjunctivae normal       Pupils: Pupils are equal, round, and reactive to light  Cardiovascular:      Rate and Rhythm: Normal rate and regular rhythm  Heart sounds: Normal heart sounds  Pulmonary:      Effort: Pulmonary effort is normal  No respiratory distress  Breath sounds: Normal breath sounds  Abdominal:      General: There is no distension  Palpations: Abdomen is soft  Tenderness: There is no abdominal tenderness  There is no guarding or rebound  Musculoskeletal:         General: Normal range of motion  Cervical back: Normal range of motion  Skin:     General: Skin is warm and dry     Neurological:      Mental Status: She is alert and oriented to person, place, and time  Cranial Nerves: No cranial nerve deficit  Psychiatric:         Behavior: Behavior normal          Thought Content:  Thought content normal          Judgment: Judgment normal

## 2023-02-15 ENCOUNTER — OFFICE VISIT (OUTPATIENT)
Dept: ENDOCRINOLOGY | Facility: CLINIC | Age: 68
End: 2023-02-15

## 2023-02-15 VITALS
HEART RATE: 91 BPM | OXYGEN SATURATION: 99 % | DIASTOLIC BLOOD PRESSURE: 78 MMHG | SYSTOLIC BLOOD PRESSURE: 138 MMHG | BODY MASS INDEX: 33.15 KG/M2 | RESPIRATION RATE: 18 BRPM | HEIGHT: 65 IN | WEIGHT: 199 LBS

## 2023-02-15 DIAGNOSIS — E11.65 TYPE 2 DIABETES MELLITUS WITH HYPERGLYCEMIA, WITH LONG-TERM CURRENT USE OF INSULIN (HCC): Primary | Chronic | ICD-10-CM

## 2023-02-15 DIAGNOSIS — L74.52 GUSTATORY HYPERHIDROSIS: ICD-10-CM

## 2023-02-15 DIAGNOSIS — Z79.4 TYPE 2 DIABETES MELLITUS WITH HYPERGLYCEMIA, WITH LONG-TERM CURRENT USE OF INSULIN (HCC): Primary | Chronic | ICD-10-CM

## 2023-02-15 DIAGNOSIS — E78.5 HYPERLIPIDEMIA, UNSPECIFIED HYPERLIPIDEMIA TYPE: Chronic | ICD-10-CM

## 2023-02-15 DIAGNOSIS — I10 ESSENTIAL HYPERTENSION: Chronic | ICD-10-CM

## 2023-02-15 RX ORDER — INSULIN GLARGINE 100 [IU]/ML
28 INJECTION, SOLUTION SUBCUTANEOUS
Qty: 30 ML | Refills: 1 | Status: SHIPPED | OUTPATIENT
Start: 2023-02-15

## 2023-02-15 RX ORDER — ISOPROPYL ALCOHOL 0.7 ML/ML
SWAB TOPICAL
COMMUNITY
Start: 2023-02-01

## 2023-02-15 NOTE — PROGRESS NOTES
Established Patient Progress Note      Chief Complaint   Patient presents with   • Diabetes Type 2     Checks bg once daily avg 104 fasting        History of Present Illness:   Tommy Chew is a 74 y o  female with hypertension, hyperlipidemia, and type 2 diabetes with long term use of insulin  Reports complications of multiple strokes and diabetic retinopathy  Denies recent severe hypoglycemic or severe hyperglycemic episodes  Denies any issues with her current regimen  home glucose monitoring: are performed sporadically-patient is having a difficult time seeing well enough to test her blood sugars  At patient's last appointment on 7/28/2022, she was counseled on how to appropriately self inject her basal insulin in light of her reduced vision  Metformin was initiated at 500 mg twice daily with meals  On 10/12/2022, patient called the office reporting symptoms of low blood sugar  She was not certain of what her blood sugars were as she has difficulty seeing her glucometer however she reported shaking, sweating, and confusion  The symptoms were corrected with ingestion of carbohydrates  Therefore, basal insulin was reduced from 40 units to 32 units nightly  Today, she reports feeling well  She does note occasional morning hypoglycemia symptoms  Her only concern is that she has developed sweating/flushing when she eats  It does not matter what or when she eats  She notices getting hot and sweating around the hairline and behind her ears    Component      Latest Ref Rng & Units 7/28/2022 9/10/2022 2/9/2023           3:02 PM  9:32 AM  2:55 PM   Hemoglobin A1C      6 5 8 0 (A) 6 9 (H) 6 4     Component      Latest Ref Rng & Units 6/13/2022 9/10/2022           4:24 AM  9:32 AM   eGFR      ml/min/1 73sq m 66 50     Home blood glucose readings: None for review     Current regimen:   Lantus 32 units nightly  Tradjenta 5 mg daily  Metformin 500 mg twice daily with meals  Repaglinide 2 mg twice daily prior to meals    Last Eye Exam: UTD  Last Foot Exam: UTD    For hyperlipidemia, she is taking 80 mg of atorvastatin nightly  She denies myalgias  For hypertension, she is taking 5 mg of lisinopril daily and 50 mg of metoprolol tartrate twice daily  She denies headache, cough, and orthostatic hypotension      Patient Active Problem List   Diagnosis   • Paroxysmal atrial fibrillation (HCC)   • Cerebrovascular accident (CVA) ([facility] )   • Blurry vision   • Diabetic cataract ([facility] )   • Dysphagia   • Dysphonia   • Glottic stenosis   • Heart disease   • Insomnia   • Incomplete emptying of bladder   • Tracheal stenosis   • Type 2 diabetes mellitus with hyperglycemia, with long-term current use of insulin ([facility])   • GERD (gastroesophageal reflux disease)   • Hyperlipidemia   • Edema   • Essential hypertension   • Current episode of major depressive disorder without prior episode   • Medicare annual wellness visit, subsequent   • Preoperative clearance   • YOLIE (obstructive sleep apnea)   • Dermatitis   • Muscle tension dysphonia   • Reflux laryngitis   • Glottic insufficiency   • Weakness of both lower extremities   • History of CVA (cerebrovascular accident)   • Hemiplegia and hemiparesis following cerebral infarction affecting left non-dominant side ([facility])   • Stage 3 chronic kidney disease, unspecified whether stage 3a or 3b CKD ([facility])   • Abnormality of gait due to impairment of balance   • Stress incontinence   • Cyst of right ovary   • Abnormal CT of the chest   • Platelets decreased ([facility] )   • Status post appendectomy   • Ovarian mass   • Abnormal uterine bleeding (AUB)   • Gustatory hyperhidrosis      Past Medical History:   Diagnosis Date   • Abdominal fibromatosis    • Arthritis    • Depression    • GERD (gastroesophageal reflux disease)     controlled   • Hyperlipemia    • Obesity    • Pneumonia    • Right pontine stroke ([place] [address] ) 12/13/2021   • Stroke (cerebrum) ([place] [address] ) 12/17/2021   • Stroke [facility])       Past Surgical History: Procedure Laterality Date   • APPENDECTOMY     • APPENDECTOMY LAPAROSCOPIC N/A 06/12/2022    Procedure: APPENDECTOMY LAPAROSCOPIC;  Surgeon: Carissa Pozo MD;  Location: [facility];  Service: General   • CATARACT EXTRACTION     • CATARACT EXTRACTION, BILATERAL     • COLONOSCOPY     • EGD     • LAPAROTOMY      Exploratory;  Last Assessed 10/17/2017   • PEG TUBE PLACEMENT     • PEG TUBE REMOVAL        Family History   Problem Relation Age of Onset   • No Known Problems Mother    • No Known Problems Father      Social History     Tobacco Use   • Smoking status: Never   • Smokeless tobacco: Never   Substance Use Topics   • Alcohol use: Yes     Comment: Socially     Allergies   Allergen Reactions   • Apixaban Vomiting and GI Intolerance     Other reaction(s): Vomiting   • Dulaglutide Vomiting     Nausea vomiting         Current Outpatient Medications:   •  Accu-Chek FastClix Lancets MISC, Use 2 (two) times a day, Disp: 102 each, Rfl: 3  •  Alcohol Swabs (Pharmacist Choice Alcohol) PADS, , Disp: , Rfl:   •  Aspirin 81 MG CAPS, Take 1 tablet by mouth in the morning, Disp: , Rfl:   •  atorvastatin (LIPITOR) 80 mg tablet, TAKE ONE TABLET BY MOUTH ONCE DAILY WITH DINNER, Disp: 90 tablet, Rfl: 1  •  B-D UF III MINI PEN NEEDLES 31G X 5 MM MISC, INJECT UNDER THE SKIN AS NEEDED (HYPOGLYCEMIA) USE AS DIRECTED, Disp: 100 each, Rfl: 1  •  Blood Glucose Monitoring Suppl (Accu-Chek Guide) w/Device KIT, Use 2 (two) times a day, Disp: 1 kit, Rfl: 0  •  clotrimazole (LOTRIMIN) 1 % cream, , Disp: , Rfl:   •  escitalopram (LEXAPRO) 5 mg tablet, take 1 tablet by mouth every morning, Disp: 90 tablet, Rfl: 1  •  famotidine (PEPCID) 40 MG tablet, take 1 tablet by mouth once daily, Disp: 90 tablet, Rfl: 1  •  Insulin Glargine Solostar (Lantus SoloStar) 100 UNIT/ML SOPN, Inject 0 28 mL (28 Units total) under the skin daily at bedtime, Disp: 30 mL, Rfl: 1  •  Insulin Pen Needle (Pen Needles 5/16") 30G X 8 MM MISC, Use daily, Disp: 50 each, Rfl: 2  •  Lancet Devices (TRUEdraw Lancing Device) MISC, USE AS DIRECTED, Disp: 1 each, Rfl: 0  •  Lancets Misc  (Accu-Chek Softclix Lancet Dev) KIT, Check blood sugar, Disp: 1 kit, Rfl: 1  •  linaGLIPtin (Tradjenta) 5 MG TABS, Take 5 mg by mouth every morning, Disp: 90 tablet, Rfl: 1  •  lisinopril (ZESTRIL) 5 mg tablet, take 1 tablet by mouth every morning, Disp: 90 tablet, Rfl: 1  •  metFORMIN (GLUCOPHAGE-XR) 500 mg 24 hr tablet, TAKE 1 TABLET (500 MG TOTAL) BY MOUTH 2 (TWO) TIMES A DAY WITH MEALS, Disp: 180 tablet, Rfl: 1  •  metoprolol tartrate (LOPRESSOR) 50 mg tablet, take 1 tablet by mouth every 12 hours, Disp: 180 tablet, Rfl: 1  •  Mirabegron ER (Myrbetriq) 50 MG TB24, Take 1 tablet (50 mg total) by mouth in the morning, Disp: 90 tablet, Rfl: 1  •  ondansetron (ZOFRAN-ODT) 4 mg disintegrating tablet, Take 1 tablet (4 mg total) by mouth every 8 (eight) hours as needed for nausea or vomiting, Disp: 30 tablet, Rfl: 1  •  ondansetron (ZOFRAN-ODT) 4 mg disintegrating tablet, TAKE ONE TABLET BY MOUTH EVERY 8 HOURS IF NEEDED FOR NAUSEA AND VOMITING, Disp: 60 tablet, Rfl: 1  •  OneTouch Ultra test strip, , Disp: , Rfl:   •  pantoprazole (PROTONIX) 40 mg tablet, Take 1 tablet (40 mg total) by mouth daily, Disp: 90 tablet, Rfl: 1  •  repaglinide (PRANDIN) 2 mg tablet, Take 1 tablet (2 mg total) by mouth 2 (two) times a day before meals, Disp: 180 tablet, Rfl: 1  •  Xarelto 20 MG tablet, take 1 tablet by mouth once daily with breakfast, Disp: 90 tablet, Rfl: 1  •  Blood Glucose Monitoring Suppl (OneTouch Verio) w/Device KIT, Use 2 (two) times a day, Disp: 1 kit, Rfl: 0  •  ondansetron (ZOFRAN-ODT) 4 mg disintegrating tablet, take 1 tablet by mouth every 8 hours if needed for nausea and vomiting (Patient not taking: Reported on 2/9/2023), Disp: 60 tablet, Rfl: 1    Review of Systems   Constitutional: Negative for activity change, appetite change, fatigue and unexpected weight change     HENT: Negative for dental problem, sore throat, trouble swallowing and voice change  Sweating with meals   Eyes: Positive for visual disturbance  Respiratory: Negative for cough, chest tightness and shortness of breath  Cardiovascular: Negative for chest pain, palpitations and leg swelling  Gastrointestinal: Negative for constipation, diarrhea, nausea and vomiting  Endocrine: Negative for cold intolerance, heat intolerance, polydipsia, polyphagia and polyuria  Genitourinary: Negative for frequency  Musculoskeletal: Positive for arthralgias and gait problem  Negative for back pain and myalgias  Skin: Negative for wound  Allergic/Immunologic: Positive for environmental allergies  Negative for food allergies  Neurological: Positive for weakness and numbness  Negative for dizziness, tremors, light-headedness and headaches  Psychiatric/Behavioral: Negative for decreased concentration, dysphoric mood and sleep disturbance  The patient is not nervous/anxious  Physical Exam:  Body mass index is 33 12 kg/m²  /78   Pulse 91   Resp 18   Ht 5' 5" (1 651 m)   Wt 90 3 kg (199 lb)   LMP  (LMP Unknown)   SpO2 99%   BMI 33 12 kg/m²    Wt Readings from Last 3 Encounters:   02/15/23 90 3 kg (199 lb)   02/09/23 90 5 kg (199 lb 9 6 oz)   12/21/22 88 kg (194 lb)       Physical Exam  Vitals reviewed  Constitutional:       General: She is not in acute distress  Appearance: She is well-developed  She is obese  She is not ill-appearing  HENT:      Head: Normocephalic and atraumatic  Eyes:      Pupils: Pupils are equal, round, and reactive to light  Neck:      Thyroid: No thyromegaly  Cardiovascular:      Rate and Rhythm: Normal rate and regular rhythm  Pulses: Normal pulses  Heart sounds: Normal heart sounds  Pulmonary:      Effort: Pulmonary effort is normal       Breath sounds: Normal breath sounds  Abdominal:      General: Bowel sounds are normal  There is no distension        Palpations: Abdomen is soft  Tenderness: There is no abdominal tenderness  Musculoskeletal:      Cervical back: Normal range of motion and neck supple  Right lower leg: No edema  Left lower leg: No edema  Lymphadenopathy:      Cervical: No cervical adenopathy  Skin:     General: Skin is warm and dry  Capillary Refill: Capillary refill takes less than 2 seconds  Neurological:      Mental Status: She is alert and oriented to person, place, and time  Gait: Gait normal    Psychiatric:         Mood and Affect: Mood normal          Behavior: Behavior normal            Labs:   Lab Results   Component Value Date    HGBA1C 6 4 02/09/2023    HGBA1C 6 9 (H) 09/10/2022    HGBA1C 8 0 (A) 07/28/2022     Lab Results   Component Value Date    CREATININE 1 15 09/10/2022    CREATININE 0 91 06/13/2022    CREATININE 1 11 06/12/2022    BUN 26 (H) 09/10/2022    K 4 5 09/10/2022     09/10/2022    CO2 24 09/10/2022     eGFR   Date Value Ref Range Status   09/10/2022 50 ml/min/1 73sq m Final     Lab Results   Component Value Date    HDL 49 (L) 12/14/2021    TRIG 73 12/14/2021     Lab Results   Component Value Date    ALT 41 09/10/2022    AST 41 09/10/2022    ALKPHOS 60 09/10/2022     Lab Results   Component Value Date    NTJ2SJHHYCCD 4 942 (H) 06/11/2022    INX0GBSPBRFV 2 409 12/13/2021    NGR8VNKZOWQQ 1 800 01/30/2021     No results found for: FREET4, TSI    Impression & Plan:    Problem List Items Addressed This Visit        Endocrine    Type 2 diabetes mellitus with hyperglycemia, with long-term current use of insulin ([address] ) - Primary (Chronic)     Patient is very well controlled  As she continues to have episodes of hypoglycemia in the morning, reduce Lantus to 28 units nightly  Continue to test blood sugars 1-2x daily as able  Patient knows to notify me with persistent hyperglycemia or episodes of hypoglycemia  Continue with a healthy diet  F/U in 6 months     Lab Results   Component Value Date    HGBA1C 6 4 02/09/2023            Relevant Medications    Insulin Glargine Solostar (Lantus SoloStar) 100 UNIT/ML SOPN    Other Relevant Orders    Comprehensive metabolic panel    Hemoglobin A1C    Lipid Panel with Direct LDL reflex    Microalbumin / creatinine urine ratio       Cardiovascular and Mediastinum    Essential hypertension (Chronic)     /78  Continue current regimen  Musculoskeletal and Integument    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, 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  I explained what this is and reassured her that it is not dangerous  Typically it will resolve spontaneously  I did offer a referral to neurology  Patient declines at this time  Other    Hyperlipidemia (Chronic)     Check fasting lipid panel  Continue 80 mg of atorvastatin nightly  Orders Placed This Encounter   Procedures   • Comprehensive metabolic panel     This is a patient instruction: Patient fasting for 8 hours or longer recommended  Standing Status:   Future     Standing Expiration Date:   2/15/2024   • Hemoglobin A1C     Standing Status:   Future     Standing Expiration Date:   2/15/2024   • Lipid Panel with Direct LDL reflex     This is a patient instruction: This test requires patient fasting for 10-12 hours or longer  Drinking of black coffee or black tea is acceptable  Standing Status:   Future     Standing Expiration Date:   2/15/2024   • Microalbumin / creatinine urine ratio     Standing Status:   Future     Standing Expiration Date:   2/15/2024       Patient Instructions   1  Jennifer Syndrome or gustatory hyperhidrosis  2  Reduce Lantus to 28 units nightly  Discussed with the patient and all questioned fully answered  She will call me if any problems arise  Follow-up appointment in 6 months       Counseled patient on diagnostic results, prognosis, risk and benefit of treatment options, instruction for management, importance of treatment compliance, Risk  factor reduction and impressions    ALEC Maloney

## 2023-02-15 NOTE — ASSESSMENT & PLAN NOTE
Patient is very well controlled  As she continues to have episodes of hypoglycemia in the morning, reduce Lantus to 28 units nightly  Continue to test blood sugars 1-2x daily as able  Patient knows to notify me with persistent hyperglycemia or episodes of hypoglycemia  Continue with a healthy diet  F/U in 6 months     Lab Results   Component Value Date    HGBA1C 6 4 02/09/2023

## 2023-02-15 NOTE — ASSESSMENT & PLAN NOTE
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

## 2023-02-20 DIAGNOSIS — Z79.4 TYPE 2 DIABETES MELLITUS WITH HYPERGLYCEMIA, WITH LONG-TERM CURRENT USE OF INSULIN (HCC): ICD-10-CM

## 2023-02-20 DIAGNOSIS — E11.65 TYPE 2 DIABETES MELLITUS WITH HYPERGLYCEMIA, WITH LONG-TERM CURRENT USE OF INSULIN (HCC): ICD-10-CM

## 2023-02-20 DIAGNOSIS — K21.9 GASTROESOPHAGEAL REFLUX DISEASE, UNSPECIFIED WHETHER ESOPHAGITIS PRESENT: ICD-10-CM

## 2023-02-21 RX ORDER — PANTOPRAZOLE SODIUM 40 MG/1
40 TABLET, DELAYED RELEASE ORAL DAILY
Qty: 90 TABLET | Refills: 1
Start: 2023-02-21

## 2023-03-06 DIAGNOSIS — I48.0 PAROXYSMAL ATRIAL FIBRILLATION (HCC): ICD-10-CM

## 2023-03-06 DIAGNOSIS — I63.9 CEREBROVASCULAR ACCIDENT (CVA), UNSPECIFIED MECHANISM (HCC): ICD-10-CM

## 2023-03-07 RX ORDER — RIVAROXABAN 20 MG/1
TABLET, FILM COATED ORAL
Qty: 90 TABLET | Refills: 1 | Status: SHIPPED | OUTPATIENT
Start: 2023-03-07

## 2023-03-28 DIAGNOSIS — E11.65 TYPE 2 DIABETES MELLITUS WITH HYPERGLYCEMIA, WITH LONG-TERM CURRENT USE OF INSULIN (HCC): Chronic | ICD-10-CM

## 2023-03-28 DIAGNOSIS — Z79.4 TYPE 2 DIABETES MELLITUS WITH HYPERGLYCEMIA, WITH LONG-TERM CURRENT USE OF INSULIN (HCC): Chronic | ICD-10-CM

## 2023-03-29 RX ORDER — ONDANSETRON 4 MG/1
TABLET, ORALLY DISINTEGRATING ORAL
Qty: 60 TABLET | Refills: 1 | Status: SHIPPED | OUTPATIENT
Start: 2023-03-29

## 2023-04-25 DIAGNOSIS — Z79.4 TYPE 2 DIABETES MELLITUS WITH HYPERGLYCEMIA, WITH LONG-TERM CURRENT USE OF INSULIN (HCC): Chronic | ICD-10-CM

## 2023-04-25 DIAGNOSIS — E11.65 TYPE 2 DIABETES MELLITUS WITH HYPERGLYCEMIA, WITH LONG-TERM CURRENT USE OF INSULIN (HCC): Chronic | ICD-10-CM

## 2023-04-25 RX ORDER — FLURBIPROFEN SODIUM 0.3 MG/ML
SOLUTION/ DROPS OPHTHALMIC
Qty: 100 EACH | Refills: 1 | Status: ON HOLD | OUTPATIENT
Start: 2023-04-25

## 2023-05-02 ENCOUNTER — RA CDI HCC (OUTPATIENT)
Dept: OTHER | Facility: HOSPITAL | Age: 68
End: 2023-05-02

## 2023-05-02 NOTE — PROGRESS NOTES
[facility]  coding opportunities     E11 22     Chart Reviewed number of suggestions sent to Provider: 1     Patients Insurance     Medicare Insurance: [employer]

## 2023-05-06 ENCOUNTER — APPOINTMENT (EMERGENCY)
Dept: RADIOLOGY | Facility: HOSPITAL | Age: 68
End: 2023-05-06

## 2023-05-06 ENCOUNTER — APPOINTMENT (EMERGENCY)
Dept: CT IMAGING | Facility: HOSPITAL | Age: 68
End: 2023-05-06

## 2023-05-06 ENCOUNTER — HOSPITAL ENCOUNTER (INPATIENT)
Facility: HOSPITAL | Age: 68
LOS: 2 days | Discharge: NURSING, REHAB OR LONG-TERM CARE | End: 2023-05-09
Attending: EMERGENCY MEDICINE | Admitting: STUDENT IN AN ORGANIZED HEALTH CARE EDUCATION/TRAINING PROGRAM

## 2023-05-06 DIAGNOSIS — Z79.4 TYPE 2 DIABETES MELLITUS WITH HYPERGLYCEMIA, WITH LONG-TERM CURRENT USE OF INSULIN (HCC): Chronic | ICD-10-CM

## 2023-05-06 DIAGNOSIS — E11.65 TYPE 2 DIABETES MELLITUS WITH HYPERGLYCEMIA, WITH LONG-TERM CURRENT USE OF INSULIN (HCC): Chronic | ICD-10-CM

## 2023-05-06 DIAGNOSIS — N39.0 UTI (URINARY TRACT INFECTION): ICD-10-CM

## 2023-05-06 DIAGNOSIS — E16.2 HYPOGLYCEMIA: Primary | ICD-10-CM
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
## 2023-05-06 PROBLEM — R74.8 ELEVATED CK: Status: ACTIVE | Noted: 2023-05-06

## 2023-05-06 LAB
ALBUMIN SERPL BCP-MCNC: 3.7 G/DL (ref 3.5–5)
ALP SERPL-CCNC: 55 U/L (ref 34–104)
ALT SERPL W P-5'-P-CCNC: 39 U/L (ref 7–52)
ANION GAP SERPL CALCULATED.3IONS-SCNC: 11 MMOL/L (ref 4–13)
AST SERPL W P-5'-P-CCNC: 66 U/L (ref 13–39)
BACTERIA UR QL AUTO: ABNORMAL /HPF
BASOPHILS # BLD AUTO: 0.04 THOUSANDS/ÂΜL (ref 0–0.1)
BASOPHILS NFR BLD AUTO: 0 % (ref 0–1)
BILIRUB SERPL-MCNC: 0.79 MG/DL (ref 0.2–1)
BILIRUB UR QL STRIP: NEGATIVE
BUN SERPL-MCNC: 26 MG/DL (ref 5–25)
CALCIUM SERPL-MCNC: 9 MG/DL (ref 8.4–10.2)
CHLORIDE SERPL-SCNC: 102 MMOL/L (ref 96–108)
CK SERPL-CCNC: 400 U/L (ref 26–192)
CLARITY UR: ABNORMAL
CO2 SERPL-SCNC: 23 MMOL/L (ref 21–32)
COLOR UR: YELLOW
CREAT SERPL-MCNC: 0.88 MG/DL (ref 0.6–1.3)
EOSINOPHIL # BLD AUTO: 0.24 THOUSAND/ÂΜL (ref 0–0.61)
EOSINOPHIL NFR BLD AUTO: 3 % (ref 0–6)
ERYTHROCYTE [DISTWIDTH] IN BLOOD BY AUTOMATED COUNT: 13 % (ref 11.6–15.1)
GFR SERPL CREATININE-BSD FRML MDRD: 69 ML/MIN/1.73SQ M
GLUCOSE SERPL-MCNC: 121 MG/DL (ref 65–140)
GLUCOSE SERPL-MCNC: 140 MG/DL (ref 65–140)
GLUCOSE SERPL-MCNC: 152 MG/DL (ref 65–140)
GLUCOSE SERPL-MCNC: 337 MG/DL (ref 65–140)
GLUCOSE SERPL-MCNC: 40 MG/DL (ref 65–140)
GLUCOSE SERPL-MCNC: 91 MG/DL (ref 65–140)
GLUCOSE UR STRIP-MCNC: NEGATIVE MG/DL
HCT VFR BLD AUTO: 37.8 % (ref 34.8–46.1)
HGB BLD-MCNC: 12.2 G/DL (ref 11.5–15.4)
HGB UR QL STRIP.AUTO: NEGATIVE
IMM GRANULOCYTES # BLD AUTO: 0.04 THOUSAND/UL (ref 0–0.2)
IMM GRANULOCYTES NFR BLD AUTO: 0 % (ref 0–2)
INR PPP: 1.28 (ref 0.84–1.19)
KETONES UR STRIP-MCNC: ABNORMAL MG/DL
LACTATE SERPL-SCNC: 1.4 MMOL/L (ref 0.5–2)
LACTATE SERPL-SCNC: 2.6 MMOL/L (ref 0.5–2)
LEUKOCYTE ESTERASE UR QL STRIP: NEGATIVE
LYMPHOCYTES # BLD AUTO: 1.39 THOUSANDS/ÂΜL (ref 0.6–4.47)
LYMPHOCYTES NFR BLD AUTO: 16 % (ref 14–44)
MCH RBC QN AUTO: 31 PG (ref 26.8–34.3)
MCHC RBC AUTO-ENTMCNC: 32.3 G/DL (ref 31.4–37.4)
MCV RBC AUTO: 96 FL (ref 82–98)
MONOCYTES # BLD AUTO: 0.59 THOUSAND/ÂΜL (ref 0.17–1.22)
MONOCYTES NFR BLD AUTO: 7 % (ref 4–12)
NEUTROPHILS # BLD AUTO: 6.62 THOUSANDS/ÂΜL (ref 1.85–7.62)
NEUTS SEG NFR BLD AUTO: 74 % (ref 43–75)
NITRITE UR QL STRIP: NEGATIVE
NON-SQ EPI CELLS URNS QL MICRO: ABNORMAL /HPF
NRBC BLD AUTO-RTO: 0 /100 WBCS
PH UR STRIP.AUTO: 5.5 [PH]
PLATELET # BLD AUTO: 219 THOUSANDS/UL (ref 149–390)
PMV BLD AUTO: 12.5 FL (ref 8.9–12.7)
POTASSIUM SERPL-SCNC: 5.4 MMOL/L (ref 3.5–5.3)
PROCALCITONIN SERPL-MCNC: <0.05 NG/ML
PROT SERPL-MCNC: 6.9 G/DL (ref 6.4–8.4)
PROT UR STRIP-MCNC: ABNORMAL MG/DL
PROTHROMBIN TIME: 16 SECONDS (ref 11.6–14.5)
RBC # BLD AUTO: 3.93 MILLION/UL (ref 3.81–5.12)
RBC #/AREA URNS AUTO: ABNORMAL /HPF
SODIUM SERPL-SCNC: 136 MMOL/L (ref 135–147)
SP GR UR STRIP.AUTO: 1.02
UROBILINOGEN UR QL STRIP.AUTO: 0.2 E.U./DL
WBC # BLD AUTO: 8.92 THOUSAND/UL (ref 4.31–10.16)
WBC #/AREA URNS AUTO: ABNORMAL /HPF

## 2023-05-06 RX ORDER — ATORVASTATIN CALCIUM 80 MG/1
80 TABLET, FILM COATED ORAL
Status: DISCONTINUED | OUTPATIENT
Start: 2023-05-06 | End: 2023-05-09 | Stop reason: HOSPADM

## 2023-05-06 RX ORDER — ASPIRIN 81 MG/1
81 TABLET, CHEWABLE ORAL DAILY
Status: DISCONTINUED | OUTPATIENT
Start: 2023-05-07 | End: 2023-05-09 | Stop reason: HOSPADM

## 2023-05-06 RX ORDER — LISINOPRIL 5 MG/1
5 TABLET ORAL EVERY MORNING
Status: DISCONTINUED | OUTPATIENT
Start: 2023-05-07 | End: 2023-05-09 | Stop reason: HOSPADM

## 2023-05-06 RX ORDER — CEFTRIAXONE 1 G/50ML
1000 INJECTION, SOLUTION INTRAVENOUS ONCE
Status: COMPLETED | OUTPATIENT
Start: 2023-05-06 | End: 2023-05-06

## 2023-05-06 RX ORDER — METOPROLOL TARTRATE 50 MG/1
50 TABLET, FILM COATED ORAL EVERY 12 HOURS
Status: DISCONTINUED | OUTPATIENT
Start: 2023-05-06 | End: 2023-05-09 | Stop reason: HOSPADM

## 2023-05-06 RX ORDER — ESCITALOPRAM OXALATE 5 MG/1
5 TABLET ORAL EVERY MORNING
Status: DISCONTINUED | OUTPATIENT
Start: 2023-05-07 | End: 2023-05-09 | Stop reason: HOSPADM

## 2023-05-06 RX ORDER — PANTOPRAZOLE SODIUM 40 MG/1
40 TABLET, DELAYED RELEASE ORAL DAILY
Status: DISCONTINUED | OUTPATIENT
Start: 2023-05-07 | End: 2023-05-09 | Stop reason: HOSPADM

## 2023-05-06 RX ADMIN — SODIUM CHLORIDE 500 ML/HR: 4 INJECTION, SOLUTION, CONCENTRATE INTRAVENOUS at 14:40

## 2023-05-06 RX ADMIN — Medication: at 13:55

## 2023-05-06 RX ADMIN — SODIUM CHLORIDE 1000 ML: 0.9 INJECTION, SOLUTION INTRAVENOUS at 15:00

## 2023-05-06 RX ADMIN — SODIUM CHLORIDE 1000 ML/HR: 4 INJECTION, SOLUTION, CONCENTRATE INTRAVENOUS at 13:56

## 2023-05-06 RX ADMIN — ATORVASTATIN CALCIUM 80 MG: 80 TABLET, FILM COATED ORAL at 18:33

## 2023-05-06 RX ADMIN — METOPROLOL TARTRATE 50 MG: 50 TABLET, FILM COATED ORAL at 18:33

## 2023-05-06 RX ADMIN — IOHEXOL 100 ML: 350 INJECTION, SOLUTION INTRAVENOUS at 14:42

## 2023-05-06 RX ADMIN — CEFTRIAXONE 1000 MG: 1 INJECTION, SOLUTION INTRAVENOUS at 14:36

## 2023-05-06 NOTE — ASSESSMENT & PLAN NOTE
Lab Results   Component Value Date    HGBA1C 6 4 02/09/2023       Recent Labs     05/06/23  1346 05/06/23  1410 05/06/23  1504   POCGLU 40* 91 140       Blood Sugar Average: Last 72 hrs:  (P) 07 [id number]   - HOLD home regimen given hypoglycemia, then consider addition of home lantus

## 2023-05-06 NOTE — PROGRESS NOTES
[facility] [address]  Progress Note  Name: Meryle Cartwright  MRN: [id number]  Unit/Bed#: -01 I Date of Admission: 5/6/2023   Date of Service: 5/6/2023  Hospital Day: 0    Assessment/Plan   Elevated CK  Assessment & Plan  Likely secondary to being down for 1 day, s/p fluids, repeat in tomorrows labs    Stage 3 chronic kidney disease, unspecified whether stage 3a or 3b CKD [facility])  Assessment & Plan  Lab Results   Component Value Date    EGFR 69 05/06/2023    EGFR 50 09/10/2022    EGFR 66 06/13/2022    CREATININE 0 88 05/06/2023    CREATININE 1 15 09/10/2022    CREATININE 0 91 06/13/2022     Normal Cr     Hemiplegia and hemiparesis following cerebral infarction affecting left non-dominant side ([facility] [address] )  Assessment & Plan  - continue antiplatelet, statin  - residual left sided weakness    Current episode of major depressive disorder without prior episode  Assessment & Plan  - Continue SSRI    Essential hypertension  Assessment & Plan  - continue lisinopril   - continue metoprolol  - follow potassium, mildly elevated on initial BMP, hold ACEi if worsening    Hyperlipidemia  Assessment & Plan  Continue atorvastatin    GERD (gastroesophageal reflux disease)  Assessment & Plan  - continue PPI    Type 2 diabetes mellitus with hyperglycemia, with long-term current use of insulin [facility])  Assessment & Plan  Lab Results   Component Value Date    HGBA1C 6 4 02/09/2023       Recent Labs     05/06/23  1346 05/06/23  1410 05/06/23  1504   POCGLU 40* 91 140       Blood Sugar Average: Last 72 hrs:  (P) 90 [id number]   - HOLD home regimen given hypoglycemia, then consider addition of home lantus    Paroxysmal atrial fibrillation (HCC)  Assessment & Plan  Continue [facility]    * Fall  Assessment & Plan  Down for 1 day, hypoglycemic, s/p mechanical fall, could not get up, will observe for 1 day, residual left side weakness, no concern for acute stroke, pan scan without fracture       VTE Pharmacologic Prophylaxis: VTE Score: 10 High Risk (Score >/= 5) - Pharmacological DVT Prophylaxis Ordered: rivaroxaban (Xarelto)  Sequential Compression Devices Ordered  Code Status: Level 3 - DNAR and DNI   Discussion with family: Updated  (son) at bedside  Anticipated Length of Stay: Patient will be admitted on an observation basis with an anticipated length of stay of less than 2 midnights secondary to weakness, hypoglycemia  Total Time Spent on Date of Encounter in care of patient: 40 minutes This time was spent on one or more of the following: performing physical exam; counseling and coordination of care; obtaining or reviewing history; documenting in the medical record; reviewing/ordering tests, medications or procedures; communicating with other healthcare professionals and discussing with patient's family/caregivers  Chief Complaint:     History of Present Illness:  Tj Arreola is a 74 y o  female with a PMH of CVA with left-sided weakness, DM, HL, pAF on AC, tracheal stenosis,  who presents with weakness after mechanical fall c/b hypoglycemia    HPI difficult to retrieve but patient describes mechanical fall without preceding weakness, cp, sob, and falling, with inability to get up for 1 day  Patient was then found to be hypoglycemic by ED, with BG 40 and given dextrose       In ED patient was pan scanned, which showed no acute findings of CT head, CT cervical spine, and CT chest showing multiple old rib fractures, right adnexal cyst    Labs in ED showed CK of 400, lactic acid of 2 6    Review of Systems:  Review of Systems    Past Medical and Surgical History:   Past Medical History:   Diagnosis Date   • Abdominal fibromatosis    • Arthritis    • Depression    • Diabetes mellitus ([facility] )    • GERD (gastroesophageal reflux disease)     controlled   • Hyperlipemia    • Hypertension    • Obesity    • Pneumonia    • Right pontine stroke ([facility] ) 12/13/2021   • Stroke (cerebrum) ([facility] ) 12/17/2021   • Stroke [facility])        Past Surgical History:   Procedure Laterality Date   • APPENDECTOMY     • APPENDECTOMY LAPAROSCOPIC N/A 06/12/2022    Procedure: APPENDECTOMY LAPAROSCOPIC;  Surgeon: Nuha Carrillo MD;  Location: [facility]IN [facility];  Service: General   • CATARACT EXTRACTION     • CATARACT EXTRACTION, BILATERAL     • COLONOSCOPY     • EGD     • LAPAROTOMY      Exploratory; Last Assessed 10/17/2017   • PEG TUBE PLACEMENT     • PEG TUBE REMOVAL         Meds/Allergies:  Prior to Admission medications    Medication Sig Start Date End Date Taking?  Authorizing Provider   Aspirin 81 MG CAPS Take 1 tablet by mouth in the morning   Yes Historical Provider, MD   atorvastatin (LIPITOR) 80 mg tablet TAKE ONE TABLET BY MOUTH ONCE DAILY WITH DINNER 2/13/23  Yes Hanny Curtis, DO   escitalopram (LEXAPRO) 5 mg tablet take 1 tablet by mouth every morning 11/2/22  Yes Manish Maria MD   famotidine (PEPCID) 40 MG tablet take 1 tablet by mouth once daily 12/27/22  Yes Manish Maria MD   Lantus SoloStar 100 units/mL SOPN INJECT 0 32 ML (32 UNITS TOTAL) UNDER THE SKIN DAILY AT BEDTIME 3/14/23  Yes Manish Maria MD   linaGLIPtin (Tradjenta) 5 MG TABS Take 5 mg by mouth every morning 12/28/22  Yes Manish Maria MD   lisinopril (ZESTRIL) 5 mg tablet take 1 tablet by mouth every morning 11/2/22  Yes Manish Mraia MD   metFORMIN (GLUCOPHAGE-XR) 500 mg 24 hr tablet TAKE 1 TABLET (500 MG TOTAL) BY MOUTH 2 (TWO) TIMES A DAY WITH MEALS 1/19/23 7/18/23 Yes Manish Maria MD   metoprolol tartrate (LOPRESSOR) 50 mg tablet take 1 tablet by mouth every 12 hours 12/27/22  Yes Manish Maria MD   Mirabegron ER (Myrbetriq) 50 MG TB24 Take 1 tablet (50 mg total) by mouth in the morning 2/10/23  Yes Manish Maria MD   pantoprazole (PROTONIX) 40 mg tablet Take 1 tablet (40 mg total) by mouth daily 2/21/23  Yes Manish Maria MD   repaglinide (PRANDIN) 2 mg tablet Take 1 tablet (2 mg total) by mouth 2 (two) times a day before meals 7/28/22  Yes "Yamil Bucco, CRNP   Xarelto 20 MG tablet TAKE ONE TABLET BY MOUTH ONCE DAILY WITH BREAKFAST 3/7/23  Yes Isabel Truong,    Accu-Chek FastClix Lancets MISC Use 2 (two) times a day 12/28/22   Yuko Jimenez MD   Alcohol Swabs (Pharmacist Choice Alcohol) PADS  2/1/23   Historical Provider, MD OROZCO UF III MINI PEN NEEDLES 31G X 5 MM MISC INJECT UNDER THE SKIN AS NEEDED (HYPOGLYCEMIA) USE AS DIRECTED 4/25/23   Yuko Jimenez MD   Blood Glucose Monitoring Suppl (Accu-Chek Guide) w/Device KIT Use 2 (two) times a day 12/28/22   Yuko Jimenez MD   Blood Glucose Monitoring Suppl (ONE TOUCH ULTRA 2) w/Device KIT USE AS DIRECTED 4/12/23   Yuko Jimenez MD   Blood Glucose Monitoring Suppl (OneTouch Verio) w/Device KIT Use 2 (two) times a day 12/28/22 1/27/23  Yuko Jimenez MD   clotrimazole (LOTRIMIN) 1 % cream  4/12/22   Historical Provider, MD   Insulin Pen Needle (Pen Needles 5/16\") 30G X 8 MM MISC Use daily 12/28/22   Yuko Jimenez MD   Lancet Devices (Lancet Device with Ejector) MISC USE TO CHECK BLOOD SUGAR TWICE A DAY 4/12/23   Yuko Jimenez MD   Lancets Misc  (Accu-Chek Softclix Lancet Dev) KIT Check blood sugar 12/28/22   Yuko Jimenez MD   ondansetron (ZOFRAN-ODT) 4 mg disintegrating tablet Take 1 tablet (4 mg total) by mouth every 8 (eight) hours as needed for nausea or vomiting 9/8/21   Yuko Jimenez MD   ondansetron (ZOFRAN-ODT) 4 mg disintegrating tablet take 1 tablet by mouth every 8 hours if needed for nausea and vomiting 12/27/22   Yuko Jimenez MD   ondansetron (ZOFRAN-ODT) 4 mg disintegrating tablet TAKE ONE TABLET BY MOUTH EVERY 8 HOURS IF NEEDED FOR NAUSEA AND VOMITING 3/29/23   Yuko Jimenez MD   OneTouch Ultra test strip  12/29/22   Historical Provider, MD     I have reviewed home medications with patient personally  Allergies:    Allergies   Allergen Reactions   • Apixaban Vomiting and GI Intolerance     Other reaction(s): Vomiting   • " Dulaglutide Vomiting     Nausea vomiting       Social History:  Marital Status:    Occupation: unknown  Patient Pre-hospital Living Situation: Home  Patient Pre-hospital Level of Mobility: walks with walker  Patient Pre-hospital Diet Restrictions: DM  Substance Use History:   Social History     Substance and Sexual Activity   Alcohol Use Never    Comment: Socially     Social History     Tobacco Use   Smoking Status Never   Smokeless Tobacco Never     Social History     Substance and Sexual Activity   Drug Use Never       Family History:  Family History   Problem Relation Age of Onset   • No Known Problems Mother    • No Known Problems Father        Physical Exam:     Vitals:   Blood Pressure: 148/81 (05/06/23 1647)  Pulse: 102 (05/06/23 1647)  Temperature: 98 1 °F (36 7 °C) (05/06/23 1647)  Temp Source: Temporal (05/06/23 1340)  Respirations: 18 (05/06/23 1647)  Weight - Scale: 88 5 kg (195 lb) (05/06/23 1340)  SpO2: 94 % (05/06/23 1647)    Physical Exam  Constitutional:       General: She is not in acute distress  Appearance: She is obese  HENT:      Head: Normocephalic  Cardiovascular:      Rate and Rhythm: Normal rate  Rhythm irregular  Heart sounds: No murmur heard  No friction rub  Pulmonary:      Breath sounds: Stridor present  Rhonchi present  Abdominal:      General: Bowel sounds are normal       Palpations: Abdomen is soft  Musculoskeletal:         General: Swelling, deformity and signs of injury present  Skin:     General: Skin is warm and dry  Neurological:      Sensory: Sensory deficit present  Motor: Weakness present     Psychiatric:         Mood and Affect: Mood normal           Additional Data:     Lab Results:  Results from last 7 days   Lab Units 05/06/23  1424   WBC Thousand/uL 8 92   HEMOGLOBIN g/dL 12 2   HEMATOCRIT % 37 8   PLATELETS Thousands/uL 219   NEUTROS PCT % 74   LYMPHS PCT % 16   MONOS PCT % 7   EOS PCT % 3     Results from last 7 days   Lab Units 05/06/23  1403   SODIUM mmol/L 136   POTASSIUM mmol/L 5 4*   CHLORIDE mmol/L 102   CO2 mmol/L 23   BUN mg/dL 26*   CREATININE mg/dL 0 88   ANION GAP mmol/L 11   CALCIUM mg/dL 9 0   ALBUMIN g/dL 3 7   TOTAL BILIRUBIN mg/dL 0 79   ALK PHOS U/L 55   ALT U/L 39   AST U/L 66*   GLUCOSE RANDOM mg/dL 121     Results from last 7 days   Lab Units 05/06/23  1403   INR  1 28*     Results from last 7 days   Lab Units 05/06/23  1504 05/06/23  1410 05/06/23  1346   POC GLUCOSE mg/dl 140 91 40*         Results from last 7 days   Lab Units 05/06/23  1403   LACTIC ACID mmol/L 2 6*   PROCALCITONIN ng/ml <0 05       Lines/Drains:  Invasive Devices     Peripheral Intravenous Line  Duration           Peripheral IV 05/06/23 Left Antecubital <1 day    Peripheral IV 05/06/23 Left;Ventral (anterior) Wrist <1 day          Drain  Duration           External Urinary Catheter 327 days                    Imaging: Reviewed radiology reports from this admission including: CT head  TRAUMA - CT head wo contrast   Final Result by Mulugeta Harvey MD (05/06 0473)      No change from prior study  No evidence of acute intracranial abnormality                  Workstation performed: [id number]         TRAUMA - CT spine cervical wo contrast   Final Result by Mulugeta Harvey MD (05/06 1502)      No cervical spine fracture or traumatic malalignment  Workstation performed: [id number]         TRAUMA - CT chest abdomen pelvis w contrast   Final Result by Mulugeta Harvey MD (05/06 1520)      1  There is no evidence of acute traumatic injury in the chest abdomen or pelvis  No acute fractures are seen   2  Multiple old right-sided rib fractures/deformities   3  Patient with known complex right adnexal cystic structure  Last ultrasound evaluation was in August 2022  Nonemergent reassessment with ultrasonography is recommended to determine if there has been any change since the prior study   4   Known history of tracheal stenosis               Workstation performed: [id number]         XR Trauma chest portable   Final Result by Anny Aguilar MD (05/06 1712)      No acute cardiopulmonary disease  Workstation performed: [id number]             EKG and Other Studies Reviewed on Admission:   · EKG: No EKG obtained  ** Please Note: This note has been constructed using a voice recognition system   **

## 2023-05-06 NOTE — ASSESSMENT & PLAN NOTE
- continue lisinopril   - continue metoprolol  - follow potassium, mildly elevated on initial BMP, hold ACEi if worsening

## 2023-05-06 NOTE — ASSESSMENT & PLAN NOTE
Down for 1 day, hypoglycemic, s/p mechanical fall, could not get up, will observe for 1 day, residual left side weakness, no concern for acute stroke, pan scan without fracture

## 2023-05-06 NOTE — PLAN OF CARE
Problem: PAIN - ADULT  Goal: Verbalizes/displays adequate comfort level or baseline comfort level  Description: Interventions:  - Encourage patient to monitor pain and request assistance  - Assess pain using appropriate pain scale  - Administer analgesics based on type and severity of pain and evaluate response  - Implement non-pharmacological measures as appropriate and evaluate response  - Consider cultural and social influences on pain and pain management  - Notify physician/advanced practitioner if interventions unsuccessful or patient reports new pain  Outcome: Progressing     Problem: SAFETY ADULT  Goal: Patient will remain free of falls  Description: INTERVENTIONS:  - Educate patient/family on patient safety including physical limitations  - Instruct patient to call for assistance with activity   - Consult OT/PT to assist with strengthening/mobility   - Keep Call bell within reach  - Keep bed low and locked with side rails adjusted as appropriate  - Keep care items and personal belongings within reach  - Initiate and maintain comfort rounds  - Make Fall Risk Sign visible to staff  - Offer Toileting every 2 Hours, in advance of need  - Initiate/Maintain bed alarm  - Obtain necessary fall risk management equipment  - Apply yellow socks and bracelet for high fall risk patients  - Consider moving patient to room near nurses station  Outcome: Progressing  Goal: Maintain or return to baseline ADL function  Description: INTERVENTIONS:  -  Assess patient's ability to carry out ADLs; assess patient's baseline for ADL function and identify physical deficits which impact ability to perform ADLs (bathing, care of mouth/teeth, toileting, grooming, dressing, etc )  - Assess/evaluate cause of self-care deficits   - Assess range of motion  - Assess patient's mobility; develop plan if impaired  - Assess patient's need for assistive devices and provide as appropriate  - Encourage maximum independence but intervene and supervise when necessary  - Involve family in performance of ADLs  - Assess for home care needs following discharge   - Consider OT consult to assist with ADL evaluation and planning for discharge  - Provide patient education as appropriate  Outcome: Progressing  Goal: Maintains/Returns to pre admission functional level  Description: INTERVENTIONS:  - Perform BMAT or MOVE assessment daily    - Set and communicate daily mobility goal to care team and patient/family/caregiver  - Collaborate with rehabilitation services on mobility goals if consulted  - Perform Range of Motion 3 times a day  - Reposition patient every 2 hours    - Dangle patient 3 times a day  - Stand patient 3 times a day  - Ambulate patient 3 times a day  - Out of bed to chair 3 times a day   - Out of bed for meals 3 times a day  - Out of bed for toileting  - Record patient progress and toleration of activity level   Outcome: Progressing

## 2023-05-06 NOTE — ASSESSMENT & PLAN NOTE
Lab Results   Component Value Date    EGFR 69 05/06/2023    EGFR 50 09/10/2022    EGFR 66 06/13/2022    CREATININE 0 88 05/06/2023    CREATININE 1 15 09/10/2022    CREATININE 0 91 06/13/2022     Normal Cr

## 2023-05-07 LAB
ANION GAP SERPL CALCULATED.3IONS-SCNC: 9 MMOL/L (ref 4–13)
BUN SERPL-MCNC: 18 MG/DL (ref 5–25)
CALCIUM SERPL-MCNC: 8.5 MG/DL (ref 8.4–10.2)
CHLORIDE SERPL-SCNC: 106 MMOL/L (ref 96–108)
CK SERPL-CCNC: 264 U/L (ref 26–192)
CO2 SERPL-SCNC: 23 MMOL/L (ref 21–32)
CREAT SERPL-MCNC: 0.82 MG/DL (ref 0.6–1.3)
GFR SERPL CREATININE-BSD FRML MDRD: 75 ML/MIN/1.73SQ M
GLUCOSE P FAST SERPL-MCNC: 151 MG/DL (ref 65–99)
GLUCOSE SERPL-MCNC: 130 MG/DL (ref 65–140)
GLUCOSE SERPL-MCNC: 151 MG/DL (ref 65–140)
GLUCOSE SERPL-MCNC: 188 MG/DL (ref 65–140)
GLUCOSE SERPL-MCNC: 208 MG/DL (ref 65–140)
GLUCOSE SERPL-MCNC: 293 MG/DL (ref 65–140)
POTASSIUM SERPL-SCNC: 3.9 MMOL/L (ref 3.5–5.3)
SODIUM SERPL-SCNC: 138 MMOL/L (ref 135–147)

## 2023-05-07 RX ORDER — ACETAMINOPHEN 325 MG/1
650 TABLET ORAL EVERY 6 HOURS PRN
Status: DISCONTINUED | OUTPATIENT
Start: 2023-05-07 | End: 2023-05-09 | Stop reason: HOSPADM

## 2023-05-07 RX ORDER — INSULIN LISPRO 100 [IU]/ML
1-6 INJECTION, SOLUTION INTRAVENOUS; SUBCUTANEOUS
Status: DISCONTINUED | OUTPATIENT
Start: 2023-05-07 | End: 2023-05-09 | Stop reason: HOSPADM

## 2023-05-07 RX ORDER — ONDANSETRON 2 MG/ML
4 INJECTION INTRAMUSCULAR; INTRAVENOUS EVERY 6 HOURS PRN
Status: DISCONTINUED | OUTPATIENT
Start: 2023-05-07 | End: 2023-05-09 | Stop reason: HOSPADM

## 2023-05-07 RX ADMIN — ATORVASTATIN CALCIUM 80 MG: 80 TABLET, FILM COATED ORAL at 16:15

## 2023-05-07 RX ADMIN — PANTOPRAZOLE SODIUM 40 MG: 40 TABLET, DELAYED RELEASE ORAL at 07:37

## 2023-05-07 RX ADMIN — ESCITALOPRAM OXALATE 5 MG: 5 TABLET, FILM COATED ORAL at 08:31

## 2023-05-07 RX ADMIN — ASPIRIN 81 MG 81 MG: 81 TABLET ORAL at 08:31

## 2023-05-07 RX ADMIN — METOPROLOL TARTRATE 50 MG: 50 TABLET, FILM COATED ORAL at 08:31

## 2023-05-07 RX ADMIN — RIVAROXABAN 20 MG: 10 TABLET, FILM COATED ORAL at 07:37

## 2023-05-07 RX ADMIN — INSULIN LISPRO 1 UNITS: 100 INJECTION, SOLUTION INTRAVENOUS; SUBCUTANEOUS at 21:13

## 2023-05-07 RX ADMIN — LISINOPRIL 5 MG: 5 TABLET ORAL at 08:31

## 2023-05-07 RX ADMIN — INSULIN LISPRO 4 UNITS: 100 INJECTION, SOLUTION INTRAVENOUS; SUBCUTANEOUS at 17:34

## 2023-05-07 NOTE — PHYSICAL THERAPY NOTE
Physical Therapy Evaluation     Patient's Name: Neha Claire    Admitting Diagnosis  UTI (urinary tract infection) [N39 0]  Weakness [R53 1]  Hypoglycemia [E16 2]  Elbow laceration [S51 019A]  Fall, initial encounter [W19  XXXA]    Problem List  Patient Active Problem List   Diagnosis    Paroxysmal atrial fibrillation (HCC)    Cerebrovascular accident (CVA) ([address] )    Blurry vision    Diabetic cataract ([address] )    Dysphagia    Dysphonia    Glottic stenosis    Heart disease    Insomnia    Incomplete emptying of bladder    Tracheal stenosis    Type 2 diabetes mellitus with hyperglycemia, with long-term current use of insulin (HCC)    GERD (gastroesophageal reflux disease)    Hyperlipidemia    Edema    Essential hypertension    Current episode of major depressive disorder without prior episode    Medicare annual wellness visit, subsequent    Preoperative clearance    YOLIE (obstructive sleep apnea)    Dermatitis    Muscle tension dysphonia    Reflux laryngitis    Glottic insufficiency    Weakness of both lower extremities    History of CVA (cerebrovascular accident)    Hemiplegia and hemiparesis following cerebral infarction affecting left non-dominant side ([address] )    Stage 3 chronic kidney disease, unspecified whether stage 3a or 3b CKD ([address] )    Abnormality of gait due to impairment of balance    Stress incontinence    Cyst of right ovary    Abnormal CT of the chest    Platelets decreased ([address] )    Status post appendectomy    Ovarian mass    Abnormal uterine bleeding (AUB)    Gustatory hyperhidrosis    Fall    Elevated CK       Past Medical History  Past Medical History:   Diagnosis Date    Abdominal fibromatosis     Arthritis     Depression     Diabetes mellitus (HCC)     GERD (gastroesophageal reflux disease)     controlled    Hyperlipemia     Hypertension     Obesity     Pneumonia     Right pontine stroke ([address] ) 12/13/2021    Stroke (cerebrum) [address] ) 12/17/2021    Stroke [facility])        Past Surgical History  Past Surgical History:   Procedure Laterality Date    APPENDECTOMY      APPENDECTOMY LAPAROSCOPIC N/A 06/12/2022    Procedure: APPENDECTOMY LAPAROSCOPIC;  Surgeon: Spencer Ballard MD;  Location: [facility];  Service: General    CATARACT EXTRACTION      CATARACT EXTRACTION, BILATERAL      COLONOSCOPY      EGD      LAPAROTOMY      Exploratory; Last Assessed 10/17/2017    PEG TUBE PLACEMENT      PEG TUBE REMOVAL          05/07/23 1132   PT Last Visit   PT Visit Date 05/07/23   Note Type   Note type Evaluation   Pain Assessment   Pain Assessment Tool 0-10   Pain Score 1   Pain Location/Orientation Orientation: Left; Other (Comment)  (wrist)   Pain Onset/Description Onset: Ongoing;Frequency: Intermittent; Descriptor: Sore   Effect of Pain on Daily Activities yes   Patient's Stated Pain Goal No pain   Hospital Pain Intervention(s) Emotional support; Environmental changes   Multiple Pain Sites No   Restrictions/Precautions   Weight Bearing Precautions Per Order No   Other Precautions Chair Alarm; Bed Alarm; Fall Risk;Pain   Home Living   Type of Home Apartment  (on 5th floor)   Home Layout One level;Performs ADLs on one level; Able to live on main level with bedroom/bathroom; Access; Ramped entrance;Elevator   Bathroom Shower/Tub Tub/shower unit   Bathroom Toilet Raised   Bathroom Equipment Grab bars in shower   [address] [facility]  (baseline rollator usage)   Prior Function   Level of [place] Independent with ADLs; Independent with functional mobility; Needs assistance with IADLS   Lives With Alone   Receives Help From Family; Neighbor   IADLs Independent with meal prep; Family/Friend/Other provides transportation; Family/Friend/Other provides medication management  ([address] sets up pill box)   Falls in the last 6 months 1 to 4  (fall prior to arrival, reports a neighbor found her after being down 2 days)   Vocational On disability   General   Family/Caregiver Present No   Cognition   Overall Cognitive Status WFL   Arousal/Participation Alert   Orientation Level Oriented X4   Memory Within functional limits   Following Commands Follows one step commands with increased time or repetition   Comments Afia Hodgson was agreeable to PT assessment, pleasant  RLE Assessment   RLE Assessment X  (3/5 gross musculature)   LLE Assessment   LLE Assessment X  (3-/5 gross musculature)   Vision-Basic Assessment   Current Vision Wears glasses all the time  (but currently at home)   Visual History   (denies acute visual changes)   Vestibular   Spontaneous Nystagmus (-) no evidence of nystagmus at rest in room light   Gaze Holding Nystagmus (-) no evidence of nystagmus   Coordination   Movements are Fluid and Coordinated 0   Coordination and Movement Description Incremental mobility requiring increased time  Sharp/Dull   RLE Sharp/Dull Grossly intact   LLE Sharp/Dull Grossly intact  (chronic L hand impairment)   Bed Mobility   Supine to Sit 3  Moderate assistance   Additional items Assist x 1;Bedrails;HOB elevated; Increased time required;Verbal cues;LE management   Sit to Supine   (DNT as Afia Hodgson was sitting out of bed on the recliner upon conclusion )   Additional Comments Verbal cues for bedrail utilization and proper body mechanics  Impaired L  noted with difficulty self propelling  Transfers   Sit to Stand 4  Minimal assistance   Additional items Assist x 2;Bedrails; Increased time required;Verbal cues   Stand to Sit 4  Minimal assistance   Additional items Assist x 2;Bedrails;Verbal cues; Impulsive   Stand pivot 4  Minimal assistance   Additional items Assist x 2; Increased time required;Verbal cues   Additional Comments Verbal cues for proper body mechanics and BUE placement with transitional movements  Ambulation/Elevation   Gait pattern Improper Weight shift;Narrow MAXWELL; Forward Flexion;Decreased foot clearance; Short stride; Step to;Excessively slow   Gait Assistance 4  Minimal assist   Additional items Assist x 2;Verbal cues; Tactile cues   Assistive Device Rolling walker   Distance 3 feet  (to recliner as distance could not be further advanced due to fatigue severity)   Stair Management Assistance Not tested  (N/A)   Ambulation/Elevation Additional Comments Verbal cues for base of support widening for stabilization, safety while turning, and armrests utilization with descent to recliner  Balance   Static Sitting Fair   Dynamic Sitting Fair -   Static Standing Poor +   Dynamic Standing Poor +   Ambulatory Poor   Endurance Deficit   Endurance Deficit Yes   Activity Tolerance   Activity Tolerance Patient limited by fatigue   Nurse Made Aware Yes, Criss PCA assisted with mobility  I appreciated the care coordination  Assessment   Prognosis Fair   Problem List Decreased strength;Decreased endurance; Impaired balance;Decreased mobility; Decreased coordination;Decreased safety awareness; Obesity;Pain   Assessment Pt is 74 y o  female seen for PT evaluation s/p admit to [facility] on 5/6/2023 w/ Fall  PT consulted to assess pt's functional mobility and d/c needs  Order placed for PT eval and tx, w/ up and OOB as tolerated order  Comorbidities affecting pt's physical performance at time of assessment include: weakness, fall,HTN,hemiplegia L,paroxysmal A-fib, type 2 DM,obesity   PTA, pt was independent w/ all functional mobility w/ AD utilization  Personal factors affecting pt at time of IE include: inability to ambulate household distances, inability to navigate community distances, inability to navigate level surfaces w/o external assistance, unable to perform dynamic tasks in community, positive fall history, unable to perform physical activity, inability to perform ADLs and inability to live alone   Please find objective findings from PT assessment regarding body systems outlined above with impairments and limitations including weakness, impaired balance, decreased endurance, impaired coordination, gait deviations, pain, decreased activity tolerance, decreased functional mobility tolerance, altered sensation, decreased safety awareness and fall risk  From PT/mobility standpoint, recommendation at time of d/c would be post acute rehabilitation services pending progress in order to facilitate return to PLOF  Goals   Patient Goals to feel better soon   LTG Expiration Date 05/17/23   Long Term Goal #1 1 )Patient will complete bed mobility CGA of 1 for decrease need for caregiver assistance, decrease burden of care  2 ) Patient will complete transfers CGA of 1 to decrease risk of falls, facilitate upright standing posture  3 ) RLE strength to greater than/equal to 3+/5 gross musculature to increase ability to safely transfer, control descent to chair  4 ) Patient will exhibit increase dynamic standing to Fair 3-4 minutes without LOB CGA of 1 to improve activity tolerance  5 ) Patient will exhibit increase dynamic ambulatory balance to Fair 25-50 feet w/AD  CGA of 1 to improve ability to mobilize to toilet, chair and decrease risk for additional medical complications  6 ) Patient will exhibit good self monitoring and ability to follow 2 step commands to increase complexity of tasks and resume ADL's without LOB  PT Treatment Day 0   Plan   Treatment/Interventions Functional transfer training;LE strengthening/ROM; Elevations; Therapeutic exercise; Endurance training;Equipment eval/education;Patient/family training;Bed mobility;Gait training;Spoke to nursing   PT Frequency 3-5x/wk   Recommendation   PT Discharge Recommendation (S)  Post acute rehabilitation services   Equipment Recommended [address] [facility] Recommended Wheeled walker   Change/add to Kids Calendar? No   Additional Comments Upon conclusion, Veronica was sitting out of bed on the recliner  All of her needs were within reach and the chair alarm was active     AM-PAC Basic Mobility Inpatient   Turning in Flat Bed Without Bedrails 2   Lying on Back to Sitting on Edge of Flat Bed Without Bedrails 2   Moving Bed to Chair 2   Standing Up From Chair Using Arms 2   Walk in Room 2   Climb 3-5 Stairs With Railing 2   Basic Mobility Inpatient Raw Score 12   Basic Mobility Standardized Score 32 23   Highest Level Of Mobility   -HLM Goal 4: Move to chair/commode   JH-HLM Achieved 4: Move to chair/commode     History/Personal Factors/Comorbidities: weakness, fall,HTN,hemiplegia L,paroxysmal A-fib, type 2 DM,obesity     # of body structures/limitations: muscle weakness, activity intolerance,decreased endurance, impaired balance, gait deviations,pain, impaired coordination    Clinical presentation: unstable as seen in fall prior to arrival at high fall risk, fatigue with minimal mobility, pain, required physical assistance of 2, impulsivity    Initial Assessment Time: [id number]    Augustine Zaman, PT

## 2023-05-07 NOTE — PLAN OF CARE
Problem: INFECTION - ADULT  Goal: Absence or prevention of progression during hospitalization  Description: INTERVENTIONS:  - Assess and monitor for signs and symptoms of infection  - Monitor lab/diagnostic results  - Monitor all insertion sites, i e  indwelling lines, tubes, and drains  - Monitor endotracheal if appropriate and nasal secretions for changes in amount and color  - [place] appropriate cooling/warming therapies per order  - Administer medications as ordered  - Instruct and encourage patient and family to use good hand hygiene technique  - Identify and instruct in appropriate isolation precautions for identified infection/condition  Outcome: Progressing  Goal: Absence of fever/infection during neutropenic period  Description: INTERVENTIONS:  - Monitor WBC    Outcome: Progressing

## 2023-05-07 NOTE — PLAN OF CARE
Problem: PHYSICAL THERAPY ADULT  Goal: Performs mobility at highest level of function for planned discharge setting  See evaluation for individualized goals  Description: Treatment/Interventions: Functional transfer training, LE strengthening/ROM, Elevations, Therapeutic exercise, Endurance training, Equipment eval/education, Patient/family training, Bed mobility, Gait training, Spoke to nursing  Equipment Recommended: [place]       See flowsheet documentation for full assessment, interventions and recommendations  Note: Prognosis: Fair  Problem List: Decreased strength, Decreased endurance, Impaired balance, Decreased mobility, Decreased coordination, Decreased safety awareness, Obesity, Pain  Assessment: Pt is 74 y o  female seen for PT evaluation s/p admit to [facility] on 5/6/2023 w/ Fall  PT consulted to assess pt's functional mobility and d/c needs  Order placed for PT eval and tx, w/ up and OOB as tolerated order  Comorbidities affecting pt's physical performance at time of assessment include: weakness, fall,HTN,hemiplegia L,paroxysmal A-fib, type 2 DM,obesity   PTA, pt was independent w/ all functional mobility w/ AD utilization  Personal factors affecting pt at time of IE include: inability to ambulate household distances, inability to navigate community distances, inability to navigate level surfaces w/o external assistance, unable to perform dynamic tasks in community, positive fall history, unable to perform physical activity, inability to perform ADLs and inability to live alone  Please find objective findings from PT assessment regarding body systems outlined above with impairments and limitations including weakness, impaired balance, decreased endurance, impaired coordination, gait deviations, pain, decreased activity tolerance, decreased functional mobility tolerance, altered sensation, decreased safety awareness and fall risk   From PT/mobility standpoint, recommendation at time of d/c would be post acute rehabilitation services pending progress in order to facilitate return to PLOF  PT Discharge Recommendation: (S) Post acute rehabilitation services    See flowsheet documentation for full assessment

## 2023-05-07 NOTE — PROGRESS NOTES
[address]  Progress Note  Name: Nataliya Arango  MRN: [id number]  Unit/Bed#: -01 I Date of Admission: 5/6/2023   Date of Service: 5/7/2023 I Hospital Day: 0    Assessment/Plan   * Fall  Assessment & Plan  · Patient had mechanical fall prehospital   · Of note patient was hypoglycemic may be contributing factor   · Patient also has history of stroke with left-sided weakness   · PT/OT consulted recommend postacute rehab  · Case management following    Type 2 diabetes mellitus with hyperglycemia, with long-term current use of insulin [facility])  Assessment & Plan  Lab Results   Component Value Date    HGBA1C 6 4 02/09/2023       Recent Labs     05/06/23  1727 05/06/23  2050 05/07/23  0728 05/07/23  1105   POCGLU 152* 337* 130 208*       Blood Sugar Average: Last 72 hrs:  (P) 156 [id number]   · Home regimen on hold  · Target blood sugar 140-180 while in hospital  · Continue SSI  · Hypoglycemia protocol  · BMP a m  Hemiplegia and hemiparesis following cerebral infarction affecting left non-dominant side (HCC)  Assessment & Plan  · Residual left-sided weakness from prior CVA  · Patient had mechanical fall prior to arrival  · PT/OT are recommending postacute rehab on discharge    Elevated CK  Assessment & Plan  · Present on admission with   · Likely secondary to being down for 1 day  · Received IV fluids  · Repeat CK today improved to 264  · Repeat labs in a m      Essential hypertension  Assessment & Plan  · Well-controlled on current regimen  · Continue lisinopril, metoprolol    Current episode of major depressive disorder without prior episode  Assessment & Plan  · Without signs of depression today  · Continue Lexapro    GERD (gastroesophageal reflux disease)  Assessment & Plan  · Continue PPI    Hyperlipidemia  Assessment & Plan  · Continue statin    Paroxysmal atrial fibrillation (HCC)  Assessment & Plan  · Controlled on current regimen   · Continue Eliquis for anticoagulation  · Continue metoprolol    Stage 3 chronic kidney disease, unspecified whether stage 3a or 3b CKD [facility])  Assessment & Plan  Lab Results   Component Value Date    EGFR 75 2023    EGFR 69 2023    EGFR 50 09/10/2022    CREATININE 0 82 2023    CREATININE 0 88 2023    CREATININE 1 15 09/10/2022   · Currently within normal limits             VTE Pharmacologic Prophylaxis:   Pharmacologic: Rivaroxaban (Xarelto)  Mechanical VTE Prophylaxis in Place: Yes    Patient Centered Rounds: I have performed bedside rounds with nursing staff today  Discussions with Specialists or Other Care Team Provider: PT/OT, nursing    Education and Discussions with Family / Patient: Treatment plan discussed with patient understands the plan as its been explained is agreeable plan as stated  All questions answered to satisfaction  Time Spent for Care: 45 minutes  More than 50% of total time spent on counseling and coordination of care as described above  Current Length of Stay: 0 day(s)    Current Patient Status: Inpatient   Certification Statement: The patient will continue to require additional inpatient hospital stay due to Need for postacute rehab on discharge    Discharge Plan: Patient had mechanical fall at home prior to arrival   Possibly related to low blood sugar versus as history of prior stroke with left hemiparesis  PT/OT are recommending postacute rehab on discharge  Case management following  Patient will be discharged home when arrangements made for postacute rehab by case management  Code Status: Level 3 - DNAR and DNI      Subjective:   Patient pleasant, denies pain or discomfort  Verbalizing needs      Objective:     Vitals:   Temp (24hrs), Av 9 °F (37 2 °C), Min:98 1 °F (36 7 °C), Max:99 5 °F (37 5 °C)    Temp:  [98 1 °F (36 7 °C)-99 5 °F (37 5 °C)] 99 5 °F (37 5 °C)  HR:  [] 88  Resp:  [18-20] 20  BP: (122-157)/(57-81) 127/64  SpO2:  [94 %-97 %] 96 %  Body mass index is 32 45 kg/m²  Input and Output Summary (last 24 hours): Intake/Output Summary (Last 24 hours) at 5/7/2023 1459  Last data filed at 5/7/2023 1229  Gross per 24 hour   Intake 640 ml   Output 700 ml   Net -60 ml       Physical Exam:     Physical Exam  Constitutional:       General: She is not in acute distress  Appearance: She is obese  She is not ill-appearing  HENT:      Head: Normocephalic and atraumatic  Nose: Nose normal       Mouth/Throat:      Mouth: Mucous membranes are moist    Cardiovascular:      Rate and Rhythm: Normal rate and regular rhythm  Pulses: Normal pulses  Heart sounds: Normal heart sounds  Pulmonary:      Effort: Pulmonary effort is normal  No respiratory distress  Breath sounds: Normal breath sounds  No wheezing or rales  Abdominal:      General: Bowel sounds are normal  There is no distension  Palpations: Abdomen is soft  Tenderness: There is no abdominal tenderness  There is no guarding  Genitourinary:     Comments: Pure wick external catheter draining clear yellow urine  Musculoskeletal:         General: Normal range of motion  Cervical back: Normal range of motion and neck supple  Skin:     General: Skin is warm and dry  Capillary Refill: Capillary refill takes less than 2 seconds  Neurological:      General: No focal deficit present  Mental Status: She is oriented to person, place, and time  Mental status is at baseline  Motor: Weakness present  Comments: Patient has left sided hemiplegia from prior CVA, currently at baseline  Gait not assessed  Psychiatric:         Mood and Affect: Mood normal          Behavior: Behavior normal          Thought Content:  Thought content normal          Judgment: Judgment normal          Additional Data:     Labs:    Results from last 7 days   Lab Units 05/06/23  1424   WBC Thousand/uL 8 92   HEMOGLOBIN g/dL 12 2   HEMATOCRIT % 37 8   PLATELETS Thousands/uL 219 NEUTROS PCT % 74   LYMPHS PCT % 16   MONOS PCT % 7   EOS PCT % 3     Results from last 7 days   Lab Units 05/07/23  0440 05/06/23  1403   POTASSIUM mmol/L 3 9 5 4*   CHLORIDE mmol/L 106 102   CO2 mmol/L 23 23   BUN mg/dL 18 26*   CREATININE mg/dL 0 82 0 88   CALCIUM mg/dL 8 5 9 0   ALK PHOS U/L  --  55   ALT U/L  --  39   AST U/L  --  66*     Results from last 7 days   Lab Units 05/06/23  1403   INR  1 28*       * I Have Reviewed All Lab Data Listed Above  * Additional Pertinent Lab Tests Reviewed: All [place] Admission Reviewed    Imaging:    Imaging Reports Reviewed Today Include: Chest x-ray, CT head, CT C-spine, CT abdomen pelvis  Imaging Personally Reviewed by Myself Includes: Same as above    Recent Cultures (last 7 days):     Results from last 7 days   Lab Units 05/06/23  1403   BLOOD CULTURE  Received in Microbiology Lab  Culture in Progress  Received in Microbiology Lab  Culture in Progress  Last 24 Hours Medication List:   Current Facility-Administered Medications   Medication Dose Route Frequency Provider Last Rate   • acetaminophen  650 mg Oral Q6H PRN Yari Barakat PA-C     • aspirin  81 mg Oral Daily Jorje Avalos MD     • atorvastatin  80 mg Oral Daily With Chadwick Ramos MD     • escitalopram  5 mg Oral QACOLEMAN Avalos MD     • lisinopril  5 mg Oral QAM Jorje Avalos MD     • metoprolol tartrate  50 mg Oral Q12H Jorje Avalos MD     • ondansetron  4 mg Intravenous Q6H PRN Yari Barakat PA-C     • pantoprazole  40 mg Oral Daily Jorje Avalos MD     • rivaroxaban  20 mg Oral Daily With Shannon Redd MD          Today, Patient Was Seen By: ALEC Vincent    ** Please Note: Dictation voice to text software may have been used in the creation of this document   **

## 2023-05-07 NOTE — ASSESSMENT & PLAN NOTE
· Residual left-sided weakness from prior CVA  · Patient had mechanical fall prior to arrival  · PT/OT are recommending postacute rehab on discharge

## 2023-05-07 NOTE — ASSESSMENT & PLAN NOTE
Lab Results   Component Value Date    EGFR 75 05/07/2023    EGFR 69 05/06/2023    EGFR 50 09/10/2022    CREATININE 0 82 05/07/2023    CREATININE 0 88 05/06/2023    CREATININE 1 15 09/10/2022   · Currently within normal limits

## 2023-05-07 NOTE — ASSESSMENT & PLAN NOTE
· Present on admission with   · Likely secondary to being down for 1 day  · Received IV fluids  · Repeat CK today improved to 264  · Repeat labs in a m

## 2023-05-07 NOTE — ASSESSMENT & PLAN NOTE
· Patient had mechanical fall prehospital   · Of note patient was hypoglycemic may be contributing factor   · Patient also has history of stroke with left-sided weakness   · PT/OT consulted recommend postacute rehab  · Case management following

## 2023-05-07 NOTE — UTILIZATION REVIEW
Initial Clinical Review    OBSERVATION 5/6 CHANGED TO INPATIENT ON 5/7 @ 1301 -     Admission: Date/Time/Statement:   Admission Orders (From admission, onward)     Ordered        05/07/23 1301  Inpatient Admission  Once                      Orders Placed This Encounter   Procedures   • Inpatient Admission     Standing Status:   Standing     Number of Occurrences:   1     Order Specific Question:   Level of Care     Answer:   Med Surg [16]     Order Specific Question:   Estimated length of stay     Answer:   More than 2 Midnights     Order Specific Question:   Certification     Answer:   I certify that inpatient services are medically necessary for this patient for a duration of greater than two midnights  See H&P and MD Progress Notes for additional information about the patient's course of treatment  ED Arrival Information     Expected   -    Arrival   5/6/2023 13:36    Acuity   Emergent            Means of arrival   Ambulance    Escorted by   [facility]    Admission type   Emergency            Arrival complaint   Fall           Chief Complaint   Patient presents with   • Trauma       Initial Presentation: 74 y o  female presents to the ED via EMS from home with c/o mechanical fall d/t hypoglycemia and weakness  She could not get up for 1 day  PMH: CVA with left-sided weakness, IDDM, HL, pAF on AC, tracheal stenosis, depression  HTN, HLD, GERD  In the ED she was tachycardic and hypoglycemic  Labs - glucose on POC 40 and serum 121,  elevated K 5 4, BUN, , lactic acid 2 6, abnormal UA  Imaging - no acute injuries, has old R rib fx , known tracheal stenosis, known complex R adenxal cystic structure  Treated with glucose tabs, IV D10/NSS, IV antibiotics, IV D10, IV NSS  On exam irreg rhythm, stridor and rhonchi, general swelling, deformity, sensory deficit and weakness  She is admitted to OBSERVATION status with elevated CK - IV fluids, trend labs    IDDM - hold home regimen and restart when glucose stable  Fall - monitor  Date: 5/7   CHANGED TO INPATIENT STATUS:     K WNL  VS stable on RA  CK trending downward  Glucose 151  Off IV fluids  Seen by PT today and recommendation for post acute rehab  ED Triage Vitals   Temperature Pulse Respirations Blood Pressure SpO2   05/06/23 1340 05/06/23 1340 05/06/23 1340 05/06/23 1340 05/06/23 1340   98 3 °F (36 8 °C) (!) 106 16 146/75 93 %      Temp Source Heart Rate Source Patient Position - Orthostatic VS BP Location FiO2 (%)   05/06/23 1340 05/06/23 1340 05/06/23 1515 05/06/23 1515 --   Temporal Monitor Lying Right arm       Pain Score       05/06/23 1342       No Pain          Wt Readings from Last 1 Encounters:   05/06/23 88 5 kg (195 lb)     Additional Vital Signs:   05/07/23 07:33:56 99 5 °F (37 5 °C) 88 20 127/64 85 96 % None (Room air) Lying   05/06/23 22:07:30 99 °F (37 2 °C) 92 19 122/65 84 95 % -- --   05/06/23 1700 -- -- -- -- -- -- None (Room air) --   05/06/23 16:47:36 98 1 °F (36 7 °C) 102 18 148/81 103 94 % -- --   05/06/23 1600 -- 102 20 152/57 82 97 % -- --   05/06/23 1515 -- 99 18 157/66 -- 97 % -- Lying   05/06/23 1437 -- 102 16 157/76 -- 97 % -- --     Pertinent Labs/Diagnostic Test Results:   TRAUMA - CT head wo contrast   Final Result by Ivana Amaya MD (05/06 3535)      No change from prior study  No evidence of acute intracranial abnormality                  Workstation performed: [id number]         TRAUMA - CT spine cervical wo contrast   Final Result by Ivana Amaya MD (05/06 5304)      No cervical spine fracture or traumatic malalignment  Workstation performed: [id number]         TRAUMA - CT chest abdomen pelvis w contrast   Final Result by Ivana Amaya MD (05/06 1528)      1  There is no evidence of acute traumatic injury in the chest abdomen or pelvis  No acute fractures are seen   2  Multiple old right-sided rib fractures/deformities   3   Patient with known complex right adnexal cystic structure  Last ultrasound evaluation was in August 2022  Nonemergent reassessment with ultrasonography is recommended to determine if there has been any change since the prior study   4  Known history of tracheal stenosis               Workstation performed: [id number]         XR Trauma chest portable   Final Result by Juju Hubbard MD (05/06 1712)      No acute cardiopulmonary disease                    Workstation performed: [id number]               Results from last 7 days   Lab Units 05/06/23  1424   WBC Thousand/uL 8 92   HEMOGLOBIN g/dL 12 2   HEMATOCRIT % 37 8   PLATELETS Thousands/uL 219   NEUTROS ABS Thousands/µL 6 62         Results from last 7 days   Lab Units 05/07/23  0440 05/06/23  1403   SODIUM mmol/L 138 136   POTASSIUM mmol/L 3 9 5 4*   CHLORIDE mmol/L 106 102   CO2 mmol/L 23 23   ANION GAP mmol/L 9 11   BUN mg/dL 18 26*   CREATININE mg/dL 0 82 0 88   EGFR ml/min/1 73sq m 75 69   CALCIUM mg/dL 8 5 9 0     Results from last 7 days   Lab Units 05/06/23  1403   AST U/L 66*   ALT U/L 39   ALK PHOS U/L 55   TOTAL PROTEIN g/dL 6 9   ALBUMIN g/dL 3 7   TOTAL BILIRUBIN mg/dL 0 79     Results from last 7 days   Lab Units 05/07/23  1105 05/07/23  0728 05/06/23  2050 05/06/23  1727 05/06/23  1504 05/06/23  1410 05/06/23  1346   POC GLUCOSE mg/dl 208* 130 337* 152* 140 91 40*     Results from last 7 days   Lab Units 05/07/23  0440 05/06/23  1403   GLUCOSE RANDOM mg/dL 151* 121     Results from last 7 days   Lab Units 05/07/23  0440 05/06/23  1403   CK TOTAL U/L 264* 400*             Results from last 7 days   Lab Units 05/06/23  1403   PROTIME seconds 16 0*   INR  1 28*         Results from last 7 days   Lab Units 05/06/23  1403   PROCALCITONIN ng/ml <0 05     Results from last 7 days   Lab Units 05/06/23  1803 05/06/23  1403   LACTIC ACID mmol/L 1 4 2 6*     Results from last 7 days   Lab Units 05/06/23  1532   CLARITY UA  Hazy   COLOR UA  Yellow   SPEC GRAV UA  1 025   PH UA  5 5   GLUCOSE UA mg/dl Negative   KETONES UA mg/dl 15 (1+)*   BLOOD UA  Negative   PROTEIN UA mg/dl 1+*   NITRITE UA  Negative   BILIRUBIN UA  Negative   UROBILINOGEN UA E U /dl 0 2   LEUKOCYTES UA  Negative   WBC UA /hpf 2-4   RBC UA /hpf None Seen   BACTERIA UA /hpf Moderate*   EPITHELIAL CELLS WET PREP /hpf Occasional             Results from last 7 days   Lab Units 05/06/23  1403   BLOOD CULTURE  Received in Microbiology Lab  Culture in Progress  Received in Microbiology Lab  Culture in Progress                 ED Treatment:   Medication Administration from 05/06/2023 1336 to 05/06/2023 1641       Date/Time Order Dose Route Action     05/06/2023 1355 EDT glucose 4 g chewable tablet **ADS Override Pull** --  Given     05/06/2023 1356 EDT dextrose 10 % and normal saline infusion 1,000 mL/hr Intravenous New Bag     05/06/2023 1436 EDT cefTRIAXone (ROCEPHIN) IVPB (premix in dextrose) 1,000 mg 50 mL 1,000 mg Intravenous New Bag     05/06/2023 1440 EDT dextrose 10 % and normal saline infusion 500 mL/hr Intravenous New Bag     05/06/2023 1442 EDT iohexol (OMNIPAQUE) 350 MG/ML injection (SINGLE-DOSE) 100 mL 100 mL Intravenous Given     05/06/2023 1500 EDT sodium chloride 0 9 % bolus 1,000 mL 1,000 mL Intravenous New Bag        Past Medical History:   Diagnosis Date   • Abdominal fibromatosis    • Arthritis    • Depression    • Diabetes mellitus ([facility] )    • GERD (gastroesophageal reflux disease)     controlled   • Hyperlipemia    • Hypertension    • Obesity    • Pneumonia    • Right pontine stroke ([facility] ) 12/13/2021   • Stroke (cerebrum) ([facility] ) 12/17/2021   • Stroke ([facility] )      Present on Admission:  • Paroxysmal atrial fibrillation (HCC)  • Tracheal stenosis  • GERD (gastroesophageal reflux disease)  • Hyperlipidemia  • Essential hypertension  • YOLIE (obstructive sleep apnea)  • Stage 3 chronic kidney disease, unspecified whether stage 3a or 3b CKD ([facility] )  • Current episode of major depressive disorder without prior episode  • Platelets decreased ([facility] )      Admitting Diagnosis: UTI (urinary tract infection) [N39 0]  Weakness [R53 1]  Hypoglycemia [E16 2]  Elbow laceration [S51 019A]  Fall, initial encounter [W19  XXXA]  Age/Sex: 74 y o  female  Admission Orders:  Scheduled Medications:  aspirin, 81 mg, Oral, Daily  atorvastatin, 80 mg, Oral, Daily With Dinner  escitalopram, 5 mg, Oral, QAM  lisinopril, 5 mg, Oral, QAM  metoprolol tartrate, 50 mg, Oral, Q12H  pantoprazole, 40 mg, Oral, Daily  rivaroxaban, 20 mg, Oral, Daily With Breakfast      Continuous IV Infusions:  IV D10 @ 1L/hr - d/c 5/6     PRN Meds:  acetaminophen, 650 mg, Oral, Q6H PRN  ondansetron, 4 mg, Intravenous, Q6H PRN    POC GLUCOSE AC/HS WITH SSI COVERAGE     Network Utilization Review Department  ATTENTION: Please call with any questions or concerns to [phone] and carefully listen to the prompts so that you are directed to the right person  All voicemails are confidential   [facility] all requests for admission clinical reviews, approved or denied determinations and any other requests to dedicated fax number below belonging to the campus where the patient is receiving treatment   List of dedicated fax numbers for the Facilities:  [address] DENIALS (Administrative/Medical Necessity) [phone]   [address] (Maternity/NICU/Pediatrics) [phone]   910 Eliana Mishra [phone]   [facility] Ghislaine  [phone]   [facility] [address] [place] [id number] [place] [address] [phone]   [address] [place] [address] [facility] [address] [phone]

## 2023-05-08 LAB
ANION GAP SERPL CALCULATED.3IONS-SCNC: 5 MMOL/L (ref 4–13)
BASOPHILS # BLD AUTO: 0.05 THOUSANDS/ÂΜL (ref 0–0.1)
BASOPHILS NFR BLD AUTO: 1 % (ref 0–1)
BUN SERPL-MCNC: 33 MG/DL (ref 5–25)
CALCIUM SERPL-MCNC: 8.4 MG/DL (ref 8.4–10.2)
CHLORIDE SERPL-SCNC: 104 MMOL/L (ref 96–108)
CK SERPL-CCNC: 179 U/L (ref 26–192)
CO2 SERPL-SCNC: 26 MMOL/L (ref 21–32)
CREAT SERPL-MCNC: 1.21 MG/DL (ref 0.6–1.3)
EOSINOPHIL # BLD AUTO: 0.63 THOUSAND/ÂΜL (ref 0–0.61)
EOSINOPHIL NFR BLD AUTO: 9 % (ref 0–6)
ERYTHROCYTE [DISTWIDTH] IN BLOOD BY AUTOMATED COUNT: 13.2 % (ref 11.6–15.1)
FLUAV RNA RESP QL NAA+PROBE: NEGATIVE
FLUBV RNA RESP QL NAA+PROBE: NEGATIVE
GFR SERPL CREATININE-BSD FRML MDRD: 47 ML/MIN/1.73SQ M
GLUCOSE SERPL-MCNC: 183 MG/DL (ref 65–140)
GLUCOSE SERPL-MCNC: 187 MG/DL (ref 65–140)
GLUCOSE SERPL-MCNC: 214 MG/DL (ref 65–140)
GLUCOSE SERPL-MCNC: 223 MG/DL (ref 65–140)
GLUCOSE SERPL-MCNC: 223 MG/DL (ref 65–140)
HCT VFR BLD AUTO: 35.3 % (ref 34.8–46.1)
HGB BLD-MCNC: 11.2 G/DL (ref 11.5–15.4)
IMM GRANULOCYTES # BLD AUTO: 0.01 THOUSAND/UL (ref 0–0.2)
IMM GRANULOCYTES NFR BLD AUTO: 0 % (ref 0–2)
LYMPHOCYTES # BLD AUTO: 3.02 THOUSANDS/ÂΜL (ref 0.6–4.47)
LYMPHOCYTES NFR BLD AUTO: 42 % (ref 14–44)
MCH RBC QN AUTO: 31.2 PG (ref 26.8–34.3)
MCHC RBC AUTO-ENTMCNC: 31.7 G/DL (ref 31.4–37.4)
MCV RBC AUTO: 98 FL (ref 82–98)
MONOCYTES # BLD AUTO: 0.78 THOUSAND/ÂΜL (ref 0.17–1.22)
MONOCYTES NFR BLD AUTO: 11 % (ref 4–12)
NEUTROPHILS # BLD AUTO: 2.6 THOUSANDS/ÂΜL (ref 1.85–7.62)
NEUTS SEG NFR BLD AUTO: 37 % (ref 43–75)
NRBC BLD AUTO-RTO: 0 /100 WBCS
PLATELET # BLD AUTO: 171 THOUSANDS/UL (ref 149–390)
PMV BLD AUTO: 11.8 FL (ref 8.9–12.7)
POTASSIUM SERPL-SCNC: 4.1 MMOL/L (ref 3.5–5.3)
RBC # BLD AUTO: 3.59 MILLION/UL (ref 3.81–5.12)
RSV RNA RESP QL NAA+PROBE: NEGATIVE
SARS-COV-2 RNA RESP QL NAA+PROBE: NEGATIVE
SODIUM SERPL-SCNC: 135 MMOL/L (ref 135–147)
WBC # BLD AUTO: 7.09 THOUSAND/UL (ref 4.31–10.16)

## 2023-05-08 RX ORDER — INSULIN GLARGINE 100 [IU]/ML
10 INJECTION, SOLUTION SUBCUTANEOUS
Status: DISCONTINUED | OUTPATIENT
Start: 2023-05-08 | End: 2023-05-09 | Stop reason: HOSPADM

## 2023-05-08 RX ADMIN — INSULIN LISPRO 1 UNITS: 100 INJECTION, SOLUTION INTRAVENOUS; SUBCUTANEOUS at 08:06

## 2023-05-08 RX ADMIN — METOPROLOL TARTRATE 50 MG: 50 TABLET, FILM COATED ORAL at 08:06

## 2023-05-08 RX ADMIN — INSULIN LISPRO 2 UNITS: 100 INJECTION, SOLUTION INTRAVENOUS; SUBCUTANEOUS at 16:27

## 2023-05-08 RX ADMIN — RIVAROXABAN 20 MG: 10 TABLET, FILM COATED ORAL at 08:06

## 2023-05-08 RX ADMIN — LISINOPRIL 5 MG: 5 TABLET ORAL at 08:06

## 2023-05-08 RX ADMIN — INSULIN LISPRO 1 UNITS: 100 INJECTION, SOLUTION INTRAVENOUS; SUBCUTANEOUS at 21:51

## 2023-05-08 RX ADMIN — METOPROLOL TARTRATE 50 MG: 50 TABLET, FILM COATED ORAL at 21:49

## 2023-05-08 RX ADMIN — INSULIN GLARGINE 10 UNITS: 100 INJECTION, SOLUTION SUBCUTANEOUS at 21:49

## 2023-05-08 RX ADMIN — ESCITALOPRAM OXALATE 5 MG: 5 TABLET, FILM COATED ORAL at 08:06

## 2023-05-08 RX ADMIN — ASPIRIN 81 MG 81 MG: 81 TABLET ORAL at 08:06

## 2023-05-08 RX ADMIN — PANTOPRAZOLE SODIUM 40 MG: 40 TABLET, DELAYED RELEASE ORAL at 08:06

## 2023-05-08 RX ADMIN — ATORVASTATIN CALCIUM 80 MG: 80 TABLET, FILM COATED ORAL at 16:27

## 2023-05-08 RX ADMIN — INSULIN LISPRO 2 UNITS: 100 INJECTION, SOLUTION INTRAVENOUS; SUBCUTANEOUS at 11:57

## 2023-05-08 NOTE — CASE MANAGEMENT
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   • Abnormal uterine bleeding (AUB) 2022   • Ovarian mass 2022   • Status post appendectomy 2022   • Platelets decreased ([address] ) 2022   • Cyst of right ovary 2022   • Abnormal CT of the chest 2022   • Abnormality of gait due to impairment of balance 02/15/2022   • Hemiplegia and hemiparesis following cerebral infarction affecting left non-dominant side ([address] ) 2022   • Stage 3 chronic kidney disease, unspecified whether stage 3a or 3b CKD ([address] ) 2022   • History of CVA (cerebrovascular accident) 2021   • Weakness of both lower extremities    • Muscle tension dysphonia 2020   • Reflux laryngitis 2020   • Glottic insufficiency 2020   • Dermatitis 2020   • YOLIE (obstructive sleep apnea)    • Preoperative clearance 2020   • Medicare annual wellness visit, subsequent 2020   • Current episode of major depressive disorder without prior episode 2019   • Stress incontinence 2019   • Essential hypertension 2019   • GERD (gastroesophageal reflux disease) 03/15/2019   • Hyperlipidemia 03/15/2019   • Edema 03/15/2019   • 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   • Incomplete emptying of bladder 2018   • Dysphonia 2018   • Glottic stenosis 2018   • Dysphagia 2018   • Paroxysmal atrial fibrillation ([address] ) 10/17/2017   • Blurry vision 10/17/2017   • Insomnia 10/17/2017   • Type 2 diabetes mellitus with hyperglycemia, with long-term current use of insulin ([address] ) 10/17/2017   • Cerebrovascular accident (CVA) ([address] ) 2017   • Heart disease 2015   • Diabetic cataract ([facility] ) 12/18/2014      LOS (days): 1  Geometric Mean LOS (GMLOS) (days):   Days to GMLOS:     OBJECTIVE:    Risk of Unplanned Readmission Score: 13 66       Current admission status: Inpatient  Referral Reason: Rehab    Preferred Pharmacy:   Abigail [facility] [address] [address]  Phone: [phone] Fax: [phone]    Primary Care Provider: Yokasta Hemphill MD    Primary Insurance: Mobile [facility]  Secondary Insurance: Liam Beerdominique    ASSESSMENT:  [address] [facility] Representative - Son   Primary Phone: [phone] (Mobile)               Advance Directives  Does patient have a [address]?: No  Was patient offered paperwork?: Yes (Provided to patient)  Does patient currently have a Health Care decision maker?: No  Does patient have Advance Directives?: No  Was patient offered paperwork?: Yes (Provided to patient)  Primary Contact: Kai Reese (Son)   [phone] (Mobile)    Readmission Root Cause  30 Day Readmission: No    Patient Information  Admitted from[de-identified] Home  Mental Status: Alert  During Assessment patient was accompanied by: Not accompanied during assessment  Assessment information provided by[de-identified] Patient  Primary Caregiver: Self  Support Systems: Self, Friend, David [address] Dr of Residence: [address] [place] [address] do you live in?: [address] [place] [address] entry access options   Select all that apply : No steps to enter home  Type of Current Residence: Apartment  Floor Level: 5  Upon entering residence, is there a bedroom on the main floor (no further steps)?: Yes  Upon entering residence, is there a bathroom on the main floor (no further steps)?: Yes  In the last 12 months, was there a time when you were not able to pay the mortgage or rent on time?: No  In the last 12 months, how many places have you lived?: 1  In the last 12 months, was there a time when you did not have a steady place to sleep or slept in a shelter (including now)?: No  Homeless/housing insecurity resource given?: N/A  Living Arrangements: Lives Alone  Is patient a ?: No    Activities of Daily Living Prior to Admission  Functional Status: Independent  Completes ADLs independently?: Yes  Ambulates independently?: Yes  Does patient use assisted devices?: Yes  Assisted Devices (DME) used: Marinus Alu  Does patient currently own DME?: Yes  What DME does the patient currently own?: Bedside Commode, Walker  Does patient have a history of Outpatient Therapy (PT/OT)?: Yes ([facility])  Does the patient have a history of Short-Term Rehab?: Yes ([facility])  Does patient have a history of HHC?: Yes ([facility])  Does patient currently have [address]?: No    Patient Information Continued  Income Source: Pension/[employer]  Does patient have prescription coverage?: Yes  Within the past 12 months, you worried that your food would run out before you got the money to buy more : Never true  Within the past 12 months, the food you bought just didn't last and you didn't have money to get more : Never true  Food insecurity resource given?: N/A  Does patient receive dialysis treatments?: No  Does patient have a history of substance abuse?: No  Does patient have a history of Mental Health Diagnosis?: No    Means of Transportation  [facility]de[facility]] Family transport  In the past 12 months, has lack of transportation kept you from medical appointments or from getting medications?: No  In the past 12 months, has lack of transportation kept you from meetings, work, or from getting things needed for daily living?: No  Was application for public transport provided?: N/A    DISCHARGE DETAILS:    Discharge planning discussed with[de-identified] Patient  Freedom of Choice: Yes  Comments - Freedom of Choice: Discussed tehrapy eval and recs for rehab  Agreeable to rehab would like [facility] if possible    Referral made to [facility] and blanket STR referrals  CM contacted family/caregiver?: Yes  Were Treatment Team discharge recommendations reviewed with patient/caregiver?: Yes  Did patient/caregiver verbalize understanding of patient care needs?: Yes  Were patient/caregiver advised of the risks associated with not following Treatment Team discharge recommendations?: Yes    Contacts  Patient Contacts: Tj Lockett  Relationship to Patient[de-identified] Family  Contact Method: Phone  Phone Number: [phone]  Reason/Outcome: Emergency Contact     Additional Comments: Met with patient at bedside  Agreeable to rehab  Refrerals made via [facility]  Call to heather ybarra  requesting CB    CM department following thru discharge

## 2023-05-08 NOTE — PLAN OF CARE
Problem: SAFETY ADULT  Goal: Patient will remain free of falls  Description: INTERVENTIONS:  - Educate patient/family on patient safety including physical limitations  - Instruct patient to call for assistance with activity   - Consult OT/PT to assist with strengthening/mobility   - Keep Call bell within reach  - Keep bed low and locked with side rails adjusted as appropriate  - Keep care items and personal belongings within reach  - Initiate and maintain comfort rounds  - Make Fall Risk Sign visible to staff  - Offer Toileting every 2 Hours, in advance of need  - Initiate/Maintain bed alarm  - Obtain necessary fall risk management equipment  - Apply yellow socks and bracelet for high fall risk patients  - Consider moving patient to room near nurses station  Outcome: Progressing     Problem: Knowledge Deficit  Goal: Patient/family/caregiver demonstrates understanding of disease process, treatment plan, medications, and discharge instructions  Description: Complete learning assessment and assess knowledge base    Interventions:  - Provide teaching at level of understanding  - Provide teaching via preferred learning methods  Outcome: Progressing

## 2023-05-08 NOTE — ASSESSMENT & PLAN NOTE
Lab Results   Component Value Date    EGFR 47 05/08/2023    EGFR 75 05/07/2023    EGFR 69 05/06/2023    CREATININE 1 21 05/08/2023    CREATININE 0 82 05/07/2023    CREATININE 0 88 05/06/2023     · Currently within normal limits

## 2023-05-08 NOTE — ASSESSMENT & PLAN NOTE
· Residual left-sided weakness from prior CVA  · Patient had mechanical fall prior to arrival  · PT/OT are recommending post acute rehab on discharge  · Patient accepted at [facility]- awaiting insurance auth

## 2023-05-08 NOTE — NURSING NOTE
Awake and oob in chair  Pleasant  R/a status 02 sat 97% hr 87/min  Denies pain  Chronic left side weakness from old cva  No distress   Call bell near a pure wic is maintained

## 2023-05-08 NOTE — PLAN OF CARE
Problem: OCCUPATIONAL THERAPY ADULT  Goal: Performs self-care activities at highest level of function for planned discharge setting  See evaluation for individualized goals  Description: Treatment Interventions: ADL retraining, UE strengthening/ROM, Functional transfer training, Endurance training, Patient/family training, Equipment evaluation/education, Neuromuscular reeducation, Compensatory technique education, Energy conservation, Activityengagement          See flowsheet documentation for full assessment, interventions and recommendations  5/8/2023 1544 by Annemarie Jones OT  Note: Limitation: Decreased ADL status, Decreased UE ROM, Decreased UE strength, Decreased endurance, Decreased self-care trans, Decreased high-level ADLs, Decreased sensation  Prognosis: Fair  Assessment: Patient is a 74 y o  female seen for OT evaluation s/p admit to  [address] on 5/6/2023 w/Fall + commorbidities/PMHx (as listed in medical record) affecting patient's functional performance c ADL tasks at time of assessment  OT orders placed for evaluation and treatment to assess pt's ADLs, cognitive status + performance during functional tasks in order to formulate appropriate d/c recommendations  Therapist performed at least two patient identifiers during session including name and wristband  Personal factors affecting patient at time of initial evaluation include: limited home support, inability to perform IADLs, inability to perform ADLs, inability to ambulate household distances, decreased initiation and engagement and recent fall(s)/fall history     Pt's clinical presentation is currently unstable/unpredictable given new onset deficits that effect pt's occupational performance and ability to safely return to PLOF including decrease activity tolerance, decrease standing balance, decrease sitting balance, decrease performance during ADL tasks, decrease generalized strength, decrease activity engagement, decrease performance during functional transfers and high fall risk combined with medical complications of abnormal renal lab values, abnormal CBC, abnormal blood sugars and need for input for mobility technique/safety  This evaluation required an extensive review of medical and/or therapy records and additional review of physical, cognitive and psychosocial history related to functional performance  Based upon functional performance deficits and assessments, this evaluation has been identified as a  high complexity evaluation  Patient to benefit from continued Occupational Therapy treatment while in the hospital to address aforementioned deficits and maximize level of functional independence with ADLs and functional mobility  Pt currently demonstrates WFL ability to collaboratively engage in d/c planning, demonstrates G awareness in d/c plan and is agreeable to d/c plan including transition to STR (as able)  From OT standpoint, recommendation at time of d/c would be Short Term Rehab  OT Discharge Recommendation: Post acute rehabilitation services       5/8/2023 1544 by Leny Tse OT  Note: Limitation: Decreased ADL status, Decreased UE ROM, Decreased UE strength, Decreased endurance, Decreased self-care trans, Decreased high-level ADLs, Decreased sensation  Prognosis: Fair  Assessment: Patient is a 74 y o  female seen for OT evaluation s/p admit to  [address] on 5/6/2023 w/Fall + commorbidities/PMHx (as listed in medical record) affecting patient's functional performance c ADL tasks at time of assessment  OT orders placed for evaluation and treatment to assess pt's ADLs, cognitive status + performance during functional tasks in order to formulate appropriate d/c recommendations  Therapist performed at least two patient identifiers during session including name and wristband   Personal factors affecting patient at time of initial evaluation include: limited home support, inability to perform IADLs, inability to perform ADLs, inability to ambulate household distances, decreased initiation and engagement and recent fall(s)/fall history  Pt's clinical presentation is currently unstable/unpredictable given new onset deficits that effect pt's occupational performance and ability to safely return to PLOF including decrease activity tolerance, decrease standing balance, decrease sitting balance, decrease performance during ADL tasks, decrease generalized strength, decrease activity engagement, decrease performance during functional transfers and high fall risk combined with medical complications of abnormal renal lab values, abnormal CBC, abnormal blood sugars and need for input for mobility technique/safety  This evaluation required an extensive review of medical and/or therapy records and additional review of physical, cognitive and psychosocial history related to functional performance  Based upon functional performance deficits and assessments, this evaluation has been identified as a  high complexity evaluation  Patient to benefit from continued Occupational Therapy treatment while in the hospital to address aforementioned deficits and maximize level of functional independence with ADLs and functional mobility  Pt currently demonstrates WFL ability to collaboratively engage in d/c planning, demonstrates G awareness in d/c plan and is agreeable to d/c plan including transition to STR (as able)  From OT standpoint, recommendation at time of d/c would be Short Term Rehab       OT Discharge Recommendation: Post acute rehabilitation services

## 2023-05-08 NOTE — ASSESSMENT & PLAN NOTE
Lab Results   Component Value Date    HGBA1C 6 4 02/09/2023       Recent Labs     05/07/23 2047 05/08/23  0725 05/08/23  1059 05/08/23  1554   POCGLU 188* 183* 223* 223*       Blood Sugar Average: Last 72 hrs:  (P) 184   · Home regimen on held on admission  · Blood sugars elevated in the 200's  · Will restart Lantus at reduced dose of 10 U qhs  · Continue SSI  · Hypoglycemia protocol  · BMP a m

## 2023-05-08 NOTE — OCCUPATIONAL THERAPY NOTE
Occupational Therapy Evaluation     Patient Name: Ramón Bishop  VOCVC'B Date: 5/8/2023  Problem List  Principal Problem:    Fall  Active Problems:    Paroxysmal atrial fibrillation (HCC)    Type 2 diabetes mellitus with hyperglycemia, with long-term current use of insulin (HCC)    GERD (gastroesophageal reflux disease)    Hyperlipidemia    Essential hypertension    Current episode of major depressive disorder without prior episode    Hemiplegia and hemiparesis following cerebral infarction affecting left non-dominant side (HCC)    Stage 3 chronic kidney disease, unspecified whether stage 3a or 3b CKD ([address] )    Platelets decreased ([address] )    Elevated CK    Past Medical History  Past Medical History:   Diagnosis Date    Abdominal fibromatosis     Arthritis     Depression     Diabetes mellitus ([facility] )     GERD (gastroesophageal reflux disease)     controlled    Hyperlipemia     Hypertension     Obesity     Pneumonia     Right pontine stroke ([facility] ) 12/13/2021    Stroke (cerebrum) ([facility] ) 12/17/2021    Stroke [facility])      Past Surgical History  Past Surgical History:   Procedure Laterality Date    APPENDECTOMY      APPENDECTOMY LAPAROSCOPIC N/A 06/12/2022    Procedure: APPENDECTOMY LAPAROSCOPIC;  Surgeon: Vivi Milton MD;  Location: [facility];  Service: General    CATARACT EXTRACTION      CATARACT EXTRACTION, BILATERAL      COLONOSCOPY      EGD      LAPAROTOMY      Exploratory; Last Assessed 10/17/2017    PEG TUBE PLACEMENT      PEG TUBE REMOVAL           05/08/23 1515   OT Last Visit   OT Visit Date 05/08/23   Note Type   Note type Evaluation   Additional Comments Nsg staff verbally cleared pt for OT evaluation  Pt received  supine in bed on this date reporting no pain  + is agreeable to OT session @ this time  @ exit, pt remains in  seated in bedside recliner c all lines intact, all needs met, call bell in hand + nsg aware of location/disposition     Pain Assessment   Pain Assessment Tool 0-10   Pain Score No Pain "  Restrictions/Precautions   Weight Bearing Precautions Per Order No   Other Precautions Chair Alarm; Bed Alarm; Fall Risk;Pain   Home Living   Type of Home Apartment  (on 5th floor)   Home Layout One level;Performs ADLs on one level; Able to live on main level with bedroom/bathroom; Access; Ramped entrance;Elevator   Bathroom Shower/Tub Tub/shower unit   Bathroom Toilet Raised   Bathroom Equipment Grab bars in shower   [address] [facility]  (baseline rollator usage; owns RW + rollator)   Prior Function   Level of [place] Independent with ADLs; Independent with functional mobility; Needs assistance with IADLS   Lives With Alone   Receives Help From Family; Neighbor   IADLs Independent with meal prep; Family/Friend/Other provides transportation; Family/Friend/Other provides medication management  ([facility] sets up pill box; Son or insurance provided transportation)   Falls in the last 6 months 1 to 4  (fall prior to arrival, reports a neighbor found her after being down 2 days)   Vocational On disability   Lifestyle   Autonomy Pt lives in  apartment alone + @ baseline, performs ADLs  I'ly, IADLs with A + fxl mobility I'ly with rollator  (-)   Pt is on disability + is currently not working     Reciprocal Relationships Family, friends   Service to Others On disability   Intrinsic Gratification TV   General   Additional Pertinent History Hx of 3-4 CVAs (most recent 2 years ago)  (L sd weakness)   Subjective   Subjective \"it feels good to be out of bed\"   ADL   Eating Assistance 5  Supervision/Setup   Grooming Assistance 5  Supervision/Setup   UB Bathing Assistance 5  Supervision/Setup   LB Bathing Assistance 3  Moderate Assistance   [address]  [facility] 3  Moderate [address]  3  Moderate Assistance   Additional Comments Given fxl performance skills + medical complexity, therapist suspecting via clinical " judgement + skilled analysis; pt currently requires stated assist above to perform each area of ADL d/t limitations including: generalized muscle weakness, sitting/standing balance deficits, fxl activity tolerance limitations, difficulty performing bend/reach-anterior trunk flexion, requires external assist to complete transitional movements, decreased core strength, decreased postural control, instability in stance, high fall risk and L hemiplegia   Bed Mobility   Supine to Sit 3  Moderate assistance   Additional items Assist x 1;Verbal cues;HOB elevated; Bedrails   Additional Comments DNT sit-sup; pt transitioned from EOB-bedside recliner  Transfers   Sit to Stand 4  Minimal assistance   Additional items Assist x 2;Verbal cues  (c RW)   Stand to Sit 4  Minimal assistance   Additional items Assist x 1; Armrests   Functional Mobility   Functional Mobility 4  Minimal assistance   Additional Comments Pt performed short distance ADL related fxl mobility from bed-bedside recliner c min A, x 2  /RW simulating toileting distances  No overt LOB demonstrated + pt denies pain /  denies SOB/ denies dizziness during transitional movements  Reports feeling unsteady on BLE, minimally below baseline c abilities related to ADL-focused fxl mobility and + presents [facility] ability utilizing RW  G safety awareness noted while navigating t/o room  Transitions to bedside recliner for remainder of session  Balance   Static Sitting Fair   Dynamic Sitting Fair -   Static Standing Poor +   Dynamic Standing Poor +   Ambulatory Poor   Activity Tolerance   Activity Tolerance Patient limited by fatigue   Nurse Made Aware Medical staff made aware of current fxn, recommendations for d/c planning, fall risk + pt's location upon exit  Flor Jackson)   RUE Assessment   RUE Assessment WFL   LUE Assessment   LUE Assessment X  (L sd deficits s/p CVA 2 yrs ago; 3-/5 shoulder, 3+/5 elbow flex/ext   Reports increased weakness since this hospitalization )   Hand Function   Gross Motor Coordination Impaired  (L sd impairments)   Fine Motor Coordination Impaired  (L sd impairments)   Sensation   Light Touch Partial deficits in the LUE   Vision-Basic Assessment   Current Vision Wears glasses all the time  (but currently at home)   Visual History   (denies acute visual changes)   Psychosocial   Psychosocial (WDL) WDL   Patient Behaviors/Mood Calm; Cooperative   Cognition   Overall Cognitive Status WFL   Arousal/Participation Alert; Responsive; Cooperative   Attention Within functional limits   Orientation Level Oriented X4;Oriented to person;Oriented to place;Oriented to time;Oriented to situation   Memory Within functional limits   Following Commands Follows one step commands with increased time or repetition   Assessment   Limitation Decreased ADL status; Decreased UE ROM; Decreased UE strength;Decreased endurance;Decreased self-care trans;Decreased high-level ADLs; Decreased sensation   Prognosis Fair   Assessment Patient is a 74 y o  female seen for OT evaluation s/p admit to  [address] on 5/6/2023 w/Fall + commorbidities/PMHx (as listed in medical record) affecting patient's functional performance c ADL tasks at time of assessment  OT orders placed for evaluation and treatment to assess pt's ADLs, cognitive status + performance during functional tasks in order to formulate appropriate d/c recommendations  Therapist performed at least two patient identifiers during session including name and wristband  Personal factors affecting patient at time of initial evaluation include: limited home support, inability to perform IADLs, inability to perform ADLs, inability to ambulate household distances, decreased initiation and engagement and recent fall(s)/fall history     Pt's clinical presentation is currently unstable/unpredictable given new onset deficits that effect pt's occupational performance and ability to safely return to OF including decrease activity tolerance, decrease standing balance, decrease sitting balance, decrease performance during ADL tasks, decrease generalized strength, decrease activity engagement, decrease performance during functional transfers and high fall risk combined with medical complications of abnormal renal lab values, abnormal CBC, abnormal blood sugars and need for input for mobility technique/safety  This evaluation required an extensive review of medical and/or therapy records and additional review of physical, cognitive and psychosocial history related to functional performance  Based upon functional performance deficits and assessments, this evaluation has been identified as a  high complexity evaluation  Patient to benefit from continued Occupational Therapy treatment while in the hospital to address aforementioned deficits and maximize level of functional independence with ADLs and functional mobility  Pt currently demonstrates WFL ability to collaboratively engage in d/c planning, demonstrates G awareness in d/c plan and is agreeable to d/c plan including transition to STR (as able)  From OT standpoint, recommendation at time of d/c would be Short Term Rehab  Goals   Patient Goals To return home following rehab   Plan   Treatment Interventions ADL retraining;UE strengthening/ROM; Functional transfer training; Endurance training;Patient/family training;Equipment evaluation/education; Neuromuscular reeducation; Compensatory technique education; Energy conservation; Activityengagement   Goal Expiration Date 05/18/23   OT Treatment Day 0   OT Frequency 3-5x/wk   Recommendation   OT Discharge Recommendation Post acute rehabilitation services   AM-PAC Daily Activity Inpatient   Lower Body Dressing 2   Bathing 2   Toileting 2   Upper Body Dressing 3   Grooming 3   Eating 3   Daily Activity Raw Score 15   Daily Activity Standardized Score (Calc for Raw Score >=11) 34 69   AM-PAC Applied Cognition Inpatient   Following a Speech/Presentation 4 Understanding Ordinary Conversation 4   Taking Medications 4   Remembering Where Things Are Placed or Put Away 3   Remembering List of 4-5 Errands 3   Taking Care of Complicated Tasks 3   Applied Cognition Raw Score 21   Applied Cognition Standardized Score 44 3   Barthel Index   Feeding 5   Bathing 0   Grooming Score 0   Dressing Score 5   Bladder Score 5   Bowels Score 5   Toilet Use Score 5   Transfers (Bed/Chair) Score 10   Mobility (Level Surface) Score 0   Stairs Score 0   Barthel Index Score 35   End of Consult   Patient Position at End of Consult Bedside chair   The patient's raw score on the -PAC Daily Activity inpatient short form is 15, standardized score is 34 69, less than 39 4  Patients at this level are likely to benefit from DC to post-acute rehabilitation services  Please refer to the recommendation of the Occupational Therapist for safe DC planning  GOALS:    *ADL transfers with (S) for inc'd independence with ADLs/purposeful tasks    *UB ADL with (I) for inc'd independence with self cares    *LB ADL with Min (A) using AE prn for inc'd independence with self cares    *Toileting with Min (A) for clothing management and hygiene for return to [facility] with personal care    *Increase stand tolerance x 3  m for inc'd tolerance with standing purposeful tasks    *Participate in 10m UE therex to increase overall stamina/activity tolerance for purposeful tasks    *Bed mobility- (S) for inc'd independence to manage own comfort and initiate EOB & OOB purposeful tasks    *Patient will verbalize 3 safety awareness/ principles to prevent falls in the home setting  *Patient will verbalize and demonstrate use of energy conservation/deep breathing techniques and work simplification skills during functional activities with no verbal cues  *Patient will increase OOB/sitting tolerance to 2-4 hours per day to increase participation in self-care and leisure tasks with no s/s of exertion  *Patient will engage in ongoing cognitive assessment to assist with safe discharge planning/recommendations         Viviana Vee, OTR/L

## 2023-05-08 NOTE — CASE MANAGEMENT
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   • Abnormal uterine bleeding (AUB) 2022   • Ovarian mass 2022   • Status post appendectomy 2022   • Platelets decreased ([address] ) 2022   • Cyst of right ovary 2022   • Abnormal CT of the chest 2022   • Abnormality of gait due to impairment of balance 02/15/2022   • Hemiplegia and hemiparesis following cerebral infarction affecting left non-dominant side ([address] ) 2022   • Stage 3 chronic kidney disease, unspecified whether stage 3a or 3b CKD ([address] ) 2022   • History of CVA (cerebrovascular accident) 2021   • Weakness of both lower extremities    • Muscle tension dysphonia 2020   • Reflux laryngitis 2020   • Glottic insufficiency 2020   • Dermatitis 2020   • YOLIE (obstructive sleep apnea)    • Preoperative clearance 2020   • Medicare annual wellness visit, subsequent 2020   • Current episode of major depressive disorder without prior episode 2019   • Stress incontinence 2019   • Essential hypertension 2019   • GERD (gastroesophageal reflux disease) 03/15/2019   • Hyperlipidemia 03/15/2019   • Edema 03/15/2019   • 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   • Incomplete emptying of bladder 2018   • Dysphonia 2018   • Glottic stenosis 2018   • Dysphagia 2018   • Paroxysmal atrial fibrillation ([address] ) 10/17/2017   • Blurry vision 10/17/2017   • Insomnia 10/17/2017   • Type 2 diabetes mellitus with hyperglycemia, with long-term current use of insulin ([address] ) 10/17/2017   • Cerebrovascular accident (CVA) ([facility] ) 2017   • Heart disease 2015   • Diabetic cataract ([address] ) 12/18/2014      LOS (days): 1  Geometric Mean LOS (GMLOS) (days): 2 90  Days to GMLOS:1 8     OBJECTIVE:  Risk of Unplanned Readmission Score: 13 7         Current admission status: Inpatient   Preferred Pharmacy:   [facility] [address]  Phone: [phone] Fax: [phone]    Primary Care Provider: Yokasta Hemphill MD    Primary Insurance: [employer] RE[employer]  Secondary Insurance: Liam Jain    DISCHARGE DETAILS:    Discharge planning discussed with[de-identified] Patient  Freedom of Choice: Yes  Comments - Freedom of Choice: Provided with patient choice list and choice is [facility]  Bed reserved and auth request tasked to [facility] discharge support staff

## 2023-05-08 NOTE — PLAN OF CARE
Problem: SAFETY ADULT  Goal: Patient will remain free of falls  Description: INTERVENTIONS:  - Educate patient/family on patient safety including physical limitations  - Instruct patient to call for assistance with activity   - Consult OT/PT to assist with strengthening/mobility   - Keep Call bell within reach  - Keep bed low and locked with side rails adjusted as appropriate  - Keep care items and personal belongings within reach  - Initiate and maintain comfort rounds  - Make Fall Risk Sign visible to staff  - Offer Toileting every 2 Hours, in advance of need  - Initiate/Maintain bed alarm  - Obtain necessary fall risk management equipment  - Apply yellow socks and bracelet for high fall risk patients  - Consider moving patient to room near nurses station  Outcome: Progressing     Problem: MOBILITY - ADULT  Goal: Maintain or return to baseline ADL function  Description: INTERVENTIONS:  -  Assess patient's ability to carry out ADLs; assess patient's baseline for ADL function and identify physical deficits which impact ability to perform ADLs (bathing, care of mouth/teeth, toileting, grooming, dressing, etc )  - Assess/evaluate cause of self-care deficits   - Assess range of motion  - Assess patient's mobility; develop plan if impaired  - Assess patient's need for assistive devices and provide as appropriate  - Encourage maximum independence but intervene and supervise when necessary  - Involve family in performance of ADLs  - Assess for home care needs following discharge   - Consider OT consult to assist with ADL evaluation and planning for discharge  - Provide patient education as appropriate  Outcome: Progressing

## 2023-05-08 NOTE — ASSESSMENT & PLAN NOTE
· Present on admission with   · Likely secondary to being down for 1 day  · Received IV fluids  · Now resolved

## 2023-05-08 NOTE — CASE MANAGEMENT
[address] received request for authorization from Care Manager    Authorization request for: SNF  Accepting Facility: [facility] [id number]  Accepting Attending: Dr Ignacio Dunn [phone]  Authorization initiated by contacting insurance: [facility] Via: [facility] Portal   Pending Reference #: [id number]   Clinicals submitted via: [facility] Portal

## 2023-05-08 NOTE — Clinical Note
Given fxl performance skills + medical complexity, therapist suspecting via clinical judgement + skilled analysis; pt currently requires stated assist above to perform each area of ADL d/t limitations including: {pmdeficits:34929}      {pmdnt:84647}      Pt performed short distance ADL related fxl mobility {pmfxlmob:69232} {pmassistlevels:10536} {pmad:55150} simulating toileting distances  {pmpain:22195} overt LOB demonstrated + pt {pmc:00568} {pmd:62329} /  {pmpain:00052} SOB/ {pmpain:22411} dizziness during transitional movements  Reports feeling {pmst:16771}  {pmgf:70914} safety awareness noted while navigating t/o room  {pmfxl:08806}    @ {pmloc:61706} {pmbody:50768}         PT/OT co-eval on this date d/t medical complexity, ambulatory dysfunction c high fall risk, various impeding lines + requirement for skilled interdisciplinary analysis of appropriate d/c recommendations  PT/OT POC/goals I'ly determined per respective discipline  ({pmpt:96936})    Medical staff made aware of current fxn, recommendations for d/c planning, fall risk + pt's location upon exit  (***)    None reported  No acute changes reported since hospitalization  SBT administered: scored *** indicating {pmsbt:75443} cognitive impairment  1: 0 OR 1 (year)                ___ x 4 =  2: 0 OR 1 (month)                 ___ x 3 =  3: 0 OR 1 (time)                 ___ x 3 =  4: 0, 1 OR 2 (20-1)     ___ x 2 =  5: 0, 1 OR 2  (months)    ___ x 2 =  6: 0, 1, 2, 3, 4 or 5 (name/add)  ___ x 2 =    0-4= normal cognition   5-9= questionable impairment   >10= impairment consistent c dementia          Patient is a 74 y o  female seen for OT evaluation s/p admit to  {PM location:91259} on 5/6/2023 w/Fall + commorbidities/PMHx (as listed in medical record) affecting patient's functional performance c ADL tasks at time of assessment   OT orders placed for evaluation and treatment to assess pt's ADLs, cognitive status + performance during functional tasks in order to formulate appropriate d/c recommendations  Therapist performed at least two patient identifiers during session including name and wristband  Personal factors affecting patient at time of initial evaluation include: {NCPERSONALFACTORS:04798}  Pt's clinical presentation is currently {MSLstable:63629} given new onset deficits that effect pt's occupational performance and ability to safely return to PLOF including {NCOTdeficits:83937} combined with medical complications of {HOBQPFKJVXWGCHJLOZWFWQ:46020}  This evaluation required an extensive review of medical and/or therapy records and additional review of physical, cognitive and psychosocial history related to functional performance  Based upon functional performance deficits and assessments, this evaluation has been identified as a  {pmcomplex:33737} complexity evaluation  Patient to benefit from continued Occupational Therapy treatment while in the hospital to address aforementioned deficits and maximize level of functional independence with ADLs and functional mobility  Pt currently {pmdc:08005}  From OT standpoint, recommendation at time of d/c would be {RV recommendation:00557}  as pt appears to be at baseline level of function  Pt may benefit from rehab if  wife is unable to provide physical assist for mobility and ADLs    @ this time, pt presents @ baseline fxn including I c ADLs/fxl mobility  D/t aforementioned factors, no further skilled occupational therapy services warranted during hospitalization/ following return home

## 2023-05-08 NOTE — ASSESSMENT & PLAN NOTE
· Patient had mechanical fall prehospital   · Of note patient was hypoglycemic may be contributing factor   · Patient also has history of stroke with left-sided weakness   · PT/OT consulted recommend post acute rehab  · Case management following[facility]

## 2023-05-08 NOTE — PROGRESS NOTES
[facility] [address]  Progress Note  Name: Minerva Crisostomo  MRN: [id number]  Unit/Bed#: -01 I Date of Admission: 5/6/2023   Date of Service: 5/8/2023 I Hospital Day: 1    Assessment/Plan   * Fall  Assessment & Plan  · Patient had mechanical fall prehospital   · Of note patient was hypoglycemic may be contributing factor   · Patient also has history of stroke with left-sided weakness   · PT/OT consulted recommend post acute rehab  · Case management following- [facility]    Type 2 diabetes mellitus with hyperglycemia, with long-term current use of insulin [facility])  Assessment & Plan  Lab Results   Component Value Date    HGBA1C 6 4 02/09/2023       Recent Labs     05/07/23  2047 05/08/23  0725 05/08/23  1059 05/08/23  1554   POCGLU 188* 183* 223* 223*       Blood Sugar Average: Last 72 hrs:  (P) 184   · Home regimen on held on admission  · Blood sugars elevated in the 200's  · Will restart Lantus at reduced dose of 10 U qhs  · Continue SSI  · Hypoglycemia protocol  · BMP a m      Hemiplegia and hemiparesis following cerebral infarction affecting left non-dominant side (HCC)  Assessment & Plan  · Residual left-sided weakness from prior CVA  · Patient had mechanical fall prior to arrival  · PT/OT are recommending post acute rehab on discharge  · Patient accepted at [facility]- [facility] auth    Essential hypertension  Assessment & Plan  · Well-controlled on current regimen  · Continue lisinopril, metoprolol    Elevated CK  Assessment & Plan  · Present on admission with   · Likely secondary to being down for 1 day  · Received IV fluids  · Now resolved    Stage 3 chronic kidney disease, unspecified whether stage 3a or 3b CKD [facility])  Assessment & Plan  Lab Results   Component Value Date    EGFR 47 05/08/2023    EGFR 75 05/07/2023    EGFR 69 05/06/2023    CREATININE 1 21 05/08/2023    CREATININE 0 82 05/07/2023    CREATININE 0 88 05/06/2023     · Currently within normal limits    Current episode of major depressive disorder without prior episode  Assessment & Plan  · Without signs of depression   · Continue Lexapro    Hyperlipidemia  Assessment & Plan  · Continue statin    GERD (gastroesophageal reflux disease)  Assessment & Plan  · Continue PPI    Paroxysmal atrial fibrillation (HCC)  Assessment & Plan  · Controlled on current regimen   · Continue Eliquis for anticoagulation  · Continue metoprolol             VTE Pharmacologic Prophylaxis: VTE Score: 10 High Risk (Score >/= 5) - Pharmacological DVT Prophylaxis Ordered: rivaroxaban (Xarelto)  Sequential Compression Devices Ordered  Patient Centered Rounds: I performed bedside rounds with nursing staff today  Discussions with Specialists or Other Care Team Provider: nursing, CM    Education and Discussions with Family / Patient: Patient declined call to   Total Time Spent on Date of Encounter in care of patient: 35 minutes This time was spent on one or more of the following: performing physical exam; counseling and coordination of care; obtaining or reviewing history; documenting in the medical record; reviewing/ordering tests, medications or procedures; communicating with other healthcare professionals and discussing with patient's family/caregivers  Current Length of Stay: 1 day(s)  Current Patient Status: Inpatient   Certification Statement: The patient will continue to require additional inpatient hospital stay due to need for placement to [facility]  Discharge Plan: Anticipate discharge tomorrow to rehab facility  Code Status: Level 3 - DNAR and DNI    Subjective: The patient was seen and examined  The patient is lying in bed in no acute distress  She denies any complaints       Objective:     Vitals:   Temp (24hrs), Av 7 °F (37 1 °C), Min:98 1 °F (36 7 °C), Max:99 5 °F (37 5 °C)    Temp:  [98 1 °F (36 7 °C)-99 5 °F (37 5 °C)] 99 5 °F (37 5 °C)  HR:  [85-90] 86  Resp:  [16-20] 16  BP: (101-131)/(46-74) 115/73  SpO2:  [94 %-96 %] 95 %  Body mass index is 32 45 kg/m²  Input and Output Summary (last 24 hours): Intake/Output Summary (Last 24 hours) at 5/8/2023 1733  Last data filed at 5/8/2023 1405  Gross per 24 hour   Intake 720 ml   Output 750 ml   Net -30 ml       Physical Exam:   Physical Exam  Vitals and nursing note reviewed  Constitutional:       General: She is awake  Appearance: Normal appearance  She is morbidly obese  HENT:      Head: Normocephalic and atraumatic  Cardiovascular:      Rate and Rhythm: Normal rate and regular rhythm  Heart sounds: Normal heart sounds  Pulmonary:      Effort: Pulmonary effort is normal       Breath sounds: Normal breath sounds  Abdominal:      Palpations: Abdomen is soft  Tenderness: There is no abdominal tenderness  Skin:     General: Skin is warm and dry  Neurological:      Mental Status: She is alert and oriented to person, place, and time  Comments: Left hemiparesis from prior CVA   Psychiatric:         Attention and Perception: Attention normal          Mood and Affect: Mood normal          Behavior: Behavior is cooperative            Additional Data:     Labs:  Results from last 7 days   Lab Units 05/08/23  0553   WBC Thousand/uL 7 09   HEMOGLOBIN g/dL 11 2*   HEMATOCRIT % 35 3   PLATELETS Thousands/uL 171   NEUTROS PCT % 37*   LYMPHS PCT % 42   MONOS PCT % 11   EOS PCT % 9*     Results from last 7 days   Lab Units 05/08/23  0553 05/07/23  0440 05/06/23  1403   SODIUM mmol/L 135   < > 136   POTASSIUM mmol/L 4 1   < > 5 4*   CHLORIDE mmol/L 104   < > 102   CO2 mmol/L 26   < > 23   BUN mg/dL 33*   < > 26*   CREATININE mg/dL 1 21   < > 0 88   ANION GAP mmol/L 5   < > 11   CALCIUM mg/dL 8 4   < > 9 0   ALBUMIN g/dL  --   --  3 7   TOTAL BILIRUBIN mg/dL  --   --  0 79   ALK PHOS U/L  --   --  55   ALT U/L  --   --  39   AST U/L  --   --  66*   GLUCOSE RANDOM mg/dL 214*   < > 121    < > = values in this interval not displayed  Results from last 7 days   Lab Units 05/06/23  1403   INR  1 28*     Results from last 7 days   Lab Units 05/08/23  1554 05/08/23  1059 05/08/23  0725 05/07/23  2047 05/07/23  1518 05/07/23  1105 05/07/23  0728 05/06/23  2050 05/06/23  1727 05/06/23  1504 05/06/23  1410 05/06/23  1346   POC GLUCOSE mg/dl 223* 223* 183* 188* 293* 208* 130 337* 152* 140 91 40*         Results from last 7 days   Lab Units 05/06/23  1803 05/06/23  1403   LACTIC ACID mmol/L 1 4 2 6*   PROCALCITONIN ng/ml  --  <0 05       Lines/Drains:  Invasive Devices     Peripheral Intravenous Line  Duration           Peripheral IV 05/06/23 Left Antecubital 2 days    Peripheral IV 05/06/23 Left;Ventral (anterior) Wrist 2 days          Drain  Duration           External Urinary Catheter 329 days                      Imaging: No pertinent imaging reviewed  Recent Cultures (last 7 days):   Results from last 7 days   Lab Units 05/06/23  1403   BLOOD CULTURE  No Growth at 24 hrs  No Growth at 24 hrs         Last 24 Hours Medication List:   Current Facility-Administered Medications   Medication Dose Route Frequency Provider Last Rate   • acetaminophen  650 mg Oral Q6H PRN Myesha MARY Broussard     • aspirin  81 mg Oral Daily Emma Valle MD     • atorvastatin  80 mg Oral Daily With Dang Jerome MD     • escitalopram  5 mg Oral QAM Emma Valle MD     • insulin glargine  10 Units Subcutaneous HS Yuliana Ortez PA-C     • insulin lispro  1-6 Units Subcutaneous TID AC ALEC Green     • insulin lispro  1-6 Units Subcutaneous HS ALEC Green     • lisinopril  5 mg Oral QAM Emma Valle MD     • metoprolol tartrate  50 mg Oral Q12H Emma Valle MD     • ondansetron  4 mg Intravenous Q6H PRN Myesha MARY Broussard     • pantoprazole  40 mg Oral Daily Emma Valle MD     • rivaroxaban  20 mg Oral Daily With Breakfast Emma Valle MD          Today, Patient Was Seen By: Yuliana Ortez PA-C    **Please Note: This note may have been constructed using a voice recognition system  **

## 2023-05-08 NOTE — H&P
[address]  Progress Note  Name: Clementina Hunter  MRN: [id number]  Unit/Bed#: -01 I Date of Admission: 5/6/2023   Date of Service: 5/6/2023  Hospital Day: 0        Assessment/Plan      Elevated CK  Assessment & Plan  Likely secondary to being down for 1 day, s/p fluids, repeat in tomorrows labs     Stage 3 chronic kidney disease, unspecified whether stage 3a or 3b CKD [facility])  Assessment & Plan        Lab Results   Component Value Date     EGFR 69 05/06/2023     EGFR 50 09/10/2022     EGFR 66 06/13/2022     CREATININE 0 88 05/06/2023     CREATININE 1 15 09/10/2022     CREATININE 0 91 06/13/2022      Normal Cr      Hemiplegia and hemiparesis following cerebral infarction affecting left non-dominant side ([facility] [address] )  Assessment & Plan  - continue antiplatelet, statin  - residual left sided weakness     Current episode of major depressive disorder without prior episode  Assessment & Plan  - Continue SSRI     Essential hypertension  Assessment & Plan  - continue lisinopril   - continue metoprolol  - follow potassium, mildly elevated on initial BMP, hold ACEi if worsening     Hyperlipidemia  Assessment & Plan  Continue atorvastatin     GERD (gastroesophageal reflux disease)  Assessment & Plan  - continue PPI     Type 2 diabetes mellitus with hyperglycemia, with long-term current use of insulin [facility])  Assessment & Plan        Lab Results   Component Value Date     HGBA1C 6 4 02/09/2023               Recent Labs     05/06/23  1346 05/06/23  1410 05/06/23  1504   POCGLU 40* 91 140         Blood Sugar Average: Last 72 hrs:  (P) 00 [id number]   - HOLD home regimen given hypoglycemia, then consider addition of home lantus     Paroxysmal atrial fibrillation (HCC)  Assessment & Plan  Continue [facility]     * Fall  Assessment & Plan  Down for 1 day, hypoglycemic, s/p mechanical fall, could not get up, will observe for 1 day, residual left side weakness, no concern for acute stroke, pan scan without fracture           VTE Pharmacologic Prophylaxis: VTE Score: 10 High Risk (Score >/= 5) - Pharmacological DVT Prophylaxis Ordered: rivaroxaban (Xarelto)  Sequential Compression Devices Ordered  Code Status: Level 3 - DNAR and DNI   Discussion with family: Updated  (son) at bedside      Anticipated Length of Stay: Patient will be admitted on an observation basis with an anticipated length of stay of less than 2 midnights secondary to weakness, hypoglycemia      Total Time Spent on Date of Encounter in care of patient: 40 minutes This time was spent on one or more of the following: performing physical exam; counseling and coordination of care; obtaining or reviewing history; documenting in the medical record; reviewing/ordering tests, medications or procedures; communicating with other healthcare professionals and discussing with patient's family/caregivers      Chief Complaint:      History of Present Illness:  Digna Hawkins is a 74 y o  female with a PMH of CVA with left-sided weakness, DM, HL, pAF on AC, tracheal stenosis,  who presents with weakness after mechanical fall c/b hypoglycemia     HPI difficult to retrieve but patient describes mechanical fall without preceding weakness, cp, sob, and falling, with inability to get up for 1 day   Patient was then found to be hypoglycemic by ED, with BG 40 and given dextrose       In ED patient was pan scanned, which showed no acute findings of CT head, CT cervical spine, and CT chest showing multiple old rib fractures, right adnexal cyst     Labs in ED showed CK of 400, lactic acid of 2 6     Review of Systems:  Review of Systems     Past Medical and Surgical History:   Medical History        Past Medical History:   Diagnosis Date   • Abdominal fibromatosis     • Arthritis     • Depression     • Diabetes mellitus ([facility] [address] )     • GERD (gastroesophageal reflux disease)       controlled   • Hyperlipemia     • Hypertension     • Obesity     • Pneumonia     • Right pontine stroke ([facility] [address] ) 12/13/2021   • [facility] 12/17/2021   • Stroke [facility])              Surgical History         Past Surgical History:   Procedure Laterality Date   • APPENDECTOMY       • APPENDECTOMY LAPAROSCOPIC N/A 06/12/2022     Procedure: APPENDECTOMY LAPAROSCOPIC;  Surgeon: Radha Walters MD;  Location: [facility]IN [facility];  Service: General   • CATARACT EXTRACTION       • CATARACT EXTRACTION, BILATERAL       • COLONOSCOPY       • EGD       • LAPAROTOMY         Exploratory; Last Assessed 10/17/2017   • PEG TUBE PLACEMENT       • PEG TUBE REMOVAL                Meds/Allergies:          Prior to Admission medications    Medication Sig Start Date End Date Taking?  Authorizing Provider   Aspirin 81 MG CAPS Take 1 tablet by mouth in the morning     Yes Historical Provider, MD   atorvastatin (LIPITOR) 80 mg tablet TAKE ONE TABLET BY MOUTH ONCE DAILY WITH DINNER 2/13/23   Yes Ben Parmar DO   escitalopram (LEXAPRO) 5 mg tablet take 1 tablet by mouth every morning 11/2/22   Yes Esme Grant MD   famotidine (PEPCID) 40 MG tablet take 1 tablet by mouth once daily 12/27/22   Yes Esme Grant MD   Lantus SoloStar 100 units/mL SOPN INJECT 0 32 ML (32 UNITS TOTAL) UNDER THE SKIN DAILY AT BEDTIME 3/14/23   Yes Esme Grant MD   linaGLIPtin (Tradjenta) 5 MG TABS Take 5 mg by mouth every morning 12/28/22   Yes Esme Grant MD   lisinopril (ZESTRIL) 5 mg tablet take 1 tablet by mouth every morning 11/2/22   Yes Esme Grant MD   metFORMIN (GLUCOPHAGE-XR) 500 mg 24 hr tablet TAKE 1 TABLET (500 MG TOTAL) BY MOUTH 2 (TWO) TIMES A DAY WITH MEALS 1/19/23 7/18/23 Yes Esme Grant MD   metoprolol tartrate (LOPRESSOR) 50 mg tablet take 1 tablet by mouth every 12 hours 12/27/22   Yes Esme Grant MD   Mirabegron ER (Myrbetriq) 50 MG TB24 Take 1 tablet (50 mg total) by mouth in the morning 2/10/23   Yes Esme Grant MD   pantoprazole (PROTONIX) 40 mg tablet Take 1 tablet (40 mg "total) by mouth daily 2/21/23   Yes Maria T Becker MD   repaglinide [facility]) 2 mg tablet Take 1 tablet (2 mg total) by mouth 2 (two) times a day before meals 7/28/22   Yes ALEC Nolan   Xarelto 20 MG tablet TAKE ONE TABLET BY MOUTH ONCE DAILY WITH BREAKFAST 3/7/23   Yes Lakisha Reed,    Accu-Chek FastClix Lancets MISC Use 2 (two) times a day 12/28/22     Maria T Becker MD   Alcohol Swabs (Pharmacist Choice Alcohol) PADS   2/1/23     Historical Provider, MD OROZCO UF III MINI PEN NEEDLES 31G X 5 MM MISC INJECT UNDER THE SKIN AS NEEDED (HYPOGLYCEMIA) USE AS DIRECTED 4/25/23     Maria T Becker MD   Blood Glucose Monitoring Suppl (Accu-Chek Guide) w/Device KIT Use 2 (two) times a day 12/28/22     Maria T Becker MD   Blood Glucose Monitoring Suppl (ONE TOUCH ULTRA 2) w/Device KIT USE AS DIRECTED 4/12/23     Maria T Becker MD   Blood Glucose Monitoring Suppl (OneTouch Verio) w/Device KIT Use 2 (two) times a day 12/28/22 1/27/23   Maria T Becker MD   clotrimazole (LOTRIMIN) 1 % cream   4/12/22     Historical Provider, MD   Insulin Pen Needle (Pen Needles 5/16\") 30G X 8 MM MISC Use daily 12/28/22     Maria T Becker MD   Lancet Devices (Lancet Device with Ejector) MISC USE TO CHECK BLOOD SUGAR TWICE A DAY 4/12/23     Maria T Becker MD   Lancets Misc   (Accu-Chek Softclix Lancet Dev) KIT Check blood sugar 12/28/22     Maria T Becker MD   ondansetron (ZOFRAN-ODT) 4 mg disintegrating tablet Take 1 tablet (4 mg total) by mouth every 8 (eight) hours as needed for nausea or vomiting 9/8/21     Maria T Becker MD   ondansetron (ZOFRAN-ODT) 4 mg disintegrating tablet take 1 tablet by mouth every 8 hours if needed for nausea and vomiting 12/27/22     Maria T Becker MD   ondansetron (ZOFRAN-ODT) 4 mg disintegrating tablet TAKE ONE TABLET BY MOUTH EVERY 8 HOURS IF NEEDED FOR NAUSEA AND VOMITING 3/29/23     Maria T Becker MD   OneTouch Ultra test strip   12/29/22     " Historical Provider, MD ANN have reviewed home medications with patient personally      Allergies: Allergies   Allergen Reactions   • Apixaban Vomiting and GI Intolerance       Other reaction(s): Vomiting   • Dulaglutide Vomiting       Nausea vomiting         Social History:  Marital Status:    Occupation: unknown  Patient Pre-hospital Living Situation: Home  Patient Pre-hospital Level of Mobility: walks with walker  Patient Pre-hospital Diet Restrictions: DM  Substance Use History:   Social History           Substance and Sexual Activity   Alcohol Use Never     Comment: Socially      Social History          Tobacco Use   Smoking Status Never   Smokeless Tobacco Never      Social History          Substance and Sexual Activity   Drug Use Never         Family History:  Family History         Family History   Problem Relation Age of Onset   • No Known Problems Mother     • No Known Problems Father              Physical Exam:      Vitals:   Blood Pressure: 148/81 (05/06/23 1647)  Pulse: 102 (05/06/23 1647)  Temperature: 98 1 °F (36 7 °C) (05/06/23 1647)  Temp Source: Temporal (05/06/23 1340)  Respirations: 18 (05/06/23 1647)  Weight - Scale: 88 5 kg (195 lb) (05/06/23 1340)  SpO2: 94 % (05/06/23 1647)     Physical Exam  Constitutional:       General: She is not in acute distress  Appearance: She is obese  HENT:      Head: Normocephalic  Cardiovascular:      Rate and Rhythm: Normal rate  Rhythm irregular  Heart sounds: No murmur heard  No friction rub  Pulmonary:      Breath sounds: Stridor present  Rhonchi present  Abdominal:      General: Bowel sounds are normal       Palpations: Abdomen is soft  Musculoskeletal:         General: Swelling, deformity and signs of injury present  Skin:     General: Skin is warm and dry  Neurological:      Sensory: Sensory deficit present  Motor: Weakness present     Psychiatric:         Mood and Affect: Mood normal             Additional Data:      Lab Results:       Results from last 7 days   Lab Units 05/06/23  1424   WBC Thousand/uL 8 92   HEMOGLOBIN g/dL 12 2   HEMATOCRIT % 37 8   PLATELETS Thousands/uL 219   NEUTROS PCT % 74   LYMPHS PCT % 16   MONOS PCT % 7   EOS PCT % 3           Results from last 7 days   Lab Units 05/06/23  1403   SODIUM mmol/L 136   POTASSIUM mmol/L 5 4*   CHLORIDE mmol/L 102   CO2 mmol/L 23   BUN mg/dL 26*   CREATININE mg/dL 0 88   ANION GAP mmol/L 11   CALCIUM mg/dL 9 0   ALBUMIN g/dL 3 7   TOTAL BILIRUBIN mg/dL 0 79   ALK PHOS U/L 55   ALT U/L 39   AST U/L 66*   GLUCOSE RANDOM mg/dL 121           Results from last 7 days   Lab Units 05/06/23  1403   INR   1 28*             Results from last 7 days   Lab Units 05/06/23  1504 05/06/23  1410 05/06/23  1346   POC GLUCOSE mg/dl 140 91 40*               Results from last 7 days   Lab Units 05/06/23  1403   LACTIC ACID mmol/L 2 6*   PROCALCITONIN ng/ml <0 05         Lines/Drains:  Invasive Devices            Peripheral Intravenous Line  Duration             Peripheral IV 05/06/23 Left Antecubital <1 day     Peripheral IV 05/06/23 Left;Ventral (anterior) Wrist <1 day                  Drain  Duration             External Urinary Catheter 327 days                          Imaging: Reviewed radiology reports from this admission including: CT head  TRAUMA - CT head wo contrast   Final Result by Akira Manuel MD (05/06 7039)       No change from prior study  No evidence of acute intracranial abnormality                       Workstation performed: [id number]           TRAUMA - CT spine cervical wo contrast   Final Result by Akira Manuel MD (05/06 7993)       No cervical spine fracture or traumatic malalignment                        Workstation performed: [id number]           TRAUMA - CT chest abdomen pelvis w contrast   Final Result by Akira Manuel MD (05/06 1525)       1  There is no evidence of acute traumatic injury in the chest abdomen or pelvis   No acute fractures are seen 2  Multiple old right-sided rib fractures/deformities   3  Patient with known complex right adnexal cystic structure  Last ultrasound evaluation was in August 2022  Nonemergent reassessment with ultrasonography is recommended to determine if there has been any change since the prior study   4  Known history of tracheal stenosis                   Workstation performed: [id number]           XR Trauma chest portable   Final Result by Malaika Knott MD (05/06 1712)       No acute cardiopulmonary disease                        Workstation performed: [id number]                 EKG and Other Studies Reviewed on Admission:   • EKG: No EKG obtained

## 2023-05-09 VITALS
BODY MASS INDEX: 32.45 KG/M2 | DIASTOLIC BLOOD PRESSURE: 58 MMHG | OXYGEN SATURATION: 95 % | WEIGHT: 195 LBS | TEMPERATURE: 98.5 F | SYSTOLIC BLOOD PRESSURE: 132 MMHG | RESPIRATION RATE: 18 BRPM | HEART RATE: 76 BPM

## 2023-05-09 LAB
GLUCOSE SERPL-MCNC: 192 MG/DL (ref 65–140)
GLUCOSE SERPL-MCNC: 224 MG/DL (ref 65–140)
GLUCOSE SERPL-MCNC: 239 MG/DL (ref 65–140)

## 2023-05-09 RX ORDER — INSULIN GLARGINE 100 [IU]/ML
10 INJECTION, SOLUTION SUBCUTANEOUS
Qty: 10 ML | Refills: 0
Start: 2023-05-09

## 2023-05-09 RX ADMIN — INSULIN LISPRO 2 UNITS: 100 INJECTION, SOLUTION INTRAVENOUS; SUBCUTANEOUS at 07:32

## 2023-05-09 RX ADMIN — ESCITALOPRAM OXALATE 5 MG: 5 TABLET, FILM COATED ORAL at 08:40

## 2023-05-09 RX ADMIN — INSULIN LISPRO 2 UNITS: 100 INJECTION, SOLUTION INTRAVENOUS; SUBCUTANEOUS at 11:51

## 2023-05-09 RX ADMIN — RIVAROXABAN 20 MG: 10 TABLET, FILM COATED ORAL at 07:32

## 2023-05-09 RX ADMIN — LISINOPRIL 5 MG: 5 TABLET ORAL at 08:40

## 2023-05-09 RX ADMIN — METOPROLOL TARTRATE 50 MG: 50 TABLET, FILM COATED ORAL at 08:40

## 2023-05-09 RX ADMIN — PANTOPRAZOLE SODIUM 40 MG: 40 TABLET, DELAYED RELEASE ORAL at 07:32

## 2023-05-09 RX ADMIN — ASPIRIN 81 MG 81 MG: 81 TABLET ORAL at 08:40

## 2023-05-09 NOTE — DISCHARGE SUMMARY
[address]  Discharge- Ricarda Slot 1955, 74 y o  female MRN: [id number]  Unit/Bed#: MS Virgie-01 Encounter: [id number]  Primary Care Provider: Romi Brooks MD   Date and time admitted to hospital: 5/6/2023  1:36 PM    * [address]  · Patient had mechanical fall prehospital   · Of note patient was hypoglycemic-may be contributing factor   · Patient also has history of stroke with left-sided weakness   · PT/OT consulted recommend post acute rehab  · Patient discharged to [facility] for STR    Type 2 diabetes mellitus with hyperglycemia, with long-term current use of insulin [facility])  Assessment & Plan  Lab Results   Component Value Date    HGBA1C 6 4 02/09/2023       Recent Labs     05/08/23  2021 05/09/23  0721 05/09/23  1140 05/09/23  1441   POCGLU 187* 192* 224* 239*       Blood Sugar Average: Last 72 hrs:  (P) 190 625   · Home regimen held on admission due to hypoglycemia  · Blood sugars elevated in the 200's on 5/8  · Lantus was restarted 5/8 at reduced dose of 10 U qhs  · Blood sugars noted to be in the 180s to 190s  · On discharge the patient was instructed to resume metformin and continue reduced dose of Lantus 10 u qhs  · Patient NOT to resume Tradjenta or Prandin for now  Monitor blood glucose and adjust regimen as needed      Hemiplegia and hemiparesis following cerebral infarction affecting left non-dominant side (HCC)  Assessment & Plan  · Residual left-sided weakness from prior CVA  · Patient had mechanical fall prior to arrival  · PT/OT are recommending post acute rehab on discharge  · Patient accepted at [facility] for STR    Essential hypertension  Assessment & Plan  · Well-controlled on current regimen  · Continue lisinopril, metoprolol    Elevated CK  Assessment & Plan  · Present on admission with   · Likely secondary to being down for 1 day  · Received IV fluids- now resolved    Stage 3 chronic kidney disease, unspecified whether stage 3a or 3b CKD [facility])  Assessment & Plan  Lab Results   Component Value Date    EGFR 47 05/08/2023    EGFR 75 05/07/2023    EGFR 69 05/06/2023    CREATININE 1 21 05/08/2023    CREATININE 0 82 05/07/2023    CREATININE 0 88 05/06/2023     · Currently within normal limits    Current episode of major depressive disorder without prior episode  Assessment & Plan  · Without signs of depression   · Continue Lexapro    Hyperlipidemia  Assessment & Plan  · Continue statin    GERD (gastroesophageal reflux disease)  Assessment & Plan  · Continue PPI    Paroxysmal atrial fibrillation ([address] )  Assessment & Plan  · Controlled on current regimen   · Continue Eliquis for anticoagulation  · Continue metoprolol      Medical Problems     Resolved Problems  Date Reviewed: 5/9/2023   None       Discharging Physician / Practitioner: Evelio Dempsey PA-C  PCP: Melecio Perez MD  Admission Date:   Admission Orders (From admission, onward)     Ordered        05/07/23 1301  Inpatient Admission  Once            05/06/23 1615  Place in Observation  Once                      Discharge Date: 05/09/23    Consultations During Hospital Stay:  · none    Procedures Performed:   · none    Significant Findings / Test Results:   XR Trauma chest portable    Result Date: 5/6/2023  Impression: No acute cardiopulmonary disease  Workstation performed: [id number]     TRAUMA - CT head wo contrast    Result Date: 5/6/2023  Impression: No change from prior study  No evidence of acute intracranial abnormality Workstation performed: [id number]     TRAUMA - CT spine cervical wo contrast    Result Date: 5/6/2023  Impression: No cervical spine fracture or traumatic malalignment  Workstation performed: [id number]     TRAUMA - CT chest abdomen pelvis w contrast    Result Date: 5/6/2023  · Impression: 1  There is no evidence of acute traumatic injury in the chest abdomen or pelvis  No acute fractures are seen 2  Multiple old right-sided rib fractures/deformities 3   Patient with known complex right adnexal cystic structure  Last ultrasound evaluation was in August 2022  Nonemergent reassessment with ultrasonography is recommended to determine if there has been any change since the prior study 4  Known history of tracheal stenosis Workstation performed: [id number]   ·   · Blood glucose 40  ·   · Lactic acid 2 6  Incidental Findings:   · none       Test Results Pending at Discharge (will require follow up):   · none     Outpatient Tests Requested:  · none    Complications:  none    Reason for Admission: fall, hypoglycemia     Hospital Course:   Shawna Mittal is a 74 y o  female patient who originally presented to the hospital on 5/6/2023 due to fall  Please see H&P by Dr Gwyn Coreas dated 5/6/2023 for complete details of presentation  The patient was noted to have a blood glucose of 40 in the ED  Trauma scan negative for acute findings  The patient's home regimen of Metformin, Prandin, Tradjenta, and Lantus 32 U qhs held on admission  The patient was placed on an ISS  CK noted to be elevated at 400 which normalized with IV fluids  The patient's blood sugars improved and was noted to be elevated in the 200's  She was restarted on Lantus at 10 units qhs  Her blood sugars improved into the 180s to 190s  PT/OT recommended post acute placement  The patient was accepted at [facility] for [facility]  The patient was discharged on Lantus 10 U qhs and restarted on her Metformin 500 mg po BID  Reeda Pilon and Prandin held on discharge  Blood sugars to be monitored and medications adjusted as needed  Outpatient follow-up with endocrinology and PCP upon discharge home  Please see above list of diagnoses and related plan for additional information       Condition at Discharge: good    Discharge Day Visit / Exam:   Subjective:    Vitals: Blood Pressure: 132/58 (05/09/23 1432)  Pulse: 76 (05/09/23 1432)  Temperature: 98 5 °F (36 9 °C) (05/09/23 1432)  Temp Source: Oral (05/09/23 0724)  Respirations: 18 (05/09/23 1432)  Weight - Scale: 88 5 kg (195 lb) (05/06/23 1340)  SpO2: 95 % (05/09/23 1432)  Exam:   Physical Exam  Vitals and nursing note reviewed  Constitutional:       Appearance: Normal appearance  HENT:      Head: Normocephalic and atraumatic  Cardiovascular:      Rate and Rhythm: Normal rate and regular rhythm  Pulses: Normal pulses  Heart sounds: Normal heart sounds  Pulmonary:      Effort: Pulmonary effort is normal       Breath sounds: Normal breath sounds  Abdominal:      General: There is no distension  Palpations: Abdomen is soft  Tenderness: There is no abdominal tenderness  Skin:     General: Skin is warm and dry  Neurological:      Mental Status: She is alert and oriented to person, place, and time  Comments: Left hemiparesis for prior CVA   Psychiatric:         Mood and Affect: Mood normal          Behavior: Behavior normal          Thought Content: Thought content normal          Judgment: Judgment normal           Discussion with Family: Patient declined call to   Discharge instructions/Information to patient and family:   See after visit summary for information provided to patient and family  Provisions for Follow-Up Care:  See after visit summary for information related to follow-up care and any pertinent home health orders  Disposition:   Ami Elizondo at [facility]    Planned Readmission: no     Discharge Statement:  I spent 25 minutes discharging the patient  This time was spent on the day of discharge  I had direct contact with the patient on the day of discharge  Greater than 50% of the total time was spent examining patient, answering all patient questions, arranging and discussing plan of care with patient as well as directly providing post-discharge instructions  Additional time then spent on discharge activities      Discharge Medications:  See after visit summary for reconciled discharge medications provided to patient and/or family        **Please Note: This note may have been constructed using a voice recognition system**

## 2023-05-09 NOTE — ASSESSMENT & PLAN NOTE
· Residual left-sided weakness from prior CVA  · Patient had mechanical fall prior to arrival  · PT/OT are recommending post acute rehab on discharge  · Patient accepted at [facility] for [address]

## 2023-05-09 NOTE — UTILIZATION REVIEW
Initial Clinical Review    OBSERVATION 5/6 CHANGED TO INPATIENT ON 5/7 @ 1301 -     Admission: Date/Time/Statement:   Admission Orders (From admission, onward)     Ordered        05/07/23 1301  Inpatient Admission  Once                      Orders Placed This Encounter   Procedures   • Inpatient Admission     Standing Status:   Standing     Number of Occurrences:   1     Order Specific Question:   Level of Care     Answer:   Med Surg [16]     Order Specific Question:   Estimated length of stay     Answer:   More than 2 Midnights     Order Specific Question:   Certification     Answer:   I certify that inpatient services are medically necessary for this patient for a duration of greater than two midnights  See H&P and MD Progress Notes for additional information about the patient's course of treatment  ED Arrival Information     Expected   -    Arrival   5/6/2023 13:36    Acuity   Emergent            Means of arrival   Ambulance    Escorted by   [facility]    Admission type   Emergency            Arrival complaint   Fall           Chief Complaint   Patient presents with   • Trauma       Initial Presentation: 74 y o  female presents to the ED via EMS from home with c/o mechanical fall d/t hypoglycemia and weakness  She could not get up for 1 day  PMH: CVA with left-sided weakness, IDDM, HL, pAF on AC, tracheal stenosis, depression  HTN, HLD, GERD  In the ED she was tachycardic and hypoglycemic  Labs - glucose on POC 40 and serum 121,  elevated K 5 4, BUN, , lactic acid 2 6, abnormal UA  Imaging - no acute injuries, has old R rib fx , known tracheal stenosis, known complex R adenxal cystic structure  Treated with glucose tabs, IV D10/NSS, IV antibiotics, IV D10, IV NSS  On exam irreg rhythm, stridor and rhonchi, general swelling, deformity, sensory deficit and weakness  She is admitted to OBSERVATION status with elevated CK - IV fluids, trend labs    IDDM - hold home regimen and restart when glucose stable  Fall - monitor  Date: 5/7   CHANGED TO INPATIENT STATUS:     K WNL  VS stable on RA  CK trending downward  Glucose 151  Off IV fluids  Seen by PT today and recommendation for post acute rehab  ED Triage Vitals   Temperature Pulse Respirations Blood Pressure SpO2   05/06/23 1340 05/06/23 1340 05/06/23 1340 05/06/23 1340 05/06/23 1340   98 3 °F (36 8 °C) (!) 106 16 146/75 93 %      Temp Source Heart Rate Source Patient Position - Orthostatic VS BP Location FiO2 (%)   05/06/23 1340 05/06/23 1340 05/06/23 1515 05/06/23 1515 --   Temporal Monitor Lying Right arm       Pain Score       05/06/23 1342       No Pain          Wt Readings from Last 1 Encounters:   05/06/23 88 5 kg (195 lb)     Additional Vital Signs:   05/07/23 07:33:56 99 5 °F (37 5 °C) 88 20 127/64 85 96 % None (Room air) Lying   05/06/23 22:07:30 99 °F (37 2 °C) 92 19 122/65 84 95 % -- --   05/06/23 1700 -- -- -- -- -- -- None (Room air) --   05/06/23 16:47:36 98 1 °F (36 7 °C) 102 18 148/81 103 94 % -- --   05/06/23 1600 -- 102 20 152/57 82 97 % -- --   05/06/23 1515 -- 99 18 157/66 -- 97 % -- Lying   05/06/23 1437 -- 102 16 157/76 -- 97 % -- --     Pertinent Labs/Diagnostic Test Results:   TRAUMA - CT head wo contrast   Final Result by Gaudencio Mejia MD (05/06 1457)      No change from prior study  No evidence of acute intracranial abnormality                  Workstation performed: [id number]         TRAUMA - CT spine cervical wo contrast   Final Result by Gaudencio Mejia MD (05/06 1502)      No cervical spine fracture or traumatic malalignment  Workstation performed: [id number]         TRAUMA - CT chest abdomen pelvis w contrast   Final Result by Gaudencio Mejia MD (05/06 1520)      1  There is no evidence of acute traumatic injury in the chest abdomen or pelvis  No acute fractures are seen   2  Multiple old right-sided rib fractures/deformities   3   Patient with known complex right adnexal cystic structure  Last ultrasound evaluation was in August 2022  Nonemergent reassessment with ultrasonography is recommended to determine if there has been any change since the prior study   4  Known history of tracheal stenosis               Workstation performed: [id number]         XR Trauma chest portable   Final Result by Yashira Travis MD (05/06 1712)      No acute cardiopulmonary disease                    Workstation performed: [id number]           Results from last 7 days   Lab Units 05/08/23  1628   SARS-COV-2  Negative     Results from last 7 days   Lab Units 05/08/23  0553 05/06/23  1424   WBC Thousand/uL 7 09 8 92   HEMOGLOBIN g/dL 11 2* 12 2   HEMATOCRIT % 35 3 37 8   PLATELETS Thousands/uL 171 219   NEUTROS ABS Thousands/µL 2 60 6 62         Results from last 7 days   Lab Units 05/08/23  0553 05/07/23  0440 05/06/23  1403   SODIUM mmol/L 135 138 136   POTASSIUM mmol/L 4 1 3 9 5 4*   CHLORIDE mmol/L 104 106 102   CO2 mmol/L 26 23 23   ANION GAP mmol/L 5 9 11   BUN mg/dL 33* 18 26*   CREATININE mg/dL 1 21 0 82 0 88   EGFR ml/min/1 73sq m 47 75 69   CALCIUM mg/dL 8 4 8 5 9 0     Results from last 7 days   Lab Units 05/06/23  1403   AST U/L 66*   ALT U/L 39   ALK PHOS U/L 55   TOTAL PROTEIN g/dL 6 9   ALBUMIN g/dL 3 7   TOTAL BILIRUBIN mg/dL 0 79     Results from last 7 days   Lab Units 05/09/23  0721 05/08/23  2021 05/08/23  1554 05/08/23  1059 05/08/23  0725 05/07/23  2047 05/07/23  1518 05/07/23  1105 05/07/23  0728 05/06/23  2050 05/06/23  1727 05/06/23  1504   POC GLUCOSE mg/dl 192* 187* 223* 223* 183* 188* 293* 208* 130 337* 152* 140     Results from last 7 days   Lab Units 05/08/23  0553 05/07/23  0440 05/06/23  1403   GLUCOSE RANDOM mg/dL 214* 151* 121     Results from last 7 days   Lab Units 05/08/23  0553 05/07/23  0440 05/06/23  1403   CK TOTAL U/L 179 264* 400*             Results from last 7 days   Lab Units 05/06/23  1403   PROTIME seconds 16 0*   INR  1 28*         Results from last 7 days   Lab Units 05/06/23  1403   PROCALCITONIN ng/ml <0 05     Results from last 7 days   Lab Units 05/06/23  1803 05/06/23  1403   LACTIC ACID mmol/L 1 4 2 6*     Results from last 7 days   Lab Units 05/06/23  1532   CLARITY UA  Hazy   COLOR UA  Yellow   SPEC GRAV UA  1 025   PH UA  5 5   GLUCOSE UA mg/dl Negative   KETONES UA mg/dl 15 (1+)*   BLOOD UA  Negative   PROTEIN UA mg/dl 1+*   NITRITE UA  Negative   BILIRUBIN UA  Negative   UROBILINOGEN UA E U /dl 0 2   LEUKOCYTES UA  Negative   WBC UA /hpf 2-4   RBC UA /hpf None Seen   BACTERIA UA /hpf Moderate*   EPITHELIAL CELLS WET PREP /hpf Occasional     Results from last 7 days   Lab Units 05/08/23  1628   INFLUENZA A PCR  Negative   INFLUENZA B PCR  Negative   RSV PCR  Negative         Results from last 7 days   Lab Units 05/06/23  1403   BLOOD CULTURE  No Growth at 48 hrs  No Growth at 48 hrs                 ED Treatment:   Medication Administration from 05/06/2023 1336 to 05/06/2023 1641       Date/Time Order Dose Route Action     05/06/2023 1355 EDT glucose 4 g chewable tablet **ADS Override Pull** --  Given     05/06/2023 1356 EDT dextrose 10 % and normal saline infusion 1,000 mL/hr Intravenous New Bag     05/06/2023 1436 EDT cefTRIAXone (ROCEPHIN) IVPB (premix in dextrose) 1,000 mg 50 mL 1,000 mg Intravenous New Bag     05/06/2023 1440 EDT dextrose 10 % and normal saline infusion 500 mL/hr Intravenous New Bag     05/06/2023 1442 EDT iohexol (OMNIPAQUE) 350 MG/ML injection (SINGLE-DOSE) 100 mL 100 mL Intravenous Given     05/06/2023 1500 EDT sodium chloride 0 9 % bolus 1,000 mL 1,000 mL Intravenous New Bag        Past Medical History:   Diagnosis Date   • Abdominal fibromatosis    • Arthritis    • Depression    • Diabetes mellitus ([facility] )    • GERD (gastroesophageal reflux disease)     controlled   • Hyperlipemia    • Hypertension    • Obesity    • Pneumonia    • Right pontine stroke ([facility] ) 12/13/2021   • Stroke (cerebrum) ([facility] ) 12/17/2021   • Stroke ([facility] ) Present on Admission:  • Paroxysmal atrial fibrillation (HCC)  • GERD (gastroesophageal reflux disease)  • Hyperlipidemia  • Essential hypertension  • Stage 3 chronic kidney disease, unspecified whether stage 3a or 3b CKD (HCC)  • Current episode of major depressive disorder without prior episode  • Platelets decreased ([facility] )      Admitting Diagnosis: UTI (urinary tract infection) [N39 0]  Weakness [R53 1]  Hypoglycemia [E16 2]  Elbow laceration [S51 019A]  Age/Sex: 74 y o  female  Admission Orders:  Scheduled Medications:  aspirin, 81 mg, Oral, Daily  atorvastatin, 80 mg, Oral, Daily With Dinner  escitalopram, 5 mg, Oral, QAM  insulin glargine, 10 Units, Subcutaneous, HS  insulin lispro, 1-6 Units, Subcutaneous, TID AC  insulin lispro, 1-6 Units, Subcutaneous, HS  lisinopril, 5 mg, Oral, QAM  metoprolol tartrate, 50 mg, Oral, Q12H  pantoprazole, 40 mg, Oral, Daily  rivaroxaban, 20 mg, Oral, Daily With Breakfast      Continuous IV Infusions:  IV D10 @ 1L/hr - d/c 5/6     PRN Meds:  acetaminophen, 650 mg, Oral, Q6H PRN  ondansetron, 4 mg, Intravenous, Q6H PRN    POC GLUCOSE AC/HS WITH SSI COVERAGE     Network Utilization Review Department  ATTENTION: Please call with any questions or concerns to [phone] and carefully listen to the prompts so that you are directed to the right person  All voicemails are confidential   Jasmine Thompson all requests for admission clinical reviews, approved or denied determinations and any other requests to dedicated fax number below belonging to the campus where the patient is receiving treatment   List of dedicated fax numbers for the Facilities:  [address] DENIALS (Administrative/Medical Necessity) [phone]   [address] [place] [address] (Maternity/NICU/Pediatrics) [phone]   [address]60 [facility] [address] [place] [address] Yvonne Wise [address] [facility] [place] 134 015 [facility] [phone]

## 2023-05-09 NOTE — PLAN OF CARE
Problem: SAFETY ADULT  Goal: Patient will remain free of falls  Description: INTERVENTIONS:  - Educate patient/family on patient safety including physical limitations  - Instruct patient to call for assistance with activity   - Consult OT/PT to assist with strengthening/mobility   - Keep Call bell within reach  - Keep bed low and locked with side rails adjusted as appropriate  - Keep care items and personal belongings within reach  - Initiate and maintain comfort rounds  - Make Fall Risk Sign visible to staff  - Offer Toileting every 2 Hours, in advance of need  - Initiate/Maintain bed alarm  - Obtain necessary fall risk management equipment  - Apply yellow socks and bracelet for high fall risk patients  - Consider moving patient to room near nurses station  Outcome: Progressing

## 2023-05-09 NOTE — ASSESSMENT & PLAN NOTE
· Patient had mechanical fall prehospital   · Of note patient was hypoglycemic-may be contributing factor   · Patient also has history of stroke with left-sided weakness   · PT/OT consulted recommend post acute rehab  · Patient discharged to [facility] in [facility] for [address] [place]

## 2023-05-09 NOTE — ASSESSMENT & PLAN NOTE
Lab Results   Component Value Date    HGBA1C 6 4 02/09/2023       Recent Labs     05/08/23 2021 05/09/23  0721 05/09/23  1140 05/09/23  1441   POCGLU 187* 192* 224* 239*       Blood Sugar Average: Last 72 hrs:  (P) 190 625   · Home regimen held on admission due to hypoglycemia  · Blood sugars elevated in the 200's on 5/8  · Lantus was restarted 5/8 at reduced dose of 10 U qhs  · Blood sugars noted to be in the 180s to 190s  · On discharge the patient was instructed to resume metformin and continue reduced dose of Lantus 10 u qhs  · Patient NOT to resume Tradjenta or Prandin for now  Monitor blood glucose and adjust regimen as needed

## 2023-05-09 NOTE — PLAN OF CARE
Problem: PAIN - ADULT  Goal: Verbalizes/displays adequate comfort level or baseline comfort level  Description: Interventions:  - Encourage patient to monitor pain and request assistance  - Assess pain using appropriate pain scale  - Administer analgesics based on type and severity of pain and evaluate response  - Implement non-pharmacological measures as appropriate and evaluate response  - Consider cultural and social influences on pain and pain management  - Notify physician/advanced practitioner if interventions unsuccessful or patient reports new pain  Outcome: Progressing     Problem: INFECTION - ADULT  Goal: Absence or prevention of progression during hospitalization  Description: INTERVENTIONS:  - Assess and monitor for signs and symptoms of infection  - Monitor lab/diagnostic results  - Monitor all insertion sites, i e  indwelling lines, tubes, and drains  - Monitor endotracheal if appropriate and nasal secretions for changes in amount and color  - Fried[place] appropriate cooling/warming therapies per order  - Administer medications as ordered  - Instruct and encourage patient and family to use good hand hygiene technique  - Identify and instruct in appropriate isolation precautions for identified infection/condition  Outcome: Progressing  Goal: Absence of fever/infection during neutropenic period  Description: INTERVENTIONS:  - Monitor WBC    Outcome: Progressing     Problem: SAFETY ADULT  Goal: Patient will remain free of falls  Description: INTERVENTIONS:  - Educate patient/family on patient safety including physical limitations  - Instruct patient to call for assistance with activity   - Consult OT/PT to assist with strengthening/mobility   - Keep Call bell within reach  - Keep bed low and locked with side rails adjusted as appropriate  - Keep care items and personal belongings within reach  - Initiate and maintain comfort rounds  - Make Fall Risk Sign visible to staff  - Offer Toileting every 2 Hours, in advance of need  - Initiate/Maintain bed alarm  - Obtain necessary fall risk management equipment  - Apply yellow socks and bracelet for high fall risk patients  - Consider moving patient to room near nurses station  Outcome: Progressing  Goal: Maintain or return to baseline ADL function  Description: INTERVENTIONS:  -  Assess patient's ability to carry out ADLs; assess patient's baseline for ADL function and identify physical deficits which impact ability to perform ADLs (bathing, care of mouth/teeth, toileting, grooming, dressing, etc )  - Assess/evaluate cause of self-care deficits   - Assess range of motion  - Assess patient's mobility; develop plan if impaired  - Assess patient's need for assistive devices and provide as appropriate  - Encourage maximum independence but intervene and supervise when necessary  - Involve family in performance of ADLs  - Assess for home care needs following discharge   - Consider OT consult to assist with ADL evaluation and planning for discharge  - Provide patient education as appropriate  Outcome: Progressing  Goal: Maintains/Returns to pre admission functional level  Description: INTERVENTIONS:  - Perform BMAT or MOVE assessment daily    - Set and communicate daily mobility goal to care team and patient/family/caregiver  - Collaborate with rehabilitation services on mobility goals if consulted  - Perform Range of Motion 3 times a day  - Reposition patient every 2 hours    - Dangle patient 3 times a day  - Stand patient 3 times a day  - Ambulate patient 3 times a day  - Out of bed to chair 3 times a day   - Out of bed for meals 3 times a day  - Out of bed for toileting  - Record patient progress and toleration of activity level   Outcome: Progressing     Problem: DISCHARGE PLANNING  Goal: Discharge to home or other facility with appropriate resources  Description: INTERVENTIONS:  - Identify barriers to discharge w/patient and caregiver  - Arrange for needed discharge resources and transportation as appropriate  - Identify discharge learning needs (meds, wound care, etc )  - Arrange for interpretive services to assist at discharge as needed  - Refer to Case Management Department for coordinating discharge planning if the patient needs post-hospital services based on physician/advanced practitioner order or complex needs related to functional status, cognitive ability, or social support system  Outcome: Progressing     Problem: Knowledge Deficit  Goal: Patient/family/caregiver demonstrates understanding of disease process, treatment plan, medications, and discharge instructions  Description: Complete learning assessment and assess knowledge base    Interventions:  - Provide teaching at level of understanding  - Provide teaching via preferred learning methods  Outcome: Progressing     Problem: Prexisting or High Potential for Compromised Skin Integrity  Goal: Skin integrity is maintained or improved  Description: INTERVENTIONS:  - Identify patients at risk for skin breakdown  - Assess and monitor skin integrity  - Assess and monitor nutrition and hydration status  - Monitor labs   - Assess for incontinence   - Turn and reposition patient  - Assist with mobility/ambulation  - Relieve pressure over bony prominences  - Avoid friction and shearing  - Provide appropriate hygiene as needed including keeping skin clean and dry  - Evaluate need for skin moisturizer/barrier cream  - Collaborate with interdisciplinary team   - Patient/family teaching  - Consider wound care consult   Outcome: Progressing     Problem: MOBILITY - ADULT  Goal: Maintain or return to baseline ADL function  Description: INTERVENTIONS:  -  Assess patient's ability to carry out ADLs; assess patient's baseline for ADL function and identify physical deficits which impact ability to perform ADLs (bathing, care of mouth/teeth, toileting, grooming, dressing, etc )  - Assess/evaluate cause of self-care deficits   - Assess range of motion  - Assess patient's mobility; develop plan if impaired  - Assess patient's need for assistive devices and provide as appropriate  - Encourage maximum independence but intervene and supervise when necessary  - Involve family in performance of ADLs  - Assess for home care needs following discharge   - Consider OT consult to assist with ADL evaluation and planning for discharge  - Provide patient education as appropriate  Outcome: Progressing  Goal: Maintains/Returns to pre admission functional level  Description: INTERVENTIONS:  - Perform BMAT or MOVE assessment daily    - Set and communicate daily mobility goal to care team and patient/family/caregiver  - Collaborate with rehabilitation services on mobility goals if consulted  - Perform Range of Motion 3 times a day  - Reposition patient every 2 hours    - Dangle patient 3 times a day  - Stand patient 3 times a day  - Ambulate patient 3 times a day  - Out of bed to chair 3 times a day   - Out of bed for meals 3 times a day  - Out of bed for toileting  - Record patient progress and toleration of activity level   Outcome: Progressing

## 2023-05-09 NOTE — UTILIZATION REVIEW
05/06/23  1403   GLUCOSE RANDOM mg/dL 214* 151* 121       Results from last 7 days   Lab Units 05/07/23  0440 05/06/23  1403   CK TOTAL U/L 264* 400*             Results from last 7 days   Lab Units 05/06/23  1403   PROTIME seconds 16 0*   INR  1 28*         Results from last 7 days   Lab Units 05/06/23  1403   PROCALCITONIN ng/ml <0 05     Results from last 7 days   Lab Units 05/06/23  1803 05/06/23  1403   LACTIC ACID mmol/L 1 4 2 6*           Results from last 7 days   Lab Units 05/06/23  1532   CLARITY UA  Hazy   COLOR UA  Yellow   SPEC GRAV UA  1 025   PH UA  5 5   GLUCOSE UA mg/dl Negative   KETONES UA mg/dl 15 (1+)*   BLOOD UA  Negative   PROTEIN UA mg/dl 1+*   NITRITE UA  Negative   BILIRUBIN UA  Negative   UROBILINOGEN UA E U /dl 0 2   LEUKOCYTES UA  Negative   WBC UA /hpf 2-4   RBC UA /hpf None Seen   BACTERIA UA /hpf Moderate*   EPITHELIAL CELLS WET PREP /hpf Occasional     Results from last 7 days   Lab Units 05/08/23  1628   INFLUENZA A PCR  Negative   INFLUENZA B PCR  Negative   RSV PCR  Negative                             Results from last 7 days   Lab Units 05/06/23  1403   BLOOD CULTURE  No Growth at 48 hrs  No Growth at 48 hrs           Medications:   Scheduled Medications:  aspirin, 81 mg, Oral, Daily  atorvastatin, 80 mg, Oral, Daily With Dinner  escitalopram, 5 mg, Oral, QAM  insulin glargine, 10 Units, Subcutaneous, HS  insulin lispro, 1-6 Units, Subcutaneous, TID AC  insulin lispro, 1-6 Units, Subcutaneous, HS  lisinopril, 5 mg, Oral, QAM  metoprolol tartrate, 50 mg, Oral, Q12H  pantoprazole, 40 mg, Oral, Daily  rivaroxaban, 20 mg, Oral, Daily With Breakfast      Continuous IV Infusions:     PRN Meds:  acetaminophen, 650 mg, Oral, Q6H PRN  ondansetron, 4 mg, Intravenous, Q6H PRN        Discharge Plan: D    Network Utilization Review Department  ATTENTION: Please call with any questions or concerns to [phone] and carefully listen to the prompts so that you are directed to the right person  All voicemails are confidential   Alfredo Poll all requests for admission clinical reviews, approved or denied determinations and any other requests to dedicated fax number below belonging to the campus where the patient is receiving treatment   List of dedicated fax numbers for the Facilities:  [address] DENIALS (Administrative/Medical Necessity) [phone]   [address] (Maternity/NICU/Pediatrics) [phone]   912 Eliana Mishra [phone]   [facility]  [phone]   13[address] [place] [address] Yvonne Beasley [facility] 28 [phone]   1550 [facility] 134 815 [facility] [phone]

## 2023-05-09 NOTE — CASE MANAGEMENT
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   • Abnormal uterine bleeding (AUB) 2022   • Ovarian mass 2022   • Status post appendectomy 2022   • Platelets decreased ([address] ) 2022   • Cyst of right ovary 2022   • Abnormal CT of the chest 2022   • Abnormality of gait due to impairment of balance 02/15/2022   • Hemiplegia and hemiparesis following cerebral infarction affecting left non-dominant side ([address] ) 2022   • Stage 3 chronic kidney disease, unspecified whether stage 3a or 3b CKD ([facility] ) 2022   • History of CVA (cerebrovascular accident) 2021   • Weakness of both lower extremities    • Muscle tension dysphonia 2020   • Reflux laryngitis 2020   • Glottic insufficiency 2020   • Dermatitis 2020   • YOLIE (obstructive sleep apnea)    • Preoperative clearance 2020   • Medicare annual wellness visit, subsequent 2020   • Current episode of major depressive disorder without prior episode 2019   • Stress incontinence 2019   • Essential hypertension 2019   • GERD (gastroesophageal reflux disease) 03/15/2019   • Hyperlipidemia 03/15/2019   • Edema 03/15/2019   • 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 ) 12/18/2014      LOS (days): 2  Geometric Mean LOS (GMLOS) (days): 2 90  Days to GMLOS:1 1     OBJECTIVE:  Risk of Unplanned Readmission Score: 14 02         Current admission status: Inpatient   Preferred Pharmacy:   [facility] - [address]  Phone: [phone] Fax: [phone]    Primary Care Provider: Maria T Becker MD    Primary Insurance: [facility]  Secondary Insurance: Liliana Mishra Number: [id number]

## 2023-05-09 NOTE — UTILIZATION REVIEW
NOTIFICATION OF INPATIENT ADMISSION   AUTHORIZATION REQUEST   SERVICING FACILITY:   [facility]  600 [facility], [address]  Tax ID: [id number]  NPI: [id number]   ATTENDING PROVIDER:  Attending Name and NPI#: Sejal William [[id number]]  Address: [address] [place] [address], [address] [facility] [address]  Phone: [phone]     ADMISSION INFORMATION:  Place of Service: [place] [address]  Place of Service Code: 21  Inpatient Admission Date/Time: 5/7/23  1:01 PM  Discharge Date/Time: No discharge date for patient encounter  Admitting Diagnosis Code/Description:  UTI (urinary tract infection) [N39 0]  Weakness [R53 1]  Hypoglycemia [E16 2]  Elbow laceration [S51 019A]     UTILIZATION REVIEW CONTACT:  Catracho Collins Utilization   Network Utilization Review Department  Phone: [phone]  Fax [phone]  Email: [email or web address]  Contact for approvals/pending authorizations, clinical reviews, and discharge  PHYSICIAN ADVISORY SERVICES:  Medical Necessity Denial & Xptl-ea-Ylec Review  Phone: [phone]  Fax: [phone]  Email: [email or web address]

## 2023-05-09 NOTE — CASE MANAGEMENT
[address] has received approved authorization from insurance: [id number] received for: SNF  Facility: [address] [place] [address] #: [id number]   Start of Care: 05/09202  Next Review Date: 05/11/2023  Care Coordinator:  Gaby PORRAS#: [phone]  Submit next review to: Fax  [phone]   Care Manager notified: Fernando Marcelino

## 2023-05-09 NOTE — NURSING NOTE
Report called at this time and spoke to Annita Oro  Gave personal phone number if additional questions need to be answered

## 2023-05-09 NOTE — PLAN OF CARE
Problem: PAIN - ADULT  Goal: Verbalizes/displays adequate comfort level or baseline comfort level  Description: Interventions:  - Encourage patient to monitor pain and request assistance  - Assess pain using appropriate pain scale  - Administer analgesics based on type and severity of pain and evaluate response  - Implement non-pharmacological measures as appropriate and evaluate response  - Consider cultural and social influences on pain and pain management  - Notify physician/advanced practitioner if interventions unsuccessful or patient reports new pain  5/9/2023 1223 by Hilary Yuen RN  Outcome: Adequate for Discharge  5/9/2023 0835 by Hilary Yuen RN  Outcome: Progressing     Problem: INFECTION - ADULT  Goal: Absence or prevention of progression during hospitalization  Description: INTERVENTIONS:  - Assess and monitor for signs and symptoms of infection  - Monitor lab/diagnostic results  - Monitor all insertion sites, i e  indwelling lines, tubes, and drains  - Monitor endotracheal if appropriate and nasal secretions for changes in amount and color  - Eb[place] appropriate cooling/warming therapies per order  - Administer medications as ordered  - Instruct and encourage patient and family to use good hand hygiene technique  - Identify and instruct in appropriate isolation precautions for identified infection/condition  5/9/2023 1223 by Hilary Yuen RN  Outcome: Adequate for Discharge  5/9/2023 0835 by Hilary Yuen RN  Outcome: Progressing  Goal: Absence of fever/infection during neutropenic period  Description: INTERVENTIONS:  - Monitor WBC    5/9/2023 1223 by Hilary Yuen RN  Outcome: Adequate for Discharge  5/9/2023 0835 by Hilary Yuen RN  Outcome: Progressing     Problem: SAFETY ADULT  Goal: Patient will remain free of falls  Description: INTERVENTIONS:  - Educate patient/family on patient safety including physical limitations  - Instruct patient to call for assistance with activity   - Consult OT/PT to assist with strengthening/mobility   - Keep Call bell within reach  - Keep bed low and locked with side rails adjusted as appropriate  - Keep care items and personal belongings within reach  - Initiate and maintain comfort rounds  - Make Fall Risk Sign visible to staff  - Offer Toileting every 2 Hours, in advance of need  - Initiate/Maintain bed alarm  - Obtain necessary fall risk management equipment  - Apply yellow socks and bracelet for high fall risk patients  - Consider moving patient to room near nurses station  5/9/2023 1223 by Isidro Madsen RN  Outcome: Adequate for Discharge  5/9/2023 0835 by Isidro Madsen RN  Outcome: Progressing  Goal: Maintain or return to baseline ADL function  Description: INTERVENTIONS:  -  Assess patient's ability to carry out ADLs; assess patient's baseline for ADL function and identify physical deficits which impact ability to perform ADLs (bathing, care of mouth/teeth, toileting, grooming, dressing, etc )  - Assess/evaluate cause of self-care deficits   - Assess range of motion  - Assess patient's mobility; develop plan if impaired  - Assess patient's need for assistive devices and provide as appropriate  - Encourage maximum independence but intervene and supervise when necessary  - Involve family in performance of ADLs  - Assess for home care needs following discharge   - Consider OT consult to assist with ADL evaluation and planning for discharge  - Provide patient education as appropriate  5/9/2023 1223 by Isidro Madsen RN  Outcome: Adequate for Discharge  5/9/2023 0835 by Isidro Madsen RN  Outcome: Progressing  Goal: Maintains/Returns to pre admission functional level  Description: INTERVENTIONS:  - Perform BMAT or MOVE assessment daily    - Set and communicate daily mobility goal to care team and patient/family/caregiver  - Collaborate with rehabilitation services on mobility goals if consulted  - Perform Range of Motion 3 times a day  - Reposition patient every 2 hours    - Dangle patient 3 times a day  - Stand patient 3 times a day  - Ambulate patient 3 times a day  - Out of bed to chair 3 times a day   - Out of bed for meals 3 times a day  - Out of bed for toileting  - Record patient progress and toleration of activity level   5/9/2023 1223 by Keith Garcia RN  Outcome: Adequate for Discharge  5/9/2023 0835 by Keith Garcia RN  Outcome: Progressing     Problem: DISCHARGE PLANNING  Goal: Discharge to home or other facility with appropriate resources  Description: INTERVENTIONS:  - Identify barriers to discharge w/patient and caregiver  - Arrange for needed discharge resources and transportation as appropriate  - Identify discharge learning needs (meds, wound care, etc )  - Arrange for interpretive services to assist at discharge as needed  - Refer to Case Management Department for coordinating discharge planning if the patient needs post-hospital services based on physician/advanced practitioner order or complex needs related to functional status, cognitive ability, or social support system  5/9/2023 1223 by Keith Garcia RN  Outcome: Adequate for Discharge  5/9/2023 0835 by Keith Garcia RN  Outcome: Progressing     Problem: Knowledge Deficit  Goal: Patient/family/caregiver demonstrates understanding of disease process, treatment plan, medications, and discharge instructions  Description: Complete learning assessment and assess knowledge base    Interventions:  - Provide teaching at level of understanding  - Provide teaching via preferred learning methods  5/9/2023 1223 by Keith Garcia RN  Outcome: Adequate for Discharge  5/9/2023 0835 by Keith Garcia RN  Outcome: Progressing     Problem: Prexisting or High Potential for Compromised Skin Integrity  Goal: Skin integrity is maintained or improved  Description: INTERVENTIONS:  - Identify patients at risk for skin breakdown  - Assess and monitor skin integrity  - Assess and monitor nutrition and hydration status  - Monitor labs   - Assess for incontinence - Turn and reposition patient  - Assist with mobility/ambulation  - Relieve pressure over bony prominences  - Avoid friction and shearing  - Provide appropriate hygiene as needed including keeping skin clean and dry  - Evaluate need for skin moisturizer/barrier cream  - Collaborate with interdisciplinary team   - Patient/family teaching  - Consider wound care consult   5/9/2023 1223 by Hilary Yuen RN  Outcome: Adequate for Discharge  5/9/2023 0835 by Hilary Yuen RN  Outcome: Progressing     Problem: MOBILITY - ADULT  Goal: Maintain or return to baseline ADL function  Description: INTERVENTIONS:  -  Assess patient's ability to carry out ADLs; assess patient's baseline for ADL function and identify physical deficits which impact ability to perform ADLs (bathing, care of mouth/teeth, toileting, grooming, dressing, etc )  - Assess/evaluate cause of self-care deficits   - Assess range of motion  - Assess patient's mobility; develop plan if impaired  - Assess patient's need for assistive devices and provide as appropriate  - Encourage maximum independence but intervene and supervise when necessary  - Involve family in performance of ADLs  - Assess for home care needs following discharge   - Consider OT consult to assist with ADL evaluation and planning for discharge  - Provide patient education as appropriate  5/9/2023 1223 by Hilary Yuen RN  Outcome: Adequate for Discharge  5/9/2023 0835 by Hilary Yune RN  Outcome: Progressing  Goal: Maintains/Returns to pre admission functional level  Description: INTERVENTIONS:  - Perform BMAT or MOVE assessment daily    - Set and communicate daily mobility goal to care team and patient/family/caregiver  - Collaborate with rehabilitation services on mobility goals if consulted  - Perform Range of Motion 3 times a day  - Reposition patient every 2 hours    - Dangle patient 3 times a day  - Stand patient 3 times a day  - Ambulate patient 3 times a day  - Out of bed to chair 3 times a day   - Out of bed for meals 3 times a day  - Out of bed for toileting  - Record patient progress and toleration of activity level   5/9/2023 1223 by Stiven Monson RN  Outcome: Adequate for Discharge  5/9/2023 0835 by Stiven Monson RN  Outcome: Progressing

## 2023-05-09 NOTE — CASE MANAGEMENT
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   • Abnormal uterine bleeding (AUB) 2022   • Ovarian mass 2022   • Status post appendectomy 2022   • Platelets decreased ([address] ) 2022   • Cyst of right ovary 2022   • Abnormal CT of the chest 2022   • Abnormality of gait due to impairment of balance 02/15/2022   • Hemiplegia and hemiparesis following cerebral infarction affecting left non-dominant side ([address] ) 2022   • Stage 3 chronic kidney disease, unspecified whether stage 3a or 3b CKD ([address] ) 2022   • History of CVA (cerebrovascular accident) 2021   • Weakness of both lower extremities    • Muscle tension dysphonia 2020   • Reflux laryngitis 2020   • Glottic insufficiency 2020   • Dermatitis 2020   • YOLIE (obstructive sleep apnea)    • Preoperative clearance 2020   • Medicare annual wellness visit, subsequent 2020   • Current episode of major depressive disorder without prior episode 2019   • Stress incontinence 2019   • Essential hypertension 2019   • GERD (gastroesophageal reflux disease) 03/15/2019   • Hyperlipidemia 03/15/2019   • Edema 03/15/2019   • 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   • Incomplete emptying of bladder 2018   • Dysphonia 2018   • Glottic stenosis 2018   • Dysphagia 2018   • Paroxysmal atrial fibrillation ([address] ) 10/17/2017   • Blurry vision 10/17/2017   • Insomnia 10/17/2017   • Type 2 diabetes mellitus with hyperglycemia, with long-term current use of insulin ([address] ) 10/17/2017   • Cerebrovascular accident (CVA) ([facility] [address] ) 2017   • Heart disease 2015   • Diabetic cataract ([address] ) 12/18/2014      LOS (days): 2  Geometric Mean LOS (GMLOS) (days): 2 90  Days to GMLOS:1     OBJECTIVE:  Risk of Unplanned Readmission Score: 14 02         Current admission status: Inpatient   Preferred Pharmacy:   [facility] - [address] [address]  Phone: [phone] Fax: [phone]    Primary Care Provider: Yokasta Hemphill MD    Primary Insurance: [facility]  Secondary Insurance: [address] [place] [address]    DISCHARGE DETAILS:    Discharge planning discussed with[de-identified] Patient and son Jeremy Jara of Choice: Yes     CM contacted family/caregiver?: Yes  Were Treatment Team discharge recommendations reviewed with patient/caregiver?: Yes  Did patient/caregiver verbalize understanding of patient care needs?: Yes  Were patient/caregiver advised of the risks associated with not following Treatment Team discharge recommendations?: Yes    Contacts  Patient Contacts: Esperanza Leroy  Relationship to Patient[de-identified] Family  Contact Method: Phone  Phone Number: [phone]  Reason/Outcome: Emergency Contact, Discharge [address]         Is the patient interested in [address] at discharge?: No    DME Referral Provided  Referral made for DME?: No    Other Referral/Resources/Interventions Provided:  Interventions: Short Term Rehab, Transportation         Treatment Team Recommendation: Short Term Rehab  Discharge Destination Plan[de-identified] Short Term Rehab  Transport at Discharge : [facility]  Dispatcher Contacted: Yes  Number/Name of Dispatcher: Jayro Vieira and Unit #): [place]  ETA of Transport (Date): 05/09/23  ETA of Transport (Time): 0377     Additional Comments: Message from [facility]  38 support staff auth approved  [facility] [place] [place]  Call to heather Knight and updated on discharge plan  Son will take clothes to rehab for patient    Message to CHERYL Stewart and nurse Nathan Arroyo same     Accepting Facility Name, [facility] : Gerardo Davis  Receiving Facility/Agency Phone Number: [phone]  Facility/Agency Fax Number: [phone]

## 2023-05-09 NOTE — ASSESSMENT & PLAN NOTE
· Present on admission with   · Likely secondary to being down for 1 day  · Received IV fluids- now resolved

## 2023-05-12 LAB
BACTERIA BLD CULT: NORMAL
BACTERIA BLD CULT: NORMAL

## 2023-05-15 NOTE — ED PROVIDER NOTES
Emergency Department Trauma Note  Vin Sullivan 74 y o  female MRN: [id number]  Unit/Bed#: /-01 Encounter: [id number]      Trauma Alert: Trauma Acuity: Trauma Evaluation  Model of Arrival: Mode of Arrival: ALS via    Trauma Team: Current Providers  Attending Provider: Honorio Young DO  Attending Provider: Barb Garcia MD  Attending Provider: Bj Guadarrama DO  Registered Nurse: Emi Reynoso RN  Registered Nurse: Elizabeth Murray RN  Registered Nurse: Zackery Woods RN  Patient Care Assistant: Leslye Crocker  Registered Nurse: Nicole Carballo RN  Certified Nursing Assistant: Lesvia Amaya  Patient Care Assistant: Leslye Crocker  Registered Nurse: Zackery Woods RN  Nurse Practitioner: ALEC Davila  Registered Nurse: Nicole Carballo RN  Certified Nursing Assistant: Lesvia Amaya  Certified Nursing Assistant: Livier Alcantara  Patient Care Assistant: Maritza Wagner  Registered Nurse: Senthil Melgar RN  Physician Assistant: Malika Flores PA-C  Care Manager: Carole Abraham RN  Virtual Registered Nurse: Theresa Moran RN  Patient Care Assistant: Maritza Wagner  Patient Care Assistant: Kusum Johnson  Patient Care Assistant: Maritza Wagner  Patient Care Assistant: Chacorta Guzman  Registered Nurse: Maxine Arana RN  Certified Nursing Assistant: Livier Alcantara  Certified Nursing Assistant: Carlos Tanner  Certified Nursing Assistant: Livier Alcantara  Patient Care Assistant: Waylon Giron  Patient Care Assistant: Griffin Mason  Patient Care Assistant: Waylon Giron  Registered Nurse: Senthil Melgar RN  Registered Nurse: Ivett Burch RN  Consultants:     None      History of Present Illness     Chief Complaint:   Chief Complaint   Patient presents with   • Trauma     HPI:  Vin Sullivan is a 74 y o  female who presents with fall with unknown mechanism    Mechanism:Details of Incident: patient fall yesterday; on floor since yesterday AM Injury Date: 05/05/23 Injury Time: 0800 Injury Occurence Location - [address]: carbon    HPI  Review of Systems    Historical Information     Immunizations:   Immunization History   Administered Date(s) Administered   • COVID-19 PFIZER VACCINE 0 3 ML IM 05/05/2021, 06/02/2021, 12/01/2021   • INFLUENZA 11/04/2015, 10/01/2017, 10/30/2020   • Influenza A Monovalent (H5n1), Adjuvanted, [employer] Stock3 10/01/2021   • Influenza, injectable, quadrivalent, preservative free 0 5 mL 09/08/2021   • Pneumococcal Polysaccharide PPV23 11/04/2015       Past Medical History:   Diagnosis Date   • Abdominal fibromatosis    • Arthritis    • Depression    • Diabetes mellitus ([facility] [address] )    • GERD (gastroesophageal reflux disease)     controlled   • Hyperlipemia    • Hypertension    • Obesity    • Pneumonia    • Right pontine stroke ([facility] ) 12/13/2021   • Stroke (cerebrum) ([facility] [address] ) 12/17/2021   • Stroke ([facility] [address] )        Family History   Problem Relation Age of Onset   • No Known Problems Mother    • No Known Problems Father      Past Surgical History:   Procedure Laterality Date   • APPENDECTOMY     • APPENDECTOMY LAPAROSCOPIC N/A 06/12/2022    Procedure: APPENDECTOMY LAPAROSCOPIC;  Surgeon: Radha Walters MD;  Location: [facility];  Service: General   • CATARACT EXTRACTION     • CATARACT EXTRACTION, BILATERAL     • COLONOSCOPY     • EGD     • LAPAROTOMY      Exploratory;  Last Assessed 10/17/2017   • PEG TUBE PLACEMENT     • PEG TUBE REMOVAL       Social History     Tobacco Use   • Smoking status: Never   • Smokeless tobacco: Never   Vaping Use   • Vaping Use: Never used   Substance Use Topics   • Alcohol use: Never     Comment: Socially   • Drug use: Never     E-Cigarette/Vaping   • E-Cigarette Use Never User      E-Cigarette/Vaping Substances   • Nicotine No    • THC No    • CBD No    • Flavoring No    • Other No    • Unknown No        Family History:   Family History   Problem Relation Age of Onset   • No Known Problems Mother    • No Known Problems Father        Meds/Allergies   Prior to "Admission Medications   Prescriptions Last Dose Informant Patient Reported? Taking? Accu-Chek FastClix Lancets MISC Unknown  No No   Sig: Use 2 (two) times a day   Alcohol Swabs (Pharmacist Choice Alcohol) PADS Unknown  Yes No   Aspirin 81 MG CAPS 5/5/2023  Yes Yes   Sig: Take 1 tablet by mouth in the morning   B-D UF III MINI PEN NEEDLES 31G X 5 MM MISC Unknown  No No   Sig: INJECT UNDER THE SKIN AS NEEDED (HYPOGLYCEMIA) USE AS DIRECTED   Blood Glucose Monitoring Suppl (Accu-Chek Guide) w/Device KIT Unknown  No No   Sig: Use 2 (two) times a day   Blood Glucose Monitoring Suppl (ONE TOUCH ULTRA 2) w/Device KIT Unknown  No No   Sig: USE AS DIRECTED   Blood Glucose Monitoring Suppl (OneTouch Verio) w/Device KIT   No No   Sig: Use 2 (two) times a day   Insulin Pen Needle (Pen Needles 5/16\") 30G X 8 MM MISC Unknown  No No   Sig: Use daily   Lancet Devices (Lancet Device with Ejector) MISC Unknown  No No   Sig: USE TO CHECK BLOOD SUGAR TWICE A DAY   Lancets Misc   (Accu-Chek Softclix Lancet Dev) KIT Unknown  No No   Sig: Check blood sugar   Lantus SoloStar 100 units/mL SOPN Past Week  No Yes   Sig: INJECT 0 32 ML (32 UNITS TOTAL) UNDER THE SKIN DAILY AT BEDTIME   Mirabegron ER (Myrbetriq) 50 MG TB24 5/5/2023  No Yes   Sig: Take 1 tablet (50 mg total) by mouth in the morning   OneTouch Ultra test strip Unknown  Yes No   Xarelto 20 MG tablet 5/5/2023  No Yes   Sig: TAKE ONE TABLET BY MOUTH ONCE DAILY WITH BREAKFAST   atorvastatin (LIPITOR) 80 mg tablet 5/5/2023  No Yes   Sig: TAKE ONE TABLET BY MOUTH ONCE DAILY WITH DINNER   clotrimazole (LOTRIMIN) 1 % cream Unknown  Yes No   escitalopram (LEXAPRO) 5 mg tablet 5/5/2023  No Yes   Sig: take 1 tablet by mouth every morning   famotidine (PEPCID) 40 MG tablet 5/5/2023  No Yes   Sig: take 1 tablet by mouth once daily   linaGLIPtin (Tradjenta) 5 MG TABS 5/5/2023  No Yes   Sig: Take 5 mg by mouth every morning   lisinopril (ZESTRIL) 5 mg tablet 5/5/2023  No Yes   Sig: take 1 " tablet by mouth every morning   metFORMIN (GLUCOPHAGE-XR) 500 mg 24 hr tablet 5/5/2023  No Yes   Sig: TAKE 1 TABLET (500 MG TOTAL) BY MOUTH 2 (TWO) TIMES A DAY WITH MEALS   metoprolol tartrate (LOPRESSOR) 50 mg tablet 5/5/2023  No Yes   Sig: take 1 tablet by mouth every 12 hours   ondansetron (ZOFRAN-ODT) 4 mg disintegrating tablet Unknown  No No   Sig: Take 1 tablet (4 mg total) by mouth every 8 (eight) hours as needed for nausea or vomiting   ondansetron (ZOFRAN-ODT) 4 mg disintegrating tablet Unknown  No No   Sig: take 1 tablet by mouth every 8 hours if needed for nausea and vomiting   ondansetron (ZOFRAN-ODT) 4 mg disintegrating tablet Unknown  No No   Sig: TAKE ONE TABLET BY MOUTH EVERY 8 HOURS IF NEEDED FOR NAUSEA AND VOMITING   pantoprazole (PROTONIX) 40 mg tablet 5/5/2023  No Yes   Sig: Take 1 tablet (40 mg total) by mouth daily   repaglinide (PRANDIN) 2 mg tablet 5/5/2023  No Yes   Sig: Take 1 tablet (2 mg total) by mouth 2 (two) times a day before meals      Facility-Administered Medications: None       Allergies   Allergen Reactions   • Apixaban Vomiting and GI Intolerance     Other reaction(s): Vomiting   • Dulaglutide Vomiting     Nausea vomiting       PHYSICAL EXAM    PE limited by: none    Objective   Vitals:   First set: Temperature: 98 3 °F (36 8 °C) (05/06/23 1340)  Pulse: (!) 106 (05/06/23 1340)  Respirations: 16 (05/06/23 1340)  Blood Pressure: 146/75 (05/06/23 1340)  SpO2: 93 % (05/06/23 1340)    Primary Survey:   (A) Airway: intact  (B) Breathing: inact  (C) Circulation: Pulses:   normal  (D) Disabliity:  GCS Total:  15  (E) Expose:  Completed    Secondary Survey: (Click on Physical Exam tab above)  Physical Exam  Vitals and nursing note reviewed  Constitutional:       Appearance: Normal appearance  She is well-developed  HENT:      Head: Normocephalic and atraumatic  Eyes:      Conjunctiva/sclera: Conjunctivae normal       Pupils: Pupils are equal, round, and reactive to light     Neck: Trachea: No tracheal deviation  Cardiovascular:      Rate and Rhythm: Normal rate and regular rhythm  Heart sounds: Normal heart sounds  No murmur heard  Pulmonary:      Effort: Pulmonary effort is normal  No respiratory distress  Breath sounds: Normal breath sounds  No wheezing or rales  Abdominal:      General: Bowel sounds are normal  There is no distension  Palpations: Abdomen is soft  Tenderness: There is abdominal tenderness (suprapubic)  Musculoskeletal:         General: No deformity  Cervical back: Normal range of motion and neck supple  Skin:     General: Skin is warm and dry  Capillary Refill: Capillary refill takes less than 2 seconds  Neurological:      Mental Status: She is alert and oriented to person, place, and time  Sensory: No sensory deficit  Psychiatric:         Thought Content: Thought content normal          Judgment: Judgment normal          Cervical spine cleared by clinical criteria? No (imaging required)      Invasive Devices     Drain  Duration           External Urinary Catheter 336 days                Lab Results:   Results Reviewed     Procedure Component Value Units Date/Time    Blood culture #1 [[id number]] Collected: 05/06/23 1403    Lab Status: Final result Specimen: Blood from Hand, Right Updated: 05/12/23 0001     Blood Culture No Growth After 5 Days  Blood culture #2 [[id number]] Collected: 05/06/23 1403    Lab Status: Final result Specimen: Blood from Hand, Left Updated: 05/12/23 0001     Blood Culture No Growth After 5 Days  Lactic acid 2 Hours [[id number]]  (Normal) Collected: 05/06/23 1803    Lab Status: Final result Specimen: Blood from Hand, Right Updated: 05/06/23 1825     LACTIC ACID 1 4 mmol/L     Narrative:      Result may be elevated if tourniquet was used during collection      Urine Microscopic [[id number]]  (Abnormal) Collected: 05/06/23 1532    Lab Status: Final result Specimen: Urine, Clean Catch Updated: 05/06/23 1542     RBC, UA None Seen /hpf      WBC, UA 2-4 /hpf      Epithelial Cells Occasional /hpf      Bacteria, UA Moderate /hpf     UA w Reflex to Microscopic w Reflex to Culture [[id number]]  (Abnormal) Collected: 05/06/23 1532    Lab Status: Final result Specimen: Urine, Clean Catch Updated: 05/06/23 1538     Color, UA Yellow     Clarity, UA Hazy     Specific [place], UA 1 025     pH, UA 5 5     Leukocytes, UA Negative     Nitrite, UA Negative     Protein, UA 1+ mg/dl      Glucose, UA Negative mg/dl      Ketones, UA 15 (1+) mg/dl      Urobilinogen, UA 0 2 E U /dl      Bilirubin, UA Negative     Occult Blood, UA Negative    Fingerstick Glucose (POCT) [[id number]]  (Normal) Collected: 05/06/23 1504    Lab Status: Final result Updated: 05/06/23 1505     POC Glucose 140 mg/dl     Procalcitonin [[id number]]  (Normal) Collected: 05/06/23 1403    Lab Status: Final result Specimen: Blood from Arm, Left Updated: 05/06/23 1500     Procalcitonin <0 05 ng/ml     CK [[id number]]  (Abnormal) Collected: 05/06/23 1403    Lab Status: Final result Specimen: Blood from Arm, Left Updated: 05/06/23 1453     Total  U/L     Comprehensive metabolic panel [[id number]]  (Abnormal) Collected: 05/06/23 1403    Lab Status: Final result Specimen: Blood from Arm, Left Updated: 05/06/23 1453     Sodium 136 mmol/L      Potassium 5 4 mmol/L      Chloride 102 mmol/L      CO2 23 mmol/L      ANION GAP 11 mmol/L      BUN 26 mg/dL      Creatinine 0 88 mg/dL      Glucose 121 mg/dL      Calcium 9 0 mg/dL      AST 66 U/L      ALT 39 U/L      Alkaline Phosphatase 55 U/L      Total Protein 6 9 g/dL      Albumin 3 7 g/dL      Total Bilirubin 0 79 mg/dL      eGFR 69 ml/min/1 73sq m     Narrative:      Dorothy guidelines for Chronic Kidney Disease (CKD):   •  Stage 1 with normal or high GFR (GFR > 90 mL/min/1 73 square meters)  •  Stage 2 Mild CKD (GFR = 60-89 mL/min/1 73 square meters)  •  Stage 3A Moderate CKD (GFR = 45-59 mL/min/1 73 square meters)  •  Stage 3B Moderate CKD (GFR = 30-44 mL/min/1 73 square meters)  •  Stage 4 Severe CKD (GFR = 15-29 mL/min/1 73 square meters)  •  Stage 5 End Stage CKD (GFR <15 mL/min/1 73 square meters)  Note: GFR calculation is accurate only with a steady state creatinine    Lactic acid [[id number]]  (Abnormal) Collected: 05/06/23 1403    Lab Status: Final result Specimen: Blood from Arm, Left Updated: 05/06/23 1443     LACTIC ACID 2 6 mmol/L     Narrative:      Result may be elevated if tourniquet was used during collection      Fingerstick Glucose (POCT) [[id number]]  (Normal) Collected: 05/06/23 1410    Lab Status: Final result Updated: 05/06/23 1435     POC Glucose 91 mg/dl     Fingerstick Glucose (POCT) [[id number]]  (Abnormal) Collected: 05/06/23 1346    Lab Status: Final result Updated: 05/06/23 1435     POC Glucose 40 mg/dl     CBC and differential [[id number]] Collected: 05/06/23 1424    Lab Status: Final result Specimen: Blood from Arm, Left Updated: 05/06/23 1429     WBC 8 92 Thousand/uL      RBC 3 93 Million/uL      Hemoglobin 12 2 g/dL      Hematocrit 37 8 %      MCV 96 fL      MCH 31 0 pg      MCHC 32 3 g/dL      RDW 13 0 %      MPV 12 5 fL      Platelets 876 Thousands/uL      nRBC 0 /100 WBCs      Neutrophils Relative 74 %      Immat GRANS % 0 %      Lymphocytes Relative 16 %      Monocytes Relative 7 %      Eosinophils Relative 3 %      Basophils Relative 0 %      Neutrophils Absolute 6 62 Thousands/µL      Immature Grans Absolute 0 04 Thousand/uL      Lymphocytes Absolute 1 39 Thousands/µL      Monocytes Absolute 0 59 Thousand/µL      Eosinophils Absolute 0 24 Thousand/µL      Basophils Absolute 0 04 Thousands/µL     Protime-INR [[id number]]  (Abnormal) Collected: 05/06/23 1403    Lab Status: Final result Specimen: Blood from Arm, Left Updated: 05/06/23 1426     Protime 16 0 seconds      INR 1 28                 Imaging Studies:   Direct to CT: No  TRAUMA - CT head wo contrast   Final Result by Glendy Rivera MD (05/06 9624)      No change from prior study  No evidence of acute intracranial abnormality                  Workstation performed: [id number]         TRAUMA - CT spine cervical wo contrast   Final Result by Glendy Rivera MD (05/06 2666)      No cervical spine fracture or traumatic malalignment  Workstation performed: [id number]         TRAUMA - CT chest abdomen pelvis w contrast   Final Result by Glendy Rivera MD (05/06 1529)      1  There is no evidence of acute traumatic injury in the chest abdomen or pelvis  No acute fractures are seen   2  Multiple old right-sided rib fractures/deformities   3  Patient with known complex right adnexal cystic structure  Last ultrasound evaluation was in August 2022  Nonemergent reassessment with ultrasonography is recommended to determine if there has been any change since the prior study   4  Known history of tracheal stenosis               Workstation performed: [id number]         XR Trauma chest portable   Final Result by Glendy Rivera MD (05/06 9530)      No acute cardiopulmonary disease                    Workstation performed: [id number]               Procedures  POC FAST US    Date/Time: 5/15/2023 3:50 PM  Performed by: Ernesto Velez DO  Authorized by: Ernesto Velez DO     Patient location:  ED  Procedure details:     Exam Type:  Diagnostic    Indications: blunt abdominal trauma and blunt chest trauma      Assess for:  Hemothorax, intra-abdominal fluid, pericardial effusion and pneumothorax    Technique: extended FAST      Views obtained:  Heart - Pericardial sac, LUQ - Splenorenal space, Left thorax, Right thorax, Suprapubic - Pouch of Reinier and RUQ - Ballesteros's Pouch    Image quality: diagnostic      Image availability:  Not obtained due to urgency  FAST Findings:     RUQ (Hepatorenal) free fluid: absent      LUQ (Splenorenal) free fluid: absent      Suprapubic free fluid: absent      Cardiac wall motion: identified Pericardial effusion: absent    extended FAST (Pulmonary) findings:     Left lung sliding: Present      Right lung sliding: Present      Left pleural effusion: Absent      Right pleural effusion: Present    Interpretation:     Impressions: negative      Positive findings: right pleural effusion present    Comments:      Suggestive of right pleural effusion             ED Course  ED Course as of 05/15/23 1606   Sat May 06, 2023   1549 Bacteria, UA(!): Moderate   1549 LACTIC ACID(!!): 2 6  Giving IVF           Medical Decision Making  30-year-old female presents with fall, unknown mechanism with prolonged downtime  She smells of urine, possibly due to incontinence and urine soaked close however suggestive of a urinary tract infection  She has no obvious external signs of trauma however she is unsure of the mechanism and is unable to recall if she hit her head or not  Trauma evaluation called because of this  Patient require CT of the head, spine and chest abdomen pelvis to rule out occult injury as well as evaluate for potential sepsis/infection  Differential includes intracranial hemorrhage, fracture, injury to internal organs, although clinically doubt due to unknown mechanism patient will require extensive CT study as well as bedside chest x-ray  Bedside chest x-ray viewed and interpreted, by me as no acute disease, chronic deformity of the right ribs  Fall, initial encounter: acute illness or injury  Hypoglycemia: acute illness or injury  UTI (urinary tract infection): acute illness or injury  Amount and/or Complexity of Data Reviewed  Labs: ordered  Decision-making details documented in ED Course  Radiology: ordered and independent interpretation performed  Decision-making details documented in ED Course  Risk  Prescription drug management  Decision regarding hospitalization                    Disposition  [facility] Transfer: No  Final diagnoses:   Hypoglycemia   UTI (urinary tract infection)   Fall, initial encounter     Time reflects when diagnosis was documented in both MDM as applicable and the Disposition within this note     Time User Action Codes Description Comment    5/6/2023  3:48 PM Josie Lone Add [E16 2] Hypoglycemia     5/6/2023  3:48 PM Josie Lone Add [A41 9] Sepsis ([facility] )     5/6/2023  3:48 PM Josie Lone Remove [A41 9] Sepsis ([facility] )     5/6/2023  3:48 PM Josie Lone Add [N39 0] UTI (urinary tract infection)     5/6/2023  3:48 PM Josie Lone Add [P63  XXXA] Fall, initial encounter     5/9/2023 12:54 PM Ashkan Rogelio Add [E11 65,  Z79 4] Type 2 diabetes mellitus with hyperglycemia, with long-term current use of insulin [facility])       ED Disposition     ED Disposition   Admit    Condition   Stable    Date/Time   Sat May 6, 2023  3:47 PM    Comment   Case was discussed with tbd and the patient's admission status was agreed to be Admission Status: inpatient status to the service of Dr Landon Bustos MD Documentation    [address] Name, [address] by (Company and Unit #) Levar Roman      RN Documentation    [address] Name, [address] by (Company and Unit #) Levar Roman      Follow-up Information     Follow up With Specialties Details Why Conchita Galan MD Family Medicine Follow up PLease call for a follow-up appointment upon discharge home   [address]       Yenifer Mcgarry MD Endocrinology   [address] [place] [id number]  [phone]          Discharge Medication List as of 5/9/2023  2:50 PM      START taking these medications    Details   insulin glargine (LANTUS) 100 units/mL subcutaneous injection Inject 10 Units under the skin daily at bedtime, Starting Tue 5/9/2023, No Print         CONTINUE these medications "which have NOT CHANGED    Details   Aspirin 81 MG CAPS Take 1 tablet by mouth in the morning, Historical Med      atorvastatin (LIPITOR) 80 mg tablet TAKE ONE TABLET BY MOUTH ONCE DAILY WITH DINNER, Normal      escitalopram (LEXAPRO) 5 mg tablet take 1 tablet by mouth every morning, Normal      famotidine (PEPCID) 40 MG tablet take 1 tablet by mouth once daily, Normal      lisinopril (ZESTRIL) 5 mg tablet take 1 tablet by mouth every morning, Normal      metFORMIN (GLUCOPHAGE-XR) 500 mg 24 hr tablet TAKE 1 TABLET (500 MG TOTAL) BY MOUTH 2 (TWO) TIMES A DAY WITH MEALS, Starting Thu 1/19/2023, Until Tue 7/18/2023, Normal      metoprolol tartrate (LOPRESSOR) 50 mg tablet take 1 tablet by mouth every 12 hours, Normal      Mirabegron ER (Myrbetriq) 50 MG TB24 Take 1 tablet (50 mg total) by mouth in the morning, Starting Fri 2/10/2023, Normal      pantoprazole (PROTONIX) 40 mg tablet Take 1 tablet (40 mg total) by mouth daily, Starting Tue 2/21/2023, No Print      Xarelto 20 MG tablet TAKE ONE TABLET BY MOUTH ONCE DAILY WITH BREAKFAST, Normal      Accu-Chek FastClix Lancets MISC Use 2 (two) times a day, Starting Wed 12/28/2022, Normal      Alcohol Swabs (Pharmacist Choice Alcohol) PADS Starting Wed 2/1/2023, Historical Med      !! B-D UF III MINI PEN NEEDLES 31G X 5 MM MISC INJECT UNDER THE SKIN AS NEEDED (HYPOGLYCEMIA) USE AS DIRECTED, Normal      !! Blood Glucose Monitoring Suppl (Accu-Chek Guide) w/Device KIT Use 2 (two) times a day, Starting Wed 12/28/2022, Normal      !! Blood Glucose Monitoring Suppl (ONE TOUCH ULTRA 2) w/Device KIT USE AS DIRECTED, Normal      clotrimazole (LOTRIMIN) 1 % cream Historical Med      !! Insulin Pen Needle (Pen Needles 5/16\") 30G X 8 MM MISC Use daily, Starting Wed 12/28/2022, Normal      Lancet Devices (Lancet Device with Ejector) MISC USE TO CHECK BLOOD SUGAR TWICE A DAY, Normal      Lancets Misc   (Accu-Chek Softclix Lancet Dev) KIT Check blood sugar, Normal      ondansetron " (ZOFRAN-ODT) 4 mg disintegrating tablet Take 1 tablet (4 mg total) by mouth every 8 (eight) hours as needed for nausea or vomiting, Starting Wed 9/8/2021, Normal      OneTouch Ultra test strip Historical Med       !! - Potential duplicate medications found  Please discuss with provider        STOP taking these medications       Lantus SoloStar 100 units/mL SOPN Comments:   Reason for Stopping:         linaGLIPtin (Tradjenta) 5 MG TABS Comments:   Reason for Stopping:         repaglinide (PRANDIN) 2 mg tablet Comments:   Reason for Stopping:             Outpatient Discharge Orders   Discharge Diet     Discharge Condition:  Improving     Patient Aware of Diagnosis: Yes     Free of Communicable Disease:   Yes     Physical Therapy Eval And Treat     Occupational Therapy Eval and Treat     Activity:  Per Rehab Recommendations       PDMP Review       Value Time User    PDMP Reviewed  Yes 12/31/2021 10:45 PM Shawna Hu DO          ED Provider  Electronically Signed by         Michoacano Costello DO  05/15/23 7939

## 2023-05-18 ENCOUNTER — HOME HEALTH ADMISSION (OUTPATIENT)
Dept: HOME HEALTH SERVICES | Facility: HOME HEALTHCARE | Age: 68
End: 2023-05-18
Payer: COMMERCIAL

## 2023-05-18 ENCOUNTER — TRANSCRIBE ORDERS (OUTPATIENT)
Dept: HOME HEALTH SERVICES | Facility: HOME HEALTHCARE | Age: 68
End: 2023-05-18

## 2023-05-18 DIAGNOSIS — I69.354 HEMIPLEGIA AND HEMIPARESIS FOLLOWING CEREBRAL INFARCTION AFFECTING LEFT NON-DOMINANT SIDE (HCC): Primary | ICD-10-CM

## 2023-05-22 DIAGNOSIS — Z79.4 OTHER SPECIFIED DIABETES MELLITUS WITHOUT COMPLICATION, WITH LONG-TERM CURRENT USE OF INSULIN (HCC): ICD-10-CM

## 2023-05-22 DIAGNOSIS — K21.9 GASTROESOPHAGEAL REFLUX DISEASE, UNSPECIFIED WHETHER ESOPHAGITIS PRESENT: Chronic | ICD-10-CM

## 2023-05-22 DIAGNOSIS — E13.9 OTHER SPECIFIED DIABETES MELLITUS WITHOUT COMPLICATION, WITH LONG-TERM CURRENT USE OF INSULIN (HCC): ICD-10-CM

## 2023-05-22 DIAGNOSIS — I48.0 PAROXYSMAL ATRIAL FIBRILLATION (HCC): ICD-10-CM

## 2023-05-22 RX ORDER — FAMOTIDINE 40 MG/1
TABLET, FILM COATED ORAL
Qty: 90 TABLET | Refills: 1 | Status: SHIPPED | OUTPATIENT
Start: 2023-05-22

## 2023-05-22 RX ORDER — PEN NEEDLE, DIABETIC, SAFETY 30 GX5/16"
NEEDLE, DISPOSABLE MISCELLANEOUS
Qty: 100 EACH | Refills: 3 | Status: SHIPPED | OUTPATIENT
Start: 2023-05-22 | End: 2023-05-23

## 2023-05-22 RX ORDER — METOPROLOL TARTRATE 50 MG/1
TABLET, FILM COATED ORAL
Qty: 180 TABLET | Refills: 1 | Status: SHIPPED | OUTPATIENT
Start: 2023-05-22

## 2023-05-23 ENCOUNTER — HOME CARE VISIT (OUTPATIENT)
Dept: HOME HEALTH SERVICES | Facility: HOME HEALTHCARE | Age: 68
End: 2023-05-23

## 2023-05-23 VITALS
HEART RATE: 90 BPM | RESPIRATION RATE: 16 BRPM | DIASTOLIC BLOOD PRESSURE: 68 MMHG | OXYGEN SATURATION: 100 % | SYSTOLIC BLOOD PRESSURE: 122 MMHG | TEMPERATURE: 98.1 F

## 2023-05-23 NOTE — CASE COMMUNICATION
[facility] has admitted your patient to [address] [place] [address] service with the following disciplines:      SN, PT, OT, MSW and HHA    +++Response needed, please respond via ___inbasket+++    Primary focus of home health care___diabetic management    Patient stated goals of care__to stay healthy and at home    Anticipated visit pattern and next visit date__3w2  2w3    See medication list - meds in home differ from [facility]  +++Repaglinide 2mg tab tw ice daily is currently in medication pack in the home  patient reports she is to continue takin this but it is not on the discharge papers from [facility]  please advise if you wish for her to continue this medication+++    Significant clinical findings__Veronica is not able to test her blood sugar indepdently she said she can not remember no matter how many times she is shown  can you try and place order for approval through insurance fo r the freestyle wesley or dexacom CGM for her? Potential barriers to goal achievement___disease process    Other pertinent information__add on HHA order at [facility] she needs assistance with bathing right now  will send 2x week x4 weeks    Thank you for allowing us to participate in the care of your patient        Eliseo Laughlin RN

## 2023-05-23 NOTE — CASE COMMUNICATION
at [facility] requested assistance with  -moms meals or any possible meal delivery system  -life alert system  -possible waiver program for home health aide long term assistance in the home    she does have transportation services through her insurance company and has pill delivery through [employer]  son lives within driving distance and does visit occasionally       Thank you

## 2023-05-24 ENCOUNTER — HOME CARE VISIT (OUTPATIENT)
Dept: HOME HEALTH SERVICES | Facility: HOME HEALTHCARE | Age: 68
End: 2023-05-24

## 2023-05-24 NOTE — CASE COMMUNICATION
Speech therapy evaluation completed 5/24/23; no ST f/u indicated  Pt presents with s/s suggestive of mild oropharyngeal dysphagia as well as dysphonia 2* hx of vocal cord paralysis and tracheal stenosis consistent with baseline  Cognitive status is WNLs per screening by [facility]  She was discharged from [facility] on a regular diet and thin liquids which has also been her baseline  No recent hx of PNA noted in chart  Review o f s/s aspiration and/or worsening dysphagia as well as precautions provided with good pt teachback  Will continue with a regular diet and thin liquids at this time; aspiration precautions  Pt in agreement with POC  D/c speech therapy

## 2023-05-25 ENCOUNTER — HOME CARE VISIT (OUTPATIENT)
Dept: HOME HEALTH SERVICES | Facility: HOME HEALTHCARE | Age: 68
End: 2023-05-25

## 2023-05-25 VITALS — OXYGEN SATURATION: 99 % | SYSTOLIC BLOOD PRESSURE: 122 MMHG | HEART RATE: 86 BPM | DIASTOLIC BLOOD PRESSURE: 78 MMHG

## 2023-05-25 DIAGNOSIS — E11.65 TYPE 2 DIABETES MELLITUS WITH HYPERGLYCEMIA, WITH LONG-TERM CURRENT USE OF INSULIN (HCC): Chronic | ICD-10-CM

## 2023-05-25 DIAGNOSIS — Z79.4 TYPE 2 DIABETES MELLITUS WITH HYPERGLYCEMIA, WITH LONG-TERM CURRENT USE OF INSULIN (HCC): Chronic | ICD-10-CM

## 2023-05-25 DIAGNOSIS — E11.649 HYPOGLYCEMIA UNAWARENESS ASSOCIATED WITH TYPE 2 DIABETES MELLITUS (HCC): Primary | ICD-10-CM

## 2023-05-25 NOTE — CASE COMMUNICATION
Faxed Dr Kristin Harvey regarding the delay in initiating PT within 7 days  Anticipated date to begin is 6/2/23 or sooner  PT to contact the patient to schedule the visit

## 2023-05-26 ENCOUNTER — HOME CARE VISIT (OUTPATIENT)
Dept: HOME HEALTH SERVICES | Facility: HOME HEALTHCARE | Age: 68
End: 2023-05-26

## 2023-05-27 ENCOUNTER — HOME CARE VISIT (OUTPATIENT)
Dept: HOME HEALTH SERVICES | Facility: HOME HEALTHCARE | Age: 68
End: 2023-05-27

## 2023-05-29 NOTE — CASE COMMUNICATION
[facility] AT [facility] OT evaluation 5/25/23    OT  to continue 1wk 1 the 2x per wk x 3 weeks for therapeutic exercise, ADL training, review of safety with transfers/mobility and recommendations for DME/AE as appropriate

## 2023-05-30 ENCOUNTER — HOME CARE VISIT (OUTPATIENT)
Dept: HOME HEALTH SERVICES | Facility: HOME HEALTHCARE | Age: 68
End: 2023-05-30

## 2023-05-30 ENCOUNTER — HOME CARE VISIT (OUTPATIENT)
Dept: HOME HEALTH SERVICES | Facility: HOME HEALTHCARE | Age: 68
End: 2023-05-30
Payer: COMMERCIAL

## 2023-05-30 VITALS
OXYGEN SATURATION: 98 % | DIASTOLIC BLOOD PRESSURE: 78 MMHG | HEART RATE: 82 BPM | RESPIRATION RATE: 16 BRPM | SYSTOLIC BLOOD PRESSURE: 138 MMHG | TEMPERATURE: 97.6 F

## 2023-05-30 PROCEDURE — G0299 HHS/HOSPICE OF RN EA 15 MIN: HCPCS

## 2023-05-31 ENCOUNTER — HOME CARE VISIT (OUTPATIENT)
Dept: HOME HEALTH SERVICES | Facility: HOME HEALTHCARE | Age: 68
End: 2023-05-31

## 2023-05-31 ENCOUNTER — HOME CARE VISIT (OUTPATIENT)
Dept: HOME HEALTH SERVICES | Facility: HOME HEALTHCARE | Age: 68
End: 2023-05-31
Payer: COMMERCIAL

## 2023-05-31 VITALS — SYSTOLIC BLOOD PRESSURE: 164 MMHG | OXYGEN SATURATION: 96 % | HEART RATE: 88 BPM | DIASTOLIC BLOOD PRESSURE: 96 MMHG

## 2023-05-31 VITALS — OXYGEN SATURATION: 98 % | DIASTOLIC BLOOD PRESSURE: 78 MMHG | SYSTOLIC BLOOD PRESSURE: 118 MMHG | HEART RATE: 84 BPM

## 2023-05-31 DIAGNOSIS — Z79.4 OTHER SPECIFIED DIABETES MELLITUS WITHOUT COMPLICATION, WITH LONG-TERM CURRENT USE OF INSULIN (HCC): ICD-10-CM

## 2023-05-31 DIAGNOSIS — E13.9 OTHER SPECIFIED DIABETES MELLITUS WITHOUT COMPLICATION, WITH LONG-TERM CURRENT USE OF INSULIN (HCC): ICD-10-CM

## 2023-05-31 RX ORDER — PEN NEEDLE, DIABETIC, SAFETY 30 GX5/16"
NEEDLE, DISPOSABLE MISCELLANEOUS
Qty: 100 EACH | Refills: 3 | Status: SHIPPED | OUTPATIENT
Start: 2023-05-31

## 2023-06-01 ENCOUNTER — HOME CARE VISIT (OUTPATIENT)
Dept: HOME HEALTH SERVICES | Facility: HOME HEALTHCARE | Age: 68
End: 2023-06-01

## 2023-06-01 ENCOUNTER — HOME CARE VISIT (OUTPATIENT)
Dept: HOME HEALTH SERVICES | Facility: HOME HEALTHCARE | Age: 68
End: 2023-06-01
Payer: COMMERCIAL

## 2023-06-01 VITALS — OXYGEN SATURATION: 98 % | HEART RATE: 80 BPM | SYSTOLIC BLOOD PRESSURE: 140 MMHG | DIASTOLIC BLOOD PRESSURE: 80 MMHG

## 2023-06-01 PROCEDURE — G0299 HHS/HOSPICE OF RN EA 15 MIN: HCPCS

## 2023-06-02 ENCOUNTER — HOME CARE VISIT (OUTPATIENT)
Dept: HOME HEALTH SERVICES | Facility: HOME HEALTHCARE | Age: 68
End: 2023-06-02

## 2023-06-02 ENCOUNTER — HOME CARE VISIT (OUTPATIENT)
Dept: HOME HEALTH SERVICES | Facility: HOME HEALTHCARE | Age: 68
End: 2023-06-02
Payer: COMMERCIAL

## 2023-06-02 VITALS
SYSTOLIC BLOOD PRESSURE: 122 MMHG | RESPIRATION RATE: 18 BRPM | OXYGEN SATURATION: 95 % | HEART RATE: 86 BPM | TEMPERATURE: 97.7 F | DIASTOLIC BLOOD PRESSURE: 76 MMHG

## 2023-06-02 PROCEDURE — G0152 HHCP-SERV OF OT,EA 15 MIN: HCPCS

## 2023-06-02 NOTE — CASE COMMUNICATION
MSW Eval and Tx ordered added to Alex  She is interested meal delivery services, transportation services and is requesting assistance with trying to find in home caregivers after [facility] has discharged

## 2023-06-04 VITALS — SYSTOLIC BLOOD PRESSURE: 118 MMHG | HEART RATE: 88 BPM | DIASTOLIC BLOOD PRESSURE: 60 MMHG | OXYGEN SATURATION: 98 %

## 2023-06-05 ENCOUNTER — HOME CARE VISIT (OUTPATIENT)
Dept: HOME HEALTH SERVICES | Facility: HOME HEALTHCARE | Age: 68
End: 2023-06-05
Payer: COMMERCIAL

## 2023-06-05 VITALS — OXYGEN SATURATION: 92 % | DIASTOLIC BLOOD PRESSURE: 80 MMHG | SYSTOLIC BLOOD PRESSURE: 136 MMHG | HEART RATE: 89 BPM

## 2023-06-05 VITALS
HEART RATE: 82 BPM | RESPIRATION RATE: 16 BRPM | OXYGEN SATURATION: 98 % | TEMPERATURE: 98.1 F | SYSTOLIC BLOOD PRESSURE: 122 MMHG | DIASTOLIC BLOOD PRESSURE: 72 MMHG

## 2023-06-05 PROCEDURE — G0152 HHCP-SERV OF OT,EA 15 MIN: HCPCS

## 2023-06-05 PROCEDURE — G0151 HHCP-SERV OF PT,EA 15 MIN: HCPCS

## 2023-06-05 PROCEDURE — G0156 HHCP-SVS OF AIDE,EA 15 MIN: HCPCS

## 2023-06-06 ENCOUNTER — HOME CARE VISIT (OUTPATIENT)
Dept: HOME HEALTH SERVICES | Facility: HOME HEALTHCARE | Age: 68
End: 2023-06-06
Payer: COMMERCIAL

## 2023-06-06 PROCEDURE — G0299 HHS/HOSPICE OF RN EA 15 MIN: HCPCS

## 2023-06-06 NOTE — Clinical Note
lantus 8units HS  CGM  has been on for a week now and she is doing well with it  sugars have ranged from 200-330  would you like for her to increase insulin or stay at the current dose of 8 units for now?     Thank you  Prasanna Byrd RN

## 2023-06-07 ENCOUNTER — HOME CARE VISIT (OUTPATIENT)
Dept: HOME HEALTH SERVICES | Facility: HOME HEALTHCARE | Age: 68
End: 2023-06-07
Payer: COMMERCIAL

## 2023-06-07 VITALS
DIASTOLIC BLOOD PRESSURE: 90 MMHG | SYSTOLIC BLOOD PRESSURE: 138 MMHG | OXYGEN SATURATION: 94 % | DIASTOLIC BLOOD PRESSURE: 82 MMHG | HEART RATE: 90 BPM | OXYGEN SATURATION: 96 % | HEART RATE: 82 BPM | SYSTOLIC BLOOD PRESSURE: 142 MMHG

## 2023-06-07 VITALS
OXYGEN SATURATION: 98 % | TEMPERATURE: 97.7 F | RESPIRATION RATE: 18 BRPM | SYSTOLIC BLOOD PRESSURE: 126 MMHG | HEART RATE: 71 BPM | DIASTOLIC BLOOD PRESSURE: 78 MMHG

## 2023-06-07 PROCEDURE — G0152 HHCP-SERV OF OT,EA 15 MIN: HCPCS

## 2023-06-08 ENCOUNTER — HOME CARE VISIT (OUTPATIENT)
Dept: HOME HEALTH SERVICES | Facility: HOME HEALTHCARE | Age: 68
End: 2023-06-08
Payer: COMMERCIAL

## 2023-06-08 VITALS — HEART RATE: 71 BPM | SYSTOLIC BLOOD PRESSURE: 120 MMHG | OXYGEN SATURATION: 99 % | DIASTOLIC BLOOD PRESSURE: 78 MMHG

## 2023-06-08 PROCEDURE — G0299 HHS/HOSPICE OF RN EA 15 MIN: HCPCS

## 2023-06-08 PROCEDURE — G0151 HHCP-SERV OF PT,EA 15 MIN: HCPCS

## 2023-06-08 PROCEDURE — G0155 HHCP-SVS OF CSW,EA 15 MIN: HCPCS

## 2023-06-08 PROCEDURE — G0156 HHCP-SVS OF AIDE,EA 15 MIN: HCPCS

## 2023-06-09 ENCOUNTER — OFFICE VISIT (OUTPATIENT)
Dept: FAMILY MEDICINE CLINIC | Facility: CLINIC | Age: 68
End: 2023-06-09
Payer: COMMERCIAL

## 2023-06-09 VITALS
HEART RATE: 80 BPM | WEIGHT: 189 LBS | BODY MASS INDEX: 31.49 KG/M2 | TEMPERATURE: 97.8 F | HEIGHT: 65 IN | OXYGEN SATURATION: 99 % | DIASTOLIC BLOOD PRESSURE: 70 MMHG | SYSTOLIC BLOOD PRESSURE: 122 MMHG

## 2023-06-09 DIAGNOSIS — E11.21 DIABETIC NEPHROPATHY ASSOCIATED WITH TYPE 2 DIABETES MELLITUS (HCC): ICD-10-CM

## 2023-06-09 DIAGNOSIS — I63.9 CEREBROVASCULAR ACCIDENT (CVA), UNSPECIFIED MECHANISM (HCC): ICD-10-CM

## 2023-06-09 DIAGNOSIS — I48.0 PAROXYSMAL ATRIAL FIBRILLATION (HCC): Chronic | ICD-10-CM

## 2023-06-09 DIAGNOSIS — E11.65 TYPE 2 DIABETES MELLITUS WITH HYPERGLYCEMIA, WITH LONG-TERM CURRENT USE OF INSULIN (HCC): Primary | Chronic | ICD-10-CM

## 2023-06-09 DIAGNOSIS — D69.6 PLATELETS DECREASED (HCC): ICD-10-CM

## 2023-06-09 DIAGNOSIS — Z79.4 TYPE 2 DIABETES MELLITUS WITH STAGE 3A CHRONIC KIDNEY DISEASE, WITH LONG-TERM CURRENT USE OF INSULIN (HCC): ICD-10-CM

## 2023-06-09 DIAGNOSIS — E11.22 TYPE 2 DIABETES MELLITUS WITH STAGE 3A CHRONIC KIDNEY DISEASE, WITH LONG-TERM CURRENT USE OF INSULIN (HCC): ICD-10-CM

## 2023-06-09 DIAGNOSIS — I69.354 HEMIPLEGIA AND HEMIPARESIS FOLLOWING CEREBRAL INFARCTION AFFECTING LEFT NON-DOMINANT SIDE (HCC): Chronic | ICD-10-CM

## 2023-06-09 DIAGNOSIS — N18.31 TYPE 2 DIABETES MELLITUS WITH STAGE 3A CHRONIC KIDNEY DISEASE, WITH LONG-TERM CURRENT USE OF INSULIN (HCC): ICD-10-CM

## 2023-06-09 DIAGNOSIS — N18.30 STAGE 3 CHRONIC KIDNEY DISEASE, UNSPECIFIED WHETHER STAGE 3A OR 3B CKD (HCC): Chronic | ICD-10-CM

## 2023-06-09 DIAGNOSIS — Z79.4 TYPE 2 DIABETES MELLITUS WITH HYPERGLYCEMIA, WITH LONG-TERM CURRENT USE OF INSULIN (HCC): Primary | Chronic | ICD-10-CM

## 2023-06-09 PROCEDURE — 99214 OFFICE O/P EST MOD 30 MIN: CPT | Performed by: FAMILY MEDICINE

## 2023-06-12 ENCOUNTER — HOME CARE VISIT (OUTPATIENT)
Dept: HOME HEALTH SERVICES | Facility: HOME HEALTHCARE | Age: 68
End: 2023-06-12
Payer: COMMERCIAL

## 2023-06-12 VITALS
RESPIRATION RATE: 16 BRPM | DIASTOLIC BLOOD PRESSURE: 78 MMHG | SYSTOLIC BLOOD PRESSURE: 132 MMHG | TEMPERATURE: 98.1 F | OXYGEN SATURATION: 98 % | HEART RATE: 82 BPM

## 2023-06-12 VITALS — SYSTOLIC BLOOD PRESSURE: 130 MMHG | OXYGEN SATURATION: 98 % | DIASTOLIC BLOOD PRESSURE: 80 MMHG | HEART RATE: 89 BPM

## 2023-06-12 PROCEDURE — G0152 HHCP-SERV OF OT,EA 15 MIN: HCPCS

## 2023-06-13 ENCOUNTER — HOME CARE VISIT (OUTPATIENT)
Dept: HOME HEALTH SERVICES | Facility: HOME HEALTHCARE | Age: 68
End: 2023-06-13
Payer: COMMERCIAL

## 2023-06-13 ENCOUNTER — TELEPHONE (OUTPATIENT)
Dept: ENDOCRINOLOGY | Facility: CLINIC | Age: 68
End: 2023-06-13

## 2023-06-13 DIAGNOSIS — N32.81 OAB (OVERACTIVE BLADDER): ICD-10-CM

## 2023-06-13 PROBLEM — E11.21 DIABETIC NEPHROPATHY ASSOCIATED WITH TYPE 2 DIABETES MELLITUS (HCC): Status: ACTIVE | Noted: 2023-06-13

## 2023-06-13 PROBLEM — E11.22 TYPE 2 DIABETES MELLITUS WITH CHRONIC KIDNEY DISEASE, WITH LONG-TERM CURRENT USE OF INSULIN (HCC): Status: ACTIVE | Noted: 2017-10-17

## 2023-06-13 PROCEDURE — G0299 HHS/HOSPICE OF RN EA 15 MIN: HCPCS

## 2023-06-13 PROCEDURE — G0156 HHCP-SVS OF AIDE,EA 15 MIN: HCPCS

## 2023-06-13 RX ORDER — MIRABEGRON 50 MG/1
1 TABLET, FILM COATED, EXTENDED RELEASE ORAL DAILY
Qty: 90 TABLET | Refills: 1 | Status: SHIPPED | OUTPATIENT
Start: 2023-06-13

## 2023-06-13 NOTE — PROGRESS NOTES
Assessment/Plan:    75 y/o woman with: DM2 with diabetic nephropathy and CKD3, CVA with resulting hemiplegia, Paroxysmal Afib , Thrombocytopenia  Stable, continue current meds  And encourage follow-up with specialists  Discussed supportive care and return parameters  No problem-specific Assessment & Plan notes found for this encounter  Diagnoses and all orders for this visit:    Type 2 diabetes mellitus with hyperglycemia, with long-term current use of insulin ([facility] )  -     IRIS Diabetic eye exam  -     CBC and differential; Future  -     Comprehensive metabolic panel; Future  -     TSH, 3rd generation with Free T4 reflex; Future  -     Lipid Panel with Direct LDL reflex; Future  -     Albumin / creatinine urine ratio  -     UA (URINE) with reflex to Scope    Cerebrovascular accident (CVA), unspecified mechanism ([facility] )  -     CBC and differential; Future  -     Comprehensive metabolic panel; Future  -     TSH, 3rd generation with Free T4 reflex; Future  -     Lipid Panel with Direct LDL reflex; Future  -     Albumin / creatinine urine ratio  -     UA (URINE) with reflex to Scope    Paroxysmal atrial fibrillation (HCC)  -     CBC and differential; Future  -     Comprehensive metabolic panel; Future  -     TSH, 3rd generation with Free T4 reflex; Future  -     Lipid Panel with Direct LDL reflex; Future  -     Albumin / creatinine urine ratio  -     UA (URINE) with reflex to Scope    Hemiplegia and hemiparesis following cerebral infarction affecting left non-dominant side (HCC)  -     CBC and differential; Future  -     Comprehensive metabolic panel; Future  -     TSH, 3rd generation with Free T4 reflex; Future  -     Lipid Panel with Direct LDL reflex; Future  -     Albumin / creatinine urine ratio  -     UA (URINE) with reflex to Scope    Stage 3 chronic kidney disease, unspecified whether stage 3a or 3b CKD (HCC)  -     CBC and differential; Future  -     Comprehensive metabolic panel;  Future  -     TSH, 3rd generation with Free T4 reflex; Future  -     Lipid Panel with Direct LDL reflex; Future  -     Albumin / creatinine urine ratio  -     UA (URINE) with reflex to Scope    Platelets decreased (HCC)  -     CBC and differential; Future  -     Comprehensive metabolic panel; Future  -     TSH, 3rd generation with Free T4 reflex; Future  -     Lipid Panel with Direct LDL reflex; Future  -     Albumin / creatinine urine ratio  -     UA (URINE) with reflex to Scope    Type 2 diabetes mellitus with stage 3a chronic kidney disease, with long-term current use of insulin ([facility] )    Diabetic nephropathy associated with type 2 diabetes mellitus ([facility] )          Subjective:     Chief Complaint   Patient presents with   • Follow-up     Patient is here for a 4 month DM follow up  Patient suffered a fall on 5/6/2023 and was at [facility] for rehab  Patient was discharged the 22nd of May and is following up  Patient ID: Kavita Rodriguez is a 74 y o  female  Patient is a 75 y/o woman who presents for follow-up on DM2 with diabetic nephropathy and CKD3, CVA with resulting hemiplegia, Paroxysmal Afib , Thrombocytopenia  Patient admits being stable on meds  And denies acute complaints  No fevers chills nausea or vomiting  The following portions of the patient's history were reviewed and updated as appropriate: allergies, current medications, past family history, past medical history, past social history, past surgical history and problem list     Review of Systems   Constitutional: Negative  HENT: Negative  Eyes: Negative  Respiratory: Negative  Cardiovascular: Negative  Gastrointestinal: Negative  Endocrine: Negative  Genitourinary: Negative  Musculoskeletal: Negative  Allergic/Immunologic: Negative  Neurological: Negative  Hematological: Negative  Psychiatric/Behavioral: Negative  All other systems reviewed and are negative          Objective:      /70 (BP Location: Left arm, Patient "Position: Sitting, Cuff Size: Adult)   Pulse 80   Temp 97 8 °F (36 6 °C) (Tympanic)   Ht 5' 5\" (1 651 m)   Wt 85 7 kg (189 lb)   LMP  (LMP Unknown)   SpO2 99%   BMI 31 45 kg/m²          Physical Exam  Constitutional:       Appearance: She is well-developed  HENT:      Head: Atraumatic  Right Ear: External ear normal       Left Ear: External ear normal    Eyes:      Conjunctiva/sclera: Conjunctivae normal       Pupils: Pupils are equal, round, and reactive to light  Cardiovascular:      Rate and Rhythm: Normal rate and regular rhythm  Heart sounds: Normal heart sounds  Pulmonary:      Effort: Pulmonary effort is normal  No respiratory distress  Breath sounds: Normal breath sounds  Abdominal:      General: There is no distension  Palpations: Abdomen is soft  Tenderness: There is no abdominal tenderness  There is no guarding or rebound  Musculoskeletal:         General: Normal range of motion  Cervical back: Normal range of motion  Skin:     General: Skin is warm and dry  Neurological:      Mental Status: She is alert and oriented to person, place, and time  Cranial Nerves: No cranial nerve deficit  Psychiatric:         Behavior: Behavior normal          Thought Content: Thought content normal          Judgment: Judgment normal          BMI Counseling: Body mass index is 31 45 kg/m²  The BMI is above normal  Nutrition recommendations include encouraging healthy choices of fruits and vegetables  Exercise recommendations include moderate physical activity 150 minutes/week  Rationale for BMI follow-up plan is due to patient being overweight or obese         "

## 2023-06-13 NOTE — PATIENT INSTRUCTIONS
Type 2 Diabetes Management for Adults   AMBULATORY CARE:   Type 2 diabetes  is a disease that affects how your body uses glucose (sugar)  Either your body cannot make enough insulin, or it cannot use the insulin correctly  It is important to keep diabetes controlled to prevent damage to your heart, blood vessels, and other organs  Management will help you feel well and enjoy your daily activities  Your diabetes care team providers can help you make a plan to fit diabetes care into your schedule  Your plan can change over time to fit your needs and your family's needs  Have someone call your local emergency number (911 in the [address]) if:   • You cannot be woken  • You have signs of diabetic ketoacidosis:     ? confusion, fatigue    ? vomiting    ? rapid heartbeat    ? fruity smelling breath    ? extreme thirst    ? dry mouth and skin    • You have any of the following signs of a heart attack:      ? Squeezing, pressure, or pain in your chest    ? You may  also have any of the following:     - Discomfort or pain in your back, neck, jaw, stomach, or arm    - Shortness of breath    - Nausea or vomiting    - Lightheadedness or a sudden cold sweat    • You have any of the following signs of a stroke:      ? Numbness or drooping on one side of your face     ? Weakness in an arm or leg    ? Confusion or difficulty speaking    ? Dizziness, a severe headache, or vision loss    Call your doctor or diabetes care team provider if:   • You have a sore or wound that will not heal     • You have a change in the amount you urinate  • Your blood sugar levels are higher than your target goals  • You often have lower blood sugar levels than your target goals  • Your skin is red, dry, warm, or swollen  • You have trouble coping with diabetes, or you feel anxious or depressed  • You have questions or concerns about your condition or care      What you need to know about high blood sugar levels:  High blood sugar levels may not cause any symptoms  You may feel more thirsty or urinate more often than usual  Over time, high blood sugar levels can damage your nerves, blood vessels, tissues, and organs  The following can increase your blood sugar levels:  • Large meals or large amounts of carbohydrates at one time    • Less physical activity    • Stress    • Illness    • A lower dose of diabetes medicine or insulin, or a late dose    What you need to know about low blood sugar levels:  Symptoms include feeling shaky, dizzy, irritable, or confused  You can prevent symptoms by keeping your blood sugar levels from going too low  • Treat a low blood sugar level right away:      ? Drink 4 ounces of juice or have 1 tube of glucose gel  ? Check your blood sugar level again 10 to 15 minutes later  ? When the level goes back to normal, eat a meal or snack to prevent another decrease  • Keep glucose gel, raisins, or hard candy with you at all times to treat a low blood sugar level  • Your blood sugar level can get too low if you take diabetes medicine or insulin and do not eat enough food  • If you use insulin, check your blood sugar level before you exercise  ? If your blood sugar level is below 100 mg/dL, eat 4 crackers or 2 ounces of raisins, or drink 4 ounces of juice  ? Check your level every 30 minutes if you exercise longer than 1 hour  ? You may need a snack during or after exercise  What you can do to manage your blood sugar levels:   • Check your blood sugar levels as directed and as needed  Several items are available to use to check your levels  You may need to check by testing a drop of blood in a glucose monitor  You may instead be given a continuous glucose monitoring (CGM) device  The device is worn at all times  The CGM checks your blood sugar level every 5 minutes  It sends results to an electronic device such as a smart phone  A CGM can be used with or without an insulin pump   You and your diabetes care team providers will decide on the best method for you  The goal for blood sugar levels before meals  is between 80 and 130 mg/dL and 2 hours after eating  is lower than 180 mg/dL  • Make healthy food choices  Work with a dietitian to develop a meal plan that works for you and your schedule  A dietitian can help you learn how to eat the right amount of carbohydrates during your meals and snacks  Carbohydrates can raise your blood sugar level if you eat too many at one time  Examples of foods that contain carbohydrates are breads, cereals, rice, pasta, and sweets  • Eat high-fiber foods as directed  Fiber helps improve blood sugar levels  Fiber also lowers your risk for heart disease and other problems diabetes can cause  Examples of high-fiber foods include vegetables, whole-grain bread, and beans such as fernandez beans  Your dietitian can tell you how much fiber to have each day  • Get regular physical activity  Physical activity can help you get to your target blood sugar level goal and manage your weight  Get at least 150 minutes of moderate to vigorous aerobic physical activity each week  Do not miss more than 2 days in a row  Do not sit longer than 30 minutes at a time  Your healthcare provider can help you create an activity plan  The plan can include the best activities for you and can help you build your strength and endurance  • Maintain a healthy weight  Ask your team what a healthy weight is for you  A healthy weight can help you control diabetes and prevent heart disease  Ask your team to help you create a weight loss plan, if needed  Weight loss can help make a difference in managing diabetes  Your team will help you set a weight-loss goal, such as 10 to 15 pounds, or 5% of your extra weight  Together you and your team can set manageable weight loss goals  • Take your diabetes medicine or insulin as directed    You may need diabetes medicine, insulin, or both to help control your blood sugar levels  Your healthcare provider will teach you how and when to take your diabetes medicine or insulin  You will also be taught about side effects oral diabetes medicine can cause  Insulin may be injected or given through a pump or pen  You and your providers will decide on the best method for you:    ? An insulin pump  is an implanted device that gives your insulin 24 hours a day  An insulin pump prevents the need for multiple insulin injections in a day  ? An insulin pen  is a device prefilled with the right amount of insulin  ? You and your family members will be taught how to draw up and give insulin  if this is the best method for you  Your providers will also teach you how to dispose of needles and syringes  ? You will learn how much insulin you need  and when to give it  You will be taught when not to give insulin  You will also be taught what to do if your blood sugar level drops too low  This may happen if you take insulin and do not eat the right amount of carbohydrates  More ways to manage type 2 diabetes:   • Wear medical alert identification  Wear medical alert jewelry or carry a card that says you have diabetes  Ask your provider where to get these items  • Do not smoke  Nicotine and other chemicals in cigarettes and cigars can cause lung and blood vessel damage  It also makes it more difficult to manage your diabetes  Ask your provider for information if you currently smoke and need help to quit  Do not use e-cigarettes or smokeless tobacco in place of cigarettes or to help you quit  They still contain nicotine  • Check your feet each day for cuts, scratches, calluses, or other wounds  Look for redness and swelling, and feel for warmth  Wear shoes that fit well  Check your shoes for rocks or other objects that can hurt your feet  Do not walk barefoot or wear shoes without socks   Wear cotton socks to help keep your feet dry  • Ask about vaccines you may need  You have a higher risk for serious illness if you get the flu, pneumonia, COVID-19, or hepatitis  Ask your provider if you should get vaccines to prevent these or other diseases, and when to get the vaccines  • Talk to your provider if you become stressed about diabetes care  Sometimes being able to fit diabetes care into your life can cause increased stress  The stress can cause you not to take care of yourself properly  Your care team providers can help by offering tips about self-care  Your providers may suggest you talk to a mental health provider who can listen and offer help with self-care issues  • Have your A1c checked as directed  Your provider may check your A1c every 3 months, or 2 times each year if your diabetes is controlled  An A1c test shows the average amount of sugar in your blood over the past 2 to 3 months  Your provider will tell you what your A1c level should be  • Have screening tests as directed  Your provider may recommend screening for complications of diabetes and other conditions that may develop  Some screenings may begin right away and some may happen within the first 5 years of diagnosis:    ? Examples of diabetes complications  include kidney problems, high cholesterol, high blood pressure, blood vessel problems, eye problems, and sleep apnea  ? You may be screened for a low vitamin B level  if you take oral diabetes medicine for a long time  ? Women of childbearing years may be screened  for polycystic ovarian syndrome (PCOS)  Follow up with your doctor or diabetes care team providers as directed: You may need to have blood tests done before your follow-up visit  The test results will show if changes need to be made in your treatment or self-care  Talk to your provider if you cannot afford your medicine  Write down your questions so you remember to ask them during your visits    © Copyright [employer] 2022 Information is for End User's use only and may not be sold, redistributed or otherwise used for commercial purposes  The above information is an  only  It is not intended as medical advice for individual conditions or treatments  Talk to your doctor, nurse or pharmacist before following any medical regimen to see if it is safe and effective for you  Type 2 Diabetes Management for Adults   AMBULATORY CARE:   Type 2 diabetes  is a disease that affects how your body uses glucose (sugar)  Either your body cannot make enough insulin, or it cannot use the insulin correctly  It is important to keep diabetes controlled to prevent damage to your heart, blood vessels, and other organs  Management will help you feel well and enjoy your daily activities  Your diabetes care team providers can help you make a plan to fit diabetes care into your schedule  Your plan can change over time to fit your needs and your family's needs  Have someone call your local emergency number (911 in the [address]) if:   • You cannot be woken  • You have signs of diabetic ketoacidosis:     ? confusion, fatigue    ? vomiting    ? rapid heartbeat    ? fruity smelling breath    ? extreme thirst    ? dry mouth and skin    • You have any of the following signs of a heart attack:      ? Squeezing, pressure, or pain in your chest    ? You may  also have any of the following:     - Discomfort or pain in your back, neck, jaw, stomach, or arm    - Shortness of breath    - Nausea or vomiting    - Lightheadedness or a sudden cold sweat    • You have any of the following signs of a stroke:      ? Numbness or drooping on one side of your face     ? Weakness in an arm or leg    ? Confusion or difficulty speaking    ? Dizziness, a severe headache, or vision loss    Call your doctor or diabetes care team provider if:   • You have a sore or wound that will not heal     • You have a change in the amount you urinate      • Your blood sugar levels are higher than your target goals  • You often have lower blood sugar levels than your target goals  • Your skin is red, dry, warm, or swollen  • You have trouble coping with diabetes, or you feel anxious or depressed  • You have questions or concerns about your condition or care  What you need to know about high blood sugar levels:  High blood sugar levels may not cause any symptoms  You may feel more thirsty or urinate more often than usual  Over time, high blood sugar levels can damage your nerves, blood vessels, tissues, and organs  The following can increase your blood sugar levels:  • Large meals or large amounts of carbohydrates at one time    • Less physical activity    • Stress    • Illness    • A lower dose of diabetes medicine or insulin, or a late dose    What you need to know about low blood sugar levels:  Symptoms include feeling shaky, dizzy, irritable, or confused  You can prevent symptoms by keeping your blood sugar levels from going too low  • Treat a low blood sugar level right away:      ? Drink 4 ounces of juice or have 1 tube of glucose gel  ? Check your blood sugar level again 10 to 15 minutes later  ? When the level goes back to normal, eat a meal or snack to prevent another decrease  • Keep glucose gel, raisins, or hard candy with you at all times to treat a low blood sugar level  • Your blood sugar level can get too low if you take diabetes medicine or insulin and do not eat enough food  • If you use insulin, check your blood sugar level before you exercise  ? If your blood sugar level is below 100 mg/dL, eat 4 crackers or 2 ounces of raisins, or drink 4 ounces of juice  ? Check your level every 30 minutes if you exercise longer than 1 hour  ? You may need a snack during or after exercise  What you can do to manage your blood sugar levels:   • Check your blood sugar levels as directed and as needed    Several items are available to use to check your levels  You may need to check by testing a drop of blood in a glucose monitor  You may instead be given a continuous glucose monitoring (CGM) device  The device is worn at all times  The CGM checks your blood sugar level every 5 minutes  It sends results to an electronic device such as a smart phone  A CGM can be used with or without an insulin pump  You and your diabetes care team providers will decide on the best method for you  The goal for blood sugar levels before meals  is between 80 and 130 mg/dL and 2 hours after eating  is lower than 180 mg/dL  • Make healthy food choices  Work with a dietitian to develop a meal plan that works for you and your schedule  A dietitian can help you learn how to eat the right amount of carbohydrates during your meals and snacks  Carbohydrates can raise your blood sugar level if you eat too many at one time  Examples of foods that contain carbohydrates are breads, cereals, rice, pasta, and sweets  • Eat high-fiber foods as directed  Fiber helps improve blood sugar levels  Fiber also lowers your risk for heart disease and other problems diabetes can cause  Examples of high-fiber foods include vegetables, whole-grain bread, and beans such as fernandez beans  Your dietitian can tell you how much fiber to have each day  • Get regular physical activity  Physical activity can help you get to your target blood sugar level goal and manage your weight  Get at least 150 minutes of moderate to vigorous aerobic physical activity each week  Do not miss more than 2 days in a row  Do not sit longer than 30 minutes at a time  Your healthcare provider can help you create an activity plan  The plan can include the best activities for you and can help you build your strength and endurance  • Maintain a healthy weight  Ask your team what a healthy weight is for you  A healthy weight can help you control diabetes and prevent heart disease   Ask your team to help you create a weight loss plan, if needed  Weight loss can help make a difference in managing diabetes  Your team will help you set a weight-loss goal, such as 10 to 15 pounds, or 5% of your extra weight  Together you and your team can set manageable weight loss goals  • Take your diabetes medicine or insulin as directed  You may need diabetes medicine, insulin, or both to help control your blood sugar levels  Your healthcare provider will teach you how and when to take your diabetes medicine or insulin  You will also be taught about side effects oral diabetes medicine can cause  Insulin may be injected or given through a pump or pen  You and your providers will decide on the best method for you:    ? An insulin pump  is an implanted device that gives your insulin 24 hours a day  An insulin pump prevents the need for multiple insulin injections in a day  ? An insulin pen  is a device prefilled with the right amount of insulin  ? You and your family members will be taught how to draw up and give insulin  if this is the best method for you  Your providers will also teach you how to dispose of needles and syringes  ? You will learn how much insulin you need  and when to give it  You will be taught when not to give insulin  You will also be taught what to do if your blood sugar level drops too low  This may happen if you take insulin and do not eat the right amount of carbohydrates  More ways to manage type 2 diabetes:   • Wear medical alert identification  Wear medical alert jewelry or carry a card that says you have diabetes  Ask your provider where to get these items  • Do not smoke  Nicotine and other chemicals in cigarettes and cigars can cause lung and blood vessel damage  It also makes it more difficult to manage your diabetes  Ask your provider for information if you currently smoke and need help to quit   Do not use e-cigarettes or smokeless tobacco in place of cigarettes or to help you quit  They still contain nicotine  • Check your feet each day for cuts, scratches, calluses, or other wounds  Look for redness and swelling, and feel for warmth  Wear shoes that fit well  Check your shoes for rocks or other objects that can hurt your feet  Do not walk barefoot or wear shoes without socks  Wear cotton socks to help keep your feet dry  • Ask about vaccines you may need  You have a higher risk for serious illness if you get the flu, pneumonia, COVID-19, or hepatitis  Ask your provider if you should get vaccines to prevent these or other diseases, and when to get the vaccines  • Talk to your provider if you become stressed about diabetes care  Sometimes being able to fit diabetes care into your life can cause increased stress  The stress can cause you not to take care of yourself properly  Your care team providers can help by offering tips about self-care  Your providers may suggest you talk to a mental health provider who can listen and offer help with self-care issues  • Have your A1c checked as directed  Your provider may check your A1c every 3 months, or 2 times each year if your diabetes is controlled  An A1c test shows the average amount of sugar in your blood over the past 2 to 3 months  Your provider will tell you what your A1c level should be  • Have screening tests as directed  Your provider may recommend screening for complications of diabetes and other conditions that may develop  Some screenings may begin right away and some may happen within the first 5 years of diagnosis:    ? Examples of diabetes complications  include kidney problems, high cholesterol, high blood pressure, blood vessel problems, eye problems, and sleep apnea  ? You may be screened for a low vitamin B level  if you take oral diabetes medicine for a long time  ? Women of childbearing years may be screened  for polycystic ovarian syndrome (PCOS)      Follow up with your doctor or diabetes care team providers as directed: You may need to have blood tests done before your follow-up visit  The test results will show if changes need to be made in your treatment or self-care  Talk to your provider if you cannot afford your medicine  Write down your questions so you remember to ask them during your visits  © Copyright [employer] 2022 Information is for End User's use only and may not be sold, redistributed or otherwise used for commercial purposes  The above information is an  only  It is not intended as medical advice for individual conditions or treatments  Talk to your doctor, nurse or pharmacist before following any medical regimen to see if it is safe and effective for you

## 2023-06-13 NOTE — TELEPHONE ENCOUNTER
Spoke to patient, she will have her aide call  Unfortunately they will need to call [facility] ([employer]) for replacement sensor  [facility]       [facility] customer support #     [phone]

## 2023-06-13 NOTE — TELEPHONE ENCOUNTER
Patient called and stated her nurse went to change the sensor for the freestyle [facility] 2 today  The machine was working properly earlier before changing the sensor and now it is reading the new sensor isnt compatible       Good call back number is [phone]

## 2023-06-14 ENCOUNTER — HOME CARE VISIT (OUTPATIENT)
Dept: HOME HEALTH SERVICES | Facility: HOME HEALTHCARE | Age: 68
End: 2023-06-14
Payer: COMMERCIAL

## 2023-06-14 VITALS — OXYGEN SATURATION: 99 % | DIASTOLIC BLOOD PRESSURE: 78 MMHG | SYSTOLIC BLOOD PRESSURE: 120 MMHG | HEART RATE: 81 BPM

## 2023-06-14 VITALS — HEART RATE: 98 BPM | DIASTOLIC BLOOD PRESSURE: 80 MMHG | OXYGEN SATURATION: 100 % | SYSTOLIC BLOOD PRESSURE: 138 MMHG

## 2023-06-14 PROCEDURE — G0151 HHCP-SERV OF PT,EA 15 MIN: HCPCS

## 2023-06-14 PROCEDURE — G0152 HHCP-SERV OF OT,EA 15 MIN: HCPCS

## 2023-06-15 ENCOUNTER — HOME CARE VISIT (OUTPATIENT)
Dept: HOME HEALTH SERVICES | Facility: HOME HEALTHCARE | Age: 68
End: 2023-06-15
Payer: COMMERCIAL

## 2023-06-15 PROCEDURE — G0156 HHCP-SVS OF AIDE,EA 15 MIN: HCPCS

## 2023-06-16 ENCOUNTER — HOME CARE VISIT (OUTPATIENT)
Dept: HOME HEALTH SERVICES | Facility: HOME HEALTHCARE | Age: 68
End: 2023-06-16
Payer: COMMERCIAL

## 2023-06-16 VITALS
SYSTOLIC BLOOD PRESSURE: 136 MMHG | RESPIRATION RATE: 16 BRPM | OXYGEN SATURATION: 97 % | DIASTOLIC BLOOD PRESSURE: 82 MMHG | TEMPERATURE: 97.9 F | RESPIRATION RATE: 16 BRPM | OXYGEN SATURATION: 97 % | DIASTOLIC BLOOD PRESSURE: 74 MMHG | HEART RATE: 82 BPM | HEART RATE: 82 BPM | SYSTOLIC BLOOD PRESSURE: 124 MMHG | TEMPERATURE: 98 F

## 2023-06-16 VITALS — DIASTOLIC BLOOD PRESSURE: 74 MMHG | SYSTOLIC BLOOD PRESSURE: 124 MMHG | OXYGEN SATURATION: 100 % | HEART RATE: 116 BPM

## 2023-06-16 PROCEDURE — G0299 HHS/HOSPICE OF RN EA 15 MIN: HCPCS

## 2023-06-16 PROCEDURE — G0151 HHCP-SERV OF PT,EA 15 MIN: HCPCS

## 2023-06-16 NOTE — CASE COMMUNICATION
Patient has IND understanding of upper extremity home exercise program   Patient demonstrates improved safety and function with transfers/performance of self care/ daily activities  OT to discharge patient following today's visit per plan of care with goals met  Patient is in agreement with OT plan of care

## 2023-06-16 NOTE — CASE COMMUNICATION
per AVS on 05/09  Lantus was decreased to 10units  upon her arrival home she was still having hypoglycemia so i sent Neal Gibbs a tiget text and she had us decrease this to 8 units and said we could decrease to 6units if symptoms continued  Prandin and Tradjenta were also discontinued on 05/09    current is lantus 8units and metformin    [facility]  and they send her medications in prefilled blister packs  i f any meds are to be changed the orders have to be sent there so they can update it    hope that helps!      ----- Message -----  From: Nazario Davis  Sent: 6/16/2023   7:14 AM EDT  To: Delta Faith RN  Subject: FW: Gil                                          Could you please confirm her medication for diabetes  At last [facility] note, she was supposed to be on 28 units of lantus   Tradjenta 5 mg daily   Metformin 500 mg t wice daily with meals   Repaglinide 2 mg twice daily prior to meals   Is above all she is taking ?    ----- Message -----  From: Alba Meng MD  Sent: 6/13/2023   4:10 PM EDT  To: Nazario Davis  Subject: RE: Gil                                        Could you please confirm her medication for diabetes  At last [facility] note, she was supposed to be on 28 units of lantus   Tradjenta 5 mg daily  Metformin 500 mg twice daily with me als  Repaglinide 2 mg twice daily prior to meals  Is above all she is taking ?   ----- Message -----  From: Laureano Deleon: 6/9/2023   9:48 AM EDT  To: Alba Meng MD  Subject: RE: Cordero                                        Patient will be stopping by today for download  ----- Message -----  From: Alba Meng MD  Sent: 6/9/2023   7:57 AM EDT  To: Nazario Davis  Subject: RE: Gil                                        Are t hey able to drop off the reader to download the report ?  ----- Message -----  From: Nazario Davis  Sent: 6/7/2023   3:18 PM EDT  To: ALEC Wilkerson;  Alba Meng MD  Subject: Jaz Pearl,     Unfortunately patient does not have the ability to be connected to our office,   ----- Message -----  From: Crystal Arguelles  Sent: 6/7/2023   2:46 PM EDT  To: *      ----- Message -----  From : ALEC Lay  Sent: 6/7/2023  10:25 AM EDT  To: *    Can we download CGM for review?   ----- Message -----  From: Melvin Mackey RN  Sent: 6/7/2023   8:14 AM EDT  To: ALEC Gold    lantus 8units HS  CGM  has been on for a week now and she is doing well with it  sugars have ranged from 200-330  would you like for her to increase insulin or stay at the current dose of 8 units for now?     Thank you  Kady hunt RN

## 2023-06-19 ENCOUNTER — HOME CARE VISIT (OUTPATIENT)
Dept: HOME HEALTH SERVICES | Facility: HOME HEALTHCARE | Age: 68
End: 2023-06-19
Payer: COMMERCIAL

## 2023-06-19 VITALS — SYSTOLIC BLOOD PRESSURE: 132 MMHG | DIASTOLIC BLOOD PRESSURE: 80 MMHG | HEART RATE: 80 BPM | OXYGEN SATURATION: 98 %

## 2023-06-19 PROCEDURE — G0299 HHS/HOSPICE OF RN EA 15 MIN: HCPCS

## 2023-06-19 PROCEDURE — G0151 HHCP-SERV OF PT,EA 15 MIN: HCPCS

## 2023-06-23 ENCOUNTER — HOME CARE VISIT (OUTPATIENT)
Dept: HOME HEALTH SERVICES | Facility: HOME HEALTHCARE | Age: 68
End: 2023-06-23
Payer: COMMERCIAL

## 2023-06-23 VITALS
SYSTOLIC BLOOD PRESSURE: 132 MMHG | TEMPERATURE: 97.9 F | HEART RATE: 82 BPM | RESPIRATION RATE: 18 BRPM | OXYGEN SATURATION: 98 % | DIASTOLIC BLOOD PRESSURE: 80 MMHG

## 2023-06-23 PROCEDURE — G0299 HHS/HOSPICE OF RN EA 15 MIN: HCPCS

## 2023-06-26 VITALS
SYSTOLIC BLOOD PRESSURE: 128 MMHG | TEMPERATURE: 97.6 F | HEART RATE: 72 BPM | DIASTOLIC BLOOD PRESSURE: 70 MMHG | RESPIRATION RATE: 18 BRPM | OXYGEN SATURATION: 98 %

## 2023-06-27 ENCOUNTER — HOME CARE VISIT (OUTPATIENT)
Dept: HOME HEALTH SERVICES | Facility: HOME HEALTHCARE | Age: 68
End: 2023-06-27
Payer: COMMERCIAL

## 2023-06-27 PROCEDURE — G0156 HHCP-SVS OF AIDE,EA 15 MIN: HCPCS

## 2023-06-28 ENCOUNTER — HOME CARE VISIT (OUTPATIENT)
Dept: HOME HEALTH SERVICES | Facility: HOME HEALTHCARE | Age: 68
End: 2023-06-28
Payer: COMMERCIAL

## 2023-06-28 PROCEDURE — G0299 HHS/HOSPICE OF RN EA 15 MIN: HCPCS

## 2023-06-29 ENCOUNTER — HOME CARE VISIT (OUTPATIENT)
Dept: HOME HEALTH SERVICES | Facility: HOME HEALTHCARE | Age: 68
End: 2023-06-29
Payer: COMMERCIAL

## 2023-06-29 VITALS
TEMPERATURE: 98.2 F | DIASTOLIC BLOOD PRESSURE: 62 MMHG | SYSTOLIC BLOOD PRESSURE: 122 MMHG | RESPIRATION RATE: 16 BRPM | OXYGEN SATURATION: 97 % | HEART RATE: 83 BPM

## 2023-06-29 PROCEDURE — G0156 HHCP-SVS OF AIDE,EA 15 MIN: HCPCS

## 2023-06-30 DIAGNOSIS — Z79.4 TYPE 2 DIABETES MELLITUS WITH HYPERGLYCEMIA, WITH LONG-TERM CURRENT USE OF INSULIN (HCC): Chronic | ICD-10-CM

## 2023-06-30 DIAGNOSIS — E11.65 TYPE 2 DIABETES MELLITUS WITH HYPERGLYCEMIA, WITH LONG-TERM CURRENT USE OF INSULIN (HCC): Chronic | ICD-10-CM

## 2023-07-03 RX ORDER — METFORMIN HYDROCHLORIDE 500 MG/1
500 TABLET, EXTENDED RELEASE ORAL 2 TIMES DAILY WITH MEALS
Qty: 180 TABLET | Refills: 1 | Status: SHIPPED | OUTPATIENT
Start: 2023-07-03 | End: 2023-12-30

## 2023-07-04 ENCOUNTER — HOME CARE VISIT (OUTPATIENT)
Dept: HOME HEALTH SERVICES | Facility: HOME HEALTHCARE | Age: 68
End: 2023-07-04
Payer: COMMERCIAL

## 2023-07-04 PROCEDURE — G0156 HHCP-SVS OF AIDE,EA 15 MIN: HCPCS

## 2023-07-06 ENCOUNTER — HOME CARE VISIT (OUTPATIENT)
Dept: HOME HEALTH SERVICES | Facility: HOME HEALTHCARE | Age: 68
End: 2023-07-06
Payer: COMMERCIAL

## 2023-07-06 PROCEDURE — G0299 HHS/HOSPICE OF RN EA 15 MIN: HCPCS

## 2023-07-06 PROCEDURE — G0156 HHCP-SVS OF AIDE,EA 15 MIN: HCPCS

## 2023-07-07 VITALS
HEART RATE: 84 BPM | DIASTOLIC BLOOD PRESSURE: 66 MMHG | OXYGEN SATURATION: 98 % | RESPIRATION RATE: 16 BRPM | TEMPERATURE: 97.6 F | SYSTOLIC BLOOD PRESSURE: 138 MMHG

## 2023-07-11 ENCOUNTER — HOME CARE VISIT (OUTPATIENT)
Dept: HOME HEALTH SERVICES | Facility: HOME HEALTHCARE | Age: 68
End: 2023-07-11
Payer: COMMERCIAL

## 2023-07-11 PROCEDURE — G0156 HHCP-SVS OF AIDE,EA 15 MIN: HCPCS

## 2023-07-12 ENCOUNTER — HOME CARE VISIT (OUTPATIENT)
Dept: HOME HEALTH SERVICES | Facility: HOME HEALTHCARE | Age: 68
End: 2023-07-12
Payer: COMMERCIAL

## 2023-07-12 PROCEDURE — G0299 HHS/HOSPICE OF RN EA 15 MIN: HCPCS

## 2023-07-13 ENCOUNTER — HOME CARE VISIT (OUTPATIENT)
Dept: HOME HEALTH SERVICES | Facility: HOME HEALTHCARE | Age: 68
End: 2023-07-13
Payer: COMMERCIAL

## 2023-07-13 PROCEDURE — G0156 HHCP-SVS OF AIDE,EA 15 MIN: HCPCS

## 2023-07-16 VITALS
OXYGEN SATURATION: 97 % | DIASTOLIC BLOOD PRESSURE: 72 MMHG | RESPIRATION RATE: 16 BRPM | TEMPERATURE: 97.6 F | HEART RATE: 82 BPM | SYSTOLIC BLOOD PRESSURE: 128 MMHG

## 2023-07-18 ENCOUNTER — HOME CARE VISIT (OUTPATIENT)
Dept: HOME HEALTH SERVICES | Facility: HOME HEALTHCARE | Age: 68
End: 2023-07-18
Payer: COMMERCIAL

## 2023-07-18 PROCEDURE — G0156 HHCP-SVS OF AIDE,EA 15 MIN: HCPCS

## 2023-07-18 PROCEDURE — G0299 HHS/HOSPICE OF RN EA 15 MIN: HCPCS

## 2023-07-20 ENCOUNTER — HOME CARE VISIT (OUTPATIENT)
Dept: HOME HEALTH SERVICES | Facility: HOME HEALTHCARE | Age: 68
End: 2023-07-20
Payer: COMMERCIAL

## 2023-07-20 VITALS
TEMPERATURE: 97.2 F | SYSTOLIC BLOOD PRESSURE: 122 MMHG | OXYGEN SATURATION: 100 % | RESPIRATION RATE: 16 BRPM | DIASTOLIC BLOOD PRESSURE: 74 MMHG | HEART RATE: 76 BPM

## 2023-07-20 PROCEDURE — G0156 HHCP-SVS OF AIDE,EA 15 MIN: HCPCS

## 2023-07-20 NOTE — CASE COMMUNICATION
current medications in home  metformin 500mg XR 2x daily  Lantus 6units nightly  Jonathan Macias continues to remove prandin from med pack as this was discontinued at rehab. do you want her to resume this or conitnue to remove it from med pack    blood glucose continues to vary greatly. in office endo appointment is not scheduled till 08/15/2023    adelina reports occasionaly episodes of sweating typically when blood glucose is low. the CGM is alestephanie ting her of low BS while sleeping and she will get up take glucose tabs  no reported dizziness.  continues with daily nausea she reports she can not form a pattern of when this is happening    not drinking plain water she states it makes her cough  drinking primarly flavorerd sugar free water and coffee without sugar  typical breakfast is avocado toast or eggs and toast  lunch is whatever the senior [employer] has in the lunch room- adelina  states this is not diabetic friendly food i.e stuffed cabbage, chicken fingers, egg salad  typical dinner is meals provided by insurance company or premade salads from the grocery store- she states this is a diabetic diet  snack at bedtime- some cookies     photos uploaded to media tab of recent readings from CGM

## 2023-07-20 NOTE — CASE COMMUNICATION
This is a required notification for certification for home health [address] need continues for: diabetic management  Primary diagnoses/co-morbidities/recent procedures in past 60 days that impact current episode: continued difficulty managing diabetes  Current level of functional ability: needs assistance for adls. limitied function of left arm  Homebound status and living arrangements: taxing effort to leave the home  Humberto baez accomplished and/or measurable progress toward unmet goals in past 60 days: CGM has assisted patient with knowing when blood glucose to low or high but she is not able to put the sensor on herself she needs assistance witht this  Focus of care for next 60 days for each discipline ordered: SN 1W9  [facility] 2W9  Skin integrity/wound status: no wounds  Most recent fall risk: at risk for falls.  most recent fall about two weeks ago patient had  to call EMS for assistance to get up but she did not seek eval in ED and reports no injuries following fall

## 2023-07-21 DIAGNOSIS — K21.9 GASTROESOPHAGEAL REFLUX DISEASE, UNSPECIFIED WHETHER ESOPHAGITIS PRESENT: ICD-10-CM

## 2023-07-21 DIAGNOSIS — Z79.4 TYPE 2 DIABETES MELLITUS WITH HYPERGLYCEMIA, WITH LONG-TERM CURRENT USE OF INSULIN (HCC): ICD-10-CM

## 2023-07-21 DIAGNOSIS — E11.65 TYPE 2 DIABETES MELLITUS WITH HYPERGLYCEMIA, WITH LONG-TERM CURRENT USE OF INSULIN (HCC): Primary | ICD-10-CM

## 2023-07-21 DIAGNOSIS — Z79.4 TYPE 2 DIABETES MELLITUS WITH HYPERGLYCEMIA, WITH LONG-TERM CURRENT USE OF INSULIN (HCC): Primary | ICD-10-CM

## 2023-07-21 DIAGNOSIS — E11.65 TYPE 2 DIABETES MELLITUS WITH HYPERGLYCEMIA, WITH LONG-TERM CURRENT USE OF INSULIN (HCC): ICD-10-CM

## 2023-07-21 RX ORDER — REPAGLINIDE 2 MG/1
TABLET ORAL
Qty: 180 TABLET | Refills: 1 | Status: SHIPPED | OUTPATIENT
Start: 2023-07-21

## 2023-07-21 RX ORDER — PANTOPRAZOLE SODIUM 40 MG/1
40 TABLET, DELAYED RELEASE ORAL DAILY
Qty: 30 TABLET | Refills: 0 | Status: SHIPPED | OUTPATIENT
Start: 2023-07-21

## 2023-07-25 ENCOUNTER — HOME CARE VISIT (OUTPATIENT)
Dept: HOME HEALTH SERVICES | Facility: HOME HEALTHCARE | Age: 68
End: 2023-07-25
Payer: COMMERCIAL

## 2023-07-25 DIAGNOSIS — I10 ESSENTIAL HYPERTENSION: Chronic | ICD-10-CM

## 2023-07-25 DIAGNOSIS — F32.9 CURRENT EPISODE OF MAJOR DEPRESSIVE DISORDER WITHOUT PRIOR EPISODE, UNSPECIFIED DEPRESSION EPISODE SEVERITY: ICD-10-CM

## 2023-07-25 PROCEDURE — 400014 VN F/U

## 2023-07-25 PROCEDURE — G0156 HHCP-SVS OF AIDE,EA 15 MIN: HCPCS

## 2023-07-25 RX ORDER — LISINOPRIL 5 MG/1
5 TABLET ORAL DAILY
Qty: 90 TABLET | Refills: 1 | Status: SHIPPED | OUTPATIENT
Start: 2023-07-25

## 2023-07-25 RX ORDER — ESCITALOPRAM OXALATE 5 MG/1
5 TABLET ORAL DAILY
Qty: 90 TABLET | Refills: 1 | Status: SHIPPED | OUTPATIENT
Start: 2023-07-25

## 2023-07-26 ENCOUNTER — HOME CARE VISIT (OUTPATIENT)
Dept: HOME HEALTH SERVICES | Facility: HOME HEALTHCARE | Age: 68
End: 2023-07-26
Payer: COMMERCIAL

## 2023-07-26 PROCEDURE — G0299 HHS/HOSPICE OF RN EA 15 MIN: HCPCS

## 2023-07-26 PROCEDURE — G0179 MD RECERTIFICATION HHA PT: HCPCS | Performed by: FAMILY MEDICINE

## 2023-07-27 ENCOUNTER — HOME CARE VISIT (OUTPATIENT)
Dept: HOME HEALTH SERVICES | Facility: HOME HEALTHCARE | Age: 68
End: 2023-07-27
Payer: COMMERCIAL

## 2023-07-27 VITALS
RESPIRATION RATE: 18 BRPM | SYSTOLIC BLOOD PRESSURE: 132 MMHG | HEART RATE: 76 BPM | DIASTOLIC BLOOD PRESSURE: 78 MMHG | OXYGEN SATURATION: 98 %

## 2023-07-27 PROCEDURE — G0156 HHCP-SVS OF AIDE,EA 15 MIN: HCPCS

## 2023-08-01 ENCOUNTER — HOME CARE VISIT (OUTPATIENT)
Dept: HOME HEALTH SERVICES | Facility: HOME HEALTHCARE | Age: 68
End: 2023-08-01
Payer: COMMERCIAL

## 2023-08-01 PROCEDURE — G0156 HHCP-SVS OF AIDE,EA 15 MIN: HCPCS

## 2023-08-02 ENCOUNTER — HOME CARE VISIT (OUTPATIENT)
Dept: HOME HEALTH SERVICES | Facility: HOME HEALTHCARE | Age: 68
End: 2023-08-02
Payer: COMMERCIAL

## 2023-08-02 PROCEDURE — G0299 HHS/HOSPICE OF RN EA 15 MIN: HCPCS

## 2023-08-03 VITALS — TEMPERATURE: 98.2 F | HEART RATE: 78 BPM | RESPIRATION RATE: 16 BRPM | OXYGEN SATURATION: 98 %

## 2023-08-03 NOTE — CASE COMMUNICATION
early morning glucose readings have been as low as 62. after readings vary based off of what she is eating. adelina gets lunch that is prepared by the [facility] she lives in and it is not diabetic friendly foods but due to difficulty with food preparation and not wanting meals on wheels delivery she opts to have lunch there(afternoon readings vary from 130-290 over past two weeks). she is eating a snack before bedtime, she even sta mel once she had an ice cream sandwich and her glucose still was only 70 when she got woken up by the CGM alarm in the morning. currently she is using 6units Lantus at night  Metformin 500mg ER 2x daily    she will see you in the office on 08/15.  I advised she take her CGM with her so that the readings can be reviewed

## 2023-08-04 ENCOUNTER — HOME CARE VISIT (OUTPATIENT)
Dept: HOME HEALTH SERVICES | Facility: HOME HEALTHCARE | Age: 68
End: 2023-08-04
Payer: COMMERCIAL

## 2023-08-04 PROCEDURE — G0156 HHCP-SVS OF AIDE,EA 15 MIN: HCPCS

## 2023-08-08 ENCOUNTER — HOME CARE VISIT (OUTPATIENT)
Dept: HOME HEALTH SERVICES | Facility: HOME HEALTHCARE | Age: 68
End: 2023-08-08
Payer: COMMERCIAL

## 2023-08-08 PROCEDURE — G0156 HHCP-SVS OF AIDE,EA 15 MIN: HCPCS

## 2023-08-09 ENCOUNTER — HOME CARE VISIT (OUTPATIENT)
Dept: HOME HEALTH SERVICES | Facility: HOME HEALTHCARE | Age: 68
End: 2023-08-09
Payer: COMMERCIAL

## 2023-08-09 PROCEDURE — G0299 HHS/HOSPICE OF RN EA 15 MIN: HCPCS

## 2023-08-09 NOTE — PROGRESS NOTES
Established Patient Progress Note      Chief Complaint   Patient presents with   • Diabetes Type 2     Using wesley with meter how ever did not bring meter with her today        Impression & Plan:    Problem List Items Addressed This Visit        Endocrine    Type 2 diabetes mellitus with chronic kidney disease, with long-term current use of insulin ([address]) - Primary     Patient's A1c is increased significantly however, we do not want to resume insulin use due to patient's hypoglycemia unawareness. Discussed the addition of GLP-1 rather than DPP 4 Tradjenta. However, patient did have difficulty with Trulicity. I explained that she may not have difficulty with Mounjaro or Ozempic. At this time, she would prefer to resume Tradjenta. When she completes her labs and I can evaluate her GFR, may consider increasing metformin. May also consider the addition of SGLT2. Strongly encourage patient to be very mindful of her diet. At this time, she can continue Prandin prior to meals. Reviewed the importance of skipping Prandin if she is not going to eat a meal.  Reviewed that medication can cause hypoglycemia. Ideally, patient will be willing to transition to GLP-1 Ozempic or Mounjaro and we can discontinue Prandin. Continue use of continuous glucose monitor as she does need to be alarmed if her blood sugar is dropping high she does not have any symptoms of this. She is to notify me with persistent hyperglycemia or episodes of hypoglycemia. Follow-up in 3 months. Lab Results   Component Value Date    HGBA1C 8.2 (A) 08/15/2023            Relevant Medications    linaGLIPtin (Tradjenta) 5 MG TABS    Diabetic nephropathy associated with type 2 diabetes mellitus ([address])     CCed on upcoming labs for PCP to evaluate kidney function.   Lab Results   Component Value Date    HGBA1C 8.2 (A) 08/15/2023            Relevant Medications    linaGLIPtin (Tradjenta) 5 MG TABS    Hypoglycemia unawareness associated with type 2 diabetes mellitus ([address])     In order to preserve patient's safety, continue use of CGM. Patient is asymptomatic of hypoglycemia. Lab Results   Component Value Date    HGBA1C 8.2 (A) 08/15/2023            Relevant Medications    linaGLIPtin (Tradjenta) 5 MG TABS    Other Relevant Orders    POCT hemoglobin A1c (Completed)       Cardiovascular and Mediastinum    Paroxysmal atrial fibrillation (HCC) (Chronic)     Continue 20 mg of Xarelto. Essential hypertension (Chronic)     BP well controlled at 126/68. Continue current regimen. Orders Placed This Encounter   Procedures   • POCT hemoglobin A1c       History of Present Illness:   Vikram Hollins is a 79 y.o. female with hypertension, hyperlipidemia, and type 2 diabetes with long term use of insulin. Reports complications of multiple strokes and diabetic retinopathy.  Denies recent severe hypoglycemic or severe hyperglycemic episodes. Denies any issues with her current regimen. home glucose monitoring: are performed sporadically-patient is having a difficult time seeing well enough to test her blood sugars. At patient's last appointment on 2/15/2023, basal insulin was reduced due to episodes of hypoglycemia. Unfortunately, on 5/6/2023, patient sustained a fall at home due to hypoglycemia. Since that time, she has been receiving home health care visits. Her nurse has been in contact with me about her blood sugars which have ranged from less than 70 mg/dL to over 200 mg/dL. Lantus has been reduced since the time of her discharge by me. At time of discharge, she was instructed to stop repaglinide and tradjenta. However, she has restarted repaglinide but not Tradjenta. She did not bring her CGM reader today so there is no data to review. Her home nurse has been in touch with me and reports blood sugars ranging from . Patient denies any recent episodes of hypoglycemia.   She reports that she has been making an effort to follow a healthy diet but also admits to some dietary excursions. Reports fasting sugar this morning was 120. Point-of-care hemoglobin A1c today is 8.2%. Last GFR during hospitalization was reduced at 47. Patient's baseline is typically in the 60s. Current regimen:   Metformin 500 mg twice daily  Repaglinide 2 mg twice daily      Last Eye Exam: UTD  Last Foot Exam: UTD    For hyperlipidemia, she is taking 80 mg of atorvastatin nightly. She denies myalgias. For hypertension, she is taking 5 mg of lisinopril daily and 50 mg of metoprolol tartrate twice daily.     Patient Active Problem List   Diagnosis   • Paroxysmal atrial fibrillation ([facility])   • Cerebrovascular accident (CVA) ([address])   • Blurry vision   • Diabetic cataract ([address])   • Dysphagia   • Dysphonia   • Posterior glottic stenosis   • Heart disease   • Insomnia   • Incomplete emptying of bladder   • Tracheal stenosis   • Type 2 diabetes mellitus with chronic kidney disease, with long-term current use of insulin ([facility])   • GERD (gastroesophageal reflux disease)   • Hyperlipidemia   • Edema   • Essential hypertension   • Current episode of major depressive disorder without prior episode   • Medicare annual wellness visit, subsequent   • Preoperative clearance   • YOLIE (obstructive sleep apnea)   • Dermatitis   • Muscle tension dysphonia   • Reflux laryngitis   • Glottic insufficiency   • Weakness of both lower extremities   • History of CVA (cerebrovascular accident)   • Hemiplegia and hemiparesis following cerebral infarction affecting left non-dominant side ([facility])   • Stage 3 chronic kidney disease, unspecified whether stage 3a or 3b CKD ([facility])   • Abnormality of gait due to impairment of balance   • Stress incontinence   • Cyst of right ovary   • Abnormal CT of the chest   • Platelets decreased ([address])   • Status post appendectomy   • Ovarian mass   • Abnormal uterine bleeding (AUB)   • Gustatory hyperhidrosis   • Fall   • Elevated CK   • Diabetic nephropathy associated with type 2 diabetes mellitus ([address])   • Hypoglycemia unawareness associated with type 2 diabetes mellitus ([address])      Past Medical History:   Diagnosis Date   • Abdominal fibromatosis    • Arthritis    • Depression    • GERD (gastroesophageal reflux disease)     controlled   • Hyperlipemia    • Hypertension    • Obesity    • Pneumonia    • Right pontine stroke ([address]) 12/13/2021   • Stroke (cerebrum) ([address]) 12/17/2021   • Stroke [facility])       Past Surgical History:   Procedure Laterality Date   • APPENDECTOMY     • APPENDECTOMY LAPAROSCOPIC N/A 06/12/2022    Procedure: APPENDECTOMY LAPAROSCOPIC;  Surgeon: Lissette Nance MD;  Location: [facility];  Service: General   • CATARACT EXTRACTION     • CATARACT EXTRACTION, BILATERAL     • COLONOSCOPY     • EGD     • LAPAROTOMY      Exploratory;  Last Assessed 10/17/2017   • PEG TUBE PLACEMENT     • PEG TUBE REMOVAL        Family History   Problem Relation Age of Onset   • No Known Problems Mother    • No Known Problems Father      Social History     Tobacco Use   • Smoking status: Never   • Smokeless tobacco: Never   Substance Use Topics   • Alcohol use: Never     Comment: Socially     Allergies   Allergen Reactions   • Apixaban Vomiting and GI Intolerance     Other reaction(s): Vomiting   • Dulaglutide Vomiting     Nausea vomiting         Current Outpatient Medications:   •  Accu-Chek FastClix Lancets MISC, Use 2 (two) times a day, Disp: 102 each, Rfl: 3  •  Alcohol Swabs (Pharmacist Choice Alcohol) PADS, , Disp: , Rfl:   •  Aspirin 81 MG CAPS, Take 1 tablet by mouth in the morning, Disp: , Rfl:   •  Assure ID Safety Pen Needles 30G X 8 MM MISC, USE DAILY, Disp: 100 each, Rfl: 3  •  atorvastatin (LIPITOR) 80 mg tablet, TAKE ONE TABLET BY MOUTH ONCE DAILY WITH DINNER, Disp: 90 tablet, Rfl: 1  •  B-D UF III MINI PEN NEEDLES 31G X 5 MM MISC, INJECT UNDER THE SKIN AS NEEDED (HYPOGLYCEMIA) USE AS DIRECTED, Disp: 100 each, Rfl: 1  •  Blood Glucose Monitoring Suppl (Accu-Chek Guide) w/Device KIT, Use 2 (two) times a day, Disp: 1 kit, Rfl: 0  •  Blood Glucose Monitoring Suppl (ONE TOUCH ULTRA 2) w/Device KIT, USE AS DIRECTED, Disp: 1 kit, Rfl: 0  •  clotrimazole (LOTRIMIN) 1 % cream, , Disp: , Rfl:   •  Continuous Blood Gluc  (FreeStyle Naveen 2 [place]) SHERWIN, Dispense 1 reader., Disp: 1 each, Rfl: 0  •  Continuous Blood Gluc Sensor (FreeStyle Naveen 2 Sensor) MISC, Use 1 sensor every 14 days for continuous glucose monitoring., Disp: 6 each, Rfl: 1  •  escitalopram (LEXAPRO) 5 mg tablet, TAKE ONE TABLET BY MOUTH EVERY MORNING, Disp: 90 tablet, Rfl: 1  •  famotidine (PEPCID) 40 MG tablet, TAKE ONE TABLET BY MOUTH ONCE DAILY, Disp: 90 tablet, Rfl: 1  •  Lancet Devices (Lancet Device with Ejector) MISC, USE TO CHECK BLOOD SUGAR TWICE A DAY, Disp: 1 each, Rfl: 0  •  Lancets Misc.  (Accu-Chek Softclix Lancet Dev) KIT, Check blood sugar, Disp: 1 kit, Rfl: 1  •  linaGLIPtin (Tradjenta) 5 MG TABS, Take 5 mg by mouth daily, Disp: 90 tablet, Rfl: 1  •  lisinopril (ZESTRIL) 5 mg tablet, TAKE ONE TABLET BY MOUTH EVERY MORNING, Disp: 90 tablet, Rfl: 1  •  metFORMIN (GLUCOPHAGE-XR) 500 mg 24 hr tablet, TAKE 1 TABLET (500 MG TOTAL) BY MOUTH 2 (TWO) TIMES A DAY WITH MEALS, Disp: 180 tablet, Rfl: 1  •  metoprolol tartrate (LOPRESSOR) 50 mg tablet, TAKE ONE TABLET BY MOUTH EVERY 12 HOURS, Disp: 180 tablet, Rfl: 1  •  Myrbetriq 50 MG TB24, TAKE 1 TABLET (50 MG TOTAL) BY MOUTH IN THE MORNING, Disp: 90 tablet, Rfl: 1  •  ondansetron (ZOFRAN-ODT) 4 mg disintegrating tablet, Take 1 tablet (4 mg total) by mouth every 8 (eight) hours as needed for nausea or vomiting, Disp: 30 tablet, Rfl: 1  •  OneTouch Ultra test strip, , Disp: , Rfl:   •  pantoprazole (PROTONIX) 40 mg tablet, TAKE ONE TABLET BY MOUTH DAILY, Disp: 30 tablet, Rfl: 0  •  repaglinide (PRANDIN) 2 mg tablet, TAKE ONE TABLET BY MOUTH TWICE DAILY BEFORE MEALS, Disp: 180 tablet, Rfl: 1  •  Xarelto 20 MG tablet, TAKE ONE TABLET BY MOUTH ONCE DAILY WITH BREAKFAST, Disp: 90 tablet, Rfl: 1  •  Blood Glucose Monitoring Suppl (OneTouch Verio) w/Device KIT, Use 2 (two) times a day, Disp: 1 kit, Rfl: 0  •  Insulin Pen Needle 31G X 5 MM MISC, BD Ultra-Fine Short Pen Needle 31 gauge x 5/16"  use as directed (Patient not taking: Reported on 8/15/2023), Disp: , Rfl:     Review of Systems   Constitutional: Positive for fatigue. Negative for activity change, appetite change and unexpected weight change. HENT: Negative for dental problem, sore throat, trouble swallowing and voice change. Eyes: Negative for visual disturbance. Respiratory: Negative for cough, chest tightness and shortness of breath. Cardiovascular: Negative for chest pain, palpitations and leg swelling. Gastrointestinal: Negative for constipation, diarrhea, nausea and vomiting. Endocrine: Negative for polydipsia, polyphagia and polyuria. Genitourinary: Negative for frequency. Musculoskeletal: Positive for arthralgias and gait problem. Negative for back pain and myalgias. Skin: Negative for wound. Allergic/Immunologic: Positive for environmental allergies. Negative for food allergies. Neurological: Positive for weakness and numbness. Negative for dizziness, light-headedness and headaches. Psychiatric/Behavioral: Negative for decreased concentration, dysphoric mood and sleep disturbance. The patient is not nervous/anxious. Physical Exam:  Body mass index is 31.45 kg/m². /68   Pulse 80   Temp 98 °F (36.7 °C)   Resp 16   Ht 5' 5" (1.651 m)   Wt 85.7 kg (189 lb)   LMP  (LMP Unknown)   SpO2 99%   BMI 31.45 kg/m²    Wt Readings from Last 3 Encounters:   08/15/23 85.7 kg (189 lb)   08/01/23 85.7 kg (189 lb)   06/09/23 85.7 kg (189 lb)       Physical Exam  Vitals reviewed. Constitutional:       General: She is not in acute distress. Appearance: She is well-developed. She is obese. She is not ill-appearing. HENT:      Head: Normocephalic and atraumatic.    Eyes: Pupils: Pupils are equal, round, and reactive to light. Neck:      Thyroid: No thyromegaly. Cardiovascular:      Rate and Rhythm: Normal rate and regular rhythm. Pulses: Normal pulses. Heart sounds: Normal heart sounds. Pulmonary:      Effort: Pulmonary effort is normal.      Breath sounds: Normal breath sounds. Abdominal:      General: Bowel sounds are normal. There is no distension. Palpations: Abdomen is soft. Tenderness: There is no abdominal tenderness. Musculoskeletal:      Cervical back: Normal range of motion and neck supple. Right lower leg: No edema. Left lower leg: No edema. Lymphadenopathy:      Cervical: No cervical adenopathy. Skin:     General: Skin is warm and dry. Capillary Refill: Capillary refill takes less than 2 seconds. Neurological:      Mental Status: She is alert and oriented to person, place, and time. Gait: Gait normal.   Psychiatric:         Mood and Affect: Mood normal.         Behavior: Behavior normal.           Labs:   Lab Results   Component Value Date    HGBA1C 8.2 (A) 08/15/2023    HGBA1C 6.4 02/09/2023    HGBA1C 6.9 (H) 09/10/2022     Lab Results   Component Value Date    CREATININE 1.21 05/08/2023    CREATININE 0.82 05/07/2023    CREATININE 0.88 05/06/2023    BUN 33 (H) 05/08/2023    K 4.1 05/08/2023     05/08/2023    CO2 26 05/08/2023     eGFR   Date Value Ref Range Status   05/08/2023 47 ml/min/1.73sq m Final     Lab Results   Component Value Date    HDL 49 (L) 12/14/2021    TRIG 73 12/14/2021     Lab Results   Component Value Date    ALT 39 05/06/2023    AST 66 (H) 05/06/2023    ALKPHOS 55 05/06/2023     Lab Results   Component Value Date    JIS9JHGVUJBK 4.942 (H) 06/11/2022    YIO9VHZEWTDB 2.409 12/13/2021    HAC8RUUBYQAL 1.800 01/30/2021     No results found for: "Alexis Hodgkin", "TSI"          Discussed with the patient and all questioned fully answered. She will call me if any problems arise.     Follow-up appointment in 3 months. Counseled patient on diagnostic results, prognosis, risk and benefit of treatment options, instruction for management, importance of treatment compliance, Risk  factor reduction and impressions    Patient Instructions   1. Ok to stay off Lantus  2. Resume 5 mg of tradjenta daily  3. Continue 500 mg of metformin twice daily. Once I see you labs, based on your kidney function, we may decide to increase that. 4. Continue 2 mg of Prandin twice daily before meals. If you skip a meal, skip the medication.        [facility] [address],[place]

## 2023-08-10 ENCOUNTER — HOME CARE VISIT (OUTPATIENT)
Dept: HOME HEALTH SERVICES | Facility: HOME HEALTHCARE | Age: 68
End: 2023-08-10
Payer: COMMERCIAL

## 2023-08-10 VITALS
RESPIRATION RATE: 16 BRPM | SYSTOLIC BLOOD PRESSURE: 128 MMHG | HEART RATE: 82 BPM | DIASTOLIC BLOOD PRESSURE: 78 MMHG | TEMPERATURE: 97.6 F | OXYGEN SATURATION: 98 %

## 2023-08-10 PROCEDURE — G0156 HHCP-SVS OF AIDE,EA 15 MIN: HCPCS

## 2023-08-15 ENCOUNTER — HOME CARE VISIT (OUTPATIENT)
Dept: HOME HEALTH SERVICES | Facility: HOME HEALTHCARE | Age: 68
End: 2023-08-15
Payer: COMMERCIAL

## 2023-08-15 ENCOUNTER — OFFICE VISIT (OUTPATIENT)
Dept: ENDOCRINOLOGY | Facility: CLINIC | Age: 68
End: 2023-08-15
Payer: COMMERCIAL

## 2023-08-15 VITALS
BODY MASS INDEX: 31.49 KG/M2 | DIASTOLIC BLOOD PRESSURE: 68 MMHG | TEMPERATURE: 98 F | RESPIRATION RATE: 16 BRPM | SYSTOLIC BLOOD PRESSURE: 126 MMHG | HEIGHT: 65 IN | WEIGHT: 189 LBS | OXYGEN SATURATION: 99 % | HEART RATE: 80 BPM

## 2023-08-15 DIAGNOSIS — N18.31 TYPE 2 DIABETES MELLITUS WITH STAGE 3A CHRONIC KIDNEY DISEASE, WITH LONG-TERM CURRENT USE OF INSULIN (HCC): Primary | ICD-10-CM

## 2023-08-15 DIAGNOSIS — E11.649 HYPOGLYCEMIA UNAWARENESS ASSOCIATED WITH TYPE 2 DIABETES MELLITUS (HCC): ICD-10-CM

## 2023-08-15 DIAGNOSIS — E11.22 TYPE 2 DIABETES MELLITUS WITH STAGE 3A CHRONIC KIDNEY DISEASE, WITH LONG-TERM CURRENT USE OF INSULIN (HCC): Primary | ICD-10-CM

## 2023-08-15 DIAGNOSIS — I48.0 PAROXYSMAL ATRIAL FIBRILLATION (HCC): ICD-10-CM

## 2023-08-15 DIAGNOSIS — I48.0 PAROXYSMAL ATRIAL FIBRILLATION (HCC): Chronic | ICD-10-CM

## 2023-08-15 DIAGNOSIS — E11.65 TYPE 2 DIABETES MELLITUS WITH HYPERGLYCEMIA, WITH LONG-TERM CURRENT USE OF INSULIN (HCC): ICD-10-CM

## 2023-08-15 DIAGNOSIS — K21.9 GASTROESOPHAGEAL REFLUX DISEASE, UNSPECIFIED WHETHER ESOPHAGITIS PRESENT: ICD-10-CM

## 2023-08-15 DIAGNOSIS — Z79.4 TYPE 2 DIABETES MELLITUS WITH STAGE 3A CHRONIC KIDNEY DISEASE, WITH LONG-TERM CURRENT USE OF INSULIN (HCC): Primary | ICD-10-CM

## 2023-08-15 DIAGNOSIS — I10 ESSENTIAL HYPERTENSION: Chronic | ICD-10-CM

## 2023-08-15 DIAGNOSIS — Z79.4 TYPE 2 DIABETES MELLITUS WITH HYPERGLYCEMIA, WITH LONG-TERM CURRENT USE OF INSULIN (HCC): ICD-10-CM

## 2023-08-15 DIAGNOSIS — E11.21 DIABETIC NEPHROPATHY ASSOCIATED WITH TYPE 2 DIABETES MELLITUS (HCC): ICD-10-CM

## 2023-08-15 DIAGNOSIS — I63.9 CEREBROVASCULAR ACCIDENT (CVA), UNSPECIFIED MECHANISM (HCC): ICD-10-CM

## 2023-08-15 LAB — SL AMB POCT HEMOGLOBIN AIC: 8.2 (ref ?–6.5)

## 2023-08-15 PROCEDURE — 99214 OFFICE O/P EST MOD 30 MIN: CPT | Performed by: NURSE PRACTITIONER

## 2023-08-15 PROCEDURE — G0156 HHCP-SVS OF AIDE,EA 15 MIN: HCPCS

## 2023-08-15 PROCEDURE — 83036 HEMOGLOBIN GLYCOSYLATED A1C: CPT | Performed by: NURSE PRACTITIONER

## 2023-08-15 RX ORDER — LINAGLIPTIN 5 MG/1
5 TABLET, FILM COATED ORAL DAILY
Qty: 90 TABLET | Refills: 1 | Status: SHIPPED | OUTPATIENT
Start: 2023-08-15

## 2023-08-15 RX ORDER — LINAGLIPTIN 5 MG/1
1 TABLET, FILM COATED ORAL DAILY
COMMUNITY
End: 2023-08-15 | Stop reason: SDUPTHER

## 2023-08-15 RX ORDER — PANTOPRAZOLE SODIUM 40 MG/1
40 TABLET, DELAYED RELEASE ORAL DAILY
Qty: 30 TABLET | Refills: 0 | Status: SHIPPED | OUTPATIENT
Start: 2023-08-15

## 2023-08-15 NOTE — ASSESSMENT & PLAN NOTE
In order to preserve patient's safety, continue use of CGM. Patient is asymptomatic of hypoglycemia.   Lab Results   Component Value Date    HGBA1C 8.2 (A) 08/15/2023

## 2023-08-15 NOTE — ASSESSMENT & PLAN NOTE
CCed on upcoming labs for PCP to evaluate kidney function.   Lab Results   Component Value Date    HGBA1C 8.2 (A) 08/15/2023

## 2023-08-15 NOTE — PATIENT INSTRUCTIONS
Ok to stay off Lantus  Resume 5 mg of tradjenta daily  Continue 500 mg of metformin twice daily. Once I see you labs, based on your kidney function, we may decide to increase that. Continue 2 mg of Prandin twice daily before meals. If you skip a meal, skip the medication.

## 2023-08-15 NOTE — ASSESSMENT & PLAN NOTE
Patient's A1c is increased significantly however, we do not want to resume insulin use due to patient's hypoglycemia unawareness. Discussed the addition of GLP-1 rather than DPP 4 Tradjenta. However, patient did have difficulty with Trulicity. I explained that she may not have difficulty with Mounjaro or Ozempic. At this time, she would prefer to resume Tradjenta. When she completes her labs and I can evaluate her GFR, may consider increasing metformin. May also consider the addition of SGLT2. Strongly encourage patient to be very mindful of her diet. At this time, she can continue Prandin prior to meals. Reviewed the importance of skipping Prandin if she is not going to eat a meal.  Reviewed that medication can cause hypoglycemia. Ideally, patient will be willing to transition to GLP-1 Ozempic or Mounjaro and we can discontinue Prandin. Continue use of continuous glucose monitor as she does need to be alarmed if her blood sugar is dropping high she does not have any symptoms of this. She is to notify me with persistent hyperglycemia or episodes of hypoglycemia. Follow-up in 3 months.   Lab Results   Component Value Date    HGBA1C 8.2 (A) 08/15/2023

## 2023-08-16 ENCOUNTER — HOME CARE VISIT (OUTPATIENT)
Dept: HOME HEALTH SERVICES | Facility: HOME HEALTHCARE | Age: 68
End: 2023-08-16
Payer: COMMERCIAL

## 2023-08-16 PROCEDURE — G0299 HHS/HOSPICE OF RN EA 15 MIN: HCPCS

## 2023-08-16 RX ORDER — RIVAROXABAN 20 MG/1
TABLET, FILM COATED ORAL
Qty: 90 TABLET | Refills: 1 | Status: SHIPPED | OUTPATIENT
Start: 2023-08-16

## 2023-08-17 VITALS
RESPIRATION RATE: 18 BRPM | OXYGEN SATURATION: 98 % | DIASTOLIC BLOOD PRESSURE: 62 MMHG | HEART RATE: 68 BPM | SYSTOLIC BLOOD PRESSURE: 122 MMHG | TEMPERATURE: 97.2 F

## 2023-08-21 NOTE — CASE COMMUNICATION
This is a required notification regarding discharge from [facility]  [facility] Discharge 08/16/2023  patient discharged to self care. [facility] services start beginning of september. insulin has been discontinued. all medications are in the home prefilled packs from [facility]. patient is aware insurance likely is not going to cover the CGM now that she has stopped insulin so she will have to resume fingersticks.

## 2023-08-28 DIAGNOSIS — E11.65 TYPE 2 DIABETES MELLITUS WITH HYPERGLYCEMIA, WITH LONG-TERM CURRENT USE OF INSULIN (HCC): Chronic | ICD-10-CM

## 2023-08-28 DIAGNOSIS — E11.649 HYPOGLYCEMIA UNAWARENESS ASSOCIATED WITH TYPE 2 DIABETES MELLITUS (HCC): ICD-10-CM

## 2023-08-28 DIAGNOSIS — Z79.4 TYPE 2 DIABETES MELLITUS WITH HYPERGLYCEMIA, WITH LONG-TERM CURRENT USE OF INSULIN (HCC): Chronic | ICD-10-CM

## 2023-09-06 ENCOUNTER — APPOINTMENT (OUTPATIENT)
Dept: LAB | Facility: HOSPITAL | Age: 68
End: 2023-09-06
Payer: COMMERCIAL

## 2023-09-06 DIAGNOSIS — Z00.00 ROUTINE MEDICAL EXAM: ICD-10-CM

## 2023-09-06 LAB
25(OH)D3 SERPL-MCNC: 13.8 NG/ML (ref 30–100)
ALBUMIN SERPL BCP-MCNC: 3.9 G/DL (ref 3.5–5)
ALP SERPL-CCNC: 52 U/L (ref 34–104)
ALT SERPL W P-5'-P-CCNC: 20 U/L (ref 7–52)
ANION GAP SERPL CALCULATED.3IONS-SCNC: 11 MMOL/L
AST SERPL W P-5'-P-CCNC: 27 U/L (ref 13–39)
BASOPHILS # BLD AUTO: 0.04 THOUSANDS/ÂΜL (ref 0–0.1)
BASOPHILS NFR BLD AUTO: 1 % (ref 0–1)
BILIRUB SERPL-MCNC: 0.98 MG/DL (ref 0.2–1)
BUN SERPL-MCNC: 24 MG/DL (ref 5–25)
CALCIUM SERPL-MCNC: 10.3 MG/DL (ref 8.4–10.2)
CHLORIDE SERPL-SCNC: 102 MMOL/L (ref 96–108)
CHOLEST SERPL-MCNC: 134 MG/DL
CO2 SERPL-SCNC: 25 MMOL/L (ref 21–32)
CREAT SERPL-MCNC: 0.95 MG/DL (ref 0.6–1.3)
EOSINOPHIL # BLD AUTO: 0.37 THOUSAND/ÂΜL (ref 0–0.61)
EOSINOPHIL NFR BLD AUTO: 7 % (ref 0–6)
ERYTHROCYTE [DISTWIDTH] IN BLOOD BY AUTOMATED COUNT: 13 % (ref 11.6–15.1)
GFR SERPL CREATININE-BSD FRML MDRD: 62 ML/MIN/1.73SQ M
GLUCOSE SERPL-MCNC: 166 MG/DL (ref 65–140)
HCT VFR BLD AUTO: 41.1 % (ref 34.8–46.1)
HDLC SERPL-MCNC: 59 MG/DL
HGB BLD-MCNC: 12.9 G/DL (ref 11.5–15.4)
IMM GRANULOCYTES # BLD AUTO: 0.01 THOUSAND/UL (ref 0–0.2)
IMM GRANULOCYTES NFR BLD AUTO: 0 % (ref 0–2)
LDLC SERPL CALC-MCNC: 59 MG/DL (ref 0–100)
LYMPHOCYTES # BLD AUTO: 2.35 THOUSANDS/ÂΜL (ref 0.6–4.47)
LYMPHOCYTES NFR BLD AUTO: 42 % (ref 14–44)
MCH RBC QN AUTO: 30.4 PG (ref 26.8–34.3)
MCHC RBC AUTO-ENTMCNC: 31.4 G/DL (ref 31.4–37.4)
MCV RBC AUTO: 97 FL (ref 82–98)
MONOCYTES # BLD AUTO: 0.42 THOUSAND/ÂΜL (ref 0.17–1.22)
MONOCYTES NFR BLD AUTO: 7 % (ref 4–12)
NEUTROPHILS # BLD AUTO: 2.45 THOUSANDS/ÂΜL (ref 1.85–7.62)
NEUTS SEG NFR BLD AUTO: 43 % (ref 43–75)
NONHDLC SERPL-MCNC: 75 MG/DL
NRBC BLD AUTO-RTO: 0 /100 WBCS
PLATELET # BLD AUTO: 193 THOUSANDS/UL (ref 149–390)
PMV BLD AUTO: 12.6 FL (ref 8.9–12.7)
POTASSIUM SERPL-SCNC: 4.6 MMOL/L (ref 3.5–5.3)
PROT SERPL-MCNC: 7.2 G/DL (ref 6.4–8.4)
RBC # BLD AUTO: 4.24 MILLION/UL (ref 3.81–5.12)
SODIUM SERPL-SCNC: 138 MMOL/L (ref 135–147)
TRIGL SERPL-MCNC: 79 MG/DL
WBC # BLD AUTO: 5.64 THOUSAND/UL (ref 4.31–10.16)

## 2023-09-06 PROCEDURE — 83036 HEMOGLOBIN GLYCOSYLATED A1C: CPT

## 2023-09-06 PROCEDURE — 36415 COLL VENOUS BLD VENIPUNCTURE: CPT

## 2023-09-06 PROCEDURE — 80053 COMPREHEN METABOLIC PANEL: CPT

## 2023-09-06 PROCEDURE — 82306 VITAMIN D 25 HYDROXY: CPT

## 2023-09-06 PROCEDURE — 80061 LIPID PANEL: CPT

## 2023-09-06 PROCEDURE — 85025 COMPLETE CBC W/AUTO DIFF WBC: CPT

## 2023-09-07 LAB
EST. AVERAGE GLUCOSE BLD GHB EST-MCNC: 200 MG/DL
HBA1C MFR BLD: 8.6 %

## 2023-09-11 DIAGNOSIS — E11.65 TYPE 2 DIABETES MELLITUS WITH HYPERGLYCEMIA, WITH LONG-TERM CURRENT USE OF INSULIN (HCC): ICD-10-CM

## 2023-09-11 DIAGNOSIS — K21.9 GASTROESOPHAGEAL REFLUX DISEASE, UNSPECIFIED WHETHER ESOPHAGITIS PRESENT: ICD-10-CM

## 2023-09-11 DIAGNOSIS — Z79.4 TYPE 2 DIABETES MELLITUS WITH HYPERGLYCEMIA, WITH LONG-TERM CURRENT USE OF INSULIN (HCC): ICD-10-CM

## 2023-09-11 RX ORDER — PANTOPRAZOLE SODIUM 40 MG/1
40 TABLET, DELAYED RELEASE ORAL DAILY
Qty: 30 TABLET | Refills: 0 | Status: SHIPPED | OUTPATIENT
Start: 2023-09-11

## 2023-09-12 ENCOUNTER — TELEPHONE (OUTPATIENT)
Dept: FAMILY MEDICINE CLINIC | Facility: CLINIC | Age: 68
End: 2023-09-12

## 2023-09-12 NOTE — TELEPHONE ENCOUNTER
Registered Nurse Meri from [employer] stated that the patient will no longer continue visiting with PCP at [facility], since patient has a PCP at [facility].

## 2023-09-14 ENCOUNTER — APPOINTMENT (EMERGENCY)
Dept: CT IMAGING | Facility: HOSPITAL | Age: 68
End: 2023-09-14
Payer: MEDICARE

## 2023-09-14 ENCOUNTER — APPOINTMENT (EMERGENCY)
Dept: RADIOLOGY | Facility: HOSPITAL | Age: 68
End: 2023-09-14
Payer: MEDICARE

## 2023-09-14 ENCOUNTER — HOSPITAL ENCOUNTER (EMERGENCY)
Facility: HOSPITAL | Age: 68
Discharge: HOME | End: 2023-09-14
Attending: EMERGENCY MEDICINE
Payer: MEDICARE

## 2023-09-14 VITALS
RESPIRATION RATE: 17 BRPM | TEMPERATURE: 98.4 F | OXYGEN SATURATION: 92 % | HEART RATE: 82 BPM | DIASTOLIC BLOOD PRESSURE: 69 MMHG | SYSTOLIC BLOOD PRESSURE: 188 MMHG | WEIGHT: 188.93 LBS | HEIGHT: 65 IN | BODY MASS INDEX: 31.48 KG/M2

## 2023-09-14 DIAGNOSIS — S51.019A SKIN TEAR OF ELBOW WITHOUT COMPLICATION: ICD-10-CM
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
## 2023-09-14 DIAGNOSIS — N39.0 UTI (URINARY TRACT INFECTION): ICD-10-CM

## 2023-09-14 LAB
2HR DELTA HS TROPONIN: 0 NG/L
ABO GROUP BLD: NORMAL
ALBUMIN SERPL BCP-MCNC: 3.4 G/DL (ref 3.5–5)
ALP SERPL-CCNC: 57 U/L (ref 34–104)
ALT SERPL W P-5'-P-CCNC: 15 U/L (ref 7–52)
ANION GAP SERPL CALCULATED.3IONS-SCNC: 6 MMOL/L
AST SERPL W P-5'-P-CCNC: 18 U/L (ref 13–39)
BACTERIA UR QL AUTO: ABNORMAL /HPF
BASOPHILS # BLD AUTO: 0.04 THOUSANDS/ÂΜL (ref 0–0.1)
BASOPHILS NFR BLD AUTO: 1 % (ref 0–1)
BILIRUB SERPL-MCNC: 0.64 MG/DL (ref 0.2–1)
BILIRUB UR QL STRIP: NEGATIVE
BLD GP AB SCN SERPL QL: NEGATIVE
BUN SERPL-MCNC: 22 MG/DL (ref 5–25)
CALCIUM ALBUM COR SERPL-MCNC: 9.1 MG/DL (ref 8.3–10.1)
CALCIUM SERPL-MCNC: 8.6 MG/DL (ref 8.4–10.2)
CARDIAC TROPONIN I PNL SERPL HS: 5 NG/L
CARDIAC TROPONIN I PNL SERPL HS: 5 NG/L
CHLORIDE SERPL-SCNC: 100 MMOL/L (ref 96–108)
CLARITY UR: ABNORMAL
CO2 SERPL-SCNC: 27 MMOL/L (ref 21–32)
COLOR UR: YELLOW
CREAT SERPL-MCNC: 0.84 MG/DL (ref 0.6–1.3)
EOSINOPHIL # BLD AUTO: 0.25 THOUSAND/ÂΜL (ref 0–0.61)
EOSINOPHIL NFR BLD AUTO: 4 % (ref 0–6)
ERYTHROCYTE [DISTWIDTH] IN BLOOD BY AUTOMATED COUNT: 12.3 % (ref 11.6–15.1)
GFR SERPL CREATININE-BSD FRML MDRD: 72 ML/MIN/1.73SQ M
GLUCOSE SERPL-MCNC: 214 MG/DL (ref 65–140)
GLUCOSE UR STRIP-MCNC: ABNORMAL MG/DL
HCT VFR BLD AUTO: 36.2 % (ref 34.8–46.1)
HGB BLD-MCNC: 11.5 G/DL (ref 11.5–15.4)
HGB UR QL STRIP.AUTO: NEGATIVE
IMM GRANULOCYTES # BLD AUTO: 0.02 THOUSAND/UL (ref 0–0.2)
IMM GRANULOCYTES NFR BLD AUTO: 0 % (ref 0–2)
KETONES UR STRIP-MCNC: NEGATIVE MG/DL
LEUKOCYTE ESTERASE UR QL STRIP: NEGATIVE
LYMPHOCYTES # BLD AUTO: 2.16 THOUSANDS/ÂΜL (ref 0.6–4.47)
LYMPHOCYTES NFR BLD AUTO: 33 % (ref 14–44)
MCH RBC QN AUTO: 31.3 PG (ref 26.8–34.3)
MCHC RBC AUTO-ENTMCNC: 31.8 G/DL (ref 31.4–37.4)
MCV RBC AUTO: 99 FL (ref 82–98)
MONOCYTES # BLD AUTO: 0.61 THOUSAND/ÂΜL (ref 0.17–1.22)
MONOCYTES NFR BLD AUTO: 9 % (ref 4–12)
MUCOUS THREADS UR QL AUTO: ABNORMAL
NEUTROPHILS # BLD AUTO: 3.51 THOUSANDS/ÂΜL (ref 1.85–7.62)
NEUTS SEG NFR BLD AUTO: 53 % (ref 43–75)
NITRITE UR QL STRIP: POSITIVE
NON-SQ EPI CELLS URNS QL MICRO: ABNORMAL /HPF
NRBC BLD AUTO-RTO: 0 /100 WBCS
PH UR STRIP.AUTO: 6.5 [PH]
PLATELET # BLD AUTO: 244 THOUSANDS/UL (ref 149–390)
PMV BLD AUTO: 12.5 FL (ref 8.9–12.7)
POTASSIUM SERPL-SCNC: 4.2 MMOL/L (ref 3.5–5.3)
PROT SERPL-MCNC: 6.5 G/DL (ref 6.4–8.4)
PROT UR STRIP-MCNC: ABNORMAL MG/DL
RBC # BLD AUTO: 3.67 MILLION/UL (ref 3.81–5.12)
RBC #/AREA URNS AUTO: ABNORMAL /HPF
RH BLD: POSITIVE
SODIUM SERPL-SCNC: 133 MMOL/L (ref 135–147)
SP GR UR STRIP.AUTO: <=1.005
SPECIMEN EXPIRATION DATE: NORMAL
UROBILINOGEN UR QL STRIP.AUTO: 0.2 E.U./DL
WBC # BLD AUTO: 6.59 THOUSAND/UL (ref 4.31–10.16)
WBC #/AREA URNS AUTO: ABNORMAL /HPF

## 2023-09-14 PROCEDURE — 71045 X-RAY EXAM CHEST 1 VIEW: CPT

## 2023-09-14 PROCEDURE — 36415 COLL VENOUS BLD VENIPUNCTURE: CPT | Performed by: EMERGENCY MEDICINE

## 2023-09-14 PROCEDURE — 84484 ASSAY OF TROPONIN QUANT: CPT | Performed by: EMERGENCY MEDICINE

## 2023-09-14 PROCEDURE — 74177 CT ABD & PELVIS W/CONTRAST: CPT

## 2023-09-14 PROCEDURE — 86901 BLOOD TYPING SEROLOGIC RH(D): CPT | Performed by: EMERGENCY MEDICINE

## 2023-09-14 PROCEDURE — 87147 CULTURE TYPE IMMUNOLOGIC: CPT | Performed by: EMERGENCY MEDICINE

## 2023-09-14 PROCEDURE — 87086 URINE CULTURE/COLONY COUNT: CPT | Performed by: EMERGENCY MEDICINE

## 2023-09-14 PROCEDURE — 90471 IMMUNIZATION ADMIN: CPT

## 2023-09-14 PROCEDURE — 86850 RBC ANTIBODY SCREEN: CPT | Performed by: EMERGENCY MEDICINE

## 2023-09-14 PROCEDURE — 70450 CT HEAD/BRAIN W/O DYE: CPT

## 2023-09-14 PROCEDURE — 73080 X-RAY EXAM OF ELBOW: CPT

## 2023-09-14 PROCEDURE — 87186 SC STD MICRODIL/AGAR DIL: CPT | Performed by: EMERGENCY MEDICINE

## 2023-09-14 PROCEDURE — 85025 COMPLETE CBC W/AUTO DIFF WBC: CPT | Performed by: EMERGENCY MEDICINE

## 2023-09-14 PROCEDURE — 90715 TDAP VACCINE 7 YRS/> IM: CPT | Performed by: EMERGENCY MEDICINE

## 2023-09-14 PROCEDURE — 93005 ELECTROCARDIOGRAM TRACING: CPT

## 2023-09-14 PROCEDURE — 71260 CT THORAX DX C+: CPT

## 2023-09-14 PROCEDURE — 96366 THER/PROPH/DIAG IV INF ADDON: CPT

## 2023-09-14 PROCEDURE — 96365 THER/PROPH/DIAG IV INF INIT: CPT

## 2023-09-14 PROCEDURE — 72170 X-RAY EXAM OF PELVIS: CPT

## 2023-09-14 PROCEDURE — 80053 COMPREHEN METABOLIC PANEL: CPT | Performed by: EMERGENCY MEDICINE

## 2023-09-14 PROCEDURE — 99285 EMERGENCY DEPT VISIT HI MDM: CPT

## 2023-09-14 PROCEDURE — 73030 X-RAY EXAM OF SHOULDER: CPT

## 2023-09-14 PROCEDURE — 87077 CULTURE AEROBIC IDENTIFY: CPT | Performed by: EMERGENCY MEDICINE

## 2023-09-14 PROCEDURE — 86900 BLOOD TYPING SEROLOGIC ABO: CPT | Performed by: EMERGENCY MEDICINE

## 2023-09-14 PROCEDURE — 81001 URINALYSIS AUTO W/SCOPE: CPT | Performed by: EMERGENCY MEDICINE

## 2023-09-14 RX ORDER — CEFUROXIME AXETIL 500 MG/1
500 TABLET ORAL EVERY 12 HOURS SCHEDULED
Qty: 14 TABLET | Refills: 0 | Status: SHIPPED | OUTPATIENT
Start: 2023-09-14 | End: 2023-09-14 | Stop reason: SDUPTHER

## 2023-09-14 RX ORDER — CEFTRIAXONE 2 G/50ML
2000 INJECTION, SOLUTION INTRAVENOUS ONCE
Status: COMPLETED | OUTPATIENT
Start: 2023-09-14 | End: 2023-09-14

## 2023-09-14 RX ORDER — CEFUROXIME AXETIL 500 MG/1
500 TABLET ORAL EVERY 12 HOURS SCHEDULED
Qty: 14 TABLET | Refills: 0 | Status: SHIPPED | OUTPATIENT
Start: 2023-09-14 | End: 2023-09-21

## 2023-09-14 RX ADMIN — CEFTRIAXONE 2000 MG: 2 INJECTION, SOLUTION INTRAVENOUS at 16:22

## 2023-09-14 RX ADMIN — TETANUS TOXOID, REDUCED DIPHTHERIA TOXOID AND ACELLULAR PERTUSSIS VACCINE, ADSORBED 0.5 ML: 5; 2.5; 8; 8; 2.5 SUSPENSION INTRAMUSCULAR at 17:58

## 2023-09-14 RX ADMIN — IOHEXOL 100 ML: 350 INJECTION, SOLUTION INTRAVENOUS at 13:11

## 2023-09-14 NOTE — ED PROVIDER NOTES
Emergency Department Trauma Note  Nubia Chandler 79 y.o. female MRN: [id number]  Unit/Bed#: ED 05/ED 05 Encounter: [id number]      Trauma Alert: Trauma Acuity: Trauma Evaluation  Model of Arrival:   via    Trauma Team: Current Providers  Attending Provider: Janeen Cardenas DO  Registered Nurse: Jonathon Do RN  Registered Nurse: Randolph Whitley RN  Consultants:     None      History of Present Illness     Chief Complaint:   Chief Complaint   Patient presents with   • Fall     Patient fell from standing position while using a walker at home on Monday. No headstrike/LOC. Patient takes Xarelto daily and is reporting pain on her left side when breathing. HPI:  Nubia Chandler is a 79 y.o. female who presents after falling on L side while trying to walk with her walker slipped on her. Patient does take xarelto at home. Denies head strike or LOC. Complaining of L arm and L chest pain at this time with pain with deep breathing. Mechanism:Details of Incident: Fall from standing. trying to get out of bed from with walker. Happened Monday Injury Date: 09/11/23 Injury Time: 1000 Injury Occurence Location - [address]    HPI  Review of Systems   Constitutional: Negative for activity change, appetite change, chills, diaphoresis, fatigue and fever. HENT: Negative for congestion, nosebleeds, postnasal drip, rhinorrhea, sinus pressure, sinus pain, sneezing and sore throat. Eyes: Negative for redness and visual disturbance. Respiratory: Negative for apnea, cough, chest tightness, shortness of breath, wheezing and stridor. Cardiovascular: Positive for chest pain (L sided pain). Negative for palpitations and leg swelling. Gastrointestinal: Negative for abdominal distention, abdominal pain, blood in stool, constipation, diarrhea, nausea and vomiting. Genitourinary: Positive for frequency. Negative for difficulty urinating, dysuria, flank pain, hematuria and urgency.    Musculoskeletal: Negative for arthralgias, back pain, gait problem, joint swelling, myalgias, neck pain and neck stiffness. Skin: Negative for color change, pallor, rash and wound. Neurological: Negative for dizziness, syncope, facial asymmetry, weakness, light-headedness, numbness and headaches. Psychiatric/Behavioral: Negative for confusion and decreased concentration. The patient is not nervous/anxious. Historical Information     Immunizations:   Immunization History   Administered Date(s) Administered   • COVID-19 PFIZER VACCINE 0.3 ML IM 05/05/2021, 06/02/2021, 12/01/2021, 12/21/2021   • INFLUENZA 11/04/2015, 10/01/2017, 10/30/2020, 10/14/2022   • Influenza A Monovalent (H5n1), Adjuvanted, National Stock3 10/01/2021   • Influenza, injectable, quadrivalent, preservative free 0.5 mL 09/08/2021   • Pneumococcal Polysaccharide PPV23 11/04/2015   • Tdap 09/14/2023   • Tuberculin Skin Test 05/10/2023       Past Medical History:   Diagnosis Date   • Abdominal fibromatosis    • Arthritis    • Depression    • GERD (gastroesophageal reflux disease)     controlled   • Hyperlipemia    • Hypertension    • Obesity    • Pneumonia    • Right pontine stroke ([address]) 12/13/2021   • Stroke (cerebrum) ([address]) 12/17/2021   • Stroke ([address])        Family History   Problem Relation Age of Onset   • No Known Problems Mother    • No Known Problems Father      Past Surgical History:   Procedure Laterality Date   • APPENDECTOMY     • APPENDECTOMY LAPAROSCOPIC N/A 06/12/2022    Procedure: APPENDECTOMY LAPAROSCOPIC;  Surgeon: Hong Dale MD;  Location: [facility];  Service: General   • CATARACT EXTRACTION     • CATARACT EXTRACTION, BILATERAL     • COLONOSCOPY     • EGD     • LAPAROTOMY      Exploratory;  Last Assessed 10/17/2017   • PEG TUBE PLACEMENT     • PEG TUBE REMOVAL       Social History     Tobacco Use   • Smoking status: Never   • Smokeless tobacco: Never   Vaping Use   • Vaping Use: Never used   Substance Use Topics   • Alcohol use: Never Comment: Socially   • Drug use: Never     E-Cigarette/Vaping   • E-Cigarette Use Never User      E-Cigarette/Vaping Substances   • Nicotine No    • THC No    • CBD No    • Flavoring No    • Other No    • Unknown No        Family History: non-contributory    Meds/Allergies   Prior to Admission Medications   Prescriptions Last Dose Informant Patient Reported? Taking? Accu-Chek FastClix Lancets MISC   No No   Sig: Use 2 (two) times a day   Alcohol Swabs (Pharmacist Choice Alcohol) PADS   Yes No   Aspirin 81 MG CAPS  Self Yes No   Sig: Take 1 tablet by mouth in the morning   Assure ID Safety Pen Needles 30G X 8 MM MISC   No No   Sig: USE DAILY   B-D UF III MINI PEN NEEDLES 31G X 5 MM MISC   No No   Sig: INJECT UNDER THE SKIN AS NEEDED (HYPOGLYCEMIA) USE AS DIRECTED   Blood Glucose Monitoring Suppl (Accu-Chek Guide) w/Device KIT   No No   Sig: Use 2 (two) times a day   Blood Glucose Monitoring Suppl (ONE TOUCH ULTRA 2) w/Device KIT   No No   Sig: USE AS DIRECTED   Blood Glucose Monitoring Suppl (OneTouch Verio) w/Device KIT   No No   Sig: Use 2 (two) times a day   Continuous Blood Gluc  (FreeStyle Naveen 2 [place]) SHERWIN   No No   Sig: DISPENSE 1 READER. Continuous Blood Gluc Sensor (FreeStyle Naveen 2 Sensor) MISC   No No   Sig: Use 1 sensor every 14 days for continuous glucose monitoring. Insulin Pen Needle 31G X 5 MM MISC   Yes No   Sig: BD Ultra-Fine Short Pen Needle 31 gauge x 5/16"   use as directed   Patient not taking: Reported on 8/15/2023   Lancet Devices (Lancet Device with Ejector) MISC   No No   Sig: USE TO CHECK BLOOD SUGAR TWICE A DAY   Lancets Misc.  (Accu-Chek Softclix Lancet Dev) KIT   No No   Sig: Check blood sugar   Myrbetriq 50 MG TB24   No No   Sig: TAKE 1 TABLET (50 MG TOTAL) BY MOUTH IN THE MORNING   OneTouch Ultra test strip   Yes No   Xarelto 20 MG tablet   No No   Sig: TAKE ONE TABLET BY MOUTH ONCE DAILY WITH BREAKFAST   atorvastatin (LIPITOR) 80 mg tablet   No No   Sig: TAKE ONE TABLET BY MOUTH ONCE DAILY WITH DINNER   clotrimazole (LOTRIMIN) 1 % cream  Self Yes No   escitalopram (LEXAPRO) 5 mg tablet   No No   Sig: TAKE ONE TABLET BY MOUTH EVERY MORNING   famotidine (PEPCID) 40 MG tablet   No No   Sig: TAKE ONE TABLET BY MOUTH ONCE DAILY   linaGLIPtin (Tradjenta) 5 MG TABS   No No   Sig: Take 5 mg by mouth daily   lisinopril (ZESTRIL) 5 mg tablet   No No   Sig: TAKE ONE TABLET BY MOUTH EVERY MORNING   metFORMIN (GLUCOPHAGE-XR) 500 mg 24 hr tablet   No No   Sig: TAKE 1 TABLET (500 MG TOTAL) BY MOUTH 2 (TWO) TIMES A DAY WITH MEALS   metoprolol tartrate (LOPRESSOR) 50 mg tablet   No No   Sig: TAKE ONE TABLET BY MOUTH EVERY 12 HOURS   ondansetron (ZOFRAN-ODT) 4 mg disintegrating tablet  Self No No   Sig: Take 1 tablet (4 mg total) by mouth every 8 (eight) hours as needed for nausea or vomiting   pantoprazole (PROTONIX) 40 mg tablet   No No   Sig: TAKE ONE TABLET BY MOUTH DAILY   repaglinide (PRANDIN) 2 mg tablet   No No   Sig: TAKE ONE TABLET BY MOUTH TWICE DAILY BEFORE MEALS      Facility-Administered Medications: None       Allergies   Allergen Reactions   • Apixaban Vomiting and GI Intolerance     Other reaction(s): Vomiting   • Dulaglutide Vomiting     Nausea vomiting       PHYSICAL EXAM    Objective   Vitals:   First set: Temperature: 98.4 °F (36.9 °C) (09/14/23 1145)  Pulse: 85 (09/14/23 1145)  Respirations: 18 (09/14/23 1145)  Blood Pressure: 135/63 (09/14/23 1145)  SpO2: 94 % (09/14/23 1147)    Primary Survey:   (A) Airway: intact  0(B) Breathing: equal B/L   (C) Circulation: Pulses:   normal  (D) Disabliity:  GCS Total:  15  (E) Expose:  Completed    Secondary Survey: (Click on Physical Exam tab above)  Physical Exam  Vitals and nursing note reviewed. Constitutional:       General: She is not in acute distress. Appearance: She is well-developed. She is not diaphoretic. HENT:      Head: Normocephalic and atraumatic.       Right Ear: External ear normal.      Left Ear: External ear normal.      Nose: Nose normal. No congestion or rhinorrhea. Mouth/Throat:      Mouth: Mucous membranes are moist.      Pharynx: Oropharynx is clear. No oropharyngeal exudate or posterior oropharyngeal erythema. Eyes:      General: No scleral icterus. Right eye: No discharge. Left eye: No discharge. Conjunctiva/sclera: Conjunctivae normal.      Pupils: Pupils are equal, round, and reactive to light. Neck:      Vascular: No JVD. Trachea: No tracheal deviation. Cardiovascular:      Rate and Rhythm: Normal rate and regular rhythm. Heart sounds: Normal heart sounds. No murmur heard. No friction rub. No gallop. Pulmonary:      Effort: Pulmonary effort is normal. No respiratory distress. Breath sounds: Normal breath sounds. No stridor. No wheezing or rales. Abdominal:      General: Bowel sounds are normal. There is no distension. Palpations: Abdomen is soft. There is no mass. Tenderness: There is no abdominal tenderness. There is no guarding. Musculoskeletal:         General: Tenderness and signs of injury present. No swelling or deformity. Normal range of motion. Cervical back: Normal, normal range of motion and neck supple. Thoracic back: Normal.      Lumbar back: Normal.      Comments: 2 small skin tears to the left dorsal proximal forearm  Full range of motion of left shoulder and elbow at patient's baseline. Patient had prior stroke with weakening of the left upper extremity states that her arm movement is what it has been since having a stroke. She does state that she has some mild discomfort in the left elbow and shoulder with movement. Sensation equal intact in bilateral upper and lower extremities. Radial, DP, PT pulses 2+ bilaterally. Full range of motion of bilateral lower extremities without any difficulty or pain. Skin:     General: Skin is warm and dry. Coloration: Skin is not pale. Findings: No erythema or rash. Neurological:      Mental Status: She is alert and oriented to person, place, and time. Cranial Nerves: No cranial nerve deficit. Sensory: No sensory deficit. Motor: No abnormal muscle tone. Psychiatric:         Behavior: Behavior normal.         Thought Content: Thought content normal.         Judgment: Judgment normal.         Cervical spine cleared by clinical criteria? Yes     Invasive Devices     None                 Lab Results:   Results Reviewed     Procedure Component Value Units Date/Time    Urine culture [[id number]] Collected: 09/14/23 1549    Lab Status:  In process Specimen: Urine, Other Updated: 09/14/23 1729    Urine Microscopic [[id number]]  (Abnormal) Collected: 09/14/23 1549    Lab Status: Final result Specimen: Urine, Other Updated: 09/14/23 1630     RBC, UA None Seen /hpf      WBC, UA 2-4 /hpf      Epithelial Cells Occasional /hpf      Bacteria, UA Innumerable /hpf      MUCUS THREADS Occasional    HS Troponin I 2hr [[id number]]  (Normal) Collected: 09/14/23 1538    Lab Status: Final result Specimen: Blood from Arm, Right Updated: 09/14/23 1621     hs TnI 2hr 5 ng/L      Delta 2hr hsTnI 0 ng/L     UA w Reflex to Microscopic w Reflex to Culture [[id number]]  (Abnormal) Collected: 09/14/23 1549    Lab Status: Final result Specimen: Urine, Other Updated: 09/14/23 1610     Color, UA Yellow     Clarity, UA Cloudy     Specific Gravity, UA <=1.005     pH, UA 6.5     Leukocytes, UA Negative     Nitrite, UA Positive     Protein, UA 1+ mg/dl      Glucose, UA 2+ mg/dl      Ketones, UA Negative mg/dl      Urobilinogen, UA 0.2 E.U./dl      Bilirubin, UA Negative     Occult Blood, UA Negative    HS Troponin 0hr (reflex protocol) [[id number]]  (Normal) Collected: 09/14/23 1345    Lab Status: Final result Specimen: Blood from Arm, Right Updated: 09/14/23 1416     hs TnI 0hr 5 ng/L     Comprehensive metabolic panel [[id number]]  (Abnormal) Collected: 09/14/23 1345    Lab Status: Final result Specimen: Blood from Arm, Right Updated: 09/14/23 1412     Sodium 133 mmol/L      Potassium 4.2 mmol/L      Chloride 100 mmol/L      CO2 27 mmol/L      ANION GAP 6 mmol/L      BUN 22 mg/dL      Creatinine 0.84 mg/dL      Glucose 214 mg/dL      Calcium 8.6 mg/dL      Corrected Calcium 9.1 mg/dL      AST 18 U/L      ALT 15 U/L      Alkaline Phosphatase 57 U/L      Total Protein 6.5 g/dL      Albumin 3.4 g/dL      Total Bilirubin 0.64 mg/dL      eGFR 72 ml/min/1.73sq m     Narrative:      National Kidney Disease Foundation guidelines for Chronic Kidney Disease (CKD):   •  Stage 1 with normal or high GFR (GFR > 90 mL/min/1.73 square meters)  •  Stage 2 Mild CKD (GFR = 60-89 mL/min/1.73 square meters)  •  Stage 3A Moderate CKD (GFR = 45-59 mL/min/1.73 square meters)  •  Stage 3B Moderate CKD (GFR = 30-44 mL/min/1.73 square meters)  •  Stage 4 Severe CKD (GFR = 15-29 mL/min/1.73 square meters)  •  Stage 5 End Stage CKD (GFR <15 mL/min/1.73 square meters)  Note: GFR calculation is accurate only with a steady state creatinine    CBC and differential [[id number]]  (Abnormal) Collected: 09/14/23 1230    Lab Status: Final result Specimen: Blood from Arm, Left Updated: 09/14/23 1237     WBC 6.59 Thousand/uL      RBC 3.67 Million/uL      Hemoglobin 11.5 g/dL      Hematocrit 36.2 %      MCV 99 fL      MCH 31.3 pg      MCHC 31.8 g/dL      RDW 12.3 %      MPV 12.5 fL      Platelets 712 Thousands/uL      nRBC 0 /100 WBCs      Neutrophils Relative 53 %      Immat GRANS % 0 %      Lymphocytes Relative 33 %      Monocytes Relative 9 %      Eosinophils Relative 4 %      Basophils Relative 1 %      Neutrophils Absolute 3.51 Thousands/µL      Immature Grans Absolute 0.02 Thousand/uL      Lymphocytes Absolute 2.16 Thousands/µL      Monocytes Absolute 0.61 Thousand/µL      Eosinophils Absolute 0.25 Thousand/µL      Basophils Absolute 0.04 Thousands/µL                  Imaging Studies:   Direct to CT: No  XR shoulder 2+ views LEFT   Final Result by Ita Piper DO (09/14 1548)      No acute osseous abnormality. Degenerative changes as described. Resident: Teresa Hale, the attending radiologist, have reviewed the images and agree with the final report above. Workstation performed: [id number]         XR elbow 3+ vw LEFT   Final Result by Ita Piper DO (09/14 1527)      No acute osseous abnormality. Resident: Teresa Hale, the attending radiologist, have reviewed the images and agree with the final report above. Workstation performed: [id number]         TRAUMA - CT chest abdomen pelvis w contrast   Final Result by Lucho Grande MD (09/14 1324)      No acute traumatic visceral injury in the chest, abdomen or pelvis. No acute fracture. No significant interval change from May 6, 2023. Workstation performed: [id number]         TRAUMA - CT head wo contrast   Final Result by Lucho Grande MD (09/14 1307)      No acute intracranial abnormality. Microangiopathic changes. No significant interval change from previous exam.      Workstation performed: [id number]         XR Trauma chest portable   Final Result by Ita Piper DO (09/14 1235)      No acute cardiopulmonary disease. Workstation performed: [id number]         XR Trauma pelvis ap only 1 or 2 vw   Final Result by Ita Piper DO (09/14 1238)      No acute osseous abnormality. Workstation performed: [id number]               Procedures  Procedures         ED Course  ED Course as of 09/14/23 1849   Thu Sep 14, 2023   1203 Patient declining pain medications at this time   1615 Nitrite, UA(!): Positive   1625 Delta 2hr hsTnI: 0   1632 Discussed with patient all findings and that we will be treating her for a UTI. Discussed with patient going home and she states she feels well enough to go home and would prefer to go home at this time.             Medical Decision Making  78 yo female presenting to the ed for fall on xarelto. Complaining of left chest and arm pain. Bedside FAST exam negative. EKG with troponin x2 negative for ischemia. Blood work otherwise unremarkable. Radiologic imaging without any obvious fractures or injuries. Discussed with patient that she has a UTI and antibiotics. Amount and/or Complexity of Data Reviewed  Labs: ordered. Decision-making details documented in ED Course. Radiology: ordered. Risk  Prescription drug management. Disposition  Priority One Transfer: No  Final diagnoses:   Fall from standing, initial encounter   Skin tear of elbow without complication   UTI (urinary tract infection)     Time reflects when diagnosis was documented in both MDM as applicable and the Disposition within this note     Time User Action Codes Description Comment    9/14/2023  5:34 PM Clois [place] Add [S57. XXXA] Fall from standing, initial encounter     9/14/2023  5:34 PM Clois Fransico Add [C99.114C] Skin tear of elbow without complication     0/98/6215  5:34 PM Delta Dorado Add [N39.0] UTI (urinary tract infection)       ED Disposition     ED Disposition   Discharge    Condition   Stable    Date/Time   Thu Sep 14, 2023  6:01 PM    Comment   Haile Syed discharge to home/self care.                Follow-up Information     Follow up With Specialties Details Why Contact Info Additional Information    Your Primary Care Doctor  Go to        [facility] Emergency Department Emergency Medicine Go to  As needed, If symptoms worsen [address] [place] [id number] [phone] [facility] Emergency Department, [address] [facility], [address]        Current Discharge Medication List      START taking these medications    Details   cefuroxime (CEFTIN) 500 mg tablet Take 1 tablet (500 mg total) by mouth every 12 (twelve) hours for 7 days  Qty: 14 tablet, Refills: 0    Associated Diagnoses: UTI (urinary tract infection)         CONTINUE these medications which have NOT CHANGED    Details   Accu-Chek FastClix Lancets MISC Use 2 (two) times a day  Qty: 102 each, Refills: 3    Comments: Per insurance formulary, lancets need to be changed to [place]. Associated Diagnoses: Type 2 diabetes mellitus with hyperglycemia, with long-term current use of insulin ([facility])      Alcohol Swabs (Pharmacist Choice Alcohol) PADS       Aspirin 81 MG CAPS Take 1 tablet by mouth in the morning      !! Assure ID Safety Pen Needles 30G X 8 MM MISC USE DAILY  Qty: 100 each, Refills: 3    Associated Diagnoses: Other specified diabetes mellitus without complication, with long-term current use of insulin ([facility])      atorvastatin (LIPITOR) 80 mg tablet TAKE ONE TABLET BY MOUTH ONCE DAILY WITH DINNER  Qty: 90 tablet, Refills: 1    Associated Diagnoses: Cerebrovascular accident (CVA), unspecified mechanism ([address])      ! ! B-D UF III MINI PEN NEEDLES 31G X 5 MM MISC INJECT UNDER THE SKIN AS NEEDED (HYPOGLYCEMIA) USE AS DIRECTED  Qty: 100 each, Refills: 1    Associated Diagnoses: Type 2 diabetes mellitus with hyperglycemia, with long-term current use of insulin ([address])      ! ! Blood Glucose Monitoring Suppl (Accu-Chek Guide) w/Device KIT Use 2 (two) times a day  Qty: 1 kit, Refills: 0    Comments: Per insurance formulary, patient needs to be changed to Accuchek Guide glucometer system  Associated Diagnoses: Type 2 diabetes mellitus with hyperglycemia, with long-term current use of insulin ([address])      ! ! Blood Glucose Monitoring Suppl (ONE TOUCH ULTRA 2) w/Device KIT USE AS DIRECTED  Qty: 1 kit, Refills: 0    Associated Diagnoses: Type 2 diabetes mellitus with hyperglycemia, with long-term current use of insulin ([facility])      clotrimazole (LOTRIMIN) 1 % cream       Continuous Blood Gluc  (FreeStyle Naveen 2 [place]) SHERWIN DISPENSE 1 READER.   Qty: 1 each, Refills: 0    Associated Diagnoses: Hypoglycemia unawareness associated with type 2 diabetes mellitus ([address]); Type 2 diabetes mellitus with hyperglycemia, with long-term current use of insulin ([facility])      Continuous Blood Gluc Sensor (FreeStyle Naveen 2 Sensor) MISC Use 1 sensor every 14 days for continuous glucose monitoring. Qty: 6 each, Refills: 1    Associated Diagnoses: Hypoglycemia unawareness associated with type 2 diabetes mellitus ([address]); Type 2 diabetes mellitus with hyperglycemia, with long-term current use of insulin ([facility])      escitalopram (LEXAPRO) 5 mg tablet TAKE ONE TABLET BY MOUTH EVERY MORNING  Qty: 90 tablet, Refills: 1    Associated Diagnoses: Current episode of major depressive disorder without prior episode, unspecified depression episode severity      famotidine (PEPCID) 40 MG tablet TAKE ONE TABLET BY MOUTH ONCE DAILY  Qty: 90 tablet, Refills: 1    Associated Diagnoses: Gastroesophageal reflux disease, unspecified whether esophagitis present      !! Insulin Pen Needle 31G X 5 MM MISC BD Ultra-Fine Short Pen Needle 31 gauge x 5/16"   use as directed      Lancet Devices (Lancet Device with Ejector) MISC USE TO CHECK BLOOD SUGAR TWICE A DAY  Qty: 1 each, Refills: 0    Associated Diagnoses: Type 2 diabetes mellitus with hyperglycemia, with long-term current use of insulin ([address])      Lancets Misc. (Accu-Chek Softclix Lancet Dev) KIT Check blood sugar  Qty: 1 kit, Refills: 1    Associated Diagnoses: Type 2 diabetes mellitus with hyperglycemia, with long-term current use of insulin ([address]);  Other specified diabetes mellitus without complication, with long-term current use of insulin ([facility])      linaGLIPtin (Tradjenta) 5 MG TABS Take 5 mg by mouth daily  Qty: 90 tablet, Refills: 1    Associated Diagnoses: Type 2 diabetes mellitus with stage 3a chronic kidney disease, with long-term current use of insulin ([facility])      lisinopril (ZESTRIL) 5 mg tablet TAKE ONE TABLET BY MOUTH EVERY MORNING  Qty: 90 tablet, Refills: 1    Associated Diagnoses: Essential hypertension      metFORMIN (GLUCOPHAGE-XR) 500 mg 24 hr tablet TAKE 1 TABLET (500 MG TOTAL) BY MOUTH 2 (TWO) TIMES A DAY WITH MEALS  Qty: 180 tablet, Refills: 1    Associated Diagnoses: Type 2 diabetes mellitus with hyperglycemia, with long-term current use of insulin ([facility])      metoprolol tartrate (LOPRESSOR) 50 mg tablet TAKE ONE TABLET BY MOUTH EVERY 12 HOURS  Qty: 180 tablet, Refills: 1    Associated Diagnoses: Paroxysmal atrial fibrillation (HCC)      Myrbetriq 50 MG TB24 TAKE 1 TABLET (50 MG TOTAL) BY MOUTH IN THE MORNING  Qty: 90 tablet, Refills: 1    Associated Diagnoses: OAB (overactive bladder)      ondansetron (ZOFRAN-ODT) 4 mg disintegrating tablet Take 1 tablet (4 mg total) by mouth every 8 (eight) hours as needed for nausea or vomiting  Qty: 30 tablet, Refills: 1    Associated Diagnoses: Nausea and vomiting, intractability of vomiting not specified, unspecified vomiting type      OneTouch Ultra test strip       pantoprazole (PROTONIX) 40 mg tablet TAKE ONE TABLET BY MOUTH DAILY  Qty: 30 tablet, Refills: 0    Associated Diagnoses: Type 2 diabetes mellitus with hyperglycemia, with long-term current use of insulin ([address]); Gastroesophageal reflux disease, unspecified whether esophagitis present      repaglinide (PRANDIN) 2 mg tablet TAKE ONE TABLET BY MOUTH TWICE DAILY BEFORE MEALS  Qty: 180 tablet, Refills: 1    Associated Diagnoses: Type 2 diabetes mellitus with hyperglycemia, with long-term current use of insulin ([facility])      Xarelto 20 MG tablet TAKE ONE TABLET BY MOUTH ONCE DAILY WITH BREAKFAST  Qty: 90 tablet, Refills: 1    Associated Diagnoses: Cerebrovascular accident (CVA), unspecified mechanism ([address]); Paroxysmal atrial fibrillation (HCC)       ! ! - Potential duplicate medications found. Please discuss with provider. No discharge procedures on file.     PDMP Review       Value Time User    PDMP Reviewed  Yes 12/31/2021 10:45 PM Mariel Siemens, DO          ED Provider  Electronically Signed by         Sherry Middleton DO  09/14/23 1849

## 2023-09-15 LAB
ATRIAL RATE: 78 BPM
P AXIS: 43 DEGREES
PR INTERVAL: 190 MS
QRS AXIS: 36 DEGREES
QRSD INTERVAL: 64 MS
QT INTERVAL: 386 MS
QTC INTERVAL: 440 MS
T WAVE AXIS: 25 DEGREES
VENTRICULAR RATE: 78 BPM

## 2023-09-15 PROCEDURE — 93010 ELECTROCARDIOGRAM REPORT: CPT | Performed by: INTERNAL MEDICINE

## 2023-09-17 LAB — BACTERIA UR CULT: ABNORMAL

## 2023-09-18 LAB
BACTERIA UR CULT: ABNORMAL
BACTERIA UR CULT: ABNORMAL

## 2023-09-21 ENCOUNTER — HOSPITAL ENCOUNTER (OUTPATIENT)
Dept: ULTRASOUND IMAGING | Facility: HOSPITAL | Age: 68
End: 2023-09-21
Payer: MEDICARE

## 2023-09-21 DIAGNOSIS — N83.201 CYST OF RIGHT OVARY: ICD-10-CM

## 2023-09-21 PROCEDURE — 76830 TRANSVAGINAL US NON-OB: CPT

## 2023-09-21 PROCEDURE — 76856 US EXAM PELVIC COMPLETE: CPT

## 2023-09-25 DIAGNOSIS — E11.649 HYPOGLYCEMIA UNAWARENESS ASSOCIATED WITH TYPE 2 DIABETES MELLITUS (HCC): ICD-10-CM

## 2023-09-25 DIAGNOSIS — E11.65 TYPE 2 DIABETES MELLITUS WITH HYPERGLYCEMIA, WITH LONG-TERM CURRENT USE OF INSULIN (HCC): Chronic | ICD-10-CM

## 2023-09-25 DIAGNOSIS — Z79.4 TYPE 2 DIABETES MELLITUS WITH HYPERGLYCEMIA, WITH LONG-TERM CURRENT USE OF INSULIN (HCC): Chronic | ICD-10-CM

## 2023-10-02 ENCOUNTER — TELEPHONE (OUTPATIENT)
Dept: HEMATOLOGY ONCOLOGY | Facility: CLINIC | Age: 68
End: 2023-10-02

## 2023-10-02 NOTE — TELEPHONE ENCOUNTER
Appointment Schedule   Who are you speaking with? elsie kay   If it is not the patient, are they listed on an active communication consent form? N/A   Which provider is the appointment scheduled with? Dr. Cande Flannery   At which location is the appointment scheduled for? [facility]   When is the appointment scheduled? Please list date and time 10/09/2023 @10:45AM   What is the reason for this appointment? F/U with US    Did patient voice understanding of the details of this appointment? Yes   Was the no show policy reviewed with patient?  Yes

## 2023-10-09 ENCOUNTER — OFFICE VISIT (OUTPATIENT)
Dept: GYNECOLOGIC ONCOLOGY | Facility: CLINIC | Age: 68
End: 2023-10-09
Payer: MEDICARE

## 2023-10-09 VITALS
OXYGEN SATURATION: 100 % | TEMPERATURE: 98 F | RESPIRATION RATE: 16 BRPM | HEIGHT: 65 IN | BODY MASS INDEX: 30.99 KG/M2 | SYSTOLIC BLOOD PRESSURE: 138 MMHG | HEART RATE: 94 BPM | WEIGHT: 186 LBS | DIASTOLIC BLOOD PRESSURE: 80 MMHG

## 2023-10-09 DIAGNOSIS — N83.8 OVARIAN MASS: ICD-10-CM

## 2023-10-09 DIAGNOSIS — N83.209 CYST OF OVARY, UNSPECIFIED LATERALITY: Primary | ICD-10-CM

## 2023-10-09 PROCEDURE — 99214 OFFICE O/P EST MOD 30 MIN: CPT | Performed by: OBSTETRICS & GYNECOLOGY

## 2023-10-09 NOTE — PROGRESS NOTES
Assessment/Plan:    Problem List Items Addressed This Visit        Other    Ovarian mass     Patient is a very pleasant 46-year-old female with a history of multiple medical problems including diabetes vocal cord paralysis significant neurologic and musculoskeletal issues related to a fall in the past.    At this point the patient has higher than average risk for surgical intervention despite clearance from ENT. The patient's risk calculation by ultrasound reveals a risk of approximately 10 to 50% of ovarian cancer. I feel this is grossly well above where I would have placed this. The patient has had this persistent ovarian cyst for a year and now it remains the same size potentially smaller. And there is a small septation. Given that I would say her risk of having a cancer is likely below 8%. This risk benefit ratio is likely similar between surgery and cancer. As the patient is asymptomatic we recommended follow-up at this time. Patient agrees we will see her back in 6 months with a repeat ultrasound. If any abdominal fullness or pelvic symptomatology comes up in the meantime the patient will see us back before then. Other Visit Diagnoses     Cyst of ovary, unspecified laterality    -  Primary    Relevant Orders    US pelvis complete w transvaginal            CHIEF COMPLAINT: Follow-up ovarian cyst        Patient ID: Bin Sharp is a 79 y.o. female  Patient is a very pleasant 46-year-old female with a history of an cyst.  The patient was scheduled for robotically assisted total laparoscopic hysterectomy bilateral salpingo-oophorectomy and possible staging however due to airway concerns her surgery was canceled by anesthesia until cleared by ENT. The patient was seen by ENT approximately a year ago who did clear her for surgery. The patient however has a number of issues and did not return until now for reconsideration of surgery.     Most recently the patient has had a pelvic ultrasound which reveals the following:  FINDINGS:     UTERUS:  The uterus is anteverted in position, measuring 6.2 x 2.8 x 4.2 cm. The uterus has a normal contour. There is diffuse heterogeneous echotexture with scattered calcifications, suggestive of calcific leiomyomatous change. No distinct fibroids detected. The cervix appears within normal limits.     ENDOMETRIUM:  The endometrial echo complex has an AP caliber of 3 mm. Homogeneous endometrial complex. No discrete mass lesion. Trace endometrial cavity fluid.     OVARIES/ADNEXA:  Right ovary:  3.4 x 2.3 x 1.8 cm. 7.5 mL. There is a septated cyst in the right ovary currently measuring approximately 3.7 x 2.2 x 2.7 cm. Given differences in measuring technique, this lesion is felt to be stable to slightly decreased in size. There is   a persistent irregular septation. Vascularity of the septation not evaluated on this examination. Based on the ACR O-RADS system, this is O-RADS category 4 (intermediate-risk with 10% to <50% risk of malignancy.) The management recommendation is   evaluation with pelvic MRI with and without IV contrast.     REFERENCE: Radiology 2020; [id number]        Left ovary not visualized. No adnexal mass or fluid collection.     OTHER:  No free fluid or loculated fluid collections.        IMPRESSION:     Persistent multilocular right ovarian cyst. Based on the ACR O-RADS system, this is O-RADS category 4 (intermediate-risk with 10% to <50% risk of malignancy.) The management recommendation is evaluation with pelvic MRI with and without IV contrast.   Consider also management by gynecologist with GYN-oncologist consultation or solely by GYN-oncologist.       Today, the patient is doing well. She denies significant abdominal pain, pelvic pain, nausea, vomiting, constipation, diarrhea, fevers, chills, or vaginal bleeding.       The following portions of the patient's history were reviewed and updated as appropriate: allergies, current medications, past family history, past medical history, past social history, past surgical history and problem list.    Review of Systems   Constitutional: Negative. HENT: Positive for voice change. Eyes: Negative. Respiratory: Negative. Cardiovascular: Negative. Gastrointestinal: Negative. Endocrine: Negative. Genitourinary: Negative. Musculoskeletal: Positive for gait problem. Skin: Negative. Hematological: Negative. Psychiatric/Behavioral: Negative. Current Outpatient Medications   Medication Sig Dispense Refill   • Accu-Chek FastClix Lancets MISC Use 2 (two) times a day 102 each 3   • Alcohol Swabs (Pharmacist Choice Alcohol) PADS      • Aspirin 81 MG CAPS Take 1 tablet by mouth in the morning     • Assure ID Safety Pen Needles 30G X 8 MM MISC USE DAILY 100 each 3   • atorvastatin (LIPITOR) 80 mg tablet TAKE ONE TABLET BY MOUTH ONCE DAILY WITH DINNER 90 tablet 1   • B-D UF III MINI PEN NEEDLES 31G X 5 MM MISC INJECT UNDER THE SKIN AS NEEDED (HYPOGLYCEMIA) USE AS DIRECTED 100 each 1   • Blood Glucose Monitoring Suppl (Accu-Chek Guide) w/Device KIT Use 2 (two) times a day 1 kit 0   • Blood Glucose Monitoring Suppl (ONE TOUCH ULTRA 2) w/Device KIT USE AS DIRECTED 1 kit 0   • Continuous Blood Gluc  (FreeStyle Naveen 2 [place]) SHERWIN DISPENSE 1 READER. 1 each 0   • Continuous Blood Gluc Sensor (FreeStyle Naveen 2 Sensor) MISC Use 1 sensor every 14 days for continuous glucose monitoring. 6 each 1   • escitalopram (LEXAPRO) 5 mg tablet TAKE ONE TABLET BY MOUTH EVERY MORNING 90 tablet 1   • famotidine (PEPCID) 40 MG tablet TAKE ONE TABLET BY MOUTH ONCE DAILY 90 tablet 1   • Lancet Devices (Lancet Device with Ejector) MISC USE TO CHECK BLOOD SUGAR TWICE A DAY 1 each 0   • Lancets Misc.  (Accu-Chek Softclix Lancet Dev) KIT Check blood sugar 1 kit 1   • linaGLIPtin (Tradjenta) 5 MG TABS Take 5 mg by mouth daily 90 tablet 1   • lisinopril (ZESTRIL) 5 mg tablet TAKE ONE TABLET BY MOUTH EVERY MORNING 90 tablet 1   • metFORMIN (GLUCOPHAGE-XR) 500 mg 24 hr tablet TAKE 1 TABLET (500 MG TOTAL) BY MOUTH 2 (TWO) TIMES A DAY WITH MEALS 180 tablet 1   • metoprolol tartrate (LOPRESSOR) 50 mg tablet TAKE ONE TABLET BY MOUTH EVERY 12 HOURS 180 tablet 1   • Myrbetriq 50 MG TB24 TAKE 1 TABLET (50 MG TOTAL) BY MOUTH IN THE MORNING 90 tablet 1   • ondansetron (ZOFRAN-ODT) 4 mg disintegrating tablet Take 1 tablet (4 mg total) by mouth every 8 (eight) hours as needed for nausea or vomiting 30 tablet 1   • OneTouch Ultra test strip      • pantoprazole (PROTONIX) 40 mg tablet TAKE ONE TABLET BY MOUTH DAILY 30 tablet 0   • repaglinide (PRANDIN) 2 mg tablet TAKE ONE TABLET BY MOUTH TWICE DAILY BEFORE MEALS 180 tablet 1   • Xarelto 20 MG tablet TAKE ONE TABLET BY MOUTH ONCE DAILY WITH BREAKFAST 90 tablet 1   • Blood Glucose Monitoring Suppl (OneTouch Verio) w/Device KIT Use 2 (two) times a day (Patient not taking: Reported on 10/9/2023) 1 kit 0   • clotrimazole (LOTRIMIN) 1 % cream  (Patient not taking: Reported on 10/9/2023)     • Insulin Pen Needle 31G X 5 MM MISC BD Ultra-Fine Short Pen Needle 31 gauge x 5/16"   use as directed (Patient not taking: Reported on 8/15/2023)       No current facility-administered medications for this visit. Objective:    Blood pressure 138/80, pulse 94, temperature 98 °F (36.7 °C), temperature source Temporal, resp. rate 16, height 5' 5" (1.651 m), weight 84.4 kg (186 lb), SpO2 100 %, not currently breastfeeding. Body mass index is 30.95 kg/m². Body surface area is 1.92 meters squared. Physical Exam  Constitutional:       Appearance: She is well-developed. HENT:      Head: Normocephalic and atraumatic. Eyes:      Pupils: Pupils are equal, round, and reactive to light. Cardiovascular:      Rate and Rhythm: Normal rate and regular rhythm. Heart sounds: Normal heart sounds. Pulmonary:      Effort: Pulmonary effort is normal. No respiratory distress.       Breath sounds: Normal breath sounds. Abdominal:      General: Bowel sounds are normal. There is no distension. Palpations: Abdomen is soft. Abdomen is not rigid. Tenderness: There is no abdominal tenderness. There is no guarding or rebound. Genitourinary:     Comments: -Normal external female genitalia, normal [employer]'s [employer]'s glands                  -Normal midline urethral meatus. No lesions notes                  -Bladder without fullness mass or tenderness                  -Vagina without lesion or discharge No significant cystocele or rectocele noted                  -Cervix normal appearing without visible lesions                  -Uterus with normal contour, mobility. No tenderness,                  -Adnexae without  mass or tenderness                  - Anus without fissure of lesion    Musculoskeletal:         General: Normal range of motion. Cervical back: Normal range of motion and neck supple. Lymphadenopathy:      Cervical: No cervical adenopathy. Upper Body:      Right upper body: No supraclavicular adenopathy. Left upper body: No supraclavicular adenopathy. Skin:     General: Skin is warm and dry. Neurological:      Mental Status: She is alert and oriented to person, place, and time.    Psychiatric:         Behavior: Behavior normal.         Lab Results   Component Value Date     6.3 09/10/2022     Lab Results   Component Value Date    K 4.2 09/14/2023     09/14/2023    CO2 27 09/14/2023    BUN 22 09/14/2023    CREATININE 0.84 09/14/2023    GLUF 151 (H) 05/07/2023    CALCIUM 8.6 09/14/2023    CORRECTEDCA 9.1 09/14/2023    AST 18 09/14/2023    ALT 15 09/14/2023    ALKPHOS 57 09/14/2023    EGFR 72 09/14/2023     Lab Results   Component Value Date    WBC 6.59 09/14/2023    HGB 11.5 09/14/2023    HCT 36.2 09/14/2023    MCV 99 (H) 09/14/2023     09/14/2023     Lab Results   Component Value Date    NEUTROABS 3.51 09/14/2023        Trend:  Lab Results   Component Value Date     6.3 09/10/2022

## 2023-10-09 NOTE — ASSESSMENT & PLAN NOTE
Patient is a very pleasant 59-year-old female with a history of multiple medical problems including diabetes vocal cord paralysis significant neurologic and musculoskeletal issues related to a fall in the past.    At this point the patient has higher than average risk for surgical intervention despite clearance from ENT. The patient's risk calculation by ultrasound reveals a risk of approximately 10 to 50% of ovarian cancer. I feel this is grossly well above where I would have placed this. The patient has had this persistent ovarian cyst for a year and now it remains the same size potentially smaller. And there is a small septation. Given that I would say her risk of having a cancer is likely below 8%. This risk benefit ratio is likely similar between surgery and cancer. As the patient is asymptomatic we recommended follow-up at this time. Patient agrees we will see her back in 6 months with a repeat ultrasound. If any abdominal fullness or pelvic symptomatology comes up in the meantime the patient will see us back before then.

## 2023-11-13 ENCOUNTER — APPOINTMENT (OUTPATIENT)
Dept: LAB | Facility: HOSPITAL | Age: 68
End: 2023-11-13
Payer: MEDICARE

## 2023-11-25 ENCOUNTER — APPOINTMENT (EMERGENCY)
Dept: CT IMAGING | Facility: HOSPITAL | Age: 68
End: 2023-11-25
Payer: MEDICARE

## 2023-11-25 ENCOUNTER — APPOINTMENT (EMERGENCY)
Dept: RADIOLOGY | Facility: HOSPITAL | Age: 68
End: 2023-11-25
Payer: MEDICARE

## 2023-11-25 ENCOUNTER — HOSPITAL ENCOUNTER (EMERGENCY)
Facility: HOSPITAL | Age: 68
Discharge: HOME | End: 2023-11-25
Attending: EMERGENCY MEDICINE
Payer: MEDICARE

## 2023-11-25 VITALS
TEMPERATURE: 97.5 F | DIASTOLIC BLOOD PRESSURE: 64 MMHG | HEIGHT: 65 IN | WEIGHT: 186 LBS | RESPIRATION RATE: 16 BRPM | SYSTOLIC BLOOD PRESSURE: 140 MMHG | HEART RATE: 70 BPM | BODY MASS INDEX: 30.99 KG/M2 | OXYGEN SATURATION: 96 %

## 2023-11-25 DIAGNOSIS — R29.6 FREQUENT FALLS: ICD-10-CM

## 2023-11-25 DIAGNOSIS — R26.2 AMBULATORY DYSFUNCTION: Primary | ICD-10-CM

## 2023-11-25 LAB
ALBUMIN SERPL BCP-MCNC: 3.6 G/DL (ref 3.5–5)
ALP SERPL-CCNC: 50 U/L (ref 34–104)
ALT SERPL W P-5'-P-CCNC: 11 U/L (ref 7–52)
ANION GAP SERPL CALCULATED.3IONS-SCNC: 7 MMOL/L
AST SERPL W P-5'-P-CCNC: 18 U/L (ref 13–39)
BASOPHILS # BLD AUTO: 0.03 THOUSANDS/ÂΜL (ref 0–0.1)
BASOPHILS NFR BLD AUTO: 1 % (ref 0–1)
BILIRUB SERPL-MCNC: 0.78 MG/DL (ref 0.2–1)
BUN SERPL-MCNC: 18 MG/DL (ref 5–25)
CALCIUM SERPL-MCNC: 8.6 MG/DL (ref 8.4–10.2)
CHLORIDE SERPL-SCNC: 99 MMOL/L (ref 96–108)
CO2 SERPL-SCNC: 29 MMOL/L (ref 21–32)
CREAT SERPL-MCNC: 0.79 MG/DL (ref 0.6–1.3)
EOSINOPHIL # BLD AUTO: 0.31 THOUSAND/ÂΜL (ref 0–0.61)
EOSINOPHIL NFR BLD AUTO: 5 % (ref 0–6)
ERYTHROCYTE [DISTWIDTH] IN BLOOD BY AUTOMATED COUNT: 11.9 % (ref 11.6–15.1)
GFR SERPL CREATININE-BSD FRML MDRD: 78 ML/MIN/1.73SQ M
GLUCOSE SERPL-MCNC: 157 MG/DL (ref 65–140)
HCT VFR BLD AUTO: 38.3 % (ref 34.8–46.1)
HGB BLD-MCNC: 12.3 G/DL (ref 11.5–15.4)
IMM GRANULOCYTES # BLD AUTO: 0.01 THOUSAND/UL (ref 0–0.2)
IMM GRANULOCYTES NFR BLD AUTO: 0 % (ref 0–2)
LYMPHOCYTES # BLD AUTO: 2.31 THOUSANDS/ÂΜL (ref 0.6–4.47)
LYMPHOCYTES NFR BLD AUTO: 38 % (ref 14–44)
MCH RBC QN AUTO: 30.8 PG (ref 26.8–34.3)
MCHC RBC AUTO-ENTMCNC: 32.1 G/DL (ref 31.4–37.4)
MCV RBC AUTO: 96 FL (ref 82–98)
MONOCYTES # BLD AUTO: 0.45 THOUSAND/ÂΜL (ref 0.17–1.22)
MONOCYTES NFR BLD AUTO: 7 % (ref 4–12)
NEUTROPHILS # BLD AUTO: 2.96 THOUSANDS/ÂΜL (ref 1.85–7.62)
NEUTS SEG NFR BLD AUTO: 49 % (ref 43–75)
NRBC BLD AUTO-RTO: 0 /100 WBCS
PLATELET # BLD AUTO: 191 THOUSANDS/UL (ref 149–390)
PMV BLD AUTO: 12.1 FL (ref 8.9–12.7)
POTASSIUM SERPL-SCNC: 4.5 MMOL/L (ref 3.5–5.3)
PROT SERPL-MCNC: 6.8 G/DL (ref 6.4–8.4)
RBC # BLD AUTO: 3.99 MILLION/UL (ref 3.81–5.12)
SODIUM SERPL-SCNC: 135 MMOL/L (ref 135–147)
WBC # BLD AUTO: 6.07 THOUSAND/UL (ref 4.31–10.16)

## 2023-11-25 PROCEDURE — 80053 COMPREHEN METABOLIC PANEL: CPT

## 2023-11-25 PROCEDURE — 36415 COLL VENOUS BLD VENIPUNCTURE: CPT

## 2023-11-25 PROCEDURE — 99285 EMERGENCY DEPT VISIT HI MDM: CPT | Performed by: EMERGENCY MEDICINE

## 2023-11-25 PROCEDURE — 85025 COMPLETE CBC W/AUTO DIFF WBC: CPT

## 2023-11-25 PROCEDURE — 72125 CT NECK SPINE W/O DYE: CPT

## 2023-11-25 PROCEDURE — 71045 X-RAY EXAM CHEST 1 VIEW: CPT

## 2023-11-25 PROCEDURE — 99285 EMERGENCY DEPT VISIT HI MDM: CPT

## 2023-11-25 PROCEDURE — 70450 CT HEAD/BRAIN W/O DYE: CPT

## 2023-11-25 NOTE — ED PROVIDER NOTES
Emergency Department Trauma Note  Augustina Gallego 79 y.o. female MRN: [id number]  Unit/Bed#: ED 27/ED 27 Encounter: [id number]      Trauma Alert: Trauma Acuity: Trauma Evaluation  Model of Arrival: Mode of Arrival: BLS via    Trauma Team: Current Providers  Attending Provider: Bhaskar Garcia DO  Resident: Radha Geller MD  Registered Nurse: Leah Lopes RN  Consultants:     None      History of Present Illness     Chief Complaint:   Chief Complaint   Patient presents with    Multiple Falls     No injury    Weakness - Generalized     HPI:  Augustina Gallego is a 79 y.o. female who presents for evaluation of weakness and fatigue after multiple falls over the last few weeks. Mechanism:  Fall from bed, fall from standing         74-year-old female anticoagulated on Eliquis and taking a daily aspirin is presenting with concern for multiple falls over the last few weeks. Most recent fall was this morning when patient describes an episode where she slid off of her mattress onto the frame of her bed. She reports not following any further, states that she was unable to pull herself back up onto the bed due to weakness and had to call EMS to help her. She denies any head strike or significant injuries from the fall today. Patient reports the fall prior was on Wednesday whenever she was walking near her kitchen with her walker and she believes that her feet "got tangled together" causing her to fall towards her left side striking a wall first and then crumbling to the floor. She had to be helped up this time as well. She does not believe she hit her head on Wednesday either. She notes that for the past several weeks she has been following 2-3 times weekly and believes that most of the falls were because she got her feet tangled up.   She reports that she had a prior CVA that left her with chronic left-sided weakness left upper extremity greater than left lower extremity, and completed physical therapy and Occupational Therapy shortly after her stroke. She recently reestablished care with PT/OT and has her first appointments coming up this week. She uses a walker for ambulation, she lives independently in an apartment. Review of Systems   Constitutional:  Negative for chills and fever. HENT:  Negative for ear pain and sore throat. Eyes:  Negative for pain and visual disturbance. Respiratory:  Negative for cough and shortness of breath. Cardiovascular:  Negative for chest pain and palpitations. Gastrointestinal:  Negative for abdominal pain and vomiting. Genitourinary:  Negative for dysuria and hematuria. Musculoskeletal:  Positive for gait problem. Negative for arthralgias and back pain. Skin:  Negative for color change and rash. Neurological:  Positive for weakness. Negative for seizures and syncope. Hematological:  Bruises/bleeds easily. All other systems reviewed and are negative.       Historical Information     Immunizations:   Immunization History   Administered Date(s) Administered    COVID-19 PFIZER VACCINE 0.3 ML IM 05/05/2021, 06/02/2021, 12/01/2021, 12/21/2021    INFLUENZA 11/04/2015, 10/01/2017, 10/30/2020, 10/14/2022    Influenza A Monovalent (H5n1), Adjuvanted, National Stock3 10/01/2021    Influenza, injectable, quadrivalent, preservative free 0.5 mL 09/08/2021    Pneumococcal Polysaccharide PPV23 11/04/2015    Tdap 09/14/2023    Tuberculin Skin Test 05/10/2023       Past Medical History:   Diagnosis Date    Abdominal fibromatosis     Arthritis     Depression     GERD (gastroesophageal reflux disease)     controlled    Hyperlipemia     Hypertension     Obesity     Pneumonia     Right pontine stroke ([address]) 12/13/2021    Stroke (cerebrum) ([address]) 12/17/2021    Stroke ([address])        Family History   Problem Relation Age of Onset    No Known Problems Mother     No Known Problems Father      Past Surgical History:   Procedure Laterality Date    APPENDECTOMY      APPENDECTOMY LAPAROSCOPIC N/A 06/12/2022    Procedure: APPENDECTOMY LAPAROSCOPIC;  Surgeon: Tereza Bingham MD;  Location: [facility];  Service: General    CATARACT EXTRACTION      CATARACT EXTRACTION, BILATERAL      COLONOSCOPY      EGD      LAPAROTOMY      Exploratory; Last Assessed 10/17/2017    PEG TUBE PLACEMENT      PEG TUBE REMOVAL       Social History     Tobacco Use    Smoking status: Never    Smokeless tobacco: Never   Vaping Use    Vaping Use: Never used   Substance Use Topics    Alcohol use: Never     Comment: Socially    Drug use: Never     E-Cigarette/Vaping    E-Cigarette Use Never User      E-Cigarette/Vaping Substances    Nicotine No     THC No     CBD No     Flavoring No     Other No     Unknown No        Family History: non-contributory    Meds/Allergies   Prior to Admission Medications   Prescriptions Last Dose Informant Patient Reported? Taking? Accu-Chek FastClix Lancets MISC  Self No No   Sig: Use 2 (two) times a day   Alcohol Swabs (Pharmacist Choice Alcohol) PADS  Self Yes No   Aspirin 81 MG CAPS  Self Yes No   Sig: Take 1 tablet by mouth in the morning   Assure ID Safety Pen Needles 30G X 8 MM MISC  Self No No   Sig: USE DAILY   B-D UF III MINI PEN NEEDLES 31G X 5 MM MISC   No No   Sig: USE 4 TIMES DAILY   Blood Glucose Monitoring Suppl (Accu-Chek Guide) w/Device KIT  Self No No   Sig: Use 2 (two) times a day   Blood Glucose Monitoring Suppl (ONE TOUCH ULTRA 2) w/Device KIT  Self No No   Sig: USE AS DIRECTED   Blood Glucose Monitoring Suppl (OneTouch Verio) w/Device KIT   No No   Sig: Use 2 (two) times a day   Patient not taking: Reported on 10/9/2023   Continuous Blood Gluc  (FreeStyle [place]aveen 2 [place]) SHERWIN  Self No No   Sig: DISPENSE 1 READER. Continuous Blood Gluc Sensor (FreeStyle Naveen 2 Sensor) MISC  Self No No   Sig: Use 1 sensor every 14 days for continuous glucose monitoring.    Insulin Pen Needle 31G X 5 MM MISC  Self Yes No   Sig: BD Ultra-Fine Short Pen Needle 31 gauge x 5/16"   use as directed Patient not taking: Reported on 8/15/2023   Lancet Devices (Lancet Device with Ejector) MISC  Self No No   Sig: USE TO CHECK BLOOD SUGAR TWICE A DAY   Lancets Misc.  (Accu-Chek Softclix Lancet Dev) KIT  Self No No   Sig: Check blood sugar   Myrbetriq 50 MG TB24  Self No No   Sig: TAKE 1 TABLET (50 MG TOTAL) BY MOUTH IN THE MORNING   OneTouch Ultra test strip  Self Yes No   Xarelto 20 MG tablet  Self No No   Sig: TAKE ONE TABLET BY MOUTH ONCE DAILY WITH BREAKFAST   atorvastatin (LIPITOR) 80 mg tablet  Self No No   Sig: TAKE ONE TABLET BY MOUTH ONCE DAILY WITH DINNER   clotrimazole (LOTRIMIN) 1 % cream  Self Yes No   Patient not taking: Reported on 10/9/2023   escitalopram (LEXAPRO) 5 mg tablet  Self No No   Sig: TAKE ONE TABLET BY MOUTH EVERY MORNING   famotidine (PEPCID) 40 MG tablet  Self No No   Sig: TAKE ONE TABLET BY MOUTH ONCE DAILY   linaGLIPtin (Tradjenta) 5 MG TABS  Self No No   Sig: Take 5 mg by mouth daily   lisinopril (ZESTRIL) 5 mg tablet  Self No No   Sig: TAKE ONE TABLET BY MOUTH EVERY MORNING   metFORMIN (GLUCOPHAGE-XR) 500 mg 24 hr tablet  Self No No   Sig: TAKE 1 TABLET (500 MG TOTAL) BY MOUTH 2 (TWO) TIMES A DAY WITH MEALS   metoprolol tartrate (LOPRESSOR) 50 mg tablet  Self No No   Sig: TAKE ONE TABLET BY MOUTH EVERY 12 HOURS   ondansetron (ZOFRAN-ODT) 4 mg disintegrating tablet  Self No No   Sig: Take 1 tablet (4 mg total) by mouth every 8 (eight) hours as needed for nausea or vomiting   pantoprazole (PROTONIX) 40 mg tablet  Self No No   Sig: TAKE ONE TABLET BY MOUTH DAILY   repaglinide (PRANDIN) 2 mg tablet  Self No No   Sig: TAKE ONE TABLET BY MOUTH TWICE DAILY BEFORE MEALS      Facility-Administered Medications: None       Allergies   Allergen Reactions    Apixaban Vomiting and GI Intolerance     Other reaction(s): Vomiting    Dulaglutide Vomiting     Nausea vomiting       PHYSICAL EXAM    PE limited by: None    Objective   Vitals:   First set: Temperature: 97.5 °F (36.4 °C) (11/25/23 0789)  Pulse: 78 (11/25/23 0743)  Respirations: 16 (11/25/23 0743)  Blood Pressure: 159/73 (11/25/23 0744)  SpO2: 98 % (11/25/23 0743)    Primary Survey:   (A) Airway: Intact  (B) Breathing: B/L breath sounds  (C) Circulation: Pulses:   pedal  2/4 and radial  2/4  (D) Disabliity:  GCS Total:  15  (E) Expose:  Completed    Secondary Survey: (Click on Physical Exam tab above)  Physical Exam  Vitals and nursing note reviewed. Constitutional:       General: She is not in acute distress. Appearance: She is well-developed. HENT:      Head: Normocephalic and atraumatic. Right Ear: External ear normal.      Left Ear: External ear normal.      Nose: Nose normal. No congestion or rhinorrhea. Mouth/Throat:      Mouth: Mucous membranes are moist.      Pharynx: Oropharynx is clear. No oropharyngeal exudate or posterior oropharyngeal erythema. Eyes:      General: No scleral icterus. Extraocular Movements: Extraocular movements intact. Conjunctiva/sclera: Conjunctivae normal.      Pupils: Pupils are equal, round, and reactive to light. Cardiovascular:      Rate and Rhythm: Normal rate and regular rhythm. Pulses: Normal pulses. Heart sounds: Normal heart sounds. No murmur heard. Pulmonary:      Effort: Pulmonary effort is normal. No respiratory distress. Breath sounds: Normal breath sounds. No wheezing or rhonchi. Abdominal:      General: Abdomen is flat. There is no distension. Palpations: Abdomen is soft. Tenderness: There is no abdominal tenderness. There is no guarding. Musculoskeletal:         General: No swelling. Cervical back: Neck supple. No rigidity. Right lower leg: No edema. Left lower leg: No edema. Lymphadenopathy:      Cervical: No cervical adenopathy. Skin:     General: Skin is warm and dry. Capillary Refill: Capillary refill takes less than 2 seconds. Coloration: Skin is not jaundiced.       Findings: Bruising (Large bruise on LUE around the bicep, appears at least several days old, has smaller bruises on RUE, one over L breast on chest wall as well) present. No rash. Neurological:      Mental Status: She is alert and oriented to person, place, and time. Sensory: No sensory deficit. Motor: Weakness (4/5 in LUE and LLE) present. Comments: Patient has some contracture in left upper extremity, has hypertonicity of the left lower extremity as well, able to complete all range of motion testing without much difficulty but certainly weaker on left upper extremity when compared to right upper extremity   Psychiatric:         Mood and Affect: Mood normal.         Behavior: Behavior normal.         Cervical spine cleared by clinical criteria?  No (imaging required)      Invasive Devices       None                   Lab Results:   Results Reviewed       Procedure Component Value Units Date/Time    Comprehensive metabolic panel [[id number]]  (Abnormal) Collected: 11/25/23 0906    Lab Status: Final result Specimen: Blood from Hand, Right Updated: 11/25/23 0928     Sodium 135 mmol/L      Potassium 4.5 mmol/L      Chloride 99 mmol/L      CO2 29 mmol/L      ANION GAP 7 mmol/L      BUN 18 mg/dL      Creatinine 0.79 mg/dL      Glucose 157 mg/dL      Calcium 8.6 mg/dL      AST 18 U/L      ALT 11 U/L      Alkaline Phosphatase 50 U/L      Total Protein 6.8 g/dL      Albumin 3.6 g/dL      Total Bilirubin 0.78 mg/dL      eGFR 78 ml/min/1.73sq m     Narrative:      [place] guidelines for Chronic Kidney Disease (CKD):     Stage 1 with normal or high GFR (GFR > 90 mL/min/1.73 square meters)    Stage 2 Mild CKD (GFR = 60-89 mL/min/1.73 square meters)    Stage 3A Moderate CKD (GFR = 45-59 mL/min/1.73 square meters)    Stage 3B Moderate CKD (GFR = 30-44 mL/min/1.73 square meters)    Stage 4 Severe CKD (GFR = 15-29 mL/min/1.73 square meters)    Stage 5 End Stage CKD (GFR <15 mL/min/1.73 square meters)  Note: GFR calculation is accurate only with a steady state creatinine    CBC and differential [[id number]] Collected: 11/25/23 0906    Lab Status: Final result Specimen: Blood from Hand, Right Updated: 11/25/23 0912     WBC 6.07 Thousand/uL      RBC 3.99 Million/uL      Hemoglobin 12.3 g/dL      Hematocrit 38.3 %      MCV 96 fL      MCH 30.8 pg      MCHC 32.1 g/dL      RDW 11.9 %      MPV 12.1 fL      Platelets 090 Thousands/uL      nRBC 0 /100 WBCs      Neutrophils Relative 49 %      Immat GRANS % 0 %      Lymphocytes Relative 38 %      Monocytes Relative 7 %      Eosinophils Relative 5 %      Basophils Relative 1 %      Neutrophils Absolute 2.96 Thousands/µL      Immature Grans Absolute 0.01 Thousand/uL      Lymphocytes Absolute 2.31 Thousands/µL      Monocytes Absolute 0.45 Thousand/µL      Eosinophils Absolute 0.31 Thousand/µL      Basophils Absolute 0.03 Thousands/µL                    Imaging Studies:   Direct to CT: No  TRAUMA - CT head wo contrast   Final Result by Darrell Curtis MD (11/25 4789)      No acute intracranial abnormality. Stable findings as above. Workstation performed: [id number]         TRAUMA - CT spine cervical wo contrast   Final Result by Darrell Curtis MD (11/25 5680)      No cervical spine fracture or traumatic malalignment. Mild cervical spondylosis as above. Multilevel facet arthropathy. Diffuse osteopenia. Workstation performed: [id number]         XR Trauma chest portable   Final Result by Darrell Curtis MD (11/25 2345)      No acute cardiopulmonary disease.                   Workstation performed: [id number]               Procedures  POC FAST     Date/Time: 11/25/2023 8:00 AM    Performed by: Floresita Madison MD  Authorized by: Floresita Madison MD    Patient location:  ED  Procedure details:     Exam Type:  Diagnostic    Technique: FAST      Views obtained:  Heart - Pericardial sac, RUQ - Ballesteros's Pouch, LUQ - Splenorenal space and Suprapubic - Pouch of Reinier    Image quality: diagnostic      Image availability:  Images available in PACS  FAST Findings:     RUQ (Hepatorenal) free fluid: absent      LUQ (Splenorenal) free fluid: absent      Suprapubic free fluid: absent      Cardiac wall motion: identified      Pericardial effusion: absent    Interpretation:     Impressions: negative             ED Course           Medical Decision Making  Patient presenting with multiple falls on Eliquis. Needs trauma evaluation to ruleout acute traumatic injuries including intracranial and cervical spine injuries. Will obtain chest xray as well. Will ruleout anemia, acute renal insufficiency, dehydration and acute metabolic disturbance as cause of her recent weakness as well. Ultimately patient will need to continue to followup with PT/OT. Reassessment/Disposition: No obvious etiology of patient's recurrent falls recently, however I do see the patient has been evaluated by PT/OT in the past who recommended ongoing therapy. Patient will continue to follow-up with PT/OT, will see her primary care doctor, and may need to eventually see a neurologist if she continues to struggle with ambulation intermittently. At this time, patient feels safe for discharge, is ambulating at her baseline with walker, and I discussed at length with her safe practices with frequent falls especially always having a phone or other emergency alert device person when attempting to get up, walk, or shift position/transfer. Patient expressed understanding of these safety precautions and is in agreement with the plan for follow-up as above. She will come back if she falls especially if she hits her head. Amount and/or Complexity of Data Reviewed  Labs: ordered. Radiology: ordered.                 Disposition  [facility] Transfer: No  Final diagnoses:   Ambulatory dysfunction   Frequent falls     Time reflects when diagnosis was documented in both MDM as applicable and the Disposition within this note Time User Action Codes Description Comment    11/25/2023  9:14 AM Dulce Cool Add [R26.2] Ambulatory dysfunction     11/25/2023  9:14 AM Dulce Cool Add [R29.6] Frequent falls           ED Disposition       ED Disposition   Discharge    Condition   Stable    Date/Time   Sat Nov 25, 2023  9:35 AM    Comment   Desiree Goetz discharge to home/self care. Follow-up Information       Follow up With Specialties Details Why Contact Info Additional [address] [facility] Emergency Department Emergency Medicine Go to  If symptoms worsen, As needed [address] [place] [id number]  [address] [place] [address] Emergency Department, [address] [facility], [facility], [address] [facility] Neurology [facility] Neurology  Call to re-establish care [address] Neurology [facility], [place], [place], [address] [facility]          Discharge Medication List as of 11/25/2023  9:35 AM        CONTINUE these medications which have NOT CHANGED    Details   Accu-Chek FastClix Lancets MISC Use 2 (two) times a day, Starting Wed 12/28/2022, Normal      Alcohol Swabs (Pharmacist Choice Alcohol) PADS Starting Wed 2/1/2023, Historical Med      Aspirin 81 MG CAPS Take 1 tablet by mouth in the morning, Historical Med      !! Assure ID Safety Pen Needles 30G X 8 MM MISC USE DAILY, Normal      atorvastatin (LIPITOR) 80 mg tablet TAKE ONE TABLET BY MOUTH ONCE DAILY WITH DINNER, Normal      !! B-D UF III MINI PEN NEEDLES 31G X 5 MM MISC USE 4 TIMES DAILY, Starting Tue 11/7/2023, Normal      !! Blood Glucose Monitoring Suppl (Accu-Chek Guide) w/Device KIT Use 2 (two) times a day, Starting Wed 12/28/2022, Normal      !!  Blood Glucose Monitoring Suppl (ONE TOUCH ULTRA 2) w/Device KIT USE AS DIRECTED, Normal      clotrimazole (LOTRIMIN) 1 % cream Historical Med Continuous Blood Gluc  (FreeStyle [place] 2 [place]) SHERWIN DISPENSE 1 READER., Normal      Continuous Blood Gluc Sensor (FreeStyle Naveen 2 Sensor) MISC Use 1 sensor every 14 days for continuous glucose monitoring., Normal      escitalopram (LEXAPRO) 5 mg tablet TAKE ONE TABLET BY MOUTH EVERY MORNING, Starting Tue 7/25/2023, Normal      famotidine (PEPCID) 40 MG tablet TAKE ONE TABLET BY MOUTH ONCE DAILY, Normal      !! Insulin Pen Needle 31G X 5 MM MISC BD Ultra-Fine Short Pen Needle 31 gauge x 5/16"   use as directed, Historical Med      Lancet Devices (Lancet Device with Ejector) MISC USE TO CHECK BLOOD SUGAR TWICE A DAY, Normal      Lancets Misc. (Accu-Chek Softclix Lancet Dev) KIT Check blood sugar, Normal      linaGLIPtin (Tradjenta) 5 MG TABS Take 5 mg by mouth daily, Starting Tue 8/15/2023, Normal      lisinopril (ZESTRIL) 5 mg tablet TAKE ONE TABLET BY MOUTH EVERY MORNING, Starting Tue 7/25/2023, Normal      metFORMIN (GLUCOPHAGE-XR) 500 mg 24 hr tablet TAKE 1 TABLET (500 MG TOTAL) BY MOUTH 2 (TWO) TIMES A DAY WITH MEALS, Starting Mon 7/3/2023, Until Sat 12/30/2023, Normal      metoprolol tartrate (LOPRESSOR) 50 mg tablet TAKE ONE TABLET BY MOUTH EVERY 12 HOURS, Normal      Myrbetriq 50 MG TB24 TAKE 1 TABLET (50 MG TOTAL) BY MOUTH IN THE MORNING, Starting Tue 6/13/2023, Normal      ondansetron (ZOFRAN-ODT) 4 mg disintegrating tablet Take 1 tablet (4 mg total) by mouth every 8 (eight) hours as needed for nausea or vomiting, Starting Wed 9/8/2021, Normal      OneTouch Ultra test strip Historical Med      pantoprazole (PROTONIX) 40 mg tablet TAKE ONE TABLET BY MOUTH DAILY, Starting Mon 9/11/2023, Normal      repaglinide (PRANDIN) 2 mg tablet TAKE ONE TABLET BY MOUTH TWICE DAILY BEFORE MEALS, Normal      Xarelto 20 MG tablet TAKE ONE TABLET BY MOUTH ONCE DAILY WITH BREAKFAST, Normal       !! - Potential duplicate medications found. Please discuss with provider.             PDMP Review         Value Time User    PDMP Reviewed  Yes 12/31/2021 10:45 PM Jennyfer Purdy DO            ED Provider  Electronically Signed by           Khloe Smith MD  11/26/23 7167

## 2023-11-25 NOTE — DISCHARGE INSTRUCTIONS
If you feel that you are unsafe at home please come immediately back to the emergency department or call 911. You need to keep your appointments with physical therapy and Occupational Therapy. I see no obvious cause today of your frequent falls. This may just be deconditioning or worsening of your chronic weakness. Please call your neurologist to set up appointment for further evaluation.

## 2023-11-25 NOTE — ED ATTENDING ATTESTATION
11/25/2023  Blanca Castle DO, saw and evaluated the patient. I have discussed the patient with the resident/non-physician practitioner and agree with the resident's/non-physician practitioner's findings, Plan of Care, and MDM as documented in the resident's/non-physician practitioner's note, except where noted. All available labs and Radiology studies were reviewed. I was present for key portions of any procedure(s) performed by the resident/non-physician practitioner and I was immediately available to provide assistance. At this point I agree with the current assessment done in the Emergency Department. I have conducted an independent evaluation of this patient a history and physical is as follows:    Patient is a 80 yo F who presents for evaluation of frequent falls and generalized weakness. Patients says the falls have been going on 2-3/week for several weeks. This morning, she "slid down the bed" but did not fall out of bed. She uses a walker at baseline. No head strike, did not hit anything. No headache, lightheadedness, dizziness or complaints of pain anywhere else. Left sided weakness from previous stroke. Mainly coming in because she "is unable to pull herself back up when she falls."   Patient is concerned for anemia and dehydration. She is on ASA and Xarelto. 72-year-old female presenting for evaluation of generalized weakness, frequent falls. Has been falling 2-3 times a week for the past 2 weeks. Denies any head strike. Denies any complaint such as headache, Lancer committees, nausea, vomiting, chest pain, shortness of breath or abdominal pain. Physical Exam  Vitals and nursing note reviewed. Constitutional:       General: She is not in acute distress. Appearance: She is well-developed. HENT:      Head: Normocephalic and atraumatic.       Right Ear: External ear normal.      Left Ear: External ear normal.      Nose: Nose normal.      Mouth/Throat:      Mouth: Mucous membranes are moist.      Pharynx: No oropharyngeal exudate. Eyes:      Conjunctiva/sclera: Conjunctivae normal.      Pupils: Pupils are equal, round, and reactive to light. Cardiovascular:      Rate and Rhythm: Normal rate and regular rhythm. Heart sounds: Normal heart sounds. No murmur heard. No friction rub. No gallop. Pulmonary:      Effort: Pulmonary effort is normal. No respiratory distress. Breath sounds: Normal breath sounds. No wheezing or rales. Abdominal:      General: There is no distension. Palpations: Abdomen is soft. Tenderness: There is no abdominal tenderness. There is no guarding. Musculoskeletal:         General: Tenderness present. No swelling or deformity. Normal range of motion. Cervical back: Normal range of motion and neck supple. Comments: Old ecchymosis to LUE  Left arm neurovascularly intact  Compartment soft    Lymphadenopathy:      Cervical: No cervical adenopathy. Skin:     General: Skin is warm and dry. Neurological:      General: No focal deficit present. Mental Status: She is alert and oriented to person, place, and time. Mental status is at baseline. Cranial Nerves: No cranial nerve deficit. Sensory: No sensory deficit. Motor: No weakness or abnormal muscle tone. Coordination: Coordination normal.          Given multiple falls on Xarelto, will obtain CT head to rule out chronic subdural or other intracranial injury. Patient had x-ray to left upper extremity which was within normal limits. No need to reimage    Obtain basic lab work to evaluate for her weakness, will obtain UA. Lab work within normal limits. UA shows no signs of infection. Patient discharged home.     ED Course         Critical Care Time  Procedures

## 2023-12-01 ENCOUNTER — LAB REQUISITION (OUTPATIENT)
Dept: LAB | Facility: HOSPITAL | Age: 68
End: 2023-12-01
Payer: MEDICARE

## 2023-12-01 DIAGNOSIS — R53.83 OTHER FATIGUE: ICD-10-CM

## 2023-12-01 LAB
ALBUMIN SERPL BCP-MCNC: 4 G/DL (ref 3.5–5)
ALP SERPL-CCNC: 51 U/L (ref 34–104)
ALT SERPL W P-5'-P-CCNC: 17 U/L (ref 7–52)
AMYLASE SERPL-CCNC: 30 IU/L (ref 29–103)
ANION GAP SERPL CALCULATED.3IONS-SCNC: 11 MMOL/L
AST SERPL W P-5'-P-CCNC: 25 U/L (ref 13–39)
BASOPHILS # BLD AUTO: 0.04 THOUSANDS/ÂΜL (ref 0–0.1)
BASOPHILS NFR BLD AUTO: 0 % (ref 0–1)
BILIRUB SERPL-MCNC: 0.7 MG/DL (ref 0.2–1)
BUN SERPL-MCNC: 17 MG/DL (ref 5–25)
CALCIUM SERPL-MCNC: 8.9 MG/DL (ref 8.4–10.2)
CHLORIDE SERPL-SCNC: 101 MMOL/L (ref 96–108)
CO2 SERPL-SCNC: 27 MMOL/L (ref 21–32)
CREAT SERPL-MCNC: 0.82 MG/DL (ref 0.6–1.3)
EOSINOPHIL # BLD AUTO: 0.15 THOUSAND/ÂΜL (ref 0–0.61)
EOSINOPHIL NFR BLD AUTO: 2 % (ref 0–6)
ERYTHROCYTE [DISTWIDTH] IN BLOOD BY AUTOMATED COUNT: 12.2 % (ref 11.6–15.1)
EST. AVERAGE GLUCOSE BLD GHB EST-MCNC: 283 MG/DL
GFR SERPL CREATININE-BSD FRML MDRD: 74 ML/MIN/1.73SQ M
GLUCOSE SERPL-MCNC: 259 MG/DL (ref 65–140)
HBA1C MFR BLD: 11.5 %
HCT VFR BLD AUTO: 40 % (ref 34.8–46.1)
HGB BLD-MCNC: 12.4 G/DL (ref 11.5–15.4)
IMM GRANULOCYTES # BLD AUTO: 0.02 THOUSAND/UL (ref 0–0.2)
IMM GRANULOCYTES NFR BLD AUTO: 0 % (ref 0–2)
LIPASE SERPL-CCNC: 21 U/L (ref 11–82)
LYMPHOCYTES # BLD AUTO: 2.14 THOUSANDS/ÂΜL (ref 0.6–4.47)
LYMPHOCYTES NFR BLD AUTO: 24 % (ref 14–44)
MCH RBC QN AUTO: 30 PG (ref 26.8–34.3)
MCHC RBC AUTO-ENTMCNC: 31 G/DL (ref 31.4–37.4)
MCV RBC AUTO: 97 FL (ref 82–98)
MONOCYTES # BLD AUTO: 0.44 THOUSAND/ÂΜL (ref 0.17–1.22)
MONOCYTES NFR BLD AUTO: 5 % (ref 4–12)
NEUTROPHILS # BLD AUTO: 6.23 THOUSANDS/ÂΜL (ref 1.85–7.62)
NEUTS SEG NFR BLD AUTO: 69 % (ref 43–75)
NRBC BLD AUTO-RTO: 0 /100 WBCS
PLATELET # BLD AUTO: 217 THOUSANDS/UL (ref 149–390)
PMV BLD AUTO: 12.9 FL (ref 8.9–12.7)
POTASSIUM SERPL-SCNC: 4.7 MMOL/L (ref 3.5–5.3)
PROT SERPL-MCNC: 7 G/DL (ref 6.4–8.4)
RBC # BLD AUTO: 4.13 MILLION/UL (ref 3.81–5.12)
SODIUM SERPL-SCNC: 139 MMOL/L (ref 135–147)
WBC # BLD AUTO: 9.02 THOUSAND/UL (ref 4.31–10.16)

## 2023-12-01 PROCEDURE — 85025 COMPLETE CBC W/AUTO DIFF WBC: CPT | Performed by: FAMILY MEDICINE

## 2023-12-01 PROCEDURE — 83690 ASSAY OF LIPASE: CPT | Performed by: FAMILY MEDICINE

## 2023-12-01 PROCEDURE — 83036 HEMOGLOBIN GLYCOSYLATED A1C: CPT | Performed by: FAMILY MEDICINE

## 2023-12-01 PROCEDURE — 80053 COMPREHEN METABOLIC PANEL: CPT | Performed by: FAMILY MEDICINE

## 2023-12-01 PROCEDURE — 82150 ASSAY OF AMYLASE: CPT | Performed by: FAMILY MEDICINE

## 2023-12-05 ENCOUNTER — APPOINTMENT (EMERGENCY)
Dept: CT IMAGING | Facility: HOSPITAL | Age: 68
End: 2023-12-05
Payer: MEDICARE

## 2023-12-05 ENCOUNTER — APPOINTMENT (EMERGENCY)
Dept: RADIOLOGY | Facility: HOSPITAL | Age: 68
End: 2023-12-05
Payer: MEDICARE

## 2023-12-05 ENCOUNTER — HOSPITAL ENCOUNTER (EMERGENCY)
Facility: HOSPITAL | Age: 68
Discharge: HOME | End: 2023-12-05
Attending: EMERGENCY MEDICINE
Payer: MEDICARE

## 2023-12-05 VITALS
TEMPERATURE: 97 F | SYSTOLIC BLOOD PRESSURE: 160 MMHG | HEART RATE: 79 BPM | BODY MASS INDEX: 33.28 KG/M2 | RESPIRATION RATE: 16 BRPM | WEIGHT: 200 LBS | DIASTOLIC BLOOD PRESSURE: 74 MMHG | OXYGEN SATURATION: 97 %
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
## 2023-12-05 DIAGNOSIS — S01.01XA LACERATION OF SCALP, INITIAL ENCOUNTER: ICD-10-CM

## 2023-12-05 LAB
ANION GAP SERPL CALCULATED.3IONS-SCNC: 8 MMOL/L
BASOPHILS # BLD AUTO: 0.02 THOUSANDS/ÂΜL (ref 0–0.1)
BASOPHILS NFR BLD AUTO: 0 % (ref 0–1)
BUN SERPL-MCNC: 17 MG/DL (ref 5–25)
CALCIUM SERPL-MCNC: 8.4 MG/DL (ref 8.4–10.2)
CHLORIDE SERPL-SCNC: 99 MMOL/L (ref 96–108)
CO2 SERPL-SCNC: 26 MMOL/L (ref 21–32)
CREAT SERPL-MCNC: 0.89 MG/DL (ref 0.6–1.3)
EOSINOPHIL # BLD AUTO: 0.19 THOUSAND/ÂΜL (ref 0–0.61)
EOSINOPHIL NFR BLD AUTO: 3 % (ref 0–6)
ERYTHROCYTE [DISTWIDTH] IN BLOOD BY AUTOMATED COUNT: 11.9 % (ref 11.6–15.1)
GFR SERPL CREATININE-BSD FRML MDRD: 67 ML/MIN/1.73SQ M
GLUCOSE SERPL-MCNC: 127 MG/DL (ref 65–140)
HCT VFR BLD AUTO: 35 % (ref 34.8–46.1)
HGB BLD-MCNC: 11.4 G/DL (ref 11.5–15.4)
IMM GRANULOCYTES # BLD AUTO: 0.03 THOUSAND/UL (ref 0–0.2)
IMM GRANULOCYTES NFR BLD AUTO: 0 % (ref 0–2)
LYMPHOCYTES # BLD AUTO: 2.39 THOUSANDS/ÂΜL (ref 0.6–4.47)
LYMPHOCYTES NFR BLD AUTO: 36 % (ref 14–44)
MCH RBC QN AUTO: 31.7 PG (ref 26.8–34.3)
MCHC RBC AUTO-ENTMCNC: 32.6 G/DL (ref 31.4–37.4)
MCV RBC AUTO: 97 FL (ref 82–98)
MONOCYTES # BLD AUTO: 0.56 THOUSAND/ÂΜL (ref 0.17–1.22)
MONOCYTES NFR BLD AUTO: 8 % (ref 4–12)
NEUTROPHILS # BLD AUTO: 3.53 THOUSANDS/ÂΜL (ref 1.85–7.62)
NEUTS SEG NFR BLD AUTO: 53 % (ref 43–75)
NRBC BLD AUTO-RTO: 0 /100 WBCS
PLATELET # BLD AUTO: 187 THOUSANDS/UL (ref 149–390)
PMV BLD AUTO: 11.7 FL (ref 8.9–12.7)
POTASSIUM SERPL-SCNC: 5.2 MMOL/L (ref 3.5–5.3)
RBC # BLD AUTO: 3.6 MILLION/UL (ref 3.81–5.12)
SODIUM SERPL-SCNC: 133 MMOL/L (ref 135–147)
WBC # BLD AUTO: 6.72 THOUSAND/UL (ref 4.31–10.16)

## 2023-12-05 PROCEDURE — 74177 CT ABD & PELVIS W/CONTRAST: CPT

## 2023-12-05 PROCEDURE — 72170 X-RAY EXAM OF PELVIS: CPT

## 2023-12-05 PROCEDURE — 71260 CT THORAX DX C+: CPT

## 2023-12-05 PROCEDURE — 71045 X-RAY EXAM CHEST 1 VIEW: CPT

## 2023-12-05 PROCEDURE — 99285 EMERGENCY DEPT VISIT HI MDM: CPT

## 2023-12-05 PROCEDURE — 72125 CT NECK SPINE W/O DYE: CPT

## 2023-12-05 PROCEDURE — 80048 BASIC METABOLIC PNL TOTAL CA: CPT

## 2023-12-05 PROCEDURE — 73060 X-RAY EXAM OF HUMERUS: CPT

## 2023-12-05 PROCEDURE — 73030 X-RAY EXAM OF SHOULDER: CPT

## 2023-12-05 PROCEDURE — 36415 COLL VENOUS BLD VENIPUNCTURE: CPT

## 2023-12-05 PROCEDURE — 85025 COMPLETE CBC W/AUTO DIFF WBC: CPT

## 2023-12-05 PROCEDURE — 96372 THER/PROPH/DIAG INJ SC/IM: CPT

## 2023-12-05 PROCEDURE — 70450 CT HEAD/BRAIN W/O DYE: CPT

## 2023-12-05 PROCEDURE — 90715 TDAP VACCINE 7 YRS/> IM: CPT

## 2023-12-05 PROCEDURE — 90471 IMMUNIZATION ADMIN: CPT

## 2023-12-05 PROCEDURE — 12002 RPR S/N/AX/GEN/TRNK2.6-7.5CM: CPT

## 2023-12-05 RX ORDER — BACITRACIN, NEOMYCIN, POLYMYXIN B 400; 3.5; 5 [USP'U]/G; MG/G; [USP'U]/G
1 OINTMENT TOPICAL ONCE
Status: COMPLETED | OUTPATIENT
Start: 2023-12-05 | End: 2023-12-05

## 2023-12-05 RX ADMIN — IOHEXOL 100 ML: 350 INJECTION, SOLUTION INTRAVENOUS at 19:55

## 2023-12-05 RX ADMIN — TETANUS TOXOID, REDUCED DIPHTHERIA TOXOID AND ACELLULAR PERTUSSIS VACCINE, ADSORBED 0.5 ML: 5; 2.5; 8; 8; 2.5 SUSPENSION INTRAMUSCULAR at 19:01

## 2023-12-05 RX ADMIN — BACITRACIN ZINC, NEOMYCIN, POLYMYXIN B SULFAT 1 SMALL APPLICATION: 5000; 3.5; 4 OINTMENT TOPICAL at 22:14

## 2023-12-06 NOTE — ED ATTENDING ATTESTATION
12/5/2023  Guillaume ANN DO, saw and evaluated the patient. I have discussed the patient with the resident/non-physician practitioner and agree with the resident's/non-physician practitioner's findings, Plan of Care, and MDM as documented in the resident's/non-physician practitioner's note, except where noted. All available labs and Radiology studies were reviewed. I was present for key portions of any procedure(s) performed by the resident/non-physician practitioner and I was immediately available to provide assistance. At this point I agree with the current assessment done in the Emergency Department. I have conducted an independent evaluation of this patient a history and physical is as follows:    ED Course  ED Course as of 12/05/23 2249   Tue Dec 05, 2023   9470 3 staples placed. Patient requesting discharge. Critical Care Time  Universal Protocol:  Risks and benefits: risks, benefits and alternatives were discussed  Consent given by: patient  Time out: Immediately prior to procedure a "time out" was called to verify the correct patient, procedure, equipment, support staff and site/side marked as required. Timeout called at: 12/5/2023 10:48 PM.  Laceration repair    Date/Time: 12/5/2023 10:48 PM    Performed by: Guillaume Ramos DO  Authorized by: Guillaume Ramos DO  Body area: head/neck  Laceration length: 4 cm  Tendon involvement: none  Nerve involvement: none    Wound Dehiscence:  Superficial Wound Dehiscence: simple closure      Procedure Details:  Preparation: Patient was prepped and draped in the usual sterile fashion.   Irrigation solution: saline  Skin closure: staples  Number of sutures: 3  Approximation: close  Approximation difficulty: simple  Dressing: antibiotic ointment  Patient tolerance: patient tolerated the procedure well with no immediate complications

## 2023-12-06 NOTE — ED PROVIDER NOTES
Emergency Department Trauma Note  Logan Samano 79 y.o. female MRN: [id number]  Unit/Bed#: ED 22/ED 22 Encounter: [id number]      Trauma Alert: Trauma Acuity: Trauma Evaluation  Model of Arrival: Mode of Arrival: BLS via    Trauma Team: Current Providers  Attending Provider: Carissa Danielle DO  Registered Nurse: Huong Burch RN  Registered Nurse: Abdirizak Bolanos RN  Nurse Practitioner: ALEC Mark  Registered Nurse: Maged Salvador RN  Consultants:     None      History of Present Illness     Chief Complaint:   Chief Complaint   Patient presents with    Fall     HPI:  Logan Samano is a 79 y.o. female who presents with mechanical fall. Mechanism:Details of Incident: patient lost balance while walking off curb, head strike to back of head, no loss of consciousness Injury Date: 12/05/23 Injury Time: 1800      Patient is a 72-year-old female with past medical history of stroke, GERD arrives for evaluation after she had a fall off of a curb. Patient reports that she has been having increasing difficulties with her balance since she had a stroke and has been having multiple falls every day. Patient reports that initially she was caught by someone, but then lost her balance again and fell off of a curb striking her head. Patient denies any LOC. Patient arrives with a wound to the back of her head. Patient denies LOC. Patient denies syncope, near syncope, prodrome prior to fall stating that she was losing her balance like she always does. Patient states that she falls a lot. Patient states that she fell flat onto her back. Patient has no midline tenderness. Patient reports that she has pain in her upper chest between her collarbones. Patient has yellowing ecchymosis on her left upper chest, and blue ecchymosis on her left upper arm. Patient answering questions appropriately alert awake oriented x 4, GCS 15, no numbness or tingling distal extremities.       Review of Systems Constitutional: Negative. HENT: Negative. Eyes: Negative. Respiratory: Negative. Cardiovascular: Negative. Gastrointestinal: Negative. Endocrine: Negative. Genitourinary: Negative. Musculoskeletal: Negative. Allergic/Immunologic: Negative. Neurological: Negative. Hematological: Negative. Psychiatric/Behavioral: Negative. All other systems reviewed and are negative. Historical Information     Immunizations:   Immunization History   Administered Date(s) Administered    COVID-19 PFIZER VACCINE 0.3 ML IM 05/05/2021, 06/02/2021, 12/01/2021, 12/21/2021    INFLUENZA 11/04/2015, 10/01/2017, 10/30/2020, 10/14/2022    Influenza A Monovalent (H5n1), Adjuvanted, [employer]3 10/01/2021    Influenza, injectable, quadrivalent, preservative free 0.5 mL 09/08/2021    Pneumococcal Polysaccharide PPV23 11/04/2015    Tdap 09/14/2023, 12/05/2023    Tuberculin Skin Test 05/10/2023       Past Medical History:   Diagnosis Date    Abdominal fibromatosis     Arthritis     Depression     GERD (gastroesophageal reflux disease)     controlled    Hyperlipemia     Hypertension     Obesity     Pneumonia     Right pontine stroke ([address]) 12/13/2021    Stroke (cerebrum) ([address]) 12/17/2021    Stroke ([address])        Family History   Problem Relation Age of Onset    No Known Problems Mother     No Known Problems Father      Past Surgical History:   Procedure Laterality Date    APPENDECTOMY      APPENDECTOMY LAPAROSCOPIC N/A 06/12/2022    Procedure: APPENDECTOMY LAPAROSCOPIC;  Surgeon: Claritza Connolly MD;  Location: [facility];  Service: General    CATARACT EXTRACTION      CATARACT EXTRACTION, BILATERAL      COLONOSCOPY      EGD      LAPAROTOMY      Exploratory;  Last Assessed 10/17/2017    PEG TUBE PLACEMENT      PEG TUBE REMOVAL       Social History     Tobacco Use    Smoking status: Never    Smokeless tobacco: Never   Vaping Use    Vaping Use: Never used   Substance Use Topics    Alcohol use: Never Comment: Socially    Drug use: Never     E-Cigarette/Vaping    E-Cigarette Use Never User      E-Cigarette/Vaping Substances    Nicotine No     THC No     CBD No     Flavoring No     Other No     Unknown No        Family History: non-contributory    Meds/Allergies   Prior to Admission Medications   Prescriptions Last Dose Informant Patient Reported? Taking? Accu-Chek FastClix Lancets MISC  Self No No   Sig: Use 2 (two) times a day   Alcohol Swabs (Pharmacist Choice Alcohol) PADS  Self Yes No   Aspirin 81 MG CAPS  Self Yes No   Sig: Take 1 tablet by mouth in the morning   Assure ID Safety Pen Needles 30G X 8 MM MISC  Self No No   Sig: USE DAILY   B-D UF III MINI PEN NEEDLES 31G X 5 MM MISC   No No   Sig: USE 4 TIMES DAILY   Blood Glucose Monitoring Suppl (Accu-Chek Guide) w/Device KIT  Self No No   Sig: Use 2 (two) times a day   Blood Glucose Monitoring Suppl (ONE TOUCH ULTRA 2) w/Device KIT  Self No No   Sig: USE AS DIRECTED   Blood Glucose Monitoring Suppl (OneTouch Verio) w/Device KIT   No No   Sig: Use 2 (two) times a day   Patient not taking: Reported on 10/9/2023   Continuous Blood Gluc  (FreeStyle Naveen 2 [place]) SHERWIN  Self No No   Sig: DISPENSE 1 READER. Continuous Blood Gluc Sensor (FreeStyle Naveen 2 Sensor) MISC  Self No No   Sig: Use 1 sensor every 14 days for continuous glucose monitoring. Insulin Pen Needle 31G X 5 MM MISC  Self Yes No   Sig: BD Ultra-Fine Short Pen Needle 31 gauge x 5/16"   use as directed   Patient not taking: Reported on 8/15/2023   Lancet Devices (Lancet Device with Ejector) MISC  Self No No   Sig: USE TO CHECK BLOOD SUGAR TWICE A DAY   Lancets Misc.  (Accu-Chek Softclix Lancet Dev) KIT  Self No No   Sig: Check blood sugar   Myrbetriq 50 MG TB24  Self No No   Sig: TAKE 1 TABLET (50 MG TOTAL) BY MOUTH IN THE MORNING   OneTouch Ultra test strip  Self Yes No   Xarelto 20 MG tablet  Self No No   Sig: TAKE ONE TABLET BY MOUTH ONCE DAILY WITH BREAKFAST   atorvastatin (LIPITOR) 80 mg tablet  Self No No   Sig: TAKE ONE TABLET BY MOUTH ONCE DAILY WITH DINNER   clotrimazole (LOTRIMIN) 1 % cream  Self Yes No   Patient not taking: Reported on 10/9/2023   escitalopram (LEXAPRO) 5 mg tablet  Self No No   Sig: TAKE ONE TABLET BY MOUTH EVERY MORNING   famotidine (PEPCID) 40 MG tablet  Self No No   Sig: TAKE ONE TABLET BY MOUTH ONCE DAILY   linaGLIPtin (Tradjenta) 5 MG TABS  Self No No   Sig: Take 5 mg by mouth daily   lisinopril (ZESTRIL) 5 mg tablet  Self No No   Sig: TAKE ONE TABLET BY MOUTH EVERY MORNING   metFORMIN (GLUCOPHAGE-XR) 500 mg 24 hr tablet  Self No No   Sig: TAKE 1 TABLET (500 MG TOTAL) BY MOUTH 2 (TWO) TIMES A DAY WITH MEALS   metoprolol tartrate (LOPRESSOR) 50 mg tablet  Self No No   Sig: TAKE ONE TABLET BY MOUTH EVERY 12 HOURS   ondansetron (ZOFRAN-ODT) 4 mg disintegrating tablet  Self No No   Sig: Take 1 tablet (4 mg total) by mouth every 8 (eight) hours as needed for nausea or vomiting   pantoprazole (PROTONIX) 40 mg tablet  Self No No   Sig: TAKE ONE TABLET BY MOUTH DAILY   repaglinide (PRANDIN) 2 mg tablet  Self No No   Sig: TAKE ONE TABLET BY MOUTH TWICE DAILY BEFORE MEALS      Facility-Administered Medications: None       Allergies   Allergen Reactions    Apixaban Vomiting and GI Intolerance     Other reaction(s): Vomiting    Dulaglutide Vomiting     Nausea vomiting       PHYSICAL EXAM    PE limited by:     Objective   Vitals:   First set: Temperature: (!) 97 °F (36.1 °C) (12/05/23 1837)  Pulse: 81 (12/05/23 1837)  Respirations: 18 (12/05/23 1837)  Blood Pressure: (!) 165/108 (12/05/23 1837)  SpO2: 99 % (12/05/23 1837)    Primary Survey:   (A) Airway: patent  (B) Breathing: lungs CTA b/l  (C) Circulation: Pulses:   normal  (D) Disabliity:  GCS Total:  15  (E) Expose:  Completed    \    Secondary Survey: (Click on Physical Exam tab above)  Physical Exam  Vitals and nursing note reviewed. Constitutional:       General: She is not in acute distress.      Appearance: Normal appearance. She is normal weight. She is not ill-appearing or toxic-appearing. HENT:      Head: Normocephalic. Right Ear: External ear normal.      Left Ear: External ear normal.      Nose: Nose normal. No congestion or rhinorrhea. Mouth/Throat:      Mouth: Mucous membranes are moist.      Pharynx: Oropharynx is clear. Eyes:      General:         Right eye: No discharge. Left eye: No discharge. Extraocular Movements: Extraocular movements intact. Conjunctiva/sclera: Conjunctivae normal.      Pupils: Pupils are equal, round, and reactive to light. Cardiovascular:      Rate and Rhythm: Normal rate and regular rhythm. Pulses: Normal pulses. Heart sounds: Normal heart sounds. Pulmonary:      Effort: Pulmonary effort is normal. No respiratory distress. Breath sounds: Normal breath sounds. No wheezing. Abdominal:      General: Abdomen is flat. Bowel sounds are normal. There is no distension. Palpations: Abdomen is soft. There is no mass. Tenderness: There is no abdominal tenderness. There is no right CVA tenderness, left CVA tenderness, guarding or rebound. Hernia: No hernia is present. Musculoskeletal:         General: Normal range of motion. Right shoulder: Normal.      Left shoulder: Tenderness present. No deformity or effusion. Right upper arm: Normal.      Left upper arm: Tenderness present. Right elbow: Normal.      Left elbow: Normal.        Arms:       Cervical back: Normal range of motion and neck supple. Tenderness present. Skin:     General: Skin is warm. Capillary Refill: Capillary refill takes less than 2 seconds. Neurological:      General: No focal deficit present. Mental Status: She is alert and oriented to person, place, and time. Mental status is at baseline. GCS: GCS eye subscore is 4. GCS verbal subscore is 5. GCS motor subscore is 6. Cranial Nerves: Cranial nerves 2-12 are intact. Sensory: Sensation is intact. Motor: Motor function is intact. Comments: 5/5 upper extremity strength, no numbness or tingling   5/5 lower extremity strength, no numbness or tingling    Psychiatric:         Mood and Affect: Mood normal.         Behavior: Behavior normal.         Thought Content: Thought content normal.         Judgment: Judgment normal.         Cervical spine cleared by clinical criteria?  No (imaging required)      Invasive Devices       Peripheral Intravenous Line  Duration             Peripheral IV 12/05/23 Right Antecubital <1 day                    Lab Results:   Results Reviewed       Procedure Component Value Units Date/Time    Basic metabolic panel [[id number]]  (Abnormal) Collected: 12/05/23 1938    Lab Status: Final result Specimen: Blood Updated: 12/05/23 2000     Sodium 133 mmol/L      Potassium 5.2 mmol/L      Chloride 99 mmol/L      CO2 26 mmol/L      ANION GAP 8 mmol/L      BUN 17 mg/dL      Creatinine 0.89 mg/dL      Glucose 127 mg/dL      Calcium 8.4 mg/dL      eGFR 67 ml/min/1.73sq m     Narrative:      [facility] guidelines for Chronic Kidney Disease (CKD):     Stage 1 with normal or high GFR (GFR > 90 mL/min/1.73 square meters)    Stage 2 Mild CKD (GFR = 60-89 mL/min/1.73 square meters)    Stage 3A Moderate CKD (GFR = 45-59 mL/min/1.73 square meters)    Stage 3B Moderate CKD (GFR = 30-44 mL/min/1.73 square meters)    Stage 4 Severe CKD (GFR = 15-29 mL/min/1.73 square meters)    Stage 5 End Stage CKD (GFR <15 mL/min/1.73 square meters)  Note: GFR calculation is accurate only with a steady state creatinine    CBC and differential [[id number]]  (Abnormal) Collected: 12/05/23 1938    Lab Status: Final result Specimen: Blood Updated: 12/05/23 1944     WBC 6.72 Thousand/uL      RBC 3.60 Million/uL      Hemoglobin 11.4 g/dL      Hematocrit 35.0 %      MCV 97 fL      MCH 31.7 pg      MCHC 32.6 g/dL      RDW 11.9 %      MPV 11.7 fL      Platelets 085 Thousands/uL      nRBC 0 /100 WBCs      Neutrophils Relative 53 %      Immat GRANS % 0 %      Lymphocytes Relative 36 %      Monocytes Relative 8 %      Eosinophils Relative 3 %      Basophils Relative 0 %      Neutrophils Absolute 3.53 Thousands/µL      Immature Grans Absolute 0.03 Thousand/uL      Lymphocytes Absolute 2.39 Thousands/µL      Monocytes Absolute 0.56 Thousand/µL      Eosinophils Absolute 0.19 Thousand/µL      Basophils Absolute 0.02 Thousands/µL                    Imaging Studies:   Direct to CT: Yes  XR Trauma chest portable   Final Result by Ludivina Logan MD (12/05 2003)      No acute cardiopulmonary disease. Workstation performed: [id number]         XR Trauma pelvis ap only 1 or 2 vw   Final Result by Ludivina Logan MD (12/05 2001)      No acute osseous abnormality.       Workstation performed: [id number]         TRAUMA - CT head wo contrast    (Results Pending)   TRAUMA - CT spine cervical wo contrast    (Results Pending)   TRAUMA - CT chest abdomen pelvis w contrast    (Results Pending)   XR humerus LEFT    (Results Pending)   XR shoulder 2+ views LEFT    (Results Pending)         Procedures  POC FAST US    Date/Time: 12/5/2023 6:54 PM    Performed by: ALEC Gottlieb  Authorized by: ALEC Gottlieb    Patient location:  ED  Procedure details:     Exam Type:  Diagnostic    Indications: blunt abdominal trauma and blunt chest trauma      Assess for:  Intra-abdominal fluid and pericardial effusion    Technique: FAST      Views obtained:  Heart - Pericardial sac and LUQ - Splenorenal space    Image quality: diagnostic      Image availability:  Images available in PACS  FAST Findings:     RUQ (Hepatorenal) free fluid: absent      LUQ (Splenorenal) free fluid: absent      Suprapubic free fluid: absent      Cardiac wall motion: identified      Pericardial effusion: absent    Interpretation:     Impressions: negative             ED Course  ED Course as of 12/05/23 2005   Tue Dec 05, 2023   2003 CBC and differential(!)  Slight decrease to hemoglobin. No leukocytosis. 7505 Basic metabolic panel(!)  No electrolyte abnormality. Creatinine at baseline. Medical Decision Making  Differential diagnoses including C-spine fracture, intracranial hemorrhage, skull fracture, visceral organ  Pt is a 62-year-old female with a past medical history of frequent falls are in for evaluation of 2 falls today. Patient states that the first fall she was caught and did not go all way down on the ground. Patient reports that the second fall she fell for curb onto the [place] striking her head. Patient denies any LOC. Patient is on Xarelto. Patient denies any vomiting but reports nausea. Patient alert awake oriented x 4, GCS 15, answering questions appropriately. Patient exposed with ecchymosis noted to the left upper arm, left chest wall. Left upper arm ecchymosis as below, appears to be age. Chest wall is healed again appears to be aged. Patient reports that she was recently evaluated at this facility for similar. Tetanus updated as she is unsure of last tetanus. Patient has blood dried to hair, and due to C-spine precautions at this time, unable to clean the wound. Fast is negative. Patient's BMP pending. UA pending. CT scans pending. Case was discussed with Dr. Breann Cain and aware of plan of care. Aware dispo based on imagining. Dr. Breann Cain aware that patient has wound that requires further investigation once c-spine cleared. Amount and/or Complexity of Data Reviewed  Labs: ordered. Decision-making details documented in ED Course. Radiology: ordered. Risk  Prescription drug management.                 Disposition  [facility] Transfer: No Awaiting ct results  Final diagnoses:   None     ED Disposition       None          Follow-up Information    None       Patient's Medications   Discharge Prescriptions    No medications on file     No discharge procedures on file.     PDMP Review         Value Time User    PDMP Reviewed  Yes 12/31/2021 10:45 PM Jodie Gaxiola DO            ED Provider  Electronically Signed by           ALEC Roberts  12/05/23 2005       ALEC Roberts  12/05/23 2006

## 2023-12-14 ENCOUNTER — APPOINTMENT (OUTPATIENT)
Dept: LAB | Facility: HOSPITAL | Age: 68
End: 2023-12-14
Payer: MEDICARE

## 2024-01-09 ENCOUNTER — APPOINTMENT (OUTPATIENT)
Dept: LAB | Facility: HOSPITAL | Age: 69
End: 2024-01-09
Payer: MEDICARE

## 2024-02-21 PROBLEM — Z00.00 MEDICARE ANNUAL WELLNESS VISIT, SUBSEQUENT: Status: RESOLVED | Noted: 2020-08-21 | Resolved: 2024-02-21

## 2024-04-09 ENCOUNTER — HOSPITAL ENCOUNTER (OUTPATIENT)
Dept: ULTRASOUND IMAGING | Facility: HOSPITAL | Age: 69
Discharge: HOME | End: 2024-04-09
Attending: OBSTETRICS & GYNECOLOGY
Payer: MEDICARE

## 2024-04-09 DIAGNOSIS — N83.209 CYST OF OVARY, UNSPECIFIED LATERALITY: ICD-10-CM

## 2024-04-09 PROCEDURE — 76830 TRANSVAGINAL US NON-OB: CPT

## 2024-04-09 PROCEDURE — 76856 US EXAM PELVIC COMPLETE: CPT

## 2024-04-11 ENCOUNTER — TELEPHONE (OUTPATIENT)
Dept: GYNECOLOGIC ONCOLOGY | Facility: CLINIC | Age: 69
End: 2024-04-11

## 2024-04-11 NOTE — TELEPHONE ENCOUNTER
Called for insurance referral. Left message for them to put a copy in her chart and fax one over.

## 2024-04-16 ENCOUNTER — OFFICE VISIT (OUTPATIENT)
Dept: GYNECOLOGIC ONCOLOGY | Facility: CLINIC | Age: 69
End: 2024-04-16
Payer: MEDICARE

## 2024-04-16 VITALS
DIASTOLIC BLOOD PRESSURE: 84 MMHG | WEIGHT: 161.2 LBS | TEMPERATURE: 98.8 F | BODY MASS INDEX: 26.86 KG/M2 | RESPIRATION RATE: 16 BRPM | HEART RATE: 102 BPM | OXYGEN SATURATION: 100 % | SYSTOLIC BLOOD PRESSURE: 130 MMHG | HEIGHT: 65 IN

## 2024-04-16 DIAGNOSIS — N83.8 OVARIAN MASS: Primary | ICD-10-CM

## 2024-04-16 PROCEDURE — 99213 OFFICE O/P EST LOW 20 MIN: CPT | Performed by: OBSTETRICS & GYNECOLOGY

## 2024-04-16 NOTE — PROGRESS NOTES
Assessment/Plan:    Problem List Items Addressed This Visit       Ovarian mass - Primary     Patient is a very pleasant 68-year-old female with a history of a 3 cm ovarian cyst.  This is minimally complex.  Due to multiple medical illnesses the patient was recommended observation.  The patient now presents 6 months after.  The cyst has decreased in size somewhat by a centimeter.  There is still remains a mild complexity.  The patient is asymptomatic.    We recommended follow-up in a year with a repeat ultrasound.  The patient will see us earlier if symptoms warrant.         Relevant Orders    US pelvis complete w transvaginal         CHIEF COMPLAINT: Ovarian cyst follow-up        Patient ID: Veronica Reese is a 68 y.o. female  Patient is a very pleasant 68-year-old female seen in follow-up for evaluation and management of ovarian cyst.    Patient was found to have approximately 3 cm asymptomatic ovarian cyst with small septation approximately 6 months ago.  Patient has multiple medical problems including musculoskeletal abnormalities vocal cord paralysis and diabetes.  The patient was felt to be at moderate risk for surgery and given the low likelihood of malignant potential it was elected to observe.  Patient presents now 6 months later for follow-up.  Pelvic ultrasound was performed and reveals the following:  UTERUS:  The uterus is anteverted in position, measuring 8.3 x 3.2 x 4.9 cm.  Normal uterine contour. Heterogeneous myometrial echotexture with scattered parenchymal calcifications.  1.7 x 1.2 x 1.6 cm right lower body calcified intramural fibroid.  1.1 x 1.2 x 1.5 cm left upper body intramural fibroid with potential submucosal component.  1.7 x 1.3 x 1.3 cm left anterior upper body/fundal intramural fibroid.  The cervix appears within normal limits.     ENDOMETRIUM:  The endometrial echo complex has an AP caliber of 3 mm.  Appearance within normal limits. Trace fluid in the endometrial cavity.      OVARIES/ADNEXA:  Right ovary: 2.7 x 1.4 x 1.3 cm. 2.6 mL.  Ovarian Doppler flow is within normal limits.  No suspicious ovarian or adnexal abnormality.  2.8 x 0.9 x 1.3 cm right ovarian cyst appears to be unilocular with thin incomplete septation on the cine images. This previously measured 3.7 x 2.2 x 2.7 cm.     Left ovary: 2.2 x 1.3 x 1.4 cm. 2.2 mL.  Ovarian Doppler flow is within normal limits.  No suspicious ovarian or adnexal abnormality.     OTHER:  No free fluid or loculated fluid collections.     IMPRESSION:     Leiomyomatous uterus.     2.8 cm right ovarian nonsimple cyst smaller since September 2023. On the current study, this is characterized as an O RADS 2 cyst.  Today, the patient is doing well.  She denies significant abdominal pain, pelvic pain, nausea, vomiting, constipation, diarrhea, fevers, chills, or vaginal bleeding.     Today, the patient is doing well.  She denies significant abdominal pain, pelvic pain, nausea, vomiting, constipation, diarrhea, fevers, chills, or vaginal bleeding.         The following portions of the patient's history were reviewed and updated as appropriate: allergies, current medications, past family history, past medical history, past social history, past surgical history, and problem list.    Review of Systems   Constitutional: Negative.    HENT: Negative.     Eyes: Negative.    Respiratory: Negative.     Cardiovascular: Negative.    Gastrointestinal: Negative.    Endocrine: Negative.    Genitourinary: Negative.    Musculoskeletal:  Positive for arthralgias, gait problem and myalgias.   Skin: Negative.    Neurological:  Positive for speech difficulty.   Hematological: Negative.    Psychiatric/Behavioral: Negative.         Current Outpatient Medications   Medication Sig Dispense Refill    Accu-Chek FastClix Lancets MISC Use 2 (two) times a day 102 each 3    Alcohol Swabs (Pharmacist Choice Alcohol) PADS       Aspirin 81 MG CAPS Take 1 tablet by mouth in the morning       Assure ID Safety Pen Needles 30G X 8 MM MISC USE DAILY 100 each 3    atorvastatin (LIPITOR) 80 mg tablet TAKE ONE TABLET BY MOUTH ONCE DAILY WITH DINNER 90 tablet 1    B-D UF III MINI PEN NEEDLES 31G X 5 MM MISC USE 4 TIMES DAILY 100 each 1    Blood Glucose Monitoring Suppl (Accu-Chek Guide) w/Device KIT Use 2 (two) times a day 1 kit 0    Blood Glucose Monitoring Suppl (ONE TOUCH ULTRA 2) w/Device KIT USE AS DIRECTED 1 kit 0    Continuous Blood Gluc  (FreeStyle [place]en 2 [place]) SHERWIN DISPENSE 1 READER. 1 each 0    Continuous Blood Gluc Sensor (FreeStyle Naveen 2 Sensor) MISC Use 1 sensor every 14 days for continuous glucose monitoring. 6 each 1    escitalopram (LEXAPRO) 5 mg tablet TAKE ONE TABLET BY MOUTH EVERY MORNING 90 tablet 1    famotidine (PEPCID) 40 MG tablet TAKE ONE TABLET BY MOUTH ONCE DAILY 90 tablet 1    Lancet Devices (Lancet Device with Ejector) MISC USE TO CHECK BLOOD SUGAR TWICE A DAY 1 each 0    Lancets Misc. (Accu-Chek Softclix Lancet Dev) KIT Check blood sugar 1 kit 1    linaGLIPtin (Tradjenta) 5 MG TABS Take 5 mg by mouth daily 90 tablet 1    lisinopril (ZESTRIL) 5 mg tablet TAKE ONE TABLET BY MOUTH EVERY MORNING 90 tablet 1    metFORMIN (GLUCOPHAGE-XR) 500 mg 24 hr tablet TAKE 1 TABLET (500 MG TOTAL) BY MOUTH 2 (TWO) TIMES A DAY WITH MEALS 180 tablet 1    metoprolol tartrate (LOPRESSOR) 50 mg tablet TAKE ONE TABLET BY MOUTH EVERY 12 HOURS 180 tablet 1    Myrbetriq 50 MG TB24 TAKE 1 TABLET (50 MG TOTAL) BY MOUTH IN THE MORNING 90 tablet 1    ondansetron (ZOFRAN-ODT) 4 mg disintegrating tablet Take 1 tablet (4 mg total) by mouth every 8 (eight) hours as needed for nausea or vomiting 30 tablet 1    OneTouch Ultra test strip       pantoprazole (PROTONIX) 40 mg tablet TAKE ONE TABLET BY MOUTH DAILY 30 tablet 0    repaglinide (PRANDIN) 2 mg tablet TAKE ONE TABLET BY MOUTH TWICE DAILY BEFORE MEALS 180 tablet 1    Xarelto 20 MG tablet TAKE ONE TABLET BY MOUTH ONCE DAILY WITH BREAKFAST 90 tablet  "1    clotrimazole (LOTRIMIN) 1 % cream  (Patient not taking: Reported on 10/9/2023)      Insulin Pen Needle 31G X 5 MM MISC BD Ultra-Fine Short Pen Needle 31 gauge x 5/16\"   use as directed (Patient not taking: Reported on 8/15/2023)       No current facility-administered medications for this visit.           Objective:    Blood pressure 130/84, pulse 102, temperature 98.8 °F (37.1 °C), temperature source Temporal, resp. rate 16, height 5' 5\" (1.651 m), weight 73.1 kg (161 lb 3.2 oz), SpO2 100%, not currently breastfeeding.  Body mass index is 26.83 kg/m².  Body surface area is 1.8 meters squared.    Physical Exam  Constitutional:       Appearance: She is well-developed.   HENT:      Head: Normocephalic and atraumatic.   Eyes:      Pupils: Pupils are equal, round, and reactive to light.   Cardiovascular:      Rate and Rhythm: Normal rate and regular rhythm.      Heart sounds: Normal heart sounds.   Pulmonary:      Effort: Pulmonary effort is normal. No respiratory distress.      Breath sounds: Normal breath sounds.   Abdominal:      General: Bowel sounds are normal. There is no distension.      Palpations: Abdomen is soft. Abdomen is not rigid.      Tenderness: There is no abdominal tenderness. There is no guarding or rebound.   Genitourinary:     Comments: -Normal external female genitalia, normal Bartholin's and [place]'s glands                  -Normal midline urethral meatus. No lesions notes                  -Bladder without fullness mass or tenderness                  -Vagina without lesion or discharge No significant cystocele or rectocele noted                  -Cervix difficult to palpate due to significant stool                  -Uterus with difficult to palpate due to significant stool                  -Adnexae without  mass or tenderness                  - Anus without fissure of lesion    Musculoskeletal:         General: Normal range of motion.      Cervical back: Normal range of motion and neck supple. "   Lymphadenopathy:      Cervical: No cervical adenopathy.      Upper Body:      Right upper body: No supraclavicular adenopathy.      Left upper body: No supraclavicular adenopathy.   Skin:     General: Skin is warm and dry.   Neurological:      Mental Status: She is alert and oriented to person, place, and time.   Psychiatric:         Behavior: Behavior normal.         Lab Results   Component Value Date     6.3 09/10/2022     Lab Results   Component Value Date    K 5.2 12/05/2023    CL 99 12/05/2023    CO2 26 12/05/2023    BUN 17 12/05/2023    CREATININE 0.89 12/05/2023    GLUF 151 (H) 05/07/2023    CALCIUM 8.4 12/05/2023    CORRECTEDCA 9.1 09/14/2023    AST 25 12/01/2023    ALT 17 12/01/2023    ALKPHOS 51 12/01/2023    EGFR 67 12/05/2023     Lab Results   Component Value Date    WBC 6.72 12/05/2023    HGB 11.4 (L) 12/05/2023    HCT 35.0 12/05/2023    MCV 97 12/05/2023     12/05/2023     Lab Results   Component Value Date    NEUTROABS 3.53 12/05/2023        Trend:  Lab Results   Component Value Date     6.3 09/10/2022

## 2024-04-16 NOTE — ASSESSMENT & PLAN NOTE
Patient is a very pleasant 68-year-old female with a history of a 3 cm ovarian cyst.  This is minimally complex.  Due to multiple medical illnesses the patient was recommended observation.  The patient now presents 6 months after.  The cyst has decreased in size somewhat by a centimeter.  There is still remains a mild complexity.  The patient is asymptomatic.    We recommended follow-up in a year with a repeat ultrasound.  The patient will see us earlier if symptoms warrant.   [facility]

## 2024-06-07 LAB — HBA1C MFR BLD HPLC: 8.3 %

## 2024-07-19 ENCOUNTER — APPOINTMENT (OUTPATIENT)
Dept: LAB | Facility: CLINIC | Age: 69
End: 2024-07-19
Payer: MEDICARE

## 2024-07-19 DIAGNOSIS — Z91.89 RISK OF EXPOSURE TO LYME DISEASE: ICD-10-CM

## 2024-07-19 DIAGNOSIS — R49.0 DYSPHONIA: ICD-10-CM

## 2024-07-19 DIAGNOSIS — J38.3 GLOTTIC INSUFFICIENCY: ICD-10-CM

## 2024-07-19 LAB — B BURGDOR IGG+IGM SER QL IA: NEGATIVE

## 2024-07-19 PROCEDURE — 86618 LYME DISEASE ANTIBODY: CPT

## 2024-07-19 PROCEDURE — 36415 COLL VENOUS BLD VENIPUNCTURE: CPT

## 2024-07-19 PROCEDURE — 86043 ACETYLCHOLN RCPTR MODLG ANTB: CPT

## 2024-07-19 PROCEDURE — 86042 ACETYLCHOLN RCPTR BLCKG ANTB: CPT

## 2024-07-19 PROCEDURE — 86041 ACETYLCHOLN RCPTR BNDNG ANTB: CPT

## 2024-07-25 LAB
ACHR BIND AB SER-SCNC: <0.03 NMOL/L (ref 0–0.24)
ACHR MOD AB/ACHR TOTAL SFR SER: 0 % (ref 0–45)

## 2024-07-26 LAB — ACHR BLOCK AB/ACHR TOTAL SFR SER: 17 % (ref 0–25)

## 2024-08-07 LAB — HBA1C MFR BLD HPLC: 8.3 %

## 2024-09-04 LAB — HBA1C MFR BLD HPLC: 9 %

## 2024-12-04 LAB — HBA1C MFR BLD HPLC: 9.8 %

## 2025-02-26 PROBLEM — Z01.810 PREOPERATIVE CARDIOVASCULAR EXAMINATION: Status: ACTIVE | Noted: 2020-08-21

## 2025-02-26 PROBLEM — Z79.4 TYPE 2 DIABETES MELLITUS WITH HYPERGLYCEMIA, WITH LONG-TERM CURRENT USE OF INSULIN (HCC): Status: ACTIVE | Noted: 2025-02-26

## 2025-02-26 PROBLEM — E11.65 TYPE 2 DIABETES MELLITUS WITH HYPERGLYCEMIA, WITH LONG-TERM CURRENT USE OF INSULIN (HCC): Status: ACTIVE | Noted: 2025-02-26

## 2025-02-26 NOTE — ASSESSMENT & PLAN NOTE
History of paroxysmal atrial fibrillation noticed 8/2018  Presently in normal sinus rhythm  Xarelto 20 mg daily anticoagulation  Metoprolol tartrate 50 mg every 12 hours

## 2025-02-26 NOTE — ASSESSMENT & PLAN NOTE
9/6/2023 lipid panel: Cholesterol 134, triglycerides 79, HDL 59, LDL calculated 59    Atorvastatin 80 mg daily

## 2025-02-26 NOTE — ASSESSMENT & PLAN NOTE
Referred for preoperative cardiovascular examination for  right, possible left type I thyroplasty, flexible laryngoscopy (Bilateral: Neck)     DIAGNOSTIC FLEXIBLE LARYNGOSCOPY (Mouth)   Patient referred by Bartolo Li MD    12/14/2021 echo: Normal systolic function, EF 65%.  Grade 1 diastolic dysfunction.  Mild LAE.  Significant MAC.  Mild MR.  Mild TR.

## 2025-03-03 ENCOUNTER — OFFICE VISIT (OUTPATIENT)
Dept: CARDIOLOGY CLINIC | Facility: CLINIC | Age: 70
End: 2025-03-03
Payer: MEDICARE

## 2025-03-03 VITALS
BODY MASS INDEX: 28.16 KG/M2 | DIASTOLIC BLOOD PRESSURE: 80 MMHG | WEIGHT: 169 LBS | HEART RATE: 79 BPM | SYSTOLIC BLOOD PRESSURE: 130 MMHG | HEIGHT: 65 IN

## 2025-03-03 DIAGNOSIS — Z79.4 TYPE 2 DIABETES MELLITUS WITH HYPERGLYCEMIA, WITH LONG-TERM CURRENT USE OF INSULIN (HCC): ICD-10-CM

## 2025-03-03 DIAGNOSIS — Z01.810 PREOPERATIVE CARDIOVASCULAR EXAMINATION: Primary | ICD-10-CM

## 2025-03-03 DIAGNOSIS — E78.00 PURE HYPERCHOLESTEROLEMIA: Chronic | ICD-10-CM

## 2025-03-03 DIAGNOSIS — I48.0 PAROXYSMAL ATRIAL FIBRILLATION (HCC): Chronic | ICD-10-CM

## 2025-03-03 DIAGNOSIS — I69.354 HEMIPLEGIA AND HEMIPARESIS FOLLOWING CEREBRAL INFARCTION AFFECTING LEFT NON-DOMINANT SIDE (HCC): Chronic | ICD-10-CM

## 2025-03-03 DIAGNOSIS — E11.65 TYPE 2 DIABETES MELLITUS WITH HYPERGLYCEMIA, WITH LONG-TERM CURRENT USE OF INSULIN (HCC): ICD-10-CM

## 2025-03-03 PROCEDURE — 93000 ELECTROCARDIOGRAM COMPLETE: CPT | Performed by: INTERNAL MEDICINE

## 2025-03-03 PROCEDURE — 99203 OFFICE O/P NEW LOW 30 MIN: CPT | Performed by: INTERNAL MEDICINE

## 2025-03-03 NOTE — PROGRESS NOTES
[facility]  [address] [place] [address]  Phone#  [phone]   Fax#  [phone]  *-*-*-*-*-*-*-*-*-*-*-*-*-*-*-*-*-*-*-*-*-*-*-*-*-*-*-*-*-*-*-*-*-*-*-*-*-*-*-*-*-*-*-*-*-*-*-*-*-*-*-*-*-*                                   Cardiology Follow Up      ENCOUNTER DATE: 25 10:43 AM  PATIENT NAME: Veronica Reese   : 1955    MRN: [id number]  AGE:69 y.o.      SEX: female  ENCOUNTER PROVIDER:Ki Bang MD     PRIMARY CARE PHYSICIAN: Jerry Kulkarni MD    ACTIVE DIAGNOSIS THIS VISIT  1. Preoperative cardiovascular examination  POCT ECG      2. Paroxysmal atrial fibrillation (HCC)        3. Pure hypercholesterolemia        4. Hemiplegia and hemiparesis following cerebral infarction affecting left non-dominant side (HCC)        5. Type 2 diabetes mellitus with hyperglycemia, with long-term current use of insulin (HCC)          ACTIVE PROBLEM LIST  Patient Active Problem List   Diagnosis    Paroxysmal atrial fibrillation (HCC)    Cerebrovascular accident (CVA) (HCC)    Blurry vision    Diabetic cataract (HCC)    Dysphagia    Dysphonia    Posterior glottic stenosis    Heart disease    Insomnia    Incomplete emptying of bladder    Tracheal stenosis    Type 2 diabetes mellitus with chronic kidney disease, with long-term current use of insulin (HCC)    GERD (gastroesophageal reflux disease)    Hyperlipidemia    Edema    Essential hypertension    Current episode of major depressive disorder without prior episode    Preoperative cardiovascular examination    Preoperative clearance    YOLIE (obstructive sleep apnea)    Dermatitis    Muscle tension dysphonia    Reflux laryngitis    Glottic insufficiency    Weakness of both lower extremities    History of CVA (cerebrovascular accident)    Hemiplegia and hemiparesis following cerebral infarction affecting left non-dominant side (HCC)    Stage 3 chronic kidney disease, unspecified whether stage 3a or 3b CKD (HCC)    Abnormality of gait due  to impairment of balance    Stress incontinence    Cyst of right ovary    Abnormal CT of the chest    Platelets decreased (HCC)    Status post appendectomy    Ovarian mass    Abnormal uterine bleeding (AUB)    Gustatory hyperhidrosis    Fall    Elevated CK    Diabetic nephropathy associated with type 2 diabetes mellitus (HCC)    Hypoglycemia unawareness associated with type 2 diabetes mellitus (HCC)    Type 2 diabetes mellitus with hyperglycemia, with long-term current use of insulin (HCC)       CARDIOLOGY SPECIALTY COMMENTS  Referred for preoperative cardiovascular examination for  right, possible left type I thyroplasty, flexible laryngoscopy (Bilateral: Neck)     DIAGNOSTIC FLEXIBLE LARYNGOSCOPY (Mouth)   Patient referred by Bartolo Li MD    12/14/2021 echo: Normal systolic function, EF 65%.  Grade 1 diastolic dysfunction.  Mild LAE.  Significant MAC.  Mild MR.  Mild TR.    INTERVAL HISTORY:        Patient has not had any cardiac problems.  She has had 3 strokes in the past which has left her with the left vocal cord paralysis and a left hemiplegia.  Patient has had  3 strokes leaving her with left hemiplegia difficulty speeching with left vocal cord paralysis.  Whether these strokes are related to her paroxysmal atrial fibrillation or not is uncertain.  However would recommend minimizing any time off anticoagulation.    She denies chest discomfort or shortness of breath.  She has no palpitations.  She denies symptoms of dizziness, lightheadedness or near-syncope/syncope.  She denies leg edema.  She denies symptoms of orthopnea or paroxysmal nocturnal dyspnea.      DISCUSSION/PLAN:          Patient may undergo planned surgery,    right, possible left type I thyroplasty, flexible laryngoscopy (Bilateral: Neck)      DIAGNOSTIC FLEXIBLE LARYNGOSCOPY (Mouth)   Anesthesia type: Conscious Sedation   Would be reluctant to stop Xarelto in view of her prior history of strokes.  If Xarelto must be stopped, it should be  discussed with the patient, that holding Xarelto may have a small but not insignificant chance of another stroke.  If Xarelto is stopped, would try to minimize the time off Xarelto as much as possible and only stopped 2 days prior to surgery.  As long as all parties are agreeable and understand the risk with holding Xarelto, would recommend proceeding as planned.    Lab Studies:    Lab Results   Component Value Date    CHOLESTEROL 134 09/06/2023    CHOLESTEROL 116 12/14/2021    CHOLESTEROL 181 01/30/2021     Lab Results   Component Value Date    TRIG 79 09/06/2023    TRIG 73 12/14/2021    TRIG 90 01/30/2021     Lab Results   Component Value Date    HDL 59 09/06/2023    HDL 49 (L) 12/14/2021    HDL 56 01/30/2021     Lab Results   Component Value Date    LDLCALC 59 09/06/2023    LDLCALC 52 12/14/2021    LDLCALC 107 (H) 01/30/2021       Lab Results   Component Value Date    HGBA1C 11.5 (H) 12/01/2023      Lab Results   Component Value Date    EGFR 101 05/30/2024    EGFR 67 12/05/2023    EGFR 74 12/01/2023    SODIUM 134 (L) 05/30/2024    SODIUM 133 (L) 12/05/2023    SODIUM 139 12/01/2023    K 4.4 05/30/2024    K 5.2 12/05/2023    K 4.7 12/01/2023     05/30/2024    CL 99 12/05/2023     12/01/2023    CO2 23 05/30/2024    CO2 26 12/05/2023    CO2 27 12/01/2023    BUN 18 05/30/2024    BUN 17 12/05/2023    BUN 17 12/01/2023    CREATININE 0.54 05/30/2024    CREATININE 0.89 12/05/2023    CREATININE 0.82 12/01/2023     Lab Results   Component Value Date    WBC 6.72 12/05/2023    WBC 9.02 12/01/2023    WBC 6.07 11/25/2023    HGB 11.4 (L) 12/05/2023    HGB 12.4 12/01/2023    HGB 12.3 11/25/2023    HCT 35.0 12/05/2023    HCT 40.0 12/01/2023    HCT 38.3 11/25/2023    MCV 97 12/05/2023    MCV 97 12/01/2023    MCV 96 11/25/2023    MCH 31.7 12/05/2023    MCH 30.0 12/01/2023    MCH 30.8 11/25/2023    MCHC 32.6 12/05/2023    MCHC 31.0 (L) 12/01/2023    MCHC 32.1 11/25/2023     12/05/2023     12/01/2023    PLT  191 11/25/2023      Lab Results   Component Value Date    CALCIUM 8.6 05/30/2024    CALCIUM 8.4 12/05/2023    CALCIUM 8.9 12/01/2023    AST 22 05/30/2024    AST 25 12/01/2023    AST 18 11/25/2023    ALT 11 05/30/2024    ALT 17 12/01/2023    ALT 11 11/25/2023    ALKPHOS 40 05/30/2024    ALKPHOS 51 12/01/2023    ALKPHOS 50 11/25/2023    MG 1.4 05/30/2024    MG 2.0 01/31/2021    MG 2.0 01/30/2021     Results for orders placed or performed in visit on 03/03/25   POCT ECG    Narrative    Normal sinus rhythm at a rate of 79 bpm.  Normal ECG.         Current Outpatient Medications:     Aspirin 81 MG CAPS, Take 1 tablet by mouth in the morning, Disp: , Rfl:     atorvastatin (LIPITOR) 80 mg tablet, TAKE ONE TABLET BY MOUTH ONCE DAILY WITH DINNER, Disp: 90 tablet, Rfl: 1    azelastine (ASTELIN) 0.1 % nasal spray, 1 spray into each nostril 2 (two) times a day, Disp: 30 mL, Rfl: 11    Continuous Blood Gluc  (FreeStyle Naveen 2 Reagan) [facility], DISPENSE 1 READER., Disp: 1 each, Rfl: 0    Continuous Blood Gluc Sensor (FreeStyle Naveen 2 Sensor) MISC, Use 1 sensor every 14 days for continuous glucose monitoring., Disp: 6 each, Rfl: 1    famotidine (PEPCID) 40 MG tablet, Take 1 tablet (40 mg total) by mouth daily, Disp: 90 tablet, Rfl: 3    linaGLIPtin (Tradjenta) 5 MG TABS, Take 5 mg by mouth daily, Disp: 90 tablet, Rfl: 1    lisinopril (ZESTRIL) 5 mg tablet, TAKE ONE TABLET BY MOUTH EVERY MORNING, Disp: 90 tablet, Rfl: 1    metFORMIN (GLUCOPHAGE-XR) 500 mg 24 hr tablet, TAKE 1 TABLET (500 MG TOTAL) BY MOUTH 2 (TWO) TIMES A DAY WITH MEALS, Disp: 180 tablet, Rfl: 1    metoprolol tartrate (LOPRESSOR) 50 mg tablet, TAKE ONE TABLET BY MOUTH EVERY 12 HOURS, Disp: 180 tablet, Rfl: 1    Myrbetriq 50 MG TB24, TAKE 1 TABLET (50 MG TOTAL) BY MOUTH IN THE MORNING, Disp: 90 tablet, Rfl: 1    ondansetron (ZOFRAN-ODT) 4 mg disintegrating tablet, Take 1 tablet (4 mg total) by mouth every 8 (eight) hours as needed for nausea or vomiting, Disp:  30 tablet, Rfl: 1    pantoprazole (PROTONIX) 40 mg tablet, TAKE ONE TABLET BY MOUTH DAILY, Disp: 30 tablet, Rfl: 0    repaglinide (PRANDIN) 2 mg tablet, TAKE ONE TABLET BY MOUTH TWICE DAILY BEFORE MEALS, Disp: 180 tablet, Rfl: 1    Xarelto 20 MG tablet, TAKE ONE TABLET BY MOUTH ONCE DAILY WITH BREAKFAST, Disp: 90 tablet, Rfl: 1    Accu-Chek FastClix Lancets MISC, Use 2 (two) times a day, Disp: 102 each, Rfl: 3    Alcohol Swabs (Pharmacist Choice Alcohol) PADS, , Disp: , Rfl:     Assure ID Safety Pen Needles 30G X 8 MM MISC, USE DAILY, Disp: 100 each, Rfl: 3    B-D UF III MINI PEN NEEDLES 31G X 5 MM MISC, USE 4 TIMES DAILY, Disp: 100 each, Rfl: 1    Blood Glucose Monitoring Suppl (Accu-Chek Guide) w/Device KIT, Use 2 (two) times a day, Disp: 1 kit, Rfl: 0    Blood Glucose Monitoring Suppl (ONE TOUCH ULTRA 2) w/Device KIT, USE AS DIRECTED, Disp: 1 kit, Rfl: 0    Lancet Devices (Lancet Device with Ejector) MISC, USE TO CHECK BLOOD SUGAR TWICE A DAY, Disp: 1 each, Rfl: 0    Lancets Misc. (Accu-Chek Softclix Lancet Dev) KIT, Check blood sugar, Disp: 1 kit, Rfl: 1    OneTouch Ultra test strip, , Disp: , Rfl:   Allergies   Allergen Reactions    Apixaban Vomiting and GI Intolerance     Other reaction(s): Vomiting    Dulaglutide Vomiting     Nausea vomiting       Past Medical History:   Diagnosis Date    Abdominal fibromatosis     Arthritis     Depression     GERD (gastroesophageal reflux disease)     controlled    Hyperlipemia     Hypertension     Obesity     Pneumonia     Right pontine stroke (HCC) 12/13/2021    Stroke (cerebrum) ([facility]) 12/17/2021    Stroke ([facility])      Social History     Socioeconomic History    Marital status:      Spouse name: Not on file    Number of children: Not on file    Years of education: Not on file    Highest education level: Not on file   Occupational History    Occupation: disabled   Tobacco Use    Smoking status: Never    Smokeless tobacco: Never   Vaping Use    Vaping status: Never  Used   Substance and Sexual Activity    Alcohol use: Never     Comment: Socially    Drug use: Never    Sexual activity: Not Currently   Other Topics Concern    Not on file   Social History Narrative    Not on file     Social Drivers of Health     Financial Resource Strain: Low Risk  (9/26/2022)    Overall Financial Resource Strain (CARDIA)     Difficulty of Paying Living Expenses: Not hard at all   Food Insecurity: No Food Insecurity (5/8/2023)    Hunger Vital Sign     Worried About Running Out of Food in the Last Year: Never true     Ran Out of Food in the Last Year: Never true   Transportation Needs: No Transportation Needs (5/8/2023)    PRAPARE - Transportation     Lack of Transportation (Medical): No     Lack of Transportation (Non-Medical): No   Physical Activity: Not on file   Stress: Not on file   Social Connections: Not on file   Intimate Partner Violence: Not on file   Housing Stability: Low Risk  (5/8/2023)    Housing Stability Vital Sign     Unable to Pay for Housing in the Last Year: No     Number of Places Lived in the Last Year: 1     Unstable Housing in the Last Year: No      Family History   Problem Relation Age of Onset    No Known Problems Mother     No Known Problems Father      Past Surgical History:   Procedure Laterality Date    APPENDECTOMY      APPENDECTOMY LAPAROSCOPIC N/A 06/12/2022    Procedure: APPENDECTOMY LAPAROSCOPIC;  Surgeon: Dexter Rendon MD;  Location: [facility];  Service: General    CATARACT EXTRACTION      CATARACT EXTRACTION, BILATERAL      COLONOSCOPY      EGD      LAPAROTOMY      Exploratory; Last Assessed 10/17/2017    PEG TUBE PLACEMENT      PEG TUBE REMOVAL         PREVIOUS WEIGHTS:   Wt Readings from Last 10 Encounters:   03/03/25 76.7 kg (169 lb)   10/23/24 73 kg (161 lb)   07/03/24 73 kg (161 lb)   04/16/24 73.1 kg (161 lb 3.2 oz)   02/23/24 90.7 kg (200 lb)   01/24/24 90.7 kg (200 lb)   12/05/23 90.7 kg (200 lb)   11/25/23 84.4 kg (186 lb)   10/09/23 84.4 kg (186  "lb)   09/14/23 85.7 kg (188 lb 15 oz)        Review of Systems:  Review of Systems   Respiratory:  Negative for cough, choking, chest tightness, shortness of breath and wheezing.    Cardiovascular:  Negative for chest pain, palpitations and leg swelling.   Skin:  Negative for rash.   Neurological:  Negative for dizziness, tremors, syncope, weakness, light-headedness, numbness and headaches.   Psychiatric/Behavioral:  Negative for agitation and behavioral problems. The patient is not hyperactive.        Physical Exam:  /80 (BP Location: Left arm, Patient Position: Sitting, Cuff Size: Adult)   Pulse 79   Ht 5' 5\" (1.651 m)   Wt 76.7 kg (169 lb)   LMP  (LMP Unknown)   BMI 28.12 kg/m²     Physical Exam  Constitutional:       General: She is not in acute distress.     Appearance: She is well-developed.   HENT:      Head: Normocephalic and atraumatic.   Neck:      Thyroid: No thyromegaly.      Vascular: No carotid bruit or JVD.      Trachea: No tracheal deviation.   Cardiovascular:      Rate and Rhythm: Normal rate and regular rhythm.      Pulses: Normal pulses.      Heart sounds: Normal heart sounds. No murmur heard.     No friction rub. No gallop.   Pulmonary:      Effort: Pulmonary effort is normal. No respiratory distress.      Breath sounds: Normal breath sounds. No wheezing, rhonchi or rales.   Chest:      Chest wall: No tenderness.   Musculoskeletal:         General: Normal range of motion.      Cervical back: Normal range of motion and neck supple.      Right lower leg: No edema.      Left lower leg: No edema.      Comments: Left arm weakness,  Left leg weakness   Skin:     General: Skin is warm and dry.   Neurological:      General: No focal deficit present.      Mental Status: She is alert and oriented to person, place, and time.   Psychiatric:         Mood and Affect: Mood normal.         Behavior: Behavior normal.         Thought Content: Thought content normal.         Judgment: Judgment normal. " "        -======================================================  Imaging:   Results Review Statement: No pertinent imaging studies reviewed.    Portions of the record may have been created with voice recognition software. Occasional wrong word or \"sound a like\" substitutions may have occurred due to the inherent limitations of voice recognition software. Read the chart carefully and recognize, using context, where substitutions have occurred.    SIGNATURES:   Ki Bang MD   "

## 2025-03-05 LAB — HBA1C MFR BLD HPLC: 9.9 %

## 2025-03-26 ENCOUNTER — APPOINTMENT (EMERGENCY)
Dept: RADIOLOGY | Facility: HOSPITAL | Age: 70
DRG: 871 | End: 2025-03-26
Payer: MEDICARE

## 2025-03-26 ENCOUNTER — HOSPITAL ENCOUNTER (INPATIENT)
Facility: HOSPITAL | Age: 70
LOS: 5 days | Discharge: NURSING, REHAB OR LONG-TERM CARE | DRG: 871 | End: 2025-03-31
Attending: EMERGENCY MEDICINE | Admitting: HOSPITALIST
Payer: MEDICARE

## 2025-03-26 ENCOUNTER — APPOINTMENT (EMERGENCY)
Dept: CT IMAGING | Facility: HOSPITAL | Age: 70
DRG: 871 | End: 2025-03-26
Payer: MEDICARE

## 2025-03-26 DIAGNOSIS — I63.9 CEREBROVASCULAR ACCIDENT (CVA), UNSPECIFIED MECHANISM (HCC): Primary | ICD-10-CM

## 2025-03-26 DIAGNOSIS — R29.90 STROKE-LIKE SYMPTOMS: ICD-10-CM

## 2025-03-26 DIAGNOSIS — J10.1 INFLUENZA A: ICD-10-CM

## 2025-03-26 DIAGNOSIS — R41.82 ALTERED MENTAL STATUS: ICD-10-CM

## 2025-03-26 DIAGNOSIS — A41.9 SEPSIS (HCC): ICD-10-CM

## 2025-03-26 PROBLEM — E87.1 HYPONATREMIA: Status: ACTIVE | Noted: 2025-03-26

## 2025-03-26 PROBLEM — N30.00 ACUTE CYSTITIS WITHOUT HEMATURIA: Status: ACTIVE | Noted: 2025-03-26

## 2025-03-26 PROBLEM — G92.8 TOXIC METABOLIC ENCEPHALOPATHY: Status: ACTIVE | Noted: 2019-03-15

## 2025-03-26 LAB
2HR DELTA HS TROPONIN: 4 NG/L
4HR DELTA HS TROPONIN: 6 NG/L
ANION GAP SERPL CALCULATED.3IONS-SCNC: 9 MMOL/L (ref 4–13)
APTT PPP: 39 SECONDS (ref 23–34)
ATRIAL RATE: 97 BPM
BACTERIA UR QL AUTO: ABNORMAL /HPF
BILIRUB UR QL STRIP: NEGATIVE
BUN SERPL-MCNC: 20 MG/DL (ref 5–25)
CALCIUM SERPL-MCNC: 8.7 MG/DL (ref 8.4–10.2)
CARDIAC TROPONIN I PNL SERPL HS: 10 NG/L (ref ?–50)
CARDIAC TROPONIN I PNL SERPL HS: 14 NG/L (ref ?–50)
CARDIAC TROPONIN I PNL SERPL HS: 16 NG/L (ref ?–50)
CHLORIDE SERPL-SCNC: 97 MMOL/L (ref 96–108)
CLARITY UR: CLEAR
CO2 SERPL-SCNC: 26 MMOL/L (ref 21–32)
COLOR UR: YELLOW
CREAT SERPL-MCNC: 0.94 MG/DL (ref 0.6–1.3)
ERYTHROCYTE [DISTWIDTH] IN BLOOD BY AUTOMATED COUNT: 12.7 % (ref 11.6–15.1)
FLUAV AG UPPER RESP QL IA.RAPID: POSITIVE
FLUBV AG UPPER RESP QL IA.RAPID: NEGATIVE
GFR SERPL CREATININE-BSD FRML MDRD: 62 ML/MIN/1.73SQ M
GLUCOSE SERPL-MCNC: 132 MG/DL (ref 65–140)
GLUCOSE SERPL-MCNC: 195 MG/DL (ref 65–140)
GLUCOSE SERPL-MCNC: 206 MG/DL (ref 65–140)
GLUCOSE UR STRIP-MCNC: ABNORMAL MG/DL
HCT VFR BLD AUTO: 35.1 % (ref 34.8–46.1)
HGB BLD-MCNC: 11.2 G/DL (ref 11.5–15.4)
HGB UR QL STRIP.AUTO: ABNORMAL
INR PPP: 2.63 (ref 0.85–1.19)
KETONES UR STRIP-MCNC: ABNORMAL MG/DL
LACTATE SERPL-SCNC: 1.9 MMOL/L (ref 0.5–2)
LEUKOCYTE ESTERASE UR QL STRIP: NEGATIVE
MCH RBC QN AUTO: 30 PG (ref 26.8–34.3)
MCHC RBC AUTO-ENTMCNC: 31.9 G/DL (ref 31.4–37.4)
MCV RBC AUTO: 94 FL (ref 82–98)
NITRITE UR QL STRIP: NEGATIVE
NON-SQ EPI CELLS URNS QL MICRO: ABNORMAL /HPF
OTHER STN SPEC: ABNORMAL
P AXIS: 63 DEGREES
PH UR STRIP.AUTO: 7 [PH]
PLATELET # BLD AUTO: 194 THOUSANDS/UL (ref 149–390)
PMV BLD AUTO: 11.2 FL (ref 8.9–12.7)
POTASSIUM SERPL-SCNC: 4.2 MMOL/L (ref 3.5–5.3)
PR INTERVAL: 176 MS
PROCALCITONIN SERPL-MCNC: <0.05 NG/ML
PROT UR STRIP-MCNC: ABNORMAL MG/DL
PROTHROMBIN TIME: 28.4 SECONDS (ref 12.3–15)
QRS AXIS: 45 DEGREES
QRSD INTERVAL: 64 MS
QT INTERVAL: 350 MS
QTC INTERVAL: 444 MS
RBC # BLD AUTO: 3.73 MILLION/UL (ref 3.81–5.12)
RBC #/AREA URNS AUTO: ABNORMAL /HPF
SARS-COV+SARS-COV-2 AG RESP QL IA.RAPID: NEGATIVE
SODIUM SERPL-SCNC: 132 MMOL/L (ref 135–147)
SP GR UR STRIP.AUTO: 1.01
T WAVE AXIS: 36 DEGREES
UROBILINOGEN UR QL STRIP.AUTO: 0.2 E.U./DL
VENTRICULAR RATE: 97 BPM
WBC # BLD AUTO: 4.8 THOUSAND/UL (ref 4.31–10.16)
WBC #/AREA URNS AUTO: ABNORMAL /HPF

## 2025-03-26 PROCEDURE — 93010 ELECTROCARDIOGRAM REPORT: CPT | Performed by: INTERNAL MEDICINE

## 2025-03-26 PROCEDURE — 85730 THROMBOPLASTIN TIME PARTIAL: CPT | Performed by: EMERGENCY MEDICINE

## 2025-03-26 PROCEDURE — 80048 BASIC METABOLIC PNL TOTAL CA: CPT | Performed by: EMERGENCY MEDICINE

## 2025-03-26 PROCEDURE — 84145 PROCALCITONIN (PCT): CPT | Performed by: EMERGENCY MEDICINE

## 2025-03-26 PROCEDURE — 70498 CT ANGIOGRAPHY NECK: CPT

## 2025-03-26 PROCEDURE — 87804 INFLUENZA ASSAY W/OPTIC: CPT | Performed by: EMERGENCY MEDICINE

## 2025-03-26 PROCEDURE — 87040 BLOOD CULTURE FOR BACTERIA: CPT | Performed by: EMERGENCY MEDICINE

## 2025-03-26 PROCEDURE — 85610 PROTHROMBIN TIME: CPT | Performed by: EMERGENCY MEDICINE

## 2025-03-26 PROCEDURE — 82948 REAGENT STRIP/BLOOD GLUCOSE: CPT

## 2025-03-26 PROCEDURE — 99285 EMERGENCY DEPT VISIT HI MDM: CPT

## 2025-03-26 PROCEDURE — 93005 ELECTROCARDIOGRAM TRACING: CPT

## 2025-03-26 PROCEDURE — 81001 URINALYSIS AUTO W/SCOPE: CPT | Performed by: EMERGENCY MEDICINE

## 2025-03-26 PROCEDURE — 96367 TX/PROPH/DG ADDL SEQ IV INF: CPT

## 2025-03-26 PROCEDURE — 71045 X-RAY EXAM CHEST 1 VIEW: CPT

## 2025-03-26 PROCEDURE — 99223 1ST HOSP IP/OBS HIGH 75: CPT

## 2025-03-26 PROCEDURE — 83605 ASSAY OF LACTIC ACID: CPT | Performed by: EMERGENCY MEDICINE

## 2025-03-26 PROCEDURE — 99291 CRITICAL CARE FIRST HOUR: CPT | Performed by: EMERGENCY MEDICINE

## 2025-03-26 PROCEDURE — 96365 THER/PROPH/DIAG IV INF INIT: CPT

## 2025-03-26 PROCEDURE — 36415 COLL VENOUS BLD VENIPUNCTURE: CPT | Performed by: EMERGENCY MEDICINE

## 2025-03-26 PROCEDURE — 83036 HEMOGLOBIN GLYCOSYLATED A1C: CPT

## 2025-03-26 PROCEDURE — 87811 SARS-COV-2 COVID19 W/OPTIC: CPT | Performed by: EMERGENCY MEDICINE

## 2025-03-26 PROCEDURE — 70496 CT ANGIOGRAPHY HEAD: CPT

## 2025-03-26 PROCEDURE — 84484 ASSAY OF TROPONIN QUANT: CPT | Performed by: EMERGENCY MEDICINE

## 2025-03-26 PROCEDURE — 85027 COMPLETE CBC AUTOMATED: CPT | Performed by: EMERGENCY MEDICINE

## 2025-03-26 PROCEDURE — 99291 CRITICAL CARE FIRST HOUR: CPT | Performed by: STUDENT IN AN ORGANIZED HEALTH CARE EDUCATION/TRAINING PROGRAM

## 2025-03-26 RX ORDER — ACETAMINOPHEN 10 MG/ML
1000 INJECTION, SOLUTION INTRAVENOUS ONCE
Status: COMPLETED | OUTPATIENT
Start: 2025-03-26 | End: 2025-03-26

## 2025-03-26 RX ORDER — SODIUM CHLORIDE, SODIUM GLUCONATE, SODIUM ACETATE, POTASSIUM CHLORIDE, MAGNESIUM CHLORIDE, SODIUM PHOSPHATE, DIBASIC, AND POTASSIUM PHOSPHATE .53; .5; .37; .037; .03; .012; .00082 G/100ML; G/100ML; G/100ML; G/100ML; G/100ML; G/100ML; G/100ML
1000 INJECTION, SOLUTION INTRAVENOUS ONCE
Status: COMPLETED | OUTPATIENT
Start: 2025-03-26 | End: 2025-03-26

## 2025-03-26 RX ORDER — METOPROLOL TARTRATE 50 MG
50 TABLET ORAL EVERY 12 HOURS
Status: DISCONTINUED | OUTPATIENT
Start: 2025-03-26 | End: 2025-03-31 | Stop reason: HOSPADM

## 2025-03-26 RX ORDER — ACETAMINOPHEN 325 MG/1
650 TABLET ORAL EVERY 4 HOURS PRN
Status: DISCONTINUED | OUTPATIENT
Start: 2025-03-26 | End: 2025-03-31 | Stop reason: HOSPADM

## 2025-03-26 RX ORDER — OXYBUTYNIN CHLORIDE 5 MG/1
10 TABLET, EXTENDED RELEASE ORAL DAILY
Status: DISCONTINUED | OUTPATIENT
Start: 2025-03-27 | End: 2025-03-31 | Stop reason: HOSPADM

## 2025-03-26 RX ORDER — ATORVASTATIN CALCIUM 80 MG/1
80 TABLET, FILM COATED ORAL
Status: DISCONTINUED | OUTPATIENT
Start: 2025-03-27 | End: 2025-03-31 | Stop reason: HOSPADM

## 2025-03-26 RX ORDER — ACETAMINOPHEN 325 MG/1
650 TABLET ORAL ONCE
Status: DISCONTINUED | OUTPATIENT
Start: 2025-03-26 | End: 2025-03-26

## 2025-03-26 RX ORDER — FAMOTIDINE 20 MG/1
40 TABLET, FILM COATED ORAL DAILY
Status: DISCONTINUED | OUTPATIENT
Start: 2025-03-27 | End: 2025-03-31 | Stop reason: HOSPADM

## 2025-03-26 RX ORDER — LISINOPRIL 5 MG/1
5 TABLET ORAL DAILY
Status: DISCONTINUED | OUTPATIENT
Start: 2025-03-27 | End: 2025-03-31 | Stop reason: HOSPADM

## 2025-03-26 RX ORDER — INSULIN LISPRO 100 [IU]/ML
1-6 INJECTION, SOLUTION INTRAVENOUS; SUBCUTANEOUS
Status: DISCONTINUED | OUTPATIENT
Start: 2025-03-27 | End: 2025-03-31 | Stop reason: HOSPADM

## 2025-03-26 RX ORDER — INSULIN LISPRO 100 [IU]/ML
1-5 INJECTION, SOLUTION INTRAVENOUS; SUBCUTANEOUS
Status: DISCONTINUED | OUTPATIENT
Start: 2025-03-26 | End: 2025-03-31 | Stop reason: HOSPADM

## 2025-03-26 RX ORDER — ASPIRIN 81 MG/1
81 TABLET, CHEWABLE ORAL ONCE
Status: COMPLETED | OUTPATIENT
Start: 2025-03-26 | End: 2025-03-26

## 2025-03-26 RX ORDER — ATROPINE SULFATE 10 MG/ML
1 SOLUTION/ DROPS OPHTHALMIC 3 TIMES DAILY
Status: DISCONTINUED | OUTPATIENT
Start: 2025-03-26 | End: 2025-03-31 | Stop reason: HOSPADM

## 2025-03-26 RX ORDER — ONDANSETRON 2 MG/ML
4 INJECTION INTRAMUSCULAR; INTRAVENOUS EVERY 6 HOURS PRN
Status: DISCONTINUED | OUTPATIENT
Start: 2025-03-26 | End: 2025-03-31 | Stop reason: HOSPADM

## 2025-03-26 RX ORDER — OSELTAMIVIR PHOSPHATE 75 MG/1
75 CAPSULE ORAL EVERY 12 HOURS SCHEDULED
Status: DISCONTINUED | OUTPATIENT
Start: 2025-03-26 | End: 2025-03-31

## 2025-03-26 RX ADMIN — IOHEXOL 85 ML: 350 INJECTION, SOLUTION INTRAVENOUS at 16:34

## 2025-03-26 RX ADMIN — OSELTAMAVIR PHOSPHATE 75 MG: 75 CAPSULE ORAL at 21:49

## 2025-03-26 RX ADMIN — ACETAMINOPHEN 1000 MG: 10 INJECTION INTRAVENOUS at 17:29

## 2025-03-26 RX ADMIN — SODIUM CHLORIDE, SODIUM GLUCONATE, SODIUM ACETATE, POTASSIUM CHLORIDE, MAGNESIUM CHLORIDE, SODIUM PHOSPHATE, DIBASIC, AND POTASSIUM PHOSPHATE 1000 ML: .53; .5; .37; .037; .03; .012; .00082 INJECTION, SOLUTION INTRAVENOUS at 21:48

## 2025-03-26 RX ADMIN — SODIUM CHLORIDE, SODIUM GLUCONATE, SODIUM ACETATE, POTASSIUM CHLORIDE, MAGNESIUM CHLORIDE, SODIUM PHOSPHATE, DIBASIC, AND POTASSIUM PHOSPHATE 1000 ML: .53; .5; .37; .037; .03; .012; .00082 INJECTION, SOLUTION INTRAVENOUS at 21:13

## 2025-03-26 RX ADMIN — ASPIRIN 81 MG: 81 TABLET, CHEWABLE ORAL at 23:51

## 2025-03-26 RX ADMIN — CEFEPIME 2000 MG: 2 INJECTION, POWDER, FOR SOLUTION INTRAVENOUS at 18:03

## 2025-03-26 RX ADMIN — METOPROLOL TARTRATE 50 MG: 50 TABLET, FILM COATED ORAL at 23:50

## 2025-03-26 RX ADMIN — ATROPINE SULFATE 1 DROP: 10 SOLUTION/ DROPS OPHTHALMIC at 23:51

## 2025-03-26 RX ADMIN — SODIUM CHLORIDE, SODIUM GLUCONATE, SODIUM ACETATE, POTASSIUM CHLORIDE, MAGNESIUM CHLORIDE, SODIUM PHOSPHATE, DIBASIC, AND POTASSIUM PHOSPHATE 1000 ML: .53; .5; .37; .037; .03; .012; .00082 INJECTION, SOLUTION INTRAVENOUS at 20:39

## 2025-03-26 NOTE — ASSESSMENT & PLAN NOTE
Presents with altered mental status for which she was a stroke alert.   CT/CTA without acute findings.  Found to be septic with + FLU A/UTI/yeast infection.  Neurology aware and rec's stroke pathway with neuro consult.  MRI ordered   ECHO  Permissive hypertension  PT/OT/ST  Continue ASA/statin/DOAC  Lipid panel/TSH/Hgb A1C ordered   Neurology consult; recommendations appreciated

## 2025-03-26 NOTE — ASSESSMENT & PLAN NOTE
Veronica Reese is a 69 y.o. left handed female with prior pontine stroke in 2022(with left sided symptoms), Afib on xarelto, DM, HLD, HTN who presents as stroke alert after being found slumped over a walker. Patient appear to have fever 101.8, chills, cough, N/V which might be from infection. NIHSS 4 but some of her symptoms appear to be chronic from prior stroke. Patient is compliant with her medications at home. CTH without acute bleed or large territorial infarct. Concern for hyperdense basilar. CTA without LVO, noted stable left P1/P2 stenosis. Imaging reviewed on PACS and discussed with radiologist. No TNK candidate due to xarelto use and OOW, Ddx: TME from acute infection, recrudescence, acute ischemic stroke.     - Continue home Xarelto  - Medical management and correction of metabolic and infectious disturbances per primary team   - Can proceed with stroke pathway if symptoms not improve after treatment of infection  - SBP goal 120-180  - Continue  Xarelto and statin   - Vascular risk factor management, A1c goal <6.5, LDL goal <70  - PT/OT/ST

## 2025-03-26 NOTE — ED PROVIDER NOTES
Time reflects when diagnosis was documented in both MDM as applicable and the Disposition within this note       Time User Action Codes Description Comment    3/26/2025  4:10 PM Leif Foreman Add [I63.9] Cerebrovascular accident (CVA), unspecified mechanism (HCC)           ED Disposition       None          Assessment & Plan   {Hyperlinks  Risk Stratification - NIHSS - HEART SCORE - Fill out sepsis note and make sure you call [id number] if severe or septic shock:[id number]}    Medical Decision Making      MDM/DDx: Altered mental status with confusion and weakness - acute stroke, acute infection such as UTI or pneumonia, sepsis, DKA, ARF.     I independently reviewed and interpreted ordered labs from this encounter.    A/P: Will check CTA head/neck, septic w/u, consult Neurology, consider tPA, reeval for deterioration, admit.    Amount and/or Complexity of Data Reviewed  Labs: ordered. Decision-making details documented in ED Course.  Radiology: ordered and independent interpretation performed. Decision-making details documented in ED Course.  ECG/medicine tests: ordered and independent interpretation performed. Decision-making details documented in ED Course.    Risk  OTC drugs.  Prescription drug management.        ED Course as of 03/26/25 1643   Wed Mar 26, 2025   1623 POC Glucose(!): 206  Less likely DKA.   1623 Med rec reveals patient is taking Xarelto.       Medications   acetaminophen (TYLENOL) tablet 650 mg (has no administration in time range)   iohexol (OMNIPAQUE) 350 MG/ML injection (MULTI-DOSE) 85 mL (85 mL Intravenous Given 3/26/25 1634)       ED Risk Strat Scores         Stroke Assessment       Row Name 03/26/25 1626             NIH Stroke Scale    Interval Baseline      Level of Consciousness (1a.) 0      LOC Questions (1b.) 1      LOC Commands (1c.) 0      Best Gaze (2.) 0      Visual (3.) 0      Facial Palsy (4.) 0      Motor Arm, Left (5a.) 0      Motor Arm, Right (5b.) 0      Motor Leg, Left (6a.) 0       Motor Leg, Right (6b.) 0      Limb Ataxia (7.) 2      Sensory (8.) 0      Best Language (9.) 0      Dysarthria (10.) 0      Extinction and Inattention (11.) (Formerly Neglect) 0      Total 3                                                          History of Present Illness   {Hyperlinks  History (Med, Surg, Fam, Social) - Current Medications - Allergies  :[id number]}    Chief Complaint   Patient presents with   • Altered Mental Status     According to the patient EMS, the patient had been more altered since 848.  Patient had a fall around 848 today and refused ems.       Past Medical History:   Diagnosis Date   • Abdominal fibromatosis    • Arthritis    • Depression    • GERD (gastroesophageal reflux disease)     controlled   • Hyperlipemia    • Hypertension    • Obesity    • Pneumonia    • Right pontine stroke (HCC) 12/13/2021   • Stroke (cerebrum) (HCC) 12/17/2021   • Stroke (HCC)       Past Surgical History:   Procedure Laterality Date   • APPENDECTOMY     • APPENDECTOMY LAPAROSCOPIC N/A 06/12/2022    Procedure: APPENDECTOMY LAPAROSCOPIC;  Surgeon: Dexter Rendon MD;  Location: [facility];  Service: General   • CATARACT EXTRACTION     • CATARACT EXTRACTION, BILATERAL     • COLONOSCOPY     • EGD     • LAPAROTOMY      Exploratory; Last Assessed 10/17/2017   • PEG TUBE PLACEMENT     • PEG TUBE REMOVAL        Family History   Problem Relation Age of Onset   • No Known Problems Mother    • No Known Problems Father       Social History     Tobacco Use   • Smoking status: Never   • Smokeless tobacco: Never   Vaping Use   • Vaping status: Never Used   Substance Use Topics   • Alcohol use: Never     Comment: Socially   • Drug use: Never      E-Cigarette/Vaping   • E-Cigarette Use Never User       E-Cigarette/Vaping Substances   • Nicotine No    • THC No    • CBD No    • Flavoring No    • Other No    • Unknown No       I have reviewed and agree with the history as documented.     69-year-old female presents via EMS  for evaluation of altered mental status, urinary incontinence and a possible fall.  EMS was called today when her visiting nurse arrived and found her slumped on her walker, unable to figure out how to move further.  She is incontinent of urine and the nurse/EMS states that the apartment smelled a foul urine.  Patient does have a history of pontine stroke and states that she has some ambulatory difficulty due to that but also feels that she is moving differently than baseline now.  She is confused in the ED and is unable to clearly state where she is or why she is here.  She intermittently knows her birthday but is unable to state how old she is.  She does not know what the date is.  She reports falling this morning but is unable to state where she fell, what she struck or even really when it happened.  EMS was called at some point this morning but found her awake, alert and oriented without alteration.  She refused transportation at that point.  It is unclear to me why EMS was called or by whom.  During the second EMS evaluation, her prehospital blood sugar was reported to be 320 mg/dL.  She has been seen in this facility before for UTI as well as the pontine stroke.  At this point, a stroke alert was called due to her altered mental status with a last known well time of 0900 today.  Case discussed with Dr. Huggins.  Will await imaging and follow care.    ROS: Denies associated f/c, HA, LH/dizziness, diaphoresis, CP, SOB, abdominal pain, n/v/d or dysuria.  She does have a persistent cough.  12 system ROS o/w unobtainable due to altered mental status.          History provided by:  Patient, medical records and EMS personnel  History limited by:  Mental status change  Altered Mental Status  Presenting symptoms: confusion    Severity:  Moderate  Most recent episode:  Today  Episode history:  Unable to specify  Duration: Unable to specify.  Timing:  Unable to specify  Progression:  Unable to specify  Context: not dementia  and not nursing home resident  Head injury: Unknown.  Associated symptoms: bladder incontinence, fever and weakness    Associated symptoms: no abdominal pain, no difficulty breathing, no eye deviation, no headaches, no light-headedness, no nausea, no palpitations, no rash, no slurred speech and no vomiting        Review of Systems   Unable to perform ROS: Mental status change   Constitutional:  Positive for fever.   HENT:  Negative for sore throat and trouble swallowing.    Respiratory:  Positive for cough. Negative for shortness of breath.    Cardiovascular:  Negative for chest pain, palpitations and leg swelling.   Gastrointestinal:  Negative for abdominal distention, abdominal pain, constipation, diarrhea, nausea and vomiting.   Genitourinary:  Positive for bladder incontinence. Negative for difficulty urinating, dysuria, flank pain and hematuria.   Skin:  Negative for pallor and rash.   Neurological:  Positive for weakness. Negative for dizziness, light-headedness and headaches.   Psychiatric/Behavioral:  Positive for confusion.            Objective   {Hyperlinks  Historical Vitals - Historical Labs - Chart Review/Microbiology - Last Echo - Code Status  :[id number]}    ED Triage Vitals [03/26/25 1615]   Temperature Pulse Blood Pressure Respirations SpO2 Patient Position - Orthostatic VS   (!) 101.4 °F (38.6 °C) 98 159/75 20 -- Lying      Temp Source Heart Rate Source BP Location FiO2 (%) Pain Score    Temporal Monitor Right arm -- --      Vitals      Date and Time Temp Pulse SpO2 Resp BP Pain Score FACES Pain Rating User   03/26/25 1615 101.4 °F (38.6 °C) 98 -- 20 159/75 [place] [facility] [place] [facility] [place]am  Vitals reviewed.   Constitutional:       General: She is in acute distress (Mild).      Appearance: She is well-developed. She is ill-appearing (moderately). She is not toxic-appearing or diaphoretic.   HENT:      Head: Normocephalic and atraumatic.      Right Ear: External ear normal.      Left Ear: External  ear normal.      Nose: Nose normal.      Mouth/Throat:      Mouth: Mucous membranes are moist.      Pharynx: Oropharynx is clear. No oropharyngeal exudate.   Eyes:      General: No scleral icterus.     Conjunctiva/sclera: Conjunctivae normal.      Pupils: Pupils are equal, round, and reactive to light.   Cardiovascular:      Rate and Rhythm: Normal rate and regular rhythm.      Heart sounds: No murmur heard.  Pulmonary:      Effort: Pulmonary effort is normal. No respiratory distress.      Breath sounds: Normal breath sounds.      Comments: Non-productive cough   Chest:      Chest wall: No tenderness.   Abdominal:      General: Bowel sounds are normal. There is no distension.      Palpations: Abdomen is soft.      Tenderness: There is no abdominal tenderness.   Musculoskeletal:         General: No tenderness. Normal range of motion.      Cervical back: Normal range of motion and neck supple.      Right lower leg: No edema.      Left lower leg: No edema.   Lymphadenopathy:      Cervical: No cervical adenopathy.   Skin:     General: Skin is warm and dry.      Coloration: Skin is not pale.      Findings: Bruising (UEs) present. No erythema or rash.   Neurological:      General: No focal deficit present.      Mental Status: She is alert and oriented to person, place, and time.      Motor: No abnormal muscle tone.      Deep Tendon Reflexes: Reflexes are normal and symmetric.      Comments: Yawning     Psychiatric:         Behavior: Behavior normal.         Thought Content: Thought content normal.         Results Reviewed       Procedure Component Value Units Date/Time    Basic metabolic panel [[id number]] Collected: 03/26/25 1642    Lab Status: No result Specimen: Blood from Arm, Left     CBC and Platelet [[id number]] Collected: 03/26/25 1642    Lab Status: No result Specimen: Blood from Arm, Left     Protime-INR [[id number]] Collected: 03/26/25 1642    Lab Status: No result Specimen: Blood from Arm, Left     APTT  [[id number]] Collected: 03/26/25 1642    Lab Status: No result Specimen: Blood from Arm, Left     HS Troponin 0hr (reflex protocol) [[id number]] Collected: 03/26/25 1642    Lab Status: No result Specimen: Blood from Arm, Left     Fingerstick Glucose (POCT) [[id number]]  (Abnormal) Collected: 03/26/25 1610    Lab Status: Final result Specimen: Blood Updated: 03/26/25 1611     POC Glucose 206 mg/dl             XR chest portable    by Alisson Gibson (03/26 1635)      CT stroke alert brain   Final Interpretation by Chuck Ozuna MD (03/26 1623)   1. No  hemorrhage or CT evidence of an acute cortical infarct.   2. Possible hyperdense basilar artery. Attention on CTA head/neck.      Findings were discussed with Dr. Huggins on 3/26/25 at 4:18 PM via [employer] secure chat.      .      Workstation performed: [id number]         CTA stroke alert (head/neck)    (Results Pending)       Procedures    ED Medication and Procedure Management   Prior to Admission Medications   Prescriptions Last Dose Informant Patient Reported? Taking?   Accu-Chek FastClix Lancets MISC  Self No No   Sig: Use 2 (two) times a day   Alcohol Swabs (Pharmacist Choice Alcohol) PADS  Self Yes No   Aspirin 81 MG CAPS  Self Yes No   Sig: Take 1 tablet by mouth in the morning   Assure ID Safety Pen Needles 30G X 8 MM MISC  Self No No   Sig: USE DAILY   B-D UF III MINI PEN NEEDLES 31G X 5 MM MISC  Self No No   Sig: USE 4 TIMES DAILY   Blood Glucose Monitoring Suppl (Accu-Chek Guide) w/Device KIT  Self No No   Sig: Use 2 (two) times a day   Blood Glucose Monitoring Suppl (ONE TOUCH ULTRA 2) w/Device KIT  Self No No   Sig: USE AS DIRECTED   Continuous Blood Gluc  (FreeStyle Naveen 2 Harrison) SHERWIN  Self No No   Sig: DISPENSE 1 READER.   Continuous Blood Gluc Sensor (FreeStyle Naveen 2 Sensor) MISC  Self No No   Sig: Use 1 sensor every 14 days for continuous glucose monitoring.   Lancet Devices (Lancet Device with Ejector) MISC  Self No No   Sig: USE TO CHECK BLOOD  SUGAR TWICE A DAY   Lancets Misc. (Accu-Chek Softclix Lancet Dev) KIT  Self No No   Sig: Check blood sugar   Myrbetriq 50 MG TB24  Self No No   Sig: TAKE 1 TABLET (50 MG TOTAL) BY MOUTH IN THE MORNING   OneTouch Ultra test strip  Self Yes No   Xarelto 20 MG tablet  Self No No   Sig: TAKE ONE TABLET BY MOUTH ONCE DAILY WITH BREAKFAST   atorvastatin (LIPITOR) 80 mg tablet  Self No No   Sig: TAKE ONE TABLET BY MOUTH ONCE DAILY WITH DINNER   atropine (ISOPTO ATROPINE) 1 % ophthalmic solution   No No   Si-3 drops sublingual three times daily as needed for drooling/excessive saliva   azelastine (ASTELIN) 0.1 % nasal spray   No No   Si spray into each nostril 2 (two) times a day   famotidine (PEPCID) 40 MG tablet   No No   Sig: Take 1 tablet (40 mg total) by mouth daily   linaGLIPtin (Tradjenta) 5 MG TABS  Self No No   Sig: Take 5 mg by mouth daily   lisinopril (ZESTRIL) 5 mg tablet  Self No No   Sig: TAKE ONE TABLET BY MOUTH EVERY MORNING   metFORMIN (GLUCOPHAGE-XR) 500 mg 24 hr tablet  Self No No   Sig: TAKE 1 TABLET (500 MG TOTAL) BY MOUTH 2 (TWO) TIMES A DAY WITH MEALS   metoprolol tartrate (LOPRESSOR) 50 mg tablet  Self No No   Sig: TAKE ONE TABLET BY MOUTH EVERY 12 HOURS   ondansetron (ZOFRAN-ODT) 4 mg disintegrating tablet  Self No No   Sig: Take 1 tablet (4 mg total) by mouth every 8 (eight) hours as needed for nausea or vomiting   pantoprazole (PROTONIX) 40 mg tablet  Self No No   Sig: TAKE ONE TABLET BY MOUTH DAILY   repaglinide (PRANDIN) 2 mg tablet  Self No No   Sig: TAKE ONE TABLET BY MOUTH TWICE DAILY BEFORE MEALS      Facility-Administered Medications: None     Patient's Medications   Discharge Prescriptions    No medications on file     No discharge procedures on file.  ED SEPSIS DOCUMENTATION   Time reflects when diagnosis was documented in both MDM as applicable and the Disposition within this note       Time User Action Codes Description Comment    3/26/2025  4:10 PM Leif Foreman Add [I63.9]  Cerebrovascular accident (CVA), unspecified mechanism (HCC)

## 2025-03-26 NOTE — ASSESSMENT & PLAN NOTE
Lab Results   Component Value Date    SODIUM 132 (L) 03/26/2025    SODIUM 134 (L) 05/30/2024    SODIUM 133 (L) 12/05/2023    As evidenced by value of 132   Appears to be euvolemic on exam  IVF  Monitor with morning labs

## 2025-03-26 NOTE — ASSESSMENT & PLAN NOTE
SIRS: 3/4; temperature, tachycardia, tachypnea  Sepsis: SIRS with suspected source UTI  WBC of 4.80  Lactic of 1.9; Procalcitonin <0.05.  IV Fluids at 30 cc/kg  Blood cultures, UA with reflex to culture  IV antibiotics; cefepime initially given in the ED; will continue; follow up with cultures and deescalate as appropriate

## 2025-03-26 NOTE — ED PROVIDER NOTES
Time reflects when diagnosis was documented in both MDM as applicable and the Disposition within this note       Time User Action Codes Description Comment    3/26/2025  4:10 PM Leif Foreman [I63.9] Cerebrovascular accident (CVA), unspecified mechanism (HCC)     3/26/2025  5:54 PM KellenLeif [R41.82] Altered mental status     3/26/2025  5:54 PM Leif Foreman Add [A41.9] Sepsis (HCC)     3/26/2025  6:04 PM Leif Foreman [J10.1] Influenza A           ED Disposition       ED Disposition   Admit    Condition   Stable    Date/Time   Wed Mar 26, 2025  6:32 PM    Comment   Case was discussed with Dr Arriola and the patient's admission status was agreed to be Admission Status: inpatient status to the service of Dr. Arriola  .               Assessment & Plan       Medical Decision Making      MDM/DDx: Altered mental status with confusion and weakness - acute stroke, acute infection such as UTI or pneumonia, sepsis, DKA, ARF.     I independently reviewed and interpreted ordered labs from this encounter.    A/P: Will check CTA head/neck, septic w/u, consult Neurology, consider tPA, reeval for deterioration, admit.    Amount and/or Complexity of Data Reviewed  Labs: ordered. Decision-making details documented in ED Course.  Radiology: ordered and independent interpretation performed. Decision-making details documented in ED Course.  ECG/medicine tests: ordered and independent interpretation performed. Decision-making details documented in ED Course.    Risk  OTC drugs.  Prescription drug management.  Decision regarding hospitalization.    Critical Care Time Statement: Upon my evaluation, this patient had a high probability of imminent or life-threatening deterioration due to altered mental status due to sepsis and possible stroke, which required my direct attention, intervention, and personal management.  I spent a total of 43 minutes directly providing critical care services, including interpretation of complex  medical databases, evaluating for the presence of life-threatening injuries or illnesses, management of organ system failure(s) , complex medical decision making (to support/prevent further life-threatening deterioration)., and interpretation of hemodynamic data. This time is exclusive of procedures, teaching, treating other patients, family meetings, and any prior time recorded by providers other than myself.       ED Course as of 03/26/25 1833   Wed Mar 26, 2025   1623 POC Glucose(!): 206  Less likely DKA.   1623 Med rec reveals patient is taking Xarelto.   1719 Patient failed her dysphagia screen - will change APAP from PO to IV.       Medications   iohexol (OMNIPAQUE) 350 MG/ML injection (MULTI-DOSE) 85 mL (85 mL Intravenous Given 3/26/25 1634)   acetaminophen (Ofirmev) injection 1,000 mg (0 mg Intravenous Stopped 3/26/25 1759)   cefepime (MAXIPIME) 2 g/50 mL dextrose IVPB (2,000 mg Intravenous New Bag 3/26/25 1803)       ED Risk Strat Scores         Stroke Assessment       Row Name 03/26/25 1626             NIH Stroke Scale    Interval Baseline      Level of Consciousness (1a.) 0      LOC Questions (1b.) 1      LOC Commands (1c.) 0      Best Gaze (2.) 0      Visual (3.) 0      Facial Palsy (4.) 0      Motor Arm, Left (5a.) 0      Motor Arm, Right (5b.) 0      Motor Leg, Left (6a.) 0      Motor Leg, Right (6b.) 0      Limb Ataxia (7.) 2      Sensory (8.) 0      Best Language (9.) 0      Dysarthria (10.) 0      Extinction and Inattention (11.) (Formerly Neglect) 0      Total 3                                      SBIRT 22yo+      Flowsheet Row Most Recent Value   Initial Alcohol Screen: US AUDIT-C     1. How often do you have a drink containing alcohol? 0 Filed at: 03/26/2025 1654   2. How many drinks containing alcohol do you have on a typical day you are drinking?  0 Filed at: 03/26/2025 1654   3b. FEMALE Any Age, or MALE 65+: How often do you have 4 or more drinks on one occassion? 0 Filed at: 03/26/2025 1651    Audit-C Score 0 Filed at: 03/26/2025 3812   MIAKEL: How many times in the past year have you...    Used an illegal drug or used a prescription medication for non-medical reasons? Never Filed at: 03/26/2025 6986                            History of Present Illness       Chief Complaint   Patient presents with    Altered Mental Status     According to the patient EMS, the patient had been more altered since 848.  Patient had a fall around 848 today and refused ems.       Past Medical History:   Diagnosis Date    Abdominal fibromatosis     Arthritis     Depression     GERD (gastroesophageal reflux disease)     controlled    Hyperlipemia     Hypertension     Obesity     Pneumonia     Right pontine stroke (HCC) 12/13/2021    Stroke (cerebrum) (HCC) 12/17/2021    Stroke (HCC)       Past Surgical History:   Procedure Laterality Date    APPENDECTOMY      APPENDECTOMY LAPAROSCOPIC N/A 06/12/2022    Procedure: APPENDECTOMY LAPAROSCOPIC;  Surgeon: Dexter Rendon MD;  Location: [facility];  Service: General    CATARACT EXTRACTION      CATARACT EXTRACTION, BILATERAL      COLONOSCOPY      EGD      LAPAROTOMY      Exploratory; Last Assessed 10/17/2017    PEG TUBE PLACEMENT      PEG TUBE REMOVAL        Family History   Problem Relation Age of Onset    No Known Problems Mother     No Known Problems Father       Social History     Tobacco Use    Smoking status: Never    Smokeless tobacco: Never   Vaping Use    Vaping status: Never Used   Substance Use Topics    Alcohol use: Never     Comment: Socially    Drug use: Never      E-Cigarette/Vaping    E-Cigarette Use Never User       E-Cigarette/Vaping Substances    Nicotine No     THC No     CBD No     Flavoring No     Other No     Unknown No       I have reviewed and agree with the history as documented.     69-year-old female presents via EMS for evaluation of altered mental status, urinary incontinence and a possible fall.  EMS was called today when her visiting nurse arrived and  found her slumped on her walker, unable to figure out how to move further.  She is incontinent of urine and the nurse/EMS states that the apartment smelled a foul urine.  Patient does have a history of pontine stroke and states that she has some ambulatory difficulty due to that but also feels that she is moving differently than baseline now.  She is confused in the ED and is unable to clearly state where she is or why she is here.  She intermittently knows her birthday but is unable to state how old she is.  She does not know what the date is.  She reports falling this morning but is unable to state where she fell, what she struck or even really when it happened.  EMS was called at some point this morning but found her awake, alert and oriented without alteration.  She refused transportation at that point.  It is unclear to me why EMS was called or by whom.  During the second EMS evaluation, her prehospital blood sugar was reported to be 320 mg/dL.  She has been seen in this facility before for UTI as well as the pontine stroke.  At this point, a stroke alert was called due to her altered mental status with a last known well time of 0900 today.  Case discussed with Dr. Huggins.  Will await imaging and follow care.    ROS: Denies associated f/c, HA, LH/dizziness, diaphoresis, CP, SOB, abdominal pain, n/v/d or dysuria.  She does have a persistent cough.  12 system ROS o/w unobtainable due to altered mental status.          History provided by:  Patient, medical records and EMS personnel  History limited by:  Mental status change  Altered Mental Status  Presenting symptoms: confusion    Severity:  Moderate  Most recent episode:  Today  Episode history:  Unable to specify  Duration: Unable to specify.  Timing:  Unable to specify  Progression:  Unable to specify  Context: not dementia and not nursing home resident  Head injury: Unknown.  Associated symptoms: bladder incontinence, fever and weakness    Associated symptoms:  no abdominal pain, no difficulty breathing, no eye deviation, no headaches, no light-headedness, no nausea, no palpitations, no rash, no slurred speech and no vomiting        Review of Systems   Unable to perform ROS: Mental status change   Constitutional:  Positive for fever.   HENT:  Negative for sore throat and trouble swallowing.    Respiratory:  Positive for cough. Negative for shortness of breath.    Cardiovascular:  Negative for chest pain, palpitations and leg swelling.   Gastrointestinal:  Negative for abdominal distention, abdominal pain, constipation, diarrhea, nausea and vomiting.   Genitourinary:  Positive for bladder incontinence. Negative for difficulty urinating, dysuria, flank pain and hematuria.   Skin:  Negative for pallor and rash.   Neurological:  Positive for weakness. Negative for dizziness, light-headedness and headaches.   Psychiatric/Behavioral:  Positive for confusion.            Objective       ED Triage Vitals   Temperature Pulse Blood Pressure Respirations SpO2 Patient Position - Orthostatic VS   03/26/25 1615 03/26/25 1615 03/26/25 1615 03/26/25 1615 03/26/25 1644 03/26/25 1615   (!) 101.4 °F (38.6 °C) 98 159/75 20 95 % Lying      Temp Source Heart Rate Source BP Location FiO2 (%) Pain Score    03/26/25 1615 03/26/25 1615 03/26/25 1615 -- --    Temporal Monitor Right arm        Vitals      Date and Time Temp Pulse SpO2 Resp BP Pain Score FACES Pain Rating User   03/26/25 1800 100.1 °F (37.8 °C) 86 95 % 16 123/62 -- -- [facility] [place]   03/26/25 1745 -- 88 96 % 21 -- -- -- [facility] [place]   03/26/25 1730 -- 87 -- 17 157/76 -- -- [facility] [place]   03/26/25 1715 -- 88 95 % 26 121/60 -- -- [facility] [place]   03/26/25 1700 -- 93 95 % 34 -- -- -- [facility] [place]   03/26/25 1645 -- 96 96 % 33 159/68 -- -- [facility] [place]   03/26/25 1644 -- 97 95 % 35 159/68 -- -- [facility] [place]   03/26/25 1615 101.4 °F (38.6 °C) 98 -- 20 159/75 -- -- [facility] [place]            Physical Exam  Vitals reviewed.   Constitutional:       General: She is in acute distress (Mild).      Appearance: She is  well-developed. She is ill-appearing (moderately). She is not toxic-appearing or diaphoretic.   HENT:      Head: Normocephalic and atraumatic.      Right Ear: External ear normal.      Left Ear: External ear normal.      Nose: Nose normal.      Mouth/Throat:      Mouth: Mucous membranes are moist.      Pharynx: Oropharynx is clear. No oropharyngeal exudate.   Eyes:      General: No scleral icterus.     Conjunctiva/sclera: Conjunctivae normal.      Pupils: Pupils are equal, round, and reactive to light.   Cardiovascular:      Rate and Rhythm: Normal rate and regular rhythm.      Heart sounds: No murmur heard.  Pulmonary:      Effort: Pulmonary effort is normal. No respiratory distress.      Breath sounds: Normal breath sounds.      Comments: Non-productive cough   Chest:      Chest wall: No tenderness.   Abdominal:      General: Bowel sounds are normal. There is no distension.      Palpations: Abdomen is soft.      Tenderness: There is no abdominal tenderness.   Musculoskeletal:         General: No tenderness. Normal range of motion.      Cervical back: Normal range of motion and neck supple.      Right lower leg: No edema.      Left lower leg: No edema.   Lymphadenopathy:      Cervical: No cervical adenopathy.   Skin:     General: Skin is warm and dry.      Coloration: Skin is not pale.      Findings: Bruising (UEs) present. No erythema or rash.   Neurological:      General: No focal deficit present.      Mental Status: She is alert and oriented to person, place, and time.      Motor: No abnormal muscle tone.      Deep Tendon Reflexes: Reflexes are normal and symmetric.      Comments: Yawning     Psychiatric:         Behavior: Behavior normal.         Thought Content: Thought content normal.         Results Reviewed       Procedure Component Value Units Date/Time    Urine Microscopic [[id number]]  (Abnormal) Collected: 03/26/25 1800    Lab Status: Final result Specimen: Urine, Straight Cath Updated: 03/26/25 2975      RBC, UA 1-2 /hpf      WBC, UA 4-10 /hpf      Epithelial Cells Occasional /hpf      Bacteria, UA None Seen /hpf      OTHER OBSERVATIONS Yeast Cells Present    UA w Reflex to Microscopic w Reflex to Culture [[id number]]  (Abnormal) Collected: 03/26/25 1800    Lab Status: Final result Specimen: Urine, Straight Cath Updated: 03/26/25 1808     Color, UA Yellow     Clarity, UA Clear     Specific Gravity, UA 1.010     pH, UA 7.0     Leukocytes, UA Negative     Nitrite, UA Negative     Protein, UA 2+ mg/dl      Glucose, UA 3+ mg/dl      Ketones, UA Trace mg/dl      Urobilinogen, UA 0.2 E.U./dl      Bilirubin, UA Negative     Occult Blood, UA Trace-Intact    Lactic acid, plasma (w/reflex if result > 2.0) [[id number]]  (Normal) Collected: 03/26/25 1734    Lab Status: Final result Specimen: Blood from Arm, Left Updated: 03/26/25 1759     LACTIC ACID 1.9 mmol/L     Narrative:      Result may be elevated if tourniquet was used during collection.    FLU/COVID Rapid Antigen (30 min. TAT) - Preferred screening test in ED [[id number]]  (Abnormal) Collected: 03/26/25 1734    Lab Status: Final result Specimen: Nares from Nose Updated: 03/26/25 1758     SARS COV Rapid Antigen Negative     Influenza A Rapid Antigen Positive     Influenza B Rapid Antigen Negative    Narrative:      This test has been performed using the Quidel Rupal 2 FLU+SARS Antigen test under the Emergency Use Authorization (EUA). This test has been validated by the  and verified by the performing laboratory. The Rupal uses lateral flow immunofluorescent sandwich assay to detect SARS-COV, Influenza A and Influenza B Antigen.     The Quidel Rupal 2 SARS Antigen test does not differentiate between SARS-CoV and SARS-CoV-2.     Negative results are presumptive and may be confirmed with a molecular assay, if necessary, for patient management. Negative results do not rule out SARS-CoV-2 or influenza infection and should not be used as the sole basis for treatment  or patient management decisions. A negative test result may occur if the level of antigen in a sample is below the limit of detection of this test.     Positive results are indicative of the presence of viral antigens, but do not rule out bacterial infection or co-infection with other viruses.     All test results should be used as an adjunct to clinical observations and other information available to the provider.    FOR PEDIATRIC PATIENTS - copy/paste COVID Guidelines URL to browser: [email or web address]    Blood culture #2 [[id number]] Collected: 03/26/25 1734    Lab Status: In process Specimen: Blood from Arm, Left Updated: 03/26/25 1738    Blood culture #1 [[id number]] Collected: 03/26/25 1734    Lab Status: In process Specimen: Blood from Arm, Left Updated: 03/26/25 1738    Procalcitonin [[id number]]  (Normal) Collected: 03/26/25 1642    Lab Status: Final result Specimen: Blood from Arm, Left Updated: 03/26/25 1730     Procalcitonin <0.05 ng/ml     Protime-INR [[id number]]  (Abnormal) Collected: 03/26/25 1642    Lab Status: Final result Specimen: Blood from Arm, Left Updated: 03/26/25 1715     Protime 28.4 seconds      INR 2.63    Narrative:      INR Therapeutic Range    Indication                                             INR Range      Atrial Fibrillation                                               2.0-3.0  Hypercoagulable State                                    2.0.2.3  Left Ventricular Asist Device                            2.0-3.0  Mechanical Heart Valve                                  -    Aortic(with afib, MI, embolism, HF, LA enlargement,    and/or coagulopathy)                                     2.0-3.0 (2.5-3.5)     Mitral                                                             2.5-3.5  Prosthetic/Bioprosthetic Heart Valve               2.0-3.0  Venous thromboembolism (VTE: VT, PE        2.0-3.0    APTT [[id number]]  (Abnormal) Collected:  03/26/25 1642    Lab Status: Final result Specimen: Blood from Arm, Left Updated: 03/26/25 1715     PTT 39 seconds     HS Troponin 0hr (reflex protocol) [[id number]]  (Normal) Collected: 03/26/25 1642    Lab Status: Final result Specimen: Blood from Arm, Left Updated: 03/26/25 1710     hs TnI 0hr 10 ng/L     HS Troponin I 2hr [[id number]]     Lab Status: No result Specimen: Blood     Basic metabolic panel [[id number]]  (Abnormal) Collected: 03/26/25 1642    Lab Status: Final result Specimen: Blood from Arm, Left Updated: 03/26/25 1701     Sodium 132 mmol/L      Potassium 4.2 mmol/L      Chloride 97 mmol/L      CO2 26 mmol/L      ANION GAP 9 mmol/L      BUN 20 mg/dL      Creatinine 0.94 mg/dL      Glucose 195 mg/dL      Calcium 8.7 mg/dL      eGFR 62 ml/min/1.73sq m     Narrative:      National Kid[employer] Disease Foundation guidelines for Chronic Kidney Disease (CKD):     Stage 1 with normal or high GFR (GFR > 90 mL/min/1.73 square meters)    Stage 2 Mild CKD (GFR = 60-89 mL/min/1.73 square meters)    Stage 3A Moderate CKD (GFR = 45-59 mL/min/1.73 square meters)    Stage 3B Moderate CKD (GFR = 30-44 mL/min/1.73 square meters)    Stage 4 Severe CKD (GFR = 15-29 mL/min/1.73 square meters)    Stage 5 End Stage CKD (GFR <15 mL/min/1.73 square meters)  Note: GFR calculation is accurate only with a steady state creatinine    CBC and Platelet [[id number]]  (Abnormal) Collected: 03/26/25 1642    Lab Status: Final result Specimen: Blood from Arm, Left Updated: 03/26/25 1655     WBC 4.80 Thousand/uL      RBC 3.73 Million/uL      Hemoglobin 11.2 g/dL      Hematocrit 35.1 %      MCV 94 fL      MCH 30.0 pg      MCHC 31.9 g/dL      RDW 12.7 %      Platelets 194 Thousands/uL      MPV 11.2 fL     Fingerstick Glucose (POCT) [[id number]]  (Abnormal) Collected: 03/26/25 1610    Lab Status: Final result Specimen: Blood Updated: 03/26/25 1611     POC Glucose 206 mg/dl             XR chest portable   ED Interpretation by Leif Foreman DO (03/26  1716)   No acute cardiopulmonary pathology.      CTA stroke alert (head/neck)   Final Interpretation by Chuck Ozuna MD (03/26 6113)   1. No intracranial large vessel occlusion or hemodynamically significant stenosis in the neck.   2. Unchanged severe stenosis at the P1/P2 junction of the left PCA.      Findings were discussed with Dr. Huggins on 3/26/25 at 4:36 PM.      Workstation performed: [id number]         CT stroke alert brain   Final Interpretation by Chuck Ozuna MD (03/26 4075)   1. No  hemorrhage or CT evidence of an acute cortical infarct.   2. Possible hyperdense basilar artery. Attention on CTA head/neck.      Findings were discussed with Dr. Huggins on 3/26/25 at 4:18 PM via [employer] secure chat.      .      Workstation performed: [id number]             ECG 12 Lead Documentation Only    Date/Time: 3/26/2025 4:48 PM    Performed by: Leif Foreman DO  Authorized by: Leif Foreman DO    Indications / Diagnosis:  Altered mental status  Patient location:  ED  Interpretation:     Interpretation: normal    Quality:     Tracing quality:  Limited by artifact  Rate:     ECG rate:  97    ECG rate assessment: normal    Rhythm:     Rhythm: sinus rhythm    Ectopy:     Ectopy: none    Conduction:     Conduction: normal    ST segments:     ST segments:  Normal  T waves:     T waves: normal        ED Medication and Procedure Management   Prior to Admission Medications   Prescriptions Last Dose Informant Patient Reported? Taking?   Accu-Chek FastClix Lancets MISC  Self No No   Sig: Use 2 (two) times a day   Alcohol Swabs (Pharmacist Choice Alcohol) PADS  Self Yes No   Aspirin 81 MG CAPS  Self Yes No   Sig: Take 1 tablet by mouth in the morning   Assure ID Safety Pen Needles 30G X 8 MM MISC  Self No No   Sig: USE DAILY   B-D UF III MINI PEN NEEDLES 31G X 5 MM MISC  Self No No   Sig: USE 4 TIMES DAILY   Blood Glucose Monitoring Suppl (Accu-Chek Guide) w/Device KIT  Self No No   Sig: Use 2 (two) times a day   Blood Glucose  Monitoring Suppl (ONE TOUCH ULTRA 2) w/Device KIT  Self No No   Sig: USE AS DIRECTED   Continuous Blood Gluc  (FreeStyle N[place]en 2 [place]) SHERWIN  Self No No   Sig: DISPENSE 1 READER.   Continuous Blood Gluc Sensor (FreeStyle Naveen 2 Sensor) MISC  Self No No   Sig: Use 1 sensor every 14 days for continuous glucose monitoring.   Lancet Devices (Lancet Device with Ejector) MISC  Self No No   Sig: USE TO CHECK BLOOD SUGAR TWICE A DAY   Lancets Misc. (Accu-Chek Softclix Lancet Dev) KIT  Self No No   Sig: Check blood sugar   Myrbetriq 50 MG TB24  Self No No   Sig: TAKE 1 TABLET (50 MG TOTAL) BY MOUTH IN THE MORNING   OneTouch Ultra test strip  Self Yes No   Xarelto 20 MG tablet  Self No No   Sig: TAKE ONE TABLET BY MOUTH ONCE DAILY WITH BREAKFAST   atorvastatin (LIPITOR) 80 mg tablet  Self No No   Sig: TAKE ONE TABLET BY MOUTH ONCE DAILY WITH DINNER   atropine (ISOPTO ATROPINE) 1 % ophthalmic solution   No No   Si-3 drops sublingual three times daily as needed for drooling/excessive saliva   azelastine (ASTELIN) 0.1 % nasal spray   No No   Si spray into each nostril 2 (two) times a day   famotidine (PEPCID) 40 MG tablet   No No   Sig: Take 1 tablet (40 mg total) by mouth daily   linaGLIPtin (Tradjenta) 5 MG TABS  Self No No   Sig: Take 5 mg by mouth daily   lisinopril (ZESTRIL) 5 mg tablet  Self No No   Sig: TAKE ONE TABLET BY MOUTH EVERY MORNING   metFORMIN (GLUCOPHAGE-XR) 500 mg 24 hr tablet  Self No No   Sig: TAKE 1 TABLET (500 MG TOTAL) BY MOUTH 2 (TWO) TIMES A DAY WITH MEALS   metoprolol tartrate (LOPRESSOR) 50 mg tablet  Self No No   Sig: TAKE ONE TABLET BY MOUTH EVERY 12 HOURS   ondansetron (ZOFRAN-ODT) 4 mg disintegrating tablet  Self No No   Sig: Take 1 tablet (4 mg total) by mouth every 8 (eight) hours as needed for nausea or vomiting   pantoprazole (PROTONIX) 40 mg tablet  Self No No   Sig: TAKE ONE TABLET BY MOUTH DAILY   repaglinide (PRANDIN) 2 mg tablet  Self No No   Sig: TAKE ONE TABLET BY  MOUTH TWICE DAILY BEFORE MEALS      Facility-Administered Medications: None     Current Discharge Medication List        CONTINUE these medications which have NOT CHANGED    Details   Accu-Chek FastClix Lancets MISC Use 2 (two) times a day  Qty: 102 each, Refills: 3    Comments: Per insurance formulary, lancets need to be changed to FastClix.  Associated Diagnoses: Type 2 diabetes mellitus with hyperglycemia, with long-term current use of insulin ([facility])      Alcohol Swabs (Pharmacist Choice Alcohol) PADS       Aspirin 81 MG CAPS Take 1 tablet by mouth in the morning      !! Assure ID Safety Pen Needles 30G X 8 MM MISC USE DAILY  Qty: 100 each, Refills: 3    Associated Diagnoses: Other specified diabetes mellitus without complication, with long-term current use of insulin ([facility])      atorvastatin (LIPITOR) 80 mg tablet TAKE ONE TABLET BY MOUTH ONCE DAILY WITH DINNER  Qty: 90 tablet, Refills: 1    Associated Diagnoses: Cerebrovascular accident (CVA), unspecified mechanism ([facility])      atropine (ISOPTO ATROPINE) 1 % ophthalmic solution 1-3 drops sublingual three times daily as needed for drooling/excessive saliva  Qty: 5 mL, Refills: 6    Associated Diagnoses: Sialorrhea      azelastine (ASTELIN) 0.1 % nasal spray 1 spray into each nostril 2 (two) times a day  Qty: 30 mL, Refills: 11    Associated Diagnoses: Chronic rhinitis      !! B-D UF III MINI PEN NEEDLES 31G X 5 MM MISC USE 4 TIMES DAILY  Qty: 100 each, Refills: 1    Associated Diagnoses: Type 2 diabetes mellitus with hyperglycemia, with long-term current use of insulin ([facility])      !! Blood Glucose Monitoring Suppl (Accu-Chek Guide) w/Device KIT Use 2 (two) times a day  Qty: 1 kit, Refills: 0    Comments: Per insurance formulary, patient needs to be changed to Accuchek Guide glucometer system  Associated Diagnoses: Type 2 diabetes mellitus with hyperglycemia, with long-term current use of insulin ([facility])      !! Blood Glucose Monitoring Suppl (ONE TOUCH ULTRA 2)  w/Device KIT USE AS DIRECTED  Qty: 1 kit, Refills: 0    Associated Diagnoses: Type 2 diabetes mellitus with hyperglycemia, with long-term current use of insulin ([facility])      Continuous Blood Gluc  (FreeStyle Naveen 2 [place]) SHERWIN DISPENSE 1 READER.  Qty: 1 each, Refills: 0    Associated Diagnoses: Hypoglycemia unawareness associated with type 2 diabetes mellitus ([facility]); Type 2 diabetes mellitus with hyperglycemia, with long-term current use of insulin ([facility])      Continuous Blood Gluc Sensor (FreeStyle Naveen 2 Sensor) MISC Use 1 sensor every 14 days for continuous glucose monitoring.  Qty: 6 each, Refills: 1    Associated Diagnoses: Hypoglycemia unawareness associated with type 2 diabetes mellitus ([facility]); Type 2 diabetes mellitus with hyperglycemia, with long-term current use of insulin ([facility])      famotidine (PEPCID) 40 MG tablet Take 1 tablet (40 mg total) by mouth daily  Qty: 90 tablet, Refills: 3    Associated Diagnoses: Gastroesophageal reflux disease, unspecified whether esophagitis present      Lancet Devices (Lancet Device with Ejector) MISC USE TO CHECK BLOOD SUGAR TWICE A DAY  Qty: 1 each, Refills: 0    Associated Diagnoses: Type 2 diabetes mellitus with hyperglycemia, with long-term current use of insulin ([facility])      Lancets Misc. (Accu-Chek Softclix Lancet Dev) KIT Check blood sugar  Qty: 1 kit, Refills: 1    Associated Diagnoses: Type 2 diabetes mellitus with hyperglycemia, with long-term current use of insulin ([facility]); Other specified diabetes mellitus without complication, with long-term current use of insulin ([facility])      linaGLIPtin (Tradjenta) 5 MG TABS Take 5 mg by mouth daily  Qty: 90 tablet, Refills: 1    Associated Diagnoses: Type 2 diabetes mellitus with stage 3a chronic kidney disease, with long-term current use of insulin ([facility])      lisinopril (ZESTRIL) 5 mg tablet TAKE ONE TABLET BY MOUTH EVERY MORNING  Qty: 90 tablet, Refills: 1    Associated Diagnoses: Essential hypertension      metFORMIN  (GLUCOPHAGE-XR) 500 mg 24 hr tablet TAKE 1 TABLET (500 MG TOTAL) BY MOUTH 2 (TWO) TIMES A DAY WITH MEALS  Qty: 180 tablet, Refills: 1    Associated Diagnoses: Type 2 diabetes mellitus with hyperglycemia, with long-term current use of insulin ([facility])      metoprolol tartrate (LOPRESSOR) 50 mg tablet TAKE ONE TABLET BY MOUTH EVERY 12 HOURS  Qty: 180 tablet, Refills: 1    Associated Diagnoses: Paroxysmal atrial fibrillation ([facility])      Myrbetriq 50 MG TB24 TAKE 1 TABLET (50 MG TOTAL) BY MOUTH IN THE MORNING  Qty: 90 tablet, Refills: 1    Associated Diagnoses: OAB (overactive bladder)      ondansetron (ZOFRAN-ODT) 4 mg disintegrating tablet Take 1 tablet (4 mg total) by mouth every 8 (eight) hours as needed for nausea or vomiting  Qty: 30 tablet, Refills: 1    Associated Diagnoses: Nausea and vomiting, intractability of vomiting not specified, unspecified vomiting type      OneTouch Ultra test strip       pantoprazole (PROTONIX) 40 mg tablet TAKE ONE TABLET BY MOUTH DAILY  Qty: 30 tablet, Refills: 0    Associated Diagnoses: Type 2 diabetes mellitus with hyperglycemia, with long-term current use of insulin ([facility]); Gastroesophageal reflux disease, unspecified whether esophagitis present      repaglinide (PRANDIN) 2 mg tablet TAKE ONE TABLET BY MOUTH TWICE DAILY BEFORE MEALS  Qty: 180 tablet, Refills: 1    Associated Diagnoses: Type 2 diabetes mellitus with hyperglycemia, with long-term current use of insulin ([facility])      Xarelto 20 MG tablet TAKE ONE TABLET BY MOUTH ONCE DAILY WITH BREAKFAST  Qty: 90 tablet, Refills: 1    Associated Diagnoses: Cerebrovascular accident (CVA), unspecified mechanism ([facility]); Paroxysmal atrial fibrillation ([facility])       !! - Potential duplicate medications found. Please discuss with provider.        No discharge procedures on file.  ED SEPSIS DOCUMENTATION   Time reflects when diagnosis was documented in both MDM as applicable and the Disposition within this note       Time User Action Codes  Description Comment    3/26/2025  4:10 PM Leif Foreman [I63.9] Cerebrovascular accident (CVA), unspecified mechanism (HCC)     3/26/2025  5:54 PM Leif Foreman [R41.82] Altered mental status     3/26/2025  5:54 PM Leif Foreman [A41.9] Sepsis (HCC)     3/26/2025  6:04 PM Leif Foreman [J10.1] Influenza A            Initial Sepsis Screening       Row Name 03/26/25 4113                Is the patient's history suggestive of a new or worsening infection? Yes (Proceed)  -AC        Suspected source of infection urinary tract infection;pneumonia  -AC        Indicate SIRS criteria Hyperthemia > 38.3C (100.9F) OR Hypothermia <36C (96.8F);Tachycardia > 90 bpm  -AC        Are two or more of the above signs & symptoms of infection both present and new to the patient? Yes (Proceed)  -AC        Assess for evidence of organ dysfunction: Are any of the below criteria present within 6 hours of suspected infection and SIRS criteria that are NOT considered to be chronic conditions? --                  User Key  (r) = Recorded By, (t) = Taken By, (c) = Cosigned By      Initials Name Provider Type    KEIKO Foreman DO Physician                       Leif Foreman DO  03/26/25 3869

## 2025-03-26 NOTE — ASSESSMENT & PLAN NOTE
Presents with AMS.   Hx of CVA. On stroke pathway.  Initial lab abnormalities: +UA/+FLU A/hyponatremia.  Correct infectious/metabolic abnormalities. Monitor for sx improvement.

## 2025-03-26 NOTE — CONSULTS
Consultation - Neurology   Name: Veronica Reese 69 y.o. female I MRN: [id number]  Unit/Bed#: ED 02 I Date of Admission: 3/26/2025   Date of Service: 3/26/2025 I Hospital Day: 0   Consult to Neurology  Consult performed by: Emma Huggins MD  Consult ordered by: Leif Foreman DO        Physician Requesting Evaluation: Leif Foreman DO   Reason for Evaluation / Principal Problem: stroke like symptoms    Assessment & Plan  Stroke-like symptoms  Veronica Reese is a 69 y.o. left handed female with prior pontine stroke in 2022(with left sided symptoms), Afib on xarelto, DM, HLD, HTN who presents as stroke alert after being found slumped over a walker. Patient appear to have fever 101.8, chills, cough, N/V which might be from infection. NIHSS 4 but some of her symptoms appear to be chronic from prior stroke. Patient is compliant with her medications at home. CTH without acute bleed or large territorial infarct. Concern for hyperdense basilar. CTA without LVO, noted stable left P1/P2 stenosis. Imaging reviewed on PACS and discussed with radiologist. No TNK candidate due to xarelto use and OOW, Ddx: TME from acute infection, recrudescence, acute ischemic stroke.     - Continue home Xarelto  - Medical management and correction of metabolic and infectious disturbances per primary team   - Can proceed with stroke pathway if symptoms not improve after treatment of infection  - SBP goal 120-180  - Continue  Xarelto and statin   - Vascular risk factor management, A1c goal <6.5, LDL goal <70  - PT/OT/ST    Toxic metabolic encephalopathy  Appreciate treatment of infectious and metabolic abnormalities per primary team    Paroxysmal atrial fibrillation (HCC)  Continue home Xarelto  Essential hypertension  SBP goal 120-180  Hyperlipidemia  Continue home statin   Hemiplegia and hemiparesis following cerebral infarction affecting left non-dominant side (HCC)  See above      Thrombolytic Decision: Patient not a candidate. Bleeding  risk.    Recommendations for outpatient neurological follow up have yet to be determined.    History of Present Illness   Hx and PE limited by: some confusion   Patient last known well: 9 am?   Stroke alert called: 4:10  Neurology time of arrival: immediate  HPI: Veronica Reese is a 69 y.o. left handed female with prior pontine stroke in 2022(with left sided symptoms), Afib on xarelto, DM, HLD, HTN who presents as stroke alert after being found slumped over a walker.   Patient reports living alone at home and she does not fully remember what happened.  She has been feeling lousy and have had fever, cough, congestion.  She does not have chest pain or shortness of breath.  She has vomited twice this morning, no one is sick around her.  She ate breakfast and then called ambulance herself this morning because she was feeling sick.  She does not remember what happened in the afternoon.  She denies being in pain.  She has pee on herself, no bowel incontinence.  She reports the right side of the room is missing for her.  She took Xarelto this morning.  She is feeling weak in both arms and legs at this time.  No smoking, no alcohol , no drugs.     Review of Systems  Constitutional symptoms: weakness, fever, chills, no sweats.   Skin symptoms: no rash.   Eye:  vision changes/disturbances, Blurry vision  ENMT:   no dizziness  Respiratory symptoms: shortness of breath, cough.   Cardiovascular symptoms: chest pain.   Gastrointestinal symptoms: nausea, vomiting, no diarrhea.   Hema/Lymph:    no problems  Endocrine:    no Cold intolerance, Heat intolerance  Immunologic:    no problems  Musculoskeletal symptoms: no back pain.  Integumentary:    no problems   Neurologic symptoms: no headache, no dizziness, weakness, altered level of consciousness, no numbness, no tingling. Abnormal balance   Psych: No SI/HI  All other system reviewed and negative except positives noted above    Medical History Review: I have reviewed the patient's  PMH, PSH, Social History, Family History, Meds, and Allergies   Historical Information   Past Medical History:   Diagnosis Date    Abdominal fibromatosis     Arthritis     Depression     GERD (gastroesophageal reflux disease)     controlled    Hyperlipemia     Hypertension     Obesity     Pneumonia     Right pontine stroke (HCC) 12/13/2021    Stroke (cerebrum) (HCC) 12/17/2021    Stroke (HCC)      Past Surgical History:   Procedure Laterality Date    APPENDECTOMY      APPENDECTOMY LAPAROSCOPIC N/A 06/12/2022    Procedure: APPENDECTOMY LAPAROSCOPIC;  Surgeon: Dexter Rendon MD;  Location: [facility];  Service: General    CATARACT EXTRACTION      CATARACT EXTRACTION, BILATERAL      COLONOSCOPY      EGD      LAPAROTOMY      Exploratory; Last Assessed 10/17/2017    PEG TUBE PLACEMENT      PEG TUBE REMOVAL       Social History     Tobacco Use    Smoking status: Never    Smokeless tobacco: Never   Vaping Use    Vaping status: Never Used   Substance and Sexual Activity    Alcohol use: Never     Comment: Socially    Drug use: Never    Sexual activity: Not Currently     E-Cigarette/Vaping    E-Cigarette Use Never User      E-Cigarette/Vaping Substances    Nicotine No     THC No     CBD No     Flavoring No     Other No     Unknown No      Family History   Problem Relation Age of Onset    No Known Problems Mother     No Known Problems Father      Social History     Tobacco Use    Smoking status: Never    Smokeless tobacco: Never   Vaping Use    Vaping status: Never Used   Substance and Sexual Activity    Alcohol use: Never     Comment: Socially    Drug use: Never    Sexual activity: Not Currently       Current Facility-Administered Medications:     acetaminophen (TYLENOL) tablet 650 mg, Once  Prior to Admission Medications   Prescriptions Last Dose Informant Patient Reported? Taking?   Accu-Chek FastClix Lancets MISC  Self No No   Sig: Use 2 (two) times a day   Alcohol Swabs (Pharmacist Choice Alcohol) PADS  Self Yes No    Aspirin 81 MG CAPS  Self Yes No   Sig: Take 1 tablet by mouth in the morning   Assure ID Safety Pen Needles 30G X 8 MM MISC  Self No No   Sig: USE DAILY   B-D UF III MINI PEN NEEDLES 31G X 5 MM MISC  Self No No   Sig: USE 4 TIMES DAILY   Blood Glucose Monitoring Suppl (Accu-Chek Guide) w/Device KIT  Self No No   Sig: Use 2 (two) times a day   Blood Glucose Monitoring Suppl (ONE TOUCH ULTRA 2) w/Device KIT  Self No No   Sig: USE AS DIRECTED   Continuous Blood Gluc  (FreeStyle Naveen 2 [place]) SHERWIN  Self No No   Sig: DISPENSE 1 READER.   Continuous Blood Gluc Sensor (FreeStyle Naveen 2 Sensor) MISC  Self No No   Sig: Use 1 sensor every 14 days for continuous glucose monitoring.   Lancet Devices (Lancet Device with Ejector) MISC  Self No No   Sig: USE TO CHECK BLOOD SUGAR TWICE A DAY   Lancets Misc. (Accu-Chek Softclix Lancet Dev) KIT  Self No No   Sig: Check blood sugar   Myrbetriq 50 MG TB24  Self No No   Sig: TAKE 1 TABLET (50 MG TOTAL) BY MOUTH IN THE MORNING   OneTouch Ultra test strip  Self Yes No   Xarelto 20 MG tablet  Self No No   Sig: TAKE ONE TABLET BY MOUTH ONCE DAILY WITH BREAKFAST   atorvastatin (LIPITOR) 80 mg tablet  Self No No   Sig: TAKE ONE TABLET BY MOUTH ONCE DAILY WITH DINNER   atropine (ISOPTO ATROPINE) 1 % ophthalmic solution   No No   Si-3 drops sublingual three times daily as needed for drooling/excessive saliva   azelastine (ASTELIN) 0.1 % nasal spray   No No   Si spray into each nostril 2 (two) times a day   famotidine (PEPCID) 40 MG tablet   No No   Sig: Take 1 tablet (40 mg total) by mouth daily   linaGLIPtin (Tradjenta) 5 MG TABS  Self No No   Sig: Take 5 mg by mouth daily   lisinopril (ZESTRIL) 5 mg tablet  Self No No   Sig: TAKE ONE TABLET BY MOUTH EVERY MORNING   metFORMIN (GLUCOPHAGE-XR) 500 mg 24 hr tablet  Self No No   Sig: TAKE 1 TABLET (500 MG TOTAL) BY MOUTH 2 (TWO) TIMES A DAY WITH MEALS   metoprolol tartrate (LOPRESSOR) 50 mg tablet  Self No No   Sig: TAKE ONE  TABLET BY MOUTH EVERY 12 HOURS   ondansetron (ZOFRAN-ODT) 4 mg disintegrating tablet  Self No No   Sig: Take 1 tablet (4 mg total) by mouth every 8 (eight) hours as needed for nausea or vomiting   pantoprazole (PROTONIX) 40 mg tablet  Self No No   Sig: TAKE ONE TABLET BY MOUTH DAILY   repaglinide (PRANDIN) 2 mg tablet  Self No No   Sig: TAKE ONE TABLET BY MOUTH TWICE DAILY BEFORE MEALS      Facility-Administered Medications: None     Apixaban and Dulaglutide    Objective :  Temp:  [101.4 °F (38.6 °C)] 101.4 °F (38.6 °C)  HR:  [98] 98  BP: (159)/(75) 159/75  Resp:  [20] 20  O2 Device: None (Room air)    Temp 101.8   Physical ExamNeurological Exam  Modified PE as this is a video consultation:  Gen:   NAD.  HEENT:  No Septal deviation EOMI NCAT.  Resp:  Symmetric chest rise and patient in no obvious respiratory distress  MSK: ROM normal  Skin: No rash noted in visualized portion of this exam    Neuroexam:  Awake, alert, oriented name, age 67, pick April for month ,year. No aphasia or dysarthria confusion noted.  Patient is able to follow 2 and 3 step commands with ease  CN 2-12 grossly intact, EOMI,  no facial asymmetry,  normal to LT,, hearing intact to conversation,  no tongue deviation, no tongue bite,   Motor:  Intact antigravity x 4 extremities, but significantly weaker on the left. Left hand is resting in extensor position, then quickly drift down. Left leg with some drift but does not hit bed.   Sensation: Intact to light touch for RN  Cerebellar: No dysmetria on right FTN but unable to perform with left arm, partly from weakness and from confusion   Gait: Not tested     NIHSS:  1a.Level of Consciousness: 0 = Alert   1b. LOC Questions: 2 = Answers neither correctly   1c. LOC Commands: 0 = Obeys both correctly   2. Best Gaze: 0 = Normal   3. Visual: 0 = No visual field loss   4. Facial Palsy: 0=Normal symmetric movement   5a. Motor Right Arm: 0=No drift, limb holds 90 (or 45) degrees for full 10 seconds   5b.  "Motor Left Arm: 1=Drift, limb holds 90 (or 45) degrees but drifts down before full 10 seconds: does not hit bed   6a. Motor Right Le=No drift, limb holds 90 (or 45) degrees for full 10 seconds   6b. Motor Left Le=Drift, limb holds 90 (or 45) degrees but drifts down before full 10 seconds: does not hit bed   7. Limb Ataxia:  0=Absent   8. Sensory: 0=Normal; no sensory loss   9. Best Language:  0=No aphasia, normal   10. Dysarthria: 0=Normal articulation   11. Extinction and Inattention (formerly Neglect): 0=No abnormality   Total Score: 4     Time NIHSS was completed: 4:45    Modified [place] Score:  4 (Moderately severe disability; unable to walk and attend to bodily needs without assistance)      Lab Results: I have reviewed the following results:CBC/BMP: No new results in last 24 hours. , PTT/INR:No new results in last 24 hours. , Lipid Profile:   , Blood Culture: No results found for: \"BLOODCX\", Urinalysis: No results found for: \"COLORU\", \"CLARITYU\", \"SPECGRAV\", \"PHUR\", \"LEUKOCYTESUR\", \"NITRITE\", \"PROTEINUA\", \"GLUCOSEU\", \"KETONESU\", \"BILIRUBINUR\", \"BLOODU\"    Imaging Results Review: I personally reviewed the following image studies/reports in PACS and discussed pertinent findings with Radiology: CTA head and neck and CT head. My interpretation of the radiology images/reports is: as noted below.  XR chest portable    by Alisson Gibson ([id number])      CTA stroke alert (head/neck)   Final Result by Chuck Ozuna MD ( [id number])   1. No intracranial large vessel occlusion or hemodynamically significant stenosis in the neck.   2. Unchanged severe stenosis at the P1/P2 junction of the left PCA.      Findings were discussed with Dr. Huggins on 3/26/25 at 4:36 PM.      Workstation performed: [id number]         CT stroke alert brain   Final Result by Chuck Ozuna MD ( [id number])   1. No  hemorrhage or CT evidence of an acute cortical infarct.   2. Possible hyperdense basilar artery. Attention on CTA head/neck.    "   Findings were discussed with Dr. Huggins on 3/26/25 at 4:18 PM via [employer] secure chat.      .      Workstation performed: [id number]                 VTE Prophylaxis: VTE covered by:    None       Code Status: Prior  Advance Directive and Living Will:      Power of :    POLST:      Administrative Statements   VIRTUAL CARE DOCUMENTATION:     1. This service was provided via Telemedicine using [employer]     2. Parties in the room with patient during teleconsult RN    3. Confidentiality My office door was closed     4. Participants No one else was in the room    5. Patient acknowledged consent and understanding of privacy and security of the  Telemedicine consult. I informed the patient that I have reviewed their record in Epic and presented the opportunity for them to ask any questions regarding the visit today.  The patient agreed to participate.    6. I have spent a total critical care time of 50 minutes in caring for this patient on the day of the visit/encounter including Diagnostic results, Prognosis, Instructions for management, Risk factor reductions, Impressions, Counseling / Coordination of care, and Communicating with other healthcare professionals , not including the time spent for establishing the audio/video connection.

## 2025-03-27 ENCOUNTER — APPOINTMENT (INPATIENT)
Dept: NON INVASIVE DIAGNOSTICS | Facility: HOSPITAL | Age: 70
DRG: 871 | End: 2025-03-27
Payer: MEDICARE

## 2025-03-27 ENCOUNTER — APPOINTMENT (INPATIENT)
Dept: MRI IMAGING | Facility: HOSPITAL | Age: 70
DRG: 871 | End: 2025-03-27
Payer: MEDICARE

## 2025-03-27 LAB
ANION GAP SERPL CALCULATED.3IONS-SCNC: 7 MMOL/L (ref 4–13)
AORTIC ROOT: 3.1 CM
ASCENDING AORTA: 2.9 CM
BSA FOR ECHO PROCEDURE: 1.83 M2
BUN SERPL-MCNC: 16 MG/DL (ref 5–25)
CALCIUM SERPL-MCNC: 7.7 MG/DL (ref 8.4–10.2)
CHLORIDE SERPL-SCNC: 101 MMOL/L (ref 96–108)
CHOLEST SERPL-MCNC: 94 MG/DL (ref ?–200)
CO2 SERPL-SCNC: 25 MMOL/L (ref 21–32)
CREAT SERPL-MCNC: 0.79 MG/DL (ref 0.6–1.3)
DOP CALC MV VTI: 41.27 CM
E WAVE DECELERATION TIME: 285 MS
E/A RATIO: 0.83
ERYTHROCYTE [DISTWIDTH] IN BLOOD BY AUTOMATED COUNT: 12.8 % (ref 11.6–15.1)
EST. AVERAGE GLUCOSE BLD GHB EST-MCNC: 237 MG/DL
FRACTIONAL SHORTENING: 31 (ref 28–44)
GFR SERPL CREATININE-BSD FRML MDRD: 77 ML/MIN/1.73SQ M
GLUCOSE SERPL-MCNC: 157 MG/DL (ref 65–140)
GLUCOSE SERPL-MCNC: 165 MG/DL (ref 65–140)
GLUCOSE SERPL-MCNC: 202 MG/DL (ref 65–140)
GLUCOSE SERPL-MCNC: 98 MG/DL (ref 65–140)
GLUCOSE SERPL-MCNC: 99 MG/DL (ref 65–140)
HBA1C MFR BLD: 9.9 %
HCT VFR BLD AUTO: 33.4 % (ref 34.8–46.1)
HDLC SERPL-MCNC: 45 MG/DL
HGB BLD-MCNC: 10.5 G/DL (ref 11.5–15.4)
INTERVENTRICULAR SEPTUM IN DIASTOLE (PARASTERNAL SHORT AXIS VIEW): 1 CM
INTERVENTRICULAR SEPTUM: 1 CM (ref 0.6–1.1)
IVC: 18 MM
LAAS-AP2: 15.3 CM2
LAAS-AP4: 16 CM2
LDLC SERPL CALC-MCNC: 38 MG/DL (ref 0–100)
LEFT ATRIUM SIZE: 2.8 CM
LEFT ATRIUM VOLUME (MOD BIPLANE): 37 ML
LEFT ATRIUM VOLUME INDEX (MOD BIPLANE): 20.2 ML/M2
LEFT INTERNAL DIMENSION IN SYSTOLE: 2 CM (ref 2.1–4)
LEFT VENTRICULAR INTERNAL DIMENSION IN DIASTOLE: 2.9 CM (ref 3.5–6)
LEFT VENTRICULAR POSTERIOR WALL IN END DIASTOLE: 0.9 CM
LEFT VENTRICULAR STROKE VOLUME: 19 ML
LV EF US.2D.A4C+ESTIMATED: 67 %
LVSV (TEICH): 19 ML
MAGNESIUM SERPL-MCNC: 2.1 MG/DL (ref 1.9–2.7)
MCH RBC QN AUTO: 29.6 PG (ref 26.8–34.3)
MCHC RBC AUTO-ENTMCNC: 31.4 G/DL (ref 31.4–37.4)
MCV RBC AUTO: 94 FL (ref 82–98)
MV E'TISSUE VEL-SEP: 8 CM/S
MV MEAN GRADIENT: 5 MMHG
MV PEAK A VEL: 1.61 M/S
MV PEAK E VEL: 134 CM/S
MV PEAK GRADIENT: 8 MMHG
MV STENOSIS PRESSURE HALF TIME: 83 MS
MV VALVE AREA P 1/2 METHOD: 2.7
PHOSPHATE SERPL-MCNC: 3.6 MG/DL (ref 2.3–4.1)
PLATELET # BLD AUTO: 165 THOUSANDS/UL (ref 149–390)
PMV BLD AUTO: 10.7 FL (ref 8.9–12.7)
POTASSIUM SERPL-SCNC: 4.5 MMOL/L (ref 3.5–5.3)
PROCALCITONIN SERPL-MCNC: <0.05 NG/ML
RBC # BLD AUTO: 3.55 MILLION/UL (ref 3.81–5.12)
RIGHT ATRIUM AREA SYSTOLE A4C: 9.3 CM2
SL CV LEFT ATRIUM LENGTH A2C: 5.2 CM
SL CV LV EF: 70
SL CV PED ECHO LEFT VENTRICLE DIASTOLIC VOLUME (MOD BIPLANE) 2D: 32 ML
SL CV PED ECHO LEFT VENTRICLE SYSTOLIC VOLUME (MOD BIPLANE) 2D: 13 ML
SODIUM SERPL-SCNC: 133 MMOL/L (ref 135–147)
TR MAX PG: 3 MMHG
TR PEAK VELOCITY: 0.8 M/S
TRICUSPID ANNULAR PLANE SYSTOLIC EXCURSION: 2.2 CM
TRICUSPID VALVE PEAK REGURGITATION VELOCITY: 0.83 M/S
TRIGL SERPL-MCNC: 53 MG/DL (ref ?–150)
WBC # BLD AUTO: 3.73 THOUSAND/UL (ref 4.31–10.16)

## 2025-03-27 PROCEDURE — 80048 BASIC METABOLIC PNL TOTAL CA: CPT

## 2025-03-27 PROCEDURE — 36415 COLL VENOUS BLD VENIPUNCTURE: CPT

## 2025-03-27 PROCEDURE — 85027 COMPLETE CBC AUTOMATED: CPT

## 2025-03-27 PROCEDURE — 99232 SBSQ HOSP IP/OBS MODERATE 35: CPT | Performed by: HOSPITALIST

## 2025-03-27 PROCEDURE — 83735 ASSAY OF MAGNESIUM: CPT

## 2025-03-27 PROCEDURE — 93306 TTE W/DOPPLER COMPLETE: CPT | Performed by: INTERNAL MEDICINE

## 2025-03-27 PROCEDURE — 70551 MRI BRAIN STEM W/O DYE: CPT

## 2025-03-27 PROCEDURE — 92610 EVALUATE SWALLOWING FUNCTION: CPT

## 2025-03-27 PROCEDURE — NC001 PR NO CHARGE: Performed by: STUDENT IN AN ORGANIZED HEALTH CARE EDUCATION/TRAINING PROGRAM

## 2025-03-27 PROCEDURE — 93306 TTE W/DOPPLER COMPLETE: CPT

## 2025-03-27 PROCEDURE — 82948 REAGENT STRIP/BLOOD GLUCOSE: CPT

## 2025-03-27 PROCEDURE — 84145 PROCALCITONIN (PCT): CPT

## 2025-03-27 PROCEDURE — 97163 PT EVAL HIGH COMPLEX 45 MIN: CPT

## 2025-03-27 PROCEDURE — 84100 ASSAY OF PHOSPHORUS: CPT

## 2025-03-27 PROCEDURE — 97166 OT EVAL MOD COMPLEX 45 MIN: CPT

## 2025-03-27 PROCEDURE — 80061 LIPID PANEL: CPT

## 2025-03-27 RX ADMIN — CEFEPIME 1000 MG: 1 INJECTION, POWDER, FOR SOLUTION INTRAMUSCULAR; INTRAVENOUS at 08:34

## 2025-03-27 RX ADMIN — ATROPINE SULFATE 1 DROP: 10 SOLUTION/ DROPS OPHTHALMIC at 17:24

## 2025-03-27 RX ADMIN — ATORVASTATIN CALCIUM 80 MG: 80 TABLET, FILM COATED ORAL at 17:24

## 2025-03-27 RX ADMIN — OSELTAMAVIR PHOSPHATE 75 MG: 75 CAPSULE ORAL at 09:32

## 2025-03-27 RX ADMIN — INSULIN LISPRO 1 UNITS: 100 INJECTION, SOLUTION INTRAVENOUS; SUBCUTANEOUS at 22:39

## 2025-03-27 RX ADMIN — METOPROLOL TARTRATE 50 MG: 50 TABLET, FILM COATED ORAL at 22:39

## 2025-03-27 RX ADMIN — INSULIN LISPRO 1 UNITS: 100 INJECTION, SOLUTION INTRAVENOUS; SUBCUTANEOUS at 17:24

## 2025-03-27 RX ADMIN — CEFTRIAXONE SODIUM 1000 MG: 10 INJECTION, POWDER, FOR SOLUTION INTRAVENOUS at 15:33

## 2025-03-27 RX ADMIN — RIVAROXABAN 20 MG: 10 TABLET, FILM COATED ORAL at 09:32

## 2025-03-27 RX ADMIN — METOPROLOL TARTRATE 50 MG: 50 TABLET, FILM COATED ORAL at 11:51

## 2025-03-27 RX ADMIN — ATROPINE SULFATE 1 DROP: 10 SOLUTION/ DROPS OPHTHALMIC at 22:40

## 2025-03-27 RX ADMIN — OSELTAMAVIR PHOSPHATE 75 MG: 75 CAPSULE ORAL at 22:39

## 2025-03-27 RX ADMIN — OXYBUTYNIN 10 MG: 5 TABLET, FILM COATED, EXTENDED RELEASE ORAL at 09:31

## 2025-03-27 RX ADMIN — LISINOPRIL 5 MG: 5 TABLET ORAL at 09:32

## 2025-03-27 RX ADMIN — ACETAMINOPHEN 650 MG: 325 TABLET ORAL at 15:01

## 2025-03-27 RX ADMIN — ATROPINE SULFATE 1 DROP: 10 SOLUTION/ DROPS OPHTHALMIC at 09:39

## 2025-03-27 RX ADMIN — INSULIN LISPRO 2 UNITS: 100 INJECTION, SOLUTION INTRAVENOUS; SUBCUTANEOUS at 11:53

## 2025-03-27 RX ADMIN — FAMOTIDINE 40 MG: 20 TABLET, FILM COATED ORAL at 09:32

## 2025-03-27 NOTE — ASSESSMENT & PLAN NOTE
Longstanding from prior CVA  Stroke workup is in progress see plan for same  Continue statin and aspirin

## 2025-03-27 NOTE — SPEECH THERAPY NOTE
Speech Language/Pathology  Speech/Language Pathology  Assessment    Patient Name: Veronica Reese  Today's Date: 3/27/2025     Problem List  Principal Problem:    Stroke-like symptoms  Active Problems:    Paroxysmal atrial fibrillation (HCC)    Type 2 diabetes mellitus with chronic kidney disease, with long-term current use of insulin (HCC)    Toxic metabolic encephalopathy    Hyperlipidemia    Essential hypertension    Hemiplegia and hemiparesis following cerebral infarction affecting left non-dominant side (HCC)    Sepsis without acute organ dysfunction (HCC)    Hyponatremia    Acute cystitis without hematuria    Influenza A    Past Medical History  Past Medical History:   Diagnosis Date    Abdominal fibromatosis     Arthritis     Depression     GERD (gastroesophageal reflux disease)     controlled    Hyperlipemia     Hypertension     Obesity     Pneumonia     Right pontine stroke (HCC) 12/13/2021    Stroke (cerebrum) (HCC) 12/17/2021    Stroke (HCC)      Past Surgical History  Past Surgical History:   Procedure Laterality Date    APPENDECTOMY      APPENDECTOMY LAPAROSCOPIC N/A 06/12/2022    Procedure: APPENDECTOMY LAPAROSCOPIC;  Surgeon: Dexter Rendon MD;  Location: [facility];  Service: General    CATARACT EXTRACTION      CATARACT EXTRACTION, BILATERAL      COLONOSCOPY      EGD      LAPAROTOMY      Exploratory; Last Assessed 10/17/2017    PEG TUBE PLACEMENT      PEG TUBE REMOVAL        Bedside Swallow Evaluation:    Summary:  Pt presented w/ oral stage of swallow WNL and suspect mild pharyngeal dysphagia. Positioned upright and alert. Pt fed herself salad and ntl via cup sip. Mastication was timely. Bolus control, formation, and transfer were WNL. Swallows appeared prompt. No overt s/s of aspiration. Pt presented with dysphonia as voice is soft and breathy. She does follow with ENT OP. Pt reported increased coughing with thin liquids. Stated she feels she does much better with the ntl. Reviewed previous VBS  recommended ntl, may benefit from repeat VBS as appropriate. ST reviewed diet recommendations and safe swallow strategies pt understood.     Recommendations:  Diet: Regular   Liquid: Nectar thick liquids   Meds: as tolerated   Supervision:distant   Positioning:Upright  Strategies: slow rate, alternate liquids with solids, swallow prior to additional po.   Pt to take PO/Meds only when fully alert and upright.   Oral care  Aspiration precautions  Reflux precautions  Therapy Prognosis:fair   Prognosis considerations:age, medical status    Frequency:1-3 times weekly as indicated.     Consider consult w/:  Rehab  ENT OP  Nutrition    Goal(s):  Dysphagia LTG  -Patient will demonstrate safe and effective oral intake (without overt s/s significant oral/pharyngeal dysphagia including s/s penetration or aspiration) for the highest appropriate diet level.     1.Pt will tolerate least restrictive diet w/out s/s aspiration or oral/pharyngeal difficulties.   2.Pt will will effectively manipulate/masticate and transfer solids w/out s/s dysphagia/aspiration.   3.Pt will tolerate thin liquids w/out s/s aspiration.   -If indicated, patient will comply with a Video/Modified Barium Swallow study for more complete assessment of swallowing anatomy/physiology/aspiration risk and to assess efficacy of treatment techniques so as to best guide treatment plan     H&P/Admit info/ pertinent provider notes: (PMH noted above)  Veronica Reese is a 69 y.o. female with a PMH of HTN, HLD, depression, CVA who presents to [facility] with altered mental status, urinary incontinence, possible unwitnessed fall.  Patient has a visiting nurse who found her slumped onto her walker unable to get up.  EMS was called who reported that her apartment smelled of urine.  She states she remembers falling this morning but cannot provide further details.  She admits that she had urinated herself but does not remember this happening.  She denies any focal  neurological deficits at this time.  She denies CP, SOB, N/V/D, chills, headache, lightheadedness, dizziness, palpitations, diaphoresis.  She admits to cough which has lasted months.     Special Studies:  XR CHEST PORTABLE 03/26/2025  IMPRESSION:  Stable findings without acute cardiopulmonary abnormalities.  CTA NECK AND BRAIN WITH CONTRAST 03/26/2025  IMPRESSION:  1. No intracranial large vessel occlusion or hemodynamically significant stenosis in the neck.  2. Unchanged severe stenosis at the P1/P2 junction of the left PCA.  CT BRAIN - STROKE ALERT PROTOCOL 03/26/2025  IMPRESSION:  1. No  hemorrhage or CT evidence of an acute cortical infarct.  2. Possible hyperdense basilar artery. Attention on CTA head/neck.    Procalcitonin: 0.05               03/27/2025   WBC: 3.73               03/27/2025     Code Status : Level 1 full code     Previous MBS:   Summary:Date: 2/11/2021   Pt is presenting with mild to mild-moderate oropharyngeal dysphagia with liquids characterized by decreased oral control, resulting in premature spillage, weak bolus formation and propulsion, delayed initiation of swallows, incomplete epiglottic closure and reduced anterior and superior hyolaryngeal movement. Pt frequently uses multiple swallows per bolus to assist in full oral clearance. Reduced oral control and premature spillage observed with larger sips of NTL, resulting in aspiration which pt did elicit a spontaneous cough response. Aspiration events of thin liquids, which were silent and pt required cuing to clear material with coughing and throat clears. Utilizing small sips and bolus prep set/hold of NTL by cup sips, oral control and timing of swallows improved where no aspiration events then present. Oral and pharyngeal swallow function observed to be functional with solids.   Pt able to follow commands to complete strategies but will recommend pt have full supervision for now with all liquid intake in order to ensure carryover. Will  "focus on carryover of strategies in upcoming sessions. Pt w/o hx of pneumonias, increased WBC count or fevers. Nursing assessment of chest as clear, per documentation.   Recommendations:  Diet: dysphagia level 3 (for now-will trial full regular diet meals with potential for upgrade)  Liquids: nectar thick by cup-SMALL sips only  Meds: whole in puree  Strategies: small sips of NTL, bolus prep set/hold of liquids  Upright position  Full Supervision for meals (for now to ensure carryover of strategies to maintain safety with intake)  Aspiration Precautions  Consider consult with: ENT (pt is currently being followed by an ENT-may consider new assessment of cords)  Results reviewed with: physician, RN, therapy staff, patient  Aspiration precautions posted.    Patient's goal:\" Nice to meet you \"    Did the pt report pain? no  If yes, was nursing notified/was it addressed? N/a     Reason for consult:  R/o aspiration  Determine safest and least restrictive diet  Change in mental status  Stroke protocol  h/o dysphagia     Precautions:  Contact  Droplet    Food Allergies:  No known    Current Diet: Regular and ntl    Premorbid diet:  Regular and thin    O2 requirement: 2 liters NC    Social/Prior living Lives alone    Voice/Speech: Dysphonia    Follows commands: Basic    Cognitive status: Alert      Oral [facility] exam:  Dentition: Adequate   Lips (VII):WNL  Tongue (XII): midline   Mandible (V):adequate ROM   Face/oral sensation (V):WNL  Velum (X):WNL    Items administered:  Salad and ntl via cup     Oral stage:  Lip closure:WNL  Mastication:WNL  Bolus formation:WNL  Bolus control:WNL  Transfer:WNL    Pharyngeal stage:  Swallow promptness: prompt   Laryngeal rise:adequate   No overt s/s aspiration    Esophageal stage:  No s/s reported  H/o GERD  H/o EGD 12/16/2020  IMPRESSION:  Gastritis  Multiple small benign-looking polyps   RECOMMENDATION:  Continue with the bariatric program process and follow up in the office.  Biopsy results " pending.    Results d/w:  Pt, nursing,    Time In:12:15  Time Out: 12:24

## 2025-03-27 NOTE — PHYSICAL THERAPY NOTE
PHYSICAL THERAPY EVALUATION  NAME:  Veronica Reese  DATE: 03/27/25    AGE:   69 y.o.  Mrn:   [id number]  ADMIT DX:  Altered mental status [R41.82]  Problem List:   Patient Active Problem List   Diagnosis    Paroxysmal atrial fibrillation (HCC)    Cerebrovascular accident (CVA) (HCC)    Blurry vision    Diabetic cataract (HCC)    Dysphagia    Dysphonia    Posterior glottic stenosis    Heart disease    Insomnia    Incomplete emptying of bladder    Tracheal stenosis    Type 2 diabetes mellitus with chronic kidney disease, with long-term current use of insulin (HCC)    Toxic metabolic encephalopathy    GERD (gastroesophageal reflux disease)    Hyperlipidemia    Edema    Essential hypertension    Current episode of major depressive disorder without prior episode    Preoperative cardiovascular examination    Preoperative clearance    YOLIE (obstructive sleep apnea)    Dermatitis    Muscle tension dysphonia    Reflux laryngitis    Glottic insufficiency    Weakness of both lower extremities    History of CVA (cerebrovascular accident)    Hemiplegia and hemiparesis following cerebral infarction affecting left non-dominant side ([facility])    Stage 3 chronic kidney disease, unspecified whether stage 3a or 3b CKD (HCC)    Abnormality of gait due to impairment of balance    Stress incontinence    Sepsis without acute organ dysfunction ([facility])    Cyst of right ovary    Abnormal CT of the chest    Platelets decreased (HCC)    Status post appendectomy    Ovarian mass    Abnormal uterine bleeding (AUB)    Gustatory hyperhidrosis    Fall    Elevated CK    Diabetic nephropathy associated with type 2 diabetes mellitus (HCC)    Hypoglycemia unawareness associated with type 2 diabetes mellitus (HCC)    Type 2 diabetes mellitus with hyperglycemia, with long-term current use of insulin (HCC)    Stroke-like symptoms    Hyponatremia    Acute cystitis without hematuria    Influenza A       Past Medical History  Past Medical History:   Diagnosis Date     Abdominal fibromatosis     Arthritis     Depression     GERD (gastroesophageal reflux disease)     controlled    Hyperlipemia     Hypertension     Obesity     Pneumonia     Right pontine stroke (HCC) 12/13/2021    Stroke (cerebrum) (HCC) 12/17/2021    Stroke (HCC)        Past Surgical History  Past Surgical History:   Procedure Laterality Date    APPENDECTOMY      APPENDECTOMY LAPAROSCOPIC N/A 06/12/2022    Procedure: APPENDECTOMY LAPAROSCOPIC;  Surgeon: Dexter Rendon MD;  Location: [facility];  Service: General    CATARACT EXTRACTION      CATARACT EXTRACTION, BILATERAL      COLONOSCOPY      EGD      LAPAROTOMY      Exploratory; Last Assessed 10/17/2017    PEG TUBE PLACEMENT      PEG TUBE REMOVAL         Length Of Stay: 1  Performed at least 2 patient identifiers during session: Name and ID bracelet       03/27/25 0931   PT Last Visit   PT Visit Date 03/27/25   Note Type   Note type Evaluation   Pain Assessment   Pain Assessment Tool 0-10   Pain Score No Pain   Restrictions/Precautions   Weight Bearing Precautions Per Order No   Other Precautions Contact/isolation;Droplet precautions;Fall Risk;O2;Multiple lines;Telemetry;Bed Alarm   Home Living   Type of Home Apartment   Home Layout Elevator;Ramped entrance  (5th FL)   Bathroom Shower/Tub Tub/shower unit   Bathroom Toilet Raised   Bathroom Equipment Grab bars in shower   Home Equipment Walker  (rollator used at baseline)   Prior Function   Level of Branch Independent with ADLs;Independent with functional mobility;Needs assistance with IADLS   Lives With Alone   Receives Help From Family;Neighbor   IADLs Family/Friend/Other provides transportation;Family/Friend/Other provides meals;Family/Friend/Other provides medication management   Falls in the last 6 months 0   Vocational On disability   General   Family/Caregiver Present No   Cognition   Overall Cognitive Status WFL   Arousal/Participation Alert   Orientation Level Oriented X4   Memory Within functional  limits   Following Commands Follows all commands and directions without difficulty   RLE Assessment   RLE Assessment WFL   LLE Assessment   LLE Assessment X  (3/5)   Vision-Basic Assessment   Current Vision Wears glasses all the time   Light Touch   RLE Light Touch Grossly intact   LLE Light Touch Impaired   Bed Mobility   Supine to Sit 5  Supervision   Additional items Increased time required;Verbal cues;HOB elevated   Sit to Supine 5  Supervision   Additional items Increased time required;Verbal cues   Additional Comments pt denied dizziness with transitional movement   Transfers   Sit to Stand 3  Moderate assistance   Additional items Assist x 1;Increased time required;Verbal cues   Stand to Sit 3  Moderate assistance   Additional items Assist x 1;Increased time required;Verbal cues   Additional Comments pt required cues for proper hand placement   Ambulation/Elevation   Gait pattern Improper Weight shift;Decreased foot clearance;Excessively slow;Inconsistent steve;Short stride   Gait Assistance 3  Moderate assist   Additional items Assist x 1;Verbal cues   Assistive Device Rolling walker   Distance 15 ft   Ambulation/Elevation Additional Comments good safety awareness noted   Balance   Static Sitting Good   Dynamic Sitting Fair +   Static Standing Fair -   Dynamic Standing Fair -   Ambulatory Fair -   Endurance Deficit   Endurance Deficit Yes   Endurance Deficit Description pt reported fatigue with activity  (HR decreased into the 40s; SpO2 decreased to 85%, /69)   Activity Tolerance   Activity Tolerance Patient limited by fatigue   Assessment   Prognosis Good   Assessment Pt is 69 y.o. female seen for PT evaluation s/p admit to [facility] on 3/26/2025 w/ Stroke-like symptoms. PT consulted to assess pt's functional mobility and d/c needs. Order placed for PT eval and tx, w/ activity as tolerated order. Pt agreeable to PT  session upon arrival, pt found supine in bed.  PTA, pt was independent w/  all functional mobility w/ rollator, ambulates household distances, lives alone in 1 level apartment, and on disability.  Pt to benefit from continued PT tx to address deficits and maximize level of functional independent mobility and consistency. Upon conclusion pt  supine in bed. Complexity: Comorbidities affecting pt's physical performance at time of assessment include: DM, htn, CVA, depression, and encephalopathy. Personal factors affecting pt at time of IE include: living alone, limited mobility, history of falls, ambulating with assistive device, and depression. Please find objective findings from PT assessment regarding body systems outlined above with impairments and limitations including weakness, impaired balance, decreased endurance, gait deviations, decreased activity tolerance, decreased functional mobility tolerance, altered sensation, and fall risk.  Pt's clinical presentation is currently unstable/unpredictable seen in pt's presentation of bradycardia, abnormal WBC count, abnormal sodium levels, abnormal calcium levels, low SaO2 levels, new onset of O2 use, telemetry use, and recent fall. The patient's AM-PAC Basic Mobility Inpatient Short Form Raw Score is 17.  Based on patient presentations and impairments, pt would most appropriately benefit from Level 2 resource intensity upon discharge. A Raw score of greater than 16 suggests the patient may benefit from discharge to home. Please also refer to the recommendation of the Physical Therapist for safe discharge planning. RN verbalized pt appropriate for PT session. Pt seen as a co-eval with OT due to the patient's co-morbidities, clinically unstable presentation, and present impairments which are a regression from the patient's baseline.   Barriers to Discharge Inaccessible home environment   Goals   Patient Goals to walk   LTG Expiration Date 04/06/25   Long Term Goal #1 Pt will: Perform bed mobility tasks to modified I to improve ease of bed  mobility. Perform transfers to modified I to improve ease of transfers. Perform ambulation with MI and RW for 250 feet to increase Indep in home environment. Increase dynamic standing balance to F+ to decrease fall risk. Increase OOB activity tolerance to 10 minutes without s/s of exertion to decrease fall risk.   Plan   Treatment/Interventions Functional transfer training;Therapeutic exercise;Endurance training;Gait training;Bed mobility;Equipment eval/education   PT Frequency 3-5x/wk   Discharge Recommendation   Rehab Resource Intensity Level, PT II (Moderate Resource Intensity)   Equipment Recommended Walker   Walker Package Recommended Wheeled walker   AM-PAC Basic Mobility Inpatient   Turning in Flat Bed Without Bedrails 3   Lying on Back to Sitting on Edge of Flat Bed Without Bedrails 3   Moving Bed to Chair 3   Standing Up From Chair Using Arms 3   Walk in Room 3   Climb 3-5 Stairs With Railing 2   Basic Mobility Inpatient Raw Score 17   Basic Mobility Standardized Score 39.67   [facility] Highest Level Of Mobility   JH-HLM Goal 5: Stand one or more mins   JH-HLM Achieved 6: Walk 10 steps or more       Time In: 0909  Time Out: 0931  Total Evaluation Minutes: 22    Miriam Witt, PT

## 2025-03-27 NOTE — OCCUPATIONAL THERAPY NOTE
Occupational Therapy Evaluation     Patient Name: Veronica Reese  Today's Date: 3/27/2025  Problem List  Principal Problem:    Stroke-like symptoms  Active Problems:    Paroxysmal atrial fibrillation (HCC)    Type 2 diabetes mellitus with chronic kidney disease, with long-term current use of insulin (HCC)    Toxic metabolic encephalopathy    Hyperlipidemia    Essential hypertension    Hemiplegia and hemiparesis following cerebral infarction affecting left non-dominant side (HCC)    Sepsis without acute organ dysfunction (HCC)    Hyponatremia    Acute cystitis without hematuria    Influenza A    Past Medical History  Past Medical History:   Diagnosis Date    Abdominal fibromatosis     Arthritis     Depression     GERD (gastroesophageal reflux disease)     controlled    Hyperlipemia     Hypertension     Obesity     Pneumonia     Right pontine stroke (HCC) 12/13/2021    Stroke (cerebrum) (HCC) 12/17/2021    Stroke (HCC)      Past Surgical History  Past Surgical History:   Procedure Laterality Date    APPENDECTOMY      APPENDECTOMY LAPAROSCOPIC N/A 06/12/2022    Procedure: APPENDECTOMY LAPAROSCOPIC;  Surgeon: Dexter Rendon MD;  Location: [facility];  Service: General    CATARACT EXTRACTION      CATARACT EXTRACTION, BILATERAL      COLONOSCOPY      EGD      LAPAROTOMY      Exploratory; Last Assessed 10/17/2017    PEG TUBE PLACEMENT      PEG TUBE REMOVAL          03/27/25 0910   OT Last Visit   OT Visit Date 03/27/25   Note Type   Note type Evaluation   Additional Comments Pt seen as a co-eval with PT due to the patient's co-morbidities, clinically unstable presentation, and present impairments which are a regression from the patient's baseline.   Pain Assessment   Pain Assessment Tool 0-10   Pain Score No Pain   Restrictions/Precautions   Weight Bearing Precautions Per Order No   Other Precautions Contact/isolation;Droplet precautions;Fall Risk;O2;Multiple lines;Telemetry;Bed Alarm   Home Living   Type of Home  "Apartment   Home Layout One level;Ramped entrance;Elevator  (5th floor apartment)   Bathroom Shower/Tub Tub/shower unit   Bathroom Toilet Raised   Bathroom Equipment Grab bars in shower   Bathroom Accessibility Accessible   Home Equipment Walker  (Pt reports rollator used at baseline)   Prior Function   Level of [place] Independent with ADLs;Independent with functional mobility;Needs assistance with IADLS   Lives With Alone   Receives Help From Family;Neighbor   IADLs Family/Friend/Other provides transportation;Family/Friend/Other provides meals;Family/Friend/Other provides medication management   Falls in the last 6 months 0  (pt denies)   Vocational On disability   Lifestyle   Autonomy Pt resides in one level apartment - alone; I with ADLs and requires some A with IADLs; -    Reciprocal Relationships supportive family   Service to Others retired   Intrinsic Gratification drinking coffee   Subjective   Subjective \"I feel a little bit dizzy\"   ADL   Where Assessed Edge of bed   Grooming Assistance 6  Modified Independent   UB Bathing Assistance 5  Supervision/Setup   LB Bathing Assistance 4  Minimal Assistance   UB Dressing Assistance 5  Supervision/Setup   LB Dressing Assistance 3  Moderate Assistance   Toileting Assistance  3  Moderate Assistance   Additional Comments Given functional performance skills + medical complexity, therapist suspects via clinical judgement + skilled analysis; pt currently requires stated assist above to perform each area of ADLs d/t limitations including: functional mobility, functional activity tolerance, coordination, balance, strength, pain and overall cognition   Bed Mobility   Supine to Sit 5  Supervision   Additional items Assist x 1;Increased time required;HOB elevated   Sit to Supine 4  Minimal assistance   Additional items Assist x 1;Increased time required;Verbal cues   Additional Comments VC for bedrail utilization and proper body mechanics; + dizziness with " transitional movements but sympomatic resolution with increased time   Transfers   Sit to Stand 3  Moderate assistance   Additional items Assist x 1;Increased time required;Verbal cues   Stand to Sit 3  Moderate assistance   Additional items Assist x 1;Increased time required;Verbal cues   Additional Comments RW used; VC for safe hand placement, proper body mechanics and RW management during directional turns  (BP post mobility: 140/69; SpO2 96% on 2L O2 via NC; 80bpm)   Functional Mobility   Functional Mobility 3  Moderate assistance   Additional Comments Pt completed short distance ADL mobility around room at mod A x1 w/ RW. No significant LOB observed, mild to moderate instability.   Additional items Rolling walker   Balance   Static Sitting Good   Dynamic Sitting Fair +   Static Standing Fair -   Dynamic Standing Fair -   Ambulatory Fair -   Activity Tolerance   Activity Tolerance Patient limited by pain;Patient limited by fatigue   Medical Staff Made Aware Yes, CM made aware of d/c recs   Nurse Made Aware Yes, nursing staff made aware of session outcomes   RUE Assessment   RUE Assessment WFL   RUE Strength   RUE Overall Strength   (3+/5)   LUE Assessment   LUE Assessment WFL   LUE Strength   LUE Overall Strength   (3+/5)   Hand Function   Gross Motor Coordination Functional   Fine Motor Coordination Functional   Vision-Basic Assessment   Current Vision Wears glasses all the time   Psychosocial   Psychosocial (WDL) WDL   Cognition   Overall Cognitive Status WFL   Arousal/Participation Alert;Responsive;Cooperative   Attention Within functional limits   Orientation Level Oriented X4   Memory Decreased recall of precautions;Decreased recall of recent events   Following Commands Follows one step commands without difficulty   Comments Pt agreeable to OT evaluation, pleasant   Assessment   Limitation Decreased ADL status;Decreased UE strength;Decreased Safe judgement during ADL;Decreased endurance;Decreased self-care  trans;Decreased high-level ADLs   Prognosis Good   Assessment Pt is a 69 y.o. female seen for OT evaluation s/p admit to [facility] on 3/26/2025 w/ Stroke-like symptoms.  Comorbidities affecting pt's functional performance at time of assessment include: a-fib, type 2 diabetes, HTN, dysphagia, dysphonia, posterior glottic stenosis, tracheal stenosis . Personal factors affecting pt at time of IE include:limited home support, difficulty performing ADLS, difficulty performing IADLS , decreased initiation and engagement , and health management . OT order placed to assess Pt's ADLs, cognitive status, and performance during functional tasks in order to maximize safety and independence while making most appropriate d/c recommendations. Prior to admission, pt was I with ADLs and requires some A with IADLs. Upon evaluation: the following deficits impact occupational performance: weakness, decreased strength, decreased balance, decreased tolerance, impaired problem solving, decreased safety awareness, and increased pain. Pt's clinical presentation is currently evolving given new onset deficits that effect Pt's occupational performance and ability to safely return to [facility] including decrease activity tolerance, decrease standing balance, decrease sitting balance, decrease performance during ADL tasks, decrease safety awareness , decrease generalized strength, decrease activity engagement, decrease performance during functional transfers, and high fall risk combined with medical complications of hypertension , poor blood pressure control, pain impacting overall mobility status, change in mental status, A-fib, new onset O2 use, decreased skin integrity, and need for input for mobility technique/safety.  Pt to benefit from continued skilled OT tx while in the hospital to address deficits as defined above and maximize level of functional independence w ADL's and functional mobility. Occupational Performance areas to address include:  grooming, bathing/shower, toilet hygiene, dressing, functional mobility, community mobility, and clothing management. From OT standpoint, recommendation at time of d/c would with moderate intensity OT resources.   Goals   Patient Goals to go home   Plan   Treatment Interventions ADL retraining;UE strengthening/ROM;Functional transfer training;Endurance training;Patient/family training;Compensatory technique education;Activityengagement;Energy conservation   Goal Expiration Date 04/06/25   OT Treatment Day 0   OT Frequency 2-3x/wk   Discharge Recommendation   Rehab Resource Intensity Level, OT II (Moderate Resource Intensity)   Additional Comments  The patient's raw score on the AM-PAC Daily Activity inpatient short form is 17, standardized score is 37.26, less than 39.4. Patients at this level are likely to benefit from discharge with moderate intensity OT resources. Please refer to the recommendation of the Occupational Therapist for safe discharge planning.   AM-PAC Daily Activity Inpatient   Lower Body Dressing 2   Bathing 2   Toileting 2   Upper Body Dressing 3   Grooming 4   Eating 4   Daily Activity Raw Score 17   Daily Activity Standardized Score (Calc for Raw Score >=11) 37.26   AM-PAC Applied Cognition Inpatient   Following a Speech/Presentation 3   Understanding Ordinary Conversation 4   Taking Medications 3   Remembering Where Things Are Placed or Put Away 3   Remembering List of 4-5 Errands 2   Taking Care of Complicated Tasks 2   Applied Cognition Raw Score 17   Applied Cognition Standardized Score 36.52     GOALS:    Pt will achieve the following within specified time frame: LTG  Pt will achieve the following goals within 10 days    *ADL transfers with (I) for inc'd independence with ADLs/purposeful tasks    *UB ADL with (I) for inc'd independence with self cares    *LB ADL with (S) using AE prn for inc'd independence with self cares    *Toileting with (I) for clothing management and hygiene for return  to PLOF with personal care    *Increase static stand balance and dyn stand balance to F+ for inc'd safety with standing purposeful tasks    *Increase stand tolerance x7 m for inc'd tolerance with standing purposeful tasks    *Bed mobility- (I) for inc'd independence to manage own comfort and initiate EOB & OOB purposeful tasks    *Participate in 10-15m UE therex to increase overall stamina/activity tolerance for purposeful tasks      Stephany Gale MS, OTR/L

## 2025-03-27 NOTE — ASSESSMENT & PLAN NOTE
POA  As evidenced by tachycardia, tachypnea with suspected UTI  U/A some leukocytes otherwise negative, culture pending  Blood cultures pending  Procalcitonin negative x 2  Chest x-ray negative  Incidentally noted to be influenza A positive-plan for same  S/p IV fluid resuscitation per sepsis protocol  Was [facility] ED[facility]  Tmax 99.5 overnight  Will de-escalate antibiotics to Rocephin today and monitor-she is complaining of some urinary frequency  Tachycardia and tachypnea have resolved  CBC a.m.

## 2025-03-27 NOTE — CONSULTS
Duplicate order. Please refer to original consult note completed yesterday.     MRI brain pending.

## 2025-03-27 NOTE — PLAN OF CARE
Problem: OCCUPATIONAL THERAPY ADULT  Goal: Performs self-care activities at highest level of function for planned discharge setting.  See evaluation for individualized goals.  Description: Treatment Interventions: ADL retraining, UE strengthening/ROM, Functional transfer training, Endurance training, Patient/family training, Compensatory technique education, Activityengagement, Energy conservation       See flowsheet documentation for full assessment, interventions and recommendations.   Note: Limitation: Decreased ADL status, Decreased UE strength, Decreased Safe judgement during ADL, Decreased endurance, Decreased self-care trans, Decreased high-level ADLs  Prognosis: Good  Assessment: Pt is a 69 y.o. female seen for OT evaluation s/p admit to [facility] on 3/26/2025 w/ Stroke-like symptoms.  Comorbidities affecting pt's functional performance at time of assessment include: a-fib, type 2 diabetes, HTN, dysphagia, dysphonia, posterior glottic stenosis, tracheal stenosis . Personal factors affecting pt at time of IE include:limited home support, difficulty performing ADLS, difficulty performing IADLS , decreased initiation and engagement , and health management . OT order placed to assess Pt's ADLs, cognitive status, and performance during functional tasks in order to maximize safety and independence while making most appropriate d/c recommendations. Prior to admission, pt was I with ADLs and requires some A with IADLs. Upon evaluation: the following deficits impact occupational performance: weakness, decreased strength, decreased balance, decreased tolerance, impaired problem solving, decreased safety awareness, and increased pain. Pt's clinical presentation is currently evolving given new onset deficits that effect Pt's occupational performance and ability to safely return to [facility] including decrease activity tolerance, decrease standing balance, decrease sitting balance, decrease performance during ADL tasks,  decrease safety awareness , decrease generalized strength, decrease activity engagement, decrease performance during functional transfers, and high fall risk combined with medical complications of hypertension , poor blood pressure control, pain impacting overall mobility status, change in mental status, A-fib, new onset O2 use, decreased skin integrity, and need for input for mobility technique/safety.  Pt to benefit from continued skilled OT tx while in the hospital to address deficits as defined above and maximize level of functional independence w ADL's and functional mobility. Occupational Performance areas to address include: grooming, bathing/shower, toilet hygiene, dressing, functional mobility, community mobility, and clothing management. From OT standpoint, recommendation at time of d/c would with moderate intensity OT resources.     Rehab Resource Intensity Level, OT: II (Moderate Resource Intensity)     Stephany Gale OTR/L

## 2025-03-27 NOTE — PROGRESS NOTES
Progress Note - Hospitalist   Name: Veronica Reese 69 y.o. female I MRN: [id number]  Unit/Bed#: ED 02 I Date of Admission: 3/26/2025   Date of Service: 3/27/2025 I Hospital Day: 1    Assessment & Plan  Stroke-like symptoms  Presents with altered mental status-plan for encephalopathy  Suspect this is more likely related to sepsis-the plan for same  Appreciate input of neurology  CT brain negative  CTA head and neck: Unchanged severe stenosis at the P1/P2 junction of the left PCA   MRI brain pending  TTE pending  PT/OT/ST consults pending  Continue ASA/statin/DOAC  Lipid panel/TSH/Hgb A1C ordered   Case management following to assist with discharge planning  Hemiplegia and hemiparesis following cerebral infarction affecting left non-dominant side (HCC)  Longstanding from prior CVA  Stroke workup is in progress see plan for same  Continue statin and aspirin  Acute cystitis without hematuria  Presented to the ED encephalopathic.  History of prior strokes.  Workup in progress see plan for same   Some leukocytes on UA was negative  Initiated on cefepime in the ED  Complaining of some urinary frequency and burning  Procalcitonin negative today  Change antibiotic to Rocephin, plan 3 days of treatment total  Monitor urinary output  T Max 99.5 overnight  CBC a.m.  Toxic metabolic encephalopathy  Presents with AMS  History of prior CVA-stroke workup is in progress  Suspect presentation is related to sepsis-see plan for same  Mentating appropriately on exam, encephalopathy resolved  Sepsis without acute organ dysfunction (HCC)  POA  As evidenced by tachycardia, tachypnea with suspected UTI  U/A some leukocytes otherwise negative, culture pending  Blood cultures pending  Procalcitonin negative x 2  Chest x-ray negative  Incidentally noted to be influenza A positive-plan for same  S/p IV fluid resuscitation per sepsis protocol  Was initiated on cefepime in the ED-continued on the MedSurg floor  Tmax 99.5 overnight  Will de-escalate  antibiotics to Rocephin today and monitor-she is complaining of some urinary frequency  Tachycardia and tachypnea have resolved  CBC a.m.  Hyponatremia    Lab Results   Component Value Date    SODIUM 133 (L) 03/27/2025    SODIUM 132 (L) 03/26/2025    SODIUM 134 (L) 05/30/2024      Appears to have chronic hyponatremia  Serum sodium currently at baseline  Euvolemic on exam  Monitoring fluid status closely  BMP a.m.  Influenza A  Presented encephalopathic-see plan for same   FLU A + on arrival  Continue Tamiflu day 2/5  Supportive care  Isolation precautions  Paroxysmal atrial fibrillation (HCC)  Denies dizziness lightheadedness chest pain or palpitations on exam  EKG normal sinus rhythm on arrival heart rate 97  Stroke workup in progress monitoring on telemetry  Per neurology recommendations maintain -[id number] optimized  Continue pre hospital metoprolol  Continue prehospital Xarelto for AC  Type 2 diabetes mellitus with chronic kidney disease, with long-term current use of insulin (HCC)  Lab Results   Component Value Date    HGBA1C 9.9 (H) 03/26/2025       Recent Labs     03/26/25  1610 03/26/25  2216 03/27/25  0717   POCGLU 206* 132 98       Blood Sugar Average: Last 72 hrs:  (P) 145.7752226739570985    Hold prehospital oral hypoglycemics while inpatient, resume on discharge  Home regimen includes 5 mg Tradjenta daily, 500 mg metformin twice a day, 2 mg Prandin twice a day  Target blood sugar inpatient 140-180  Blood sugars are optimized  Continue SSI ACHS  Continue diabetic low-salt diet  Hypoglycemic protocol  BMP a.m.  Hyperlipidemia  Continue statin   Essential hypertension  Blood pressure controlled  Continue prehospital lisinopril and metoprolol  Monitor BP per protocol    VTE Pharmacologic Prophylaxis: VTE Score: 5 High Risk (Score >/= 5) - Pharmacological DVT Prophylaxis Ordered: rivaroxaban (Xarelto). Sequential Compression Devices Ordered.    Mobility:   Basic Mobility Inpatient Raw Score:  13  JH-HLM Goal: 4: Move to chair/commode  JH-HLM Achieved: 2: Bed activities/Dependent transfer  JH-HLM Goal NOT achieved. Continue with multidisciplinary rounding and encourage appropriate mobility to improve upon JH-HLM goals.    Patient Centered Rounds: I performed bedside rounds with nursing staff today.   Discussions with Specialists or Other Care Team Provider: Neurology, PT OT ST, case management, nursing    Education and Discussions with Family / Patient: Updated  (son) via phone.    Current Length of Stay: 1 day(s)  Current Patient Status: Inpatient   Certification Statement: The patient will continue to require additional inpatient hospital stay due to stroke workup in progress, also treated for sepsis continue IV antibiotics, a.m. labs  Discharge Plan:  Presented encephalopathic last evening.  Suspect related to sepsis rather than stroke.  Stroke workup is in progress negative thus far.  Pending PT OT ST recommendations.  Continuing IV antibiotics for UTI-sepsis parameters have resolved.  Jamar labs in the AM.  Case management following for discharge disposition    Code Status: Level 1 - Full Code    Subjective   Is complaining of some frequency and burning with urination otherwise no complaints.  Denies dizziness lightheadedness chest pain or palpitations.  Denies shortness of breath.    Objective :  Temp:  [97.9 °F (36.6 °C)-101.4 °F (38.6 °C)] 99.5 °F (37.5 °C)  HR:  [66-98] 80  BP: (106-159)/(55-76) 140/69  Resp:  [13-35] 18  SpO2:  [90 %-99 %] 98 %  O2 Device: Nasal cannula  Nasal Cannula O2 Flow Rate (L/min):  [2 L/min] 2 L/min    Body mass index is 27.62 kg/m².     Input and Output Summary (last 24 hours):     Intake/Output Summary (Last 24 hours) at 3/27/2025 1008  Last data filed at 3/26/2025 2210  Gross per 24 hour   Intake 3150 ml   Output --   Net 3150 ml       Physical Exam  Vitals and nursing note reviewed.   Constitutional:       General: She is not in acute distress.      Appearance: She is well-developed.   HENT:      Head: Normocephalic and atraumatic.   Eyes:      Extraocular Movements: Extraocular movements intact.      Conjunctiva/sclera: Conjunctivae normal.      Pupils: Pupils are equal, round, and reactive to light.   Cardiovascular:      Rate and Rhythm: Normal rate and regular rhythm.      Heart sounds: No murmur heard.  Pulmonary:      Effort: Pulmonary effort is normal. No respiratory distress.      Breath sounds: Normal breath sounds. No wheezing or rales.   Abdominal:      General: Bowel sounds are normal. There is no distension.      Palpations: Abdomen is soft.      Tenderness: There is no abdominal tenderness. There is no guarding.   Musculoskeletal:         General: No swelling.      Cervical back: Neck supple.   Skin:     General: Skin is warm and dry.      Capillary Refill: Capillary refill takes less than 2 seconds.   Neurological:      General: No focal deficit present.      Mental Status: She is alert and oriented to person, place, and time. Mental status is at baseline.      Cranial Nerves: No cranial nerve deficit.      Sensory: No sensory deficit.      Motor: No weakness.      Coordination: Coordination normal.      Comments: Gait not assessed at this time   Psychiatric:         Mood and Affect: Mood normal.         Behavior: Behavior normal.         Thought Content: Thought content normal.         Judgment: Judgment normal.           Lines/Drains:        Telemetry:  Telemetry Orders (From admission, onward)               24 Hour Telemetry Monitoring  Continuous x 24 Hours (Telem)        Expiring   Question:  Reason for 24 Hour Telemetry  Answer:  TIA/Suspected CVA/ Confirmed CVA                     Telemetry Reviewed: Normal Sinus Rhythm  Indication for Continued Telemetry Use: No indication for continued use. Will discontinue.                Lab Results: I have reviewed the following results:   Results from last 7 days   Lab Units 03/27/25  0440   WBC  Thousand/uL 3.73*   HEMOGLOBIN g/dL 10.5*   HEMATOCRIT % 33.4*   PLATELETS Thousands/uL 165     Results from last 7 days   Lab Units 03/27/25  0440   SODIUM mmol/L 133*   POTASSIUM mmol/L 4.5   CHLORIDE mmol/L 101   CO2 mmol/L 25   BUN mg/dL 16   CREATININE mg/dL 0.79   ANION GAP mmol/L 7   CALCIUM mg/dL 7.7*   GLUCOSE RANDOM mg/dL 99     Results from last 7 days   Lab Units 03/26/25  1642   INR  2.63*     Results from last 7 days   Lab Units 03/27/25  0717 03/26/25  2216 03/26/25  1610   POC GLUCOSE mg/dl 98 132 206*     Results from last 7 days   Lab Units 03/26/25  2041   HEMOGLOBIN A1C % 9.9*     Results from last 7 days   Lab Units 03/27/25  0440 03/26/25  1734 03/26/25  1642   LACTIC ACID mmol/L  --  1.9  --    PROCALCITONIN ng/ml <0.05  --  <0.05       Recent Cultures (last 7 days):   Results from last 7 days   Lab Units 03/26/25  1734   BLOOD CULTURE  Received in Microbiology Lab. Culture in Progress.  Received in Microbiology Lab. Culture in Progress.       Imaging Results Review: I reviewed radiology reports from this admission including: CT brain, CTA head and neck, chest x-ray.  Other Study Results Review: EKG was personally reviewed and my interpretation is: NSR. HR 97..    Last 24 Hours Medication List:     Current Facility-Administered Medications:     acetaminophen (TYLENOL) tablet 650 mg, Q4H PRN    atorvastatin (LIPITOR) tablet 80 mg, Daily With Dinner    atropine (ISOPTO ATROPINE) 1 % ophthalmic solution 1 drop, TID    ceftriaxone (ROCEPHIN) 1 g/50 mL in dextrose IVPB, Q24H    famotidine (PEPCID) tablet 40 mg, Daily    insulin lispro (HumALOG/ADMELOG) 100 units/mL subcutaneous injection 1-5 Units, HS    insulin lispro (HumALOG/ADMELOG) 100 units/mL subcutaneous injection 1-6 Units, TID AC **AND** Fingerstick Glucose (POCT), TID AC    lisinopril (ZESTRIL) tablet 5 mg, Daily    metoprolol tartrate (LOPRESSOR) tablet 50 mg, Q12H    ondansetron (ZOFRAN) injection 4 mg, Q6H PRN    oseltamivir  (TAMIFLU) capsule 75 mg, Q12H AVERY    oxybutynin (DITROPAN-XL) 24 hr tablet 10 mg, Daily    rivaroxaban (XARELTO) tablet 20 mg, Daily With Breakfast    Administrative Statements   Today, Patient Was Seen By: ALEC Green  I have spent a total time of 39 minutes in caring for this patient on the day of the visit/encounter including Patient and family education, Documenting in the medical record, Reviewing/placing orders in the medical record (including tests, medications, and/or procedures), Obtaining or reviewing history  , and Communicating with other healthcare professionals .    **Please Note: This note may have been constructed using a voice recognition system.**

## 2025-03-27 NOTE — ASSESSMENT & PLAN NOTE
Blood pressure controlled  Continue prehospital lisinopril and metoprolol  Monitor BP per protocol

## 2025-03-27 NOTE — ASSESSMENT & PLAN NOTE
Presented to the ED encephalopathic.  History of prior strokes.  Workup in progress see plan for same   Some leukocytes on UA was negative  Initiated on cefepime in the ED  Complaining of some urinary frequency and burning  Procalcitonin negative today  Change antibiotic to Rocephin, plan 3 days of treatment total  Monitor urinary output  T Max 99.5 overnight  CBC a.m.

## 2025-03-27 NOTE — PLAN OF CARE
Problem: Potential for Falls  Goal: Patient will remain free of falls  Description: INTERVENTIONS:- Educate patient/family on patient safety including physical limitations- Instruct patient to call for assistance with activity - Consult OT/PT to assist with strengthening/mobility - Keep Call bell within reach- Keep bed low and locked with side rails adjusted as appropriate- Keep care items and personal belongings within reach- Initiate and maintain comfort rounds- Make Fall Risk Sign visible to staff- Offer Toileting every 2 Hours, in advance of need- Initiate/Maintain bed alarm- Obtain necessary fall risk management equipment: yellow socks- Apply yellow socks and bracelet for high fall risk patients- Consider moving patient to room near nurses station  Outcome: Progressing     Problem: Prexisting or High Potential for Compromised Skin Integrity  Goal: Skin integrity is maintained or improved  Description: INTERVENTIONS:- Identify patients at risk for skin breakdown- Assess and monitor skin integrity- Assess and monitor nutrition and hydration status- Monitor labs - Assess for incontinence - Turn and reposition patient- Assist with mobility/ambulation- Relieve pressure over bony prominences- Avoid friction and shearing- Provide appropriate hygiene as needed including keeping skin clean and dry- Evaluate need for skin moisturizer/barrier cream- Collaborate with interdisciplinary team - Patient/family teaching- Consider wound care consult   Outcome: Progressing     Problem: Neurological Deficit  Goal: Neurological status is stable or improving  Description: Interventions:- Monitor and assess patient's level of consciousness, motor function, sensory function, and level of assistance needed for ADLs. - Monitor and report changes from baseline. Collaborate with interdisciplinary team to initiate plan and implement interventions as ordered. - Provide and maintain a safe environment.- Consider seizure precautions.-  Consider fall precautions.- Consider aspiration precautions.- Consider bleeding precautions.  Outcome: Progressing     Problem: Activity Intolerance/Impaired Mobility  Goal: Mobility/activity is maintained at optimum level for patient  Description: Interventions:- Assess and monitor patient  barriers to mobility and need for assistive/adaptive devices.- Assess patient's emotional response to limitations.- Collaborate with interdisciplinary team and initiate plans and interventions as ordered.- Encourage independent activity per ability.- Maintain proper body alignment.- Perform active/passive rom as tolerated/ordered.- Plan activities to conserve energy.- Turn patient as appropriate  Outcome: Progressing     Problem: Communication Impairment  Goal: Ability to express needs and understand communication  Description: Assess patient's communication skills and ability to understand information.  Patient will demonstrate use of effective communication techniques, alternative methods of communication and understanding even if not able to speak. - Encourage communication and provide alternate methods of communication as needed.- Collaborate with case management/ for discharge needs.- Include patient/family/caregiver in decisions related to communication.  Outcome: Progressing     Problem: Potential for Aspiration  Goal: Non-ventilated patient's risk of aspiration is minimized  Description: Assess and monitor vital signs, respiratory status, and labs (WBC).  Monitor for signs of aspiration (tachypnea, cough, rales, wheezing, cyanosis, fever).- Assess and monitor patient's ability to swallow.- Place patient up in chair to eat if possible.- HOB up at 90 degrees to eat if unable to get patient up into chair.- Supervise patient during oral intake. - Instruct patient/ family to take small bites.- Instruct patient/ family to take small single sips when taking liquids.- Follow patient-specific strategies generated  by speech pathologist.  Outcome: Progressing  Goal: Ventilated patient's risk of aspiration is minimized  Description: Assess and monitor vital signs, respiratory status, airway cuff pressure, and labs (WBC).  Monitor for signs of aspiration (tachypnea, cough, rales, wheezing, cyanosis, fever).- Elevate head of bed 30 degrees if patient has tube feeding.- Monitor tube feeding.  Outcome: Progressing     Problem: Nutrition  Goal: Nutrition/Hydration status is improving  Description: Monitor and assess patient's nutrition/hydration status for malnutrition (ex- brittle hair, bruises, dry skin, pale skin and conjunctiva, muscle wasting, smooth red tongue, and disorientation). Collaborate with interdisciplinary team and initiate plan and interventions as ordered.  Monitor patient's weight and dietary intake as ordered or per policy. Utilize nutrition screening tool and intervene per policy. Determine patient's food preferences and provide high-protein, high-caloric foods as appropriate. - Assist patient with eating.- Allow adequate time for meals.- Encourage patient to take dietary supplement as ordered.- Collaborate with clinical nutritionist.- Include patient/family/caregiver in decisions related to nutrition.  Outcome: Progressing     Problem: PAIN - ADULT  Goal: Verbalizes/displays adequate comfort level or baseline comfort level  Description: Interventions:- Encourage patient to monitor pain and request assistance- Assess pain using appropriate pain scale- Administer analgesics based on type and severity of pain and evaluate response- Implement non-pharmacological measures as appropriate and evaluate response- Consider cultural and social influences on pain and pain management- Notify physician/advanced practitioner if interventions unsuccessful or patient reports new pain  Outcome: Progressing     Problem: INFECTION - ADULT  Goal: Absence or prevention of progression during hospitalization  Description:  INTERVENTIONS:- Assess and monitor for signs and symptoms of infection- Monitor lab/diagnostic results- Monitor all insertion sites, i.e. indwelling lines, tubes, and drains- Monitor endotracheal if appropriate and nasal secretions for changes in amount and color- [place] appropriate cooling/warming therapies per order- Administer medications as ordered- Instruct and encourage patient and family to use good hand hygiene technique- Identify and instruct in appropriate isolation precautions for identified infection/condition  Outcome: Progressing  Goal: Absence of fever/infection during neutropenic period  Description: INTERVENTIONS:- Monitor WBC  Outcome: Progressing     Problem: DISCHARGE PLANNING  Goal: Discharge to home or other facility with appropriate resources  Description: INTERVENTIONS:- Identify barriers to discharge w/patient and caregiver- Arrange for needed discharge resources and transportation as appropriate- Identify discharge learning needs (meds, wound care, etc.)- Arrange for interpretive services to assist at discharge as needed- Refer to Case Management Department for coordinating discharge planning if the patient needs post-hospital services based on physician/advanced practitioner order or complex needs related to functional status, cognitive ability, or social support system  Outcome: Progressing     Problem: Knowledge Deficit  Goal: Patient/family/caregiver demonstrates understanding of disease process, treatment plan, medications, and discharge instructions  Description: Complete learning assessment and assess knowledge base.Interventions:- Provide teaching at level of understanding- Provide teaching via preferred learning methods  Outcome: Progressing

## 2025-03-27 NOTE — ASSESSMENT & PLAN NOTE
Presents with AMS  History of prior CVA-stroke workup is in progress  Suspect presentation is related to sepsis-see plan for same  Mentating appropriately on exam, encephalopathy resolved

## 2025-03-27 NOTE — ASSESSMENT & PLAN NOTE
Lab Results   Component Value Date    HGBA1C 11.5 (H) 12/01/2023       Recent Labs     03/26/25  1610 03/26/25  2216   POCGLU 206* 132       Blood Sugar Average: Last 72 hrs:  (P) 169    Home agents held. Sliding scale and hypoglycemia protocol.

## 2025-03-27 NOTE — NUTRITION
03/27/25 1444   Biochemical Data,Medical Tests, and Procedures   Biochemical Data/Medical Tests/Procedures Lab values reviewed;Meds reviewed   Labs (Comment) 3/27/25 HgA1c 9.9, glucose 202, HDL 45, sodium 133, calcium 7.7, hemoglobin 10.5, hematocrit 33.4   Meds (Comment) atorvastatin, rocephin, pepcid, humalog, zestril, lopressor, xarelto, atropine, zofran   Nutrition-Focused Physical Exam   Nutrition-Focused Physical Exam Findings RN skin assessment reviewed;No skin issues documented;Weight loss   Medical-Related Concerns Type 2 DM, stage 3 CKD, hypertension, hyperlipidemia, influenza A, metabolic encephalopathy, sepsis, hyponatremia, history of CVA, glottic insufficiency, gustatory hyperhidrosis,  heart disease, GERD, sleep apnea, diabetic neuropathy   Adequacy of Intake   Nutrition Modality PO   Feeding Route   PO Independent   Current PO Intake   Current Diet Order CCD2; 2G sodium restriction; nectar thick liquid   Current Meal Intake Other (Comment)  (Meal completions unavailable via EMR.)   Estimated Calorie Intake %   Estimated calorie intake compared to estimated need Nutrient needs are likely met on average as patient reports no issues with her appetite.   PES Statement   Problem Intake   Carbohydrate and Fiber (5.8) Inconsistent carbohydrate intake [id number]   Related to Other (Comment)  (altered carbohydrate needs; Type 2 DM)   As evidenced by: Abnormal lab (Specify)  (3/27/25 HgA1c 9.9)   Recommendations/Interventions   Malnutrition/BMI Present No   Summary Speech consult, stroke consult, weight loss. Presents via EMS for evaluation of altered mental status, urinary incontinence and a possible fall. PMH significant for Type 2 DM, stage 3 CKD, hypertension, hyperlipidemia, influenza A, metabolic encephalopathy, sepsis, hyponatremia, history of CVA, glottic insufficiency, gustatory hyperhidrosis,  heart disease, GERD, sleep apnea, diabetic neuropathy. Weight history reviewed; weight appears to be  decreasing in past year with no significant changes. No edema. No pressure areas. Prescribed CCD2; 2G sodium restriction; nectar thick liquid. Meal completions unavailable via EMR. Nutrient needs are likely met on average as patient reports no issues with her appetite.   Patient states her appetite is good. She usually has 3 meals daily. She does not follow any special kind of diet. NKFA. She cooks & grocery shops herself. Denies dysphagia. Reports non-intentional weight loss. She occasionally adds salt to food. She checks her blood sugar which is usually at 130. She administers insulin and takes PO medication, as prescribed, for DM. Recommend implementing cardiac diet pending stroke workup; CCD2; 2G sodium restriction; nectar think liquids per ST. Nutrition services will follow.   Interventions/Recommendations Adjust diet order;Monitor I & O's   Recommendations to Provider Recommend implementing cardiac diet pending stroke workup; CCD2; 2G sodium restriction; nectar think liquids per ST.   Education Assessment   Education Education initiated/ completed   Education Notes Education provided on stroke nutrition therapy.   Patient Nutrition Goals   Goal Glucose WNL;Comprehend education;Improve to healthful diet   Goal Status Initiated   Timeframe to complete goal by next f/u   Nutrition Complexity Risk   Nutrition complexity level Moderate risk   Follow up date 04/02/25

## 2025-03-27 NOTE — ASSESSMENT & PLAN NOTE
Denies dizziness lightheadedness chest pain or palpitations on exam  EKG normal sinus rhythm on arrival heart rate 97  Stroke workup in progress monitoring on telemetry  Per neurology recommendations maintain -[id number] optimized  Continue pre hospital metoprolol  Continue prehospital Xarelto for AC

## 2025-03-27 NOTE — ASSESSMENT & PLAN NOTE
Presents with altered mental status-plan for encephalopathy  Suspect this is more likely related to sepsis-the plan for same  Appreciate input of neurology  CT brain negative  CTA head and neck: Unchanged severe stenosis at the P1/P2 junction of the left PCA   MRI brain pending  TTE pending  PT/OT/ST consults pending  Continue ASA/statin/DOAC  Lipid panel/TSH/Hgb A1C ordered   Case management following to assist with discharge planning

## 2025-03-27 NOTE — PLAN OF CARE
Problem: PHYSICAL THERAPY ADULT  Goal: Performs mobility at highest level of function for planned discharge setting.  See evaluation for individualized goals.  Description: Treatment/Interventions: Functional transfer training, Therapeutic exercise, Endurance training, Gait training, Bed mobility, Equipment eval/education  Equipment Recommended: Walker       See flowsheet documentation for full assessment, interventions and recommendations.  Outcome: Progressing  Note: Prognosis: Good     Assessment: Pt is 69 y.o. female seen for PT evaluation s/p admit to [facility] on 3/26/2025 w/ Stroke-like symptoms. PT consulted to assess pt's functional mobility and d/c needs. Order placed for PT eval and tx, w/ activity as tolerated order. Pt agreeable to PT  session upon arrival, pt found supine in bed.  PTA, pt was independent w/ all functional mobility w/ rollator, ambulates household distances, lives alone in 1 level apartment, and on disability.  Pt to benefit from continued PT tx to address deficits and maximize level of functional independent mobility and consistency. Upon conclusion pt  supine in bed. Complexity: Comorbidities affecting pt's physical performance at time of assessment include: DM, htn, CVA, depression, and encephalopathy. Personal factors affecting pt at time of IE include: living alone, limited mobility, history of falls, ambulating with assistive device, and depression. Please find objective findings from PT assessment regarding body systems outlined above with impairments and limitations including weakness, impaired balance, decreased endurance, gait deviations, decreased activity tolerance, decreased functional mobility tolerance, altered sensation, and fall risk.  Pt's clinical presentation is currently unstable/unpredictable seen in pt's presentation of bradycardia, abnormal WBC count, abnormal sodium levels, abnormal calcium levels, low SaO2 levels, new onset of O2 use, telemetry use,  and recent fall. The patient's AM-PAC Basic Mobility Inpatient Short Form Raw Score is 17.  Based on patient presentations and impairments, pt would most appropriately benefit from Level 2 resource intensity upon discharge. A Raw score of greater than 16 suggests the patient may benefit from discharge to home. Please also refer to the recommendation of the Physical Therapist for safe discharge planning. RN verbalized pt appropriate for PT session. Pt seen as a co-eval with OT due to the patient's co-morbidities, clinically unstable presentation, and present impairments which are a regression from the patient's baseline.  Barriers to Discharge: Inaccessible home environment     Rehab Resource Intensity Level, PT: II (Moderate Resource Intensity)    See flowsheet documentation for full assessment.

## 2025-03-27 NOTE — ASSESSMENT & PLAN NOTE
Lab Results   Component Value Date    SODIUM 133 (L) 03/27/2025    SODIUM 132 (L) 03/26/2025    SODIUM 134 (L) 05/30/2024      Appears to have chronic hyponatremia  Serum sodium currently at baseline  Euvolemic on exam  Monitoring fluid status closely  BMP a.m.

## 2025-03-27 NOTE — UTILIZATION REVIEW
Initial Clinical Review    Admission: Date/Time/Statement:   Admission Orders (From admission, onward)       Ordered        03/26/25 1833  INPATIENT ADMISSION  Once                          Orders Placed This Encounter   Procedures    INPATIENT ADMISSION     Standing Status:   Standing     Number of Occurrences:   1     Level of Care:   Med Surg [16]     Estimated length of stay:   More than 2 Midnights     Certification:   I certify that inpatient services are medically necessary for this patient for a duration of greater than two midnights. See H&P and MD Progress Notes for additional information about the patient's course of treatment.     ED Arrival Information       Expected   -    Arrival   3/26/2025 15:57    Acuity   Emergent              Means of arrival   Amb[facility]    Admission type   Emergency              Arrival complaint   altered mental status             Chief Complaint   Patient presents with    Altered Mental Status     According to the patient EMS, the patient had been more altered since 848.  Patient had a fall around 848 today and refused ems.       Initial Presentation: 69 y.o. female presented via EMS as inpatient admission for stroke like symptoms. PMH of HTN, HLD, depression, CVA who presents to [facility] with altered mental status, urinary incontinence, possible unwitnessed fall.  Patient has a visiting nurse who found her slumped onto her walker unable to get up.  EMS was called who reported that her apartment smelled of urine.  She states she remembers falling this morning but cannot provide further details.  She admits that she had urinated herself but does not remember this happening. Patient c/o weakness and confusion.On exam frequent dry cough frequent yawning.  Stroke alter called. Plan neuro and VS q 1 hr x 4 q 2 hrs x 8 hrs q 4 hrs x 72 hrs, telemetry, accu checks with SSI ECHO MRI,PT/OT/ST  looks like UTI  initiated on cefepime in the ED SCD and supportive care    Neurology consult:  Assessment & Plan  Stroke-like symptoms  Veronica Reese is a 69 y.o. left handed female with prior pontine stroke in 2022(with left sided symptoms), Afib on xarelto, DM, HLD, HTN who presents as stroke alert after being found slumped over a walker. Patient appear to have fever 101.8, chills, cough, N/V which might be from infection. NIHSS 4 but some of her symptoms appear to be chronic from prior stroke. Patient is compliant with her medications at home. CTH without acute bleed or large territorial infarct. Concern for hyperdense basilar. CTA without LVO, noted stable left P1/P2 stenosis. Imaging reviewed on PACS and discussed with radiologist. No TNK candidate due to xarelto use and OOW, Ddx: TME from acute infection, recrudescence, acute ischemic stroke.   - Continue home Xarelto  - Medical management and correction of metabolic and infectious disturbances per primary team   - Can proceed with stroke pathway if symptoms not improve after treatment of infection  - SBP goal 120-180  - Continue  Xarelto and statin   - Vascular risk factor management, A1c goal <6.5, LDL goal <70  - PT/OT/ST  Anticipated Length of Stay/Certification Statement:  Patient will be admitted on an inpatient basis with an anticipated length of stay of greater than 2 midnights secondary to stroke pathway.       Date: 03-27-25   Day 2: MRI pending, continue ASA/statin, TTE pending Some leukocytes on UA Complaining of some urinary frequency and burning continue, Change antibiotic to Rocephin, plan 3 days of treatment total monitor I/O fever curve, blood cultures pending, Incidentally noted to be influenza A positive-plan for same, started on Tamiflu x 5 days, accu checks with SSI, continue neuro checks awaiting PT/OT recommendation. CM following to assist with safe d/c planning.     Certification Statement: The patient will continue to require additional inpatient  hospital stay due to stroke workup in progress, also treated for sepsis continue IV antibiotics, a.m. labs     Date: 03-28-25  Day 3: Has surpassed a 2nd midnight with active treatments and services. As per PT patient will need acute rehab upon discharge for safe d/c Will consult CM continue neuro checks IV rocephin Tamiflu, accu checks with SSI and supportive care     Neuro note  MRI brain without acute ischemic infarct but noted possible punctate subacute subcortical infarcts in the right posterior parietal temporal and left posterior frontal parietal white matter versus T2 shine through from white matter lesions.    Patient is already on Asa and Xarelto for secondary stroke prevention, can continue    ED Treatment-Medication Administration from 03/26/2025 1556 to 03/27/2025 1313         Date/Time Order Dose Route Action     03/26/2025 1634 iohexol (OMNIPAQUE) 350 MG/ML injection (MULTI-DOSE) 85 mL 85 mL Intravenous Given     03/26/2025 1729 acetaminophen (Ofirmev) injection 1,000 mg 1,000 mg Intravenous New Bag     03/26/2025 1803 cefepime (MAXIPIME) 2 g/50 mL dextrose IVPB 2,000 mg Intravenous New Bag     03/26/2025 2039 multi-electrolyte (Plasmalyte-A/Isolyte-S PH 7.4/Normosol-R) IV bolus 1,000 mL 1,000 mL Intravenous New Bag     03/26/2025 2113 multi-electrolyte (Plasmalyte-A/Isolyte-S PH 7.4/Normosol-R) IV bolus 1,000 mL 1,000 mL Intravenous New Bag     03/26/2025 2148 multi-electrolyte (Plasmalyte-A/Isolyte-S PH 7.4/Normosol-R) IV bolus 1,000 mL 1,000 mL Intravenous New Bag     03/27/2025 1153 insulin lispro (HumALOG/ADMELOG) 100 units/mL subcutaneous injection 1-6 Units 2 Units Subcutaneous Given     03/26/2025 2351 aspirin chewable tablet 81 mg 81 mg Oral Given     03/26/2025 2351 atropine (ISOPTO ATROPINE) 1 % ophthalmic solution 1 drop 1 drop Sublingual Given     03/27/2025 0939 atropine (ISOPTO ATROPINE) 1 % ophthalmic solution 1 drop 1 drop Sublingual Given     03/27/2025 0932 famotidine (PEPCID)  tablet 40 mg 40 mg Oral Given     03/27/2025 0932 lisinopril (ZESTRIL) tablet 5 mg 5 mg Oral Given     03/26/2025 2350 metoprolol tartrate (LOPRESSOR) tablet 50 mg 50 mg Oral Given     03/27/2025 1151 metoprolol tartrate (LOPRESSOR) tablet 50 mg 50 mg Oral Given     03/27/2025 0931 oxybutynin (DITROPAN-XL) 24 hr tablet 10 mg 10 mg Oral Given     03/27/2025 0932 rivaroxaban (XARELTO) tablet 20 mg 20 mg Oral Given     03/26/2025 2149 oseltamivir (TAMIFLU) capsule 75 mg 75 mg Oral Given     03/27/2025 0932 oseltamivir (TAMIFLU) capsule 75 mg 75 mg Oral Given     03/27/2025 0834 cefepime (MAXIPIME) 1,000 mg in dextrose 5 % 50 mL IVPB 1,000 mg Intravenous New Bag            Scheduled Medications:  atorvastatin, 80 mg, Oral, Daily With Dinner  atropine, 1 drop, Sublingual, TID  cefTRIAXone, 1,000 mg, Intravenous, Q24H  famotidine, 40 mg, Oral, Daily  insulin lispro, 1-5 Units, Subcutaneous, HS  insulin lispro, 1-6 Units, Subcutaneous, TID AC  lisinopril, 5 mg, Oral, Daily  metoprolol tartrate, 50 mg, Oral, Q12H  oseltamivir, 75 mg, Oral, Q12H AVERY  oxybutynin, 10 mg, Oral, Daily  rivaroxaban, 20 mg, Oral, Daily With Breakfast      Continuous IV Infusions:     PRN Meds:  acetaminophen, 650 mg, Oral, Q4H PRN  ondansetron, 4 mg, Intravenous, Q6H PRN      ED Triage Vitals   Temperature Pulse Respirations Blood Pressure SpO2 Pain Score   03/26/25 1615 03/26/25 1615 03/26/25 1615 03/26/25 1615 03/26/25 1644 03/26/25 2330   (!) 101.4 °F (38.6 °C) 98 20 159/75 95 % No Pain     Weight (last 2 days)       Date/Time Weight    03/27/25 1301 76.9 (169.53)    03/27/25 0930 75.3 (166)    03/26/25 16:44:17 75.3 (166.01)            Vital Signs (last 3 days)       Date/Time Temp Pulse Resp BP MAP (mmHg) SpO2 Calculated FIO2 (%) - Nasal Cannula Nasal Cannula O2 Flow Rate (L/min) O2 Device Patient Position - Orthostatic VS [place] Coma Scale Score Pain    03/28/25 15:30:57 -- 76 22 130/77 95 99 % -- -- -- -- -- --    03/28/25 1400 -- -- -- --  -- -- -- -- -- -- -- No Pain    03/28/25 1300 -- -- -- -- -- -- -- -- -- -- 15 --    03/28/25 1101 -- 87 -- 129/77 -- -- -- -- None (Room air) -- 15 No Pain    03/28/25 1100 97.3 °F (36.3 °C) -- 18 130/71 -- -- -- -- -- -- -- --    03/28/25 1032 97 °F (36.1 °C) 75 18 130/71 -- -- -- -- -- -- -- --    03/28/25 0900 97.9 °F (36.6 °C) -- -- -- -- -- -- -- -- -- 15 --    03/28/25 07:49:26 -- 72 18 139/71 94 97 % -- -- -- -- -- --    03/28/25 05:13:47 -- -- -- -- -- 94 % -- -- -- -- -- --    03/28/25 0500 98.4 °F (36.9 °C) 67 18 134/74 92 91 % -- -- -- Lying 15 --    03/28/25 0100 100.3 °F (37.9 °C) 74 17 120/68 85 -- -- -- -- -- 15 --    03/28/25 0058 -- -- -- -- -- -- -- -- -- -- -- Med Not Given for Pain - for MAR use only    03/28/25 00:55:14 100.3 °F (37.9 °C) 72 17 120/68 85 95 % -- -- -- -- -- --    03/27/25 22:35:10 -- 62 -- 128/70 89 97 % -- -- -- -- -- --    03/27/25 2100 99.1 °F (37.3 °C) 70 17 121/66 84 -- -- -- -- -- 15 No Pain    03/27/25 20:59:46 99.1 °F (37.3 °C) 70 17 121/66 84 96 % -- -- -- -- -- --    03/27/25 17:07:08 99.7 °F (37.6 °C) 76 19 125/63 84 94 % -- -- -- -- -- --    03/27/25 1700 -- -- -- -- -- -- -- -- -- -- 15 --    03/27/25 1501 -- -- -- -- -- -- -- -- -- -- -- 3    03/27/25 13:20:29 99.7 °F (37.6 °C) 81 18 110/69 83 95 % 28 2 L/min Nasal cannula -- 15 No Pain    03/27/25 1301 100.1 °F (37.8 °C) 78 25 124/70 -- 93 % -- -- -- -- -- --    03/27/25 1130 99.8 °F (37.7 °C) 80 18 127/68 -- 98 % -- -- -- -- 14 --    03/27/25 0932 -- -- -- 140/69 -- -- -- -- -- -- -- --    03/27/25 0931 -- -- -- -- -- -- -- -- -- -- -- No Pain    03/27/25 0930 -- 80 18 140/69 98 98 % 28 2 L/min Nasal cannula -- -- --    03/27/25 0910 -- -- -- -- -- -- -- -- -- -- -- No Pain    03/27/25 0730 99.5 °F (37.5 °C) 78 19 122/56 81 90 % 28 2 L/min Nasal cannula Lying 14 No Pain    03/27/25 0707 99.3 °F (37.4 °C) 72 14 115/56 -- 98 % -- -- -- -- -- --    03/27/25 0530 98.3 °F (36.8 °C) 68 16 117/57 82 98 % -- -- -- -- 14  --    03/27/25 0330 -- 69 20 126/58 83 98 % -- -- -- -- 14 --    03/27/25 0130 -- 66 21 120/57 82 99 % -- -- -- -- 14 --    03/26/25 2330 -- 75 22 127/63 90 -- -- -- -- -- 14 No Pain    03/26/25 2315 -- 74 18 113/56 79 98 % 28 2 L/min Nasal cannula Lying -- --    03/26/25 2300 -- 75 18 113/55 79 98 % 28 2 L/min Nasal cannula Lying -- --    03/26/25 2245 -- 77 16 115/64 84 95 % 28 2 L/min Nasal cannula Lying -- --    03/26/25 2230 -- 84 13 113/60 -- 95 % 28 2 L/min Nasal cannula Lying 14 --    03/26/25 2215 -- 80 16 111/58 80 96 % -- -- Nasal cannula Lying -- --    03/26/25 2200 -- 83 16 122/65 88 95 % 28 2 L/min Nasal cannula Sitting -- --    03/26/25 2130 -- 80 18 111/57 -- 91 % -- -- None (Room air) -- 14 --    03/26/25 2100 -- 74 18 111/57 78 99 % 28 2 L/min Nasal cannula Lying -- --    03/26/25 2030 -- 77 18 112/56 -- 97 % -- -- None (Room air) -- 14 --    03/26/25 2000 97.9 °F (36.6 °C) 77 19 112/56 77 98 % 28 2 L/min Nasal cannula -- -- --    03/26/25 1915 -- 78 26 -- -- 92 % -- -- -- -- -- --    03/26/25 1900 -- 80 29 112/59 82 92 % -- -- None (Room air) Lying -- --    03/26/25 1845 -- 80 27 -- -- 92 % -- -- -- -- -- --    03/26/25 1830 -- 82 18 106/59 79 91 % -- -- None (Room air) -- -- --    03/26/25 1800 100.1 °F (37.8 °C) 86 16 123/62 85 95 % -- -- None (Room air) Lying -- --    03/26/25 1745 -- 88 21 -- -- 96 % -- -- -- -- -- --    03/26/25 1730 -- 87 17 157/76 109 -- -- -- -- -- -- --    03/26/25 1715 -- 88 26 121/60 86 95 % -- -- -- -- -- --    03/26/25 1700 -- 93 34 -- -- 95 % -- -- -- -- -- --    03/26/25 1645 -- 96 33 159/68 98 96 % -- -- -- -- -- --    03/26/25 16:44:17 -- 97 35 159/68 98 95 % -- -- -- -- 14 --    03/26/25 1615 101.4 °F (38.6 °C) 98 20 159/75 108 -- -- -- None (Room air) Lying -- --              Pertinent Labs/Diagnostic Test Results:   Radiology:  MRI brain wo contrast   Final Interpretation by Chencho Graham MD (03/27 1407)         Possible punctate subacute subcortical  infarcts in the right posterior parietal temporal and left posterior frontal parietal white matter versus T2 shine through from white matter lesions. No evidence of acute infarct..      Workstation performed: [id number]         XR chest portable   ED Interpretation by Leif Foreman DO (03/26 1716)   No acute cardiopulmonary pathology.      Final Interpretation by Andre George MD (03/27 0012)      Stable findings without acute cardiopulmonary abnormalities.      Workstation performed: [id number]         CTA stroke alert (head/neck)   Final Interpretation by Chuck Ozuna MD (03/26 2330)   1. No intracranial large vessel occlusion or hemodynamically significant stenosis in the neck.   2. Unchanged severe stenosis at the P1/P2 junction of the left PCA.      Findings were discussed with Dr. Huggins on 3/26/25 at 4:36 PM.      Workstation performed: [id number]         CT stroke alert brain   Final Interpretation by Chuck Ozuna MD (03/26 3273)   1. No  hemorrhage or CT evidence of an acute cortical infarct.   2. Possible hyperdense basilar artery. Attention on CTA head/neck.      Findings were discussed with Dr. Huggins on 3/26/25 at 4:18 PM via [place] secure chat.      .      Workstation performed: [id number]           Cardiology:  Echo complete w/ contrast if indicated   Final Result by Pool Johnson MD (03/27 1122)        Left Ventricle: Left ventricular cavity size is normal. Wall thickness    is normal. The left ventricular ejection fraction is 70%. Systolic    function is hyperdynamic. Wall motion is normal.     Left Atrium: The atrium is mildly dilated.     Aortic Valve: The aortic valve is trileaflet. The leaflets are mildly    calcified. The leaflets exhibit normal mobility. There is no evidence of    regurgitation. The aortic valve has no significant stenosis.     Mitral Valve: There is annular calcification. There is no evidence of    regurgitation.     Injection of agitated saline did not reveal any right to left  shunt.         ECG 12 lead   Final Result by Dexter Moseley DO (03/26 1703)   * Poor data quality, interpretation may be adversely affected   Normal sinus rhythm   Low voltage QRS   Septal infarct , age undetermined   Abnormal ECG   When compared with ECG of 14-Sep-2023 12:36,   Septal infarct is now Present   Confirmed by Dexter Moseley ([id number]) on 3/26/2025 5:02:57 PM        GI:  No orders to display           Results from last 7 days   Lab Units 03/28/25  0516 03/27/25  0440 03/26/25  1642   WBC Thousand/uL 3.99* 3.73* 4.80   HEMOGLOBIN g/dL 11.2* 10.5* 11.2*   HEMATOCRIT % 35.0 33.4* 35.1   PLATELETS Thousands/uL 169 165 194   TOTAL NEUT ABS Thousands/µL 1.46*  --   --          Results from last 7 days   Lab Units 03/28/25  0516 03/27/25  0440 03/26/25  1642   SODIUM mmol/L 136 133* 132*   POTASSIUM mmol/L 3.6 4.5 4.2   CHLORIDE mmol/L 103 101 97   CO2 mmol/L 27 25 26   ANION GAP mmol/L 6 7 9   BUN mg/dL 17 16 20   CREATININE mg/dL 0.78 0.79 0.94   EGFR ml/min/1.73sq m 78 77 62   CALCIUM mg/dL 8.2* 7.7* 8.7   MAGNESIUM mg/dL  --  2.1  --    PHOSPHORUS mg/dL  --  3.6  --          Results from last 7 days   Lab Units 03/28/25  1224 03/28/25  0748 03/27/25  2031 03/27/25  1615 03/27/25  1125 03/27/25  0717 03/26/25  2216 03/26/25  1610   POC GLUCOSE mg/dl 168* 104 157* 165* 202* 98 132 206*     Results from last 7 days   Lab Units 03/28/25  0516 03/27/25  0440 03/26/25  1642   GLUCOSE RANDOM mg/dL 100 99 195*         Results from last 7 days   Lab Units 03/26/25  2041   HEMOGLOBIN A1C % 9.9*   EAG mg/dl 237     Results from last 7 days   Lab Units 03/26/25  2041 03/26/25  1909 03/26/25  1642   HS TNI 0HR ng/L  --   --  10   HS TNI 2HR ng/L  --  14  --    HSTNI D2 ng/L  --  4  --    HS TNI 4HR ng/L 16  --   --    HSTNI D4 ng/L 6  --   --          Results from last 7 days   Lab Units 03/26/25  1642   PROTIME seconds 28.4*   INR  2.63*   PTT seconds 39*         Results from last 7 days   Lab Units 03/27/25  9597  03/26/25  1642   PROCALCITONIN ng/ml <0.05 <0.05     Results from last 7 days   Lab Units 03/26/25  1734   LACTIC ACID mmol/L 1.9                                                 Results from last 7 days   Lab Units 03/26/25  1800   CLARITY UA  Clear   COLOR UA  Yellow   SPEC GRAV UA  1.010   PH UA  7.0   GLUCOSE UA mg/dl 3+*   KETONES UA mg/dl Trace*   BLOOD UA  Trace-Intact*   PROTEIN UA mg/dl 2+*   NITRITE UA  Negative   BILIRUBIN UA  Negative   UROBILINOGEN UA E.U./dl 0.2   LEUKOCYTES UA  Negative   WBC UA /hpf 4-10*   RBC UA /hpf 1-2   BACTERIA UA /hpf None Seen   EPITHELIAL CELLS WET PREP /hpf Occasional                                 Results from last 7 days   Lab Units 03/26/25  1734   BLOOD CULTURE  No Growth at 24 hrs.  No Growth at 24 hrs.                   Past Medical History:   Diagnosis Date    Abdominal fibromatosis     Arthritis     Depression     GERD (gastroesophageal reflux disease)     controlled    Hyperlipemia     Hypertension     Obesity     Pneumonia     Right pontine stroke (HCC) 12/13/2021    Stroke (cerebrum) (HCC) 12/17/2021    Stroke (HCC)      Present on Admission:   Toxic metabolic encephalopathy   Paroxysmal atrial fibrillation (HCC)   Essential hypertension   Hyperlipidemia   Hyponatremia   Sepsis without acute organ dysfunction (HCC)   Influenza A      Admitting Diagnosis: Altered mental status [R41.82]  Influenza A [J10.1]  Stroke-like symptoms [R29.90]  Sepsis (HCC) [A41.9]  Cerebrovascular accident (CVA), unspecified mechanism (HCC) [I63.9]  Age/Sex: 69 y.o. female    Network Utilization Review Department  ATTENTION: Please call with any questions or concerns to [phone] and carefully listen to the prompts so that you are directed to the right person. All voicemails are confidential.   For Discharge needs, contact Care Management DC Support Team at [phone] opt. [phone]  Send all requests for admission clinical reviews, approved or denied determinations and any other requests  to dedicated fax number below belonging to the campus where the patient is receiving treatment. List of dedicated fax numbers for the Facilities:  FACILITY NAME UR FAX NUMBER   ADMISSION DENIALS (Administrative/Medical Necessity) [phone]   DISCHARGE SUPPORT TEAM (NETWORK) [phone]   PARENT CHILD HEALTH (Maternity/NICU/Pediatrics) [phone]   [facility] [phone]   [facility] [phone]   [facility] [phone]   [facility] [phone]   [facility] [phone]   [facility] [phone]   [facility] [phone]   [facility] [phone]   [facility] [phone]   [facility] [phone]   [facility] [phone]   [facility] [phone]

## 2025-03-27 NOTE — ASSESSMENT & PLAN NOTE
Presented encephalopathic-see plan for same   FLU A + on arrival  Continue Tamiflu day 2/5  Supportive care  Isolation precautions

## 2025-03-27 NOTE — H&P
H&P - Hospitalist   Name: Veronica Reese 69 y.o. female I MRN: [id number]  Unit/Bed#: ED 02 I Date of Admission: 3/26/2025   Date of Service: 3/27/2025 I Hospital Day: 1     Assessment & Plan  Stroke-like symptoms  Presents with altered mental status for which she was a stroke alert.   CT/CTA without acute findings.  Found to be septic with + FLU A/UTI/yeast infection.  Neurology aware and rec's stroke pathway with neuro consult.  MRI ordered   ECHO  Permissive hypertension  PT/OT/ST  Continue ASA/statin/DOAC  Lipid panel/TSH/Hgb A1C ordered   Neurology consult; recommendations appreciated    Hemiplegia and hemiparesis following cerebral infarction affecting left non-dominant side (HCC)  Longstanding from prior CVA.  Continue aspirin, statin, Xarelto.  Acute cystitis without hematuria  Cefepime started in ED. Yeast cells found on microscopy.  Follow up cultures    Toxic metabolic encephalopathy  Presents with AMS.   Hx of CVA. On stroke pathway.  Initial lab abnormalities: +UA/+FLU A/hyponatremia.  Correct infectious/metabolic abnormalities. Monitor for sx improvement.  Sepsis without acute organ dysfunction (HCC)  SIRS: 3/4; temperature, tachycardia, tachypnea  Sepsis: SIRS with suspected source UTI  WBC of 4.80  Lactic of 1.9; Procalcitonin <0.05.  IV Fluids at 30 cc/kg  Blood cultures, UA with reflex to culture  IV antibiotics; cefepime initially given in the ED; will continue; follow up with cultures and deescalate as appropriate  Hyponatremia    Lab Results   Component Value Date    SODIUM 132 (L) 03/26/2025    SODIUM 134 (L) 05/30/2024    SODIUM 133 (L) 12/05/2023    As evidenced by value of 132   Appears to be euvolemic on exam  IVF  Monitor with morning labs   Influenza A  FLU A + on arrival.  Tamiflu/IVF/supportive care.  Paroxysmal atrial fibrillation (HCC)  On metoprolol and Xarelto. Continue.  Type 2 diabetes mellitus with chronic kidney disease, with long-term current use of insulin (HCC)  Lab Results    Component Value Date    HGBA1C 11.5 (H) 12/01/2023       Recent Labs     03/26/25  1610 03/26/25  2216   POCGLU 206* 132       Blood Sugar Average: Last 72 hrs:  (P) 169    Home agents held. Sliding scale and hypoglycemia protocol.  Hyperlipidemia  Continue statin therapy.  Essential hypertension  Continue lisinopril.      VTE Pharmacologic Prophylaxis: VTE Score: 5 High Risk (Score >/= 5) - Pharmacological DVT Prophylaxis Ordered: rivaroxaban (Xarelto). Sequential Compression Devices Ordered.  Code Status: Level 1 - Full Code   Discussion with family: Patient declined call to .     Anticipated Length of Stay: Patient will be admitted on an inpatient basis with an anticipated length of stay of greater than 2 midnights secondary to stroke pathway.    History of Present Illness   Chief Complaint: BENITO Reese is a 69 y.o. female with a PMH of HTN, HLD, depression, CVA who presents to [facility] with altered mental status, urinary incontinence, possible unwitnessed fall.  Patient has a visiting nurse who found her slumped onto her walker unable to get up.  EMS was called who reported that her apartment smelled of urine.  She states she remembers falling this morning but cannot provide further details.  She admits that she had urinated herself but does not remember this happening.  She denies any focal neurological deficits at this time.  She denies CP, SOB, N/V/D, chills, headache, lightheadedness, dizziness, palpitations, diaphoresis.  She admits to cough which has lasted months.    Review of Systems   Constitutional:  Negative for chills and fever.   HENT:  Negative for ear pain and sore throat.    Eyes:  Negative for pain and visual disturbance.   Respiratory:  Positive for cough. Negative for shortness of breath.    Cardiovascular:  Negative for chest pain and palpitations.   Gastrointestinal:  Negative for abdominal pain and vomiting.   Genitourinary:  Negative for dysuria and  hematuria.        Incontinence   Musculoskeletal:  Negative for arthralgias and back pain.   Skin:  Negative for color change and rash.   Neurological:  Positive for weakness. Negative for seizures and syncope.   Psychiatric/Behavioral:  Positive for confusion. Negative for agitation.    All other systems reviewed and are negative.      Historical Information   Past Medical History:   Diagnosis Date    Abdominal fibromatosis     Arthritis     Depression     GERD (gastroesophageal reflux disease)     controlled    Hyperlipemia     Hypertension     Obesity     Pneumonia     Right pontine stroke (HCC) 12/13/2021    Stroke (cerebrum) (HCC) 12/17/2021    Stroke (HCC)      Past Surgical History:   Procedure Laterality Date    APPENDECTOMY      APPENDECTOMY LAPAROSCOPIC N/A 06/12/2022    Procedure: APPENDECTOMY LAPAROSCOPIC;  Surgeon: Dexter Rendon MD;  Location: [facility];  Service: General    CATARACT EXTRACTION      CATARACT EXTRACTION, BILATERAL      COLONOSCOPY      EGD      LAPAROTOMY      Exploratory; Last Assessed 10/17/2017    PEG TUBE PLACEMENT      PEG TUBE REMOVAL       Social History     Tobacco Use    Smoking status: Never    Smokeless tobacco: Never   Vaping Use    Vaping status: Never Used   Substance and Sexual Activity    Alcohol use: Never     Comment: Socially    Drug use: Never    Sexual activity: Not Currently     E-Cigarette/Vaping    E-Cigarette Use Never User      E-Cigarette/Vaping Substances    Nicotine No     THC No     CBD No     Flavoring No     Other No     Unknown No      Family History   Problem Relation Age of Onset    No Known Problems Mother     No Known Problems Father      Social History:  Marital Status:    Occupation:   Patient Pre-hospital Living Situation: Apartment, Alone  Patient Pre-hospital Level of Mobility: walks with walker  Patient Pre-hospital Diet Restrictions:     Meds/Allergies   I have reviewed home medications with patient personally.  Prior to Admission  medications    Medication Sig Start Date End Date Taking? Authorizing Provider   Accu-Chek FastClix Lancets MISC Use 2 (two) times a day 12/28/22   Jerry Kulkarni MD   Alcohol Swabs (Pharmacist Choice Alcohol) PADS  2/1/23   Historical Provider, MD   Aspirin 81 MG CAPS Take 1 tablet by mouth in the morning    Historical Provider, MD   Assure ID Safety Pen Needles 30G X 8 MM MISC USE DAILY 5/31/23   Jerry Kulkarni MD   atorvastatin (LIPITOR) 80 mg tablet TAKE ONE TABLET BY MOUTH ONCE DAILY WITH DINNER 6/27/23   Jerry Kulkarni MD   atropine (ISOPTO ATROPINE) 1 % ophthalmic solution 1-3 drops sublingual three times daily as needed for drooling/excessive saliva 3/12/25   Bartolo Li MD   azelastine (ASTELIN) 0.1 % nasal spray 1 spray into each nostril 2 (two) times a day 11/4/24   Bartolo Li MD   B-D UF III MINI PEN NEEDLES 31G X 5 MM MISC USE 4 TIMES DAILY 11/7/23   Jerry Kulkarni MD   Blood Glucose Monitoring Suppl (Accu-Chek Guide) w/Device KIT Use 2 (two) times a day 12/28/22   Jerry Kulkarni MD   Blood Glucose Monitoring Suppl (ONE TOUCH ULTRA 2) w/Device KIT USE AS DIRECTED 4/12/23   Jerry Kulkarni MD   Continuous Blood Gluc  (FreeStyle Naveen 2 [place]) SHERWIN DISPENSE 1 READER. 9/25/23   ALEC Richardson   Continuous Blood Gluc Sensor (FreeStyle Naveen 2 Sensor) MISC Use 1 sensor every 14 days for continuous glucose monitoring. 5/25/23   ALEC Richardson   famotidine (PEPCID) 40 MG tablet Take 1 tablet (40 mg total) by mouth daily 11/4/24   Bartolo Li MD   Lancet Devices (Lancet Device with Ejector) MISC USE TO CHECK BLOOD SUGAR TWICE A DAY 4/12/23   Jerry Kulkarni MD   Lancets Misc. (Accu-Chek Softclix Lancet Dev) KIT Check blood sugar 12/28/22   Jerry Kulkarni MD   linaGLIPtin (Tradjenta) 5 MG TABS Take 5 mg by mouth daily 8/15/23   ALEC Richardson   lisinopril (ZESTRIL) 5 mg tablet TAKE ONE TABLET BY MOUTH EVERY MORNING 7/25/23    Jerry Kulkarni MD   metFORMIN (GLUCOPHAGE-XR) 500 mg 24 hr tablet TAKE 1 TABLET (500 MG TOTAL) BY MOUTH 2 (TWO) TIMES A DAY WITH MEALS 7/3/23 3/3/25  Jerry Kulkarni MD   metoprolol tartrate (LOPRESSOR) 50 mg tablet TAKE ONE TABLET BY MOUTH EVERY 12 HOURS 5/22/23   Jerry Kulkarni MD   Myrbetriq 50 MG TB24 TAKE 1 TABLET (50 MG TOTAL) BY MOUTH IN THE MORNING 6/13/23   Jerry Kulkarni MD   ondansetron (ZOFRAN-ODT) 4 mg disintegrating tablet Take 1 tablet (4 mg total) by mouth every 8 (eight) hours as needed for nausea or vomiting 9/8/21   Jerry Kulkarni MD   OneTouch Ultra test strip  12/29/22   Maddie Campbell MD   pantoprazole (PROTONIX) 40 mg tablet TAKE ONE TABLET BY MOUTH DAILY 9/11/23   Jerry Kulkarni MD   repaglinide (PRANDIN) 2 mg tablet TAKE ONE TABLET BY MOUTH TWICE DAILY BEFORE MEALS 7/21/23   ALEC Richardson   Xarelto 20 MG tablet TAKE ONE TABLET BY MOUTH ONCE DAILY WITH BREAKFAST 8/16/23   Jerry Kulkarni MD     Allergies   Allergen Reactions    Apixaban Vomiting and GI Intolerance     Other reaction(s): Vomiting    Dulaglutide Vomiting     Nausea vomiting       Objective :  Temp:  [97.9 °F (36.6 °C)-101.4 °F (38.6 °C)] 97.9 °F (36.6 °C)  HR:  [66-98] 66  BP: (106-159)/(55-76) 120/57  Resp:  [13-35] 21  SpO2:  [91 %-99 %] 98 %  O2 Device: Nasal cannula  Nasal Cannula O2 Flow Rate (L/min):  [2 L/min] 2 L/min    Physical Exam  Vitals and nursing note reviewed.   Constitutional:       General: She is not in acute distress.     Appearance: She is well-developed.   HENT:      Head: Normocephalic and atraumatic.   Eyes:      Conjunctiva/sclera: Conjunctivae normal.   Cardiovascular:      Rate and Rhythm: Normal rate and regular rhythm.      Heart sounds: No murmur heard.  Pulmonary:      Effort: Pulmonary effort is normal. No respiratory distress.      Breath sounds: Normal breath sounds.      Comments: Frequent dry cough  Abdominal:      Palpations: Abdomen is soft.       Tenderness: There is no abdominal tenderness.   Musculoskeletal:         General: No swelling.      Cervical back: Neck supple.   Skin:     General: Skin is warm and dry.      Capillary Refill: Capillary refill takes less than 2 seconds.   Neurological:      General: No focal deficit present.      Mental Status: She is alert.      Cranial Nerves: No cranial nerve deficit.      Comments: Frequent yawning   Psychiatric:         Mood and Affect: Mood normal.          Lines/Drains:            Lab Results: I have reviewed the following results:  Results from last 7 days   Lab Units 03/26/25  1642   WBC Thousand/uL 4.80   HEMOGLOBIN g/dL 11.2*   HEMATOCRIT % 35.1   PLATELETS Thousands/uL 194     Results from last 7 days   Lab Units 03/26/25  1642   SODIUM mmol/L 132*   POTASSIUM mmol/L 4.2   CHLORIDE mmol/L 97   CO2 mmol/L 26   BUN mg/dL 20   CREATININE mg/dL 0.94   ANION GAP mmol/L 9   CALCIUM mg/dL 8.7   GLUCOSE RANDOM mg/dL 195*     Results from last 7 days   Lab Units 03/26/25  1642   INR  2.63*     Results from last 7 days   Lab Units 03/26/25  2216 03/26/25  1610   POC GLUCOSE mg/dl 132 206*     Lab Results   Component Value Date    HGBA1C 11.5 (H) 12/01/2023    HGBA1C 8.6 (H) 09/06/2023    HGBA1C 8.2 (A) 08/15/2023     Results from last 7 days   Lab Units 03/26/25  1734 03/26/25  1642   LACTIC ACID mmol/L 1.9  --    PROCALCITONIN ng/ml  --  <0.05       Imaging Results Review: I personally reviewed the following image studies in PACS and associated radiology reports: chest xray and CT head. My interpretation of the radiology images/reports is: No acute findings.  Other Study Results Review: EKG was personally reviewed and my interpretation is: NSR. HR 95..    Administrative Statements   I have spent a total time of 76 minutes in caring for this patient on the day of the visit/encounter including Diagnostic results, Patient and family education, Documenting in the medical record, Reviewing/placing orders in the  medical record (including tests, medications, and/or procedures), and Obtaining or reviewing history  .    ** Please Note: This note has been constructed using a voice recognition system. **

## 2025-03-28 LAB
ANION GAP SERPL CALCULATED.3IONS-SCNC: 6 MMOL/L (ref 4–13)
BASOPHILS # BLD AUTO: 0.01 THOUSANDS/ÂΜL (ref 0–0.1)
BASOPHILS NFR BLD AUTO: 0 % (ref 0–1)
BUN SERPL-MCNC: 17 MG/DL (ref 5–25)
CALCIUM SERPL-MCNC: 8.2 MG/DL (ref 8.4–10.2)
CHLORIDE SERPL-SCNC: 103 MMOL/L (ref 96–108)
CO2 SERPL-SCNC: 27 MMOL/L (ref 21–32)
CREAT SERPL-MCNC: 0.78 MG/DL (ref 0.6–1.3)
EOSINOPHIL # BLD AUTO: 0.06 THOUSAND/ÂΜL (ref 0–0.61)
EOSINOPHIL NFR BLD AUTO: 2 % (ref 0–6)
ERYTHROCYTE [DISTWIDTH] IN BLOOD BY AUTOMATED COUNT: 12.9 % (ref 11.6–15.1)
GFR SERPL CREATININE-BSD FRML MDRD: 78 ML/MIN/1.73SQ M
GLUCOSE SERPL-MCNC: 100 MG/DL (ref 65–140)
GLUCOSE SERPL-MCNC: 104 MG/DL (ref 65–140)
GLUCOSE SERPL-MCNC: 168 MG/DL (ref 65–140)
GLUCOSE SERPL-MCNC: 203 MG/DL (ref 65–140)
GLUCOSE SERPL-MCNC: 239 MG/DL (ref 65–140)
HCT VFR BLD AUTO: 35 % (ref 34.8–46.1)
HGB BLD-MCNC: 11.2 G/DL (ref 11.5–15.4)
IMM GRANULOCYTES # BLD AUTO: 0.01 THOUSAND/UL (ref 0–0.2)
IMM GRANULOCYTES NFR BLD AUTO: 0 % (ref 0–2)
LYMPHOCYTES # BLD AUTO: 1.92 THOUSANDS/ÂΜL (ref 0.6–4.47)
LYMPHOCYTES NFR BLD AUTO: 48 % (ref 14–44)
MCH RBC QN AUTO: 30 PG (ref 26.8–34.3)
MCHC RBC AUTO-ENTMCNC: 32 G/DL (ref 31.4–37.4)
MCV RBC AUTO: 94 FL (ref 82–98)
MONOCYTES # BLD AUTO: 0.53 THOUSAND/ÂΜL (ref 0.17–1.22)
MONOCYTES NFR BLD AUTO: 13 % (ref 4–12)
NEUTROPHILS # BLD AUTO: 1.46 THOUSANDS/ÂΜL (ref 1.85–7.62)
NEUTS SEG NFR BLD AUTO: 37 % (ref 43–75)
NRBC BLD AUTO-RTO: 0 /100 WBCS
PLATELET # BLD AUTO: 169 THOUSANDS/UL (ref 149–390)
PMV BLD AUTO: 11.7 FL (ref 8.9–12.7)
POTASSIUM SERPL-SCNC: 3.6 MMOL/L (ref 3.5–5.3)
RBC # BLD AUTO: 3.73 MILLION/UL (ref 3.81–5.12)
SODIUM SERPL-SCNC: 136 MMOL/L (ref 135–147)
WBC # BLD AUTO: 3.99 THOUSAND/UL (ref 4.31–10.16)

## 2025-03-28 PROCEDURE — 97116 GAIT TRAINING THERAPY: CPT

## 2025-03-28 PROCEDURE — 80048 BASIC METABOLIC PNL TOTAL CA: CPT

## 2025-03-28 PROCEDURE — 82948 REAGENT STRIP/BLOOD GLUCOSE: CPT

## 2025-03-28 PROCEDURE — 97110 THERAPEUTIC EXERCISES: CPT

## 2025-03-28 PROCEDURE — 85025 COMPLETE CBC W/AUTO DIFF WBC: CPT

## 2025-03-28 PROCEDURE — 99232 SBSQ HOSP IP/OBS MODERATE 35: CPT | Performed by: PHYSICIAN ASSISTANT

## 2025-03-28 RX ADMIN — OXYBUTYNIN 10 MG: 5 TABLET, FILM COATED, EXTENDED RELEASE ORAL at 08:06

## 2025-03-28 RX ADMIN — ACETAMINOPHEN 650 MG: 325 TABLET ORAL at 00:58

## 2025-03-28 RX ADMIN — RIVAROXABAN 20 MG: 10 TABLET, FILM COATED ORAL at 08:06

## 2025-03-28 RX ADMIN — ATROPINE SULFATE 1 DROP: 10 SOLUTION/ DROPS OPHTHALMIC at 22:15

## 2025-03-28 RX ADMIN — INSULIN LISPRO 3 UNITS: 100 INJECTION, SOLUTION INTRAVENOUS; SUBCUTANEOUS at 17:23

## 2025-03-28 RX ADMIN — CEFTRIAXONE SODIUM 1000 MG: 10 INJECTION, POWDER, FOR SOLUTION INTRAVENOUS at 14:22

## 2025-03-28 RX ADMIN — ATROPINE SULFATE 1 DROP: 10 SOLUTION/ DROPS OPHTHALMIC at 17:25

## 2025-03-28 RX ADMIN — INSULIN LISPRO 1 UNITS: 100 INJECTION, SOLUTION INTRAVENOUS; SUBCUTANEOUS at 12:30

## 2025-03-28 RX ADMIN — ATORVASTATIN CALCIUM 80 MG: 80 TABLET, FILM COATED ORAL at 17:23

## 2025-03-28 RX ADMIN — METOPROLOL TARTRATE 50 MG: 50 TABLET, FILM COATED ORAL at 22:16

## 2025-03-28 RX ADMIN — INSULIN LISPRO 1 UNITS: 100 INJECTION, SOLUTION INTRAVENOUS; SUBCUTANEOUS at 22:20

## 2025-03-28 RX ADMIN — METOPROLOL TARTRATE 50 MG: 50 TABLET, FILM COATED ORAL at 12:29

## 2025-03-28 RX ADMIN — OSELTAMAVIR PHOSPHATE 75 MG: 75 CAPSULE ORAL at 22:21

## 2025-03-28 RX ADMIN — ATROPINE SULFATE 1 DROP: 10 SOLUTION/ DROPS OPHTHALMIC at 08:06

## 2025-03-28 RX ADMIN — OSELTAMAVIR PHOSPHATE 75 MG: 75 CAPSULE ORAL at 08:06

## 2025-03-28 RX ADMIN — LISINOPRIL 5 MG: 5 TABLET ORAL at 08:05

## 2025-03-28 RX ADMIN — FAMOTIDINE 40 MG: 20 TABLET, FILM COATED ORAL at 08:06

## 2025-03-28 NOTE — CASE MANAGEMENT
Case Management Assessment & Discharge Planning Note    Patient name Veronica Reese  Location /-01 MRN [id number]  : 1955 Date 3/28/2025       Current Admission Date: 3/26/2025  Current Admission Diagnosis:Stroke-like symptoms   Patient Active Problem List    Diagnosis Date Noted Date Diagnosed    Stroke-like symptoms 2025     Hyponatremia 2025     Acute cystitis without hematuria 2025     Influenza A 2025     Type 2 diabetes mellitus with hyperglycemia, with long-term current use of insulin (HCC) 2025     Hypoglycemia unawareness associated with type 2 diabetes mellitus ([facility]) 08/15/2023     Diabetic nephropathy associated with type 2 diabetes mellitus ([facility]) 2023     Fall 2023     Elevated CK 2023     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     Abnormal uterine bleeding (AUB) 2022     Ovarian mass 2022     Status post appendectomy 2022     Platelets decreased (HCC) 2022     Cyst of right ovary 2022     Abnormal CT of the chest 2022     Sepsis without acute organ dysfunction (HCC) 2022     Abnormality of gait due to impairment of balance 02/15/2022     Hemiplegia and hemiparesis following cerebral infarction affecting left non-dominant side (HCC) 2022     Stage 3 chronic kidney disease, unspecified whether stage 3a or 3b CKD (HCC) 2022     History of CVA (cerebrovascular accident) 2021     Weakness of both lower extremities      Muscle tension dysphonia 2020     Reflux laryngitis 2020     Glottic insufficiency 2020     Dermatitis 2020     YOLIE (obstructive sleep apnea)      Preoperative clearance 2020     Preoperative cardiovascular examination 2020     Current episode of major depressive disorder without prior episode 2019     Stress incontinence 2019     Essential hypertension 2019     Toxic metabolic encephalopathy 03/15/2019     GERD  (gastroesophageal reflux disease) 03/15/2019     Hyperlipidemia 03/15/2019     Edema 03/15/2019     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     Incomplete emptying of bladder 05/07/2018     Dysphonia 05/04/2018     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     Dysphagia 04/19/2018     Paroxysmal atrial fibrillation (HCC) 10/17/2017     Blurry vision 10/17/2017     Insomnia 10/17/2017     Type 2 diabetes mellitus with chronic kidney disease, with long-term current use of insulin ([facility]) 10/17/2017     Cerebrovascular accident (CVA) ([facility]) 01/01/2017     Heart disease 02/23/2015     Diabetic cataract ([facility]) 12/18/2014       LOS (days): 2  Geometric Mean LOS (GMLOS) (days): 4.9  Days to GMLOS:2.9     OBJECTIVE:    Risk of Unplanned Readmission Score: 17.38         Current admission status: Inpatient  Referral Reason: Other (d/c planning)    Preferred Pharmacy:   [place] [facility] [place] [address] [address] [place] [address] [address] [place] [id number]  Phone: [phone] Fax: [phone]    Primary Care Provider: Jerry Kulkarni MD    Primary Insurance: [employer] [facility]  Secondary Insurance:     ASSESSMENT:  Active Health Care Proxies       Kai Reese Health Care Representative - Son   Primary Phone: [phone] (Mobile)                           Readmission Root Cause  30 Day Readmission: No    Patient Information  Admitted from:: Home  Mental Status: Alert  During Assessment patient was accompanied by: Not accompanied during assessment  Assessment information provided by:: Patient, Son  Primary Caregiver: Other (Comment)  Caregiver's Name:: [employer]  Caregiver's Relationship to Patient:: Other (Specify) (aide & [employer] & family)  Caregiver's Telephone Number:: Khushbu   at [place]  [phone] ext# [phone]  Support Systems: Son  County of Residence: [place]  What city do you live in?: [place]  Home entry access options. Select all that apply.: Elevator, No steps to enter home  Type of Current Residence:  Apartment  Floor Level: 5  Upon entering residence, is there a bedroom on the main floor (no further steps)?: Yes  Upon entering residence, is there a bathroom on the main floor (no further steps)?: Yes  Living Arrangements: Lives Alone  Is patient a ?: No    Activities of Daily Living Prior to Admission  Functional Status: Assistance (has a aide 2 times per week & pt attends  [facility] Mon-friday 8 am- 1pm)  Completes ADLs independently?: No  Level of ADL dependence: Assistance (aid to help)  Ambulates independently?: Yes  Does patient use assisted devices?: Yes  Assisted Devices (DME) used: Rollator, Other (Comment) (dexacon)  Does patient currently own DME?: Yes  What DME does the patient currently own?: Walker, Rollator, Shower Chair, Other (Comment) (dexacon, raised toilet seat, bp cuff, pulse ox,)  Does patient have a history of Outpatient Therapy (PT/OT)?: Yes  Does the patient have a history of Short-Term Rehab?: No  Does patient have a history of HHC?: Yes (pt unsure of the agency)  Does patient currently have HHC?: No         Patient Information Continued  Income Source: Pension/residential  Does patient have prescription coverage?: Yes ([facility])  Can the patient afford their medications and any related supplies (such as glucometers or test strips)?: Yes  Does patient receive dialysis treatments?: No  Does patient have a history of substance abuse?: No  Does patient have a history of Mental Health Diagnosis?: No         Means of Transportation  [facility] Other (Comment) (family , pt gets transport to the center by [facility] and doctor appointments)          DISCHARGE DETAILS:    Discharge planning discussed with:: patient and son was called at 13:51 pm LM & spoke with him at 16:59pm  Freedom of Choice: Yes  Comments - Freedom of Choice: recommendation is for Moderate resources - pt had declined rehab & hhc - pt would now  like rehab now- permission was given to send a  referral via aidin for snf rehab - need authorization  CM contacted family/caregiver?: Yes             Contacts  Patient Contacts: [place]  Relationship to Patient:: Family (son)  Contact Method: Phone  Phone Number: [phone]  Reason/Outcome: Discharge Planning    Requested Home Health Care         Is the patient interested in HHC at discharge?: No    DME Referral Provided  Referral made for DME?: No    Other Referral/Resources/Interventions Provided:  Interventions: Short Term Rehab  Referral Comments: referrrla was sent via aidin - pt & son were made aware that Khushbu from [employer] told cm that if the pt needs rehab they have a contract with  [facility] 2021 &20229- referral was snet with permission - auth is needed    Would you like to participate in our [facility] service program?  : No - Declined    Treatment Team Recommendation: Short Term Rehab (snf referrals -need auth - [facility])                                         Family notified:: son was called  Additional Comments: pt is active with [facility]- pt goes to the center Monday-Friday &  [employer] transports her there & back - she attends 8am-1pm - they help transport her to doctor appointments-  pt has an aide Wed & friday 2hrs- she gets her food delivered to her home- aid helps with preparing meals, chores & she has  Laundry service 2 times per month-  she has an aide come 2 times per week to help with a shower-    pt's avs needs to faxed when d/c to [phone]-  [employer] is the transport company  # [phone] if they do not come to [place] then we can use any transport company

## 2025-03-28 NOTE — ASSESSMENT & PLAN NOTE
Presents with altered mental status-plan for encephalopathy  Suspect this is more likely related to sepsis-the plan for same  Appreciate input of neurology  CT brain negative  CTA head and neck: Unchanged severe stenosis at the P1/P2 junction of the left PCA  MRI brain:   Possible punctate subacute subcortical infarcts in the right posterior parietal temporal and left posterior frontal parietal white matter versus T2 shine through from white matter lesions. No evidence of acute infarct.   TTE reviewed  PT/OT- recommending rehab  Continue ASA/statin/DOAC  Neurology consultation appreciated-   Patient is already on Asa and Xarelto for secondary stroke prevention, can continue.   Outpatient follow-up in the office  Case management placed referral for STR

## 2025-03-28 NOTE — ASSESSMENT & PLAN NOTE
Presented to the ED encephalopathic, also with complains of urinary frequency and burning  Some leukocytes on UA  Initiated on cefepime in the ED  Change antibiotic to Rocephin 3/27, now discontinued- patient completed 3 days of treatment total

## 2025-03-28 NOTE — QUICK NOTE
MRI brain without acute ischemic infarct but noted possible punctate subacute subcortical infarcts in the right posterior parietal temporal and left posterior frontal parietal white matter versus T2 shine through from white matter lesions.      Patient is already on Asa and Xarelto for secondary stroke prevention, can continue.  Appreciate treatment of infectious and metabolic abnormalities per primary team    Patient previously no show to neurology clinic. Patient to call clinic to reschedule or referral as needed per PCP.

## 2025-03-28 NOTE — ASSESSMENT & PLAN NOTE
POA as evidenced by tachycardia, tachypnea with suspected UTI  U/A some leukocytes otherwise negative, culture pending  Blood cultures- NGTD (24h)  Procalcitonin negative x 2  Chest x-ray negative  Incidentally noted to be influenza A positive-plan for same  S/p IV fluid resuscitation per sepsis protocol  Was initiated on cefepime in the ED-continued on the [facility] floor  De-escalate antibiotics to Rocephin 3/27- will discontinue as patient completed 3 day course  Tachycardia and tachypnea have resolved

## 2025-03-28 NOTE — PHYSICAL THERAPY NOTE
PT Treatment Note    NAME:  Veronica Reese  DATE: 03/28/25    AGE:   69 y.o.  Mrn:   [id number]  ADMIT DX:  Altered mental status [R41.82]  Influenza A [J10.1]  Stroke-like symptoms [R29.90]  Sepsis (HCC) [A41.9]  Cerebrovascular accident (CVA), unspecified mechanism (HCC) [I63.9]  Performed at least 2 patient identifiers during session: Name and Epic photo       03/28/25 1400   PT Last Visit   PT Visit Date 03/28/25   Note Type   Note Type Treatment   Pain Assessment   Pain Assessment Tool 0-10   Pain Score No Pain   Restrictions/Precautions   Weight Bearing Precautions Per Order No   Other Precautions Contact/isolation;Droplet precautions;Chair Alarm;Fall Risk;Telemetry   General   Chart Reviewed Yes   Family/Caregiver Present No   Cognition   Overall Cognitive Status WFL   Arousal/Participation Alert;Responsive;Cooperative   Attention Within functional limits   Orientation Level Oriented X4   Memory Within functional limits   Following Commands Follows all commands and directions without difficulty   Subjective   Subjective i want to get up   Bed Mobility   Supine to Sit 5  Supervision   Additional items Assist x 1;Increased time required   Additional Comments pt in recliner to end session   Transfers   Sit to Stand 4  Minimal assistance   Additional items Assist x 1;Increased time required;Armrests;Verbal cues   Stand to Sit 4  Minimal assistance   Additional items Assist x 1;Increased time required;Armrests   Stand pivot 4  Minimal assistance   Additional items Assist x 1;Increased time required;Verbal cues   Additional Comments rw utilized, pt declined dizziness with transitional movements   Ambulation/Elevation   Gait pattern Improper Weight shift;Decreased foot clearance;Short stride   Gait Assistance 4  Minimal assist   Additional items Assist x 1   Assistive Device Rolling walker   Distance 7 ft   Ambulation/Elevation Additional Comments good safety awareness   Balance   Static Sitting Good   Dynamic  Sitting Fair +   Static Standing Fair   Dynamic Standing Fair   Ambulatory Fair -   Endurance Deficit   Endurance Deficit Yes   Activity Tolerance   Activity Tolerance Patient limited by fatigue   Medical Staff Made Aware cm made aware   Exercises   Hip Abduction Sitting;20 reps;Bilateral   Hip Adduction Sitting;20 reps;Bilateral   Knee AROM Long Arc Quad Sitting;20 reps;Bilateral   Ankle Pumps Sitting;20 reps;Bilateral   Squat Standing;5 reps;Bilateral  (5 sit to stands)   Marching Sitting;20 reps;Bilateral   Assessment   Prognosis Good   Problem List Decreased strength;Decreased endurance;Impaired balance;Decreased mobility   Assessment Pt seen for PT treatment session this date with interventions consisting of gait training to normalize gait pattern to decrease fall risk and therapeutic exercise to improve strength to improve functional mobility. Pt agreeable to PT treatment session upon arrival, pt found supine in bed, in no apparent distress, A&O x 4, and responsive. Since previous session, pt has made good progress as evidenced by decreased assistance required with mobility  Barriers during this session include fatigue.  Pt continues to be functioning below baseline level, and remains limited 2* factors listed above and including decreased strength, impaired  balance, decreased endurance, and decreased mobility.  Pt prognosis for achieving goals is good, pending pt progress with hospitalization/medical status improvements, and indicated by motivated to participate in therapy and ability to follow cues. PT will continue to see pt during current hospitalization in order to address the deficits listed above and provide interventions consistent w/ POC in effort to achieve goals. Current goals and POC remain appropriate, pt continues to have rehab potential  Upon conclusion pt seated OOB in recliner. The patient's AM-PAC Basic Mobility Inpatient Short Form Raw Score is 15.  A Raw score of less than or equal to 16  suggests the patient may benefit from discharge to post-acute rehabilitation services. Based on patient presentations and impairments, pt would most appropriately benefit from Level 2 resource intensity upon discharge.  Please also refer to the recommendation of the Physical Therapist for safe discharge planning. RN verbalized pt appropriate for PT session.   Goals   Patient Goals to get better   LTG Expiration Date 04/06/25   PT Treatment Day 1   Plan   Treatment/Interventions Functional transfer training;ADL retraining;LE strengthening/ROM;Elevations;Therapeutic exercise;Endurance training;Gait training   Progress Progressing toward goals   PT Frequency 3-5x/wk   Discharge Recommendation   Rehab Resource Intensity Level, PT II (Moderate Resource Intensity)   Equipment Recommended Walker   AM-PAC Basic Mobility Inpatient   Turning in Flat Bed Without Bedrails 3   Lying on Back to Sitting on Edge of Flat Bed Without Bedrails 3   Moving Bed to Chair 3   Standing Up From Chair Using Arms 2   Walk in Room 2   Climb 3-5 Stairs With Railing 2   Basic Mobility Inpatient Raw Score 15   Basic Mobility Standardized Score 36.97   [place] Highest Level Of Mobility   -HLM Goal 4: Move to chair/commode   -HLM Achieved 6: Walk 10 steps or more       Time In: 1400  Time Out: 1425  Total Treatment Minutes: 25    Milan Hicks, PT

## 2025-03-28 NOTE — ASSESSMENT & PLAN NOTE
Denies dizziness lightheadedness chest pain or palpitations on exam  EKG normal sinus rhythm on arrival heart rate 97  Continue pre hospital metoprolol  Continue prehospital Xarelto for AC

## 2025-03-28 NOTE — ASSESSMENT & PLAN NOTE
Presented encephalopathic-see plan for same   FLU A + on arrival  Continue Tamiflu day 3/5  Supportive care  Isolation precautions

## 2025-03-28 NOTE — ASSESSMENT & PLAN NOTE
Lab Results   Component Value Date    HGBA1C 9.9 (H) 03/26/2025       Recent Labs     03/27/25  2031 03/28/25  0748 03/28/25  1224 03/28/25  1702   POCGLU 157* 104 168* 239*       Blood Sugar Average: Last 72 hrs:  (P) [id number]    Hold prehospital oral hypoglycemics while inpatient, resume on discharge  Blood sugars are controlled  Continue SSI ACHS  Continue diabetic low-salt diet  Hypoglycemic protocol  BMP a.m.

## 2025-03-28 NOTE — PROGRESS NOTES
Progress Note - Hospitalist   Name: Veronica Reese 69 y.o. female I MRN: [id number]  Unit/Bed#: -01 I Date of Admission: 3/26/2025   Date of Service: 3/28/2025 I Hospital Day: 2    Assessment & Plan  Stroke-like symptoms  Presents with altered mental status-plan for encephalopathy  Suspect this is more likely related to sepsis-the plan for same  Appreciate input of neurology  CT brain negative  CTA head and neck: Unchanged severe stenosis at the P1/P2 junction of the left PCA  MRI brain:   Possible punctate subacute subcortical infarcts in the right posterior parietal temporal and left posterior frontal parietal white matter versus T2 shine through from white matter lesions. No evidence of acute infarct.   TTE reviewed  PT/OT- recommending rehab  Continue ASA/statin/DOAC  Neurology consultation appreciated-   Patient is already on Asa and Xarelto for secondary stroke prevention, can continue.   Outpatient follow-up in the office  Case management placed referral for STR  Hemiplegia and hemiparesis following cerebral infarction affecting left non-dominant side (HCC)  Longstanding from prior CVA  Continue statin and aspirin  Acute cystitis without hematuria  Presented to the ED encephalopathic, also with complains of urinary frequency and burning  Some leukocytes on UA  Initiated on cefepime in the ED  Change antibiotic to Rocephin 3/27, now discontinued- patient completed 3 days of treatment total  Toxic metabolic encephalopathy  Presents with AMS  History of prior CVA-stroke workup is in progress  Suspect presentation is related to sepsis-see plan for same  Mentating appropriately on exam, encephalopathy resolved  Sepsis without acute organ dysfunction (HCC)  POA as evidenced by tachycardia, tachypnea with suspected UTI  U/A some leukocytes otherwise negative, culture pending  Blood cultures- NGTD (24h)  Procalcitonin negative x 2  Chest x-ray negative  Incidentally noted to be influenza A positive-plan for  same  S/p IV fluid resuscitation per sepsis protocol  Was initiated on cefepime in the ED-continued on the [facility] floor  De-escalate antibiotics to Rocephin 3/27- will discontinue as patient completed 3 day course  Tachycardia and tachypnea have resolved  Hyponatremia    Lab Results   Component Value Date    SODIUM 136 03/28/2025    SODIUM 133 (L) 03/27/2025    SODIUM 132 (L) 03/26/2025      Appears to have chronic hyponatremia  Sodium normal at 136   Influenza A  Presented encephalopathic-see plan for same   FLU A + on arrival  Continue Tamiflu day 3/5  Supportive care  Isolation precautions  Paroxysmal atrial fibrillation (HCC)  Denies dizziness lightheadedness chest pain or palpitations on exam  EKG normal sinus rhythm on arrival heart rate 97  Continue pre hospital metoprolol  Continue prehospital Xarelto for AC  Type 2 diabetes mellitus with chronic kidney disease, with long-term current use of insulin (HCC)  Lab Results   Component Value Date    HGBA1C 9.9 (H) 03/26/2025       Recent Labs     03/27/25  2031 03/28/25  0748 03/28/25  1224 03/28/25  1702   POCGLU 157* 104 168* 239*       Blood Sugar Average: Last 72 hrs:  (P) [id number]    Hold prehospital oral hypoglycemics while inpatient, resume on discharge  Blood sugars are controlled  Continue SSI ACHS  Continue diabetic low-salt diet  Hypoglycemic protocol  BMP a.m.  Hyperlipidemia  Continue statin   Essential hypertension  Blood pressure controlled  Continue prehospital lisinopril and metoprolol  Monitor BP per protocol    VTE Pharmacologic Prophylaxis: VTE Score: 5 High Risk (Score >/= 5) - Pharmacological DVT Prophylaxis Ordered: rivaroxaban (Xarelto). Sequential Compression Devices Ordered.    Mobility:   Basic Mobility Inpatient Raw Score: 15  JH-HLM Goal: 4: Move to chair/commode  JH-HLM Achieved: 6: Walk 10 steps or more  JH-HLM Goal achieved. Continue to encourage appropriate mobility.    Patient Centered Rounds: I performed bedside  rounds with nursing staff today.   Discussions with Specialists or Other Care Team Provider: nursingCRISTINA        Current Length of Stay: 2 day(s)  Current Patient Status: Inpatient   Certification Statement: The patient will continue to require additional inpatient hospital stay due to need placement to rehab  Discharge Plan: Anticipate discharge in 48-72 hrs to rehab facility.    Code Status: Level 1 - Full Code    Subjective   The patient was seen and examined. The patient is sitting up in her chair in no acute distress. She denies any complaints.     Objective :  Temp:  [97 °F (36.1 °C)-100.3 °F (37.9 °C)] 97.3 °F (36.3 °C)  HR:  [62-87] 76  BP: (120-139)/(66-77) 130/77  Resp:  [17-22] 22  SpO2:  [91 %-99 %] 99 %  O2 Device: None (Room air)    Body mass index is 28.21 kg/m².     Input and Output Summary (last 24 hours):     Intake/Output Summary (Last 24 hours) at 3/28/2025 1712  Last data filed at 3/28/2025 1422  Gross per 24 hour   Intake 1000 ml   Output --   Net 1000 ml       Physical Exam  Vitals and nursing note reviewed.   Constitutional:       General: She is awake.      Appearance: Normal appearance.   HENT:      Head: Normocephalic and atraumatic.   Cardiovascular:      Rate and Rhythm: Normal rate and regular rhythm.   Pulmonary:      Effort: Pulmonary effort is normal.      Breath sounds: Normal breath sounds.   Abdominal:      Palpations: Abdomen is soft.      Tenderness: There is no abdominal tenderness.   Skin:     General: Skin is warm and dry.   Neurological:      Mental Status: She is alert and oriented to person, place, and time.   Psychiatric:         Attention and Perception: Attention normal.         Mood and Affect: Mood normal.         Speech: Speech normal.         Behavior: Behavior is cooperative.         Cognition and Memory: Cognition and memory normal.           Lines/Drains:              Lab Results: I have reviewed the following results:   Results from last 7 days   Lab Units  03/28/25  0516   WBC Thousand/uL 3.99*   HEMOGLOBIN g/dL 11.2*   HEMATOCRIT % 35.0   PLATELETS Thousands/uL 169   SEGS PCT % 37*   LYMPHO PCT % 48*   MONO PCT % 13*   EOS PCT % 2     Results from last 7 days   Lab Units 03/28/25  0516   SODIUM mmol/L 136   POTASSIUM mmol/L 3.6   CHLORIDE mmol/L 103   CO2 mmol/L 27   BUN mg/dL 17   CREATININE mg/dL 0.78   ANION GAP mmol/L 6   CALCIUM mg/dL 8.2*   GLUCOSE RANDOM mg/dL 100     Results from last 7 days   Lab Units 03/26/25  1642   INR  2.63*     Results from last 7 days   Lab Units 03/28/25  1702 03/28/25  1224 03/28/25  0748 03/27/25  2031 03/27/25  1615 03/27/25  1125 03/27/25  0717 03/26/25  2216 03/26/25  1610   POC GLUCOSE mg/dl 239* 168* 104 157* 165* 202* 98 132 206*     Results from last 7 days   Lab Units 03/26/25  2041   HEMOGLOBIN A1C % 9.9*     Results from last 7 days   Lab Units 03/27/25  0440 03/26/25  1734 03/26/25  1642   LACTIC ACID mmol/L  --  1.9  --    PROCALCITONIN ng/ml <0.05  --  <0.05       Recent Cultures (last 7 days):   Results from last 7 days   Lab Units 03/26/25  1734   BLOOD CULTURE  No Growth at 24 hrs.  No Growth at 24 hrs.       Imaging Results Review: I reviewed radiology reports from this admission including: MRI brain.  Other Study Results Review: No additional pertinent studies reviewed.    Last 24 Hours Medication List:     Current Facility-Administered Medications:     acetaminophen (TYLENOL) tablet 650 mg, Q4H PRN    atorvastatin (LIPITOR) tablet 80 mg, Daily With Dinner    atropine (ISOPTO ATROPINE) 1 % ophthalmic solution 1 drop, TID    famotidine (PEPCID) tablet 40 mg, Daily    insulin lispro (HumALOG/ADMELOG) 100 units/mL subcutaneous injection 1-5 Units, HS    insulin lispro (HumALOG/ADMELOG) 100 units/mL subcutaneous injection 1-6 Units, TID AC **AND** Fingerstick Glucose (POCT), TID AC    lisinopril (ZESTRIL) tablet 5 mg, Daily    metoprolol tartrate (LOPRESSOR) tablet 50 mg, Q12H    ondansetron (ZOFRAN) injection 4 mg,  Q6H PRN    oseltamivir (TAMIFLU) capsule 75 mg, Q12H AVERY    oxybutynin (DITROPAN-XL) 24 hr tablet 10 mg, Daily    rivaroxaban (XARELTO) tablet 20 mg, Daily With Breakfast    Administrative Statements   Today, Patient Was Seen By: Deon Main PA-C  I have spent a total time of 35 minutes in caring for this patient on the day of the visit/encounter including Documenting in the medical record, Reviewing/placing orders in the medical record (including tests, medications, and/or procedures), and Communicating with other healthcare professionals .    **Please Note: This note may have been constructed using a voice recognition system.**

## 2025-03-28 NOTE — PLAN OF CARE
Problem: Potential for Falls  Goal: Patient will remain free of falls  Description: INTERVENTIONS:- Educate patient/family on patient safety including physical limitations- Instruct patient to call for assistance with activity - Consult OT/PT to assist with strengthening/mobility - Keep Call bell within reach- Keep bed low and locked with side rails adjusted as appropriate- Keep care items and personal belongings within reach- Initiate and maintain comfort rounds- Make Fall Risk Sign visible to staff- Offer Toileting every 1 Hours, in advance of need- Initiate/Maintain bed alarm- Obtain necessary fall risk management equipment: bed alarm- Apply yellow socks and bracelet for high fall risk patients- Consider moving patient to room near nurses station  Outcome: Progressing     Problem: Prexisting or High Potential for Compromised Skin Integrity  Goal: Skin integrity is maintained or improved  Description: INTERVENTIONS:- Identify patients at risk for skin breakdown- Assess and monitor skin integrity- Assess and monitor nutrition and hydration status- Monitor labs - Assess for incontinence - Turn and reposition patient- Assist with mobility/ambulation- Relieve pressure over bony prominences- Avoid friction and shearing- Provide appropriate hygiene as needed including keeping skin clean and dry- Evaluate need for skin moisturizer/barrier cream- Collaborate with interdisciplinary team - Patient/family teaching- Consider wound care consult   Outcome: Progressing     Problem: Neurological Deficit  Goal: Neurological status is stable or improving  Description: Interventions:- Monitor and assess patient's level of consciousness, motor function, sensory function, and level of assistance needed for ADLs. - Monitor and report changes from baseline. Collaborate with interdisciplinary team to initiate plan and implement interventions as ordered. - Provide and maintain a safe environment.- Consider seizure precautions.- Consider  fall precautions.- Consider aspiration precautions.- Consider bleeding precautions.  Outcome: Progressing     Problem: Activity Intolerance/Impaired Mobility  Goal: Mobility/activity is maintained at optimum level for patient  Description: Interventions:- Assess and monitor patient  barriers to mobility and need for assistive/adaptive devices.- Assess patient's emotional response to limitations.- Collaborate with interdisciplinary team and initiate plans and interventions as ordered.- Encourage independent activity per ability.- Maintain proper body alignment.- Perform active/passive rom as tolerated/ordered.- Plan activities to conserve energy.- Turn patient as appropriate  Outcome: Progressing     Problem: Communication Impairment  Goal: Ability to express needs and understand communication  Description: Assess patient's communication skills and ability to understand information.  Patient will demonstrate use of effective communication techniques, alternative methods of communication and understanding even if not able to speak. - Encourage communication and provide alternate methods of communication as needed.- Collaborate with case management/ for discharge needs.- Include patient/family/caregiver in decisions related to communication.  Outcome: Progressing     Problem: Potential for Aspiration  Goal: Non-ventilated patient's risk of aspiration is minimized  Description: Assess and monitor vital signs, respiratory status, and labs (WBC).  Monitor for signs of aspiration (tachypnea, cough, rales, wheezing, cyanosis, fever).- Assess and monitor patient's ability to swallow.- Place patient up in chair to eat if possible.- HOB up at 90 degrees to eat if unable to get patient up into chair.- Supervise patient during oral intake. - Instruct patient/ family to take small bites.- Instruct patient/ family to take small single sips when taking liquids.- Follow patient-specific strategies generated by speech  pathologist.  Outcome: Progressing  Goal: Ventilated patient's risk of aspiration is minimized  Description: Assess and monitor vital signs, respiratory status, airway cuff pressure, and labs (WBC).  Monitor for signs of aspiration (tachypnea, cough, rales, wheezing, cyanosis, fever).- Elevate head of bed 30 degrees if patient has tube feeding.- Monitor tube feeding.  Outcome: Progressing     Problem: Nutrition  Goal: Nutrition/Hydration status is improving  Description: Monitor and assess patient's nutrition/hydration status for malnutrition (ex- brittle hair, bruises, dry skin, pale skin and conjunctiva, muscle wasting, smooth red tongue, and disorientation). Collaborate with interdisciplinary team and initiate plan and interventions as ordered.  Monitor patient's weight and dietary intake as ordered or per policy. Utilize nutrition screening tool and intervene per policy. Determine patient's food preferences and provide high-protein, high-caloric foods as appropriate. - Assist patient with eating.- Allow adequate time for meals.- Encourage patient to take dietary supplement as ordered.- Collaborate with clinical nutritionist.- Include patient/family/caregiver in decisions related to nutrition.  Outcome: Progressing     Problem: PAIN - ADULT  Goal: Verbalizes/displays adequate comfort level or baseline comfort level  Description: Interventions:- Encourage patient to monitor pain and request assistance- Assess pain using appropriate pain scale- Administer analgesics based on type and severity of pain and evaluate response- Implement non-pharmacological measures as appropriate and evaluate response- Consider cultural and social influences on pain and pain management- Notify physician/advanced practitioner if interventions unsuccessful or patient reports new pain  Outcome: Progressing     Problem: INFECTION - ADULT  Goal: Absence or prevention of progression during hospitalization  Description: INTERVENTIONS:- Assess  and monitor for signs and symptoms of infection- Monitor lab/diagnostic results- Monitor all insertion sites, i.e. indwelling lines, tubes, and drains- Monitor endotracheal if appropriate and nasal secretions for changes in amount and color- [place] appropriate cooling/warming therapies per order- Administer medications as ordered- Instruct and encourage patient and family to use good hand hygiene technique- Identify and instruct in appropriate isolation precautions for identified infection/condition  Outcome: Progressing  Goal: Absence of fever/infection during neutropenic period  Description: INTERVENTIONS:- Monitor WBC  Outcome: Progressing     Problem: DISCHARGE PLANNING  Goal: Discharge to home or other facility with appropriate resources  Description: INTERVENTIONS:- Identify barriers to discharge w/patient and caregiver- Arrange for needed discharge resources and transportation as appropriate- Identify discharge learning needs (meds, wound care, etc.)- Arrange for interpretive services to assist at discharge as needed- Refer to Case Management Department for coordinating discharge planning if the patient needs post-hospital services based on physician/advanced practitioner order or complex needs related to functional status, cognitive ability, or social support system  Outcome: Progressing     Problem: Knowledge Deficit  Goal: Patient/family/caregiver demonstrates understanding of disease process, treatment plan, medications, and discharge instructions  Description: Complete learning assessment and assess knowledge base.Interventions:- Provide teaching at level of understanding- Provide teaching via preferred learning methods  Outcome: Progressing     Problem: Nutrition/Hydration-ADULT  Goal: Nutrient/Hydration intake appropriate for improving, restoring or maintaining nutritional needs  Description: Monitor and assess patient's nutrition/hydration status for malnutrition. Collaborate with interdisciplinary  team and initiate plan and interventions as ordered.  Monitor patient's weight and dietary intake as ordered or per policy. Utilize nutrition screening tool and intervene as necessary. Determine patient's food preferences and provide high-protein, high-caloric foods as appropriate. INTERVENTIONS:- Monitor oral intake, urinary output, labs, and treatment plans- Assess nutrition and hydration status and recommend course of action- Evaluate amount of meals eaten- Assist patient with eating if necessary - Allow adequate time for meals- Recommend/ encourage appropriate diets, oral nutritional supplements, and vitamin/mineral supplements- Order, calculate, and assess calorie counts as needed- Recommend, monitor, and adjust tube feedings and TPN/PPN based on assessed needs- Assess need for intravenous fluids- Provide specific nutrition/hydration education as appropriate- Include patient/family/caregiver in decisions related to nutrition  Outcome: Progressing

## 2025-03-28 NOTE — ASSESSMENT & PLAN NOTE
Lab Results   Component Value Date    SODIUM 136 03/28/2025    SODIUM 133 (L) 03/27/2025    SODIUM 132 (L) 03/26/2025      Appears to have chronic hyponatremia  Sodium normal at 136

## 2025-03-28 NOTE — PLAN OF CARE
Problem: PHYSICAL THERAPY ADULT  Goal: Performs mobility at highest level of function for planned discharge setting.  See evaluation for individualized goals.  Description: Treatment/Interventions: Functional transfer training, Therapeutic exercise, Endurance training, Gait training, Bed mobility, Equipment eval/education  Equipment Recommended: Walker       See flowsheet documentation for full assessment, interventions and recommendations.  Outcome: Progressing  Note: Prognosis: Good  Problem List: Decreased strength, Decreased endurance, Impaired balance, Decreased mobility  Assessment: Pt seen for PT treatment session this date with interventions consisting of gait training to normalize gait pattern to decrease fall risk and therapeutic exercise to improve strength to improve functional mobility. Pt agreeable to PT treatment session upon arrival, pt found supine in bed, in no apparent distress, A&O x 4, and responsive. Since previous session, pt has made good progress as evidenced by decreased assistance required with mobility  Barriers during this session include fatigue.  Pt continues to be functioning below baseline level, and remains limited 2* factors listed above and including decreased strength, impaired  balance, decreased endurance, and decreased mobility.  Pt prognosis for achieving goals is good, pending pt progress with hospitalization/medical status improvements, and indicated by motivated to participate in therapy and ability to follow cues. PT will continue to see pt during current hospitalization in order to address the deficits listed above and provide interventions consistent w/ POC in effort to achieve goals. Current goals and POC remain appropriate, pt continues to have rehab potential  Upon conclusion pt seated OOB in recliner. The patient's AM-PAC Basic Mobility Inpatient Short Form Raw Score is 15.  A Raw score of less than or equal to 16 suggests the patient may benefit from discharge to  post-acute rehabilitation services. Based on patient presentations and impairments, pt would most appropriately benefit from Level 2 resource intensity upon discharge.  Please also refer to the recommendation of the Physical Therapist for safe discharge planning. RN verbalized pt appropriate for PT session.  Barriers to Discharge: Inaccessible home environment     Rehab Resource Intensity Level, PT: II (Moderate Resource Intensity)    See flowsheet documentation for full assessment.

## 2025-03-28 NOTE — PLAN OF CARE
Problem: Potential for Falls  Goal: Patient will remain free of falls  Description: INTERVENTIONS:- Educate patient/family on patient safety including physical limitations- Instruct patient to call for assistance with activity - Consult OT/PT to assist with strengthening/mobility - Keep Call bell within reach- Keep bed low and locked with side rails adjusted as appropriate- Keep care items and personal belongings within reach- Initiate and maintain comfort rounds- Make Fall Risk Sign visible to staff- Offer Toileting every 2 Hours, in advance of need- Initiate/Maintain bed alarm- Obtain necessary fall risk management equipment: nonslip socks - Apply yellow socks and bracelet for high fall risk patients- Consider moving patient to room near nurses station  Outcome: Progressing     Problem: Prexisting or High Potential for Compromised Skin Integrity  Goal: Skin integrity is maintained or improved  Description: INTERVENTIONS:- Identify patients at risk for skin breakdown- Assess and monitor skin integrity- Assess and monitor nutrition and hydration status- Monitor labs - Assess for incontinence - Turn and reposition patient- Assist with mobility/ambulation- Relieve pressure over bony prominences- Avoid friction and shearing- Provide appropriate hygiene as needed including keeping skin clean and dry- Evaluate need for skin moisturizer/barrier cream- Collaborate with interdisciplinary team - Patient/family teaching- Consider wound care consult   Outcome: Progressing     Problem: Neurological Deficit  Goal: Neurological status is stable or improving  Description: Interventions:- Monitor and assess patient's level of consciousness, motor function, sensory function, and level of assistance needed for ADLs. - Monitor and report changes from baseline. Collaborate with interdisciplinary team to initiate plan and implement interventions as ordered. - Provide and maintain a safe environment.- Consider seizure precautions.-  Consider fall precautions.- Consider aspiration precautions.- Consider bleeding precautions.  Outcome: Progressing     Problem: Activity Intolerance/Impaired Mobility  Goal: Mobility/activity is maintained at optimum level for patient  Description: Interventions:- Assess and monitor patient  barriers to mobility and need for assistive/adaptive devices.- Assess patient's emotional response to limitations.- Collaborate with interdisciplinary team and initiate plans and interventions as ordered.- Encourage independent activity per ability.- Maintain proper body alignment.- Perform active/passive rom as tolerated/ordered.- Plan activities to conserve energy.- Turn patient as appropriate  Outcome: Progressing     Problem: Communication Impairment  Goal: Ability to express needs and understand communication  Description: Assess patient's communication skills and ability to understand information.  Patient will demonstrate use of effective communication techniques, alternative methods of communication and understanding even if not able to speak. - Encourage communication and provide alternate methods of communication as needed.- Collaborate with case management/ for discharge needs.- Include patient/family/caregiver in decisions related to communication.  Outcome: Progressing     Problem: Potential for Aspiration  Goal: Non-ventilated patient's risk of aspiration is minimized  Description: Assess and monitor vital signs, respiratory status, and labs (WBC).  Monitor for signs of aspiration (tachypnea, cough, rales, wheezing, cyanosis, fever).- Assess and monitor patient's ability to swallow.- Place patient up in chair to eat if possible.- HOB up at 90 degrees to eat if unable to get patient up into chair.- Supervise patient during oral intake. - Instruct patient/ family to take small bites.- Instruct patient/ family to take small single sips when taking liquids.- Follow patient-specific strategies generated  by speech pathologist.  Outcome: Progressing  Goal: Ventilated patient's risk of aspiration is minimized  Description: Assess and monitor vital signs, respiratory status, airway cuff pressure, and labs (WBC).  Monitor for signs of aspiration (tachypnea, cough, rales, wheezing, cyanosis, fever).- Elevate head of bed 30 degrees if patient has tube feeding.- Monitor tube feeding.  Outcome: Progressing     Problem: Nutrition  Goal: Nutrition/Hydration status is improving  Description: Monitor and assess patient's nutrition/hydration status for malnutrition (ex- brittle hair, bruises, dry skin, pale skin and conjunctiva, muscle wasting, smooth red tongue, and disorientation). Collaborate with interdisciplinary team and initiate plan and interventions as ordered.  Monitor patient's weight and dietary intake as ordered or per policy. Utilize nutrition screening tool and intervene per policy. Determine patient's food preferences and provide high-protein, high-caloric foods as appropriate. - Assist patient with eating.- Allow adequate time for meals.- Encourage patient to take dietary supplement as ordered.- Collaborate with clinical nutritionist.- Include patient/family/caregiver in decisions related to nutrition.  Outcome: Progressing     Problem: PAIN - ADULT  Goal: Verbalizes/displays adequate comfort level or baseline comfort level  Description: Interventions:- Encourage patient to monitor pain and request assistance- Assess pain using appropriate pain scale- Administer analgesics based on type and severity of pain and evaluate response- Implement non-pharmacological measures as appropriate and evaluate response- Consider cultural and social influences on pain and pain management- Notify physician/advanced practitioner if interventions unsuccessful or patient reports new pain  Outcome: Progressing     Problem: INFECTION - ADULT  Goal: Absence or prevention of progression during hospitalization  Description:  INTERVENTIONS:- Assess and monitor for signs and symptoms of infection- Monitor lab/diagnostic results- Monitor all insertion sites, i.e. indwelling lines, tubes, and drains- Monitor endotracheal if appropriate and nasal secretions for changes in amount and color- [place] appropriate cooling/warming therapies per order- Administer medications as ordered- Instruct and encourage patient and family to use good hand hygiene technique- Identify and instruct in appropriate isolation precautions for identified infection/condition  Outcome: Progressing  Goal: Absence of fever/infection during neutropenic period  Description: INTERVENTIONS:- Monitor WBC  Outcome: Progressing     Problem: DISCHARGE PLANNING  Goal: Discharge to home or other facility with appropriate resources  Description: INTERVENTIONS:- Identify barriers to discharge w/patient and caregiver- Arrange for needed discharge resources and transportation as appropriate- Identify discharge learning needs (meds, wound care, etc.)- Arrange for interpretive services to assist at discharge as needed- Refer to Case Management Department for coordinating discharge planning if the patient needs post-hospital services based on physician/advanced practitioner order or complex needs related to functional status, cognitive ability, or social support system  Outcome: Progressing     Problem: Knowledge Deficit  Goal: Patient/family/caregiver demonstrates understanding of disease process, treatment plan, medications, and discharge instructions  Description: Complete learning assessment and assess knowledge base.Interventions:- Provide teaching at level of understanding- Provide teaching via preferred learning methods  Outcome: Progressing     Problem: Nutrition/Hydration-ADULT  Goal: Nutrient/Hydration intake appropriate for improving, restoring or maintaining nutritional needs  Description: Monitor and assess patient's nutrition/hydration status for malnutrition. Collaborate  with interdisciplinary team and initiate plan and interventions as ordered.  Monitor patient's weight and dietary intake as ordered or per policy. Utilize nutrition screening tool and intervene as necessary. Determine patient's food preferences and provide high-protein, high-caloric foods as appropriate. INTERVENTIONS:- Monitor oral intake, urinary output, labs, and treatment plans- Assess nutrition and hydration status and recommend course of action- Evaluate amount of meals eaten- Assist patient with eating if necessary - Allow adequate time for meals- Recommend/ encourage appropriate diets, oral nutritional supplements, and vitamin/mineral supplements- Order, calculate, and assess calorie counts as needed- Recommend, monitor, and adjust tube feedings and TPN/PPN based on assessed needs- Assess need for intravenous fluids- Provide specific nutrition/hydration education as appropriate- Include patient/family/caregiver in decisions related to nutrition  Outcome: Progressing

## 2025-03-29 LAB
GLUCOSE SERPL-MCNC: 184 MG/DL (ref 65–140)
GLUCOSE SERPL-MCNC: 188 MG/DL (ref 65–140)
GLUCOSE SERPL-MCNC: 219 MG/DL (ref 65–140)
GLUCOSE SERPL-MCNC: 252 MG/DL (ref 65–140)

## 2025-03-29 PROCEDURE — 99232 SBSQ HOSP IP/OBS MODERATE 35: CPT | Performed by: PHYSICIAN ASSISTANT

## 2025-03-29 PROCEDURE — 82948 REAGENT STRIP/BLOOD GLUCOSE: CPT

## 2025-03-29 RX ORDER — NYSTATIN 100000 U/G
CREAM TOPICAL 2 TIMES DAILY
Status: DISCONTINUED | OUTPATIENT
Start: 2025-03-29 | End: 2025-03-31 | Stop reason: HOSPADM

## 2025-03-29 RX ORDER — BISACODYL 10 MG
10 SUPPOSITORY, RECTAL RECTAL DAILY PRN
Status: DISCONTINUED | OUTPATIENT
Start: 2025-03-29 | End: 2025-03-31 | Stop reason: HOSPADM

## 2025-03-29 RX ADMIN — INSULIN LISPRO 1 UNITS: 100 INJECTION, SOLUTION INTRAVENOUS; SUBCUTANEOUS at 07:43

## 2025-03-29 RX ADMIN — LISINOPRIL 5 MG: 5 TABLET ORAL at 08:09

## 2025-03-29 RX ADMIN — INSULIN LISPRO 2 UNITS: 100 INJECTION, SOLUTION INTRAVENOUS; SUBCUTANEOUS at 22:28

## 2025-03-29 RX ADMIN — NYSTATIN CREAM 1 APPLICATION: 100000 CREAM TOPICAL at 12:36

## 2025-03-29 RX ADMIN — ATROPINE SULFATE 1 DROP: 10 SOLUTION/ DROPS OPHTHALMIC at 17:21

## 2025-03-29 RX ADMIN — INSULIN LISPRO 1 UNITS: 100 INJECTION, SOLUTION INTRAVENOUS; SUBCUTANEOUS at 17:22

## 2025-03-29 RX ADMIN — ATROPINE SULFATE 1 DROP: 10 SOLUTION/ DROPS OPHTHALMIC at 08:10

## 2025-03-29 RX ADMIN — OSELTAMAVIR PHOSPHATE 75 MG: 75 CAPSULE ORAL at 22:29

## 2025-03-29 RX ADMIN — ATROPINE SULFATE 1 DROP: 10 SOLUTION/ DROPS OPHTHALMIC at 22:26

## 2025-03-29 RX ADMIN — RIVAROXABAN 20 MG: 10 TABLET, FILM COATED ORAL at 08:10

## 2025-03-29 RX ADMIN — METOPROLOL TARTRATE 50 MG: 50 TABLET, FILM COATED ORAL at 22:29

## 2025-03-29 RX ADMIN — INSULIN LISPRO 3 UNITS: 100 INJECTION, SOLUTION INTRAVENOUS; SUBCUTANEOUS at 12:35

## 2025-03-29 RX ADMIN — ATORVASTATIN CALCIUM 80 MG: 80 TABLET, FILM COATED ORAL at 17:21

## 2025-03-29 RX ADMIN — OSELTAMAVIR PHOSPHATE 75 MG: 75 CAPSULE ORAL at 08:09

## 2025-03-29 RX ADMIN — FAMOTIDINE 40 MG: 20 TABLET, FILM COATED ORAL at 08:09

## 2025-03-29 RX ADMIN — OXYBUTYNIN 10 MG: 5 TABLET, FILM COATED, EXTENDED RELEASE ORAL at 08:09

## 2025-03-29 RX ADMIN — METOPROLOL TARTRATE 50 MG: 50 TABLET, FILM COATED ORAL at 12:35

## 2025-03-29 NOTE — ASSESSMENT & PLAN NOTE
POA as evidenced by tachycardia, tachypnea with suspected UTI  U/A some leukocytes otherwise negative  Blood cultures- NGTD (48h)  Procalcitonin negative x 2  Chest x-ray negative  Incidentally noted to be influenza A positive-plan for same  S/p IV fluid resuscitation per sepsis protocol  Was initiated on cefepime in the ED-continued on the [facility] floor  De-escalate antibiotics to Rocephin 3/27- discontinued 3/28 as patient completed 3 day course  Tachycardia and tachypnea have resolved

## 2025-03-29 NOTE — PROGRESS NOTES
Progress Note - Hospitalist   Name: Veronica Reese 69 y.o. female I MRN: [id number]  Unit/Bed#: -01 I Date of Admission: 3/26/2025   Date of Service: 3/29/2025 I Hospital Day: 3    Assessment & Plan  Stroke-like symptoms  Presents with altered mental status-plan for encephalopathy  Suspect this is more likely related to sepsis  Appreciate input of neurology  CT brain negative  CTA head and neck: Unchanged severe stenosis at the P1/P2 junction of the left PCA  MRI brain:   Possible punctate subacute subcortical infarcts in the right posterior parietal temporal and left posterior frontal parietal white matter versus T2 shine through from white matter lesions. No evidence of acute infarct.   TTE reviewed  PT/OT- recommending rehab  Continue ASA/statin/DOAC  Neurology consultation appreciated-   Patient is already on Asa and Xarelto for secondary stroke prevention, can continue.   Outpatient follow-up in the office  Case management placed referral for STR  Hemiplegia and hemiparesis following cerebral infarction affecting left non-dominant side (HCC)  Longstanding from prior CVA  Continue statin and aspirin  Acute cystitis without hematuria  Presented to the ED encephalopathic, also with complains of urinary frequency and burning  Some leukocytes on UA  Initiated on cefepime in the ED  Change antibiotic to Rocephin 3/27, now discontinued- patient completed 3 days of treatment total  Toxic metabolic encephalopathy  Presents with AMS  History of prior CVA  Suspect presentation is related to sepsis-see plan for same  Mentating appropriately on exam, encephalopathy resolved  Sepsis without acute organ dysfunction (HCC)  POA as evidenced by tachycardia, tachypnea with suspected UTI  U/A some leukocytes otherwise negative  Blood cultures- NGTD (48h)  Procalcitonin negative x 2  Chest x-ray negative  Incidentally noted to be influenza A positive-plan for same  S/p IV fluid resuscitation per sepsis protocol  Was initiated on  cefepime in the ED-continued on the [facility] floor  De-escalate antibiotics to Rocephin 3/27- discontinued 3/28 as patient completed 3 day course  Tachycardia and tachypnea have resolved  Hyponatremia    Lab Results   Component Value Date    SODIUM 136 03/28/2025    SODIUM 133 (L) 03/27/2025    SODIUM 132 (L) 03/26/2025      Appears to have chronic hyponatremia  Sodium normal at 136   Influenza A  Presented encephalopathic-see plan for same   FLU A + on arrival  Continue Tamiflu day 4/5  Supportive care  Isolation precautions  Paroxysmal atrial fibrillation (HCC)  Denies dizziness lightheadedness chest pain or palpitations on exam  EKG normal sinus rhythm on arrival heart rate 97  Continue pre hospital metoprolol  Continue prehospital Xarelto for AC  Type 2 diabetes mellitus with chronic kidney disease, with long-term current use of insulin (HCC)  Lab Results   Component Value Date    HGBA1C 9.9 (H) 03/26/2025       Recent Labs     03/28/25  1702 03/28/25  2109 03/29/25  0701 03/29/25  1052   POCGLU 239* 203* 188* 252*       Blood Sugar Average: Last 72 hrs:  (P) 176.9615694871947530    Hold prehospital oral hypoglycemics while inpatient, resume on discharge  Blood sugars are controlled  Continue SSI ACHS  Continue diabetic low-salt diet  Hypoglycemic protocol  BMP a.m.  Hyperlipidemia  Continue statin   Essential hypertension  Blood pressure controlled  Continue prehospital lisinopril and metoprolol  Monitor BP per protocol    VTE Pharmacologic Prophylaxis: VTE Score: 5 High Risk (Score >/= 5) - Pharmacological DVT Prophylaxis Ordered: rivaroxaban (Xarelto). Sequential Compression Devices Ordered.    Mobility:   Basic Mobility Inpatient Raw Score: 16  JH-HLM Goal: 5: Stand one or more mins  JH-HLM Achieved: 7: Walk 25 feet or more  JH-HLM Goal achieved. Continue to encourage appropriate mobility.    Patient Centered Rounds: I performed bedside rounds with nursing staff today.   Discussions with Specialists or Other  Care Team Provider: nursing, CM    Education and Discussions with Family / Patient:  patient updated at bedside.     Current Length of Stay: 3 day(s)  Current Patient Status: Inpatient   Certification Statement: The patient will continue to require additional inpatient hospital stay due to need for placement to [facility]  Discharge Plan: Anticipate discharge in 48-72 hrs to rehab facility.    Code Status: Level 1 - Full Code    Subjective   The patient was seen and examined. The patient is sitting up in her chair in no acute distress. She reports constipation. She also reports pain with urination. She denies burning but she is noted to have erythema and swelling of her labia, no discharge.    Objective :  Temp:  [98.3 °F (36.8 °C)-99.1 °F (37.3 °C)] 98.3 °F (36.8 °C)  HR:  [75-78] 78  BP: (111-143)/(75-77) 139/76  Resp:  [18-22] 18  SpO2:  [96 %-99 %] 97 %  O2 Device: None (Room air)  Nasal Cannula O2 Flow Rate (L/min):  [2 L/min] 2 L/min    Body mass index is 28.21 kg/m².     Input and Output Summary (last 24 hours):     Intake/Output Summary (Last 24 hours) at 3/29/2025 1324  Last data filed at 3/29/2025 1038  Gross per 24 hour   Intake 650 ml   Output 61 ml   Net 589 ml       Physical Exam  Vitals and nursing note reviewed.   Constitutional:       General: She is awake.      Appearance: Normal appearance.   HENT:      Head: Normocephalic and atraumatic.   Cardiovascular:      Rate and Rhythm: Normal rate and regular rhythm.   Pulmonary:      Effort: Pulmonary effort is normal.      Breath sounds: Normal breath sounds.   Abdominal:      Palpations: Abdomen is soft.      Tenderness: There is no abdominal tenderness.   Skin:     General: Skin is warm and dry.   Neurological:      General: No focal deficit present.      Mental Status: She is alert and oriented to person, place, and time.   Psychiatric:         Attention and Perception: Attention normal.         Mood and Affect: Mood normal.         Speech: Speech normal.          Behavior: Behavior is cooperative.         Cognition and Memory: Cognition and memory normal.           Lines/Drains:              Lab Results: I have reviewed the following results:   Results from last 7 days   Lab Units 03/28/25  0516   WBC Thousand/uL 3.99*   HEMOGLOBIN g/dL 11.2*   HEMATOCRIT % 35.0   PLATELETS Thousands/uL 169   SEGS PCT % 37*   LYMPHO PCT % 48*   MONO PCT % 13*   EOS PCT % 2     Results from last 7 days   Lab Units 03/28/25  0516   SODIUM mmol/L 136   POTASSIUM mmol/L 3.6   CHLORIDE mmol/L 103   CO2 mmol/L 27   BUN mg/dL 17   CREATININE mg/dL 0.78   ANION GAP mmol/L 6   CALCIUM mg/dL 8.2*   GLUCOSE RANDOM mg/dL 100     Results from last 7 days   Lab Units 03/26/25  1642   INR  2.63*     Results from last 7 days   Lab Units 03/29/25  1052 03/29/25  0701 03/28/25  2109 03/28/25  1702 03/28/25  1224 03/28/25  0748 03/27/25  2031 03/27/25  1615 03/27/25  1125 03/27/25  0717 03/26/25  2216 03/26/25  1610   POC GLUCOSE mg/dl 252* 188* 203* 239* 168* 104 157* 165* 202* 98 132 206*     Results from last 7 days   Lab Units 03/26/25  2041   HEMOGLOBIN A1C % 9.9*     Results from last 7 days   Lab Units 03/27/25  0440 03/26/25  1734 03/26/25  1642   LACTIC ACID mmol/L  --  1.9  --    PROCALCITONIN ng/ml <0.05  --  <0.05       Recent Cultures (last 7 days):   Results from last 7 days   Lab Units 03/26/25  1734   BLOOD CULTURE  No Growth at 48 hrs.  No Growth at 48 hrs.       Imaging Results Review: No pertinent imaging studies reviewed.  Other Study Results Review: No additional pertinent studies reviewed.    Last 24 Hours Medication List:     Current Facility-Administered Medications:     acetaminophen (TYLENOL) tablet 650 mg, Q4H PRN    atorvastatin (LIPITOR) tablet 80 mg, Daily With Dinner    atropine (ISOPTO ATROPINE) 1 % ophthalmic solution 1 drop, TID    bisacodyl (DULCOLAX) rectal suppository 10 mg, Daily PRN    famotidine (PEPCID) tablet 40 mg, Daily    insulin lispro (HumALOG/ADMELOG) 100  units/mL subcutaneous injection 1-5 Units, HS    insulin lispro (HumALOG/ADMELOG) 100 units/mL subcutaneous injection 1-6 Units, TID AC **AND** Fingerstick Glucose (POCT), TID AC    lisinopril (ZESTRIL) tablet 5 mg, Daily    metoprolol tartrate (LOPRESSOR) tablet 50 mg, Q12H    nystatin (MYCOSTATIN) cream, BID    ondansetron (ZOFRAN) injection 4 mg, Q6H PRN    oseltamivir (TAMIFLU) capsule 75 mg, Q12H AVERY    oxybutynin (DITROPAN-XL) 24 hr tablet 10 mg, Daily    rivaroxaban (XARELTO) tablet 20 mg, Daily With Breakfast    Administrative Statements   Today, Patient Was Seen By: Deon Main PA-C  I have spent a total time of 35 minutes in caring for this patient on the day of the visit/encounter including Patient and family education, Documenting in the medical record, and Reviewing/placing orders in the medical record (including tests, medications, and/or procedures).    **Please Note: This note may have been constructed using a voice recognition system.**

## 2025-03-29 NOTE — PLAN OF CARE
Problem: Potential for Falls  Goal: Patient will remain free of falls  Description: INTERVENTIONS:- Educate patient/family on patient safety including physical limitations- Instruct patient to call for assistance with activity - Consult OT/PT to assist with strengthening/mobility - Keep Call bell within reach- Keep bed low and locked with side rails adjusted as appropriate- Keep care items and personal belongings within reach- Initiate and maintain comfort rounds- Make Fall Risk Sign visible to staff- Offer Toileting every 2 Hours, in advance of need- Initiate/Maintain bed/chair alarm- Obtain necessary fall risk management equipment:- Apply yellow socks and bracelet for high fall risk patients- Consider moving patient to room near nurses station  Outcome: Progressing    Problem: Neurological Deficit  Goal: Neurological status is stable or improving  Description: Interventions:- Monitor and assess patient's level of consciousness, motor function, sensory function, and level of assistance needed for ADLs. - Monitor and report changes from baseline. Collaborate with interdisciplinary team to initiate plan and implement interventions as ordered. - Provide and maintain a safe environment.- Consider seizure precautions.- Consider fall precautions.- Consider aspiration precautions.- Consider bleeding precautions.  Outcome: Progressing     Problem: Nutrition/Hydration-ADULT  Goal: Nutrient/Hydration intake appropriate for improving, restoring or maintaining nutritional needs  Description: Monitor and assess patient's nutrition/hydration status for malnutrition. Collaborate with interdisciplinary team and initiate plan and interventions as ordered.  Monitor patient's weight and dietary intake as ordered or per policy. Utilize nutrition screening tool and intervene as necessary. Determine patient's food preferences and provide high-protein, high-caloric foods as appropriate. INTERVENTIONS:- Monitor oral intake, urinary  output, labs, and treatment plans- Assess nutrition and hydration status and recommend course of action- Evaluate amount of meals eaten- Assist patient with eating if necessary - Allow adequate time for meals- Recommend/ encourage appropriate diets, oral nutritional supplements, and vitamin/mineral supplements- Order, calculate, and assess calorie counts as needed- Recommend, monitor, and adjust tube feedings and TPN/PPN based on assessed needs- Assess need for intravenous fluids- Provide specific nutrition/hydration education as appropriate- Include patient/family/caregiver in decisions related to nutrition  Outcome: Progressing     Problem: GASTROINTESTINAL - ADULT  Goal: Maintains or returns to baseline bowel function  Description: INTERVENTIONS:- Assess bowel function- Encourage oral fluids to ensure adequate hydration- Administer IV fluids if ordered to ensure adequate hydration- Administer ordered medications as needed- Encourage mobilization and activity- Consider nutritional services referral to assist patient with adequate nutrition and appropriate food choices  Outcome: Progressing

## 2025-03-29 NOTE — ASSESSMENT & PLAN NOTE
Presents with AMS  History of prior CVA  Suspect presentation is related to sepsis-see plan for same  Mentating appropriately on exam, encephalopathy resolved

## 2025-03-29 NOTE — ASSESSMENT & PLAN NOTE
Presented encephalopathic-see plan for same   FLU A + on arrival  Continue Tamiflu day 4/5  Supportive care  Isolation precautions   Pt in er for c/o migraine pain not relieved with home prescription meds

## 2025-03-29 NOTE — ASSESSMENT & PLAN NOTE
Lab Results   Component Value Date    HGBA1C 9.9 (H) 03/26/2025       Recent Labs     03/28/25  1702 03/28/25  2109 03/29/25  0701 03/29/25  1052   POCGLU 239* 203* 188* 252*       Blood Sugar Average: Last 72 hrs:  (P) 176.6052808155155631    Hold prehospital oral hypoglycemics while inpatient, resume on discharge  Blood sugars are controlled  Continue SSI ACHS  Continue diabetic low-salt diet  Hypoglycemic protocol  BMP a.mViktoriya

## 2025-03-29 NOTE — ASSESSMENT & PLAN NOTE
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 No evidence of acute infarct.   TTE reviewed  PT/OT- recommending rehab  Continue ASA/statin/DOAC  Neurology consultation appreciated-   Patient is already on Asa and Xarelto for secondary stroke prevention, can continue.   Outpatient follow-up in the office  Case management placed referral for STR

## 2025-03-30 PROBLEM — E87.1 HYPONATREMIA: Status: RESOLVED | Noted: 2025-03-26 | Resolved: 2025-03-30

## 2025-03-30 LAB
GLUCOSE SERPL-MCNC: 153 MG/DL (ref 65–140)
GLUCOSE SERPL-MCNC: 194 MG/DL (ref 65–140)
GLUCOSE SERPL-MCNC: 208 MG/DL (ref 65–140)
GLUCOSE SERPL-MCNC: 251 MG/DL (ref 65–140)

## 2025-03-30 PROCEDURE — 82948 REAGENT STRIP/BLOOD GLUCOSE: CPT

## 2025-03-30 PROCEDURE — 99232 SBSQ HOSP IP/OBS MODERATE 35: CPT | Performed by: PHYSICIAN ASSISTANT

## 2025-03-30 RX ADMIN — ATORVASTATIN CALCIUM 80 MG: 80 TABLET, FILM COATED ORAL at 17:03

## 2025-03-30 RX ADMIN — OXYBUTYNIN 10 MG: 5 TABLET, FILM COATED, EXTENDED RELEASE ORAL at 08:03

## 2025-03-30 RX ADMIN — ATROPINE SULFATE 1 DROP: 10 SOLUTION/ DROPS OPHTHALMIC at 08:03

## 2025-03-30 RX ADMIN — LISINOPRIL 5 MG: 5 TABLET ORAL at 08:03

## 2025-03-30 RX ADMIN — METOPROLOL TARTRATE 50 MG: 50 TABLET, FILM COATED ORAL at 22:02

## 2025-03-30 RX ADMIN — METOPROLOL TARTRATE 50 MG: 50 TABLET, FILM COATED ORAL at 12:11

## 2025-03-30 RX ADMIN — OSELTAMAVIR PHOSPHATE 75 MG: 75 CAPSULE ORAL at 08:03

## 2025-03-30 RX ADMIN — NYSTATIN CREAM 1 APPLICATION: 100000 CREAM TOPICAL at 17:06

## 2025-03-30 RX ADMIN — ATROPINE SULFATE 1 DROP: 10 SOLUTION/ DROPS OPHTHALMIC at 22:03

## 2025-03-30 RX ADMIN — INSULIN LISPRO 3 UNITS: 100 INJECTION, SOLUTION INTRAVENOUS; SUBCUTANEOUS at 17:06

## 2025-03-30 RX ADMIN — INSULIN LISPRO 2 UNITS: 100 INJECTION, SOLUTION INTRAVENOUS; SUBCUTANEOUS at 12:08

## 2025-03-30 RX ADMIN — FAMOTIDINE 40 MG: 20 TABLET, FILM COATED ORAL at 08:03

## 2025-03-30 RX ADMIN — ATROPINE SULFATE 1 DROP: 10 SOLUTION/ DROPS OPHTHALMIC at 17:04

## 2025-03-30 RX ADMIN — ACETAMINOPHEN 650 MG: 325 TABLET ORAL at 12:07

## 2025-03-30 RX ADMIN — OSELTAMAVIR PHOSPHATE 75 MG: 75 CAPSULE ORAL at 22:04

## 2025-03-30 RX ADMIN — INSULIN LISPRO 1 UNITS: 100 INJECTION, SOLUTION INTRAVENOUS; SUBCUTANEOUS at 22:03

## 2025-03-30 RX ADMIN — INSULIN LISPRO 1 UNITS: 100 INJECTION, SOLUTION INTRAVENOUS; SUBCUTANEOUS at 08:06

## 2025-03-30 RX ADMIN — NYSTATIN CREAM 1 APPLICATION: 100000 CREAM TOPICAL at 08:06

## 2025-03-30 RX ADMIN — RIVAROXABAN 20 MG: 10 TABLET, FILM COATED ORAL at 08:03

## 2025-03-30 NOTE — PROGRESS NOTES
Progress Note - Hospitalist   Name: Veronica Reese 69 y.o. 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 No evidence of acute infarct.   TTE reviewed  PT/OT- recommending rehab  Continue ASA/statin/DOAC  Neurology consultation appreciated-   Patient is already on Asa and Xarelto for secondary stroke prevention, can continue.   Outpatient follow-up in the office  Case management placed referral for STR  Hemiplegia and hemiparesis following cerebral infarction affecting left non-dominant side (HCC)  Longstanding from prior CVA  Continue statin and aspirin  Acute cystitis without hematuria  Presented to the ED encephalopathic, also with complains of urinary frequency and burning  Some leukocytes on UA  Initiated on cefepime in the ED  Change antibiotic to Rocephin 3/27, now discontinued- patient completed 3 days of treatment total  Toxic metabolic encephalopathy  Presents with AMS  History of prior CVA  Suspect presentation is related to sepsis-see plan for same  Mentating appropriately on exam, encephalopathy resolved  Sepsis without acute organ dysfunction (HCC)  POA as evidenced by tachycardia, tachypnea with suspected UTI  U/A some leukocytes otherwise negative  Blood cultures- NGTD (72h)  Procalcitonin negative x 2  Chest x-ray negative  Incidentally noted to be influenza A positive-plan for same  S/p IV fluid resuscitation per sepsis protocol  Was initiated on  cefepime in the ED-continued on the [facility] floor  De-escalate antibiotics to Rocephin 3/27- discontinued 3/28 as patient completed 3 day course  Tachycardia and tachypnea have resolved  Hyponatremia (Resolved: 3/30/2025)    Lab Results   Component Value Date    SODIUM 136 03/28/2025    SODIUM 133 (L) 03/27/2025    SODIUM 132 (L) 03/26/2025      Appears to have chronic hyponatremia  Sodium normal at 136   Influenza A  Presented encephalopathic-see plan for same   FLU A + on arrival  Continue Tamiflu day 5/5  Supportive care  Isolation precautions  Paroxysmal atrial fibrillation (HCC)  Denies dizziness lightheadedness chest pain or palpitations on exam  EKG normal sinus rhythm on arrival heart rate 97  Continue pre hospital metoprolol  Continue prehospital Xarelto for AC  Type 2 diabetes mellitus with chronic kidney disease, with long-term current use of insulin (HCC)  Lab Results   Component Value Date    HGBA1C 9.9 (H) 03/26/2025       Recent Labs     03/29/25  1557 03/29/25  2108 03/30/25  0716 03/30/25  1117   POCGLU 184* 219* 153* 194*       Blood Sugar Average: Last 72 hrs:  (P) 180.8050221077166460    Hold prehospital oral hypoglycemics while inpatient, resume on discharge  Blood sugars are controlled  Continue SSI ACHS  Continue diabetic low-salt diet  Hypoglycemic protocol  BMP a.m.  Hyperlipidemia  Continue statin   Essential hypertension  Blood pressure controlled  Continue prehospital lisinopril and metoprolol  Monitor BP per protocol    VTE Pharmacologic Prophylaxis: VTE Score: 5 High Risk (Score >/= 5) - Pharmacological DVT Prophylaxis Ordered: rivaroxaban (Xarelto). Sequential Compression Devices Ordered.    Mobility:   Basic Mobility Inpatient Raw Score: 16  JH-HLM Goal: 5: Stand one or more mins  JH-HLM Achieved: 7: Walk 25 feet or more  JH-HLM Goal achieved. Continue to encourage appropriate mobility.    Patient Centered Rounds: I performed bedside rounds with nursing staff today.   Discussions with  Specialists or Other Care Team Provider: nursing    Education and Discussions with Family / Patient: Updated  (son) via phone.    Current Length of Stay: 4 day(s)  Current Patient Status: Inpatient   Certification Statement: The patient will continue to require additional inpatient hospital stay due to need for placement to [facility]  Discharge Plan: Anticipate discharge in 24-48 hrs to rehab facility.    Code Status: Level 1 - Full Code    Subjective   The patient was seen and examined. The patient is sitting up in her chair in no acute distress. She denies any complaints.    Objective :  Temp:  [97.7 °F (36.5 °C)-98.4 °F (36.9 °C)] 98.4 °F (36.9 °C)  HR:  [69-92] 81  BP: (112-135)/(69-84) 132/84  Resp:  [18] 18  SpO2:  [92 %-99 %] 92 %  O2 Device: None (Room air)    Body mass index is 28.21 kg/m².     Input and Output Summary (last 24 hours):     Intake/Output Summary (Last 24 hours) at 3/30/2025 1333  Last data filed at 3/30/2025 1300  Gross per 24 hour   Intake 600 ml   Output 446 ml   Net 154 ml       Physical Exam  Vitals and nursing note reviewed.   Constitutional:       General: She is awake.      Appearance: Normal appearance.   HENT:      Head: Normocephalic and atraumatic.   Cardiovascular:      Rate and Rhythm: Normal rate and regular rhythm.   Pulmonary:      Effort: Pulmonary effort is normal.      Breath sounds: Normal breath sounds.   Abdominal:      Palpations: Abdomen is soft.      Tenderness: There is no abdominal tenderness.   Skin:     General: Skin is warm and dry.   Neurological:      General: No focal deficit present.      Mental Status: She is alert and oriented to person, place, and time.   Psychiatric:         Attention and Perception: Attention normal.         Mood and Affect: Mood normal.         Speech: Speech normal.         Behavior: Behavior is cooperative.         Cognition and Memory: Cognition and memory normal.           Lines/Drains:              Lab Results: I have  reviewed the following results:   Results from last 7 days   Lab Units 03/28/25  0516   WBC Thousand/uL 3.99*   HEMOGLOBIN g/dL 11.2*   HEMATOCRIT % 35.0   PLATELETS Thousands/uL 169   SEGS PCT % 37*   LYMPHO PCT % 48*   MONO PCT % 13*   EOS PCT % 2     Results from last 7 days   Lab Units 03/28/25  0516   SODIUM mmol/L 136   POTASSIUM mmol/L 3.6   CHLORIDE mmol/L 103   CO2 mmol/L 27   BUN mg/dL 17   CREATININE mg/dL 0.78   ANION GAP mmol/L 6   CALCIUM mg/dL 8.2*   GLUCOSE RANDOM mg/dL 100     Results from last 7 days   Lab Units 03/26/25  1642   INR  2.63*     Results from last 7 days   Lab Units 03/30/25  1117 03/30/25  0716 03/29/25  2108 03/29/25  1557 03/29/25  1052 03/29/25  0701 03/28/25  2109 03/28/25  1702 03/28/25  1224 03/28/25  0748 03/27/25  2031 03/27/25  1615   POC GLUCOSE mg/dl 194* 153* 219* 184* 252* 188* 203* 239* 168* 104 157* 165*     Results from last 7 days   Lab Units 03/26/25  2041   HEMOGLOBIN A1C % 9.9*     Results from last 7 days   Lab Units 03/27/25  0440 03/26/25  1734 03/26/25  1642   LACTIC ACID mmol/L  --  1.9  --    PROCALCITONIN ng/ml <0.05  --  <0.05       Recent Cultures (last 7 days):   Results from last 7 days   Lab Units 03/26/25  1734   BLOOD CULTURE  No Growth at 72 hrs.  No Growth at 72 hrs.       Imaging Results Review: No pertinent imaging studies reviewed.  Other Study Results Review: No additional pertinent studies reviewed.    Last 24 Hours Medication List:     Current Facility-Administered Medications:     acetaminophen (TYLENOL) tablet 650 mg, Q4H PRN    atorvastatin (LIPITOR) tablet 80 mg, Daily With Dinner    atropine (ISOPTO ATROPINE) 1 % ophthalmic solution 1 drop, TID    bisacodyl (DULCOLAX) rectal suppository 10 mg, Daily PRN    famotidine (PEPCID) tablet 40 mg, Daily    insulin lispro (HumALOG/ADMELOG) 100 units/mL subcutaneous injection 1-5 Units, HS    insulin lispro (HumALOG/ADMELOG) 100 units/mL subcutaneous injection 1-6 Units, TID AC **AND**  Fingerstick Glucose (POCT), TID AC    lisinopril (ZESTRIL) tablet 5 mg, Daily    metoprolol tartrate (LOPRESSOR) tablet 50 mg, Q12H    nystatin (MYCOSTATIN) cream, BID    ondansetron (ZOFRAN) injection 4 mg, Q6H PRN    oseltamivir (TAMIFLU) capsule 75 mg, Q12H AVERY    oxybutynin (DITROPAN-XL) 24 hr tablet 10 mg, Daily    rivaroxaban (XARELTO) tablet 20 mg, Daily With Breakfast    Administrative Statements   Today, Patient Was Seen By: Deon Main PA-C  I have spent a total time of 25 minutes in caring for this patient on the day of the visit/encounter including Patient and family education and Documenting in the medical record.    **Please Note: This note may have been constructed using a voice recognition system.**

## 2025-03-30 NOTE — ASSESSMENT & PLAN NOTE
[employer] COOLING UNIT      Information about your [facility] cold therapy unit:  • [employer]'s patented Automated Temperature Control™ keeps the water in your unit at the correct temperature (40 ?--60 ?) to reduce pain and swelling.  • The motor will run at different speeds to keep the water at the correct temperature. As it runs, the motor will sound different at different speeds.  • It may turn itself off at times. That is okay.  • Depending on the speed at which the motor is running, the blanket may or may not always be completely filled with water.     Pay attention to the LED light.  • A green light means the water in the blanket is the correct temperature.  • A flashing green light means it will need more ice soon.  • A yellow light means add ice now.  • A red light means the unit needs ice or water.  • A flashing red light means you should check that there is no kink in the hose between the unit and the blanket, and   • Be sure the unit is not lower than the injury site for the unit to run correctly. Please do not place the unit on the floor.    For further explanation on LED lighting and troubleshooting, please refer to the instruction manual attached to the side of the cooler unit, or call:     [phone]    ([phone])   DISCHARGE INSTRUCTIONS  POST-OPERATIVE  Knee Arthroscopic Surgery      • For your surgery you had:  ? General anesthesia (you may have a sore throat for the first 24 hours)  • You may experience dizziness, drowsiness, or light-headedness for several hours following surgery/procedure.  • Do not stay alone today or tonight.  • Limit your activity for 24 hours.  • Resume your diet slowly.  Follow whatever special dietary instructions you may have been given by your doctor.  • You should not drive or operate machinery or drink alcohol for 24 hours or while you are taking pain medication.  • You should not sign legally binding documents.  []Post-Operative Day #1  • Post-Operative Day #1 is  Longstanding from prior CVA  Continue statin and aspirin   counted as the 1st day after surgery.  • Leave ace wrap on day one to aid in the reduction of swelling.  If dressing is too tight it can be rewrapped.  Post-Operative Day #2  • Ace wrap and dressing may be removed.  • Put a 4x4 dressing to suture or staple site.  • Change dressing once or twice daily until suture or staples are removed.  It is normal to have some redness or irritation around skin sutures or stapes, however, if you have any expanding areas of redness with a persistent fever and increasing pain notify the physician as soon as possible.  • On Post-Operative Day #2, you may want to reapply the ace wrap.  Put ace wrap directly over the knee to add compression and aid in swelling reduction.  • It is normal to have some swelling down into the calf and ankle after knee arthroscopy or Anterior Cruciate Ligament (ACL) reconstruction.  If you notice a significant amount of swelling or increasing pain in calf area, notify the physician immediately.   Post-Operative Day #4  • You may shower.  During shower, avoid too much water to incision site.  Let water run down leg and then pat dry.  Do not put any ointments on the incision unless directed by surgeon.  Reapply dry dressing to sutures or staple sites.    General Information  • Full weight bearing with crutches is allowed after a standard knee arthroscopy or ACL reconstruction unless instructed otherwise by surgeon.  You may put as much weight on knee as tolerable.  If given a knee immobilizer postoperatively, usually with an ACL reconstruction, this is for protection with early ambulation until you are steadier on your feet.  It is very important to take off immobilizer to do range of motion and flexion and extension exercises.  You do not have to sleep in the knee immobilizer.  • Knee range of motion exercises should be started as early as the day of surgery.  Try to achieve full extension and start working on range of motion and flexion of the knee.  Move  the ankle up and down periodically to help reduce swelling as well as reduce blood pooling.  To allow full extension of the knee and prevent blood clots it is very important to put pillows at the level of the mid-calf to the foot.  DO NOT put pillows directly behind knee.  SPECIAL INSTRUCTIONS:  May take and over the counter stool softner as needed   Start physical therapy Monday or tuesday                                                        DISCHARGE INSTRUCTIONS  POST-OPERATIVE   Knee Arthroscopic Surgery      General Instructions  • You will receive a prescription for physical therapy, unless otherwise instructed by physician.  Physical therapy is imperative especially after ACL reconstruction and some knee arthroscopies for swelling reduction, knee range of motion and strengthening.  ? The Cold Therapy System can help reduce swelling and decrease pain.  Utilize device for -60 minutes per session, with-60 minute breaks in between sessions.  It is recommended to use, as directed, for the first 72 hours after surgery until bedtime.  After 72 hours, continue using the device as needed until your follow-up appointment with your physician.  Never place directly on skin.  Please refer to the instruction sheet given.   • [] To help reduce swelling and minimize throbbing pain, use pillows to keep the knee elevated above the level of the heart, even while sleeping.  • Sit with the leg propped up on a footstool or chair with pillows.  • Exercises toes for 10 minutes every hour while awake.  • If you are given a prescription for pain medication, take it as prescribed.  If you are able to take over-the-counter anti-inflammatory medications such as Motrin or Aleve, you may take as per package instructions in between the pain medication to help enhance pain control and reduce swelling.  If you are taking the pain medication prescribed, do not take Tylenol too unless you consult with the pharmacist. Generally, the pain  medications prescribed have Tylenol in them and too much Tylenol can be harmful.  You should stop the anti-inflammatory medication if you experience any stomach/GI disturbances.  Consult with your physician or your pharmacist before taking over-the-counter pain medications/anti-inflammatories. • It is not uncommon to have a fever post-operatively especially in the first 24-48 hours.  Deep breathing and coughing and early ambulation will help.  Anti-inflammatories can be used for fever reduction also.  • If unable to urinate 6 to 8 hours after surgery or urinating frequently in small amounts, notify the physician or go to the nearest Emergency Room.  NOTIFY YOUR PHYSICIAN IF YOU EXPERIENCE:  1. Numbness of toes.  2. Inability to move toes.  3. Extreme coldness, paleness or blue dis-coloration of toes.  4. Any pain other than from the incision, such as swelling of the leg that blocks circulation of the toes.  • Follow verbal instructions from your doctor.  • Medications per physician instructions as indicated on Discharge Medication Information Sheet.  .  Phone number: _[phone]________________________  • Missing your appointment/follow-up could lead to serious complications.  • If you have an emergency and cannot reach your doctor, go to an Emergency Room nearest your home.

## 2025-03-30 NOTE — ASSESSMENT & PLAN NOTE
POA as evidenced by tachycardia, tachypnea with suspected UTI  U/A some leukocytes otherwise negative  Blood cultures- NGTD (72h)  Procalcitonin negative x 2  Chest x-ray negative  Incidentally noted to be influenza A positive-plan for same  S/p IV fluid resuscitation per sepsis protocol  Was initiated on cefepime in the ED-continued on the [facility] floor  De-escalate antibiotics to Rocephin 3/27- discontinued 3/28 as patient completed 3 day course  Tachycardia and tachypnea have resolved

## 2025-03-30 NOTE — ASSESSMENT & PLAN NOTE
Presented encephalopathic-see plan for same   FLU A + on arrival  Continue Tamiflu day 5/5  Supportive care  Isolation precautions

## 2025-03-30 NOTE — PLAN OF CARE
Problem: Prexisting or High Potential for Compromised Skin Integrity  Goal: Skin integrity is maintained or improved  Description: INTERVENTIONS:- Identify patients at risk for skin breakdown- Assess and monitor skin integrity- Assess and monitor nutrition and hydration status- Monitor labs - Assess for incontinence - Turn and reposition patient- Assist with mobility/ambulation- Relieve pressure over bony prominences- Avoid friction and shearing- Provide appropriate hygiene as needed including keeping skin clean and dry- Evaluate need for skin moisturizer/barrier cream- Collaborate with interdisciplinary team - Patient/family teaching- Consider wound care consult   Outcome: Progressing     Problem: Neurological Deficit  Goal: Neurological status is stable or improving  Description: Interventions:- Monitor and assess patient's level of consciousness, motor function, sensory function, and level of assistance needed for ADLs. - Monitor and report changes from baseline. Collaborate with interdisciplinary team to initiate plan and implement interventions as ordered. - Provide and maintain a safe environment.- Consider seizure precautions.- Consider fall precautions.- Consider aspiration precautions.- Consider bleeding precautions.  Outcome: Progressing     Problem: Potential for Aspiration  Goal: Non-ventilated patient's risk of aspiration is minimized  Description: Assess and monitor vital signs, respiratory status, and labs (WBC).  Monitor for signs of aspiration (tachypnea, cough, rales, wheezing, cyanosis, fever).- Assess and monitor patient's ability to swallow.- Place patient up in chair to eat if possible.- HOB up at 90 degrees to eat if unable to get patient up into chair.- Supervise patient during oral intake. - Instruct patient/ family to take small bites.- Instruct patient/ family to take small single sips when taking liquids.- Follow patient-specific strategies generated by speech pathologist.  Outcome:  Progressing     Problem: INFECTION - ADULT  Goal: Absence or prevention of progression during hospitalization  Description: INTERVENTIONS:- Assess and monitor for signs and symptoms of infection- Monitor lab/diagnostic results- Monitor all insertion sites, i.e. indwelling lines, tubes, and drains- Monitor endotracheal if appropriate and nasal secretions for changes in amount and color- [place] appropriate cooling/warming therapies per order- Administer medications as ordered- Instruct and encourage patient and family to use good hand hygiene technique- Identify and instruct in appropriate isolation precautions for identified infection/condition  Outcome: Progressing

## 2025-03-30 NOTE — ASSESSMENT & PLAN NOTE
Lab Results   Component Value Date    HGBA1C 9.9 (H) 03/26/2025       Recent Labs     03/29/25  1557 03/29/25  2108 03/30/25  0716 03/30/25  1117   POCGLU 184* 219* 153* 194*       Blood Sugar Average: Last 72 hrs:  (P) 180.3547047369768547    Hold prehospital oral hypoglycemics while inpatient, resume on discharge  Blood sugars are controlled  Continue SSI ACHS  Continue diabetic low-salt diet  Hypoglycemic protocol  BMP a.m.

## 2025-03-31 ENCOUNTER — TRANSITIONAL CARE MANAGEMENT (OUTPATIENT)
Age: 70
End: 2025-03-31

## 2025-03-31 VITALS
RESPIRATION RATE: 18 BRPM | WEIGHT: 169.53 LBS | SYSTOLIC BLOOD PRESSURE: 132 MMHG | TEMPERATURE: 98.3 F | DIASTOLIC BLOOD PRESSURE: 91 MMHG | HEART RATE: 71 BPM | BODY MASS INDEX: 28.25 KG/M2 | OXYGEN SATURATION: 95 % | HEIGHT: 65 IN

## 2025-03-31 LAB
BACTERIA BLD CULT: NORMAL
BACTERIA BLD CULT: NORMAL
GLUCOSE SERPL-MCNC: 172 MG/DL (ref 65–140)
GLUCOSE SERPL-MCNC: 244 MG/DL (ref 65–140)

## 2025-03-31 PROCEDURE — 99238 HOSP IP/OBS DSCHRG MGMT 30/<: CPT | Performed by: PHYSICIAN ASSISTANT

## 2025-03-31 PROCEDURE — 82948 REAGENT STRIP/BLOOD GLUCOSE: CPT

## 2025-03-31 RX ADMIN — OSELTAMAVIR PHOSPHATE 75 MG: 75 CAPSULE ORAL at 08:52

## 2025-03-31 RX ADMIN — INSULIN LISPRO 3 UNITS: 100 INJECTION, SOLUTION INTRAVENOUS; SUBCUTANEOUS at 12:10

## 2025-03-31 RX ADMIN — OXYBUTYNIN 10 MG: 5 TABLET, FILM COATED, EXTENDED RELEASE ORAL at 08:52

## 2025-03-31 RX ADMIN — INSULIN LISPRO 1 UNITS: 100 INJECTION, SOLUTION INTRAVENOUS; SUBCUTANEOUS at 08:53

## 2025-03-31 RX ADMIN — LISINOPRIL 5 MG: 5 TABLET ORAL at 08:53

## 2025-03-31 RX ADMIN — Medication 6 MG: at 01:23

## 2025-03-31 RX ADMIN — METOPROLOL TARTRATE 50 MG: 50 TABLET, FILM COATED ORAL at 12:11

## 2025-03-31 RX ADMIN — RIVAROXABAN 20 MG: 10 TABLET, FILM COATED ORAL at 08:53

## 2025-03-31 RX ADMIN — ATROPINE SULFATE 1 DROP: 10 SOLUTION/ DROPS OPHTHALMIC at 08:52

## 2025-03-31 RX ADMIN — NYSTATIN CREAM 1 APPLICATION: 100000 CREAM TOPICAL at 08:52

## 2025-03-31 RX ADMIN — FAMOTIDINE 40 MG: 20 TABLET, FILM COATED ORAL at 08:53

## 2025-03-31 NOTE — ASSESSMENT & PLAN NOTE
Lab Results   Component Value Date    HGBA1C 9.9 (H) 03/26/2025       Recent Labs     03/30/25  1547 03/30/25  2110 03/31/25  0733 03/31/25  1114   POCGLU 251* 208* 172* 244*       Blood Sugar Average: Last 72 hrs:  (P) 198.5    Prehospital oral hypoglycemics held while inpatient, resume on discharge

## 2025-03-31 NOTE — DISCHARGE SUMMARY
Discharge Summary - Hospitalist   Name: Veronica Reese 69 y.o. female I MRN: [id number]  Unit/Bed#: -01 I Date of Admission: 3/26/2025   Date of Service: 3/31/2025 I Hospital Day: 5     Assessment & Plan  Stroke-like symptoms  Presents with altered mental status-plan for encephalopathy  Suspect this is more likely related to sepsis  Appreciate input of neurology  CT brain negative  CTA head and neck: Unchanged severe stenosis at the P1/P2 junction of the left PCA  MRI brain:   Possible punctate subacute subcortical infarcts in the right posterior parietal temporal and left posterior frontal parietal white matter versus T2 shine through from white matter lesions. No evidence of acute infarct.   TTE reviewed  PT/OT- recommending rehab  Continue ASA/statin/DOAC  Neurology consultation appreciated-   Patient is already on Asa and Xarelto for secondary stroke prevention, can continue.   Outpatient follow-up in the office  Patient discharged to [facility] for STR  Hemiplegia and hemiparesis following cerebral infarction affecting left non-dominant side (HCC)  Longstanding from prior CVA  Continue statin and aspirin  Acute cystitis without hematuria  Presented to the ED encephalopathic, also with complains of urinary frequency and burning  Some leukocytes on UA  Initiated on cefepime in the ED and was continued on admission  Antibiotic was changed to Rocephin 3/27, now discontinued- patient completed 3 days of treatment total  Toxic metabolic encephalopathy  Presents with AMS  History of prior CVA  Suspect presentation is related to sepsis-see plan for same  Mentating appropriately on exam, encephalopathy resolved  Sepsis without acute organ dysfunction (HCC)  POA as evidenced by tachycardia, tachypnea with suspected UTI  U/A some leukocytes otherwise negative  Blood cultures- NGTD (4d)  Procalcitonin negative x 2  Chest x-ray negative  Incidentally noted to be influenza A positive-plan for same  S/p IV fluid  resuscitation per sepsis protocol  Was initiated on cefepime in the ED-continued on admission  De-escalate antibiotics to Rocephin 3/27- discontinued 3/28 as patient completed 3 day course  Sepsis resolved  Influenza A  Presented encephalopathic-see plan for same   FLU A + on arrival  The patient completed a 5 day course of Tamiflu  Paroxysmal atrial fibrillation (HCC)  The patient is rate controlled  Continue home metoprolol and Xarelto for AC  Type 2 diabetes mellitus with chronic kidney disease, with long-term current use of insulin (HCC)  Lab Results   Component Value Date    HGBA1C 9.9 (H) 03/26/2025       Recent Labs     03/30/25  1547 03/30/25  2110 03/31/25  0733 03/31/25  1114   POCGLU 251* 208* 172* 244*       Blood Sugar Average: Last 72 hrs:  (P) 198.5    Prehospital oral hypoglycemics held while inpatient, resume on discharge  Hyperlipidemia  Continue statin   Essential hypertension  Blood pressure controlled  Continue prehospital lisinopril and metoprolol     Medical Problems       Resolved Problems  Date Reviewed: 3/26/2025          Resolved    Hyponatremia 3/30/2025     Resolved by  Deon Main PA-C        Discharging Physician / Practitioner: Deon Main PA-C  PCP: Jerry Kulkarni MD  Admission Date:   Admission Orders (From admission, onward)       Ordered        03/26/25 1833  INPATIENT ADMISSION  Once                          Discharge Date: 03/31/25    Consultations During Hospital Stay:  Neurology    Procedures Performed:   none    Significant Findings / Test Results:   MRI brain wo contrast  Result Date: 3/27/2025  Impression: Possible punctate subacute subcortical infarcts in the right posterior parietal temporal and left posterior frontal parietal white matter versus T2 shine through from white matter lesions. No evidence of acute infarct.. Workstation performed: [id number]     XR chest portable  Result Date: 3/27/2025  Impression: Stable findings without acute cardiopulmonary  abnormalities. Workstation performed: [id number]     CTA stroke alert (head/neck)  Result Date: 3/26/2025  Impression: 1. No intracranial large vessel occlusion or hemodynamically significant stenosis in the neck. 2. Unchanged severe stenosis at the P1/P2 junction of the left PCA. Findings were discussed with Dr. Huggins on 3/26/25 at 4:36 PM. Workstation performed: [id number]     CT stroke alert brain  Result Date: 3/26/2025  Impression: 1. No  hemorrhage or CT evidence of an acute cortical infarct. 2. Possible hyperdense basilar artery. Attention on CTA head/neck. Findings were discussed with Dr. Huggins on 3/26/25 at 4:18 PM via EPIC secure chat. . Workstation performed: [id number]         Incidental Findings:   none     Test Results Pending at Discharge (will require follow up):   none     Outpatient Tests Requested:  none    Complications:  none    Reason for Admission: stroke like symptoms, Sepsis, UTI    Hospital Course:   Veronica Reese is a 69 y.o. female patient who originally presented to the hospital on 3/26/2025 due to change in mental status. Please see H&P for complete details of presentation. The patient was placed on stroke pathway with neuro checks and telemetry monitoring.  Patient received IV cefepime in the emergency department for UTI/sepsis.  The patient was continued on cefepime on admission.  Patient was also noted to be positive for flu and started on Tamiflu.  MRI of the brain was negative for acute infarct.  Neurology recommended for the patient to continue her same home medications without changes.  IV cefepime was transitioned to Rocephin.  This was discontinued on 3/27/2025 as the patient completed a 3-day course of antibiotics.  The patient completed a 5-day course of Tamiflu on 3/31/2025.  Speech evaluated the patient and recommended a regular diet however the patient required nectar thick liquids as she would cough with thin liquids.  PT recommended rehab and the patient was agreeable the  "patient was discharged to [facility] for short-term rehab.  The patient was instructed to follow-up with her family doctor upon discharge home.  This is a brief discharge summary please see notes from throughout the patient's hospital stay for complete details of her hospitalization.      Please see above list of diagnoses and related plan for additional information.     Condition at Discharge: good    Discharge Day Visit / Exam:   Subjective:    Vitals: Blood Pressure: 132/91 (03/31/25 1211)  Pulse: 71 (03/31/25 0725)  Temperature: 98.3 °F (36.8 °C) (03/31/25 0725)  Temp Source: Oral (03/30/25 1900)  Respirations: 18 (03/31/25 0725)  Height: 5' 5\" (165.1 cm) (03/27/25 0930)  Weight - Scale: 76.9 kg (169 lb 8.5 oz) (03/27/25 1301)  SpO2: 95 % (03/31/25 0725)  Physical Exam  Vitals and nursing note reviewed.   Constitutional:       Appearance: Normal appearance.   HENT:      Head: Normocephalic and atraumatic.      Nose: Nose normal.      Mouth/Throat:      Mouth: Mucous membranes are moist.      Pharynx: Oropharynx is clear.   Cardiovascular:      Pulses: Normal pulses.      Heart sounds: Normal heart sounds.   Pulmonary:      Effort: Pulmonary effort is normal.      Breath sounds: Normal breath sounds.   Abdominal:      General: There is no distension.      Palpations: Abdomen is soft.      Tenderness: There is no abdominal tenderness.   Skin:     General: Skin is warm and dry.   Neurological:      General: No focal deficit present.      Mental Status: She is alert and oriented to person, place, and time.   Psychiatric:         Mood and Affect: Mood normal.         Behavior: Behavior normal.         Thought Content: Thought content normal.            Discharge instructions/Information to patient and family:   See after visit summary for information provided to patient and family.      Provisions for Follow-Up Care:  See after visit summary for information related to follow-up care and any pertinent home " health orders.      Mobility at time of Discharge:   Basic Mobility Inpatient Raw Score: 17  JH-HLM Goal: 5: Stand one or more mins  JH-HLM Achieved: 7: Walk 25 feet or more  HLM Goal achieved. Continue to encourage appropriate mobility.     Disposition:   Other Skilled Nursing Facility at [facility]    Planned Readmission: No    Discharge Medications:  See after visit summary for reconciled discharge medications provided to patient and/or family.      Administrative Statements   Discharge Statement:  I have spent a total time of 25 minutes in caring for this patient on the day of the visit/encounter. >30 minutes of time was spent on: Counseling / Coordination of care, Documenting in the medical record, and Communicating with other healthcare professionals .    **Please Note: This note may have been constructed using a voice recognition system**

## 2025-03-31 NOTE — ASSESSMENT & PLAN NOTE
POA as evidenced by tachycardia, tachypnea with suspected UTI  U/A some leukocytes otherwise negative  Blood cultures- NGTD (4d)  Procalcitonin negative x 2  Chest x-ray negative  Incidentally noted to be influenza A positive-plan for same  S/p IV fluid resuscitation per sepsis protocol  Was initiated on cefepime in the ED-continued on admission  De-escalate antibiotics to Rocephin 3/27- discontinued 3/28 as patient completed 3 day course  Sepsis resolved

## 2025-03-31 NOTE — PROGRESS NOTES
Patient:    MRN:  [id number]    [place] Request ID:  [id number]    Level of care reserved:  Skilled Nursing Facility    Partner Reserved:  [facility], [place] [address] [phone]    Clinical needs requested:    Geography searched:  30 miles around [address]    Start of Service:    Request sent:  6:54pm EDT on 3/28/2025 by Tangela Armando    Partner reserved:  9:39am EDT on 3/31/2025 by Tangela Armando    Choice list shared:  8:36am EDT on 3/31/2025 by Tangela Armando

## 2025-03-31 NOTE — ASSESSMENT & PLAN NOTE
Presented encephalopathic-see plan for same   FLU A + on arrival  The patient completed a 5 day course of Tamiflu

## 2025-03-31 NOTE — ASSESSMENT & PLAN NOTE
Presented to the ED encephalopathic, also with complains of urinary frequency and burning  Some leukocytes on UA  Initiated on cefepime in the ED and was continued on admission  Antibiotic was changed to Rocephin 3/27, now discontinued- patient completed 3 days of treatment total

## 2025-04-01 NOTE — CASE MANAGEMENT
Case Management Discharge Planning Note    Patient name Veronica Reese  Location /-01 MRN [id number]  : 1955 Date 3/31/2025       Current Admission Date: 3/26/2025  Current Admission Diagnosis:Stroke-like symptoms   Patient Active Problem List    Diagnosis Date Noted Date Diagnosed    Stroke-like symptoms 2025     Acute cystitis without hematuria 2025     Influenza A 2025     Type 2 diabetes mellitus with hyperglycemia, with long-term current use of insulin ([facility]) 2025     Hypoglycemia unawareness associated with type 2 diabetes mellitus ([facility]) 08/15/2023     Diabetic nephropathy associated with type 2 diabetes mellitus ([facility]) 2023     Fall 2023     Elevated CK 2023     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     Abnormal uterine bleeding (AUB) 2022     Ovarian mass 2022     Status post appendectomy 2022     Platelets decreased ([facility]) 2022     Cyst of right ovary 2022     Abnormal CT of the chest 2022     Sepsis without acute organ dysfunction ([facility]) 2022     Abnormality of gait due to impairment of balance 02/15/2022     Hemiplegia and hemiparesis following cerebral infarction affecting left non-dominant side ([facility]) 2022     Stage 3 chronic kidney disease, unspecified whether stage 3a or 3b CKD ([facility]) 2022     History of CVA (cerebrovascular accident) 2021     Weakness of both lower extremities      Muscle tension dysphonia 2020     Reflux laryngitis 2020     Glottic insufficiency 2020     Dermatitis 2020     YOLIE (obstructive sleep apnea)      Preoperative clearance 2020     Preoperative cardiovascular examination 2020     Current episode of major depressive disorder without prior episode 2019     Stress incontinence 2019     Essential hypertension 2019     Toxic metabolic encephalopathy 03/15/2019     GERD (gastroesophageal reflux disease) 03/15/2019      Hyperlipidemia 03/15/2019     Edema 03/15/2019     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     Incomplete emptying of bladder 05/07/2018     Dysphonia 05/04/2018     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     Dysphagia 04/19/2018     Paroxysmal atrial fibrillation (HCC) 10/17/2017     Blurry vision 10/17/2017     Insomnia 10/17/2017     Type 2 diabetes mellitus with chronic kidney disease, with long-term current use of insulin (HCC) 10/17/2017     Cerebrovascular accident (CVA) (HCC) 01/01/2017     Heart disease 02/23/2015     Diabetic cataract (HCC) 12/18/2014       LOS (days): 5  Geometric Mean LOS (GMLOS) (days): 4.9  Days to GMLOS:0.1     OBJECTIVE:  Risk of Unplanned Readmission Score: 10.45         Current admission status: Inpatient   Preferred Pharmacy:   [facility] [address]  [address]  [facility] [address]  Phone: [phone] Fax: [phone]    Primary Care Provider: Jerry Kulkarni MD    Primary Insurance: [employer] [facility]  Secondary Insurance:     DISCHARGE DETAILS:    Discharge planning discussed with:: patient & son was called at 10:08 am  Freedom of Choice: Yes  Comments - Freedom of Choice: recommendation is rehab - [facility] [employer] has a contract- [employer] gave the authorization  CM contacted family/caregiver?: Yes  Were Treatment Team discharge recommendations reviewed with patient/caregiver?: Yes  Did patient/caregiver verbalize understanding of patient care needs?: Yes  Were patient/caregiver advised of the risks associated with not following Treatment Team discharge recommendations?: Yes    Contacts  Patient Contacts: [place]  Relationship to Patient:: Family  Contact Method: Phone  Phone Number: [phone]  Reason/Outcome: Discharge Planning    Requested Home Health Care         Is the patient interested in HHC at discharge?: No    DME Referral Provided  Referral made for DME?: No    Other  Referral/Resources/Interventions Provided:  Interventions: Short Term Rehab  Referral Comments: prt accepted to [facility] - pt & son's choice- rn was given the fax & report #- no covid needed- romed w/c van  tranport time 1-3pm - therapy in agreement with  the mode of transport    Would you like to participate in our [facility] service program?  : No - Declined    Treatment Team Recommendation: Short Term Rehab ([facility] -1-3pm w/c van Romed)  Discharge Destination Plan:: Short Term Rehab ([facility] auth received- 1-3pm W/cvan Romed 1-3pm)         IMM reviewed with patient, patient agrees with discharge determination.       Pt & son are in agreement with the d/c & d/c plan                 IMM Given (Date):: 03/31/25  IMM Given to:: Patient  Family notified:: son was called  Additional Comments: cm called [employer] # [phone] ext#[phone]  cm was told that the pt is able to go to [facility] and they will give the authorization to them & cm made her aware [facility] will come to [facility] and they want to know if the pt or [employer] pays for the transport - cm was told [employer] will pay- cm called [employer] [phone] and made them aware - cm was told they would come from 1pm-3pm w/c van    Accepting Facility Name, City & State : [facility] [address] [address]  Receiving Facility/Agency Phone Number: [phone]  Facility/Agency Fax Number: [phone]

## 2025-04-02 ENCOUNTER — TELEPHONE (OUTPATIENT)
Age: 70
End: 2025-04-02

## 2025-04-02 NOTE — TELEPHONE ENCOUNTER
----- Message from Sarah POWELL sent at 4/1/2025  8:42 AM EDT -----  Regarding: FW: Patient  Can you please assist with this    Thank you  ----- Message -----  From: Jerry Kulkarni MD  Sent: 4/1/2025   5:47 AM EDT  To: [facility]  Subject: Patient                                          I have not seen this patient in over a year. Please schedule or make sure she is seeing someone else and possibly remove me as PCP if she is not able or willing to follow up and especially if she has transferred to someone else.

## 2025-04-04 ENCOUNTER — TELEPHONE (OUTPATIENT)
Dept: GYNECOLOGIC ONCOLOGY | Facility: CLINIC | Age: 70
End: 2025-04-04

## 2025-04-04 NOTE — TELEPHONE ENCOUNTER
Called and left a message for the patient in regards to her 04/17/2025 with , called to attempt to tell the patient the appointment has to be rescheduled due to the provider having a meeting that day. # [phone].

## 2025-04-07 NOTE — UTILIZATION REVIEW
NOTIFICATION OF ADMISSION DISCHARGE   This is a Notification of Discharge from [facility]. Please be advised that this patient has been discharge from our facility. Below you will find the admission and discharge date and time including the patient’s disposition.   UTILIZATION REVIEW CONTACT:  Utilization Review Assistants  Network Utilization Review Department  Phone: [phone] x carefully listen to the prompts. All voicemails are confidential.  Email: [email or web address]     ADMISSION INFORMATION  PRESENTATION DATE: 3/26/2025  3:58 PM  OBERVATION ADMISSION DATE: N/A  INPATIENT ADMISSION DATE: 3/26/25  6:33 PM   DISCHARGE DATE: 3/31/2025  1:17 PM   DISPOSITION:[facility]    Network Utilization Review Department  ATTENTION: Please call with any questions or concerns to [phone] and carefully listen to the prompts so that you are directed to the right person. All voicemails are confidential.   For Discharge needs, contact Care Management DC Support Team at [phone] opt. [phone]  Send all requests for admission clinical reviews, approved or denied determinations and any other requests to dedicated fax number below belonging to the campus where the patient is receiving treatment. List of dedicated fax numbers for the Facilities:  FACILITY NAME UR FAX NUMBER   ADMISSION DENIALS (Administrative/Medical Necessity) [phone]   DISCHARGE SUPPORT TEAM ([facility]) [phone]   PARENT CHILD HEALTH (Maternity/NICU/Pediatrics) [phone]   [facility] [phone]   [facility] [phone]   Critical access hospital [phone]   [facility] [phone]   [facility] [phone]   [facility] [phone]   General acute hospital [phone]   [facility] [phone]   [facility]  [facility] [phone]   [facility] [phone]   [facility] [phone]   [facility] [phone]

## 2025-04-25 PROBLEM — J10.1 INFLUENZA A: Status: RESOLVED | Noted: 2025-03-26 | Resolved: 2025-04-25

## 2025-04-25 PROBLEM — N30.00 ACUTE CYSTITIS WITHOUT HEMATURIA: Status: RESOLVED | Noted: 2025-03-26 | Resolved: 2025-04-25

## 2025-05-09 ENCOUNTER — TELEPHONE (OUTPATIENT)
Dept: ENDOCRINOLOGY | Facility: CLINIC | Age: 70
End: 2025-05-09

## 2025-05-09 NOTE — TELEPHONE ENCOUNTER
Received treatment detail letter from [employer].   States Patient did not tolerate Trulicity due to GI side effects. Stopped insulin- not able to comply due to poor dexterity,difficult administering.  A1C 9.9 on 3/5/2025  Letter scanned into chart.

## 2025-06-10 ENCOUNTER — HOSPITAL ENCOUNTER (OUTPATIENT)
Dept: ULTRASOUND IMAGING | Facility: HOSPITAL | Age: 70
Discharge: HOME | End: 2025-06-10
Attending: OBSTETRICS & GYNECOLOGY
Payer: MEDICARE

## 2025-06-10 DIAGNOSIS — N83.8 OVARIAN MASS: ICD-10-CM

## 2025-06-10 PROCEDURE — 76856 US EXAM PELVIC COMPLETE: CPT

## 2025-06-10 PROCEDURE — 76830 TRANSVAGINAL US NON-OB: CPT

## 2025-06-11 ENCOUNTER — DOCUMENTATION (OUTPATIENT)
Dept: ADMINISTRATIVE | Facility: OTHER | Age: 70
End: 2025-06-11

## 2025-06-11 NOTE — PROGRESS NOTES
06/11/25 11:24 AM     DM A1c outreach is not required, patient has an upcoming appointment with the Endocrinologist office.    Thank you.  Tomasz Saez MA  [facility]

## 2025-06-16 ENCOUNTER — OFFICE VISIT (OUTPATIENT)
Dept: GYNECOLOGIC ONCOLOGY | Facility: CLINIC | Age: 70
End: 2025-06-16
Payer: MEDICARE

## 2025-06-16 VITALS
OXYGEN SATURATION: 97 % | TEMPERATURE: 98.6 F | HEART RATE: 69 BPM | HEIGHT: 65 IN | BODY MASS INDEX: 27.56 KG/M2 | WEIGHT: 165.4 LBS | RESPIRATION RATE: 18 BRPM

## 2025-06-16 DIAGNOSIS — N83.201 CYST OF RIGHT OVARY: Primary | ICD-10-CM

## 2025-06-16 PROCEDURE — 99213 OFFICE O/P EST LOW 20 MIN: CPT | Performed by: OBSTETRICS & GYNECOLOGY

## 2025-06-16 NOTE — PROGRESS NOTES
Name: Veronica Reese      : 1955      MRN: [id number]  Encounter Provider: Michoacano Alvarado MD  Encounter Date: 2025   Encounter department: [facility] [place]  :  Assessment & Plan  Cyst of right ovary  Patient has persistent decreasing likely paraovarian cyst.  We have discussed that the likelihood of malignancy is 1% or less.  We have given the patient the option of continued ultrasound surveillance or no further close observation.  The patient would prefer under the given circumstances with her multiple medical issues not to screen.  If she does develop any symptoms of bloating bowel or bladder dysfunction that last 2 to 4 weeks she will call us.               History of Present Illness   Reason for Visit / CC: Follow-up ovarian cyst   Veronica Reese is a 69 y.o. female   Patient is very pleasant 69-year-old female seen for evaluation and management of ovarian cyst.  This was noted on ultrasound approximately year and a half ago.  It was noted to actually decrease in size 3 months after and the patient now presents for follow-up in 1 year.  The patient had significant medical comorbidities and was recently admitted with flu and encephalitis.  She does also have a history of CVA.    The patient's most recent ultrasound reveals the following:  FINDINGS:     UTERUS:  The uterus is anteverted in position, measuring 7.9 x 4.0 x 5.6 cm.  The uterus has a normal contour and echotexture.  Fibroid 1: 0.9 x 1.3 x 1.0 cm. Intramural location. Right uterine body location. Peripheral calcification with posterior acoustic shadowing. This fibroid previously measured 1.7 x 1.2 x 1.6 cm.     Fibroid 2: 1.1 x 1.1 x 1.0 cm. Intramural location. Anterior uterine body location. This fibroid previously measured 1.1 x 1.2 x 1.5 cm.     Fibroid 3: 1.4 x 0.9 x 1.4 cm. Intramural location. Anterior uterine body location. This fibroid previously measured 1.7 x 1.3 x 1.3 cm.        The cervix appears  "within normal limits.     ENDOMETRIUM:  The endometrial echo complex has an AP caliber of 3 mm.  There is trace fluid in the endometrial cavity     OVARIES/ADNEXA:  Right ovary: 3.0 x 1.9 x 1.1 cm. 3.2 mL.  Ovarian Doppler flow is within normal limits.  There is a small cyst exophytic from the right ovary that measures 9 x 8 x 6 mm, previously 15 x 5 x 7 mm. This cyst has a slightly thickened wall. There is an additional slightly oblong cystic focus within the right adnexa that measures up to 11 x 4 x 8   mm, previously a 12 x 6 x 13 mm. This may represent a small paraovarian cyst.     Left ovary: 1.6 x 1.1 x 0.9 cm. 0.8 mL.  Ovarian Doppler flow is within normal limits.  No suspicious ovarian or adnexal abnormality.     OTHER:  No free fluid or loculated fluid collections.     IMPRESSION:     Since April 2024 there is slight interval decrease in size of intramural uterine fibroids. No imaging follow-up necessary.     Slight interval decrease in size of small exophytic right ovarian cyst.     Unchanged small right paraovarian cyst.     Normal left ovary.     O-RADS 2 = Almost certainly benign. Follow-up ultrasound may be considered in 1 year.      Today, the patient is doing well.  She denies significant abdominal pain, pelvic pain, nausea, vomiting, constipation, diarrhea, fevers, chills, or vaginal bleeding.         Pertinent Medical History            Review of Systems   Constitutional: Negative.    HENT: Negative.     Eyes: Negative.    Respiratory: Negative.     Cardiovascular: Negative.    Gastrointestinal: Negative.    Endocrine: Negative.    Genitourinary: Negative.    Musculoskeletal: Negative.    Skin: Negative.    Neurological: Negative.    Hematological: Negative.    Psychiatric/Behavioral: Negative.      A complete review of systems is negative other than that noted above in the HPI.       Objective   Pulse 69   Temp 98.6 °F (37 °C)   Resp 18   Ht 5' 5\" (1.651 m)   Wt 75 kg (165 lb 6.4 oz)   LMP  " "(LMP Unknown)   SpO2 97%   BMI 27.52 kg/m²     Body mass index is 27.52 kg/m².  Pain Screening:  Pain Score: 0-No pain  ECOG   2  Physical Exam  Constitutional:       Appearance: She is well-developed.   HENT:      Head: Normocephalic and atraumatic.     Eyes:      Pupils: Pupils are equal, round, and reactive to light.       Cardiovascular:      Rate and Rhythm: Normal rate and regular rhythm.      Heart sounds: Normal heart sounds.   Pulmonary:      Effort: Pulmonary effort is normal. No respiratory distress.      Breath sounds: Normal breath sounds.   Abdominal:      General: Bowel sounds are normal. There is no distension.      Palpations: Abdomen is soft. Abdomen is not rigid.      Tenderness: There is no abdominal tenderness. There is no guarding or rebound.   Genitourinary:     Comments: -Normal external female genitalia, normal Bartholin's and Garceno's glands                  -Normal midline urethral meatus. No lesions notes                  -Bladder without fullness mass or tenderness                  -Vagina without lesion or discharge No significant cystocele or rectocele noted                  -Cervix normal appearing without visible lesions                  -Uterus with normal contour, mobility. No tenderness,                  -Adnexae without  mass or tenderness                  - Anus without fissure of lesion      Musculoskeletal:         General: Normal range of motion.      Cervical back: Normal range of motion and neck supple.   Lymphadenopathy:      Cervical: No cervical adenopathy.      Upper Body:      Right upper body: No supraclavicular adenopathy.      Left upper body: No supraclavicular adenopathy.     Skin:     General: Skin is warm and dry.     Neurological:      Mental Status: She is alert and oriented to person, place, and time.     Psychiatric:         Behavior: Behavior normal.          Labs: I have reviewed pertinent labs.   No results found for: \"\"  Lab Results   Component " Value Date/Time    Potassium 3.6 03/28/2025 05:16 AM    Chloride 103 03/28/2025 05:16 AM    CO2 27 03/28/2025 05:16 AM    BUN 17 03/28/2025 05:16 AM    Creatinine 0.78 03/28/2025 05:16 AM    Calcium 8.2 (L) 03/28/2025 05:16 AM    eGFR 78 03/28/2025 05:16 AM     Lab Results   Component Value Date/Time    WBC 3.99 (L) 03/28/2025 05:16 AM    Hemoglobin 11.2 (L) 03/28/2025 05:16 AM    Hematocrit 35.0 03/28/2025 05:16 AM    MCV 94 03/28/2025 05:16 AM    Platelets 169 03/28/2025 05:16 AM     Lab Results   Component Value Date/Time    Absolute Neutrophils 1.46 (L) 03/28/2025 05:16 AM        Trend:  Lab Results   Component Value Date     6.3 09/10/2022

## 2025-06-16 NOTE — ASSESSMENT & PLAN NOTE
Patient has persistent decreasing likely paraovarian cyst.  We have discussed that the likelihood of malignancy is 1% or less.  We have given the patient the option of continued ultrasound surveillance or no further close observation.  The patient would prefer under the given circumstances with her multiple medical issues not to screen.  If she does develop any symptoms of bloating bowel or bladder dysfunction that last 2 to 4 weeks she will call us.

## 2025-06-27 ENCOUNTER — TELEPHONE (OUTPATIENT)
Age: 70
End: 2025-06-27

## (undated) DEVICE — SUT MONOCRYL 4-0 PS-2 27 IN Y426H

## (undated) DEVICE — ENDOPATH 5MM CURVED SCISSORS WITH MONOPOLAR CAUTERY: Brand: ENDOPATH

## (undated) DEVICE — SUT VICRYL 3-0 SH 27 IN J416H

## (undated) DEVICE — ENDOPATH ETS45 2.5MM RELOADS (VASCULAR/THIN): Brand: ENDOPATH

## (undated) DEVICE — GLOVE SRG BIOGEL 7.5

## (undated) DEVICE — 4-PORT MANIFOLD: Brand: NEPTUNE 2

## (undated) DEVICE — 3M™ TEGADERM™ TRANSPARENT FILM DRESSING FRAME STYLE, 1624W, 2-3/8 IN X 2-3/4 IN (6 CM X 7 CM), 100/CT 4CT/CASE: Brand: 3M™ TEGADERM™

## (undated) DEVICE — ENDOPATH ETS-FLEX45 ARTICULATING ENDOSCOPIC LINEAR CUTTER, NO RELOAD: Brand: ENDOPATH

## (undated) DEVICE — ENDOPOUCH RETRIEVER SPECIMEN RETRIEVAL BAGS: Brand: ENDOPOUCH RETRIEVER

## (undated) DEVICE — GLOVE SRG BIOGEL 7

## (undated) DEVICE — TROCARS: Brand: KII® OPTICAL ACCESS SYSTEM

## (undated) DEVICE — 5 MM CURVED DISSECTORS WITH MONOPOLAR CAUTERY: Brand: ENDOPATH

## (undated) DEVICE — ADHESIVE SKIN HIGH VISCOSITY EXOFIN 1ML

## (undated) DEVICE — TROCARS: Brand: KII® BALLOON BLUNT TIP SYSTEM

## (undated) DEVICE — PACK PBDS LAP CHOLE RF

## (undated) DEVICE — GLOVE INDICATOR PI UNDERGLOVE SZ 7.5 BLUE

## (undated) DEVICE — IRRIGATOR DISPOSABLE SUCTION

## (undated) DEVICE — GARMENT,MEDLINE,DVT,INT,CALF,FOAM,MED: Brand: MEDLINE

## (undated) DEVICE — TROCAR: Brand: KII FIOS FIRST ENTRY

## (undated) DEVICE — ETS45 RELOAD STANDARD 45MM: Brand: ENDOPATH

## (undated) DEVICE — GAUZE SPONGES,8 PLY: Brand: CURITY

## (undated) DEVICE — ENDOPATH XCEL BLADELESS TROCARS WITH STABILITY SLEEVES: Brand: ENDOPATH XCEL

## (undated) DEVICE — NEEDLE 25G X 1 1/2

## (undated) DEVICE — SUT VICRYL 0 UR-6 27 IN J603H

## (undated) DEVICE — HARMONIC 1100 SHEARS, 36CM SHAFT LENGTH: Brand: HARMONIC

## (undated) DEVICE — CHLORAPREP HI-LITE 26ML ORANGE

## (undated) DEVICE — TRAY FOLEY 16FR URIMETER SURESTEP

## (undated) DEVICE — INTENDED FOR TISSUE SEPARATION, AND OTHER PROCEDURES THAT REQUIRE A SHARP SURGICAL BLADE TO PUNCTURE OR CUT.: Brand: BARD-PARKER ® CARBON RIB-BACK BLADES

## (undated) DEVICE — DISPOSABLE LAPAROSCOPIC CLIP APPLIER WITH 20 CLIPS.: Brand: EPIX® UNIVERSAL CLIP APPLIER